# Patient Record
Sex: FEMALE | Race: WHITE | Employment: OTHER | URBAN - METROPOLITAN AREA
[De-identification: names, ages, dates, MRNs, and addresses within clinical notes are randomized per-mention and may not be internally consistent; named-entity substitution may affect disease eponyms.]

---

## 2017-04-13 ENCOUNTER — TELEPHONE (OUTPATIENT)
Dept: CARDIOLOGY CLINIC | Age: 67
End: 2017-04-13

## 2017-04-19 ENCOUNTER — OFFICE VISIT (OUTPATIENT)
Dept: CARDIOLOGY CLINIC | Age: 67
End: 2017-04-19

## 2017-04-19 VITALS
SYSTOLIC BLOOD PRESSURE: 116 MMHG | HEART RATE: 88 BPM | DIASTOLIC BLOOD PRESSURE: 60 MMHG | BODY MASS INDEX: 24.74 KG/M2 | HEIGHT: 60 IN | WEIGHT: 126 LBS

## 2017-04-19 DIAGNOSIS — R06.02 SOB (SHORTNESS OF BREATH): ICD-10-CM

## 2017-04-19 DIAGNOSIS — I10 ESSENTIAL HYPERTENSION: ICD-10-CM

## 2017-04-19 DIAGNOSIS — E78.2 MIXED HYPERLIPIDEMIA: Primary | ICD-10-CM

## 2017-04-19 PROCEDURE — 99212 OFFICE O/P EST SF 10 MIN: CPT | Performed by: INTERNAL MEDICINE

## 2017-04-25 ENCOUNTER — HOSPITAL ENCOUNTER (OUTPATIENT)
Dept: GENERAL RADIOLOGY | Age: 67
Discharge: HOME | End: 2017-04-25
Attending: FAMILY MEDICINE | Admitting: FAMILY MEDICINE

## 2017-04-25 DIAGNOSIS — R60.9 ACUTE EDEMA: ICD-10-CM

## 2017-05-02 LAB
BUN BLDV-MCNC: 15 MG/DL
CALCIUM SERPL-MCNC: 9.3 MG/DL
CHLORIDE BLD-SCNC: 92 MMOL/L
CO2: 43 MMOL/L
CREAT SERPL-MCNC: 0.7 MG/DL
GFR CALCULATED: NORMAL
GLUCOSE BLD-MCNC: 86 MG/DL
POTASSIUM SERPL-SCNC: 3.8 MMOL/L
SODIUM BLD-SCNC: 145 MMOL/L

## 2017-09-15 LAB
ALBUMIN SERPL-MCNC: 4.5 G/DL
ALP BLD-CCNC: 88 U/L
ALT SERPL-CCNC: 16 U/L
ANION GAP SERPL CALCULATED.3IONS-SCNC: NORMAL MMOL/L
AST SERPL-CCNC: 19 U/L
BILIRUB SERPL-MCNC: 0.4 MG/DL (ref 0.1–1.4)
BUN BLDV-MCNC: 33 MG/DL
CALCIUM SERPL-MCNC: 9.9 MG/DL
CHLORIDE BLD-SCNC: 93 MMOL/L
CHOLESTEROL, TOTAL: 201 MG/DL
CHOLESTEROL/HDL RATIO: ABNORMAL
CO2: 41 MMOL/L
CREAT SERPL-MCNC: 1.1 MG/DL
GFR CALCULATED: NORMAL
GLUCOSE BLD-MCNC: 106 MG/DL
HDLC SERPL-MCNC: 114 MG/DL (ref 35–70)
LDL CHOLESTEROL CALCULATED: 73 MG/DL (ref 0–160)
POTASSIUM SERPL-SCNC: 4.3 MMOL/L
SODIUM BLD-SCNC: 145 MMOL/L
TOTAL PROTEIN: 7.4
TRIGL SERPL-MCNC: 70 MG/DL
VLDLC SERPL CALC-MCNC: 14 MG/DL

## 2018-04-18 ENCOUNTER — OFFICE VISIT (OUTPATIENT)
Dept: CARDIOLOGY CLINIC | Age: 68
End: 2018-04-18

## 2018-04-18 VITALS
DIASTOLIC BLOOD PRESSURE: 70 MMHG | WEIGHT: 117.8 LBS | BODY MASS INDEX: 23.13 KG/M2 | HEIGHT: 60 IN | HEART RATE: 90 BPM | SYSTOLIC BLOOD PRESSURE: 116 MMHG

## 2018-04-18 DIAGNOSIS — R07.9 CHEST PAIN, UNSPECIFIED TYPE: ICD-10-CM

## 2018-04-18 DIAGNOSIS — R06.02 SOB (SHORTNESS OF BREATH): ICD-10-CM

## 2018-04-18 DIAGNOSIS — E78.2 MIXED HYPERLIPIDEMIA: ICD-10-CM

## 2018-04-18 DIAGNOSIS — I10 ESSENTIAL HYPERTENSION: Primary | ICD-10-CM

## 2018-04-18 PROCEDURE — 93000 ELECTROCARDIOGRAM COMPLETE: CPT | Performed by: INTERNAL MEDICINE

## 2018-04-18 PROCEDURE — 99213 OFFICE O/P EST LOW 20 MIN: CPT | Performed by: INTERNAL MEDICINE

## 2018-07-03 ENCOUNTER — HOSPITAL ENCOUNTER (OUTPATIENT)
Dept: WOMENS IMAGING | Age: 68
Discharge: HOME | End: 2018-07-03
Attending: FAMILY MEDICINE | Admitting: FAMILY MEDICINE

## 2018-07-03 ENCOUNTER — HOSPITAL ENCOUNTER (OUTPATIENT)
Dept: GENERAL RADIOLOGY | Age: 68
Discharge: HOME | End: 2018-07-03
Attending: INTERNAL MEDICINE | Admitting: INTERNAL MEDICINE

## 2018-07-03 DIAGNOSIS — J43.2 CENTRILOBULAR EMPHYSEMA (HCC): ICD-10-CM

## 2018-07-03 DIAGNOSIS — Z12.31 VISIT FOR SCREENING MAMMOGRAM: ICD-10-CM

## 2019-03-19 LAB
ALBUMIN SERPL-MCNC: 4.6 G/DL
ALP BLD-CCNC: 102 U/L
ALT SERPL-CCNC: 9 U/L
ANION GAP SERPL CALCULATED.3IONS-SCNC: ABNORMAL MMOL/L
AST SERPL-CCNC: 19 U/L
BILIRUB SERPL-MCNC: 0.4 MG/DL (ref 0.1–1.4)
BUN BLDV-MCNC: 21 MG/DL
CALCIUM SERPL-MCNC: 9.9 MG/DL
CHLORIDE BLD-SCNC: 93 MMOL/L
CHOLESTEROL, TOTAL: 174 MG/DL
CHOLESTEROL/HDL RATIO: ABNORMAL
CO2: 35 MMOL/L
CREAT SERPL-MCNC: 0.8 MG/DL
GFR CALCULATED: 75
GLUCOSE BLD-MCNC: 97 MG/DL
HDLC SERPL-MCNC: 79 MG/DL (ref 35–70)
LDL CHOLESTEROL CALCULATED: 73 MG/DL (ref 0–160)
POTASSIUM SERPL-SCNC: 4.7 MMOL/L
SODIUM BLD-SCNC: 142 MMOL/L
TOTAL PROTEIN: 7.2
TRIGL SERPL-MCNC: 109 MG/DL
VLDLC SERPL CALC-MCNC: 22 MG/DL

## 2019-04-16 ENCOUNTER — OFFICE VISIT (OUTPATIENT)
Dept: CARDIOLOGY CLINIC | Age: 69
End: 2019-04-16
Payer: COMMERCIAL

## 2019-04-16 VITALS
HEIGHT: 60 IN | DIASTOLIC BLOOD PRESSURE: 80 MMHG | SYSTOLIC BLOOD PRESSURE: 124 MMHG | HEART RATE: 86 BPM | BODY MASS INDEX: 25.05 KG/M2 | WEIGHT: 127.6 LBS

## 2019-04-16 DIAGNOSIS — I10 ESSENTIAL HYPERTENSION: Primary | ICD-10-CM

## 2019-04-16 DIAGNOSIS — E78.2 MIXED HYPERLIPIDEMIA: ICD-10-CM

## 2019-04-16 PROCEDURE — 99213 OFFICE O/P EST LOW 20 MIN: CPT | Performed by: NURSE PRACTITIONER

## 2019-04-16 NOTE — PROGRESS NOTES
[facility] [place]  [place]  Phone: [phone]    Fax [phone]                  Rafa Mcnulty MD, Nam Juarez MD, [address] Lee MD, MD Selin Trivedi MD Elida Christ, MD Jori Push, APRYUN Christensen, HELIO      4/16/2019    RE: Sanjana Foster  (1950)                               TO:  Dr. Junie Guadarrama MD  The primary cardiologist is Dr. Yessica Salvador    CC: HTN- HLD-  SOB/COPD  VHD    HPI:    Thank you for involving me in taking care of your patient Sanjana Foster. She is a 77y.o. year old female with a history of HTN- HLD - SOB/COPD- VHD and is being seen in the office today    She reports that she is doing fair. She notes on going SOB- using oxygen around the clock. She reports intermittent fleeting chest pain that is lasting for a second. Not related to activity. There is no palpitations or dizziness.         Vitals:    04/16/19 1055   BP: 124/80   Pulse: 86       Current Outpatient Medications   Medication Sig Dispense Refill    Umeclidinium Bromide (INCRUSE ELLIPTA) 62.5 MCG/INH AEPB Inhale 1 puff into the lungs daily 1 each 5    albuterol (PROVENTIL) (2.5 MG/3ML) 0.083% nebulizer solution Take 3 mLs by nebulization 4 times daily 120 each 5    LORazepam (ATIVAN) 0.5 MG tablet take 1 tablet by mouth three times a day if needed  0    furosemide (LASIX) 40 MG tablet Take 40 mg by mouth 2 times daily      fluticasone (FLONASE) 50 MCG/ACT nasal spray 1 spray by Nasal route daily 1 Bottle 3    GuaiFENesin (MUCINEX PO) Take by mouth      albuterol sulfate HFA (PROAIR HFA) 108 (90 BASE) MCG/ACT inhaler Inhale 2 puffs into the lungs every 6 hours as needed for Wheezing 1 Inhaler 6    Cholecalciferol (VITAMIN D3) 2000 UNITS CAPS Take 1 capsule by mouth daily      HYDROcodone-acetaminophen (NORCO)  MG per tablet   0    simvastatin (ZOCOR) 20 MG tablet Take 20 mg by mouth nightly.  aspirin 81 MG tablet Take 81 mg by mouth daily. No current facility-administered medications for this visit. Allergies: Formaldehyde; Gabapentin; Sosa Ephraim [aclidinium bromide]; and Cranberry  Past Medical History:   Diagnosis Date    COPD (chronic obstructive pulmonary disease) ([facility] [address]     H/O cardiovascular stress test 11/18/2009    thallium, normal no ischemia EF70%     H/O cardiovascular stress test 10/11/2013    thallium,EF70%, normal, no ischemia    H/O Doppler ultrasound 10/13/10    carotid, right normal, left normal    H/O Doppler ultrasound 10/07/14    Carotid mild bilateral internal carotid artery disease less than 50% in severity. Normal vertebral artery flows with good velocities. Incidental finding of possible thyroid nodule in the left lobe.  History of 24 hour EKG monitoring 3/11/2011    NSR no ectopy    History of nuclear stress test 10/21/2016    lexiscan-normal,no ischemia,EF70%,enlarged right ventricle    Hx of chest x-ray 01/02/2015    WNL    Hx of echocardiogram 10/11/2013    10/13 Abn lt ventricular diastolic function, mile MR, EF 57%,10/10 EF55%, mild mitral annular calcification    Hx of echocardiogram 10/21/2016    EF >55%. Normal chamber sizes. Normal LVSF. Mild aortic stenosis. Mild TR. Normal sized abdominal aorta.      Hx of pelvic x-ray 01/02/2015    Severe RT his osterarothrosis    Hx of x-ray of hip 01/02/2015    Severe Rt hip osteosrthrosis    Hyperlipidemia     Hypertension     Leg pain      Past Surgical History:   Procedure Laterality Date    BACK SURGERY      CYST REMOVAL      left arm    HYSTERECTOMY      1999    TUBAL LIGATION       Family History   Problem Relation Age of Onset    Stroke Mother     Dementia Mother     Heart Attack Mother 61    Cancer Father     Stroke Father     Heart Attack Father 61    Diabetes Sister     Heart Attack Sister     Hypertension Sister    [facility] Cancer Sister     Stroke Sister     Hypertension Sister     Hypertension Sister     Irritable Bowel Syndrome Sister      Social History     Tobacco Use    Smoking status: Current Some Day Smoker     Packs/day: 1.00     Years: 47.00     Pack years: 47.00    Smokeless tobacco: Never Used   Substance Use Topics    Alcohol use: Yes     Alcohol/week: 0.0 oz     Comment: rare        Review of Systems:   · Constitutional: No Fever,no unintentional weight Loss   · Eyes: No change in Vision:     · ENT: No Headaches, Hearing Loss or Vertigo. No tinnitus   · Cardiovascular: as per note above   · Respiratory: No cough or wheezing and as per note above. · Gastrointestinal: no abdominal pain, no appetite loss, no blood in stools, constipation, or diarrhea, No heartburn  · Genitourinary: No dysuria, trouble voiding, or hematuria  · Musculoskeletal: normal strength to legs and arms Yes,  back pain- Yes: myalgia No,  Arthralgia- No  · Integumentary: No rash or pruritis  · Neurological: No TIA or stroke symptoms  · Psychiatric: Anxiety- No: depression-  No   · Endocrine: No malaise-No, fatigue No,- no temperature intolerance  · Hematologic/Lymphatic: No bleeding problems, blood clots or swollen lymph nodes  · Allergic/Immunologic: No nasal congestion or hives    Objective:      Physical Exam:  /80   Pulse 86   Ht 5' (1.524 m)   Wt 127 lb 9.6 oz (57.9 kg)   BMI 24.92 kg/m²   Wt Readings from Last 3 Encounters:   04/16/19 127 lb 9.6 oz (57.9 kg)   01/14/19 121 lb 6.4 oz (55.1 kg)   09/18/18 118 lb (53.5 kg)     Body mass index is 24.92 kg/m². GENERAL - Alert, oriented, pleasant, in no apparent distress. Head unremarkable  Eyes - pupils equal and reactive to light - bilateral conjunctiva are pink: sclera are white   ENT - external ears intact, nose is intact:  tongue is midline pink and moist  Neck - Supple. No jugular venous distention noted. No carotid bruits appreciated.    Cardiovascular - Normal S1 and S2: murmur appreciated No, No gallop. Regular rate- Yes,  rhythm regular-Yes. Extremities - No cyanosis, clubbing, no edema to lower legs. Pulmonary - No respiratory distress. No wheezes or rales. Chest is clear  Pulses: Bilateral radial and pedal pulses normal  Abdomen -  Soft no tenderness, non distended   Musculoskeletal - Normal movement of all extremities   Neurologic - alert and oriented: There are no gross focal neurologic abnormalities. Skin-  No rash: No echymosis   Affect- normal mood and pleasant     DATA:  No results found for: CKTOTAL, CKMB, CKMBINDEX, TROPONINI  BNP:  No results found for: BNP  PT/INR:  No results found for: PTINR  No results found for: LABA1C  Lab Results   Component Value Date    CHOL 201 09/15/2017    TRIG 70 09/15/2017     (A) 09/15/2017    LDLCALC 73 09/15/2017     Lab Results   Component Value Date    ALT 16 09/15/2017    AST 19 09/15/2017     TSH:  No results found for: TSH      Assessment/ Plan:    Patient seen, interviewed and examined. Testing was reviewed. HTN    Controlled  advised low salt diet   Testing ordered:  no    Hyperlipidemia  At or near goal no recent labs will request copy   Current medications include: simvastatin   She is to continue current medications     SOB /COPD  On chronic oxygen    Stable     VHD  Mild AS   Will check echo     Lifestyle and risk factor modificatons discussed. Various goals are discussed and questions answered. Continue current medications. Appropriate prescriptions are addressed. Questions answered and patient verbalizes understanding. Call for any problems, questions, or concerns.

## 2019-04-16 NOTE — PATIENT INSTRUCTIONS
Please hold on to these instructions the  will call you within 1-9 business days when we receive authorization from your insurance. Echocardiogram    WHAT TO EXPECT:   ? This test will take approximately 45 minutes. ? It is an ultrasound of the heart. ? It can look at the valves and chambers inside the heart   ? There is no special instructions for this test.     If you are unable to keep this appointment, please notify us 24 hours prior to test at [phone].

## 2019-05-02 ENCOUNTER — PROCEDURE VISIT (OUTPATIENT)
Dept: CARDIOLOGY CLINIC | Age: 69
End: 2019-05-02
Payer: COMMERCIAL

## 2019-05-02 DIAGNOSIS — I10 ESSENTIAL HYPERTENSION: ICD-10-CM

## 2019-05-02 DIAGNOSIS — E78.2 MIXED HYPERLIPIDEMIA: ICD-10-CM

## 2019-05-02 DIAGNOSIS — I35.0 AORTIC VALVE STENOSIS, ETIOLOGY OF CARDIAC VALVE DISEASE UNSPECIFIED: Primary | ICD-10-CM

## 2019-05-02 LAB
LV EF: 58 %
LVEF MODALITY: NORMAL

## 2019-05-02 PROCEDURE — 93306 TTE W/DOPPLER COMPLETE: CPT | Performed by: INTERNAL MEDICINE

## 2019-05-08 ENCOUNTER — TELEPHONE (OUTPATIENT)
Dept: CARDIOLOGY CLINIC | Age: 69
End: 2019-05-08

## 2019-05-08 NOTE — TELEPHONE ENCOUNTER
LM on VM of echo results. Left ventricular systolic function is normal with an ejection fraction of   55-60%.  Grade I diastolic dysfunction.   Mild aortic stenosis with mean gradient of 11 mmHg.   Calcific thickening of posterior leaflet of Mitral valve.   Right ventricular systolic pressure of 38 mmHg consistent with mild   pulmonary hypertension.   No evidence of pericardial effusion.

## 2019-05-18 ENCOUNTER — ANESTHESIA EVENT (OUTPATIENT)
Dept: OPERATING ROOM | Age: 69
DRG: 343 | End: 2019-05-18
Payer: COMMERCIAL

## 2019-05-18 ENCOUNTER — HOSPITAL ENCOUNTER (INPATIENT)
Age: 69
LOS: 1 days | Discharge: HOME | DRG: 343 | End: 2019-05-19
Attending: EMERGENCY MEDICINE | Admitting: FAMILY MEDICINE
Payer: COMMERCIAL

## 2019-05-18 ENCOUNTER — APPOINTMENT (OUTPATIENT)
Dept: CT IMAGING | Age: 69
DRG: 343 | End: 2019-05-18
Payer: COMMERCIAL

## 2019-05-18 ENCOUNTER — ANESTHESIA (OUTPATIENT)
Dept: OPERATING ROOM | Age: 69
DRG: 343 | End: 2019-05-18
Payer: COMMERCIAL

## 2019-05-18 VITALS
SYSTOLIC BLOOD PRESSURE: 100 MMHG | OXYGEN SATURATION: 98 % | TEMPERATURE: 98.6 F | RESPIRATION RATE: 6 BRPM | DIASTOLIC BLOOD PRESSURE: 64 MMHG

## 2019-05-18 DIAGNOSIS — K35.80 ACUTE APPENDICITIS, UNSPECIFIED ACUTE APPENDICITIS TYPE: Primary | ICD-10-CM

## 2019-05-18 DIAGNOSIS — D72.829 LEUKOCYTOSIS, UNSPECIFIED TYPE: ICD-10-CM

## 2019-05-18 DIAGNOSIS — I71.40 ABDOMINAL AORTIC ANEURYSM (AAA) WITHOUT RUPTURE: ICD-10-CM

## 2019-05-18 DIAGNOSIS — R11.2 NON-INTRACTABLE VOMITING WITH NAUSEA, UNSPECIFIED VOMITING TYPE: ICD-10-CM

## 2019-05-18 DIAGNOSIS — K35.20 ACUTE APPENDICITIS WITH PERFORATION AND GENERALIZED PERITONITIS, WITHOUT ABSCESS OR GANGRENE: ICD-10-CM

## 2019-05-18 PROBLEM — Z72.0 TOBACCO ABUSE: Status: ACTIVE | Noted: 2019-05-18

## 2019-05-18 LAB
ALBUMIN SERPL-MCNC: 4.5 GM/DL (ref 3.4–5)
ALP BLD-CCNC: 90 IU/L (ref 40–129)
ALT SERPL-CCNC: 8 U/L (ref 10–40)
ANION GAP SERPL CALCULATED.3IONS-SCNC: 13 MMOL/L (ref 4–16)
AST SERPL-CCNC: 14 IU/L (ref 15–37)
BASOPHILS ABSOLUTE: 0 K/CU MM
BASOPHILS RELATIVE PERCENT: 0.1 % (ref 0–1)
BILIRUB SERPL-MCNC: 0.4 MG/DL (ref 0–1)
BUN BLDV-MCNC: 23 MG/DL (ref 6–23)
CALCIUM SERPL-MCNC: 10.1 MG/DL (ref 8.3–10.6)
CHLORIDE BLD-SCNC: 95 MMOL/L (ref 99–110)
CO2: 32 MMOL/L (ref 21–32)
CREAT SERPL-MCNC: 0.7 MG/DL (ref 0.6–1.1)
DIFFERENTIAL TYPE: ABNORMAL
EOSINOPHILS ABSOLUTE: 0 K/CU MM
EOSINOPHILS RELATIVE PERCENT: 0 % (ref 0–3)
GFR AFRICAN AMERICAN: >60 ML/MIN/1.73M2
GFR NON-AFRICAN AMERICAN: >60 ML/MIN/1.73M2
GLUCOSE BLD-MCNC: 166 MG/DL (ref 70–99)
HCT VFR BLD CALC: 44.4 % (ref 37–47)
HEMOGLOBIN: 13.4 GM/DL (ref 12.5–16)
IMMATURE NEUTROPHIL %: 0.4 % (ref 0–0.43)
LACTATE: 1.3 MMOL/L (ref 0.4–2)
LIPASE: 8 IU/L (ref 13–60)
LYMPHOCYTES ABSOLUTE: 0.6 K/CU MM
LYMPHOCYTES RELATIVE PERCENT: 3.9 % (ref 24–44)
MCH RBC QN AUTO: 27.6 PG (ref 27–31)
MCHC RBC AUTO-ENTMCNC: 30.2 % (ref 32–36)
MCV RBC AUTO: 91.4 FL (ref 78–100)
MONOCYTES ABSOLUTE: 0.3 K/CU MM
MONOCYTES RELATIVE PERCENT: 2.1 % (ref 0–4)
NUCLEATED RBC %: 0 %
PDW BLD-RTO: 13.4 % (ref 11.7–14.9)
PLATELET # BLD: 257 K/CU MM (ref 140–440)
PMV BLD AUTO: 10.7 FL (ref 7.5–11.1)
POTASSIUM SERPL-SCNC: 4.1 MMOL/L (ref 3.5–5.1)
RBC # BLD: 4.86 M/CU MM (ref 4.2–5.4)
SEGMENTED NEUTROPHILS ABSOLUTE COUNT: 15.3 K/CU MM
SEGMENTED NEUTROPHILS RELATIVE PERCENT: 93.5 % (ref 36–66)
SODIUM BLD-SCNC: 140 MMOL/L (ref 135–145)
TOTAL IMMATURE NEUTOROPHIL: 0.07 K/CU MM
TOTAL NUCLEATED RBC: 0 K/CU MM
TOTAL PROTEIN: 7.9 GM/DL (ref 6.4–8.2)
WBC # BLD: 16.3 K/CU MM (ref 4–10.5)

## 2019-05-18 PROCEDURE — 6360000002 HC RX W HCPCS: Performed by: NURSE ANESTHETIST, CERTIFIED REGISTERED

## 2019-05-18 PROCEDURE — 0DTJ4ZZ RESECTION OF APPENDIX, PERCUTANEOUS ENDOSCOPIC APPROACH: ICD-10-PCS | Performed by: SURGERY

## 2019-05-18 PROCEDURE — 2580000003 HC RX 258: Performed by: NURSE PRACTITIONER

## 2019-05-18 PROCEDURE — 3700000001 HC ADD 15 MINUTES (ANESTHESIA): Performed by: SURGERY

## 2019-05-18 PROCEDURE — G0378 HOSPITAL OBSERVATION PER HR: HCPCS

## 2019-05-18 PROCEDURE — 6360000002 HC RX W HCPCS: Performed by: ANESTHESIOLOGY

## 2019-05-18 PROCEDURE — 96372 THER/PROPH/DIAG INJ SC/IM: CPT

## 2019-05-18 PROCEDURE — 6370000000 HC RX 637 (ALT 250 FOR IP): Performed by: NURSE PRACTITIONER

## 2019-05-18 PROCEDURE — 74177 CT ABD & PELVIS W/CONTRAST: CPT

## 2019-05-18 PROCEDURE — 3700000000 HC ANESTHESIA ATTENDED CARE: Performed by: SURGERY

## 2019-05-18 PROCEDURE — 99223 1ST HOSP IP/OBS HIGH 75: CPT | Performed by: SURGERY

## 2019-05-18 PROCEDURE — 36415 COLL VENOUS BLD VENIPUNCTURE: CPT

## 2019-05-18 PROCEDURE — 1200000000 HC SEMI PRIVATE

## 2019-05-18 PROCEDURE — 83690 ASSAY OF LIPASE: CPT

## 2019-05-18 PROCEDURE — 6360000002 HC RX W HCPCS: Performed by: NURSE PRACTITIONER

## 2019-05-18 PROCEDURE — 7100000000 HC PACU RECOVERY - FIRST 15 MIN: Performed by: SURGERY

## 2019-05-18 PROCEDURE — 6360000004 HC RX CONTRAST MEDICATION: Performed by: PHYSICIAN ASSISTANT

## 2019-05-18 PROCEDURE — 99285 EMERGENCY DEPT VISIT HI MDM: CPT

## 2019-05-18 PROCEDURE — 2580000003 HC RX 258: Performed by: PHYSICIAN ASSISTANT

## 2019-05-18 PROCEDURE — 88304 TISSUE EXAM BY PATHOLOGIST: CPT

## 2019-05-18 PROCEDURE — 85025 COMPLETE CBC W/AUTO DIFF WBC: CPT

## 2019-05-18 PROCEDURE — 83605 ASSAY OF LACTIC ACID: CPT

## 2019-05-18 PROCEDURE — 3600000014 HC SURGERY LEVEL 4 ADDTL 15MIN: Performed by: SURGERY

## 2019-05-18 PROCEDURE — 2500000003 HC RX 250 WO HCPCS: Performed by: NURSE ANESTHETIST, CERTIFIED REGISTERED

## 2019-05-18 PROCEDURE — 96365 THER/PROPH/DIAG IV INF INIT: CPT

## 2019-05-18 PROCEDURE — 94640 AIRWAY INHALATION TREATMENT: CPT

## 2019-05-18 PROCEDURE — 96374 THER/PROPH/DIAG INJ IV PUSH: CPT

## 2019-05-18 PROCEDURE — 96376 TX/PRO/DX INJ SAME DRUG ADON: CPT

## 2019-05-18 PROCEDURE — 7100000001 HC PACU RECOVERY - ADDTL 15 MIN: Performed by: SURGERY

## 2019-05-18 PROCEDURE — 2709999900 HC NON-CHARGEABLE SUPPLY: Performed by: SURGERY

## 2019-05-18 PROCEDURE — 44970 LAPAROSCOPY APPENDECTOMY: CPT | Performed by: SURGERY

## 2019-05-18 PROCEDURE — 2580000003 HC RX 258: Performed by: NURSE ANESTHETIST, CERTIFIED REGISTERED

## 2019-05-18 PROCEDURE — 96375 TX/PRO/DX INJ NEW DRUG ADDON: CPT

## 2019-05-18 PROCEDURE — 80053 COMPREHEN METABOLIC PANEL: CPT

## 2019-05-18 PROCEDURE — 6360000002 HC RX W HCPCS: Performed by: PHYSICIAN ASSISTANT

## 2019-05-18 PROCEDURE — 2500000003 HC RX 250 WO HCPCS: Performed by: SURGERY

## 2019-05-18 PROCEDURE — 2720000010 HC SURG SUPPLY STERILE: Performed by: SURGERY

## 2019-05-18 PROCEDURE — 3600000004 HC SURGERY LEVEL 4 BASE: Performed by: SURGERY

## 2019-05-18 RX ORDER — ASPIRIN 81 MG/1
81 TABLET, CHEWABLE ORAL DAILY
Status: DISCONTINUED | OUTPATIENT
Start: 2019-05-18 | End: 2019-05-19 | Stop reason: HOSPADM

## 2019-05-18 RX ORDER — FAMOTIDINE 20 MG/1
20 TABLET, FILM COATED ORAL 2 TIMES DAILY
Status: DISCONTINUED | OUTPATIENT
Start: 2019-05-18 | End: 2019-05-19 | Stop reason: HOSPADM

## 2019-05-18 RX ORDER — MORPHINE SULFATE 4 MG/ML
2 INJECTION, SOLUTION INTRAMUSCULAR; INTRAVENOUS
Status: DISCONTINUED | OUTPATIENT
Start: 2019-05-18 | End: 2019-05-18 | Stop reason: SDUPTHER

## 2019-05-18 RX ORDER — MORPHINE SULFATE 4 MG/ML
1 INJECTION, SOLUTION INTRAMUSCULAR; INTRAVENOUS
Status: DISCONTINUED | OUTPATIENT
Start: 2019-05-18 | End: 2019-05-19 | Stop reason: HOSPADM

## 2019-05-18 RX ORDER — ONDANSETRON 2 MG/ML
4 INJECTION INTRAMUSCULAR; INTRAVENOUS ONCE
Status: COMPLETED | OUTPATIENT
Start: 2019-05-18 | End: 2019-05-18

## 2019-05-18 RX ORDER — HYDROMORPHONE HCL 110MG/55ML
0.5 PATIENT CONTROLLED ANALGESIA SYRINGE INTRAVENOUS EVERY 5 MIN PRN
Status: DISCONTINUED | OUTPATIENT
Start: 2019-05-18 | End: 2019-05-18 | Stop reason: HOSPADM

## 2019-05-18 RX ORDER — OXYCODONE HYDROCHLORIDE AND ACETAMINOPHEN 5; 325 MG/1; MG/1
1 TABLET ORAL EVERY 6 HOURS PRN
Status: DISCONTINUED | OUTPATIENT
Start: 2019-05-18 | End: 2019-05-19 | Stop reason: HOSPADM

## 2019-05-18 RX ORDER — MORPHINE SULFATE 4 MG/ML
2 INJECTION, SOLUTION INTRAMUSCULAR; INTRAVENOUS
Status: DISCONTINUED | OUTPATIENT
Start: 2019-05-18 | End: 2019-05-19 | Stop reason: HOSPADM

## 2019-05-18 RX ORDER — SUCCINYLCHOLINE/SOD CL,ISO/PF 100 MG/5ML
SYRINGE (ML) INTRAVENOUS PRN
Status: DISCONTINUED | OUTPATIENT
Start: 2019-05-18 | End: 2019-05-18 | Stop reason: SDUPTHER

## 2019-05-18 RX ORDER — SODIUM CHLORIDE 9 MG/ML
INJECTION, SOLUTION INTRAVENOUS CONTINUOUS
Status: DISCONTINUED | OUTPATIENT
Start: 2019-05-18 | End: 2019-05-19 | Stop reason: HOSPADM

## 2019-05-18 RX ORDER — ACETAMINOPHEN 10 MG/ML
1000 INJECTION, SOLUTION INTRAVENOUS ONCE
Status: COMPLETED | OUTPATIENT
Start: 2019-05-18 | End: 2019-05-18

## 2019-05-18 RX ORDER — SODIUM CHLORIDE 0.9 % (FLUSH) 0.9 %
10 SYRINGE (ML) INJECTION PRN
Status: DISCONTINUED | OUTPATIENT
Start: 2019-05-18 | End: 2019-05-18 | Stop reason: SDUPTHER

## 2019-05-18 RX ORDER — PROPOFOL 10 MG/ML
INJECTION, EMULSION INTRAVENOUS PRN
Status: DISCONTINUED | OUTPATIENT
Start: 2019-05-18 | End: 2019-05-18 | Stop reason: SDUPTHER

## 2019-05-18 RX ORDER — FUROSEMIDE 40 MG/1
40 TABLET ORAL 2 TIMES DAILY
Status: DISCONTINUED | OUTPATIENT
Start: 2019-05-18 | End: 2019-05-19 | Stop reason: HOSPADM

## 2019-05-18 RX ORDER — ONDANSETRON 2 MG/ML
INJECTION INTRAMUSCULAR; INTRAVENOUS PRN
Status: DISCONTINUED | OUTPATIENT
Start: 2019-05-18 | End: 2019-05-18 | Stop reason: SDUPTHER

## 2019-05-18 RX ORDER — ALBUTEROL SULFATE 2.5 MG/3ML
2.5 SOLUTION RESPIRATORY (INHALATION) 4 TIMES DAILY
Status: DISCONTINUED | OUTPATIENT
Start: 2019-05-18 | End: 2019-05-19 | Stop reason: HOSPADM

## 2019-05-18 RX ORDER — ONDANSETRON 2 MG/ML
4 INJECTION INTRAMUSCULAR; INTRAVENOUS EVERY 6 HOURS PRN
Status: DISCONTINUED | OUTPATIENT
Start: 2019-05-18 | End: 2019-05-18 | Stop reason: SDUPTHER

## 2019-05-18 RX ORDER — DICYCLOMINE HYDROCHLORIDE 10 MG/ML
20 INJECTION INTRAMUSCULAR ONCE
Status: COMPLETED | OUTPATIENT
Start: 2019-05-18 | End: 2019-05-18

## 2019-05-18 RX ORDER — 0.9 % SODIUM CHLORIDE 0.9 %
1000 INTRAVENOUS SOLUTION INTRAVENOUS ONCE
Status: DISCONTINUED | OUTPATIENT
Start: 2019-05-18 | End: 2019-05-18

## 2019-05-18 RX ORDER — FLUTICASONE PROPIONATE 50 MCG
1 SPRAY, SUSPENSION (ML) NASAL DAILY
Status: DISCONTINUED | OUTPATIENT
Start: 2019-05-19 | End: 2019-05-19 | Stop reason: HOSPADM

## 2019-05-18 RX ORDER — MEPERIDINE HYDROCHLORIDE 25 MG/ML
12.5 INJECTION INTRAMUSCULAR; INTRAVENOUS; SUBCUTANEOUS EVERY 5 MIN PRN
Status: DISCONTINUED | OUTPATIENT
Start: 2019-05-18 | End: 2019-05-18 | Stop reason: HOSPADM

## 2019-05-18 RX ORDER — SODIUM CHLORIDE 0.9 % (FLUSH) 0.9 %
10 SYRINGE (ML) INJECTION EVERY 12 HOURS SCHEDULED
Status: DISCONTINUED | OUTPATIENT
Start: 2019-05-18 | End: 2019-05-18 | Stop reason: SDUPTHER

## 2019-05-18 RX ORDER — PIPERACILLIN SODIUM, TAZOBACTAM SODIUM 3; .375 G/15ML; G/15ML
INJECTION, POWDER, LYOPHILIZED, FOR SOLUTION INTRAVENOUS PRN
Status: DISCONTINUED | OUTPATIENT
Start: 2019-05-18 | End: 2019-05-18 | Stop reason: SDUPTHER

## 2019-05-18 RX ORDER — ACETAMINOPHEN 325 MG/1
650 TABLET ORAL EVERY 4 HOURS PRN
Status: DISCONTINUED | OUTPATIENT
Start: 2019-05-18 | End: 2019-05-19 | Stop reason: HOSPADM

## 2019-05-18 RX ORDER — GUAIFENESIN 600 MG/1
600 TABLET, EXTENDED RELEASE ORAL 2 TIMES DAILY PRN
Status: DISCONTINUED | OUTPATIENT
Start: 2019-05-18 | End: 2019-05-19 | Stop reason: HOSPADM

## 2019-05-18 RX ORDER — SODIUM CHLORIDE 9 MG/ML
INJECTION, SOLUTION INTRAVENOUS CONTINUOUS PRN
Status: DISCONTINUED | OUTPATIENT
Start: 2019-05-18 | End: 2019-05-18 | Stop reason: SDUPTHER

## 2019-05-18 RX ORDER — 0.9 % SODIUM CHLORIDE 0.9 %
10 VIAL (ML) INJECTION
Status: COMPLETED | OUTPATIENT
Start: 2019-05-18 | End: 2019-05-18

## 2019-05-18 RX ORDER — LABETALOL HYDROCHLORIDE 5 MG/ML
5 INJECTION, SOLUTION INTRAVENOUS EVERY 10 MIN PRN
Status: DISCONTINUED | OUTPATIENT
Start: 2019-05-18 | End: 2019-05-18 | Stop reason: HOSPADM

## 2019-05-18 RX ORDER — FENTANYL CITRATE 50 UG/ML
50 INJECTION, SOLUTION INTRAMUSCULAR; INTRAVENOUS EVERY 5 MIN PRN
Status: DISCONTINUED | OUTPATIENT
Start: 2019-05-18 | End: 2019-05-18 | Stop reason: HOSPADM

## 2019-05-18 RX ORDER — DEXAMETHASONE SODIUM PHOSPHATE 4 MG/ML
INJECTION, SOLUTION INTRA-ARTICULAR; INTRALESIONAL; INTRAMUSCULAR; INTRAVENOUS; SOFT TISSUE PRN
Status: DISCONTINUED | OUTPATIENT
Start: 2019-05-18 | End: 2019-05-18 | Stop reason: SDUPTHER

## 2019-05-18 RX ORDER — FENTANYL CITRATE 50 UG/ML
INJECTION, SOLUTION INTRAMUSCULAR; INTRAVENOUS PRN
Status: DISCONTINUED | OUTPATIENT
Start: 2019-05-18 | End: 2019-05-18 | Stop reason: SDUPTHER

## 2019-05-18 RX ORDER — MORPHINE SULFATE 4 MG/ML
4 INJECTION, SOLUTION INTRAMUSCULAR; INTRAVENOUS ONCE
Status: COMPLETED | OUTPATIENT
Start: 2019-05-18 | End: 2019-05-18

## 2019-05-18 RX ORDER — HYDRALAZINE HYDROCHLORIDE 20 MG/ML
5 INJECTION INTRAMUSCULAR; INTRAVENOUS EVERY 10 MIN PRN
Status: DISCONTINUED | OUTPATIENT
Start: 2019-05-18 | End: 2019-05-18 | Stop reason: HOSPADM

## 2019-05-18 RX ORDER — SODIUM CHLORIDE 0.9 % (FLUSH) 0.9 %
10 SYRINGE (ML) INJECTION PRN
Status: DISCONTINUED | OUTPATIENT
Start: 2019-05-18 | End: 2019-05-19 | Stop reason: HOSPADM

## 2019-05-18 RX ORDER — ALBUTEROL SULFATE 90 UG/1
2 AEROSOL, METERED RESPIRATORY (INHALATION) EVERY 6 HOURS PRN
Status: DISCONTINUED | OUTPATIENT
Start: 2019-05-18 | End: 2019-05-19 | Stop reason: HOSPADM

## 2019-05-18 RX ORDER — SODIUM CHLORIDE 9 MG/ML
INJECTION, SOLUTION INTRAVENOUS
Status: DISPENSED
Start: 2019-05-18 | End: 2019-05-19

## 2019-05-18 RX ORDER — LIDOCAINE HYDROCHLORIDE 20 MG/ML
INJECTION, SOLUTION INTRAVENOUS PRN
Status: DISCONTINUED | OUTPATIENT
Start: 2019-05-18 | End: 2019-05-18 | Stop reason: SDUPTHER

## 2019-05-18 RX ORDER — SIMVASTATIN 20 MG
20 TABLET ORAL NIGHTLY
Status: DISCONTINUED | OUTPATIENT
Start: 2019-05-18 | End: 2019-05-19 | Stop reason: HOSPADM

## 2019-05-18 RX ORDER — NICOTINE 21 MG/24HR
1 PATCH, TRANSDERMAL 24 HOURS TRANSDERMAL DAILY
Status: DISCONTINUED | OUTPATIENT
Start: 2019-05-18 | End: 2019-05-19 | Stop reason: HOSPADM

## 2019-05-18 RX ORDER — SODIUM CHLORIDE 0.9 % (FLUSH) 0.9 %
10 SYRINGE (ML) INJECTION EVERY 12 HOURS SCHEDULED
Status: DISCONTINUED | OUTPATIENT
Start: 2019-05-18 | End: 2019-05-19 | Stop reason: HOSPADM

## 2019-05-18 RX ORDER — PROMETHAZINE HYDROCHLORIDE 25 MG/ML
6.25 INJECTION, SOLUTION INTRAMUSCULAR; INTRAVENOUS
Status: DISCONTINUED | OUTPATIENT
Start: 2019-05-18 | End: 2019-05-18 | Stop reason: HOSPADM

## 2019-05-18 RX ORDER — ROCURONIUM BROMIDE 10 MG/ML
INJECTION, SOLUTION INTRAVENOUS PRN
Status: DISCONTINUED | OUTPATIENT
Start: 2019-05-18 | End: 2019-05-18 | Stop reason: SDUPTHER

## 2019-05-18 RX ORDER — ONDANSETRON 2 MG/ML
4 INJECTION INTRAMUSCULAR; INTRAVENOUS EVERY 6 HOURS PRN
Status: DISCONTINUED | OUTPATIENT
Start: 2019-05-18 | End: 2019-05-19 | Stop reason: HOSPADM

## 2019-05-18 RX ORDER — BUPIVACAINE HYDROCHLORIDE 5 MG/ML
INJECTION, SOLUTION EPIDURAL; INTRACAUDAL
Status: COMPLETED | OUTPATIENT
Start: 2019-05-18 | End: 2019-05-18

## 2019-05-18 RX ORDER — DIPHENHYDRAMINE HYDROCHLORIDE 50 MG/ML
12.5 INJECTION INTRAMUSCULAR; INTRAVENOUS
Status: DISCONTINUED | OUTPATIENT
Start: 2019-05-18 | End: 2019-05-18 | Stop reason: HOSPADM

## 2019-05-18 RX ORDER — 0.9 % SODIUM CHLORIDE 0.9 %
1000 INTRAVENOUS SOLUTION INTRAVENOUS ONCE
Status: COMPLETED | OUTPATIENT
Start: 2019-05-18 | End: 2019-05-18

## 2019-05-18 RX ORDER — LORAZEPAM 0.5 MG/1
0.5 TABLET ORAL
Status: DISCONTINUED | OUTPATIENT
Start: 2019-05-18 | End: 2019-05-19 | Stop reason: HOSPADM

## 2019-05-18 RX ADMIN — ALBUTEROL SULFATE 2.5 MG: 2.5 SOLUTION RESPIRATORY (INHALATION) at 20:18

## 2019-05-18 RX ADMIN — ONDANSETRON 4 MG: 2 INJECTION INTRAMUSCULAR; INTRAVENOUS at 13:49

## 2019-05-18 RX ADMIN — SODIUM CHLORIDE 1000 ML: 9 INJECTION, SOLUTION INTRAVENOUS at 11:51

## 2019-05-18 RX ADMIN — ACETAMINOPHEN 1000 MG: 10 INJECTION, SOLUTION INTRAVENOUS at 16:47

## 2019-05-18 RX ADMIN — SODIUM CHLORIDE, PRESERVATIVE FREE 10 ML: 5 INJECTION INTRAVENOUS at 12:12

## 2019-05-18 RX ADMIN — SODIUM CHLORIDE: 9 INJECTION, SOLUTION INTRAVENOUS at 16:52

## 2019-05-18 RX ADMIN — SIMVASTATIN 20 MG: 20 TABLET, FILM COATED ORAL at 21:37

## 2019-05-18 RX ADMIN — ROCURONIUM BROMIDE 30 MG: 50 INJECTION, SOLUTION INTRAVENOUS at 15:40

## 2019-05-18 RX ADMIN — LIDOCAINE HYDROCHLORIDE 100 MG: 20 INJECTION, SOLUTION INTRAVENOUS at 15:36

## 2019-05-18 RX ADMIN — DEXAMETHASONE SODIUM PHOSPHATE 8 MG: 4 INJECTION, SOLUTION INTRAMUSCULAR; INTRAVENOUS at 15:41

## 2019-05-18 RX ADMIN — ROCURONIUM BROMIDE 10 MG: 50 INJECTION, SOLUTION INTRAVENOUS at 15:55

## 2019-05-18 RX ADMIN — PHENYLEPHRINE HYDROCHLORIDE 200 MCG: 10 INJECTION INTRAVENOUS at 15:40

## 2019-05-18 RX ADMIN — IOPAMIDOL 75 ML: 755 INJECTION, SOLUTION INTRAVENOUS at 12:11

## 2019-05-18 RX ADMIN — PIPERACILLIN SODIUM AND TAZOBACTAM SODIUM 3.38 G: .375; 3 INJECTION, POWDER, LYOPHILIZED, FOR SOLUTION INTRAVENOUS at 15:46

## 2019-05-18 RX ADMIN — ASPIRIN 81 MG 81 MG: 81 TABLET ORAL at 18:30

## 2019-05-18 RX ADMIN — FENTANYL CITRATE 100 MCG: 50 INJECTION INTRAMUSCULAR; INTRAVENOUS at 15:36

## 2019-05-18 RX ADMIN — Medication 100 MG: at 15:36

## 2019-05-18 RX ADMIN — PHENYLEPHRINE HYDROCHLORIDE 150 MCG: 10 INJECTION INTRAVENOUS at 15:45

## 2019-05-18 RX ADMIN — PHENYLEPHRINE HYDROCHLORIDE 150 MCG: 10 INJECTION INTRAVENOUS at 15:50

## 2019-05-18 RX ADMIN — DICYCLOMINE HYDROCHLORIDE 20 MG: 20 INJECTION, SOLUTION INTRAMUSCULAR at 11:51

## 2019-05-18 RX ADMIN — MORPHINE SULFATE 4 MG: 4 INJECTION INTRAVENOUS at 13:48

## 2019-05-18 RX ADMIN — PIPERACILLIN SODIUM,TAZOBACTAM SODIUM 3.38 G: 3; .375 INJECTION, POWDER, FOR SOLUTION INTRAVENOUS at 13:48

## 2019-05-18 RX ADMIN — ONDANSETRON 4 MG: 2 INJECTION INTRAMUSCULAR; INTRAVENOUS at 11:51

## 2019-05-18 RX ADMIN — SUGAMMADEX 200 MG: 100 INJECTION, SOLUTION INTRAVENOUS at 16:15

## 2019-05-18 RX ADMIN — ROCURONIUM BROMIDE 10 MG: 50 INJECTION, SOLUTION INTRAVENOUS at 16:00

## 2019-05-18 RX ADMIN — FAMOTIDINE 20 MG: 20 TABLET ORAL at 21:37

## 2019-05-18 RX ADMIN — SODIUM CHLORIDE: 9 INJECTION, SOLUTION INTRAVENOUS at 14:59

## 2019-05-18 RX ADMIN — ONDANSETRON 4 MG: 2 INJECTION INTRAMUSCULAR; INTRAVENOUS at 15:48

## 2019-05-18 RX ADMIN — PROPOFOL 120 MG: 10 INJECTION, EMULSION INTRAVENOUS at 15:36

## 2019-05-18 ASSESSMENT — PULMONARY FUNCTION TESTS
PIF_VALUE: 18
PIF_VALUE: 40
PIF_VALUE: 19
PIF_VALUE: 9
PIF_VALUE: 32
PIF_VALUE: 18
PIF_VALUE: 19
PIF_VALUE: 18
PIF_VALUE: 21
PIF_VALUE: 5
PIF_VALUE: 18
PIF_VALUE: 31
PIF_VALUE: 22
PIF_VALUE: 32
PIF_VALUE: 32
PIF_VALUE: 1
PIF_VALUE: 25
PIF_VALUE: 32
PIF_VALUE: 21
PIF_VALUE: 19
PIF_VALUE: 32
PIF_VALUE: 18
PIF_VALUE: 43
PIF_VALUE: 33
PIF_VALUE: 18
PIF_VALUE: 21
PIF_VALUE: 18
PIF_VALUE: 33
PIF_VALUE: 20
PIF_VALUE: 24
PIF_VALUE: 17
PIF_VALUE: 18
PIF_VALUE: 18
PIF_VALUE: 26
PIF_VALUE: 20
PIF_VALUE: 23
PIF_VALUE: 18
PIF_VALUE: 20
PIF_VALUE: 19
PIF_VALUE: 30
PIF_VALUE: 18

## 2019-05-18 ASSESSMENT — PAIN SCALES - GENERAL
PAINLEVEL_OUTOF10: 10
PAINLEVEL_OUTOF10: 0
PAINLEVEL_OUTOF10: 0
PAINLEVEL_OUTOF10: 8
PAINLEVEL_OUTOF10: 0

## 2019-05-18 ASSESSMENT — ENCOUNTER SYMPTOMS
STRIDOR: 0
APNEA: 0
PHOTOPHOBIA: 0
VOMITING: 1
SORE THROAT: 0
CONSTIPATION: 0
EYE ITCHING: 0
ANAL BLEEDING: 0
CHOKING: 0
BACK PAIN: 0
EYE REDNESS: 0
RECTAL PAIN: 0
ABDOMINAL PAIN: 1
SHORTNESS OF BREATH: 1
COLOR CHANGE: 0

## 2019-05-18 ASSESSMENT — PAIN DESCRIPTION - LOCATION: LOCATION: ABDOMEN

## 2019-05-18 ASSESSMENT — COPD QUESTIONNAIRES: CAT_SEVERITY: SEVERE

## 2019-05-18 ASSESSMENT — LIFESTYLE VARIABLES: SMOKING_STATUS: 1

## 2019-05-18 ASSESSMENT — PAIN DESCRIPTION - PAIN TYPE: TYPE: ACUTE PAIN

## 2019-05-18 NOTE — H&P
History and Physical  HELIO Sahu-BC   Internal Medicine Hospitalist        Name:  Susana King /Age/Sex: 1950  (77 y.o. female)   MRN & CSN:  [id number] & [id number] Admission Date/Time: 2019 11:01 AM   Location:  [facility] PCP: Farideh Polanco, 29 erica Singh Day: 1      Supervising Physician: Dr. Shell Vargas    Chief Complaint: Abdominal Pain (vomiting since early this morning)     Assessment and Plan:   Susana King is a 77 y.o.  female who presents with Acute appendicitis       Abdominal pain, 2/2 Acute appendicitis - WBC 16.3, CT Abdomen shows right hemipelvic findings suggesting acute appendicitis. - admit inpatient, telemetry         - General Surgery, Dr. Kasie Anderson consulted, for possible appendectomy         - NPO effective now         - cont Zosyn, Frist dose in ED         - cont IVF         - control pain: PRN Morphine         - check lab works in AM     Ruelas AAA without rupture - CT Abd also shows Abdominal aortic atherosclerosis with 2.6 cm distal infrarenal AAA, recommendations for abdominal aortas with maximum diameter of 2.6-2.9 cm meeting criteria  for AAA (>50% of proximal normal segment). - recommended annual f/u; outpatient f/u w/ PCP         Tobacco abuse - reports smoking cigarettes 1 PPD; Tobacco cessation education, Nicotine patch.  COPD - not in exacerbation, stable at room air w/ O2 sat >95%, continue home breathing treatment.  Chronic Illnesses: will continue current home medications unless contraindicated by above plan and assessment. - hyperlipidemia - cont daily Zocor         - hypertension - stable     Current diagnosis and plan of management discussed with the patient at the time of admission in lay language who agree(s) to the above plan and disposition of admission for further care. All concerns and questions addressed.       Patient assessment and plan in conjunction with supervising physician - Dr. Angel Cooper

## 2019-05-18 NOTE — ANESTHESIA POSTPROCEDURE EVALUATION
Department of Anesthesiology  Postprocedure Note    Patient: Dutch Orellana  MRN: [id number]  YOB: 1950  Date of evaluation: 5/18/2019  Time:  5:22 PM     Procedure Summary     Date:  05/18/19 Room / Location:  [place] [address] / [facility] OR    Anesthesia Start:  0937 Anesthesia Stop:  9811    Procedure:  APPENDECTOMY LAPAROSCOPIC (N/A Abdomen) Diagnosis:  (ac appendicitis)    Surgeon:  David Buitrago MD Responsible Provider:  Alicia Beauchamp DO    Anesthesia Type:  general ASA Status:  3 - Emergent          Anesthesia Type: general    Sam Phase I: Sam Score: 8    Sam Phase II:      Last vitals: Reviewed and per EMR flowsheets.        Anesthesia Post Evaluation    Patient location during evaluation: PACU  Patient participation: complete - patient participated  Level of consciousness: sleepy but conscious  Airway patency: patent  Nausea & Vomiting: no nausea  Complications: no  Cardiovascular status: hemodynamically stable  Respiratory status: acceptable  Hydration status: euvolemic

## 2019-05-18 NOTE — ED PROVIDER NOTES
Cholecalciferol (VITAMIN D3) 2000 UNITS CAPS Take 1 capsule by mouth daily      HYDROcodone-acetaminophen (NORCO)  MG per tablet   0    simvastatin (ZOCOR) 20 MG tablet Take 20 mg by mouth nightly.  aspirin 81 MG tablet Take 81 mg by mouth daily. ALLERGIES    Allergies   Allergen Reactions    Formaldehyde     Gabapentin Other (See Comments)    Tudorza Pressair [Aclidinium Bromide]      Pt states it gave her a rash on her ankles and knees.  Cranberry Rash       SOCIAL AND FAMILY HISTORY    Social History     Socioeconomic History    Marital status:      Spouse name: None    Number of children: None    Years of education: None    Highest education level: None   Occupational History    None   Social Needs    Financial resource strain: None    Food insecurity:     Worry: None     Inability: None    Transportation needs:     Medical: None     Non-medical: None   Tobacco Use    Smoking status: Current Some Day Smoker     Packs/day: 1.00     Years: 47.00     Pack years: 47.00    Smokeless tobacco: Never Used   Substance and Sexual Activity    Alcohol use:  Yes     Alcohol/week: 0.0 oz     Comment: rare    Drug use: No    Sexual activity: None     Comment:    Lifestyle    Physical activity:     Days per week: None     Minutes per session: None    Stress: None   Relationships    Social connections:     Talks on phone: None     Gets together: None     Attends [employer] service: None     Active member of club or organization: None     Attends meetings of clubs or organizations: None     Relationship status: None    Intimate partner violence:     Fear of current or ex partner: None     Emotionally abused: None     Physically abused: None     Forced sexual activity: None   Other Topics Concern    None   Social History Narrative    None     Family History   Problem Relation Age of Onset    Stroke Mother     Dementia Mother     Heart Attack Mother 61    Cancer Lymphocytes % 3.9 (L) 24 - 44 %    Monocytes % 2.1 0 - 4 %    Eosinophils % 0.0 0 - 3 %    Basophils % 0.1 0 - 1 %    Segs Absolute 15.3 K/CU MM    Lymphocytes # 0.6 K/CU MM    Monocytes # 0.3 K/CU MM    Eosinophils # 0.0 K/CU MM    Basophils # 0.0 K/CU MM    Nucleated RBC % 0.0 %    Total Nucleated RBC 0.0 K/CU MM    Total Immature Neutrophil 0.07 K/CU MM    Immature Neutrophil % 0.4 0 - 0.43 %   CMP   Result Value Ref Range    Sodium 140 135 - 145 MMOL/L    Potassium 4.1 3.5 - 5.1 MMOL/L    Chloride 95 (L) 99 - 110 mMol/L    CO2 32 21 - 32 MMOL/L    BUN 23 6 - 23 MG/DL    CREATININE 0.7 0.6 - 1.1 MG/DL    Glucose 166 (H) 70 - 99 MG/DL    Calcium 10.1 8.3 - 10.6 MG/DL    Alb 4.5 3.4 - 5.0 GM/DL    Total Protein 7.9 6.4 - 8.2 GM/DL    Total Bilirubin 0.4 0.0 - 1.0 MG/DL    ALT 8 (L) 10 - 40 U/L    AST 14 (L) 15 - 37 IU/L    Alkaline Phosphatase 90 40 - 129 IU/L    GFR Non-African American >60 >60 mL/min/1.73m2    GFR African American >60 >60 mL/min/1.73m2    Anion Gap 13 4 - 16   Lipase   Result Value Ref Range    Lipase 8 (L) 13 - 60 IU/L   Lactic Acid, Plasma   Result Value Ref Range    Lactate 1.3 0.4 - 2.0 mMOL/L           RADIOLOGY/PROCEDURES        CT ABDOMEN PELVIS W IV CONTRAST (Preliminary result)   Result time 05/18/19 12:51:18   Preliminary result by Dawson Bearden MD (05/18/19 12:51:18)                Impression:    1. Prior right hip arthroplasty results in streak artifact through the right  aspect of the pelvis which severely limits the exam.  There are right  hemipelvic findings suggesting acute appendicitis though the appendiceal  origin is not identified due to the artifact.   No evidence of bowel  obstruction, perforation, or abscess. 2. Nonspecific jejunal wall thickening which may relate to nondistention  versus enteritis. 3. Bilateral nonobstructing nephrolithiasis.   4. Abdominal aortic atherosclerosis with 2.6 cm distal infrarenal abdominal  aortic aneurysm, see vasculature  appear unremarkable.  The gallbladder, common bile duct, spleen, and adrenals  are normal.  The kidneys are symmetric in size with normal parenchymal  enhancement.  Bilateral non-obstructing nephrolithiasis ranging from 0.2-0.5  cm.  The pancreas demonstrates no acute abnormality. The stomach and duodenum are normal.  Proximal to mid jejunal loops  demonstrate circumferential wall and mucosal fold thickening with no  significant distention.  The distal jejunum and ileum appear unremarkable. The cecum extends into the right hemipelvis where there is extensive streak  artifact related to the right hip arthroplasty which limits the exam.  The  colon is decompressed throughout with left hemicolonic diverticulosis.  No  ascites or free air.  No abscess.  No focal mesenteric inflammatory changes. There is a small umbilical fat containing hernia. Pelvis:  The bladder is decompressed and unremarkable.  The rectum is normal.  Prior hysterectomy.  No ascites.  Along the right lateral aspect of the  pelvis there is subtle fat induration.  In the mid to posterior aspect there  is a fluid distended blind-ending tube which demonstrates central fluid  opacification measuring 1 cm in diameter.  I believe this represents an  acutely inflamed appendix though the origin cannot be identified due to the  hip arthroplasty artifact.  No evidence of an abscess or free air.     Musculoskeletal structures:  No diffuse body wall edema.  No significant  inguinal lymphadenopathy.  The lumbar spine demonstrates normal curvature  with multilevel degenerative changes and grade 1 anterolisthesis of L4 with  respect to 5.  Normal bilateral sacroiliac alignments.  The native left hip  demonstrates normal alignment with moderate-severe osteoarthritis.  Pubic  symphysis degeneration.  Prior right hip arthroplasty which demonstrates  normal alignment.                Preliminary result by Braydon Holm MD (05/18/19 12:46:24) leukocytosis. CT of the abdomen and pelvis shows evidence of acute appendicitis with some streaking, limitation from previous right hip arthroplasty. Incidental findings of jejunal wall thickening, nonobstructing nephrolithiasis and 2.6 cm infrarenal abdominal aortic aneurysm. I touched base with this patient she is not seen Gen. surgery in the past. She is not septic currently afebrile. Discussed remaining nothing by mouth. I will touch base with general surgery and the hospice regarding admission. Patient to remain nothing by mouth, given Zosyn.          ----------------------------------------------------------                   Clinical  IMPRESSION    1. Acute appendicitis, unspecified acute appendicitis type    2. Non-intractable vomiting with nausea, unspecified vomiting type    3. Leukocytosis, unspecified type    4. Abdominal aortic aneurysm (AAA) without rupture [facility])        Admission    Comment: Please note this report has been produced using speech recognition software and may contain errors related to that system including errors in grammar, punctuation, and spelling, as well as words and phrases that may be inappropriate. If there are any questions or concerns please feel free to contact the dictating provider for clarification. Celestine Wyatt PA-C  05/18/19 2959    I spoke with Dr. Janay Bullock who is comfortable with this work up and plan. Pt to remain NPO.         Celestine Wyatt PA-C  05/18/19 3016

## 2019-05-18 NOTE — ANESTHESIA PRE PROCEDURE
Department of Anesthesiology  Preprocedure Note       Name:  Leonel Brandon   Age:  77 y.o.  :  1950                                          MRN:  [id number]         Date:  2019      Surgeon: Cici Jaquez):  Quita Gutierrez MD    Procedure: APPENDECTOMY LAPAROSCOPIC (N/A Abdomen)    Medications prior to admission:   Prior to Admission medications    Medication Sig Start Date End Date Taking? Authorizing Provider   calcium-vitamin D (OSCAL) 250-125 MG-UNIT per tablet Take 1 tablet by mouth daily   Yes Historical Provider, MD   ipratropium-albuterol (DUONEB) 0.5-2.5 (3) MG/3ML SOLN nebulizer solution Inhale 1 vial into the lungs every 4 hours as needed  19  Yes Historical Provider, MD   CHANTIX CONTINUING MONTH CELSA 1 MG tablet 1 mg 2 times daily  19  Yes Historical Provider, MD   Umeclidinium Bromide (INCRUSE ELLIPTA) 62.5 MCG/INH AEPB Inhale 1 puff into the lungs daily 19  Yes Marleni Fisher MD   albuterol (PROVENTIL) (2.5 MG/3ML) 0.083% nebulizer solution Take 3 mLs by nebulization 4 times daily 18  Yes Marleni Fisher MD   furosemide (LASIX) 40 MG tablet Take 40 mg by mouth daily    Yes Historical Provider, MD   albuterol sulfate HFA (PROAIR HFA) 108 (90 BASE) MCG/ACT inhaler Inhale 2 puffs into the lungs every 6 hours as needed for Wheezing 17  Yes Marleni Fisher MD   Cholecalciferol (VITAMIN D3) 2000 UNITS CAPS Take 1 capsule by mouth daily   Yes Historical Provider, MD   HYDROcodone-acetaminophen (NORCO)  MG per tablet Take 1 tablet by mouth every 4 hours as needed. 4/21/15  Yes Historical Provider, MD   simvastatin (ZOCOR) 20 MG tablet Take 20 mg by mouth nightly. Yes Historical Provider, MD   aspirin 81 MG tablet Take 81 mg by mouth daily. Yes Historical Provider, MD       Current medications:    No current facility-administered medications for this encounter.       Current Outpatient Medications   Medication Sig Dispense Refill    [facility]  1st Floor  [address] [address]  [phone]  Dept: [phone]    PRE-OP EVALUATION:  Today's date: 2018    Patty Beckett (: 1977) presents for pre-operative evaluation assessment as requested by Dr. Platt.  She requires evaluation and anesthesia risk assessment prior to undergoing surgery/procedure for treatment of cervical cancer .    Surgery -Cr Sleeve insertion.    Proposed Surgery/ Procedure: Cr sleeve insertion  Date of Surgery/ Procedure: 18  Time of Surgery/ Procedure: 10:55  Hospital/Surgical Facility: [facility]    Primary Physician: No Ref-Primary, Physician  Type of Anesthesia Anticipated: General    Patient has a Health Care Directive or Living Will:  No    1. NO - Do you have a history of heart attack, stroke, stent, bypass or surgery on an artery in the head, neck, heart or legs?  2. YES - DO YOU EVER HAVE ANY PAIN OR DISCOMFORT IN YOUR CHEST? Patient had chest pain 8 years ago.  Negative EKG and Negative ECHO.  Diagnosed with anxiety.  3. NO - Do you have a history of  Heart Failure?  4. NO - Are you troubled by shortness of breath when: walking on the level, up a slight hill or at night?  5. NO - Do you currently have a cold, bronchitis or other respiratory infection?  6. NO - Do you have a cough, shortness of breath or wheezing?  7. NO - Do you sometimes get pains in the calves of your legs when you walk?  8. NO - Do you or anyone in your family have previous history of blood clots?  9. NO - Do you or does anyone in your family have a serious bleeding problem such as prolonged bleeding following surgeries or cuts?  10. YES - HAVE YOU EVER HAD PROBLEMS WITH ANEMIA OR BEEN TOLD TO TAKE IRON PILLS? While pregnant.  11. NO - Have you had any abnormal blood loss such as black, tarry or bloody stools, or abnormal vaginal bleeding?  12. NO - Have you ever had a blood transfusion?  13. NO -  Have you or any of your relatives ever had problems with anesthesia?  14. NO - Do you have sleep apnea, excessive snoring or daytime drowsiness?  15. NO - Do you have any prosthetic heart valves?  16. NO - Do you have prosthetic joints?  17. NO - Is there any chance that you may be pregnant?      HPI:     HPI related to upcoming procedure: Patient presented to ER on 17 with heavy vaginal bleeding.  Hemoglobin was 8.5.  Two months later she presented again with vaginal bleeding.  On 17 she had a D and C and endometrial ablation.  Pathology showed invasive SCC, moderately differentiated +p16.  PET showed uterine cervical mass and hypermetabolic 2.3 cm aortocaval node and 2cm right distal common iliac lymph node and 2 cm left external iliac chain node.    She is now being treated with weekly cisplatin and pelvic radiation. Last chemotherapy was today.      ANEMIA - Patient has a recent history of moderate severity anemia, which has not been symptomatic. Work up to date has revealed abnormal vag bleeding..                                                                                                                   .    MEDICAL HISTORY:     Patient Active Problem List    Diagnosis Date Noted     Encounter for antineoplastic chemotherapy 01/15/2018     Priority: Medium     Malignant neoplasm of endocervix (H) 2018     Priority: Medium      Past Medical History:   Diagnosis Date     Angina at rest (H)      Anxiety      Coronary atherosclerosis      Past Surgical History:   Procedure Laterality Date     AS ABLATION, ENDOMETRIAL, THERMAL, W/O HYSTEROSCOPIC GUIDANCE       CERVICAL CANCER SCREENING RESULTS DOCUMENTED AND REVIEWED        SECTION       TUBAL LIGATION       US BREAST BIOPSY RT N/A     Lanced for breast abscess     Current Outpatient Prescriptions   Medication Sig Dispense Refill     loperamide (IMODIUM A-D) 2 MG tablet Take 2 tabs (4 mg) after first loose stool, and then take one tab  calcium-vitamin D (OSCAL) 250-125 MG-UNIT per tablet Take 1 tablet by mouth daily      ipratropium-albuterol (DUONEB) 0.5-2.5 (3) MG/3ML SOLN nebulizer solution Inhale 1 vial into the lungs every 4 hours as needed   0    CHANTIX CONTINUING MONTH CELSA 1 MG tablet 1 mg 2 times daily   0    Umeclidinium Bromide (INCRUSE ELLIPTA) 62.5 MCG/INH AEPB Inhale 1 puff into the lungs daily 1 each 5    albuterol (PROVENTIL) (2.5 MG/3ML) 0.083% nebulizer solution Take 3 mLs by nebulization 4 times daily 120 each 5    furosemide (LASIX) 40 MG tablet Take 40 mg by mouth daily       albuterol sulfate HFA (PROAIR HFA) 108 (90 BASE) MCG/ACT inhaler Inhale 2 puffs into the lungs every 6 hours as needed for Wheezing 1 Inhaler 6    Cholecalciferol (VITAMIN D3) 2000 UNITS CAPS Take 1 capsule by mouth daily      HYDROcodone-acetaminophen (NORCO)  MG per tablet Take 1 tablet by mouth every 4 hours as needed. 0    simvastatin (ZOCOR) 20 MG tablet Take 20 mg by mouth nightly.  aspirin 81 MG tablet Take 81 mg by mouth daily. Allergies: Allergies   Allergen Reactions    Formaldehyde     Gabapentin Other (See Comments)    Tudorza Pressair [Aclidinium Bromide]      Pt states it gave her a rash on her ankles and knees.     Cranberry Rash       Problem List:    Patient Active Problem List   Diagnosis Code    Hypertension I10    Hyperlipidemia E78.5    COPD (chronic obstructive pulmonary disease) ([facility] [address] J44.9    Leg pain M79.606    Hypertension I10    SOB (shortness of breath) R06.02    Acute appendicitis K35.80    Tobacco abuse Z72.0       Past Medical History:        Diagnosis Date    COPD (chronic obstructive pulmonary disease) ([facility] [address]     H/O cardiovascular stress test 11/18/2009    thallium, normal no ischemia EF70%     H/O cardiovascular stress test 10/11/2013    thallium,EF70%, normal, no ischemia    H/O Doppler ultrasound 10/13/10    carotid, right normal, left normal    H/O Doppler ultrasound (2 mg) after each diarrheal stool.  Max of 8 tabs (16 mg) per day. 60 tablet 1     traMADol (ULTRAM) 50 MG tablet Take 1 tablet (50 mg) by mouth 2 times daily 20 tablet 0     ibuprofen (ADVIL/MOTRIN) 200 MG tablet Take 200-600 mg by mouth       GABAPENTIN PO Take 400 mg by mouth 3 times daily       prochlorperazine (COMPAZINE) 10 MG tablet Take 1 tablet (10 mg) by mouth every 6 hours as needed (nausea/vomiting) 30 tablet 2     BusPIRone HCl (BUSPAR PO) Take 10 mg by mouth 3 times daily       CLONIDINE HCL PO Take 0.1 mg by mouth 2 times daily       ferrous sulfate (IRON) 325 (65 FE) MG tablet Take by mouth 2 times daily       LAMOTRIGINE PO Take 100 mg by mouth daily        ACETAMINOPHEN PO Take 325 mg by mouth every 8 hours as needed for pain       MIRTAZAPINE PO Take 15 mg by mouth At Bedtime        OTC products: Tylenol PRN    Allergies   Allergen Reactions     Kiwi Swelling     Tongue swelling        Latex Allergy: NO    Social History   Substance Use Topics     Smoking status: Former Smoker     Smokeless tobacco: Never Used     Alcohol use No     History   Drug Use No       REVIEW OF SYSTEMS:   CONSTITUTIONAL: NEGATIVE for fever, chills, change in weight  INTEGUMENTARY/SKIN: NEGATIVE for worrisome rashes, moles or lesions  EYES: NEGATIVE for vision changes or irritation  ENT/MOUTH: NEGATIVE for ear, mouth and throat problems  RESP: NEGATIVE for significant cough or SOB  BREAST: NEGATIVE for masses, tenderness or discharge  CV: NEGATIVE for chest pain, palpitations or peripheral edema  GI: NEGATIVE for nausea, abdominal pain, heartburn, or change in bowel habits  GI: nausea  : NEGATIVE for frequency, dysuria, or hematuria  MUSCULOSKELETAL: NEGATIVE for significant arthralgias or myalgia  NEURO: NEGATIVE for weakness, dizziness or paresthesias  ENDOCRINE: NEGATIVE for temperature intolerance, skin/hair changes  HEME: NEGATIVE for bleeding problems  PSYCHIATRIC: NEGATIVE for changes in mood or affect    EXAM:  10/07/14    Carotid mild bilateral internal carotid artery disease less than 50% in severity. Normal vertebral artery flows with good velocities. Incidental finding of possible thyroid nodule in the left lobe.  History of 24 hour EKG monitoring 3/11/2011    NSR no ectopy    History of nuclear stress test 10/21/2016    lexiscan-normal,no ischemia,EF70%,enlarged right ventricle    Hx of chest x-ray 01/02/2015    WNL    Hx of echocardiogram 10/11/2013    10/13 Abn lt ventricular diastolic function, mile MR, EF 57%,10/10 EF55%, mild mitral annular calcification    Hx of echocardiogram 05/02/2019    EF 18-25%, Grade I diastolic dysfunction, Mild aortic stenosis, Calcific thickening of posterior leaflet of mitral valve, Mild Pulm HTN    Hx of pelvic x-ray 01/02/2015    Severe RT his osterarothrosis    Hx of x-ray of hip 01/02/2015    Severe Rt hip osteosrthrosis    Hyperlipidemia     Hypertension     Leg pain        Past Surgical History:        Procedure Laterality Date    BACK SURGERY      CYST REMOVAL      left arm    HYSTERECTOMY      1999    JOINT REPLACEMENT Right     hip    TUBAL LIGATION         Social History:    Social History     Tobacco Use    Smoking status: Current Some Day Smoker     Packs/day: 1.00     Years: 47.00     Pack years: 47.00    Smokeless tobacco: Never Used   Substance Use Topics    Alcohol use:  Yes     Alcohol/week: 0.0 oz     Comment: rare                                Ready to quit: Not Answered  Counseling given: Not Answered      Vital Signs (Current):   Vitals:    05/18/19 1027   BP: (!) 157/87   Pulse: 91   Resp: 18   Temp: 97.7 °F (36.5 °C)   TempSrc: Oral   SpO2: 92%   Weight: 120 lb (54.4 kg)   Height: 5' (1.524 m)                                              BP Readings from Last 3 Encounters:   05/18/19 (!) 157/87   05/13/19 136/77   04/16/19 124/80       NPO Status:                                                                                 BMI:   Wt   /89  Pulse 93  SpO2 96%      GENERAL APPEARANCE: healthy, alert and no distress     EYES: EOMI, PERRL     HENT: ear canals and TM's normal and nose and mouth without ulcers or lesions     NECK: no adenopathy, no asymmetry, masses, or scars and thyroid normal to palpation     RESP: lungs clear to auscultation - no rales, rhonchi or wheezes     CV: regular rates and rhythm, normal S1 S2, no S3 or S4 and no murmur, click or rub     ABDOMEN:  soft, nontender, no HSM or masses and bowel sounds normal     MS: extremities normal- no gross deformities noted, no evidence of inflammation in joints, FROM in all extremities.     SKIN: no suspicious lesions or rashes     NEURO: Normal strength and tone, sensory exam grossly normal, mentation intact and speech normal     PSYCH: mentation appears normal. and affect normal/bright     LYMPHATICS: No cervical adenopathy    DIAGNOSTICS:   No labs or EKG required for low risk surgery (cataract, skin procedure, breast biopsy, etc)    Recent Labs   Lab Test  02/20/18   0725  02/12/18   0853   HGB  11.6*  12.0   PLT  110*  116*   NA  138  138   POTASSIUM  3.6  3.5   CR  0.58  0.62        IMPRESSION:   Diagnosis/reason for consult: Cervical cancer.    The proposed surgical procedure is considered INTERMEDIATE risk.    REVISED CARDIAC RISK INDEX  The patient has the following serious cardiovascular risks for perioperative complications such as (MI, PE, VFib and 3  AV Block):  No serious cardiac risks  INTERPRETATION: 0 risks: Class I (very low risk - 0.4% complication rate)    The patient has the following additional risks for perioperative complications:  No identified additional risks        RECOMMENDATIONS:       --Patient is to take all scheduled medications on the day of surgery EXCEPT for modifications listed below.    APPROVAL GIVEN to proceed with proposed procedure, without further diagnostic evaluation       Signed Electronically by: NELLY Ching CNP    Copy  Readings from Last 3 Encounters:   05/18/19 120 lb (54.4 kg)   05/13/19 118 lb (53.5 kg)   04/16/19 127 lb 9.6 oz (57.9 kg)     Body mass index is 23.44 kg/m². CBC:   Lab Results   Component Value Date    WBC 16.3 05/18/2019    RBC 4.86 05/18/2019    HGB 13.4 05/18/2019    HCT 44.4 05/18/2019    MCV 91.4 05/18/2019    RDW 13.4 05/18/2019     05/18/2019       CMP:   Lab Results   Component Value Date     05/18/2019    K 4.1 05/18/2019    CL 95 05/18/2019    CO2 32 05/18/2019    BUN 23 05/18/2019    CREATININE 0.7 05/18/2019    GFRAA >60 05/18/2019    AGRATIO 2.0 03/06/2015    LABGLOM >60 05/18/2019    GLUCOSE 166 05/18/2019    PROT 7.9 05/18/2019    PROT 6.5 03/06/2015    CALCIUM 10.1 05/18/2019    BILITOT 0.4 05/18/2019    ALKPHOS 90 05/18/2019    AST 14 05/18/2019    ALT 8 05/18/2019       POC Tests: No results for input(s): POCGLU, POCNA, POCK, POCCL, POCBUN, POCHEMO, POCHCT in the last 72 hours.     Coags: No results found for: PROTIME, INR, APTT    HCG (If Applicable): No results found for: PREGTESTUR, PREGSERUM, HCG, HCGQUANT     ABGs: No results found for: PHART, PO2ART, TFP8VIZ, QJU8FQJ, BEART, [id number]     Type & Screen (If Applicable):  No results found for: [facility]    Anesthesia Evaluation  Patient summary reviewed and Nursing notes reviewed no history of anesthetic complications:   Airway: Mallampati: II  TM distance: >3 FB   Neck ROM: full  Mouth opening: > = 3 FB Dental:    (+) upper dentures and lower dentures      Pulmonary:   (+) COPD: severe,  shortness of breath: chronic,  current smoker                           Cardiovascular:  Exercise tolerance: poor (<4 METS),   (+) hypertension:, hyperlipidemia              Stress test reviewed       Beta Blocker:  Not on Beta Blocker         Neuro/Psych:   Negative Neuro/Psych ROS              GI/Hepatic/Renal: Neg GI/Hepatic/Renal ROS            Endo/Other:    (+) : arthritis: OA., .                 Abdominal:           Vascular: negative vascular ROS. Anesthesia Plan      general     ASA 3 - emergent       Induction: intravenous. MIPS: Postoperative opioids intended and Prophylactic antiemetics administered. Anesthetic plan and risks discussed with patient. Plan discussed with CRNA.                   Flora Danielson DO   5/18/2019 of this evaluation report is provided to requesting physician.    [place] Preop Guidelines

## 2019-05-18 NOTE — CONSULTS
Consults     Department of GeneralSurgery   Surgical Service Dr Immanuel Cole   Consult Note    Date of Consult: 5/18/19      Reason for Consult:  RLQ abdominal pain  Requesting Physician:  Rogers Christian PA-c    CHIEF COMPLAINT:  Abdominal pain    History Obtained From:  patient    HISTORY OF PRESENT ILLNESS:                The patient is a 77 y.o. female who presents with RLQ abdominal pain. This started this morning at 3 AM.. Pain is described as burning, colicky and cramping. Pain level is 10/10. Pain does not radiate. Patient is associated with nausea and vomiting. Patient denies fever chills. Patient was seen and evaluated in the emergency room Department. CT and labs were reviewed. CT scan consistent with acute appendicitis and patient has white count of 16,000. Past Medical History:    Past Medical History:   Diagnosis Date    COPD (chronic obstructive pulmonary disease) ([facility] [address]     H/O cardiovascular stress test 11/18/2009    thallium, normal no ischemia EF70%     H/O cardiovascular stress test 10/11/2013    thallium,EF70%, normal, no ischemia    H/O Doppler ultrasound 10/13/10    carotid, right normal, left normal    H/O Doppler ultrasound 10/07/14    Carotid mild bilateral internal carotid artery disease less than 50% in severity. Normal vertebral artery flows with good velocities. Incidental finding of possible thyroid nodule in the left lobe.      History of 24 hour EKG monitoring 3/11/2011    NSR no ectopy    History of nuclear stress test 10/21/2016    lexiscan-normal,no ischemia,EF70%,enlarged right ventricle    Hx of chest x-ray 01/02/2015    WNL    Hx of echocardiogram 10/11/2013    10/13 Abn lt ventricular diastolic function, mile MR, EF 57%,10/10 EF55%, mild mitral annular calcification    Hx of echocardiogram 05/02/2019    EF 70-77%, Grade I diastolic dysfunction, Mild aortic stenosis, Calcific thickening of posterior leaflet of mitral valve, Mild Pulm HTN    Hx of pelvic x-ray 01/02/2015 Severe RT his osterarothrosis    Hx of x-ray of hip 01/02/2015    Severe Rt hip osteosrthrosis    Hyperlipidemia     Hypertension     Leg pain        Past Surgical History:    Past Surgical History:   Procedure Laterality Date    BACK SURGERY      CYST REMOVAL      left arm    HYSTERECTOMY      1999    JOINT REPLACEMENT Right     hip    TUBAL LIGATION         Current Medications:   No current facility-administered medications for this encounter. Current Outpatient Medications   Medication Sig Dispense Refill    calcium-vitamin D (OSCAL) 250-125 MG-UNIT per tablet Take 1 tablet by mouth daily      ipratropium-albuterol (DUONEB) 0.5-2.5 (3) MG/3ML SOLN nebulizer solution Inhale 1 vial into the lungs every 4 hours as needed   0    CHANTIX CONTINUING MONTH CELSA 1 MG tablet 1 mg 2 times daily   0    Umeclidinium Bromide (INCRUSE ELLIPTA) 62.5 MCG/INH AEPB Inhale 1 puff into the lungs daily 1 each 5    albuterol (PROVENTIL) (2.5 MG/3ML) 0.083% nebulizer solution Take 3 mLs by nebulization 4 times daily 120 each 5    furosemide (LASIX) 40 MG tablet Take 40 mg by mouth daily       albuterol sulfate HFA (PROAIR HFA) 108 (90 BASE) MCG/ACT inhaler Inhale 2 puffs into the lungs every 6 hours as needed for Wheezing 1 Inhaler 6    Cholecalciferol (VITAMIN D3) 2000 UNITS CAPS Take 1 capsule by mouth daily      HYDROcodone-acetaminophen (NORCO)  MG per tablet Take 1 tablet by mouth every 4 hours as needed. 0    simvastatin (ZOCOR) 20 MG tablet Take 20 mg by mouth nightly.  aspirin 81 MG tablet Take 81 mg by mouth daily. Allergies:  Formaldehyde; Gabapentin; Cloyde Bhavik pressair [aclidinium bromide]; and Cranberry    Social History:   Social History     Socioeconomic History    Marital status:       Spouse name: None    Number of children: None    Years of education: None    Highest education level: None   Occupational History    None   Social Needs    Financial resource

## 2019-05-19 VITALS
RESPIRATION RATE: 18 BRPM | BODY MASS INDEX: 23.56 KG/M2 | DIASTOLIC BLOOD PRESSURE: 79 MMHG | OXYGEN SATURATION: 95 % | HEART RATE: 89 BPM | TEMPERATURE: 98.2 F | HEIGHT: 60 IN | SYSTOLIC BLOOD PRESSURE: 122 MMHG | WEIGHT: 120 LBS

## 2019-05-19 PROBLEM — K37 APPENDICITIS: Status: ACTIVE | Noted: 2019-05-19

## 2019-05-19 LAB
ANION GAP SERPL CALCULATED.3IONS-SCNC: 8 MMOL/L (ref 4–16)
BASOPHILS ABSOLUTE: 0 K/CU MM
BASOPHILS RELATIVE PERCENT: 0.1 % (ref 0–1)
BUN BLDV-MCNC: 19 MG/DL (ref 6–23)
CALCIUM SERPL-MCNC: 8.4 MG/DL (ref 8.3–10.6)
CHLORIDE BLD-SCNC: 104 MMOL/L (ref 99–110)
CO2: 32 MMOL/L (ref 21–32)
CREAT SERPL-MCNC: 0.7 MG/DL (ref 0.6–1.1)
DIFFERENTIAL TYPE: ABNORMAL
EOSINOPHILS ABSOLUTE: 0 K/CU MM
EOSINOPHILS RELATIVE PERCENT: 0 % (ref 0–3)
GFR AFRICAN AMERICAN: >60 ML/MIN/1.73M2
GFR NON-AFRICAN AMERICAN: >60 ML/MIN/1.73M2
GLUCOSE BLD-MCNC: 129 MG/DL (ref 70–99)
HCT VFR BLD CALC: 40.6 % (ref 37–47)
HEMOGLOBIN: 12 GM/DL (ref 12.5–16)
IMMATURE NEUTROPHIL %: 0.6 % (ref 0–0.43)
LYMPHOCYTES ABSOLUTE: 0.7 K/CU MM
LYMPHOCYTES RELATIVE PERCENT: 5.2 % (ref 24–44)
MCH RBC QN AUTO: 27.5 PG (ref 27–31)
MCHC RBC AUTO-ENTMCNC: 29.6 % (ref 32–36)
MCV RBC AUTO: 92.9 FL (ref 78–100)
MONOCYTES ABSOLUTE: 0.5 K/CU MM
MONOCYTES RELATIVE PERCENT: 3.3 % (ref 0–4)
NUCLEATED RBC %: 0 %
PDW BLD-RTO: 13.7 % (ref 11.7–14.9)
PLATELET # BLD: 224 K/CU MM (ref 140–440)
PMV BLD AUTO: 10.8 FL (ref 7.5–11.1)
POTASSIUM SERPL-SCNC: 3.7 MMOL/L (ref 3.5–5.1)
RBC # BLD: 4.37 M/CU MM (ref 4.2–5.4)
SEGMENTED NEUTROPHILS ABSOLUTE COUNT: 12.3 K/CU MM
SEGMENTED NEUTROPHILS RELATIVE PERCENT: 90.8 % (ref 36–66)
SODIUM BLD-SCNC: 144 MMOL/L (ref 135–145)
TOTAL IMMATURE NEUTOROPHIL: 0.08 K/CU MM
TOTAL NUCLEATED RBC: 0 K/CU MM
WBC # BLD: 13.5 K/CU MM (ref 4–10.5)

## 2019-05-19 PROCEDURE — 94150 VITAL CAPACITY TEST: CPT

## 2019-05-19 PROCEDURE — 80048 BASIC METABOLIC PNL TOTAL CA: CPT

## 2019-05-19 PROCEDURE — 94640 AIRWAY INHALATION TREATMENT: CPT

## 2019-05-19 PROCEDURE — 99024 POSTOP FOLLOW-UP VISIT: CPT | Performed by: PHYSICIAN ASSISTANT

## 2019-05-19 PROCEDURE — 94762 N-INVAS EAR/PLS OXIMTRY CONT: CPT

## 2019-05-19 PROCEDURE — 6360000002 HC RX W HCPCS: Performed by: NURSE PRACTITIONER

## 2019-05-19 PROCEDURE — G0378 HOSPITAL OBSERVATION PER HR: HCPCS

## 2019-05-19 PROCEDURE — 6370000000 HC RX 637 (ALT 250 FOR IP): Performed by: NURSE PRACTITIONER

## 2019-05-19 PROCEDURE — 85025 COMPLETE CBC W/AUTO DIFF WBC: CPT

## 2019-05-19 PROCEDURE — 6370000000 HC RX 637 (ALT 250 FOR IP): Performed by: SURGERY

## 2019-05-19 PROCEDURE — 36415 COLL VENOUS BLD VENIPUNCTURE: CPT

## 2019-05-19 PROCEDURE — 2580000003 HC RX 258: Performed by: NURSE PRACTITIONER

## 2019-05-19 RX ORDER — HYDROCODONE BITARTRATE AND ACETAMINOPHEN 10; 325 MG/1; MG/1
1 TABLET ORAL EVERY 6 HOURS PRN
Qty: 10 TABLET | Refills: 0 | Status: SHIPPED | OUTPATIENT
Start: 2019-05-19 | End: 2019-05-22

## 2019-05-19 RX ADMIN — FUROSEMIDE 40 MG: 40 TABLET ORAL at 08:37

## 2019-05-19 RX ADMIN — VITAMIN D, TAB 1000IU (100/BT) 1000 UNITS: 25 TAB at 08:37

## 2019-05-19 RX ADMIN — OXYCODONE AND ACETAMINOPHEN 1 TABLET: 5; 325 TABLET ORAL at 09:13

## 2019-05-19 RX ADMIN — SODIUM CHLORIDE: 9 INJECTION, SOLUTION INTRAVENOUS at 03:13

## 2019-05-19 RX ADMIN — ASPIRIN 81 MG 81 MG: 81 TABLET ORAL at 08:37

## 2019-05-19 RX ADMIN — ALBUTEROL SULFATE 2.5 MG: 2.5 SOLUTION RESPIRATORY (INHALATION) at 08:26

## 2019-05-19 RX ADMIN — ALBUTEROL SULFATE 2.5 MG: 2.5 SOLUTION RESPIRATORY (INHALATION) at 11:37

## 2019-05-19 RX ADMIN — FAMOTIDINE 20 MG: 20 TABLET ORAL at 08:37

## 2019-05-19 ASSESSMENT — PAIN SCALES - GENERAL
PAINLEVEL_OUTOF10: 7
PAINLEVEL_OUTOF10: 10

## 2019-05-19 ASSESSMENT — ENCOUNTER SYMPTOMS
ABDOMINAL PAIN: 1
APNEA: 0
RECTAL PAIN: 0
EYE REDNESS: 0
EYE ITCHING: 0
VOMITING: 0
ANAL BLEEDING: 0
PHOTOPHOBIA: 0
CHOKING: 0
STRIDOR: 0
SORE THROAT: 0
NAUSEA: 0
COLOR CHANGE: 0
CONSTIPATION: 0
BACK PAIN: 0

## 2019-05-19 NOTE — PROGRESS NOTES
ischemia,EF70%,enlarged right ventricle    Hx of chest x-ray 2015    WNL    Hx of echocardiogram 10/11/2013    10/13 Abn lt ventricular diastolic function, mile MR, EF 57%,10/10 EF55%, mild mitral annular calcification    Hx of echocardiogram 2019    EF 19-04%, Grade I diastolic dysfunction, Mild aortic stenosis, Calcific thickening of posterior leaflet of mitral valve, Mild Pulm HTN    Hx of pelvic x-ray 2015    Severe RT his osterarothrosis    Hx of x-ray of hip 2015    Severe Rt hip osteosrthrosis    Hyperlipidemia     Hypertension     Leg pain        Objective:     Vitals:    19 0419   BP: 122/79   Pulse: 89   Resp: 18   Temp: 98.2 °F (36.8 °C)   SpO2:        TEMPERATURE:  Current - Temp: 98.2 °F (36.8 °C); Max - Temp  Av.7 °F (37.1 °C)  Min: 98.1 °F (36.7 °C)  Max: 100.2 °F (37.9 °C)    I/O this shift:  In: 240 [P.O.:240]  Out: - I/O last 3 completed shifts: In: 1050 [I.V.:1050]  Out: 80 [Urine:700; Blood:10]      Physical Exam:    Physical Exam   Constitutional: She is oriented to person, place, and time. She appears well-developed and well-nourished. HENT:   Head: Normocephalic. Eyes: Pupils are equal, round, and reactive to light. Neck: Normal range of motion. Neck supple. Cardiovascular: Normal rate. Pulmonary/Chest: Effort normal.   Abdominal: Soft. She exhibits no distension and no mass. There is tenderness. There is no rebound and no guarding. Musculoskeletal: Normal range of motion. Neurological: She is alert and oriented to person, place, and time. Skin: Skin is warm. Laparoscopic incisions well approximated with glue in place. No evidence of infection   Psychiatric: She has a normal mood and affect.            Scheduled Meds:   enoxaparin  40 mg Subcutaneous Daily    famotidine  20 mg Oral BID    nicotine  1 patch Transdermal Daily    albuterol  2.5 mg Nebulization 4x Daily    aspirin  81 mg Oral Daily    vitamin D  1,000 Units Oral

## 2019-05-19 NOTE — DISCHARGE SUMMARY
Jalil Lip 1950 [id number]  PCP:  Jolanta Reyes MD    Admit date: 5/18/2019  Admitting Physician: Billie Perry MD    Discharge date: 5/19/2019 Discharge Physician: Billie Perry MD         Hospital Course and Discharge Diagnoses Include:    Acute Appendicitis  - stable  - s/p lap lesa POD#1  - IVF  - diet advanced  - empiric Zosyn  - surgery following, ok for d/c    2.6cm Infrarenal abd aortic aneurysm  - f/u scan in 5 years per recommendations          Physical Exam on Discharge date: 05/19/19  Gen:  awake, alert, cooperative, no apparent distress  Head/Eyes:  Normocephalic atraumatic, EOMI   NECK:   symmetrical, trachea midline  LUNGS: Normal Effort   CARDIOVASCULAR:  Normal rate  ABDOMEN: Non tender, non distended, no HSM noted. MUSCULOSKELETAL:  ROM WNL  NEUROLOGIC: Alert and Oriented,  Cranial nerves II-XII are grossly intact. SKIN:  no bruising or bleeding, normal skin color,  no redness    Procedures:  See above  Ct Abdomen Pelvis W Iv Contrast    Result Date: 5/18/2019  EXAMINATION: CT OF THE ABDOMEN AND PELVIS WITH CONTRAST 5/18/2019 12:04 pm TECHNIQUE: CT of the abdomen and pelvis was performed with the administration of intravenous contrast. Multiplanar reformatted images are provided for review. Dose modulation, iterative reconstruction, and/or weight based adjustment of the mA/kV was utilized to reduce the radiation dose to as low as reasonably achievable. COMPARISON: CT abdomen 09/19/2008 HISTORY: ORDERING SYSTEM PROVIDED HISTORY: abd nv TECHNOLOGIST PROVIDED HISTORY: Ordering Physician Provided Reason for Exam: generalized abd pain/ N/V Acuity: Acute Type of Exam: Initial Additional signs and symptoms: 75ml isovue 370 Relevant Medical/Surgical History: hx COPD, back surg, hyster FINDINGS: Thorax base:  Normal heart size with no pericardial effusion. The lung bases demonstrate no acute consolidation or pleural effusion.   Incompletely visualized right lower lobe days.  What changed:    · when to take this  · reasons to take this        CONTINUE taking these medications    * albuterol sulfate  (90 Base) MCG/ACT inhaler  Commonly known as:  PROAIR HFA  Inhale 2 puffs into the lungs every 6 hours as needed for Wheezing     * albuterol (2.5 MG/3ML) 0.083% nebulizer solution  Commonly known as:  PROVENTIL  Take 3 mLs by nebulization 4 times daily     aspirin 81 MG tablet     calcium-vitamin D 250-125 MG-UNIT per tablet  Commonly known as:  OSCAL     CHANTIX CONTINUING MONTH CELSA 1 MG tablet  Generic drug:  varenicline     furosemide 40 MG tablet  Commonly known as:  LASIX     ipratropium-albuterol 0.5-2.5 (3) MG/3ML Soln nebulizer solution  Commonly known as:  DUONEB     simvastatin 20 MG tablet  Commonly known as:  ZOCOR     Umeclidinium Bromide 62.5 MCG/INH Aepb  Commonly known as:  INCRUSE ELLIPTA  Inhale 1 puff into the lungs daily     Vitamin D3 2000 units Caps         * This list has 2 medication(s) that are the same as other medications prescribed for you. Read the directions carefully, and ask your doctor or other care provider to review them with you. Where to Get Your Medications      You can get these medications from any pharmacy    Bring a paper prescription for each of these medications  · HYDROcodone-acetaminophen  MG per tablet            Code Status: Full Code     Consults:   IP CONSULT TO GENERAL SURGERY  IP CONSULT TO HOSPITALIST  IP CONSULT TO GENERAL SURGERY    Diet: regular diet    Activity: activity as tolerated   Work:    Discharged Condition: good    Prognosis: Fair - Good    Disposition: home      Follow-up with   1. PCP within   5-7    Days    Follow up labs: none       Discharge Physician Signed: Link Diamond M.D. The patient was seen and examined on day of discharge and this discharge summary is in conjunction with any daily progress note from day of discharge.   Time spent on discharge in the examination, evaluation, counseling and review of medications and discharge plan: 34 minutes

## 2019-05-29 DIAGNOSIS — B96.89 ACUTE BACTERIAL SINUSITIS: ICD-10-CM

## 2019-05-29 DIAGNOSIS — M79.606 PAIN OF LOWER EXTREMITY, UNSPECIFIED LATERALITY: Primary | ICD-10-CM

## 2019-05-29 DIAGNOSIS — F41.9 ANXIETY: ICD-10-CM

## 2019-05-29 DIAGNOSIS — J01.90 ACUTE BACTERIAL SINUSITIS: ICD-10-CM

## 2019-05-29 RX ORDER — HYDROCODONE BITARTRATE AND ACETAMINOPHEN 10; 325 MG/1; MG/1
TABLET ORAL
Qty: 180 TABLET | Refills: 0 | Status: SHIPPED | OUTPATIENT
Start: 2019-05-29 | End: 2019-06-27

## 2019-05-29 RX ORDER — LORAZEPAM 0.5 MG/1
TABLET ORAL
Qty: 30 TABLET | Refills: 0 | Status: SHIPPED | OUTPATIENT
Start: 2019-05-29 | End: 2019-06-27

## 2019-05-29 NOTE — TELEPHONE ENCOUNTER
----- Message from Ernestina Ireland sent at 5/29/2019  9:27 AM EDT -----  Contact: PATIENT  MED REFILL:    NORCO 10 -325 MG  Q 4-6 HRS PRN    LORAZEPAM  0.5 MG  PRN      PHARMACY:  [id number]

## 2019-05-31 ENCOUNTER — OFFICE VISIT (OUTPATIENT)
Dept: SURGERY | Age: 69
End: 2019-05-31

## 2019-05-31 VITALS
WEIGHT: 125 LBS | SYSTOLIC BLOOD PRESSURE: 126 MMHG | HEIGHT: 60 IN | HEART RATE: 99 BPM | DIASTOLIC BLOOD PRESSURE: 60 MMHG | BODY MASS INDEX: 24.54 KG/M2

## 2019-05-31 DIAGNOSIS — Z09 POSTOP CHECK: ICD-10-CM

## 2019-05-31 DIAGNOSIS — K35.20 ACUTE APPENDICITIS WITH PERFORATION AND GENERALIZED PERITONITIS, WITHOUT ABSCESS OR GANGRENE: Primary | ICD-10-CM

## 2019-05-31 PROCEDURE — 99024 POSTOP FOLLOW-UP VISIT: CPT | Performed by: SURGERY

## 2019-05-31 ASSESSMENT — ENCOUNTER SYMPTOMS
SHORTNESS OF BREATH: 0
VOMITING: 0
ABDOMINAL PAIN: 0
NAUSEA: 0

## 2019-05-31 NOTE — PROGRESS NOTES
[id number] [employer] [facility] [employer] [facility]    PATIENT: January Marcano, 1950, 77 y.o., female    MRN: [id number]    Physician: Piyush Hein MD    Date: 5/31/19    CHIEF COMPLAINT:     Chief Complaint   Patient presents with    Post-Op Check     P/O Lap Rogelio @ [facility] 5/19/19 (Andom PT)       History Obtained From:  patient, electronic medical record    HISTORY OF PRESENT ILLNESS:      Roc Ponce is a 77 y.o. female presenting postoperatively after laparoscopic appendectomy with Dr. Harrison Han. Since the procedure, she has been doing well. Eating a regular diet without difficulty. Bowel movement are Normal.    Pain is controlled without any medications. .     Wounds/Incisions: are healing well. No drainage or erythema. Past Medical History:    Past Medical History:   Diagnosis Date    COPD (chronic obstructive pulmonary disease) ([facility] [address]     H/O cardiovascular stress test 11/18/2009    thallium, normal no ischemia EF70%     H/O cardiovascular stress test 10/11/2013    thallium,EF70%, normal, no ischemia    H/O Doppler ultrasound 10/13/10    carotid, right normal, left normal    H/O Doppler ultrasound 10/07/14    Carotid mild bilateral internal carotid artery disease less than 50% in severity. Normal vertebral artery flows with good velocities. Incidental finding of possible thyroid nodule in the left lobe.      History of 24 hour EKG monitoring 3/11/2011    NSR no ectopy    History of nuclear stress test 10/21/2016    lexiscan-normal,no ischemia,EF70%,enlarged right ventricle    Hx of chest x-ray 01/02/2015    WNL    Hx of echocardiogram 10/11/2013    10/13 Abn lt ventricular diastolic function, mile MR, EF 57%,10/10 EF55%, mild mitral annular calcification    Hx of echocardiogram 05/02/2019    EF 40-82%, Grade I diastolic dysfunction, Mild aortic stenosis, Calcific thickening of posterior leaflet of mitral valve, Mild Pulm HTN    Hx of pelvic x-ray 01/02/2015 Severe RT his osterarothrosis    Hx of x-ray of hip 01/02/2015    Severe Rt hip osteosrthrosis    Hyperlipidemia     Hypertension     Leg pain        Past Surgical History:    Past Surgical History:   Procedure Laterality Date    BACK SURGERY      CYST REMOVAL      left arm    HYSTERECTOMY      1999    JOINT REPLACEMENT Right     hip    LAPAROSCOPIC APPENDECTOMY N/A 5/18/2019    APPENDECTOMY LAPAROSCOPIC performed by Jassi Macedo MD at [address] [place] [address]         Current Medications:   Current Outpatient Medications   Medication Sig Dispense Refill    HYDROcodone-acetaminophen (NORCO)  MG per tablet TAKE 1 Q 4 HOURS. MUST LAST 30 DAYS. 180 tablet 0    LORazepam (ATIVAN) 0.5 MG tablet TAKE 1 TID PRN. MUST LAST 30 DAYS. 30 tablet 0    calcium-vitamin D (OSCAL) 250-125 MG-UNIT per tablet Take 1 tablet by mouth daily      ipratropium-albuterol (DUONEB) 0.5-2.5 (3) MG/3ML SOLN nebulizer solution Inhale 1 vial into the lungs every 4 hours as needed   0    CHANTIX CONTINUING MONTH CELSA 1 MG tablet 1 mg 2 times daily   0    Umeclidinium Bromide (INCRUSE ELLIPTA) 62.5 MCG/INH AEPB Inhale 1 puff into the lungs daily 1 each 5    albuterol (PROVENTIL) (2.5 MG/3ML) 0.083% nebulizer solution Take 3 mLs by nebulization 4 times daily 120 each 5    furosemide (LASIX) 40 MG tablet Take 40 mg by mouth daily       albuterol sulfate HFA (PROAIR HFA) 108 (90 BASE) MCG/ACT inhaler Inhale 2 puffs into the lungs every 6 hours as needed for Wheezing 1 Inhaler 6    Cholecalciferol (VITAMIN D3) 2000 UNITS CAPS Take 1 capsule by mouth daily      simvastatin (ZOCOR) 20 MG tablet Take 20 mg by mouth nightly.  aspirin 81 MG tablet Take 81 mg by mouth daily. No current facility-administered medications for this visit. Allergies:  Formaldehyde; Gabapentin;  Nicol Reva pressair [aclidinium bromide]; and Cranberry    Social History:   Social History     Socioeconomic History    Marital status: breath. Cardiovascular: Negative for chest pain. Gastrointestinal: Negative for abdominal pain, nausea and vomiting. I have reviewed the patient's information pertinent to this visit, including medical history, family history, social history and review of systems. PHYSICAL EXAM:    Vitals:    05/31/19 1019   BP: 126/60   Site: Right Upper Arm   Position: Sitting   Cuff Size: Medium Adult   Pulse: 99   Weight: 125 lb (56.7 kg)   Height: 5' (1.524 m)       Physical Exam   Constitutional: She is oriented to person, place, and time. She appears well-developed and well-nourished. No distress. HENT:   Head: Normocephalic and atraumatic. Eyes: Pupils are equal, round, and reactive to light. Cardiovascular: Normal rate and regular rhythm. Pulmonary/Chest: Effort normal. No respiratory distress. Abdominal: Soft. There is no tenderness. Incision(s) well healed, no erythema or drainage    Neurological: She is alert and oriented to person, place, and time. Skin: She is not diaphoretic. Psychiatric: She has a normal mood and affect.  Her behavior is normal.       DATA:    Pathology Results:   Specimen #[id number]      Final Pathologic Diagnosis:  Appendix, appendectomy:       Acute appendicitis with perforation and periappendiceal  abscess.      Labs:  Lab Results   Component Value Date    WBC 13.5 (H) 05/19/2019    HGB 12.0 (L) 05/19/2019    HCT 40.6 05/19/2019     05/19/2019     05/19/2019    K 3.7 05/19/2019     05/19/2019    CO2 32 05/19/2019    BUN 19 05/19/2019    CREATININE 0.7 05/19/2019    GLUCOSE 129 (H) 05/19/2019    CALCIUM 8.4 05/19/2019    PROT 7.9 05/18/2019    BILITOT 0.4 05/18/2019    AST 14 (L) 05/18/2019    ALT 8 (L) 05/18/2019    ALKPHOS 90 05/18/2019    LIPASE 8 (L) 05/18/2019       Imaging:   Ct Abdomen Pelvis W Iv Contrast    Result Date: 5/18/2019  EXAMINATION: CT OF THE ABDOMEN AND PELVIS WITH CONTRAST 5/18/2019 12:04 pm TECHNIQUE: CT of the abdomen and pelvis was performed with the administration of intravenous contrast. Multiplanar reformatted images are provided for review. Dose modulation, iterative reconstruction, and/or weight based adjustment of the mA/kV was utilized to reduce the radiation dose to as low as reasonably achievable. COMPARISON: CT abdomen 09/19/2008 HISTORY: ORDERING SYSTEM PROVIDED HISTORY: abd nv TECHNOLOGIST PROVIDED HISTORY: Ordering Physician Provided Reason for Exam: generalized abd pain/ N/V Acuity: Acute Type of Exam: Initial Additional signs and symptoms: 75ml isovue 370 Relevant Medical/Surgical History: hx COPD, back surg, hyster FINDINGS: Thorax base:  Normal heart size with no pericardial effusion. The lung bases demonstrate no acute consolidation or pleural effusion. Incompletely visualized right lower lobe macrolobular solid 0.8 x 0.9 cm nodule which is essentially stable compared to the prior 2008 exam.  No follow-up imaging is recommended. Abdomen:  Extensive abdominal aortic atherosclerotic calcifications. There is a distal abdominal aortic saccular 2.6 cm aneurysm with some mural hypodense thrombus. Extensive bilateral iliac atherosclerotic calcifications with no aneurysm formation. The liver, hepatic veins, and portal vasculature appear unremarkable. The gallbladder, common bile duct, spleen, and adrenals are normal.  The kidneys are symmetric in size with normal parenchymal enhancement. Bilateral non-obstructing nephrolithiasis ranging from 0.2-0.5 cm. The pancreas demonstrates no acute abnormality. The stomach and duodenum are normal.  Proximal to mid jejunal loops demonstrate circumferential wall and mucosal fold thickening with no significant distention. The distal jejunum and ileum appear unremarkable.  The cecum extends into the right hemipelvis where there is extensive streak artifact related to the right hip arthroplasty which limits the exam.  The colon is decompressed throughout with left hemicolonic

## 2019-06-09 DIAGNOSIS — F41.9 ANXIETY: ICD-10-CM

## 2019-06-09 PROBLEM — M85.80 OSTEOPENIA: Status: ACTIVE | Noted: 2019-06-09

## 2019-06-09 RX ORDER — NYSTATIN 100000 [USP'U]/G
POWDER TOPICAL 2 TIMES DAILY
COMMUNITY
End: 2019-06-24

## 2019-06-24 ENCOUNTER — OFFICE VISIT (OUTPATIENT)
Dept: FAMILY MEDICINE CLINIC | Age: 69
End: 2019-06-24
Payer: COMMERCIAL

## 2019-06-24 VITALS
SYSTOLIC BLOOD PRESSURE: 100 MMHG | HEIGHT: 59 IN | HEART RATE: 82 BPM | DIASTOLIC BLOOD PRESSURE: 70 MMHG | WEIGHT: 126.1 LBS | OXYGEN SATURATION: 94 % | BODY MASS INDEX: 25.42 KG/M2

## 2019-06-24 DIAGNOSIS — E78.2 MIXED HYPERLIPIDEMIA: ICD-10-CM

## 2019-06-24 DIAGNOSIS — G89.29 CHRONIC LOW BACK PAIN WITHOUT SCIATICA, UNSPECIFIED BACK PAIN LATERALITY: ICD-10-CM

## 2019-06-24 DIAGNOSIS — Z72.0 TOBACCO ABUSE: ICD-10-CM

## 2019-06-24 DIAGNOSIS — I10 ESSENTIAL HYPERTENSION: ICD-10-CM

## 2019-06-24 DIAGNOSIS — B37.89 CANDIDA RASH OF GROIN: ICD-10-CM

## 2019-06-24 DIAGNOSIS — M54.50 CHRONIC LOW BACK PAIN WITHOUT SCIATICA, UNSPECIFIED BACK PAIN LATERALITY: ICD-10-CM

## 2019-06-24 DIAGNOSIS — J44.9 CHRONIC OBSTRUCTIVE PULMONARY DISEASE, UNSPECIFIED COPD TYPE (HCC): Primary | ICD-10-CM

## 2019-06-24 PROCEDURE — 99214 OFFICE O/P EST MOD 30 MIN: CPT | Performed by: PHYSICIAN ASSISTANT

## 2019-06-24 RX ORDER — NYSTATIN 100000 U/G
OINTMENT TOPICAL 2 TIMES DAILY
Qty: 30 G | Refills: 1 | Status: SHIPPED | OUTPATIENT
Start: 2019-06-24 | End: 2019-09-30

## 2019-06-24 RX ORDER — NYSTATIN 100000 U/G
OINTMENT TOPICAL 2 TIMES DAILY
COMMUNITY
End: 2019-06-24 | Stop reason: SDUPTHER

## 2019-06-24 ASSESSMENT — PATIENT HEALTH QUESTIONNAIRE - PHQ9
SUM OF ALL RESPONSES TO PHQ9 QUESTIONS 1 & 2: 0
1. LITTLE INTEREST OR PLEASURE IN DOING THINGS: 0
2. FEELING DOWN, DEPRESSED OR HOPELESS: 0
SUM OF ALL RESPONSES TO PHQ QUESTIONS 1-9: 0
SUM OF ALL RESPONSES TO PHQ QUESTIONS 1-9: 0

## 2019-06-24 NOTE — PROGRESS NOTES
6/24/2019    [place]    Chief Complaint   Patient presents with    3 Month Follow-Up     medication refill       HPI  History was obtained from the patient. Briana Bae is a 77 y.o. female who presents today for routine follow up. Patient was just discharged from the hospital 3 weeks ago status post an acute appendicitis. Patient states she did well. Patient states it was very short hospital stay. Patient states she is healed well appetite is great she has no problems with bowel or bladder. Patient states she continues to smoke she was unable to take the Chantix due to nausea. Patient states she is thinking of quitting on her own and knows she must do so. States she continues to take her Ativan as needed for anxiety. It does work without any side effects. Patient also continues to take her Norco as needed for low back pain. It does work when she takes it. It does not make her groggy or sleepy or give her any side effects. she has had no falls. Patient states her COPD has been stable. She continues to use her oxygen. REVIEW OF SYMPTOMS    Constitutional:  Denies fever, chills, weight loss or weakness  Eyes:  Denies photophobia or discharge  ENT:  Denies sore throat or ear pain  Cardiovascular:  Denies chest pain, palpitations or swelling  Respiratory:  Denies cough or shortness of breath  GI:  Denies abdominal pain, nausea, vomiting, or diarrhea  Musculoskeletal: Chronic low back pain  Skin:  No rashes  Neurologic:  Denies headache, focal weakness, or sensory changes  Endocrine:  Denies polyuria or polydipsia  Psychiatric:  Denies depression, suicidal ideation or homicidal ideation  All symptoms negative except as marked.      PAST MEDICAL HISTORY  Past Medical History:   Diagnosis Date    [facility] obstructive pulmonary disease) ([facility] [address]     H/O cardiovascular stress test 11/18/2009    thallium, normal no ischemia EF70%     H/O cardiovascular stress test 10/11/2013    thallium,EF70%, sulfate HFA (PROAIR HFA) 108 (90 BASE) MCG/ACT inhaler Inhale 2 puffs into the lungs every 6 hours as needed for Wheezing 1 Inhaler 6    simvastatin (ZOCOR) 20 MG tablet Take 20 mg by mouth nightly.  aspirin 81 MG tablet Take 81 mg by mouth daily. No current facility-administered medications for this visit. ALLERGIES  Allergies   Allergen Reactions    Formaldehyde     Gabapentin Other (See Comments)    Norflex Tablets [Orphenadrine]     Tudorza Pressair [Aclidinium Bromide]      Pt states it gave her a rash on her ankles and knees.  Cranberry Rash       PHYSICAL EXAM    /70   Pulse 82   Ht 4' 11\" (1.499 m)   Wt 126 lb 1.6 oz (57.2 kg)   SpO2 94% Comment: 02 on 3L  BMI 25.47 kg/m²     Constitutional:  Well developed, well nourished  HENT:  Normocephalic, atraumatic, bilateral external ears normal, oropharynx moist, nose normal  Eyes:  PERRLA, EOMI, conjunctiva normal, no discharge, no scleral icterus  Neck:  Normal range of motion, no tenderness, supple, no thyromegaly no carotid bruits noted  Lymphatic:  No lymphadenopathy noted  Cardiovascular:  Normal heart rate, normal rhythm, no murmurs, gallops or rubs  Thorax & Lungs: Faint expiratory wheezes posteriorly bilaterally, no respiratory distress  Abdomen:  Soft, no tenderness, no masses, no pulsatile masses, not distended, bowel sounds normal  Incision healed  Skin:  Warm, dry, Candida breast and groin  Back:  No tenderness, No CVA tenderness  Extremities:  No edema, no tenderness, no cyanosis, no clubbing  Musculoskeletal:  Good range of motion  all major joints, no tenderness to palpation or major deformities noted  Neurologic:  Alert & oriented X 3, normal motor function, normal sensory function, no focal deficits noted  Psychiatric:  Affect normal, mood normal    ASSESSMENT & [address] was seen today for 3 month follow-up.     Diagnoses and all orders for this visit:    Chronic obstructive pulmonary disease,

## 2019-06-28 DIAGNOSIS — M79.606 PAIN OF LOWER EXTREMITY, UNSPECIFIED LATERALITY: Primary | ICD-10-CM

## 2019-06-28 RX ORDER — HYDROCODONE BITARTRATE AND ACETAMINOPHEN 10; 325 MG/1; MG/1
1 TABLET ORAL EVERY 4 HOURS PRN
Qty: 180 TABLET | Refills: 0 | Status: SHIPPED | OUTPATIENT
Start: 2019-06-28 | End: 2019-07-28

## 2019-06-28 NOTE — TELEPHONE ENCOUNTER
PER PT RQST FORWARDED TO DR Gloria Aguilar FOR [facility]  [facility]   [facility]  Electronically signed by Yuli Buenrostro MA on 6/28/2019 at 9:38 AM

## 2019-07-29 DIAGNOSIS — F41.9 ANXIETY: ICD-10-CM

## 2019-07-29 DIAGNOSIS — M51.37 DEGENERATION OF LUMBAR OR LUMBOSACRAL INTERVERTEBRAL DISC: Primary | ICD-10-CM

## 2019-07-29 RX ORDER — HYDROCODONE BITARTRATE AND ACETAMINOPHEN 10; 325 MG/1; MG/1
1 TABLET ORAL EVERY 4 HOURS PRN
Qty: 180 TABLET | Refills: 0 | Status: SHIPPED | OUTPATIENT
Start: 2019-07-29 | End: 2019-08-28 | Stop reason: SDUPTHER

## 2019-07-29 RX ORDER — LORAZEPAM 0.5 MG/1
0.5 TABLET ORAL 3 TIMES DAILY PRN
Qty: 30 TABLET | Refills: 0 | Status: SHIPPED | OUTPATIENT
Start: 2019-07-29 | End: 2019-08-28

## 2019-07-29 NOTE — TELEPHONE ENCOUNTER
PER PT RQST SENT NORCO/LORAZEPAM DIONISIO BRITO TO DR Day Andrade TO SIGN  Electronically signed by Leonel King MA on 7/29/2019 at 1:14 PM

## 2019-08-06 ENCOUNTER — HOSPITAL ENCOUNTER (OUTPATIENT)
Age: 69
Discharge: HOME | End: 2019-08-06
Payer: COMMERCIAL

## 2019-08-06 ENCOUNTER — HOSPITAL ENCOUNTER (OUTPATIENT)
Dept: GENERAL RADIOLOGY | Age: 69
Discharge: HOME | End: 2019-08-06
Payer: COMMERCIAL

## 2019-08-06 DIAGNOSIS — J43.2 CENTRILOBULAR EMPHYSEMA (HCC): ICD-10-CM

## 2019-08-06 PROCEDURE — 71046 X-RAY EXAM CHEST 2 VIEWS: CPT

## 2019-08-28 DIAGNOSIS — M51.37 DEGENERATION OF LUMBAR OR LUMBOSACRAL INTERVERTEBRAL DISC: ICD-10-CM

## 2019-08-28 NOTE — TELEPHONE ENCOUNTER
PER PT RQST FOR NORCO SENT RX TO DIONISIO LUND TO DR ZARAGOZA INBOX TO SIGN  Electronically signed by Aleta Sommer MA on 8/28/2019 at 10:07 AM

## 2019-08-29 RX ORDER — HYDROCODONE BITARTRATE AND ACETAMINOPHEN 10; 325 MG/1; MG/1
1 TABLET ORAL EVERY 4 HOURS PRN
Qty: 180 TABLET | Refills: 0 | Status: SHIPPED | OUTPATIENT
Start: 2019-08-29 | End: 2019-09-28

## 2019-09-23 DIAGNOSIS — I10 ESSENTIAL HYPERTENSION: ICD-10-CM

## 2019-09-23 DIAGNOSIS — E78.2 MIXED HYPERLIPIDEMIA: ICD-10-CM

## 2019-09-23 LAB
A/G RATIO: 1.8 (ref 1.1–2.2)
ALBUMIN SERPL-MCNC: 4.4 G/DL (ref 3.4–5)
ALP BLD-CCNC: 95 U/L (ref 40–129)
ALT SERPL-CCNC: 7 U/L (ref 10–40)
ANION GAP SERPL CALCULATED.3IONS-SCNC: 17 MMOL/L (ref 3–16)
AST SERPL-CCNC: 13 U/L (ref 15–37)
BASOPHILS ABSOLUTE: 0 K/UL (ref 0–0.2)
BASOPHILS RELATIVE PERCENT: 0.4 %
BILIRUB SERPL-MCNC: 0.3 MG/DL (ref 0–1)
BUN BLDV-MCNC: 21 MG/DL (ref 7–20)
CALCIUM SERPL-MCNC: 9.7 MG/DL (ref 8.3–10.6)
CHLORIDE BLD-SCNC: 94 MMOL/L (ref 99–110)
CHOLESTEROL, TOTAL: 171 MG/DL (ref 0–199)
CO2: 35 MMOL/L (ref 21–32)
CREAT SERPL-MCNC: 0.6 MG/DL (ref 0.6–1.2)
EOSINOPHILS ABSOLUTE: 0.1 K/UL (ref 0–0.6)
EOSINOPHILS RELATIVE PERCENT: 1 %
GFR AFRICAN AMERICAN: >60
GFR NON-AFRICAN AMERICAN: >60
GLOBULIN: 2.5 G/DL
GLUCOSE BLD-MCNC: 94 MG/DL (ref 70–99)
HCT VFR BLD CALC: 38.3 % (ref 36–48)
HDLC SERPL-MCNC: 97 MG/DL (ref 40–60)
HEMOGLOBIN: 12.5 G/DL (ref 12–16)
LDL CHOLESTEROL CALCULATED: 56 MG/DL
LYMPHOCYTES ABSOLUTE: 0.9 K/UL (ref 1–5.1)
LYMPHOCYTES RELATIVE PERCENT: 13 %
MCH RBC QN AUTO: 29.1 PG (ref 26–34)
MCHC RBC AUTO-ENTMCNC: 32.6 G/DL (ref 31–36)
MCV RBC AUTO: 89.1 FL (ref 80–100)
MONOCYTES ABSOLUTE: 0.6 K/UL (ref 0–1.3)
MONOCYTES RELATIVE PERCENT: 8.9 %
NEUTROPHILS ABSOLUTE: 5.3 K/UL (ref 1.7–7.7)
NEUTROPHILS RELATIVE PERCENT: 76.7 %
PDW BLD-RTO: 14.3 % (ref 12.4–15.4)
PLATELET # BLD: 240 K/UL (ref 135–450)
PMV BLD AUTO: 9.4 FL (ref 5–10.5)
POTASSIUM SERPL-SCNC: 4.5 MMOL/L (ref 3.5–5.1)
RBC # BLD: 4.3 M/UL (ref 4–5.2)
SODIUM BLD-SCNC: 146 MMOL/L (ref 136–145)
TOTAL PROTEIN: 6.9 G/DL (ref 6.4–8.2)
TRIGL SERPL-MCNC: 89 MG/DL (ref 0–150)
VLDLC SERPL CALC-MCNC: 18 MG/DL
WBC # BLD: 6.9 K/UL (ref 4–11)

## 2019-09-24 ENCOUNTER — TELEPHONE (OUTPATIENT)
Dept: FAMILY MEDICINE CLINIC | Age: 69
End: 2019-09-24

## 2019-09-30 ENCOUNTER — OFFICE VISIT (OUTPATIENT)
Dept: FAMILY MEDICINE CLINIC | Age: 69
End: 2019-09-30
Payer: COMMERCIAL

## 2019-09-30 VITALS
SYSTOLIC BLOOD PRESSURE: 138 MMHG | HEIGHT: 59 IN | BODY MASS INDEX: 24.52 KG/M2 | WEIGHT: 121.6 LBS | HEART RATE: 92 BPM | DIASTOLIC BLOOD PRESSURE: 78 MMHG

## 2019-09-30 DIAGNOSIS — Z13.220 SCREENING FOR HYPERLIPIDEMIA: ICD-10-CM

## 2019-09-30 DIAGNOSIS — Z23 NEED FOR PROPHYLACTIC VACCINATION AND INOCULATION AGAINST VARICELLA: ICD-10-CM

## 2019-09-30 DIAGNOSIS — Z87.891 PERSONAL HISTORY OF TOBACCO USE, PRESENTING HAZARDS TO HEALTH: ICD-10-CM

## 2019-09-30 DIAGNOSIS — Z13.1 SCREENING FOR DIABETES MELLITUS (DM): ICD-10-CM

## 2019-09-30 DIAGNOSIS — Z00.00 ROUTINE GENERAL MEDICAL EXAMINATION AT A HEALTH CARE FACILITY: ICD-10-CM

## 2019-09-30 DIAGNOSIS — Z23 NEED FOR PROPHYLACTIC VACCINATION AGAINST DIPHTHERIA-TETANUS-PERTUSSIS (DTP): ICD-10-CM

## 2019-09-30 DIAGNOSIS — Z23 NEEDS FLU SHOT: ICD-10-CM

## 2019-09-30 DIAGNOSIS — M51.37 DEGENERATION OF LUMBAR OR LUMBOSACRAL INTERVERTEBRAL DISC: ICD-10-CM

## 2019-09-30 DIAGNOSIS — F41.9 ANXIETY: Primary | ICD-10-CM

## 2019-09-30 PROCEDURE — G0438 PPPS, INITIAL VISIT: HCPCS | Performed by: FAMILY MEDICINE

## 2019-09-30 PROCEDURE — 99406 BEHAV CHNG SMOKING 3-10 MIN: CPT | Performed by: FAMILY MEDICINE

## 2019-09-30 PROCEDURE — G0008 ADMIN INFLUENZA VIRUS VAC: HCPCS | Performed by: FAMILY MEDICINE

## 2019-09-30 PROCEDURE — 90653 IIV ADJUVANT VACCINE IM: CPT | Performed by: FAMILY MEDICINE

## 2019-09-30 RX ORDER — SIMVASTATIN 20 MG
20 TABLET ORAL NIGHTLY
Qty: 90 TABLET | Refills: 1 | Status: SHIPPED | OUTPATIENT
Start: 2019-09-30 | End: 2020-03-06 | Stop reason: SDUPTHER

## 2019-09-30 RX ORDER — LORAZEPAM 0.5 MG/1
0.5 TABLET ORAL 3 TIMES DAILY PRN
Qty: 30 TABLET | Refills: 0 | Status: SHIPPED | OUTPATIENT
Start: 2019-09-30 | End: 2019-11-27 | Stop reason: SDUPTHER

## 2019-09-30 RX ORDER — FUROSEMIDE 40 MG/1
40 TABLET ORAL DAILY
Qty: 90 TABLET | Refills: 1 | Status: SHIPPED | OUTPATIENT
Start: 2019-09-30 | End: 2020-01-01 | Stop reason: ALTCHOICE

## 2019-09-30 RX ORDER — HYDROCODONE BITARTRATE AND ACETAMINOPHEN 10; 325 MG/1; MG/1
1 TABLET ORAL EVERY 4 HOURS PRN
Qty: 180 TABLET | Refills: 0 | Status: SHIPPED | OUTPATIENT
Start: 2019-09-30 | End: 2019-10-14 | Stop reason: SDUPTHER

## 2019-09-30 RX ORDER — HYDROCODONE BITARTRATE AND ACETAMINOPHEN 10; 325 MG/1; MG/1
1 TABLET ORAL EVERY 4 HOURS PRN
COMMUNITY
End: 2019-09-30 | Stop reason: SDUPTHER

## 2019-09-30 RX ORDER — LORAZEPAM 0.5 MG/1
0.5 TABLET ORAL 3 TIMES DAILY PRN
COMMUNITY
End: 2019-09-30 | Stop reason: SDUPTHER

## 2019-09-30 RX ORDER — HYDROCODONE BITARTRATE AND ACETAMINOPHEN 10; 325 MG/1; MG/1
1 TABLET ORAL EVERY 4 HOURS PRN
Qty: 180 TABLET | Refills: 0 | Status: SHIPPED | OUTPATIENT
Start: 2019-09-30 | End: 2019-09-30 | Stop reason: SDUPTHER

## 2019-09-30 RX ORDER — LORAZEPAM 0.5 MG/1
0.5 TABLET ORAL 3 TIMES DAILY PRN
Qty: 30 TABLET | Refills: 0 | Status: SHIPPED | OUTPATIENT
Start: 2019-09-30 | End: 2019-09-30 | Stop reason: SDUPTHER

## 2019-09-30 ASSESSMENT — LIFESTYLE VARIABLES
HAS A RELATIVE, FRIEND, DOCTOR, OR ANOTHER HEALTH PROFESSIONAL EXPRESSED CONCERN ABOUT YOUR DRINKING OR SUGGESTED YOU CUT DOWN: 0
HOW OFTEN DURING THE LAST YEAR HAVE YOU HAD A FEELING OF GUILT OR REMORSE AFTER DRINKING: 0
HOW OFTEN DURING THE LAST YEAR HAVE YOU FAILED TO DO WHAT WAS NORMALLY EXPECTED FROM YOU BECAUSE OF DRINKING: 0
HOW OFTEN DO YOU HAVE SIX OR MORE DRINKS ON ONE OCCASION: 0
HOW OFTEN DURING THE LAST YEAR HAVE YOU NEEDED AN ALCOHOLIC DRINK FIRST THING IN THE MORNING TO GET YOURSELF GOING AFTER A NIGHT OF HEAVY DRINKING: 0
HAVE YOU OR SOMEONE ELSE BEEN INJURED AS A RESULT OF YOUR DRINKING: 0
HOW OFTEN DO YOU HAVE A DRINK CONTAINING ALCOHOL: 1
HOW OFTEN DURING THE LAST YEAR HAVE YOU FOUND THAT YOU WERE NOT ABLE TO STOP DRINKING ONCE YOU HAD STARTED: 0
HOW OFTEN DURING THE LAST YEAR HAVE YOU BEEN UNABLE TO REMEMBER WHAT HAPPENED THE NIGHT BEFORE BECAUSE YOU HAD BEEN DRINKING: 0
AUDIT-C TOTAL SCORE: 1
AUDIT TOTAL SCORE: 1
HOW MANY STANDARD DRINKS CONTAINING ALCOHOL DO YOU HAVE ON A TYPICAL DAY: 0

## 2019-09-30 ASSESSMENT — PATIENT HEALTH QUESTIONNAIRE - PHQ9
SUM OF ALL RESPONSES TO PHQ QUESTIONS 1-9: 0
SUM OF ALL RESPONSES TO PHQ QUESTIONS 1-9: 0

## 2019-10-01 ENCOUNTER — TELEPHONE (OUTPATIENT)
Dept: FAMILY MEDICINE CLINIC | Age: 69
End: 2019-10-01

## 2019-10-01 RX ORDER — IPRATROPIUM BROMIDE AND ALBUTEROL SULFATE 2.5; .5 MG/3ML; MG/3ML
1 SOLUTION RESPIRATORY (INHALATION) EVERY 4 HOURS PRN
Qty: 360 ML | Refills: 0 | Status: SHIPPED | OUTPATIENT
Start: 2019-10-01 | End: 2019-12-06 | Stop reason: SDUPTHER

## 2019-10-07 DIAGNOSIS — M51.37 DEGENERATION OF LUMBAR OR LUMBOSACRAL INTERVERTEBRAL DISC: ICD-10-CM

## 2019-10-14 RX ORDER — HYDROCODONE BITARTRATE AND ACETAMINOPHEN 10; 325 MG/1; MG/1
1 TABLET ORAL EVERY 4 HOURS PRN
Qty: 180 TABLET | Refills: 0 | Status: SHIPPED | OUTPATIENT
Start: 2019-10-14 | End: 2019-11-27 | Stop reason: SDUPTHER

## 2019-11-27 RX ORDER — LORAZEPAM 0.5 MG/1
0.5 TABLET ORAL 3 TIMES DAILY PRN
Qty: 30 TABLET | Refills: 0 | Status: SHIPPED | OUTPATIENT
Start: 2019-11-27 | End: 2019-12-30 | Stop reason: SDUPTHER

## 2019-11-27 RX ORDER — HYDROCODONE BITARTRATE AND ACETAMINOPHEN 10; 325 MG/1; MG/1
1 TABLET ORAL EVERY 4 HOURS PRN
Qty: 180 TABLET | Refills: 0 | Status: SHIPPED | OUTPATIENT
Start: 2019-11-27 | End: 2019-12-30 | Stop reason: SDUPTHER

## 2019-12-06 ENCOUNTER — OFFICE VISIT (OUTPATIENT)
Dept: FAMILY MEDICINE CLINIC | Age: 69
End: 2019-12-06
Payer: COMMERCIAL

## 2019-12-06 VITALS
HEIGHT: 59 IN | WEIGHT: 119.6 LBS | BODY MASS INDEX: 24.11 KG/M2 | OXYGEN SATURATION: 91 % | DIASTOLIC BLOOD PRESSURE: 84 MMHG | HEART RATE: 96 BPM | SYSTOLIC BLOOD PRESSURE: 124 MMHG

## 2019-12-06 DIAGNOSIS — I10 ESSENTIAL HYPERTENSION: ICD-10-CM

## 2019-12-06 DIAGNOSIS — J44.9 CHRONIC OBSTRUCTIVE PULMONARY DISEASE, UNSPECIFIED COPD TYPE (HCC): Primary | ICD-10-CM

## 2019-12-06 DIAGNOSIS — F41.9 ANXIETY: ICD-10-CM

## 2019-12-06 DIAGNOSIS — M51.37 DEGENERATION OF LUMBAR OR LUMBOSACRAL INTERVERTEBRAL DISC: ICD-10-CM

## 2019-12-06 PROBLEM — K35.80 ACUTE APPENDICITIS: Status: RESOLVED | Noted: 2019-05-18 | Resolved: 2019-12-06

## 2019-12-06 PROCEDURE — 99213 OFFICE O/P EST LOW 20 MIN: CPT | Performed by: FAMILY MEDICINE

## 2019-12-06 RX ORDER — IPRATROPIUM BROMIDE AND ALBUTEROL SULFATE 2.5; .5 MG/3ML; MG/3ML
1 SOLUTION RESPIRATORY (INHALATION) EVERY 4 HOURS PRN
Qty: 360 ML | Refills: 0 | Status: SHIPPED | OUTPATIENT
Start: 2019-12-06 | End: 2020-01-01 | Stop reason: SDUPTHER

## 2019-12-27 DIAGNOSIS — F41.9 ANXIETY: ICD-10-CM

## 2019-12-27 DIAGNOSIS — M51.37 DEGENERATION OF LUMBAR OR LUMBOSACRAL INTERVERTEBRAL DISC: ICD-10-CM

## 2019-12-30 RX ORDER — LORAZEPAM 0.5 MG/1
0.5 TABLET ORAL 3 TIMES DAILY PRN
Qty: 30 TABLET | Refills: 0 | Status: SHIPPED | OUTPATIENT
Start: 2019-12-30 | End: 2020-01-29

## 2019-12-30 RX ORDER — HYDROCODONE BITARTRATE AND ACETAMINOPHEN 10; 325 MG/1; MG/1
1 TABLET ORAL EVERY 4 HOURS PRN
Qty: 180 TABLET | Refills: 0 | Status: SHIPPED | OUTPATIENT
Start: 2019-12-30 | End: 2020-01-29 | Stop reason: SDUPTHER

## 2020-01-01 ENCOUNTER — ANESTHESIA EVENT (OUTPATIENT)
Dept: ENDOSCOPY | Age: 70
End: 2020-01-01
Payer: COMMERCIAL

## 2020-01-01 ENCOUNTER — CARE COORDINATION (OUTPATIENT)
Dept: CASE MANAGEMENT | Age: 70
End: 2020-01-01

## 2020-01-01 ENCOUNTER — OFFICE VISIT (OUTPATIENT)
Dept: FAMILY MEDICINE CLINIC | Age: 70
End: 2020-01-01
Payer: COMMERCIAL

## 2020-01-01 ENCOUNTER — ANESTHESIA (OUTPATIENT)
Dept: OPERATING ROOM | Age: 70
DRG: 167 | End: 2020-01-01
Payer: COMMERCIAL

## 2020-01-01 ENCOUNTER — ANESTHESIA EVENT (OUTPATIENT)
Dept: OPERATING ROOM | Age: 70
DRG: 167 | End: 2020-01-01
Payer: COMMERCIAL

## 2020-01-01 ENCOUNTER — HOSPITAL ENCOUNTER (INPATIENT)
Age: 70
LOS: 4 days | Discharge: HOME | DRG: 189 | End: 2020-09-22
Attending: EMERGENCY MEDICINE | Admitting: INTERNAL MEDICINE
Payer: COMMERCIAL

## 2020-01-01 ENCOUNTER — APPOINTMENT (OUTPATIENT)
Dept: CT IMAGING | Age: 70
DRG: 167 | End: 2020-01-01
Payer: COMMERCIAL

## 2020-01-01 ENCOUNTER — VIRTUAL VISIT (OUTPATIENT)
Dept: FAMILY MEDICINE CLINIC | Age: 70
End: 2020-01-01
Payer: COMMERCIAL

## 2020-01-01 ENCOUNTER — HOSPITAL ENCOUNTER (OUTPATIENT)
Dept: GENERAL RADIOLOGY | Age: 70
Discharge: HOME | End: 2020-08-28
Payer: COMMERCIAL

## 2020-01-01 ENCOUNTER — APPOINTMENT (OUTPATIENT)
Dept: GENERAL RADIOLOGY | Age: 70
DRG: 191 | End: 2020-01-01
Payer: COMMERCIAL

## 2020-01-01 ENCOUNTER — HOSPITAL ENCOUNTER (OUTPATIENT)
Dept: LAB | Age: 70
Discharge: HOME | End: 2020-11-30
Payer: COMMERCIAL

## 2020-01-01 ENCOUNTER — HOSPITAL ENCOUNTER (INPATIENT)
Age: 70
LOS: 7 days | Discharge: HOME | DRG: 167 | End: 2020-12-11
Attending: EMERGENCY MEDICINE | Admitting: STUDENT IN AN ORGANIZED HEALTH CARE EDUCATION/TRAINING PROGRAM
Payer: COMMERCIAL

## 2020-01-01 ENCOUNTER — HOSPITAL ENCOUNTER (OUTPATIENT)
Dept: LAB | Age: 70
Discharge: HOME | End: 2020-11-02
Payer: COMMERCIAL

## 2020-01-01 ENCOUNTER — HOSPITAL ENCOUNTER (OUTPATIENT)
Dept: PREADMISSION TESTING | Age: 70
Discharge: HOME | End: 2020-12-23

## 2020-01-01 ENCOUNTER — HOSPITAL ENCOUNTER (OUTPATIENT)
Age: 70
Discharge: HOME | End: 2020-08-28
Payer: COMMERCIAL

## 2020-01-01 ENCOUNTER — APPOINTMENT (OUTPATIENT)
Dept: GENERAL RADIOLOGY | Age: 70
DRG: 167 | End: 2020-01-01
Payer: COMMERCIAL

## 2020-01-01 ENCOUNTER — OFFICE VISIT (OUTPATIENT)
Dept: CARDIOLOGY CLINIC | Age: 70
End: 2020-01-01
Payer: COMMERCIAL

## 2020-01-01 ENCOUNTER — HOSPITAL ENCOUNTER (OUTPATIENT)
Dept: PREADMISSION TESTING | Age: 70
Discharge: HOME | End: 2020-12-29

## 2020-01-01 ENCOUNTER — HOSPITAL ENCOUNTER (INPATIENT)
Age: 70
LOS: 3 days | Discharge: HOME HEALTH | DRG: 191 | End: 2020-12-31
Attending: EMERGENCY MEDICINE | Admitting: INTERNAL MEDICINE
Payer: COMMERCIAL

## 2020-01-01 ENCOUNTER — HOSPITAL ENCOUNTER (OUTPATIENT)
Dept: PET IMAGING | Age: 70
Discharge: HOME | End: 2020-10-22
Payer: COMMERCIAL

## 2020-01-01 ENCOUNTER — APPOINTMENT (OUTPATIENT)
Dept: CT IMAGING | Age: 70
DRG: 189 | End: 2020-01-01
Payer: COMMERCIAL

## 2020-01-01 ENCOUNTER — HOSPITAL ENCOUNTER (OUTPATIENT)
Dept: CT IMAGING | Age: 70
Discharge: HOME | End: 2020-11-06
Payer: COMMERCIAL

## 2020-01-01 VITALS
OXYGEN SATURATION: 93 % | TEMPERATURE: 98.9 F | BODY MASS INDEX: 24.56 KG/M2 | WEIGHT: 125.1 LBS | SYSTOLIC BLOOD PRESSURE: 140 MMHG | HEIGHT: 60 IN | RESPIRATION RATE: 24 BRPM | HEART RATE: 111 BPM | DIASTOLIC BLOOD PRESSURE: 74 MMHG

## 2020-01-01 VITALS
TEMPERATURE: 97.1 F | BODY MASS INDEX: 23.16 KG/M2 | DIASTOLIC BLOOD PRESSURE: 72 MMHG | SYSTOLIC BLOOD PRESSURE: 126 MMHG | HEIGHT: 60 IN | OXYGEN SATURATION: 91 % | WEIGHT: 118 LBS | HEART RATE: 103 BPM

## 2020-01-01 VITALS
WEIGHT: 118 LBS | SYSTOLIC BLOOD PRESSURE: 116 MMHG | HEIGHT: 60 IN | DIASTOLIC BLOOD PRESSURE: 68 MMHG | BODY MASS INDEX: 23.16 KG/M2 | OXYGEN SATURATION: 87 % | HEART RATE: 103 BPM | TEMPERATURE: 98.7 F

## 2020-01-01 VITALS
DIASTOLIC BLOOD PRESSURE: 80 MMHG | BODY MASS INDEX: 24.5 KG/M2 | HEART RATE: 92 BPM | SYSTOLIC BLOOD PRESSURE: 160 MMHG | WEIGHT: 124.8 LBS | HEIGHT: 60 IN

## 2020-01-01 VITALS
OXYGEN SATURATION: 91 % | DIASTOLIC BLOOD PRESSURE: 72 MMHG | HEIGHT: 60 IN | HEART RATE: 97 BPM | TEMPERATURE: 98.1 F | BODY MASS INDEX: 23.16 KG/M2 | SYSTOLIC BLOOD PRESSURE: 126 MMHG | WEIGHT: 118 LBS

## 2020-01-01 VITALS
DIASTOLIC BLOOD PRESSURE: 61 MMHG | OXYGEN SATURATION: 96 % | WEIGHT: 122.2 LBS | HEIGHT: 60 IN | RESPIRATION RATE: 16 BRPM | BODY MASS INDEX: 23.99 KG/M2 | TEMPERATURE: 97.6 F | HEART RATE: 68 BPM | SYSTOLIC BLOOD PRESSURE: 115 MMHG

## 2020-01-01 VITALS
OXYGEN SATURATION: 99 % | DIASTOLIC BLOOD PRESSURE: 114 MMHG | RESPIRATION RATE: 3 BRPM | TEMPERATURE: 97.7 F | SYSTOLIC BLOOD PRESSURE: 164 MMHG

## 2020-01-01 VITALS
SYSTOLIC BLOOD PRESSURE: 155 MMHG | HEART RATE: 104 BPM | RESPIRATION RATE: 20 BRPM | OXYGEN SATURATION: 96 % | TEMPERATURE: 97.2 F | HEIGHT: 60 IN | DIASTOLIC BLOOD PRESSURE: 71 MMHG | BODY MASS INDEX: 23.56 KG/M2 | WEIGHT: 120 LBS

## 2020-01-01 VITALS
DIASTOLIC BLOOD PRESSURE: 65 MMHG | RESPIRATION RATE: 26 BRPM | WEIGHT: 115 LBS | HEIGHT: 60 IN | SYSTOLIC BLOOD PRESSURE: 123 MMHG | TEMPERATURE: 97.5 F | OXYGEN SATURATION: 91 % | BODY MASS INDEX: 22.58 KG/M2 | HEART RATE: 91 BPM

## 2020-01-01 DIAGNOSIS — R06.00 DYSPNEA, UNSPECIFIED TYPE: ICD-10-CM

## 2020-01-01 DIAGNOSIS — R05.9 COUGH: ICD-10-CM

## 2020-01-01 DIAGNOSIS — J44.1 COPD EXACERBATION (HCC): Primary | ICD-10-CM

## 2020-01-01 DIAGNOSIS — E83.42 HYPOMAGNESEMIA: ICD-10-CM

## 2020-01-01 DIAGNOSIS — J40 BRONCHITIS: ICD-10-CM

## 2020-01-01 DIAGNOSIS — I10 ESSENTIAL HYPERTENSION: ICD-10-CM

## 2020-01-01 DIAGNOSIS — E87.6 HYPOKALEMIA: ICD-10-CM

## 2020-01-01 LAB
A/G RATIO: 1.6 (ref 1.1–2.2)
ADENOVIRUS DETECTION BY PCR: NOT DETECTED
ALBUMIN SERPL-MCNC: 3.2 GM/DL (ref 3.4–5)
ALBUMIN SERPL-MCNC: 3.3 GM/DL (ref 3.4–5)
ALBUMIN SERPL-MCNC: 3.3 GM/DL (ref 3.4–5)
ALBUMIN SERPL-MCNC: 3.5 GM/DL (ref 3.4–5)
ALBUMIN SERPL-MCNC: 3.6 GM/DL (ref 3.4–5)
ALBUMIN SERPL-MCNC: 4.2 G/DL (ref 3.4–5)
ALBUMIN SERPL-MCNC: 4.3 GM/DL (ref 3.4–5)
ALBUMIN SERPL-MCNC: 4.5 GM/DL (ref 3.4–5)
ALP BLD-CCNC: 71 IU/L (ref 40–129)
ALP BLD-CCNC: 74 IU/L (ref 40–129)
ALP BLD-CCNC: 75 IU/L (ref 40–129)
ALP BLD-CCNC: 76 IU/L (ref 40–129)
ALP BLD-CCNC: 77 IU/L (ref 40–129)
ALP BLD-CCNC: 78 U/L (ref 40–129)
ALP BLD-CCNC: 81 IU/L (ref 40–129)
ALP BLD-CCNC: 83 IU/L (ref 40–129)
ALT SERPL-CCNC: 5 U/L (ref 10–40)
ALT SERPL-CCNC: 6 U/L (ref 10–40)
ALT SERPL-CCNC: 6 U/L (ref 10–40)
ALT SERPL-CCNC: 7 U/L (ref 10–40)
ALT SERPL-CCNC: <5 U/L (ref 10–40)
ANION GAP SERPL CALCULATED.3IONS-SCNC: 10 MMOL/L (ref 3–16)
ANION GAP SERPL CALCULATED.3IONS-SCNC: 10 MMOL/L (ref 4–16)
ANION GAP SERPL CALCULATED.3IONS-SCNC: 11 MMOL/L (ref 4–16)
ANION GAP SERPL CALCULATED.3IONS-SCNC: 12 MMOL/L (ref 4–16)
ANION GAP SERPL CALCULATED.3IONS-SCNC: 13 MMOL/L (ref 4–16)
ANION GAP SERPL CALCULATED.3IONS-SCNC: 5 MMOL/L (ref 4–16)
ANION GAP SERPL CALCULATED.3IONS-SCNC: 5 MMOL/L (ref 4–16)
ANION GAP SERPL CALCULATED.3IONS-SCNC: 7 MMOL/L (ref 4–16)
ANION GAP SERPL CALCULATED.3IONS-SCNC: 8 MMOL/L (ref 4–16)
ANION GAP SERPL CALCULATED.3IONS-SCNC: 8 MMOL/L (ref 4–16)
ANION GAP SERPL CALCULATED.3IONS-SCNC: 9 MMOL/L (ref 4–16)
APTT: 29.9 SECONDS (ref 25.1–37.1)
ASPERGILLUS ANTIBODY ID: NORMAL
AST SERPL-CCNC: 10 IU/L (ref 15–37)
AST SERPL-CCNC: 10 IU/L (ref 15–37)
AST SERPL-CCNC: 12 IU/L (ref 15–37)
AST SERPL-CCNC: 15 U/L (ref 15–37)
AST SERPL-CCNC: 16 IU/L (ref 15–37)
AST SERPL-CCNC: 8 IU/L (ref 15–37)
AST SERPL-CCNC: 9 IU/L (ref 15–37)
AST SERPL-CCNC: 9 IU/L (ref 15–37)
BACTERIA: ABNORMAL /HPF
BANDED NEUTROPHILS ABSOLUTE COUNT: 0.12 K/CU MM
BANDED NEUTROPHILS RELATIVE PERCENT: 1 % (ref 5–11)
BASE EXCESS MIXED: 13 (ref 0–2.3)
BASE EXCESS MIXED: 14.8 (ref 0–2.3)
BASE EXCESS MIXED: 15.2 (ref 0–2.3)
BASE EXCESS MIXED: 18.1 (ref 0–2.3)
BASE EXCESS MIXED: 9.2 (ref 0–2.3)
BASOPHILS ABSOLUTE: 0 K/CU MM
BASOPHILS RELATIVE PERCENT: 0.1 % (ref 0–1)
BASOPHILS RELATIVE PERCENT: 0.2 % (ref 0–1)
BASOPHILS RELATIVE PERCENT: 0.3 % (ref 0–1)
BASOPHILS RELATIVE PERCENT: 0.3 % (ref 0–1)
BILIRUB SERPL-MCNC: 0.1 MG/DL (ref 0–1)
BILIRUB SERPL-MCNC: 0.2 MG/DL (ref 0–1)
BILIRUB SERPL-MCNC: 0.2 MG/DL (ref 0–1)
BILIRUB SERPL-MCNC: 0.3 MG/DL (ref 0–1)
BILIRUB SERPL-MCNC: 0.3 MG/DL (ref 0–1)
BILIRUBIN URINE: NEGATIVE MG/DL
BLASTOMYCES ANTIBODY ID: NORMAL
BLOOD, URINE: ABNORMAL
BORDETELLA PARAPERTUSSIS BY PCR: NOT DETECTED
BORDETELLA PERTUSSIS PCR: NOT DETECTED
BUN BLDV-MCNC: 12 MG/DL (ref 6–23)
BUN BLDV-MCNC: 12 MG/DL (ref 6–23)
BUN BLDV-MCNC: 19 MG/DL (ref 6–23)
BUN BLDV-MCNC: 21 MG/DL (ref 6–23)
BUN BLDV-MCNC: 21 MG/DL (ref 6–23)
BUN BLDV-MCNC: 22 MG/DL (ref 6–23)
BUN BLDV-MCNC: 23 MG/DL (ref 6–23)
BUN BLDV-MCNC: 23 MG/DL (ref 7–20)
BUN BLDV-MCNC: 24 MG/DL (ref 6–23)
BUN BLDV-MCNC: 28 MG/DL (ref 6–23)
BUN BLDV-MCNC: 31 MG/DL (ref 6–23)
BUN BLDV-MCNC: 32 MG/DL (ref 6–23)
BUN BLDV-MCNC: 32 MG/DL (ref 6–23)
BUN BLDV-MCNC: 34 MG/DL (ref 6–23)
CALCIUM SERPL-MCNC: 8.3 MG/DL (ref 8.3–10.6)
CALCIUM SERPL-MCNC: 8.6 MG/DL (ref 8.3–10.6)
CALCIUM SERPL-MCNC: 8.6 MG/DL (ref 8.3–10.6)
CALCIUM SERPL-MCNC: 8.8 MG/DL (ref 8.3–10.6)
CALCIUM SERPL-MCNC: 8.9 MG/DL (ref 8.3–10.6)
CALCIUM SERPL-MCNC: 9.1 MG/DL (ref 8.3–10.6)
CALCIUM SERPL-MCNC: 9.2 MG/DL (ref 8.3–10.6)
CALCIUM SERPL-MCNC: 9.2 MG/DL (ref 8.3–10.6)
CALCIUM SERPL-MCNC: 9.3 MG/DL (ref 8.3–10.6)
CALCIUM SERPL-MCNC: 9.8 MG/DL (ref 8.3–10.6)
CARBON MONOXIDE, BLOOD: 1.5 % (ref 0–5)
CARBON MONOXIDE, BLOOD: 1.5 % (ref 0–5)
CEA: 3.8 NG/ML
CEA: 3.8 NG/ML
CHLAMYDOPHILA PNEUMONIA PCR: NOT DETECTED
CHLORIDE BLD-SCNC: 100 MMOL/L (ref 99–110)
CHLORIDE BLD-SCNC: 89 MMOL/L (ref 99–110)
CHLORIDE BLD-SCNC: 90 MMOL/L (ref 99–110)
CHLORIDE BLD-SCNC: 91 MMOL/L (ref 99–110)
CHLORIDE BLD-SCNC: 93 MMOL/L (ref 99–110)
CHLORIDE BLD-SCNC: 93 MMOL/L (ref 99–110)
CHLORIDE BLD-SCNC: 96 MMOL/L (ref 99–110)
CHLORIDE BLD-SCNC: 97 MMOL/L (ref 99–110)
CHLORIDE BLD-SCNC: 97 MMOL/L (ref 99–110)
CHLORIDE BLD-SCNC: 98 MMOL/L (ref 99–110)
CHOLESTEROL, TOTAL: 169 MG/DL (ref 0–199)
CLARITY: CLEAR
CO2 CONTENT: 42.9 MMOL/L (ref 19–24)
CO2 CONTENT: 47 MMOL/L (ref 19–24)
CO2: 30 MMOL/L (ref 21–32)
CO2: 33 MMOL/L (ref 21–32)
CO2: 34 MMOL/L (ref 21–32)
CO2: 36 MMOL/L (ref 21–32)
CO2: 37 MMOL/L (ref 21–32)
CO2: 38 MMOL/L (ref 21–32)
CO2: 38 MMOL/L (ref 21–32)
CO2: 39 MMOL/L (ref 21–32)
CO2: 39 MMOL/L (ref 21–32)
CO2: 40 MMOL/L (ref 21–32)
CO2: 40 MMOL/L (ref 21–32)
CO2: 41 MMOL/L (ref 21–32)
COCCIDIOIDES ANTIBODY ID: NORMAL
COLOR: YELLOW
COMMENT: ABNORMAL
CORONAVIRUS 229E PCR: NOT DETECTED
CORONAVIRUS HKU1 PCR: NOT DETECTED
CORONAVIRUS NL63 PCR: NOT DETECTED
CORONAVIRUS OC43 PCR: NOT DETECTED
CREAT SERPL-MCNC: 0.5 MG/DL (ref 0.6–1.1)
CREAT SERPL-MCNC: 0.5 MG/DL (ref 0.6–1.1)
CREAT SERPL-MCNC: 0.6 MG/DL (ref 0.6–1.1)
CREAT SERPL-MCNC: 0.7 MG/DL (ref 0.6–1.1)
CREAT SERPL-MCNC: 0.7 MG/DL (ref 0.6–1.2)
CREAT SERPL-MCNC: 0.8 MG/DL (ref 0.6–1.1)
CREAT SERPL-MCNC: 0.8 MG/DL (ref 0.6–1.1)
CREAT SERPL-MCNC: 0.9 MG/DL (ref 0.6–1.1)
CREAT SERPL-MCNC: 1 MG/DL (ref 0.6–1.1)
CREAT SERPL-MCNC: 1 MG/DL (ref 0.6–1.1)
CULTURE: NORMAL
D DIMER: 243 NG/ML(DDU)
DIFFERENTIAL TYPE: ABNORMAL
DOSE AMOUNT: ABNORMAL
DOSE TIME: ABNORMAL
EKG ATRIAL RATE: 105 BPM
EKG ATRIAL RATE: 107 BPM
EKG ATRIAL RATE: 109 BPM
EKG ATRIAL RATE: 93 BPM
EKG ATRIAL RATE: 99 BPM
EKG ATRIAL RATE: 99 BPM
EKG DIAGNOSIS: NORMAL
EKG P AXIS: 25 DEGREES
EKG P AXIS: 58 DEGREES
EKG P AXIS: 61 DEGREES
EKG P AXIS: 63 DEGREES
EKG P AXIS: 67 DEGREES
EKG P AXIS: 78 DEGREES
EKG P-R INTERVAL: 136 MS
EKG P-R INTERVAL: 142 MS
EKG P-R INTERVAL: 144 MS
EKG P-R INTERVAL: 152 MS
EKG P-R INTERVAL: 156 MS
EKG P-R INTERVAL: 162 MS
EKG Q-T INTERVAL: 310 MS
EKG Q-T INTERVAL: 312 MS
EKG Q-T INTERVAL: 314 MS
EKG Q-T INTERVAL: 326 MS
EKG Q-T INTERVAL: 326 MS
EKG Q-T INTERVAL: 334 MS
EKG QRS DURATION: 60 MS
EKG QRS DURATION: 66 MS
EKG QRS DURATION: 66 MS
EKG QRS DURATION: 72 MS
EKG QRS DURATION: 74 MS
EKG QRS DURATION: 80 MS
EKG QTC CALCULATION (BAZETT): 400 MS
EKG QTC CALCULATION (BAZETT): 405 MS
EKG QTC CALCULATION (BAZETT): 413 MS
EKG QTC CALCULATION (BAZETT): 422 MS
EKG QTC CALCULATION (BAZETT): 428 MS
EKG QTC CALCULATION (BAZETT): 430 MS
EKG R AXIS: -11 DEGREES
EKG R AXIS: -12 DEGREES
EKG R AXIS: -20 DEGREES
EKG R AXIS: -27 DEGREES
EKG R AXIS: -7 DEGREES
EKG R AXIS: 9 DEGREES
EKG T AXIS: 43 DEGREES
EKG T AXIS: 48 DEGREES
EKG T AXIS: 49 DEGREES
EKG T AXIS: 49 DEGREES
EKG T AXIS: 50 DEGREES
EKG T AXIS: 51 DEGREES
EKG VENTRICULAR RATE: 105 BPM
EKG VENTRICULAR RATE: 107 BPM
EKG VENTRICULAR RATE: 109 BPM
EKG VENTRICULAR RATE: 93 BPM
EKG VENTRICULAR RATE: 99 BPM
EKG VENTRICULAR RATE: 99 BPM
EOSINOPHILS ABSOLUTE: 0 K/CU MM
EOSINOPHILS ABSOLUTE: 0.1 K/CU MM
EOSINOPHILS ABSOLUTE: 0.1 K/CU MM
EOSINOPHILS RELATIVE PERCENT: 0 % (ref 0–3)
EOSINOPHILS RELATIVE PERCENT: 0.1 % (ref 0–3)
EOSINOPHILS RELATIVE PERCENT: 0.2 % (ref 0–3)
EOSINOPHILS RELATIVE PERCENT: 1 % (ref 0–3)
EOSINOPHILS RELATIVE PERCENT: 1.3 % (ref 0–3)
FERRITIN: 234 NG/ML (ref 15–150)
GFR AFRICAN AMERICAN: >60
GFR AFRICAN AMERICAN: >60 ML/MIN/1.73M2
GFR NON-AFRICAN AMERICAN: 55 ML/MIN/1.73M2
GFR NON-AFRICAN AMERICAN: 55 ML/MIN/1.73M2
GFR NON-AFRICAN AMERICAN: >60
GFR NON-AFRICAN AMERICAN: >60 ML/MIN/1.73M2
GLOBULIN: 2.7 G/DL
GLUCOSE BLD-MCNC: 109 MG/DL (ref 70–99)
GLUCOSE BLD-MCNC: 115 MG/DL (ref 70–99)
GLUCOSE BLD-MCNC: 117 MG/DL (ref 70–99)
GLUCOSE BLD-MCNC: 118 MG/DL (ref 70–99)
GLUCOSE BLD-MCNC: 119 MG/DL (ref 70–99)
GLUCOSE BLD-MCNC: 122 MG/DL (ref 70–99)
GLUCOSE BLD-MCNC: 140 MG/DL (ref 70–99)
GLUCOSE BLD-MCNC: 142 MG/DL (ref 70–99)
GLUCOSE BLD-MCNC: 160 MG/DL (ref 70–99)
GLUCOSE BLD-MCNC: 164 MG/DL (ref 70–99)
GLUCOSE BLD-MCNC: 170 MG/DL (ref 70–99)
GLUCOSE BLD-MCNC: 67 MG/DL (ref 70–99)
GLUCOSE BLD-MCNC: 77 MG/DL (ref 70–99)
GLUCOSE BLD-MCNC: 97 MG/DL (ref 70–99)
GLUCOSE, URINE: NEGATIVE MG/DL
GRAM SMEAR: NORMAL
HCO3 ARTERIAL: 40.9 MMOL/L (ref 18–23)
HCO3 ARTERIAL: 44.6 MMOL/L (ref 18–23)
HCO3 VENOUS: 38.9 MMOL/L (ref 19–25)
HCO3 VENOUS: 45.3 MMOL/L (ref 19–25)
HCO3 VENOUS: 47.1 MMOL/L (ref 19–25)
HCT VFR BLD CALC: 36.6 % (ref 37–47)
HCT VFR BLD CALC: 38.2 % (ref 37–47)
HCT VFR BLD CALC: 38.8 % (ref 37–47)
HCT VFR BLD CALC: 39.4 % (ref 37–47)
HCT VFR BLD CALC: 39.6 % (ref 37–47)
HCT VFR BLD CALC: 41.8 % (ref 37–47)
HCT VFR BLD CALC: 42.8 % (ref 37–47)
HCT VFR BLD CALC: 44.3 % (ref 37–47)
HCT VFR BLD CALC: 44.8 % (ref 37–47)
HDLC SERPL-MCNC: 82 MG/DL (ref 40–60)
HEMOGLOBIN: 10.9 GM/DL (ref 12.5–16)
HEMOGLOBIN: 11.2 GM/DL (ref 12.5–16)
HEMOGLOBIN: 11.2 GM/DL (ref 12.5–16)
HEMOGLOBIN: 11.4 GM/DL (ref 12.5–16)
HEMOGLOBIN: 11.8 GM/DL (ref 12.5–16)
HEMOGLOBIN: 12.5 GM/DL (ref 12.5–16)
HEMOGLOBIN: 12.6 GM/DL (ref 12.5–16)
HEMOGLOBIN: 13.2 GM/DL (ref 12.5–16)
HEMOGLOBIN: 13.6 GM/DL (ref 12.5–16)
HIGH SENSITIVE C-REACTIVE PROTEIN: 4.6 MG/L
HISTOPLASMA AB, ID: NORMAL
HUMAN METAPNEUMOVIRUS PCR: NOT DETECTED
IMMATURE NEUTROPHIL %: 0.2 % (ref 0–0.43)
IMMATURE NEUTROPHIL %: 0.3 % (ref 0–0.43)
IMMATURE NEUTROPHIL %: 0.4 % (ref 0–0.43)
IMMATURE NEUTROPHIL %: 0.4 % (ref 0–0.43)
IMMATURE NEUTROPHIL %: 0.5 % (ref 0–0.43)
IMMATURE NEUTROPHIL %: 0.6 % (ref 0–0.43)
IMMATURE NEUTROPHIL %: 0.6 % (ref 0–0.43)
INFLUENZA A BY PCR: NOT DETECTED
INFLUENZA A H1 (2009) PCR: NOT DETECTED
INFLUENZA A H1 PANDEMIC PCR: NOT DETECTED
INFLUENZA A H3 PCR: NOT DETECTED
INFLUENZA B BY PCR: NOT DETECTED
INR BLD: 0.84 INDEX
KETONES, URINE: ABNORMAL MG/DL
LACTATE DEHYDROGENASE: 172 IU/L (ref 120–246)
LACTATE: 0.7 MMOL/L (ref 0.4–2)
LDL CHOLESTEROL CALCULATED: 71 MG/DL
LEUKOCYTE ESTERASE, URINE: ABNORMAL
LV EF: 53 %
LVEF MODALITY: NORMAL
LYMPHOCYTES ABSOLUTE: 0.3 K/CU MM
LYMPHOCYTES ABSOLUTE: 0.4 K/CU MM
LYMPHOCYTES ABSOLUTE: 0.4 K/CU MM
LYMPHOCYTES ABSOLUTE: 0.5 K/CU MM
LYMPHOCYTES ABSOLUTE: 0.5 K/CU MM
LYMPHOCYTES ABSOLUTE: 0.6 K/CU MM
LYMPHOCYTES ABSOLUTE: 0.9 K/CU MM
LYMPHOCYTES ABSOLUTE: 3.3 K/CU MM
LYMPHOCYTES RELATIVE PERCENT: 10.2 % (ref 24–44)
LYMPHOCYTES RELATIVE PERCENT: 28 % (ref 24–44)
LYMPHOCYTES RELATIVE PERCENT: 3.4 % (ref 24–44)
LYMPHOCYTES RELATIVE PERCENT: 3.5 % (ref 24–44)
LYMPHOCYTES RELATIVE PERCENT: 3.7 % (ref 24–44)
LYMPHOCYTES RELATIVE PERCENT: 5.1 % (ref 24–44)
LYMPHOCYTES RELATIVE PERCENT: 5.5 % (ref 24–44)
LYMPHOCYTES RELATIVE PERCENT: 8.1 % (ref 24–44)
Lab: NORMAL
MAGNESIUM: 1.2 MG/DL (ref 1.8–2.4)
MAGNESIUM: 1.6 MG/DL (ref 1.8–2.4)
MAGNESIUM: 1.6 MG/DL (ref 1.8–2.4)
MAGNESIUM: 1.7 MG/DL (ref 1.8–2.4)
MAGNESIUM: 1.8 MG/DL (ref 1.8–2.4)
MCH RBC QN AUTO: 26.9 PG (ref 27–31)
MCH RBC QN AUTO: 27.7 PG (ref 27–31)
MCH RBC QN AUTO: 27.8 PG (ref 27–31)
MCH RBC QN AUTO: 28 PG (ref 27–31)
MCH RBC QN AUTO: 28.1 PG (ref 27–31)
MCH RBC QN AUTO: 28.1 PG (ref 27–31)
MCH RBC QN AUTO: 28.3 PG (ref 27–31)
MCH RBC QN AUTO: 28.6 PG (ref 27–31)
MCH RBC QN AUTO: 28.6 PG (ref 27–31)
MCHC RBC AUTO-ENTMCNC: 28.4 % (ref 32–36)
MCHC RBC AUTO-ENTMCNC: 29.2 % (ref 32–36)
MCHC RBC AUTO-ENTMCNC: 29.3 % (ref 32–36)
MCHC RBC AUTO-ENTMCNC: 29.4 % (ref 32–36)
MCHC RBC AUTO-ENTMCNC: 29.8 % (ref 32–36)
MCHC RBC AUTO-ENTMCNC: 30.1 % (ref 32–36)
MCHC RBC AUTO-ENTMCNC: 30.4 % (ref 32–36)
MCV RBC AUTO: 93.4 FL (ref 78–100)
MCV RBC AUTO: 93.9 FL (ref 78–100)
MCV RBC AUTO: 94.1 FL (ref 78–100)
MCV RBC AUTO: 94.3 FL (ref 78–100)
MCV RBC AUTO: 94.5 FL (ref 78–100)
MCV RBC AUTO: 94.7 FL (ref 78–100)
MCV RBC AUTO: 95.5 FL (ref 78–100)
MCV RBC AUTO: 95.6 FL (ref 78–100)
MCV RBC AUTO: 95.9 FL (ref 78–100)
METAMYELOCYTES ABSOLUTE COUNT: 0.24 K/CU MM
METAMYELOCYTES PERCENT: 2 %
METHEMOGLOBIN ARTERIAL: 1 %
METHEMOGLOBIN ARTERIAL: 1 %
MONOCYTES ABSOLUTE: 0.1 K/CU MM
MONOCYTES ABSOLUTE: 0.1 K/CU MM
MONOCYTES ABSOLUTE: 0.2 K/CU MM
MONOCYTES ABSOLUTE: 0.3 K/CU MM
MONOCYTES ABSOLUTE: 0.3 K/CU MM
MONOCYTES ABSOLUTE: 0.5 K/CU MM
MONOCYTES ABSOLUTE: 0.7 K/CU MM
MONOCYTES ABSOLUTE: 0.8 K/CU MM
MONOCYTES RELATIVE PERCENT: 0.9 % (ref 0–4)
MONOCYTES RELATIVE PERCENT: 1.1 % (ref 0–4)
MONOCYTES RELATIVE PERCENT: 1.7 % (ref 0–4)
MONOCYTES RELATIVE PERCENT: 2.2 % (ref 0–4)
MONOCYTES RELATIVE PERCENT: 3.3 % (ref 0–4)
MONOCYTES RELATIVE PERCENT: 3.4 % (ref 0–4)
MONOCYTES RELATIVE PERCENT: 7 % (ref 0–4)
MONOCYTES RELATIVE PERCENT: 9 % (ref 0–4)
MUCUS: ABNORMAL HPF
MYCOPLASMA PNEUMONIAE PCR: NOT DETECTED
NITRITE URINE, QUANTITATIVE: NEGATIVE
NUCLEATED RBC %: 0 %
NUCLEATED RED BLOOD CELLS: 1
O2 SAT, VEN: 66.2 % (ref 50–70)
O2 SAT, VEN: 91.3 % (ref 50–70)
O2 SAT, VEN: 93.7 % (ref 50–70)
O2 SATURATION: 89.7 % (ref 96–97)
O2 SATURATION: 94.8 % (ref 96–97)
PARAINFLUENZA 1 PCR: NOT DETECTED
PARAINFLUENZA 2 PCR: NOT DETECTED
PARAINFLUENZA 3 PCR: NOT DETECTED
PARAINFLUENZA 4 PCR: NOT DETECTED
PCO2 ARTERIAL: 66 MMHG (ref 32–45)
PCO2 ARTERIAL: 79 MMHG (ref 32–45)
PCO2, VEN: 76 MMHG (ref 38–52)
PCO2, VEN: 79 MMHG (ref 38–52)
PCO2, VEN: 88 MMHG (ref 38–52)
PDW BLD-RTO: 13.4 % (ref 11.7–14.9)
PDW BLD-RTO: 13.4 % (ref 11.7–14.9)
PDW BLD-RTO: 13.6 % (ref 11.7–14.9)
PDW BLD-RTO: 13.7 % (ref 11.7–14.9)
PDW BLD-RTO: 13.7 % (ref 11.7–14.9)
PDW BLD-RTO: 14.5 % (ref 11.7–14.9)
PDW BLD-RTO: 14.6 % (ref 11.7–14.9)
PDW BLD-RTO: 14.6 % (ref 11.7–14.9)
PDW BLD-RTO: 14.9 % (ref 11.7–14.9)
PH BLOOD: 7.36 (ref 7.34–7.45)
PH BLOOD: 7.4 (ref 7.34–7.45)
PH VENOUS: 7.3 (ref 7.32–7.42)
PH VENOUS: 7.32 (ref 7.32–7.42)
PH VENOUS: 7.4 (ref 7.32–7.42)
PH, URINE: 5 (ref 5–8)
PHOSPHORUS: 2.4 MG/DL (ref 2.5–4.9)
PHOSPHORUS: 3 MG/DL (ref 2.5–4.9)
PHOSPHORUS: 3 MG/DL (ref 2.5–4.9)
PLATELET # BLD: 218 K/CU MM (ref 140–440)
PLATELET # BLD: 282 K/CU MM (ref 140–440)
PLATELET # BLD: 303 K/CU MM (ref 140–440)
PLATELET # BLD: 314 K/CU MM (ref 140–440)
PLATELET # BLD: 324 K/CU MM (ref 140–440)
PLATELET # BLD: 324 K/CU MM (ref 140–440)
PLATELET # BLD: 389 K/CU MM (ref 140–440)
PLATELET # BLD: 393 K/CU MM (ref 140–440)
PLATELET # BLD: 442 K/CU MM (ref 140–440)
PLT MORPHOLOGY: ABNORMAL
PLT MORPHOLOGY: ABNORMAL
PMV BLD AUTO: 10.1 FL (ref 7.5–11.1)
PMV BLD AUTO: 10.3 FL (ref 7.5–11.1)
PMV BLD AUTO: 9.6 FL (ref 7.5–11.1)
PMV BLD AUTO: 9.9 FL (ref 7.5–11.1)
PMV BLD AUTO: 9.9 FL (ref 7.5–11.1)
PO2 ARTERIAL: 58 MMHG (ref 75–100)
PO2 ARTERIAL: 73 MMHG (ref 75–100)
PO2, VEN: 180 MMHG (ref 28–48)
PO2, VEN: 190 MMHG (ref 28–48)
PO2, VEN: 33 MMHG (ref 28–48)
POLYCHROMASIA: ABNORMAL
POLYCHROMASIA: ABNORMAL
POTASSIUM SERPL-SCNC: 3.2 MMOL/L (ref 3.5–5.1)
POTASSIUM SERPL-SCNC: 3.9 MMOL/L (ref 3.5–5.1)
POTASSIUM SERPL-SCNC: 3.9 MMOL/L (ref 3.5–5.1)
POTASSIUM SERPL-SCNC: 4.1 MMOL/L (ref 3.5–5.1)
POTASSIUM SERPL-SCNC: 4.2 MMOL/L (ref 3.5–5.1)
POTASSIUM SERPL-SCNC: 4.3 MMOL/L (ref 3.5–5.1)
POTASSIUM SERPL-SCNC: 4.3 MMOL/L (ref 3.5–5.1)
POTASSIUM SERPL-SCNC: 4.4 MMOL/L (ref 3.5–5.1)
POTASSIUM SERPL-SCNC: 4.6 MMOL/L (ref 3.5–5.1)
POTASSIUM SERPL-SCNC: 4.6 MMOL/L (ref 3.5–5.1)
POTASSIUM SERPL-SCNC: 5 MMOL/L (ref 3.5–5.1)
POTASSIUM SERPL-SCNC: 5 MMOL/L (ref 3.5–5.1)
POTASSIUM SERPL-SCNC: 5.4 MMOL/L (ref 3.5–5.1)
POTASSIUM SERPL-SCNC: 5.4 MMOL/L (ref 3.5–5.1)
POTASSIUM SERPL-SCNC: 5.5 MMOL/L (ref 3.5–5.1)
POTASSIUM SERPL-SCNC: 5.5 MMOL/L (ref 3.5–5.1)
PRO-BNP: 112.3 PG/ML
PRO-BNP: 120.6 PG/ML
PRO-BNP: 191.4 PG/ML
PRO-BNP: 381.8 PG/ML
PRO-BNP: 440 PG/ML
PRO-BNP: 459.6 PG/ML
PRO-BNP: 655.5 PG/ML
PRO-BNP: 803.3 PG/ML
PROCALCITONIN: 0.16
PROCALCITONIN: 0.21
PROCALCITONIN: 0.3
PROCALCITONIN: 0.37
PROTEIN UA: 100 MG/DL
PROTHROMBIN TIME: 10.2 SECONDS (ref 11.7–14.5)
RAPID INFLUENZA  B AGN: NEGATIVE
RAPID INFLUENZA A AGN: NEGATIVE
RBC # BLD: 3.92 M/CU MM (ref 4.2–5.4)
RBC # BLD: 4 M/CU MM (ref 4.2–5.4)
RBC # BLD: 4.06 M/CU MM (ref 4.2–5.4)
RBC # BLD: 4.17 M/CU MM (ref 4.2–5.4)
RBC # BLD: 4.2 M/CU MM (ref 4.2–5.4)
RBC # BLD: 4.45 M/CU MM (ref 4.2–5.4)
RBC # BLD: 4.52 M/CU MM (ref 4.2–5.4)
RBC # BLD: 4.62 M/CU MM (ref 4.2–5.4)
RBC # BLD: 4.76 M/CU MM (ref 4.2–5.4)
RBC URINE: 11 /HPF (ref 0–6)
RHINOVIRUS ENTEROVIRUS PCR: NOT DETECTED
RSV PCR: NOT DETECTED
SARS-COV-2, NAAT: NOT DETECTED
SARS-COV-2: NOT DETECTED
SEGMENTED NEUTROPHILS ABSOLUTE COUNT: 10.5 K/CU MM
SEGMENTED NEUTROPHILS ABSOLUTE COUNT: 10.8 K/CU MM
SEGMENTED NEUTROPHILS ABSOLUTE COUNT: 13 K/CU MM
SEGMENTED NEUTROPHILS ABSOLUTE COUNT: 6 K/CU MM
SEGMENTED NEUTROPHILS ABSOLUTE COUNT: 6.4 K/CU MM
SEGMENTED NEUTROPHILS ABSOLUTE COUNT: 7.3 K/CU MM
SEGMENTED NEUTROPHILS ABSOLUTE COUNT: 7.8 K/CU MM
SEGMENTED NEUTROPHILS ABSOLUTE COUNT: 8 K/CU MM
SEGMENTED NEUTROPHILS RELATIVE PERCENT: 61 % (ref 36–66)
SEGMENTED NEUTROPHILS RELATIVE PERCENT: 80.9 % (ref 36–66)
SEGMENTED NEUTROPHILS RELATIVE PERCENT: 85.9 % (ref 36–66)
SEGMENTED NEUTROPHILS RELATIVE PERCENT: 92.5 % (ref 36–66)
SEGMENTED NEUTROPHILS RELATIVE PERCENT: 92.9 % (ref 36–66)
SEGMENTED NEUTROPHILS RELATIVE PERCENT: 93.1 % (ref 36–66)
SEGMENTED NEUTROPHILS RELATIVE PERCENT: 93.6 % (ref 36–66)
SEGMENTED NEUTROPHILS RELATIVE PERCENT: 94.2 % (ref 36–66)
SODIUM BLD-SCNC: 132 MMOL/L (ref 135–145)
SODIUM BLD-SCNC: 134 MMOL/L (ref 135–145)
SODIUM BLD-SCNC: 137 MMOL/L (ref 135–145)
SODIUM BLD-SCNC: 139 MMOL/L (ref 135–145)
SODIUM BLD-SCNC: 139 MMOL/L (ref 135–145)
SODIUM BLD-SCNC: 141 MMOL/L (ref 135–145)
SODIUM BLD-SCNC: 142 MMOL/L (ref 135–145)
SODIUM BLD-SCNC: 143 MMOL/L (ref 135–145)
SODIUM BLD-SCNC: 144 MMOL/L (ref 135–145)
SODIUM BLD-SCNC: 145 MMOL/L (ref 136–145)
SODIUM BLD-SCNC: 146 MMOL/L (ref 135–145)
SODIUM BLD-SCNC: 146 MMOL/L (ref 135–145)
SOURCE: NORMAL
SPECIFIC GRAVITY UA: 1.02 (ref 1–1.03)
SPECIMEN: NORMAL
SQUAMOUS EPITHELIAL: 1 /HPF
THEOPHYLLINE LEVEL: 3.2 UG/ML (ref 10–20)
THEOPHYLLINE LEVEL: 4.1 UG/ML (ref 10–20)
THEOPHYLLINE LEVEL: 6 UG/ML (ref 10–20)
THEOPHYLLINE LEVEL: 9.4 UG/ML (ref 10–20)
TOTAL CK: 33 IU/L (ref 26–140)
TOTAL IMMATURE NEUTOROPHIL: 0.01 K/CU MM
TOTAL IMMATURE NEUTOROPHIL: 0.03 K/CU MM
TOTAL IMMATURE NEUTOROPHIL: 0.03 K/CU MM
TOTAL IMMATURE NEUTOROPHIL: 0.05 K/CU MM
TOTAL IMMATURE NEUTOROPHIL: 0.05 K/CU MM
TOTAL IMMATURE NEUTOROPHIL: 0.06 K/CU MM
TOTAL IMMATURE NEUTOROPHIL: 0.08 K/CU MM
TOTAL NUCLEATED RBC: 0 K/CU MM
TOTAL PROTEIN: 5.8 GM/DL (ref 6.4–8.2)
TOTAL PROTEIN: 6.1 GM/DL (ref 6.4–8.2)
TOTAL PROTEIN: 6.1 GM/DL (ref 6.4–8.2)
TOTAL PROTEIN: 6.6 GM/DL (ref 6.4–8.2)
TOTAL PROTEIN: 6.8 GM/DL (ref 6.4–8.2)
TOTAL PROTEIN: 6.9 G/DL (ref 6.4–8.2)
TOTAL PROTEIN: 6.9 GM/DL (ref 6.4–8.2)
TOTAL PROTEIN: 7.6 GM/DL (ref 6.4–8.2)
TRICHOMONAS: ABNORMAL /HPF
TRIGL SERPL-MCNC: 82 MG/DL (ref 0–150)
TROPONIN T: <0.01 NG/ML
TSH HIGH SENSITIVITY: 0.91 UIU/ML (ref 0.27–4.2)
TSH HIGH SENSITIVITY: 1.02 UIU/ML (ref 0.27–4.2)
UROBILINOGEN, URINE: NORMAL MG/DL (ref 0.2–1)
VLDLC SERPL CALC-MCNC: 16 MG/DL
WBC # BLD: 11.2 K/CU MM (ref 4–10.5)
WBC # BLD: 11.5 K/CU MM (ref 4–10.5)
WBC # BLD: 11.9 K/CU MM (ref 4–10.5)
WBC # BLD: 14.1 K/CU MM (ref 4–10.5)
WBC # BLD: 6.5 K/CU MM (ref 4–10.5)
WBC # BLD: 7.9 K/CU MM (ref 4–10.5)
WBC # BLD: 8.6 K/CU MM (ref 4–10.5)
WBC # BLD: 9.1 K/CU MM (ref 4–10.5)
WBC # BLD: 9.5 K/CU MM (ref 4–10.5)
WBC # BLD: ABNORMAL 10*3/UL
WBC UA: 1 /HPF (ref 0–5)

## 2020-01-01 PROCEDURE — 96375 TX/PRO/DX INJ NEW DRUG ADDON: CPT

## 2020-01-01 PROCEDURE — 6360000002 HC RX W HCPCS: Performed by: NURSE PRACTITIONER

## 2020-01-01 PROCEDURE — 2580000003 HC RX 258: Performed by: INTERNAL MEDICINE

## 2020-01-01 PROCEDURE — 6370000000 HC RX 637 (ALT 250 FOR IP): Performed by: INTERNAL MEDICINE

## 2020-01-01 PROCEDURE — 83735 ASSAY OF MAGNESIUM: CPT

## 2020-01-01 PROCEDURE — 94761 N-INVAS EAR/PLS OXIMETRY MLT: CPT

## 2020-01-01 PROCEDURE — 80053 COMPREHEN METABOLIC PANEL: CPT

## 2020-01-01 PROCEDURE — 6370000000 HC RX 637 (ALT 250 FOR IP): Performed by: STUDENT IN AN ORGANIZED HEALTH CARE EDUCATION/TRAINING PROGRAM

## 2020-01-01 PROCEDURE — 36600 WITHDRAWAL OF ARTERIAL BLOOD: CPT

## 2020-01-01 PROCEDURE — G0378 HOSPITAL OBSERVATION PER HR: HCPCS

## 2020-01-01 PROCEDURE — 6360000002 HC RX W HCPCS: Performed by: STUDENT IN AN ORGANIZED HEALTH CARE EDUCATION/TRAINING PROGRAM

## 2020-01-01 PROCEDURE — 93005 ELECTROCARDIOGRAM TRACING: CPT | Performed by: EMERGENCY MEDICINE

## 2020-01-01 PROCEDURE — 2580000003 HC RX 258: Performed by: STUDENT IN AN ORGANIZED HEALTH CARE EDUCATION/TRAINING PROGRAM

## 2020-01-01 PROCEDURE — 84145 PROCALCITONIN (PCT): CPT

## 2020-01-01 PROCEDURE — 3600000003 HC SURGERY LEVEL 3 BASE: Performed by: THORACIC SURGERY (CARDIOTHORACIC VASCULAR SURGERY)

## 2020-01-01 PROCEDURE — 1200000000 HC SEMI PRIVATE

## 2020-01-01 PROCEDURE — 82805 BLOOD GASES W/O2 SATURATION: CPT

## 2020-01-01 PROCEDURE — 2709999900 HC NON-CHARGEABLE SUPPLY

## 2020-01-01 PROCEDURE — 36415 COLL VENOUS BLD VENIPUNCTURE: CPT

## 2020-01-01 PROCEDURE — 6370000000 HC RX 637 (ALT 250 FOR IP): Performed by: PHYSICIAN ASSISTANT

## 2020-01-01 PROCEDURE — 96374 THER/PROPH/DIAG INJ IV PUSH: CPT

## 2020-01-01 PROCEDURE — 80198 ASSAY OF THEOPHYLLINE: CPT

## 2020-01-01 PROCEDURE — 84100 ASSAY OF PHOSPHORUS: CPT

## 2020-01-01 PROCEDURE — 94640 AIRWAY INHALATION TREATMENT: CPT

## 2020-01-01 PROCEDURE — 96366 THER/PROPH/DIAG IV INF ADDON: CPT

## 2020-01-01 PROCEDURE — 71045 X-RAY EXAM CHEST 1 VIEW: CPT

## 2020-01-01 PROCEDURE — 2700000000 HC OXYGEN THERAPY PER DAY

## 2020-01-01 PROCEDURE — 81001 URINALYSIS AUTO W/SCOPE: CPT

## 2020-01-01 PROCEDURE — 2709999900 HC NON-CHARGEABLE SUPPLY: Performed by: THORACIC SURGERY (CARDIOTHORACIC VASCULAR SURGERY)

## 2020-01-01 PROCEDURE — 6360000002 HC RX W HCPCS: Performed by: THORACIC SURGERY (CARDIOTHORACIC VASCULAR SURGERY)

## 2020-01-01 PROCEDURE — U0002 COVID-19 LAB TEST NON-CDC: HCPCS

## 2020-01-01 PROCEDURE — 83880 ASSAY OF NATRIURETIC PEPTIDE: CPT

## 2020-01-01 PROCEDURE — 2140000000 HC CCU INTERMEDIATE R&B

## 2020-01-01 PROCEDURE — 83605 ASSAY OF LACTIC ACID: CPT

## 2020-01-01 PROCEDURE — 2500000003 HC RX 250 WO HCPCS: Performed by: NURSE PRACTITIONER

## 2020-01-01 PROCEDURE — 6370000000 HC RX 637 (ALT 250 FOR IP): Performed by: NURSE PRACTITIONER

## 2020-01-01 PROCEDURE — 84132 ASSAY OF SERUM POTASSIUM: CPT

## 2020-01-01 PROCEDURE — 6370000000 HC RX 637 (ALT 250 FOR IP): Performed by: THORACIC SURGERY (CARDIOTHORACIC VASCULAR SURGERY)

## 2020-01-01 PROCEDURE — 82550 ASSAY OF CK (CPK): CPT

## 2020-01-01 PROCEDURE — 87205 SMEAR GRAM STAIN: CPT

## 2020-01-01 PROCEDURE — 6360000002 HC RX W HCPCS: Performed by: INTERNAL MEDICINE

## 2020-01-01 PROCEDURE — 6360000004 HC RX CONTRAST MEDICATION: Performed by: EMERGENCY MEDICINE

## 2020-01-01 PROCEDURE — 80048 BASIC METABOLIC PNL TOTAL CA: CPT

## 2020-01-01 PROCEDURE — 6360000002 HC RX W HCPCS: Performed by: EMERGENCY MEDICINE

## 2020-01-01 PROCEDURE — 93010 ELECTROCARDIOGRAM REPORT: CPT | Performed by: INTERNAL MEDICINE

## 2020-01-01 PROCEDURE — 2580000003 HC RX 258: Performed by: NURSE PRACTITIONER

## 2020-01-01 PROCEDURE — G2012 BRIEF CHECK IN BY MD/QHP: HCPCS | Performed by: PHYSICIAN ASSISTANT

## 2020-01-01 PROCEDURE — 96376 TX/PRO/DX INJ SAME DRUG ADON: CPT

## 2020-01-01 PROCEDURE — 7100000000 HC PACU RECOVERY - FIRST 15 MIN: Performed by: THORACIC SURGERY (CARDIOTHORACIC VASCULAR SURGERY)

## 2020-01-01 PROCEDURE — 2500000003 HC RX 250 WO HCPCS: Performed by: THORACIC SURGERY (CARDIOTHORACIC VASCULAR SURGERY)

## 2020-01-01 PROCEDURE — 3600000013 HC SURGERY LEVEL 3 ADDTL 15MIN: Performed by: THORACIC SURGERY (CARDIOTHORACIC VASCULAR SURGERY)

## 2020-01-01 PROCEDURE — 2500000003 HC RX 250 WO HCPCS: Performed by: FAMILY MEDICINE

## 2020-01-01 PROCEDURE — 84484 ASSAY OF TROPONIN QUANT: CPT

## 2020-01-01 PROCEDURE — 1111F DSCHRG MED/CURRENT MED MERGE: CPT | Performed by: FAMILY MEDICINE

## 2020-01-01 PROCEDURE — 82378 CARCINOEMBRYONIC ANTIGEN: CPT

## 2020-01-01 PROCEDURE — 99232 SBSQ HOSP IP/OBS MODERATE 35: CPT | Performed by: INTERNAL MEDICINE

## 2020-01-01 PROCEDURE — 87804 INFLUENZA ASSAY W/OPTIC: CPT

## 2020-01-01 PROCEDURE — 86141 C-REACTIVE PROTEIN HS: CPT

## 2020-01-01 PROCEDURE — 2580000003 HC RX 258: Performed by: THORACIC SURGERY (CARDIOTHORACIC VASCULAR SURGERY)

## 2020-01-01 PROCEDURE — 99441 PR PHYS/QHP TELEPHONE EVALUATION 5-10 MIN: CPT | Performed by: FAMILY MEDICINE

## 2020-01-01 PROCEDURE — 85610 PROTHROMBIN TIME: CPT

## 2020-01-01 PROCEDURE — 87581 M.PNEUMON DNA AMP PROBE: CPT

## 2020-01-01 PROCEDURE — 86606 ASPERGILLUS ANTIBODY: CPT

## 2020-01-01 PROCEDURE — 94660 CPAP INITIATION&MGMT: CPT

## 2020-01-01 PROCEDURE — 6370000000 HC RX 637 (ALT 250 FOR IP): Performed by: EMERGENCY MEDICINE

## 2020-01-01 PROCEDURE — 85007 BL SMEAR W/DIFF WBC COUNT: CPT

## 2020-01-01 PROCEDURE — 99285 EMERGENCY DEPT VISIT HI MDM: CPT

## 2020-01-01 PROCEDURE — 87070 CULTURE OTHR SPECIMN AEROBIC: CPT

## 2020-01-01 PROCEDURE — 2500000003 HC RX 250 WO HCPCS

## 2020-01-01 PROCEDURE — 85379 FIBRIN DEGRADATION QUANT: CPT

## 2020-01-01 PROCEDURE — 83615 LACTATE (LD) (LDH) ENZYME: CPT

## 2020-01-01 PROCEDURE — 84443 ASSAY THYROID STIM HORMONE: CPT

## 2020-01-01 PROCEDURE — 85025 COMPLETE CBC W/AUTO DIFF WBC: CPT

## 2020-01-01 PROCEDURE — 96367 TX/PROPH/DG ADDL SEQ IV INF: CPT

## 2020-01-01 PROCEDURE — 99214 OFFICE O/P EST MOD 30 MIN: CPT | Performed by: INTERNAL MEDICINE

## 2020-01-01 PROCEDURE — 71046 X-RAY EXAM CHEST 2 VIEWS: CPT

## 2020-01-01 PROCEDURE — 99214 OFFICE O/P EST MOD 30 MIN: CPT | Performed by: FAMILY MEDICINE

## 2020-01-01 PROCEDURE — 96365 THER/PROPH/DIAG IV INF INIT: CPT

## 2020-01-01 PROCEDURE — 86698 HISTOPLASMA ANTIBODY: CPT

## 2020-01-01 PROCEDURE — G0008 ADMIN INFLUENZA VIRUS VAC: HCPCS | Performed by: FAMILY MEDICINE

## 2020-01-01 PROCEDURE — 86635 COCCIDIOIDES ANTIBODY: CPT

## 2020-01-01 PROCEDURE — 6360000002 HC RX W HCPCS

## 2020-01-01 PROCEDURE — 88307 TISSUE EXAM BY PATHOLOGIST: CPT | Performed by: PATHOLOGY

## 2020-01-01 PROCEDURE — A9552 F18 FDG: HCPCS | Performed by: INTERNAL MEDICINE

## 2020-01-01 PROCEDURE — 82803 BLOOD GASES ANY COMBINATION: CPT

## 2020-01-01 PROCEDURE — 96372 THER/PROPH/DIAG INJ SC/IM: CPT

## 2020-01-01 PROCEDURE — 85027 COMPLETE CBC AUTOMATED: CPT

## 2020-01-01 PROCEDURE — 2580000003 HC RX 258: Performed by: FAMILY MEDICINE

## 2020-01-01 PROCEDURE — 07B70ZX EXCISION OF THORAX LYMPHATIC, OPEN APPROACH, DIAGNOSTIC: ICD-10-PCS | Performed by: THORACIC SURGERY (CARDIOTHORACIC VASCULAR SURGERY)

## 2020-01-01 PROCEDURE — 71250 CT THORAX DX C-: CPT

## 2020-01-01 PROCEDURE — 88331 PATH CONSLTJ SURG 1 BLK 1SPC: CPT | Performed by: PATHOLOGY

## 2020-01-01 PROCEDURE — 94150 VITAL CAPACITY TEST: CPT

## 2020-01-01 PROCEDURE — 93005 ELECTROCARDIOGRAM TRACING: CPT | Performed by: NURSE PRACTITIONER

## 2020-01-01 PROCEDURE — 93005 ELECTROCARDIOGRAM TRACING: CPT | Performed by: PHYSICIAN ASSISTANT

## 2020-01-01 PROCEDURE — C9803 HOPD COVID-19 SPEC COLLECT: HCPCS

## 2020-01-01 PROCEDURE — 2060000000 HC ICU INTERMEDIATE R&B

## 2020-01-01 PROCEDURE — 99223 1ST HOSP IP/OBS HIGH 75: CPT | Performed by: INTERNAL MEDICINE

## 2020-01-01 PROCEDURE — 7100000001 HC PACU RECOVERY - ADDTL 15 MIN: Performed by: THORACIC SURGERY (CARDIOTHORACIC VASCULAR SURGERY)

## 2020-01-01 PROCEDURE — 86612 BLASTOMYCES ANTIBODY: CPT

## 2020-01-01 PROCEDURE — 87486 CHLMYD PNEUM DNA AMP PROBE: CPT

## 2020-01-01 PROCEDURE — 99284 EMERGENCY DEPT VISIT MOD MDM: CPT

## 2020-01-01 PROCEDURE — 78815 PET IMAGE W/CT SKULL-THIGH: CPT

## 2020-01-01 PROCEDURE — 88342 IMHCHEM/IMCYTCHM 1ST ANTB: CPT | Performed by: PATHOLOGY

## 2020-01-01 PROCEDURE — 2580000003 HC RX 258: Performed by: RADIOLOGY

## 2020-01-01 PROCEDURE — 6360000002 HC RX W HCPCS: Performed by: PHYSICIAN ASSISTANT

## 2020-01-01 PROCEDURE — 71275 CT ANGIOGRAPHY CHEST: CPT

## 2020-01-01 PROCEDURE — 3700000001 HC ADD 15 MINUTES (ANESTHESIA): Performed by: THORACIC SURGERY (CARDIOTHORACIC VASCULAR SURGERY)

## 2020-01-01 PROCEDURE — 94762 N-INVAS EAR/PLS OXIMTRY CONT: CPT

## 2020-01-01 PROCEDURE — 88184 FLOWCYTOMETRY/ TC 1 MARKER: CPT

## 2020-01-01 PROCEDURE — 82728 ASSAY OF FERRITIN: CPT

## 2020-01-01 PROCEDURE — 87633 RESP VIRUS 12-25 TARGETS: CPT

## 2020-01-01 PROCEDURE — 85730 THROMBOPLASTIN TIME PARTIAL: CPT

## 2020-01-01 PROCEDURE — 88185 FLOWCYTOMETRY/TC ADD-ON: CPT

## 2020-01-01 PROCEDURE — 90653 IIV ADJUVANT VACCINE IM: CPT | Performed by: FAMILY MEDICINE

## 2020-01-01 PROCEDURE — 3700000000 HC ANESTHESIA ATTENDED CARE: Performed by: THORACIC SURGERY (CARDIOTHORACIC VASCULAR SURGERY)

## 2020-01-01 PROCEDURE — G0439 PPPS, SUBSEQ VISIT: HCPCS | Performed by: FAMILY MEDICINE

## 2020-01-01 PROCEDURE — 3430000000 HC RX DIAGNOSTIC RADIOPHARMACEUTICAL: Performed by: INTERNAL MEDICINE

## 2020-01-01 PROCEDURE — 93306 TTE W/DOPPLER COMPLETE: CPT

## 2020-01-01 PROCEDURE — 94664 DEMO&/EVAL PT USE INHALER: CPT

## 2020-01-01 PROCEDURE — 87798 DETECT AGENT NOS DNA AMP: CPT

## 2020-01-01 RX ORDER — PANTOPRAZOLE SODIUM 40 MG/1
40 TABLET, DELAYED RELEASE ORAL
Status: DISCONTINUED | OUTPATIENT
Start: 2020-01-01 | End: 2020-01-01 | Stop reason: HOSPADM

## 2020-01-01 RX ORDER — FENTANYL CITRATE 50 UG/ML
INJECTION, SOLUTION INTRAMUSCULAR; INTRAVENOUS PRN
Status: DISCONTINUED | OUTPATIENT
Start: 2020-01-01 | End: 2020-01-01 | Stop reason: SDUPTHER

## 2020-01-01 RX ORDER — FUROSEMIDE 10 MG/ML
20 INJECTION INTRAMUSCULAR; INTRAVENOUS ONCE
Status: COMPLETED | OUTPATIENT
Start: 2020-01-01 | End: 2020-01-01

## 2020-01-01 RX ORDER — GUAIFENESIN 600 MG/1
600 TABLET, EXTENDED RELEASE ORAL 2 TIMES DAILY
Status: DISCONTINUED | OUTPATIENT
Start: 2020-01-01 | End: 2020-01-01

## 2020-01-01 RX ORDER — SODIUM CHLORIDE 0.9 % (FLUSH) 0.9 %
10 SYRINGE (ML) INJECTION PRN
Status: DISCONTINUED | OUTPATIENT
Start: 2020-01-01 | End: 2020-01-01 | Stop reason: HOSPADM

## 2020-01-01 RX ORDER — ALBUTEROL SULFATE 90 UG/1
2 AEROSOL, METERED RESPIRATORY (INHALATION) EVERY 6 HOURS PRN
Status: DISCONTINUED | OUTPATIENT
Start: 2020-01-01 | End: 2020-01-01

## 2020-01-01 RX ORDER — HYDROCODONE BITARTRATE AND ACETAMINOPHEN 10; 325 MG/1; MG/1
1 TABLET ORAL EVERY 4 HOURS PRN
Qty: 180 TABLET | Refills: 0 | Status: SHIPPED | OUTPATIENT
Start: 2020-01-01 | End: 2020-01-01 | Stop reason: SDUPTHER

## 2020-01-01 RX ORDER — IPRATROPIUM BROMIDE AND ALBUTEROL SULFATE 2.5; .5 MG/3ML; MG/3ML
1 SOLUTION RESPIRATORY (INHALATION)
Status: DISCONTINUED | OUTPATIENT
Start: 2020-01-01 | End: 2020-01-01

## 2020-01-01 RX ORDER — POTASSIUM CHLORIDE 20 MEQ/1
40 TABLET, EXTENDED RELEASE ORAL ONCE
Status: COMPLETED | OUTPATIENT
Start: 2020-01-01 | End: 2020-01-01

## 2020-01-01 RX ORDER — LEVOFLOXACIN 5 MG/ML
500 INJECTION, SOLUTION INTRAVENOUS ONCE
Status: COMPLETED | OUTPATIENT
Start: 2020-01-01 | End: 2020-01-01

## 2020-01-01 RX ORDER — FLUDEOXYGLUCOSE F 18 200 MCI/ML
14.65 INJECTION, SOLUTION INTRAVENOUS
Status: COMPLETED | OUTPATIENT
Start: 2020-01-01 | End: 2020-01-01

## 2020-01-01 RX ORDER — HYDROCODONE BITARTRATE AND ACETAMINOPHEN 5; 325 MG/1; MG/1
1 TABLET ORAL PRN
Status: DISCONTINUED | OUTPATIENT
Start: 2020-01-01 | End: 2020-01-01 | Stop reason: HOSPADM

## 2020-01-01 RX ORDER — CALCIUM CARBONATE 500(1250)
500 TABLET ORAL DAILY
Status: DISCONTINUED | OUTPATIENT
Start: 2020-01-01 | End: 2020-01-01 | Stop reason: HOSPADM

## 2020-01-01 RX ORDER — FLUTICASONE PROPIONATE 50 MCG
1 SPRAY, SUSPENSION (ML) NASAL DAILY
Status: DISCONTINUED | OUTPATIENT
Start: 2020-01-01 | End: 2020-01-01 | Stop reason: HOSPADM

## 2020-01-01 RX ORDER — ACETAMINOPHEN 650 MG/1
650 SUPPOSITORY RECTAL EVERY 6 HOURS PRN
Status: DISCONTINUED | OUTPATIENT
Start: 2020-01-01 | End: 2020-01-01 | Stop reason: HOSPADM

## 2020-01-01 RX ORDER — ALBUTEROL SULFATE 90 UG/1
2 AEROSOL, METERED RESPIRATORY (INHALATION) ONCE
Status: COMPLETED | OUTPATIENT
Start: 2020-01-01 | End: 2020-01-01

## 2020-01-01 RX ORDER — HYDROCODONE BITARTRATE AND ACETAMINOPHEN 10; 325 MG/1; MG/1
1 TABLET ORAL EVERY 4 HOURS PRN
Qty: 180 TABLET | Refills: 0 | Status: SHIPPED | OUTPATIENT
Start: 2020-01-01 | End: 2021-01-01 | Stop reason: SDUPTHER

## 2020-01-01 RX ORDER — AZITHROMYCIN 500 MG/1
500 TABLET, FILM COATED ORAL DAILY
Qty: 3 TABLET | Refills: 0 | Status: SHIPPED | OUTPATIENT
Start: 2020-01-01 | End: 2020-01-01

## 2020-01-01 RX ORDER — METHYLPREDNISOLONE SODIUM SUCCINATE 40 MG/ML
40 INJECTION, POWDER, LYOPHILIZED, FOR SOLUTION INTRAMUSCULAR; INTRAVENOUS EVERY 6 HOURS
Status: COMPLETED | OUTPATIENT
Start: 2020-01-01 | End: 2020-01-01

## 2020-01-01 RX ORDER — NICOTINE 21 MG/24HR
1 PATCH, TRANSDERMAL 24 HOURS TRANSDERMAL DAILY
Status: DISCONTINUED | OUTPATIENT
Start: 2020-01-01 | End: 2020-01-01 | Stop reason: HOSPADM

## 2020-01-01 RX ORDER — ACETAMINOPHEN 325 MG/1
650 TABLET ORAL EVERY 6 HOURS PRN
Status: DISCONTINUED | OUTPATIENT
Start: 2020-01-01 | End: 2020-01-01 | Stop reason: HOSPADM

## 2020-01-01 RX ORDER — PROPOFOL 10 MG/ML
INJECTION, EMULSION INTRAVENOUS PRN
Status: DISCONTINUED | OUTPATIENT
Start: 2020-01-01 | End: 2020-01-01 | Stop reason: SDUPTHER

## 2020-01-01 RX ORDER — CALCIUM CARBONATE 200(500)MG
500 TABLET,CHEWABLE ORAL DAILY
Status: DISCONTINUED | OUTPATIENT
Start: 2020-01-01 | End: 2020-01-01 | Stop reason: HOSPADM

## 2020-01-01 RX ORDER — ALBUTEROL SULFATE 2.5 MG/3ML
2.5 SOLUTION RESPIRATORY (INHALATION)
Status: DISCONTINUED | OUTPATIENT
Start: 2020-01-01 | End: 2020-01-01

## 2020-01-01 RX ORDER — DILTIAZEM HYDROCHLORIDE 120 MG/1
120 CAPSULE, COATED, EXTENDED RELEASE ORAL DAILY
Status: DISCONTINUED | OUTPATIENT
Start: 2020-01-01 | End: 2020-01-01 | Stop reason: HOSPADM

## 2020-01-01 RX ORDER — MEPERIDINE HYDROCHLORIDE 25 MG/ML
12.5 INJECTION INTRAMUSCULAR; INTRAVENOUS; SUBCUTANEOUS EVERY 5 MIN PRN
Status: DISCONTINUED | OUTPATIENT
Start: 2020-01-01 | End: 2020-01-01 | Stop reason: HOSPADM

## 2020-01-01 RX ORDER — AZITHROMYCIN 500 MG/1
500 TABLET, FILM COATED ORAL DAILY
Qty: 3 TABLET | Refills: 0 | Status: SHIPPED | OUTPATIENT
Start: 2020-01-01 | End: 2021-01-01

## 2020-01-01 RX ORDER — FLUTICASONE PROPIONATE 50 MCG
1 SPRAY, SUSPENSION (ML) NASAL DAILY
COMMUNITY
End: 2021-01-01

## 2020-01-01 RX ORDER — HYDROCODONE BITARTRATE AND ACETAMINOPHEN 5; 325 MG/1; MG/1
2 TABLET ORAL PRN
Status: DISCONTINUED | OUTPATIENT
Start: 2020-01-01 | End: 2020-01-01 | Stop reason: HOSPADM

## 2020-01-01 RX ORDER — DILTIAZEM HYDROCHLORIDE 60 MG/1
60 TABLET, FILM COATED ORAL ONCE
Status: COMPLETED | OUTPATIENT
Start: 2020-01-01 | End: 2020-01-01

## 2020-01-01 RX ORDER — MAGNESIUM SULFATE 1 G/100ML
1 INJECTION INTRAVENOUS PRN
Status: DISCONTINUED | OUTPATIENT
Start: 2020-01-01 | End: 2020-01-01 | Stop reason: HOSPADM

## 2020-01-01 RX ORDER — ALBUTEROL SULFATE 90 UG/1
2 AEROSOL, METERED RESPIRATORY (INHALATION) EVERY 6 HOURS PRN
Qty: 1 INHALER | Refills: 6 | Status: SHIPPED | OUTPATIENT
Start: 2020-01-01

## 2020-01-01 RX ORDER — SODIUM POLYSTYRENE SULFONATE 15 G/60ML
15 SUSPENSION ORAL; RECTAL ONCE
Status: COMPLETED | OUTPATIENT
Start: 2020-01-01 | End: 2020-01-01

## 2020-01-01 RX ORDER — LABETALOL HYDROCHLORIDE 5 MG/ML
5 INJECTION, SOLUTION INTRAVENOUS EVERY 10 MIN PRN
Status: DISCONTINUED | OUTPATIENT
Start: 2020-01-01 | End: 2020-01-01 | Stop reason: HOSPADM

## 2020-01-01 RX ORDER — HYDROCODONE BITARTRATE AND ACETAMINOPHEN 10; 325 MG/1; MG/1
1 TABLET ORAL EVERY 4 HOURS PRN
Status: DISCONTINUED | OUTPATIENT
Start: 2020-01-01 | End: 2020-01-01 | Stop reason: HOSPADM

## 2020-01-01 RX ORDER — ALBUTEROL SULFATE 90 UG/1
2 AEROSOL, METERED RESPIRATORY (INHALATION) 4 TIMES DAILY
Status: DISCONTINUED | OUTPATIENT
Start: 2020-01-01 | End: 2020-01-01 | Stop reason: HOSPADM

## 2020-01-01 RX ORDER — ONDANSETRON 2 MG/ML
4 INJECTION INTRAMUSCULAR; INTRAVENOUS EVERY 6 HOURS PRN
Status: DISCONTINUED | OUTPATIENT
Start: 2020-01-01 | End: 2020-01-01 | Stop reason: HOSPADM

## 2020-01-01 RX ORDER — DEXAMETHASONE SODIUM PHOSPHATE 10 MG/ML
10 INJECTION, SOLUTION INTRAMUSCULAR; INTRAVENOUS ONCE
Status: COMPLETED | OUTPATIENT
Start: 2020-01-01 | End: 2020-01-01

## 2020-01-01 RX ORDER — POLYETHYLENE GLYCOL 3350 17 G/17G
17 POWDER, FOR SOLUTION ORAL DAILY PRN
Status: DISCONTINUED | OUTPATIENT
Start: 2020-01-01 | End: 2020-01-01 | Stop reason: HOSPADM

## 2020-01-01 RX ORDER — POTASSIUM CHLORIDE 7.45 MG/ML
10 INJECTION INTRAVENOUS PRN
Status: DISCONTINUED | OUTPATIENT
Start: 2020-01-01 | End: 2020-01-01

## 2020-01-01 RX ORDER — AZITHROMYCIN 250 MG/1
250 TABLET, FILM COATED ORAL DAILY
Status: COMPLETED | OUTPATIENT
Start: 2020-01-01 | End: 2020-01-01

## 2020-01-01 RX ORDER — SODIUM CHLORIDE, SODIUM LACTATE, POTASSIUM CHLORIDE, CALCIUM CHLORIDE 600; 310; 30; 20 MG/100ML; MG/100ML; MG/100ML; MG/100ML
INJECTION, SOLUTION INTRAVENOUS CONTINUOUS
Status: DISCONTINUED | OUTPATIENT
Start: 2020-01-01 | End: 2020-01-01 | Stop reason: HOSPADM

## 2020-01-01 RX ORDER — SODIUM CHLORIDE 0.9 % (FLUSH) 0.9 %
10 SYRINGE (ML) INJECTION EVERY 12 HOURS SCHEDULED
Status: DISCONTINUED | OUTPATIENT
Start: 2020-01-01 | End: 2020-01-01 | Stop reason: HOSPADM

## 2020-01-01 RX ORDER — IPRATROPIUM BROMIDE AND ALBUTEROL SULFATE 2.5; .5 MG/3ML; MG/3ML
1 SOLUTION RESPIRATORY (INHALATION) ONCE
Status: DISCONTINUED | OUTPATIENT
Start: 2020-01-01 | End: 2020-01-01

## 2020-01-01 RX ORDER — ASPIRIN 81 MG/1
81 TABLET ORAL DAILY
Status: DISCONTINUED | OUTPATIENT
Start: 2020-01-01 | End: 2020-01-01 | Stop reason: HOSPADM

## 2020-01-01 RX ORDER — GUAIFENESIN 600 MG/1
600 TABLET, EXTENDED RELEASE ORAL 3 TIMES DAILY
Status: DISCONTINUED | OUTPATIENT
Start: 2020-01-01 | End: 2020-01-01 | Stop reason: HOSPADM

## 2020-01-01 RX ORDER — ATORVASTATIN CALCIUM 10 MG/1
10 TABLET, FILM COATED ORAL DAILY
Status: DISCONTINUED | OUTPATIENT
Start: 2020-01-01 | End: 2020-01-01 | Stop reason: HOSPADM

## 2020-01-01 RX ORDER — METOPROLOL TARTRATE 5 MG/5ML
5 INJECTION INTRAVENOUS ONCE
Status: COMPLETED | OUTPATIENT
Start: 2020-01-01 | End: 2020-01-01

## 2020-01-01 RX ORDER — FEXOFENADINE HCL 180 MG/1
180 TABLET ORAL 2 TIMES DAILY
Status: DISCONTINUED | OUTPATIENT
Start: 2020-01-01 | End: 2020-01-01 | Stop reason: CLARIF

## 2020-01-01 RX ORDER — ONDANSETRON 2 MG/ML
4 INJECTION INTRAMUSCULAR; INTRAVENOUS
Status: DISCONTINUED | OUTPATIENT
Start: 2020-01-01 | End: 2020-01-01 | Stop reason: HOSPADM

## 2020-01-01 RX ORDER — PROMETHAZINE HYDROCHLORIDE 25 MG/1
12.5 TABLET ORAL EVERY 6 HOURS PRN
Status: DISCONTINUED | OUTPATIENT
Start: 2020-01-01 | End: 2020-01-01 | Stop reason: HOSPADM

## 2020-01-01 RX ORDER — ALBUTEROL SULFATE 2.5 MG/3ML
2.5 SOLUTION RESPIRATORY (INHALATION) EVERY 6 HOURS PRN
Status: DISCONTINUED | OUTPATIENT
Start: 2020-01-01 | End: 2020-01-01 | Stop reason: HOSPADM

## 2020-01-01 RX ORDER — THEOPHYLLINE 400 MG/1
200 TABLET, EXTENDED RELEASE ORAL 2 TIMES DAILY
Status: DISCONTINUED | OUTPATIENT
Start: 2020-01-01 | End: 2020-01-01 | Stop reason: HOSPADM

## 2020-01-01 RX ORDER — PREDNISONE 10 MG/1
10 TABLET ORAL DAILY
Status: DISCONTINUED | OUTPATIENT
Start: 2020-01-01 | End: 2020-01-01 | Stop reason: HOSPADM

## 2020-01-01 RX ORDER — CETIRIZINE HYDROCHLORIDE 10 MG/1
10 TABLET ORAL DAILY
Status: DISCONTINUED | OUTPATIENT
Start: 2020-01-01 | End: 2020-01-01 | Stop reason: HOSPADM

## 2020-01-01 RX ORDER — MORPHINE SULFATE 4 MG/ML
4 INJECTION, SOLUTION INTRAMUSCULAR; INTRAVENOUS ONCE
Status: COMPLETED | OUTPATIENT
Start: 2020-01-01 | End: 2020-01-01

## 2020-01-01 RX ORDER — PREDNISONE 20 MG/1
40 TABLET ORAL DAILY
Qty: 20 TABLET | Refills: 0 | Status: SHIPPED | OUTPATIENT
Start: 2021-01-01 | End: 2021-01-01

## 2020-01-01 RX ORDER — DEXAMETHASONE SODIUM PHOSPHATE 4 MG/ML
INJECTION, SOLUTION INTRA-ARTICULAR; INTRALESIONAL; INTRAMUSCULAR; INTRAVENOUS; SOFT TISSUE PRN
Status: DISCONTINUED | OUTPATIENT
Start: 2020-01-01 | End: 2020-01-01 | Stop reason: SDUPTHER

## 2020-01-01 RX ORDER — PREDNISONE 20 MG/1
20 TABLET ORAL DAILY
Status: DISCONTINUED | OUTPATIENT
Start: 2020-01-01 | End: 2020-01-01 | Stop reason: HOSPADM

## 2020-01-01 RX ORDER — PROMETHAZINE HYDROCHLORIDE 12.5 MG/1
12.5 TABLET ORAL EVERY 6 HOURS PRN
Status: DISCONTINUED | OUTPATIENT
Start: 2020-01-01 | End: 2020-01-01 | Stop reason: HOSPADM

## 2020-01-01 RX ORDER — PREDNISONE 1 MG/1
5 TABLET ORAL DAILY
Qty: 10 TABLET | Refills: 0 | Status: SHIPPED | OUTPATIENT
Start: 2020-01-01 | End: 2020-01-01

## 2020-01-01 RX ORDER — DOXYCYCLINE HYCLATE 100 MG
100 TABLET ORAL EVERY 12 HOURS
Status: DISCONTINUED | OUTPATIENT
Start: 2020-01-01 | End: 2020-01-01 | Stop reason: HOSPADM

## 2020-01-01 RX ORDER — FUROSEMIDE 40 MG/1
40 TABLET ORAL DAILY
Status: DISCONTINUED | OUTPATIENT
Start: 2020-01-01 | End: 2020-01-01 | Stop reason: HOSPADM

## 2020-01-01 RX ORDER — PREDNISONE 10 MG/1
40 TABLET ORAL DAILY
Status: DISCONTINUED | OUTPATIENT
Start: 2020-01-01 | End: 2020-01-01

## 2020-01-01 RX ORDER — DILTIAZEM HCL 90 MG
180 TABLET ORAL ONCE
Status: COMPLETED | OUTPATIENT
Start: 2020-01-01 | End: 2020-01-01

## 2020-01-01 RX ORDER — CEFAZOLIN SODIUM 1 G/50ML
INJECTION, SOLUTION INTRAVENOUS PRN
Status: DISCONTINUED | OUTPATIENT
Start: 2020-01-01 | End: 2020-01-01 | Stop reason: SDUPTHER

## 2020-01-01 RX ORDER — GUAIFENESIN 600 MG/1
600 TABLET, EXTENDED RELEASE ORAL 3 TIMES DAILY
Qty: 15 TABLET | Refills: 0 | Status: SHIPPED | OUTPATIENT
Start: 2020-01-01 | End: 2020-01-01

## 2020-01-01 RX ORDER — SODIUM CHLORIDE 0.9 % (FLUSH) 0.9 %
10 SYRINGE (ML) INJECTION PRN
Status: CANCELLED | OUTPATIENT
Start: 2020-01-01

## 2020-01-01 RX ORDER — THEOPHYLLINE 400 MG/1
200 TABLET, EXTENDED RELEASE ORAL DAILY
Status: DISCONTINUED | OUTPATIENT
Start: 2020-01-01 | End: 2020-01-01

## 2020-01-01 RX ORDER — DIPHENHYDRAMINE HYDROCHLORIDE 50 MG/ML
12.5 INJECTION INTRAMUSCULAR; INTRAVENOUS
Status: DISCONTINUED | OUTPATIENT
Start: 2020-01-01 | End: 2020-01-01 | Stop reason: HOSPADM

## 2020-01-01 RX ORDER — GUAIFENESIN 600 MG/1
600 TABLET, EXTENDED RELEASE ORAL 2 TIMES DAILY
Status: DISCONTINUED | OUTPATIENT
Start: 2020-01-01 | End: 2020-01-01 | Stop reason: HOSPADM

## 2020-01-01 RX ORDER — DILTIAZEM HYDROCHLORIDE 120 MG/1
120 CAPSULE, COATED, EXTENDED RELEASE ORAL DAILY
Qty: 30 CAPSULE | Refills: 3 | Status: ON HOLD | OUTPATIENT
Start: 2020-01-01 | End: 2021-01-01 | Stop reason: HOSPADM

## 2020-01-01 RX ORDER — PREDNISONE 10 MG/1
TABLET ORAL
Qty: 20 TABLET | Refills: 0 | Status: ON HOLD | OUTPATIENT
Start: 2020-01-01 | End: 2020-01-01 | Stop reason: HOSPADM

## 2020-01-01 RX ORDER — NICOTINE 21 MG/24HR
1 PATCH, TRANSDERMAL 24 HOURS TRANSDERMAL DAILY
Qty: 30 PATCH | Refills: 3 | Status: SHIPPED | OUTPATIENT
Start: 2020-01-01 | End: 2020-01-01 | Stop reason: DRUGHIGH

## 2020-01-01 RX ORDER — ONDANSETRON 2 MG/ML
INJECTION INTRAMUSCULAR; INTRAVENOUS PRN
Status: DISCONTINUED | OUTPATIENT
Start: 2020-01-01 | End: 2020-01-01 | Stop reason: SDUPTHER

## 2020-01-01 RX ORDER — HYDRALAZINE HYDROCHLORIDE 20 MG/ML
5 INJECTION INTRAMUSCULAR; INTRAVENOUS EVERY 10 MIN PRN
Status: DISCONTINUED | OUTPATIENT
Start: 2020-01-01 | End: 2020-01-01 | Stop reason: HOSPADM

## 2020-01-01 RX ORDER — 0.9 % SODIUM CHLORIDE 0.9 %
500 INTRAVENOUS SOLUTION INTRAVENOUS
Status: DISCONTINUED | OUTPATIENT
Start: 2020-01-01 | End: 2020-01-01 | Stop reason: HOSPADM

## 2020-01-01 RX ORDER — HYDROMORPHONE HCL 110MG/55ML
0.5 PATIENT CONTROLLED ANALGESIA SYRINGE INTRAVENOUS EVERY 5 MIN PRN
Status: DISCONTINUED | OUTPATIENT
Start: 2020-01-01 | End: 2020-01-01 | Stop reason: HOSPADM

## 2020-01-01 RX ORDER — BENZONATATE 100 MG/1
100 CAPSULE ORAL ONCE
Status: COMPLETED | OUTPATIENT
Start: 2020-01-01 | End: 2020-01-01

## 2020-01-01 RX ORDER — PREDNISONE 10 MG/1
10 TABLET ORAL DAILY
Qty: 10 TABLET | Refills: 0 | Status: SHIPPED | OUTPATIENT
Start: 2020-01-01 | End: 2020-01-01

## 2020-01-01 RX ORDER — ALBUTEROL SULFATE 2.5 MG/3ML
15 SOLUTION RESPIRATORY (INHALATION)
Status: DISCONTINUED | OUTPATIENT
Start: 2020-01-01 | End: 2020-01-01

## 2020-01-01 RX ORDER — ROCURONIUM BROMIDE 10 MG/ML
INJECTION, SOLUTION INTRAVENOUS PRN
Status: DISCONTINUED | OUTPATIENT
Start: 2020-01-01 | End: 2020-01-01 | Stop reason: SDUPTHER

## 2020-01-01 RX ORDER — CETIRIZINE HYDROCHLORIDE 10 MG/1
10 TABLET ORAL 2 TIMES DAILY
Status: DISCONTINUED | OUTPATIENT
Start: 2020-01-01 | End: 2020-01-01 | Stop reason: HOSPADM

## 2020-01-01 RX ORDER — IPRATROPIUM BROMIDE AND ALBUTEROL SULFATE 2.5; .5 MG/3ML; MG/3ML
1 SOLUTION RESPIRATORY (INHALATION) EVERY 4 HOURS PRN
Qty: 360 ML | Refills: 0 | Status: SHIPPED | OUTPATIENT
Start: 2020-01-01 | End: 2020-01-01 | Stop reason: SDUPTHER

## 2020-01-01 RX ORDER — PREDNISONE 1 MG/1
15 TABLET ORAL DAILY
Qty: 30 TABLET | Refills: 0 | Status: SHIPPED | OUTPATIENT
Start: 2020-01-01 | End: 2020-01-01

## 2020-01-01 RX ORDER — ALBUTEROL SULFATE 90 UG/1
2 AEROSOL, METERED RESPIRATORY (INHALATION)
Status: DISCONTINUED | OUTPATIENT
Start: 2020-01-01 | End: 2020-01-01 | Stop reason: HOSPADM

## 2020-01-01 RX ORDER — PREDNISONE 20 MG/1
40 TABLET ORAL DAILY
Qty: 20 TABLET | Refills: 0 | Status: SHIPPED | OUTPATIENT
Start: 2020-01-01 | End: 2020-01-01 | Stop reason: HOSPADM

## 2020-01-01 RX ORDER — BUPIVACAINE HYDROCHLORIDE AND EPINEPHRINE 2.5; 5 MG/ML; UG/ML
INJECTION, SOLUTION EPIDURAL; INFILTRATION; INTRACAUDAL; PERINEURAL
Status: COMPLETED | OUTPATIENT
Start: 2020-01-01 | End: 2020-01-01

## 2020-01-01 RX ORDER — PROMETHAZINE HYDROCHLORIDE 25 MG/ML
6.25 INJECTION, SOLUTION INTRAMUSCULAR; INTRAVENOUS
Status: DISCONTINUED | OUTPATIENT
Start: 2020-01-01 | End: 2020-01-01 | Stop reason: HOSPADM

## 2020-01-01 RX ORDER — PREDNISONE 20 MG/1
20 TABLET ORAL DAILY
Qty: 10 TABLET | Refills: 0 | Status: SHIPPED | OUTPATIENT
Start: 2020-01-01 | End: 2020-01-01

## 2020-01-01 RX ORDER — PREDNISONE 20 MG/1
40 TABLET ORAL DAILY
Status: DISCONTINUED | OUTPATIENT
Start: 2020-01-01 | End: 2020-01-01 | Stop reason: HOSPADM

## 2020-01-01 RX ORDER — POTASSIUM CHLORIDE 20 MEQ/1
40 TABLET, EXTENDED RELEASE ORAL PRN
Status: DISCONTINUED | OUTPATIENT
Start: 2020-01-01 | End: 2020-01-01

## 2020-01-01 RX ORDER — MAGNESIUM SULFATE IN WATER 40 MG/ML
2 INJECTION, SOLUTION INTRAVENOUS ONCE
Status: COMPLETED | OUTPATIENT
Start: 2020-01-01 | End: 2020-01-01

## 2020-01-01 RX ORDER — SODIUM CHLORIDE 0.9 % (FLUSH) 0.9 %
10 SYRINGE (ML) INJECTION PRN
Status: COMPLETED | OUTPATIENT
Start: 2020-01-01 | End: 2020-01-01

## 2020-01-01 RX ORDER — GUAIFENESIN 100 MG/5ML
200 SOLUTION ORAL ONCE
Status: COMPLETED | OUTPATIENT
Start: 2020-01-01 | End: 2020-01-01

## 2020-01-01 RX ORDER — ALBUTEROL SULFATE 90 UG/1
2 AEROSOL, METERED RESPIRATORY (INHALATION) 4 TIMES DAILY
Status: DISCONTINUED | OUTPATIENT
Start: 2020-01-01 | End: 2020-01-01

## 2020-01-01 RX ORDER — SIMVASTATIN 20 MG
20 TABLET ORAL NIGHTLY
Qty: 90 TABLET | Refills: 1 | Status: SHIPPED | OUTPATIENT
Start: 2020-01-01 | End: 2021-01-01 | Stop reason: SDUPTHER

## 2020-01-01 RX ORDER — IPRATROPIUM BROMIDE AND ALBUTEROL SULFATE 2.5; .5 MG/3ML; MG/3ML
1 SOLUTION RESPIRATORY (INHALATION) 4 TIMES DAILY
Status: DISCONTINUED | OUTPATIENT
Start: 2020-01-01 | End: 2020-01-01

## 2020-01-01 RX ORDER — FENTANYL CITRATE 50 UG/ML
50 INJECTION, SOLUTION INTRAMUSCULAR; INTRAVENOUS EVERY 5 MIN PRN
Status: DISCONTINUED | OUTPATIENT
Start: 2020-01-01 | End: 2020-01-01 | Stop reason: HOSPADM

## 2020-01-01 RX ORDER — PREDNISONE 10 MG/1
10 TABLET ORAL DAILY
Qty: 44 TABLET | Refills: 0 | Status: SHIPPED | OUTPATIENT
Start: 2020-01-01 | End: 2020-01-01

## 2020-01-01 RX ORDER — LIDOCAINE HYDROCHLORIDE 20 MG/ML
INJECTION, SOLUTION INTRAVENOUS PRN
Status: DISCONTINUED | OUTPATIENT
Start: 2020-01-01 | End: 2020-01-01 | Stop reason: SDUPTHER

## 2020-01-01 RX ORDER — HYDROXYZINE PAMOATE 25 MG/1
50 CAPSULE ORAL ONCE
Status: COMPLETED | OUTPATIENT
Start: 2020-01-01 | End: 2020-01-01

## 2020-01-01 RX ORDER — DIPHENHYDRAMINE HCL 25 MG
50 TABLET ORAL ONCE
Status: COMPLETED | OUTPATIENT
Start: 2020-01-01 | End: 2020-01-01

## 2020-01-01 RX ORDER — OXYCODONE HYDROCHLORIDE AND ACETAMINOPHEN 5; 325 MG/1; MG/1
2 TABLET ORAL ONCE
Status: COMPLETED | OUTPATIENT
Start: 2020-01-01 | End: 2020-01-01

## 2020-01-01 RX ORDER — PREDNISONE 1 MG/1
5 TABLET ORAL DAILY
Status: DISCONTINUED | OUTPATIENT
Start: 2020-01-01 | End: 2020-01-01 | Stop reason: HOSPADM

## 2020-01-01 RX ADMIN — SODIUM CHLORIDE, PRESERVATIVE FREE 10 ML: 5 INJECTION INTRAVENOUS at 21:20

## 2020-01-01 RX ADMIN — GUAIFENESIN 600 MG: 600 TABLET, EXTENDED RELEASE ORAL at 20:24

## 2020-01-01 RX ADMIN — ATORVASTATIN CALCIUM 10 MG: 10 TABLET, FILM COATED ORAL at 08:03

## 2020-01-01 RX ADMIN — DILTIAZEM HYDROCHLORIDE 120 MG: 120 CAPSULE, COATED, EXTENDED RELEASE ORAL at 13:16

## 2020-01-01 RX ADMIN — ALBUTEROL SULFATE 2 PUFF: 90 AEROSOL, METERED RESPIRATORY (INHALATION) at 08:41

## 2020-01-01 RX ADMIN — GUAIFENESIN 600 MG: 600 TABLET, EXTENDED RELEASE ORAL at 08:35

## 2020-01-01 RX ADMIN — METHYLPREDNISOLONE SODIUM SUCCINATE 40 MG: 40 INJECTION, POWDER, LYOPHILIZED, FOR SOLUTION INTRAMUSCULAR; INTRAVENOUS at 17:35

## 2020-01-01 RX ADMIN — Medication 1000 UNITS: at 08:43

## 2020-01-01 RX ADMIN — CETIRIZINE HYDROCHLORIDE 10 MG: 10 TABLET, FILM COATED ORAL at 09:53

## 2020-01-01 RX ADMIN — SODIUM CHLORIDE, PRESERVATIVE FREE 10 ML: 5 INJECTION INTRAVENOUS at 08:28

## 2020-01-01 RX ADMIN — CEFTRIAXONE 1 G: 1 INJECTION, POWDER, FOR SOLUTION INTRAMUSCULAR; INTRAVENOUS at 20:56

## 2020-01-01 RX ADMIN — Medication 2 PUFF: at 12:18

## 2020-01-01 RX ADMIN — Medication 2 PUFF: at 08:52

## 2020-01-01 RX ADMIN — CEFTRIAXONE 1 G: 1 INJECTION, POWDER, FOR SOLUTION INTRAMUSCULAR; INTRAVENOUS at 20:34

## 2020-01-01 RX ADMIN — DOXYCYCLINE HYCLATE 100 MG: 100 TABLET, COATED ORAL at 17:47

## 2020-01-01 RX ADMIN — Medication 2 PUFF: at 20:08

## 2020-01-01 RX ADMIN — GUAIFENESIN 600 MG: 600 TABLET, EXTENDED RELEASE ORAL at 08:24

## 2020-01-01 RX ADMIN — HYDROCODONE BITARTRATE AND ACETAMINOPHEN 1 TABLET: 10; 325 TABLET ORAL at 12:14

## 2020-01-01 RX ADMIN — HYDROCODONE BITARTRATE AND ACETAMINOPHEN 1 TABLET: 10; 325 TABLET ORAL at 10:29

## 2020-01-01 RX ADMIN — DOXYCYCLINE HYCLATE 100 MG: 100 TABLET, COATED ORAL at 15:06

## 2020-01-01 RX ADMIN — FLUTICASONE PROPIONATE 1 SPRAY: 50 SPRAY, METERED NASAL at 08:58

## 2020-01-01 RX ADMIN — DOXYCYCLINE HYCLATE 100 MG: 100 TABLET, COATED ORAL at 02:36

## 2020-01-01 RX ADMIN — PREDNISONE 50 MG: 20 TABLET ORAL at 17:30

## 2020-01-01 RX ADMIN — CETIRIZINE HYDROCHLORIDE 10 MG: 10 TABLET, FILM COATED ORAL at 08:58

## 2020-01-01 RX ADMIN — CETIRIZINE HYDROCHLORIDE 10 MG: 10 TABLET, FILM COATED ORAL at 08:39

## 2020-01-01 RX ADMIN — METHYLPREDNISOLONE SODIUM SUCCINATE 40 MG: 40 INJECTION, POWDER, LYOPHILIZED, FOR SOLUTION INTRAMUSCULAR; INTRAVENOUS at 22:49

## 2020-01-01 RX ADMIN — METHYLPREDNISOLONE SODIUM SUCCINATE 40 MG: 40 INJECTION, POWDER, FOR SOLUTION INTRAMUSCULAR; INTRAVENOUS at 04:29

## 2020-01-01 RX ADMIN — ALBUTEROL SULFATE 2 PUFF: 90 AEROSOL, METERED RESPIRATORY (INHALATION) at 23:02

## 2020-01-01 RX ADMIN — ATORVASTATIN CALCIUM 10 MG: 10 TABLET, FILM COATED ORAL at 09:53

## 2020-01-01 RX ADMIN — ATORVASTATIN CALCIUM 10 MG: 10 TABLET, FILM COATED ORAL at 09:50

## 2020-01-01 RX ADMIN — MAGNESIUM SULFATE HEPTAHYDRATE 1 G: 1 INJECTION, SOLUTION INTRAVENOUS at 04:19

## 2020-01-01 RX ADMIN — AZITHROMYCIN MONOHYDRATE 250 MG: 250 TABLET ORAL at 14:08

## 2020-01-01 RX ADMIN — PREDNISONE 40 MG: 20 TABLET ORAL at 09:33

## 2020-01-01 RX ADMIN — ALBUTEROL SULFATE 2 PUFF: 90 AEROSOL, METERED RESPIRATORY (INHALATION) at 07:28

## 2020-01-01 RX ADMIN — ALBUTEROL SULFATE 2 PUFF: 90 AEROSOL, METERED RESPIRATORY (INHALATION) at 00:20

## 2020-01-01 RX ADMIN — CETIRIZINE HYDROCHLORIDE 10 MG: 10 TABLET, FILM COATED ORAL at 21:20

## 2020-01-01 RX ADMIN — SODIUM CHLORIDE, PRESERVATIVE FREE 10 ML: 5 INJECTION INTRAVENOUS at 09:49

## 2020-01-01 RX ADMIN — FLUTICASONE PROPIONATE 1 SPRAY: 50 SPRAY, METERED NASAL at 08:03

## 2020-01-01 RX ADMIN — HYDROCODONE BITARTRATE AND ACETAMINOPHEN 1 TABLET: 10; 325 TABLET ORAL at 10:56

## 2020-01-01 RX ADMIN — THEOPHYLLINE 200 MG: 400 TABLET, EXTENDED RELEASE ORAL at 09:53

## 2020-01-01 RX ADMIN — SODIUM CHLORIDE, PRESERVATIVE FREE 10 ML: 5 INJECTION INTRAVENOUS at 21:13

## 2020-01-01 RX ADMIN — HYDROCODONE BITARTRATE AND ACETAMINOPHEN 1 TABLET: 10; 325 TABLET ORAL at 19:11

## 2020-01-01 RX ADMIN — ALBUTEROL SULFATE 2.5 MG: 2.5 SOLUTION RESPIRATORY (INHALATION) at 00:59

## 2020-01-01 RX ADMIN — ATORVASTATIN CALCIUM 10 MG: 10 TABLET, FILM COATED ORAL at 09:27

## 2020-01-01 RX ADMIN — ALBUTEROL SULFATE 2 PUFF: 90 AEROSOL, METERED RESPIRATORY (INHALATION) at 20:45

## 2020-01-01 RX ADMIN — IPRATROPIUM BROMIDE 2 PUFF: 17 AEROSOL, METERED RESPIRATORY (INHALATION) at 16:15

## 2020-01-01 RX ADMIN — ATORVASTATIN CALCIUM 10 MG: 10 TABLET, FILM COATED ORAL at 09:06

## 2020-01-01 RX ADMIN — GUAIFENESIN 200 MG: 200 SOLUTION ORAL at 12:29

## 2020-01-01 RX ADMIN — HYDROCODONE BITARTRATE AND ACETAMINOPHEN 1 TABLET: 10; 325 TABLET ORAL at 17:54

## 2020-01-01 RX ADMIN — DOXYCYCLINE HYCLATE 100 MG: 100 TABLET, COATED ORAL at 04:07

## 2020-01-01 RX ADMIN — PREDNISONE 40 MG: 20 TABLET ORAL at 08:03

## 2020-01-01 RX ADMIN — ALBUTEROL SULFATE 2.5 MG: 2.5 SOLUTION RESPIRATORY (INHALATION) at 15:24

## 2020-01-01 RX ADMIN — SODIUM CHLORIDE, PRESERVATIVE FREE 10 ML: 5 INJECTION INTRAVENOUS at 17:47

## 2020-01-01 RX ADMIN — ASPIRIN 81 MG: 81 TABLET, COATED ORAL at 09:50

## 2020-01-01 RX ADMIN — CETIRIZINE HYDROCHLORIDE 10 MG: 10 TABLET, FILM COATED ORAL at 09:33

## 2020-01-01 RX ADMIN — HYDROCODONE BITARTRATE AND ACETAMINOPHEN 1 TABLET: 10; 325 TABLET ORAL at 06:41

## 2020-01-01 RX ADMIN — HYDROCODONE BITARTRATE AND ACETAMINOPHEN 1 TABLET: 10; 325 TABLET ORAL at 20:46

## 2020-01-01 RX ADMIN — AZITHROMYCIN MONOHYDRATE 250 MG: 250 TABLET ORAL at 08:26

## 2020-01-01 RX ADMIN — CALCIUM CARBONATE 500 MG: 500 TABLET, CHEWABLE ORAL at 08:28

## 2020-01-01 RX ADMIN — ALBUTEROL SULFATE 2 PUFF: 90 AEROSOL, METERED RESPIRATORY (INHALATION) at 11:50

## 2020-01-01 RX ADMIN — ALBUTEROL SULFATE 2 PUFF: 90 AEROSOL, METERED RESPIRATORY (INHALATION) at 11:41

## 2020-01-01 RX ADMIN — GUAIFENESIN 600 MG: 600 TABLET, EXTENDED RELEASE ORAL at 21:10

## 2020-01-01 RX ADMIN — HYDROCODONE BITARTRATE AND ACETAMINOPHEN 1 TABLET: 10; 325 TABLET ORAL at 05:23

## 2020-01-01 RX ADMIN — CALCIUM 500 MG: 500 TABLET ORAL at 09:53

## 2020-01-01 RX ADMIN — PREDNISONE 40 MG: 10 TABLET ORAL at 08:25

## 2020-01-01 RX ADMIN — ALBUTEROL SULFATE 2 PUFF: 90 AEROSOL, METERED RESPIRATORY (INHALATION) at 08:21

## 2020-01-01 RX ADMIN — THEOPHYLLINE ANHYDROUS 100 MG: 100 CAPSULE, EXTENDED RELEASE ORAL at 10:56

## 2020-01-01 RX ADMIN — CETIRIZINE HYDROCHLORIDE 10 MG: 10 TABLET, FILM COATED ORAL at 21:13

## 2020-01-01 RX ADMIN — DOXYCYCLINE HYCLATE 100 MG: 100 TABLET, COATED ORAL at 15:11

## 2020-01-01 RX ADMIN — IPRATROPIUM BROMIDE 2 PUFF: 17 AEROSOL, METERED RESPIRATORY (INHALATION) at 20:30

## 2020-01-01 RX ADMIN — SODIUM CHLORIDE, POTASSIUM CHLORIDE, SODIUM LACTATE AND CALCIUM CHLORIDE: 600; 310; 30; 20 INJECTION, SOLUTION INTRAVENOUS at 20:24

## 2020-01-01 RX ADMIN — FUROSEMIDE 40 MG: 40 TABLET ORAL at 08:03

## 2020-01-01 RX ADMIN — ALBUTEROL SULFATE 2 PUFF: 90 AEROSOL, METERED RESPIRATORY (INHALATION) at 12:26

## 2020-01-01 RX ADMIN — FLUTICASONE PROPIONATE 1 SPRAY: 50 SPRAY, METERED NASAL at 08:27

## 2020-01-01 RX ADMIN — SODIUM CHLORIDE, PRESERVATIVE FREE 10 ML: 5 INJECTION INTRAVENOUS at 21:48

## 2020-01-01 RX ADMIN — ALBUTEROL SULFATE 2 PUFF: 90 AEROSOL, METERED RESPIRATORY (INHALATION) at 04:55

## 2020-01-01 RX ADMIN — CEFTRIAXONE 1 G: 1 INJECTION, POWDER, FOR SOLUTION INTRAMUSCULAR; INTRAVENOUS at 18:18

## 2020-01-01 RX ADMIN — ALBUTEROL SULFATE 2 PUFF: 90 AEROSOL, METERED RESPIRATORY (INHALATION) at 20:29

## 2020-01-01 RX ADMIN — CALCIUM CARBONATE 500 MG: 500 TABLET, CHEWABLE ORAL at 09:27

## 2020-01-01 RX ADMIN — DOXYCYCLINE HYCLATE 100 MG: 100 TABLET, COATED ORAL at 15:38

## 2020-01-01 RX ADMIN — ASPIRIN 81 MG: 81 TABLET, COATED ORAL at 09:44

## 2020-01-01 RX ADMIN — ALBUTEROL SULFATE 2 PUFF: 90 AEROSOL, METERED RESPIRATORY (INHALATION) at 11:25

## 2020-01-01 RX ADMIN — ALBUTEROL SULFATE 2 PUFF: 90 AEROSOL, METERED RESPIRATORY (INHALATION) at 10:52

## 2020-01-01 RX ADMIN — CEFTRIAXONE 1 G: 1 INJECTION, POWDER, FOR SOLUTION INTRAMUSCULAR; INTRAVENOUS at 00:00

## 2020-01-01 RX ADMIN — PANTOPRAZOLE SODIUM 40 MG: 40 TABLET, DELAYED RELEASE ORAL at 09:26

## 2020-01-01 RX ADMIN — SODIUM POLYSTYRENE SULFONATE 15 G: 15 SUSPENSION ORAL; RECTAL at 14:08

## 2020-01-01 RX ADMIN — SODIUM CHLORIDE, PRESERVATIVE FREE 10 ML: 5 INJECTION INTRAVENOUS at 08:04

## 2020-01-01 RX ADMIN — METHYLPREDNISOLONE SODIUM SUCCINATE 40 MG: 40 INJECTION, POWDER, LYOPHILIZED, FOR SOLUTION INTRAMUSCULAR; INTRAVENOUS at 14:08

## 2020-01-01 RX ADMIN — SODIUM CHLORIDE, POTASSIUM CHLORIDE, SODIUM LACTATE AND CALCIUM CHLORIDE: 600; 310; 30; 20 INJECTION, SOLUTION INTRAVENOUS at 07:28

## 2020-01-01 RX ADMIN — DILTIAZEM HYDROCHLORIDE 60 MG: 60 TABLET, FILM COATED ORAL at 00:00

## 2020-01-01 RX ADMIN — ROCURONIUM BROMIDE 50 MG: 10 INJECTION INTRAVENOUS at 07:37

## 2020-01-01 RX ADMIN — CEFTRIAXONE 1 G: 1 INJECTION, POWDER, FOR SOLUTION INTRAMUSCULAR; INTRAVENOUS at 17:59

## 2020-01-01 RX ADMIN — OXYCODONE HYDROCHLORIDE AND ACETAMINOPHEN 2 TABLET: 5; 325 TABLET ORAL at 13:58

## 2020-01-01 RX ADMIN — GUAIFENESIN 600 MG: 600 TABLET, EXTENDED RELEASE ORAL at 20:46

## 2020-01-01 RX ADMIN — GUAIFENESIN 600 MG: 600 TABLET, EXTENDED RELEASE ORAL at 20:51

## 2020-01-01 RX ADMIN — Medication 1000 UNITS: at 08:27

## 2020-01-01 RX ADMIN — ALBUTEROL SULFATE 2 PUFF: 90 AEROSOL, METERED RESPIRATORY (INHALATION) at 08:51

## 2020-01-01 RX ADMIN — ASPIRIN 81 MG: 81 TABLET, COATED ORAL at 12:13

## 2020-01-01 RX ADMIN — ALBUTEROL SULFATE 2 PUFF: 90 AEROSOL, METERED RESPIRATORY (INHALATION) at 15:03

## 2020-01-01 RX ADMIN — ASPIRIN 81 MG: 81 TABLET, COATED ORAL at 08:58

## 2020-01-01 RX ADMIN — DIPHENHYDRAMINE HYDROCHLORIDE 50 MG: 25 TABLET ORAL at 22:07

## 2020-01-01 RX ADMIN — Medication 2 PUFF: at 10:53

## 2020-01-01 RX ADMIN — PREDNISONE 40 MG: 20 TABLET ORAL at 08:39

## 2020-01-01 RX ADMIN — PREDNISONE 40 MG: 20 TABLET ORAL at 09:07

## 2020-01-01 RX ADMIN — FLUTICASONE PROPIONATE 1 SPRAY: 50 SPRAY, METERED NASAL at 09:33

## 2020-01-01 RX ADMIN — THEOPHYLLINE ANHYDROUS 100 MG: 100 CAPSULE, EXTENDED RELEASE ORAL at 20:46

## 2020-01-01 RX ADMIN — ONDANSETRON 4 MG: 2 INJECTION INTRAMUSCULAR; INTRAVENOUS at 09:14

## 2020-01-01 RX ADMIN — IPRATROPIUM BROMIDE 2 PUFF: 17 AEROSOL, METERED RESPIRATORY (INHALATION) at 09:09

## 2020-01-01 RX ADMIN — THEOPHYLLINE 200 MG: 400 TABLET, EXTENDED RELEASE ORAL at 09:02

## 2020-01-01 RX ADMIN — ALBUTEROL SULFATE 2.5 MG: 2.5 SOLUTION RESPIRATORY (INHALATION) at 15:51

## 2020-01-01 RX ADMIN — CETIRIZINE HYDROCHLORIDE 10 MG: 10 TABLET, FILM COATED ORAL at 08:50

## 2020-01-01 RX ADMIN — ENOXAPARIN SODIUM 40 MG: 40 INJECTION SUBCUTANEOUS at 08:25

## 2020-01-01 RX ADMIN — HYDROCODONE BITARTRATE AND ACETAMINOPHEN 1 TABLET: 10; 325 TABLET ORAL at 17:59

## 2020-01-01 RX ADMIN — SODIUM CHLORIDE, PRESERVATIVE FREE 10 ML: 5 INJECTION INTRAVENOUS at 08:59

## 2020-01-01 RX ADMIN — HYDROCODONE BITARTRATE AND ACETAMINOPHEN 1 TABLET: 10; 325 TABLET ORAL at 17:52

## 2020-01-01 RX ADMIN — DILTIAZEM HYDROCHLORIDE 120 MG: 120 CAPSULE, COATED, EXTENDED RELEASE ORAL at 08:58

## 2020-01-01 RX ADMIN — ALBUTEROL SULFATE 2 PUFF: 90 AEROSOL, METERED RESPIRATORY (INHALATION) at 07:17

## 2020-01-01 RX ADMIN — HYDROCODONE BITARTRATE AND ACETAMINOPHEN 1 TABLET: 10; 325 TABLET ORAL at 12:54

## 2020-01-01 RX ADMIN — HYDROCODONE BITARTRATE AND ACETAMINOPHEN 1 TABLET: 10; 325 TABLET ORAL at 19:56

## 2020-01-01 RX ADMIN — GUAIFENESIN 600 MG: 600 TABLET, EXTENDED RELEASE ORAL at 22:08

## 2020-01-01 RX ADMIN — ALBUTEROL SULFATE 2 PUFF: 90 AEROSOL, METERED RESPIRATORY (INHALATION) at 15:26

## 2020-01-01 RX ADMIN — DOXYCYCLINE HYCLATE 100 MG: 100 TABLET, COATED ORAL at 02:12

## 2020-01-01 RX ADMIN — METHYLPREDNISOLONE SODIUM SUCCINATE 40 MG: 40 INJECTION, POWDER, FOR SOLUTION INTRAMUSCULAR; INTRAVENOUS at 22:03

## 2020-01-01 RX ADMIN — ALBUTEROL SULFATE 2 PUFF: 90 AEROSOL, METERED RESPIRATORY (INHALATION) at 07:52

## 2020-01-01 RX ADMIN — SODIUM CHLORIDE, PRESERVATIVE FREE 10 ML: 5 INJECTION INTRAVENOUS at 20:13

## 2020-01-01 RX ADMIN — SODIUM CHLORIDE, POTASSIUM CHLORIDE, SODIUM LACTATE AND CALCIUM CHLORIDE: 600; 310; 30; 20 INJECTION, SOLUTION INTRAVENOUS at 09:59

## 2020-01-01 RX ADMIN — THEOPHYLLINE ANHYDROUS 100 MG: 100 CAPSULE, EXTENDED RELEASE ORAL at 12:51

## 2020-01-01 RX ADMIN — ATORVASTATIN CALCIUM 10 MG: 10 TABLET, FILM COATED ORAL at 09:44

## 2020-01-01 RX ADMIN — IPRATROPIUM BROMIDE 2 PUFF: 17 AEROSOL, METERED RESPIRATORY (INHALATION) at 11:51

## 2020-01-01 RX ADMIN — ALBUTEROL SULFATE 2 PUFF: 90 AEROSOL, METERED RESPIRATORY (INHALATION) at 11:12

## 2020-01-01 RX ADMIN — THEOPHYLLINE ANHYDROUS 100 MG: 100 CAPSULE, EXTENDED RELEASE ORAL at 08:25

## 2020-01-01 RX ADMIN — Medication 1000 UNITS: at 09:02

## 2020-01-01 RX ADMIN — THEOPHYLLINE ANHYDROUS 100 MG: 100 CAPSULE, EXTENDED RELEASE ORAL at 19:56

## 2020-01-01 RX ADMIN — HYDROCODONE BITARTRATE AND ACETAMINOPHEN 1 TABLET: 10; 325 TABLET ORAL at 07:05

## 2020-01-01 RX ADMIN — ALBUTEROL SULFATE 2.5 MG: 2.5 SOLUTION RESPIRATORY (INHALATION) at 00:16

## 2020-01-01 RX ADMIN — GUAIFENESIN 600 MG: 600 TABLET, EXTENDED RELEASE ORAL at 08:02

## 2020-01-01 RX ADMIN — ATORVASTATIN CALCIUM 10 MG: 10 TABLET, FILM COATED ORAL at 09:33

## 2020-01-01 RX ADMIN — DILTIAZEM HYDROCHLORIDE 180 MG: 90 TABLET, FILM COATED ORAL at 12:13

## 2020-01-01 RX ADMIN — ALBUTEROL SULFATE 2 PUFF: 90 AEROSOL, METERED RESPIRATORY (INHALATION) at 04:15

## 2020-01-01 RX ADMIN — CALCIUM CARBONATE 500 MG: 500 TABLET, CHEWABLE ORAL at 08:25

## 2020-01-01 RX ADMIN — ALBUTEROL SULFATE 2 PUFF: 90 AEROSOL, METERED RESPIRATORY (INHALATION) at 03:59

## 2020-01-01 RX ADMIN — ASPIRIN 81 MG: 81 TABLET, COATED ORAL at 09:34

## 2020-01-01 RX ADMIN — ALBUTEROL SULFATE 2 PUFF: 90 AEROSOL, METERED RESPIRATORY (INHALATION) at 09:08

## 2020-01-01 RX ADMIN — THEOPHYLLINE 200 MG: 400 TABLET, EXTENDED RELEASE ORAL at 21:13

## 2020-01-01 RX ADMIN — ALBUTEROL SULFATE 2 PUFF: 90 AEROSOL, METERED RESPIRATORY (INHALATION) at 11:20

## 2020-01-01 RX ADMIN — METHYLPREDNISOLONE SODIUM SUCCINATE 40 MG: 40 INJECTION, POWDER, LYOPHILIZED, FOR SOLUTION INTRAMUSCULAR; INTRAVENOUS at 23:56

## 2020-01-01 RX ADMIN — MAGNESIUM SULFATE HEPTAHYDRATE 2 G: 40 INJECTION, SOLUTION INTRAVENOUS at 14:32

## 2020-01-01 RX ADMIN — GUAIFENESIN 600 MG: 600 TABLET, EXTENDED RELEASE ORAL at 14:15

## 2020-01-01 RX ADMIN — CEFTRIAXONE 1 G: 1 INJECTION, POWDER, FOR SOLUTION INTRAMUSCULAR; INTRAVENOUS at 17:28

## 2020-01-01 RX ADMIN — ALBUTEROL SULFATE 2 PUFF: 90 AEROSOL, METERED RESPIRATORY (INHALATION) at 11:01

## 2020-01-01 RX ADMIN — CALCIUM 500 MG: 500 TABLET ORAL at 09:07

## 2020-01-01 RX ADMIN — PREDNISONE 40 MG: 20 TABLET ORAL at 12:12

## 2020-01-01 RX ADMIN — ENOXAPARIN SODIUM 40 MG: 40 INJECTION SUBCUTANEOUS at 09:27

## 2020-01-01 RX ADMIN — GUAIFENESIN 600 MG: 600 TABLET, EXTENDED RELEASE ORAL at 09:44

## 2020-01-01 RX ADMIN — HYDROCODONE BITARTRATE AND ACETAMINOPHEN 1 TABLET: 10; 325 TABLET ORAL at 13:56

## 2020-01-01 RX ADMIN — METHYLPREDNISOLONE SODIUM SUCCINATE 40 MG: 40 INJECTION, POWDER, FOR SOLUTION INTRAMUSCULAR; INTRAVENOUS at 04:02

## 2020-01-01 RX ADMIN — THEOPHYLLINE ANHYDROUS 100 MG: 100 CAPSULE, EXTENDED RELEASE ORAL at 20:34

## 2020-01-01 RX ADMIN — Medication 2 PUFF: at 08:42

## 2020-01-01 RX ADMIN — CALCIUM 500 MG: 500 TABLET ORAL at 09:33

## 2020-01-01 RX ADMIN — HYDROCODONE BITARTRATE AND ACETAMINOPHEN 1 TABLET: 10; 325 TABLET ORAL at 02:51

## 2020-01-01 RX ADMIN — THEOPHYLLINE ANHYDROUS 100 MG: 100 CAPSULE, EXTENDED RELEASE ORAL at 20:24

## 2020-01-01 RX ADMIN — ALBUTEROL SULFATE 2 PUFF: 90 AEROSOL, METERED RESPIRATORY (INHALATION) at 22:33

## 2020-01-01 RX ADMIN — ALBUTEROL SULFATE 2 PUFF: 90 AEROSOL, METERED RESPIRATORY (INHALATION) at 13:05

## 2020-01-01 RX ADMIN — ATORVASTATIN CALCIUM 10 MG: 10 TABLET, FILM COATED ORAL at 12:14

## 2020-01-01 RX ADMIN — ALBUTEROL SULFATE 2 PUFF: 90 AEROSOL, METERED RESPIRATORY (INHALATION) at 16:39

## 2020-01-01 RX ADMIN — Medication 1000 UNITS: at 09:49

## 2020-01-01 RX ADMIN — HYDROCODONE BITARTRATE AND ACETAMINOPHEN 1 TABLET: 10; 325 TABLET ORAL at 00:07

## 2020-01-01 RX ADMIN — MAGNESIUM SULFATE HEPTAHYDRATE 1 G: 1 INJECTION, SOLUTION INTRAVENOUS at 05:04

## 2020-01-01 RX ADMIN — DILTIAZEM HYDROCHLORIDE 120 MG: 120 CAPSULE, COATED, EXTENDED RELEASE ORAL at 08:40

## 2020-01-01 RX ADMIN — DOXYCYCLINE HYCLATE 100 MG: 100 TABLET, COATED ORAL at 15:19

## 2020-01-01 RX ADMIN — CALCIUM 500 MG: 500 TABLET ORAL at 09:50

## 2020-01-01 RX ADMIN — SODIUM CHLORIDE, PRESERVATIVE FREE 10 ML: 5 INJECTION INTRAVENOUS at 07:55

## 2020-01-01 RX ADMIN — METHYLPREDNISOLONE SODIUM SUCCINATE 40 MG: 40 INJECTION, POWDER, FOR SOLUTION INTRAMUSCULAR; INTRAVENOUS at 09:44

## 2020-01-01 RX ADMIN — DOXYCYCLINE HYCLATE 100 MG: 100 TABLET, COATED ORAL at 17:10

## 2020-01-01 RX ADMIN — FLUTICASONE PROPIONATE 1 SPRAY: 50 SPRAY, METERED NASAL at 12:15

## 2020-01-01 RX ADMIN — DEXAMETHASONE SODIUM PHOSPHATE 10 MG: 10 INJECTION, SOLUTION INTRAMUSCULAR; INTRAVENOUS at 23:31

## 2020-01-01 RX ADMIN — HYDROCODONE BITARTRATE AND ACETAMINOPHEN 1 TABLET: 10; 325 TABLET ORAL at 04:55

## 2020-01-01 RX ADMIN — FENTANYL CITRATE 50 MCG: 50 INJECTION INTRAMUSCULAR; INTRAVENOUS at 07:36

## 2020-01-01 RX ADMIN — HYDROCODONE BITARTRATE AND ACETAMINOPHEN 1 TABLET: 10; 325 TABLET ORAL at 22:23

## 2020-01-01 RX ADMIN — METOPROLOL TARTRATE 5 MG: 5 INJECTION INTRAVENOUS at 01:55

## 2020-01-01 RX ADMIN — ALBUTEROL SULFATE 2 PUFF: 90 AEROSOL, METERED RESPIRATORY (INHALATION) at 20:42

## 2020-01-01 RX ADMIN — SODIUM CHLORIDE, PRESERVATIVE FREE 10 ML: 5 INJECTION INTRAVENOUS at 09:33

## 2020-01-01 RX ADMIN — METHYLPREDNISOLONE SODIUM SUCCINATE 40 MG: 40 INJECTION, POWDER, LYOPHILIZED, FOR SOLUTION INTRAMUSCULAR; INTRAVENOUS at 08:23

## 2020-01-01 RX ADMIN — IPRATROPIUM BROMIDE 2 PUFF: 17 AEROSOL, METERED RESPIRATORY (INHALATION) at 07:28

## 2020-01-01 RX ADMIN — HYDROCODONE BITARTRATE AND ACETAMINOPHEN 1 TABLET: 10; 325 TABLET ORAL at 02:37

## 2020-01-01 RX ADMIN — LEVOFLOXACIN 500 MG: 5 INJECTION, SOLUTION INTRAVENOUS at 16:02

## 2020-01-01 RX ADMIN — DILTIAZEM HYDROCHLORIDE 120 MG: 120 CAPSULE, COATED, EXTENDED RELEASE ORAL at 09:53

## 2020-01-01 RX ADMIN — ASPIRIN 81 MG: 81 TABLET, COATED ORAL at 09:01

## 2020-01-01 RX ADMIN — METHYLPREDNISOLONE SODIUM SUCCINATE 40 MG: 40 INJECTION, POWDER, FOR SOLUTION INTRAMUSCULAR; INTRAVENOUS at 10:52

## 2020-01-01 RX ADMIN — ASPIRIN 81 MG: 81 TABLET, COATED ORAL at 08:25

## 2020-01-01 RX ADMIN — THEOPHYLLINE ANHYDROUS 100 MG: 100 CAPSULE, EXTENDED RELEASE ORAL at 08:40

## 2020-01-01 RX ADMIN — SODIUM CHLORIDE, PRESERVATIVE FREE 10 ML: 5 INJECTION INTRAVENOUS at 10:09

## 2020-01-01 RX ADMIN — SODIUM CHLORIDE, PRESERVATIVE FREE 10 ML: 5 INJECTION INTRAVENOUS at 21:10

## 2020-01-01 RX ADMIN — HYDROCODONE BITARTRATE AND ACETAMINOPHEN 1 TABLET: 10; 325 TABLET ORAL at 22:08

## 2020-01-01 RX ADMIN — THEOPHYLLINE ANHYDROUS 100 MG: 100 CAPSULE, EXTENDED RELEASE ORAL at 09:06

## 2020-01-01 RX ADMIN — Medication 2 PUFF: at 04:20

## 2020-01-01 RX ADMIN — Medication 1000 UNITS: at 09:53

## 2020-01-01 RX ADMIN — HYDROCODONE BITARTRATE AND ACETAMINOPHEN 1 TABLET: 10; 325 TABLET ORAL at 04:07

## 2020-01-01 RX ADMIN — THEOPHYLLINE 200 MG: 400 TABLET, EXTENDED RELEASE ORAL at 08:58

## 2020-01-01 RX ADMIN — SODIUM POLYSTYRENE SULFONATE 15 G: 15 SUSPENSION ORAL; RECTAL at 12:30

## 2020-01-01 RX ADMIN — AZITHROMYCIN MONOHYDRATE 500 MG: 500 INJECTION, POWDER, LYOPHILIZED, FOR SOLUTION INTRAVENOUS at 20:47

## 2020-01-01 RX ADMIN — ALBUTEROL SULFATE 2 PUFF: 90 AEROSOL, METERED RESPIRATORY (INHALATION) at 16:20

## 2020-01-01 RX ADMIN — ALBUTEROL SULFATE 2 PUFF: 90 AEROSOL, METERED RESPIRATORY (INHALATION) at 11:40

## 2020-01-01 RX ADMIN — GUAIFENESIN 600 MG: 600 TABLET, EXTENDED RELEASE ORAL at 20:56

## 2020-01-01 RX ADMIN — FLUTICASONE PROPIONATE 1 SPRAY: 50 SPRAY, METERED NASAL at 12:52

## 2020-01-01 RX ADMIN — THEOPHYLLINE ANHYDROUS 100 MG: 100 CAPSULE, EXTENDED RELEASE ORAL at 08:24

## 2020-01-01 RX ADMIN — ONDANSETRON 4 MG: 2 INJECTION INTRAMUSCULAR; INTRAVENOUS at 19:36

## 2020-01-01 RX ADMIN — LIDOCAINE HYDROCHLORIDE 80 MG: 20 INJECTION, SOLUTION INTRAVENOUS at 07:36

## 2020-01-01 RX ADMIN — METHYLPREDNISOLONE SODIUM SUCCINATE 40 MG: 40 INJECTION, POWDER, FOR SOLUTION INTRAMUSCULAR; INTRAVENOUS at 21:47

## 2020-01-01 RX ADMIN — ALBUTEROL SULFATE 2 PUFF: 90 AEROSOL, METERED RESPIRATORY (INHALATION) at 19:50

## 2020-01-01 RX ADMIN — HYDROCODONE BITARTRATE AND ACETAMINOPHEN 1 TABLET: 10; 325 TABLET ORAL at 04:02

## 2020-01-01 RX ADMIN — HYDROCODONE BITARTRATE AND ACETAMINOPHEN 1 TABLET: 10; 325 TABLET ORAL at 02:22

## 2020-01-01 RX ADMIN — THEOPHYLLINE ANHYDROUS 100 MG: 100 CAPSULE, EXTENDED RELEASE ORAL at 20:13

## 2020-01-01 RX ADMIN — THEOPHYLLINE 200 MG: 400 TABLET, EXTENDED RELEASE ORAL at 08:26

## 2020-01-01 RX ADMIN — Medication 2 PUFF: at 16:54

## 2020-01-01 RX ADMIN — SODIUM CHLORIDE, PRESERVATIVE FREE 10 ML: 5 INJECTION INTRAVENOUS at 20:24

## 2020-01-01 RX ADMIN — HYDROCODONE BITARTRATE AND ACETAMINOPHEN 1 TABLET: 10; 325 TABLET ORAL at 16:16

## 2020-01-01 RX ADMIN — Medication 1000 UNITS: at 09:33

## 2020-01-01 RX ADMIN — ALBUTEROL SULFATE 2 PUFF: 90 AEROSOL, METERED RESPIRATORY (INHALATION) at 07:51

## 2020-01-01 RX ADMIN — HYDROCODONE BITARTRATE AND ACETAMINOPHEN 1 TABLET: 10; 325 TABLET ORAL at 00:00

## 2020-01-01 RX ADMIN — HYDROCODONE BITARTRATE AND ACETAMINOPHEN 1 TABLET: 10; 325 TABLET ORAL at 22:51

## 2020-01-01 RX ADMIN — ALBUTEROL SULFATE 2 PUFF: 90 AEROSOL, METERED RESPIRATORY (INHALATION) at 12:17

## 2020-01-01 RX ADMIN — CETIRIZINE HYDROCHLORIDE 10 MG: 10 TABLET, FILM COATED ORAL at 08:02

## 2020-01-01 RX ADMIN — AZITHROMYCIN MONOHYDRATE 500 MG: 500 INJECTION, POWDER, LYOPHILIZED, FOR SOLUTION INTRAVENOUS at 22:21

## 2020-01-01 RX ADMIN — PANTOPRAZOLE SODIUM 40 MG: 40 TABLET, DELAYED RELEASE ORAL at 07:01

## 2020-01-01 RX ADMIN — POTASSIUM CHLORIDE 40 MEQ: 1500 TABLET, EXTENDED RELEASE ORAL at 23:53

## 2020-01-01 RX ADMIN — HYDROCODONE BITARTRATE AND ACETAMINOPHEN 1 TABLET: 10; 325 TABLET ORAL at 11:34

## 2020-01-01 RX ADMIN — METHYLPREDNISOLONE SODIUM SUCCINATE 40 MG: 40 INJECTION, POWDER, FOR SOLUTION INTRAMUSCULAR; INTRAVENOUS at 03:18

## 2020-01-01 RX ADMIN — IPRATROPIUM BROMIDE 2 PUFF: 17 AEROSOL, METERED RESPIRATORY (INHALATION) at 19:11

## 2020-01-01 RX ADMIN — HYDROCODONE BITARTRATE AND ACETAMINOPHEN 1 TABLET: 10; 325 TABLET ORAL at 04:19

## 2020-01-01 RX ADMIN — GUAIFENESIN 600 MG: 600 TABLET, EXTENDED RELEASE ORAL at 20:34

## 2020-01-01 RX ADMIN — ALBUTEROL SULFATE 2 PUFF: 90 AEROSOL, METERED RESPIRATORY (INHALATION) at 15:08

## 2020-01-01 RX ADMIN — ENOXAPARIN SODIUM 40 MG: 40 INJECTION SUBCUTANEOUS at 09:32

## 2020-01-01 RX ADMIN — CEFTRIAXONE 1 G: 1 INJECTION, POWDER, FOR SOLUTION INTRAMUSCULAR; INTRAVENOUS at 21:47

## 2020-01-01 RX ADMIN — SODIUM CHLORIDE, PRESERVATIVE FREE 10 ML: 5 INJECTION INTRAVENOUS at 09:06

## 2020-01-01 RX ADMIN — ALBUTEROL SULFATE 2 PUFF: 90 AEROSOL, METERED RESPIRATORY (INHALATION) at 16:11

## 2020-01-01 RX ADMIN — PANTOPRAZOLE SODIUM 40 MG: 40 TABLET, DELAYED RELEASE ORAL at 06:41

## 2020-01-01 RX ADMIN — ENOXAPARIN SODIUM 40 MG: 40 INJECTION SUBCUTANEOUS at 09:49

## 2020-01-01 RX ADMIN — ALBUTEROL SULFATE 2 PUFF: 90 AEROSOL, METERED RESPIRATORY (INHALATION) at 08:43

## 2020-01-01 RX ADMIN — CEFTRIAXONE 1 G: 1 INJECTION, POWDER, FOR SOLUTION INTRAMUSCULAR; INTRAVENOUS at 22:50

## 2020-01-01 RX ADMIN — HYDROCODONE BITARTRATE AND ACETAMINOPHEN 1 TABLET: 10; 325 TABLET ORAL at 02:10

## 2020-01-01 RX ADMIN — HYDROCODONE BITARTRATE AND ACETAMINOPHEN 1 TABLET: 10; 325 TABLET ORAL at 12:13

## 2020-01-01 RX ADMIN — METHYLPREDNISOLONE SODIUM SUCCINATE 40 MG: 40 INJECTION, POWDER, FOR SOLUTION INTRAMUSCULAR; INTRAVENOUS at 20:34

## 2020-01-01 RX ADMIN — CETIRIZINE HYDROCHLORIDE 10 MG: 10 TABLET, FILM COATED ORAL at 00:07

## 2020-01-01 RX ADMIN — FUROSEMIDE 20 MG: 10 INJECTION, SOLUTION INTRAVENOUS at 12:51

## 2020-01-01 RX ADMIN — HYDROCODONE BITARTRATE AND ACETAMINOPHEN 1 TABLET: 10; 325 TABLET ORAL at 00:20

## 2020-01-01 RX ADMIN — SODIUM CHLORIDE, PRESERVATIVE FREE 10 ML: 5 INJECTION INTRAVENOUS at 08:58

## 2020-01-01 RX ADMIN — GUAIFENESIN 600 MG: 600 TABLET, EXTENDED RELEASE ORAL at 09:33

## 2020-01-01 RX ADMIN — Medication 1000 UNITS: at 09:06

## 2020-01-01 RX ADMIN — HYDROCODONE BITARTRATE AND ACETAMINOPHEN 1 TABLET: 10; 325 TABLET ORAL at 21:03

## 2020-01-01 RX ADMIN — ENOXAPARIN SODIUM 40 MG: 40 INJECTION SUBCUTANEOUS at 09:02

## 2020-01-01 RX ADMIN — METHYLPREDNISOLONE SODIUM SUCCINATE 40 MG: 40 INJECTION, POWDER, LYOPHILIZED, FOR SOLUTION INTRAMUSCULAR; INTRAVENOUS at 10:53

## 2020-01-01 RX ADMIN — THEOPHYLLINE ANHYDROUS 100 MG: 100 CAPSULE, EXTENDED RELEASE ORAL at 15:19

## 2020-01-01 RX ADMIN — THEOPHYLLINE ANHYDROUS 100 MG: 100 CAPSULE, EXTENDED RELEASE ORAL at 09:33

## 2020-01-01 RX ADMIN — ATORVASTATIN CALCIUM 10 MG: 10 TABLET, FILM COATED ORAL at 08:44

## 2020-01-01 RX ADMIN — CEFTRIAXONE 1 G: 1 INJECTION, POWDER, FOR SOLUTION INTRAMUSCULAR; INTRAVENOUS at 17:47

## 2020-01-01 RX ADMIN — HYDROCODONE BITARTRATE AND ACETAMINOPHEN 1 TABLET: 10; 325 TABLET ORAL at 17:36

## 2020-01-01 RX ADMIN — DOXYCYCLINE HYCLATE 100 MG: 100 TABLET, COATED ORAL at 03:38

## 2020-01-01 RX ADMIN — METHYLPREDNISOLONE SODIUM SUCCINATE 40 MG: 40 INJECTION, POWDER, FOR SOLUTION INTRAMUSCULAR; INTRAVENOUS at 05:15

## 2020-01-01 RX ADMIN — ALBUTEROL SULFATE 2 PUFF: 90 AEROSOL, METERED RESPIRATORY (INHALATION) at 11:21

## 2020-01-01 RX ADMIN — POTASSIUM PHOSPHATE, MONOBASIC AND POTASSIUM PHOSPHATE, DIBASIC 15 MMOL: 224; 236 INJECTION, SOLUTION, CONCENTRATE INTRAVENOUS at 11:05

## 2020-01-01 RX ADMIN — THEOPHYLLINE ANHYDROUS 100 MG: 100 CAPSULE, EXTENDED RELEASE ORAL at 20:51

## 2020-01-01 RX ADMIN — ATORVASTATIN CALCIUM 10 MG: 10 TABLET, FILM COATED ORAL at 09:01

## 2020-01-01 RX ADMIN — HYDROCODONE BITARTRATE AND ACETAMINOPHEN 1 TABLET: 10; 325 TABLET ORAL at 16:45

## 2020-01-01 RX ADMIN — ASPIRIN 81 MG: 81 TABLET, COATED ORAL at 08:43

## 2020-01-01 RX ADMIN — CETIRIZINE HYDROCHLORIDE 10 MG: 10 TABLET, FILM COATED ORAL at 09:49

## 2020-01-01 RX ADMIN — SODIUM CHLORIDE, PRESERVATIVE FREE 10 ML: 5 INJECTION INTRAVENOUS at 09:27

## 2020-01-01 RX ADMIN — POTASSIUM CHLORIDE 40 MEQ: 1500 TABLET, EXTENDED RELEASE ORAL at 13:58

## 2020-01-01 RX ADMIN — FLUTICASONE PROPIONATE 1 SPRAY: 50 SPRAY, METERED NASAL at 10:17

## 2020-01-01 RX ADMIN — Medication 1000 UNITS: at 08:40

## 2020-01-01 RX ADMIN — HYDROCODONE BITARTRATE AND ACETAMINOPHEN 1 TABLET: 10; 325 TABLET ORAL at 03:11

## 2020-01-01 RX ADMIN — HYDROCODONE BITARTRATE AND ACETAMINOPHEN 1 TABLET: 10; 325 TABLET ORAL at 10:34

## 2020-01-01 RX ADMIN — ALBUTEROL SULFATE 2 PUFF: 90 AEROSOL, METERED RESPIRATORY (INHALATION) at 19:11

## 2020-01-01 RX ADMIN — SODIUM CHLORIDE, PRESERVATIVE FREE 10 ML: 5 INJECTION INTRAVENOUS at 07:13

## 2020-01-01 RX ADMIN — THEOPHYLLINE ANHYDROUS 100 MG: 100 CAPSULE, EXTENDED RELEASE ORAL at 13:50

## 2020-01-01 RX ADMIN — ALBUTEROL SULFATE 2 PUFF: 90 AEROSOL, METERED RESPIRATORY (INHALATION) at 15:55

## 2020-01-01 RX ADMIN — HYDROCODONE BITARTRATE AND ACETAMINOPHEN 1 TABLET: 10; 325 TABLET ORAL at 14:49

## 2020-01-01 RX ADMIN — PREDNISONE 40 MG: 20 TABLET ORAL at 09:50

## 2020-01-01 RX ADMIN — SODIUM CHLORIDE, PRESERVATIVE FREE 10 ML: 5 INJECTION INTRAVENOUS at 08:42

## 2020-01-01 RX ADMIN — Medication 2 PUFF: at 00:53

## 2020-01-01 RX ADMIN — HYDROCODONE BITARTRATE AND ACETAMINOPHEN 1 TABLET: 10; 325 TABLET ORAL at 22:54

## 2020-01-01 RX ADMIN — AZITHROMYCIN MONOHYDRATE 500 MG: 500 INJECTION, POWDER, LYOPHILIZED, FOR SOLUTION INTRAVENOUS at 21:48

## 2020-01-01 RX ADMIN — CALCIUM 500 MG: 500 TABLET ORAL at 08:03

## 2020-01-01 RX ADMIN — THEOPHYLLINE ANHYDROUS 100 MG: 100 CAPSULE, EXTENDED RELEASE ORAL at 15:22

## 2020-01-01 RX ADMIN — ALBUTEROL SULFATE 2 PUFF: 90 AEROSOL, METERED RESPIRATORY (INHALATION) at 15:29

## 2020-01-01 RX ADMIN — ALBUTEROL SULFATE 2 PUFF: 90 AEROSOL, METERED RESPIRATORY (INHALATION) at 11:49

## 2020-01-01 RX ADMIN — ENOXAPARIN SODIUM 40 MG: 40 INJECTION SUBCUTANEOUS at 09:55

## 2020-01-01 RX ADMIN — SODIUM CHLORIDE, PRESERVATIVE FREE 10 ML: 5 INJECTION INTRAVENOUS at 23:58

## 2020-01-01 RX ADMIN — Medication 2 PUFF: at 08:18

## 2020-01-01 RX ADMIN — SODIUM CHLORIDE, POTASSIUM CHLORIDE, SODIUM LACTATE AND CALCIUM CHLORIDE: 600; 310; 30; 20 INJECTION, SOLUTION INTRAVENOUS at 07:21

## 2020-01-01 RX ADMIN — ASPIRIN 81 MG: 81 TABLET, COATED ORAL at 09:07

## 2020-01-01 RX ADMIN — ALBUTEROL SULFATE 2.5 MG: 2.5 SOLUTION RESPIRATORY (INHALATION) at 10:12

## 2020-01-01 RX ADMIN — METHYLPREDNISOLONE SODIUM SUCCINATE 40 MG: 40 INJECTION, POWDER, FOR SOLUTION INTRAMUSCULAR; INTRAVENOUS at 16:09

## 2020-01-01 RX ADMIN — THEOPHYLLINE ANHYDROUS 100 MG: 100 CAPSULE, EXTENDED RELEASE ORAL at 08:03

## 2020-01-01 RX ADMIN — ATORVASTATIN CALCIUM 10 MG: 10 TABLET, FILM COATED ORAL at 08:29

## 2020-01-01 RX ADMIN — GUAIFENESIN 600 MG: 600 TABLET, EXTENDED RELEASE ORAL at 19:56

## 2020-01-01 RX ADMIN — CALCIUM 500 MG: 500 TABLET ORAL at 08:26

## 2020-01-01 RX ADMIN — HYDROCODONE BITARTRATE AND ACETAMINOPHEN 1 TABLET: 10; 325 TABLET ORAL at 20:51

## 2020-01-01 RX ADMIN — IPRATROPIUM BROMIDE 2 PUFF: 17 AEROSOL, METERED RESPIRATORY (INHALATION) at 12:27

## 2020-01-01 RX ADMIN — THEOPHYLLINE ANHYDROUS 100 MG: 100 CAPSULE, EXTENDED RELEASE ORAL at 20:00

## 2020-01-01 RX ADMIN — CETIRIZINE HYDROCHLORIDE 10 MG: 10 TABLET, FILM COATED ORAL at 12:12

## 2020-01-01 RX ADMIN — THEOPHYLLINE ANHYDROUS 100 MG: 100 CAPSULE, EXTENDED RELEASE ORAL at 09:49

## 2020-01-01 RX ADMIN — HYDROCODONE BITARTRATE AND ACETAMINOPHEN 1 TABLET: 10; 325 TABLET ORAL at 15:06

## 2020-01-01 RX ADMIN — HYDROCODONE BITARTRATE AND ACETAMINOPHEN 1 TABLET: 10; 325 TABLET ORAL at 13:35

## 2020-01-01 RX ADMIN — THEOPHYLLINE 200 MG: 400 TABLET, EXTENDED RELEASE ORAL at 08:43

## 2020-01-01 RX ADMIN — HYDROCODONE BITARTRATE AND ACETAMINOPHEN 1 TABLET: 10; 325 TABLET ORAL at 04:42

## 2020-01-01 RX ADMIN — SODIUM CHLORIDE, PRESERVATIVE FREE 10 ML: 5 INJECTION INTRAVENOUS at 20:34

## 2020-01-01 RX ADMIN — CEFTRIAXONE 1 G: 1 INJECTION, POWDER, FOR SOLUTION INTRAMUSCULAR; INTRAVENOUS at 18:03

## 2020-01-01 RX ADMIN — CETIRIZINE HYDROCHLORIDE 10 MG: 10 TABLET, FILM COATED ORAL at 08:27

## 2020-01-01 RX ADMIN — Medication 2 PUFF: at 22:09

## 2020-01-01 RX ADMIN — DILTIAZEM HYDROCHLORIDE 120 MG: 120 CAPSULE, COATED, EXTENDED RELEASE ORAL at 12:13

## 2020-01-01 RX ADMIN — Medication 2 PUFF: at 14:31

## 2020-01-01 RX ADMIN — DOXYCYCLINE HYCLATE 100 MG: 100 TABLET, COATED ORAL at 15:22

## 2020-01-01 RX ADMIN — METHYLPREDNISOLONE SODIUM SUCCINATE 40 MG: 40 INJECTION, POWDER, FOR SOLUTION INTRAMUSCULAR; INTRAVENOUS at 17:02

## 2020-01-01 RX ADMIN — GUAIFENESIN 600 MG: 600 TABLET, EXTENDED RELEASE ORAL at 20:13

## 2020-01-01 RX ADMIN — DOXYCYCLINE HYCLATE 100 MG: 100 TABLET, COATED ORAL at 04:17

## 2020-01-01 RX ADMIN — FUROSEMIDE 20 MG: 10 INJECTION, SOLUTION INTRAVENOUS at 10:52

## 2020-01-01 RX ADMIN — GUAIFENESIN 600 MG: 600 TABLET, EXTENDED RELEASE ORAL at 08:40

## 2020-01-01 RX ADMIN — PREDNISONE 40 MG: 20 TABLET ORAL at 08:26

## 2020-01-01 RX ADMIN — METHYLPREDNISOLONE SODIUM SUCCINATE 40 MG: 40 INJECTION, POWDER, FOR SOLUTION INTRAMUSCULAR; INTRAVENOUS at 17:53

## 2020-01-01 RX ADMIN — HYDROCODONE BITARTRATE AND ACETAMINOPHEN 1 TABLET: 10; 325 TABLET ORAL at 20:33

## 2020-01-01 RX ADMIN — CALCIUM CARBONATE 500 MG: 500 TABLET, CHEWABLE ORAL at 09:44

## 2020-01-01 RX ADMIN — IPRATROPIUM BROMIDE 2 PUFF: 17 AEROSOL, METERED RESPIRATORY (INHALATION) at 21:04

## 2020-01-01 RX ADMIN — CALCIUM 500 MG: 500 TABLET ORAL at 08:43

## 2020-01-01 RX ADMIN — CETIRIZINE HYDROCHLORIDE 10 MG: 10 TABLET, FILM COATED ORAL at 09:07

## 2020-01-01 RX ADMIN — SODIUM CHLORIDE, PRESERVATIVE FREE 10 ML: 5 INJECTION INTRAVENOUS at 08:45

## 2020-01-01 RX ADMIN — AZITHROMYCIN DIHYDRATE 500 MG: 500 INJECTION, POWDER, LYOPHILIZED, FOR SOLUTION INTRAVENOUS at 01:07

## 2020-01-01 RX ADMIN — ASPIRIN 81 MG: 81 TABLET, COATED ORAL at 08:03

## 2020-01-01 RX ADMIN — ASPIRIN 81 MG: 81 TABLET, COATED ORAL at 08:40

## 2020-01-01 RX ADMIN — ENOXAPARIN SODIUM 40 MG: 40 INJECTION SUBCUTANEOUS at 08:36

## 2020-01-01 RX ADMIN — IPRATROPIUM BROMIDE 2 PUFF: 17 AEROSOL, METERED RESPIRATORY (INHALATION) at 08:42

## 2020-01-01 RX ADMIN — AZITHROMYCIN DIHYDRATE 500 MG: 500 INJECTION, POWDER, LYOPHILIZED, FOR SOLUTION INTRAVENOUS at 23:45

## 2020-01-01 RX ADMIN — ASPIRIN 81 MG: 81 TABLET, COATED ORAL at 09:26

## 2020-01-01 RX ADMIN — GUAIFENESIN 600 MG: 600 TABLET, EXTENDED RELEASE ORAL at 12:52

## 2020-01-01 RX ADMIN — FLUTICASONE PROPIONATE 1 SPRAY: 50 SPRAY, METERED NASAL at 09:06

## 2020-01-01 RX ADMIN — ENOXAPARIN SODIUM 40 MG: 40 INJECTION SUBCUTANEOUS at 08:58

## 2020-01-01 RX ADMIN — METHYLPREDNISOLONE SODIUM SUCCINATE 40 MG: 40 INJECTION, POWDER, FOR SOLUTION INTRAMUSCULAR; INTRAVENOUS at 00:32

## 2020-01-01 RX ADMIN — HYDROXYZINE PAMOATE 50 MG: 25 CAPSULE ORAL at 23:53

## 2020-01-01 RX ADMIN — DOXYCYCLINE HYCLATE 100 MG: 100 TABLET, COATED ORAL at 03:09

## 2020-01-01 RX ADMIN — SODIUM CHLORIDE, PRESERVATIVE FREE 10 ML: 5 INJECTION INTRAVENOUS at 09:35

## 2020-01-01 RX ADMIN — ALBUTEROL SULFATE 2 PUFF: 90 AEROSOL, METERED RESPIRATORY (INHALATION) at 16:16

## 2020-01-01 RX ADMIN — SODIUM CHLORIDE, PRESERVATIVE FREE 10 ML: 5 INJECTION INTRAVENOUS at 20:32

## 2020-01-01 RX ADMIN — SUGAMMADEX 111 MG: 100 INJECTION, SOLUTION INTRAVENOUS at 09:27

## 2020-01-01 RX ADMIN — METHYLPREDNISOLONE SODIUM SUCCINATE 40 MG: 40 INJECTION, POWDER, FOR SOLUTION INTRAMUSCULAR; INTRAVENOUS at 09:27

## 2020-01-01 RX ADMIN — ALBUTEROL SULFATE 2 PUFF: 90 AEROSOL, METERED RESPIRATORY (INHALATION) at 20:43

## 2020-01-01 RX ADMIN — CETIRIZINE HYDROCHLORIDE 10 MG: 10 TABLET, FILM COATED ORAL at 09:01

## 2020-01-01 RX ADMIN — ATORVASTATIN CALCIUM 10 MG: 10 TABLET, FILM COATED ORAL at 08:40

## 2020-01-01 RX ADMIN — METHYLPREDNISOLONE SODIUM SUCCINATE 40 MG: 40 INJECTION, POWDER, LYOPHILIZED, FOR SOLUTION INTRAMUSCULAR; INTRAVENOUS at 19:14

## 2020-01-01 RX ADMIN — METHYLPREDNISOLONE SODIUM SUCCINATE 40 MG: 40 INJECTION, POWDER, FOR SOLUTION INTRAMUSCULAR; INTRAVENOUS at 09:02

## 2020-01-01 RX ADMIN — PROPOFOL 90 MG: 10 INJECTION, EMULSION INTRAVENOUS at 07:36

## 2020-01-01 RX ADMIN — CALCIUM 500 MG: 500 TABLET ORAL at 09:01

## 2020-01-01 RX ADMIN — GUAIFENESIN 600 MG: 600 TABLET, EXTENDED RELEASE ORAL at 13:35

## 2020-01-01 RX ADMIN — ALBUTEROL SULFATE 2 PUFF: 90 AEROSOL, METERED RESPIRATORY (INHALATION) at 12:04

## 2020-01-01 RX ADMIN — METHYLPREDNISOLONE SODIUM SUCCINATE 40 MG: 40 INJECTION, POWDER, FOR SOLUTION INTRAMUSCULAR; INTRAVENOUS at 17:47

## 2020-01-01 RX ADMIN — ALBUTEROL SULFATE 2 PUFF: 90 AEROSOL, METERED RESPIRATORY (INHALATION) at 15:28

## 2020-01-01 RX ADMIN — THEOPHYLLINE ANHYDROUS 100 MG: 100 CAPSULE, EXTENDED RELEASE ORAL at 20:56

## 2020-01-01 RX ADMIN — SODIUM CHLORIDE, PRESERVATIVE FREE 10 ML: 5 INJECTION INTRAVENOUS at 09:02

## 2020-01-01 RX ADMIN — PREDNISONE 40 MG: 10 TABLET ORAL at 08:29

## 2020-01-01 RX ADMIN — THEOPHYLLINE ANHYDROUS 100 MG: 100 CAPSULE, EXTENDED RELEASE ORAL at 09:27

## 2020-01-01 RX ADMIN — HYDROCODONE BITARTRATE AND ACETAMINOPHEN 1 TABLET: 10; 325 TABLET ORAL at 03:09

## 2020-01-01 RX ADMIN — CEFAZOLIN SODIUM 1 G: 1 INJECTION, SOLUTION INTRAVENOUS at 07:43

## 2020-01-01 RX ADMIN — CALCIUM 500 MG: 500 TABLET ORAL at 08:40

## 2020-01-01 RX ADMIN — CEFTRIAXONE 1 G: 1 INJECTION, POWDER, FOR SOLUTION INTRAMUSCULAR; INTRAVENOUS at 16:45

## 2020-01-01 RX ADMIN — DOXYCYCLINE HYCLATE 100 MG: 100 TABLET, COATED ORAL at 04:42

## 2020-01-01 RX ADMIN — ATORVASTATIN CALCIUM 10 MG: 10 TABLET, FILM COATED ORAL at 08:58

## 2020-01-01 RX ADMIN — THEOPHYLLINE ANHYDROUS 100 MG: 100 CAPSULE, EXTENDED RELEASE ORAL at 13:16

## 2020-01-01 RX ADMIN — SODIUM CHLORIDE, PRESERVATIVE FREE 10 ML: 5 INJECTION INTRAVENOUS at 09:54

## 2020-01-01 RX ADMIN — Medication 2 PUFF: at 16:14

## 2020-01-01 RX ADMIN — GUAIFENESIN 600 MG: 600 TABLET, EXTENDED RELEASE ORAL at 09:07

## 2020-01-01 RX ADMIN — ATORVASTATIN CALCIUM 10 MG: 10 TABLET, FILM COATED ORAL at 08:25

## 2020-01-01 RX ADMIN — FLUDEOXYGLUCOSE F 18 14.65 MILLICURIE: 200 INJECTION, SOLUTION INTRAVENOUS at 07:55

## 2020-01-01 RX ADMIN — ROCURONIUM BROMIDE 10 MG: 10 INJECTION INTRAVENOUS at 08:46

## 2020-01-01 RX ADMIN — ENOXAPARIN SODIUM 40 MG: 40 INJECTION SUBCUTANEOUS at 09:44

## 2020-01-01 RX ADMIN — HYDROCODONE BITARTRATE AND ACETAMINOPHEN 1 TABLET: 10; 325 TABLET ORAL at 18:07

## 2020-01-01 RX ADMIN — ALBUTEROL SULFATE 2 PUFF: 90 AEROSOL, METERED RESPIRATORY (INHALATION) at 11:11

## 2020-01-01 RX ADMIN — CEFTRIAXONE 1 G: 1 INJECTION, POWDER, FOR SOLUTION INTRAMUSCULAR; INTRAVENOUS at 20:47

## 2020-01-01 RX ADMIN — BENZONATATE 100 MG: 100 CAPSULE ORAL at 12:29

## 2020-01-01 RX ADMIN — ALBUTEROL SULFATE 2 PUFF: 90 AEROSOL, METERED RESPIRATORY (INHALATION) at 22:32

## 2020-01-01 RX ADMIN — AZITHROMYCIN MONOHYDRATE 500 MG: 500 INJECTION, POWDER, LYOPHILIZED, FOR SOLUTION INTRAVENOUS at 21:03

## 2020-01-01 RX ADMIN — CEFTRIAXONE 1 G: 1 INJECTION, POWDER, FOR SOLUTION INTRAMUSCULAR; INTRAVENOUS at 18:45

## 2020-01-01 RX ADMIN — ALBUTEROL SULFATE 2 PUFF: 90 AEROSOL, METERED RESPIRATORY (INHALATION) at 22:07

## 2020-01-01 RX ADMIN — SODIUM CHLORIDE, PRESERVATIVE FREE 10 ML: 5 INJECTION INTRAVENOUS at 08:25

## 2020-01-01 RX ADMIN — DOXYCYCLINE HYCLATE 100 MG: 100 TABLET, COATED ORAL at 02:51

## 2020-01-01 RX ADMIN — THEOPHYLLINE ANHYDROUS 100 MG: 100 CAPSULE, EXTENDED RELEASE ORAL at 14:46

## 2020-01-01 RX ADMIN — CALCIUM 500 MG: 500 TABLET ORAL at 08:58

## 2020-01-01 RX ADMIN — ENOXAPARIN SODIUM 40 MG: 40 INJECTION SUBCUTANEOUS at 08:26

## 2020-01-01 RX ADMIN — HYDROCODONE BITARTRATE AND ACETAMINOPHEN 1 TABLET: 10; 325 TABLET ORAL at 12:02

## 2020-01-01 RX ADMIN — SODIUM POLYSTYRENE SULFONATE 15 G: 15 SUSPENSION ORAL; RECTAL at 13:16

## 2020-01-01 RX ADMIN — ALBUTEROL SULFATE 2 PUFF: 90 AEROSOL, METERED RESPIRATORY (INHALATION) at 22:19

## 2020-01-01 RX ADMIN — DILTIAZEM HYDROCHLORIDE 60 MG: 60 TABLET, FILM COATED ORAL at 21:09

## 2020-01-01 RX ADMIN — DEXAMETHASONE SODIUM PHOSPHATE 8 MG: 4 INJECTION, SOLUTION INTRAMUSCULAR; INTRAVENOUS at 08:04

## 2020-01-01 RX ADMIN — Medication 1000 UNITS: at 12:13

## 2020-01-01 RX ADMIN — FENTANYL CITRATE 25 MCG: 50 INJECTION INTRAMUSCULAR; INTRAVENOUS at 08:15

## 2020-01-01 RX ADMIN — IPRATROPIUM BROMIDE AND ALBUTEROL SULFATE 1 AMPULE: .5; 2.5 SOLUTION RESPIRATORY (INHALATION) at 23:32

## 2020-01-01 RX ADMIN — ALBUTEROL SULFATE 2.5 MG: 2.5 SOLUTION RESPIRATORY (INHALATION) at 08:02

## 2020-01-01 RX ADMIN — IPRATROPIUM BROMIDE 2 PUFF: 17 AEROSOL, METERED RESPIRATORY (INHALATION) at 15:29

## 2020-01-01 RX ADMIN — ALBUTEROL SULFATE 2 PUFF: 90 AEROSOL, METERED RESPIRATORY (INHALATION) at 20:07

## 2020-01-01 RX ADMIN — ASPIRIN 81 MG: 81 TABLET, COATED ORAL at 08:26

## 2020-01-01 RX ADMIN — HYDROCODONE BITARTRATE AND ACETAMINOPHEN 1 TABLET: 10; 325 TABLET ORAL at 09:49

## 2020-01-01 RX ADMIN — CALCIUM 500 MG: 500 TABLET ORAL at 12:13

## 2020-01-01 RX ADMIN — SODIUM CHLORIDE, PRESERVATIVE FREE 10 ML: 5 INJECTION INTRAVENOUS at 20:21

## 2020-01-01 RX ADMIN — Medication 2 PUFF: at 11:49

## 2020-01-01 RX ADMIN — GUAIFENESIN 600 MG: 600 TABLET, EXTENDED RELEASE ORAL at 09:27

## 2020-01-01 RX ADMIN — ATORVASTATIN CALCIUM 10 MG: 10 TABLET, FILM COATED ORAL at 08:27

## 2020-01-01 RX ADMIN — GUAIFENESIN 600 MG: 600 TABLET, EXTENDED RELEASE ORAL at 16:09

## 2020-01-01 RX ADMIN — HYDROCODONE BITARTRATE AND ACETAMINOPHEN 1 TABLET: 10; 325 TABLET ORAL at 17:07

## 2020-01-01 RX ADMIN — MORPHINE SULFATE 4 MG: 4 INJECTION, SOLUTION INTRAMUSCULAR; INTRAVENOUS at 05:55

## 2020-01-01 RX ADMIN — IOPAMIDOL 75 ML: 755 INJECTION, SOLUTION INTRAVENOUS at 17:56

## 2020-01-01 RX ADMIN — ALBUTEROL SULFATE 2.5 MG: 2.5 SOLUTION RESPIRATORY (INHALATION) at 16:37

## 2020-01-01 RX ADMIN — THEOPHYLLINE ANHYDROUS 100 MG: 100 CAPSULE, EXTENDED RELEASE ORAL at 14:48

## 2020-01-01 RX ADMIN — Medication 1000 UNITS: at 08:58

## 2020-01-01 RX ADMIN — HYDROCODONE BITARTRATE AND ACETAMINOPHEN 1 TABLET: 10; 325 TABLET ORAL at 02:12

## 2020-01-01 RX ADMIN — ALBUTEROL SULFATE 2 PUFF: 90 AEROSOL, METERED RESPIRATORY (INHALATION) at 21:04

## 2020-01-01 RX ADMIN — ALBUTEROL SULFATE 2.5 MG: 2.5 SOLUTION RESPIRATORY (INHALATION) at 07:55

## 2020-01-01 RX ADMIN — METHYLPREDNISOLONE SODIUM SUCCINATE 40 MG: 40 INJECTION, POWDER, LYOPHILIZED, FOR SOLUTION INTRAMUSCULAR; INTRAVENOUS at 05:35

## 2020-01-01 RX ADMIN — HYDROCODONE BITARTRATE AND ACETAMINOPHEN 1 TABLET: 10; 325 TABLET ORAL at 14:48

## 2020-01-01 RX ADMIN — ALBUTEROL SULFATE 2 PUFF: 90 AEROSOL, METERED RESPIRATORY (INHALATION) at 07:50

## 2020-01-01 RX ADMIN — FLUTICASONE PROPIONATE 1 SPRAY: 50 SPRAY, METERED NASAL at 08:41

## 2020-01-01 RX ADMIN — THEOPHYLLINE ANHYDROUS 100 MG: 100 CAPSULE, EXTENDED RELEASE ORAL at 21:09

## 2020-01-01 RX ADMIN — IPRATROPIUM BROMIDE 2 PUFF: 17 AEROSOL, METERED RESPIRATORY (INHALATION) at 08:15

## 2020-01-01 RX ADMIN — HYDROCODONE BITARTRATE AND ACETAMINOPHEN 1 TABLET: 10; 325 TABLET ORAL at 06:06

## 2020-01-01 RX ADMIN — DILTIAZEM HYDROCHLORIDE 120 MG: 120 CAPSULE, COATED, EXTENDED RELEASE ORAL at 08:26

## 2020-01-01 RX ADMIN — DOXYCYCLINE HYCLATE 100 MG: 100 TABLET, COATED ORAL at 14:46

## 2020-01-01 RX ADMIN — ALBUTEROL SULFATE 2.5 MG: 2.5 SOLUTION RESPIRATORY (INHALATION) at 15:27

## 2020-01-01 RX ADMIN — ALBUTEROL SULFATE 2 PUFF: 90 AEROSOL, METERED RESPIRATORY (INHALATION) at 19:38

## 2020-01-01 RX ADMIN — IPRATROPIUM BROMIDE 2 PUFF: 17 AEROSOL, METERED RESPIRATORY (INHALATION) at 12:05

## 2020-01-01 RX ADMIN — ENOXAPARIN SODIUM 40 MG: 40 INJECTION SUBCUTANEOUS at 08:43

## 2020-01-01 RX ADMIN — SODIUM CHLORIDE, PRESERVATIVE FREE 10 ML: 5 INJECTION INTRAVENOUS at 12:15

## 2020-01-01 RX ADMIN — Medication 1000 UNITS: at 08:02

## 2020-01-01 RX ADMIN — FENTANYL CITRATE 25 MCG: 50 INJECTION INTRAMUSCULAR; INTRAVENOUS at 08:27

## 2020-01-01 ASSESSMENT — PULMONARY FUNCTION TESTS
PIF_VALUE: 1
PIF_VALUE: 21
PIF_VALUE: 1
PIF_VALUE: 21
PIF_VALUE: 28
PIF_VALUE: 28
PIF_VALUE: 15
PIF_VALUE: 23
PIF_VALUE: 24
PIF_VALUE: 22
PIF_VALUE: 21
PIF_VALUE: 21
PIF_VALUE: 22
PIF_VALUE: 25
PIF_VALUE: 13
PIF_VALUE: 22
PIF_VALUE: 26
PIF_VALUE: 18
PIF_VALUE: 25
PIF_VALUE: 23
PIF_VALUE: 31
PIF_VALUE: 21
PIF_VALUE: 21
PIF_VALUE: 22
PIF_VALUE: 23
PIF_VALUE: 22
PIF_VALUE: 21
PIF_VALUE: 22
PIF_VALUE: 21
PIF_VALUE: 24
PIF_VALUE: 21
PIF_VALUE: 22
PIF_VALUE: 0
PIF_VALUE: 31
PIF_VALUE: 23
PIF_VALUE: 22
PIF_VALUE: 1
PIF_VALUE: 26
PIF_VALUE: 21
PIF_VALUE: 24
PIF_VALUE: 22
PIF_VALUE: 22
PIF_VALUE: 6
PIF_VALUE: 21
PIF_VALUE: 26
PIF_VALUE: 14
PIF_VALUE: 21
PIF_VALUE: 22
PIF_VALUE: 0
PIF_VALUE: 29
PIF_VALUE: 21
PIF_VALUE: 22
PIF_VALUE: 23
PIF_VALUE: 22
PIF_VALUE: 23
PIF_VALUE: 22
PIF_VALUE: 27
PIF_VALUE: 21
PIF_VALUE: 21
PIF_VALUE: 28
PIF_VALUE: 22
PIF_VALUE: 24
PIF_VALUE: 21
PIF_VALUE: 1
PIF_VALUE: 22
PIF_VALUE: 21
PIF_VALUE: 6
PIF_VALUE: 25
PIF_VALUE: 27
PIF_VALUE: 21
PIF_VALUE: 21
PIF_VALUE: 22
PIF_VALUE: 24
PIF_VALUE: 27
PIF_VALUE: 22
PIF_VALUE: 24
PIF_VALUE: 21
PIF_VALUE: 24
PIF_VALUE: 21
PIF_VALUE: 22
PIF_VALUE: 21
PIF_VALUE: 0
PIF_VALUE: 2
PIF_VALUE: 26
PIF_VALUE: 24
PIF_VALUE: 28
PIF_VALUE: 25
PIF_VALUE: 7
PIF_VALUE: 26
PIF_VALUE: 21
PIF_VALUE: 21
PIF_VALUE: 29
PIF_VALUE: 22
PIF_VALUE: 21
PIF_VALUE: 31
PIF_VALUE: 21
PIF_VALUE: 21
PIF_VALUE: 2
PIF_VALUE: 22
PIF_VALUE: 31
PIF_VALUE: 21
PIF_VALUE: 14
PIF_VALUE: 21
PIF_VALUE: 21
PIF_VALUE: 23
PIF_VALUE: 22
PIF_VALUE: 21
PIF_VALUE: 24
PIF_VALUE: 21
PIF_VALUE: 21
PIF_VALUE: 1
PIF_VALUE: 21
PIF_VALUE: 22
PIF_VALUE: 23
PIF_VALUE: 23
PIF_VALUE: 21
PIF_VALUE: 22
PIF_VALUE: 23

## 2020-01-01 ASSESSMENT — PAIN DESCRIPTION - PROGRESSION
CLINICAL_PROGRESSION: NOT CHANGED
CLINICAL_PROGRESSION: GRADUALLY IMPROVING
CLINICAL_PROGRESSION: NOT CHANGED
CLINICAL_PROGRESSION: GRADUALLY WORSENING
CLINICAL_PROGRESSION: NOT CHANGED
CLINICAL_PROGRESSION: GRADUALLY IMPROVING
CLINICAL_PROGRESSION: NOT CHANGED
CLINICAL_PROGRESSION: NOT CHANGED

## 2020-01-01 ASSESSMENT — PAIN DESCRIPTION - DESCRIPTORS
DESCRIPTORS: ACHING
DESCRIPTORS: CONSTANT;ACHING
DESCRIPTORS: ACHING
DESCRIPTORS: ACHING
DESCRIPTORS: ACHING;DISCOMFORT;NAGGING
DESCRIPTORS: ACHING
DESCRIPTORS: ACHING;DISCOMFORT
DESCRIPTORS: ACHING

## 2020-01-01 ASSESSMENT — PAIN SCALES - GENERAL
PAINLEVEL_OUTOF10: 9
PAINLEVEL_OUTOF10: 8
PAINLEVEL_OUTOF10: 7
PAINLEVEL_OUTOF10: 8
PAINLEVEL_OUTOF10: 0
PAINLEVEL_OUTOF10: 10
PAINLEVEL_OUTOF10: 0
PAINLEVEL_OUTOF10: 10
PAINLEVEL_OUTOF10: 10
PAINLEVEL_OUTOF10: 7
PAINLEVEL_OUTOF10: 9
PAINLEVEL_OUTOF10: 8
PAINLEVEL_OUTOF10: 7
PAINLEVEL_OUTOF10: 8
PAINLEVEL_OUTOF10: 10
PAINLEVEL_OUTOF10: 3
PAINLEVEL_OUTOF10: 8
PAINLEVEL_OUTOF10: 2
PAINLEVEL_OUTOF10: 1
PAINLEVEL_OUTOF10: 8
PAINLEVEL_OUTOF10: 6
PAINLEVEL_OUTOF10: 8
PAINLEVEL_OUTOF10: 0
PAINLEVEL_OUTOF10: 0
PAINLEVEL_OUTOF10: 9
PAINLEVEL_OUTOF10: 0
PAINLEVEL_OUTOF10: 8
PAINLEVEL_OUTOF10: 0
PAINLEVEL_OUTOF10: 0
PAINLEVEL_OUTOF10: 7
PAINLEVEL_OUTOF10: 8
PAINLEVEL_OUTOF10: 6
PAINLEVEL_OUTOF10: 8
PAINLEVEL_OUTOF10: 2
PAINLEVEL_OUTOF10: 0
PAINLEVEL_OUTOF10: 7
PAINLEVEL_OUTOF10: 0
PAINLEVEL_OUTOF10: 8
PAINLEVEL_OUTOF10: 9
PAINLEVEL_OUTOF10: 6
PAINLEVEL_OUTOF10: 7
PAINLEVEL_OUTOF10: 8
PAINLEVEL_OUTOF10: 8
PAINLEVEL_OUTOF10: 0
PAINLEVEL_OUTOF10: 8
PAINLEVEL_OUTOF10: 0
PAINLEVEL_OUTOF10: 3
PAINLEVEL_OUTOF10: 0
PAINLEVEL_OUTOF10: 0
PAINLEVEL_OUTOF10: 10
PAINLEVEL_OUTOF10: 3
PAINLEVEL_OUTOF10: 5
PAINLEVEL_OUTOF10: 0
PAINLEVEL_OUTOF10: 4
PAINLEVEL_OUTOF10: 10
PAINLEVEL_OUTOF10: 8
PAINLEVEL_OUTOF10: 9
PAINLEVEL_OUTOF10: 5
PAINLEVEL_OUTOF10: 8
PAINLEVEL_OUTOF10: 10
PAINLEVEL_OUTOF10: 4
PAINLEVEL_OUTOF10: 7
PAINLEVEL_OUTOF10: 8
PAINLEVEL_OUTOF10: 8
PAINLEVEL_OUTOF10: 0
PAINLEVEL_OUTOF10: 8
PAINLEVEL_OUTOF10: 6
PAINLEVEL_OUTOF10: 8
PAINLEVEL_OUTOF10: 7
PAINLEVEL_OUTOF10: 7
PAINLEVEL_OUTOF10: 4
PAINLEVEL_OUTOF10: 0
PAINLEVEL_OUTOF10: 0
PAINLEVEL_OUTOF10: 9
PAINLEVEL_OUTOF10: 2
PAINLEVEL_OUTOF10: 8
PAINLEVEL_OUTOF10: 8
PAINLEVEL_OUTOF10: 0
PAINLEVEL_OUTOF10: 8

## 2020-01-01 ASSESSMENT — PAIN DESCRIPTION - PAIN TYPE
TYPE: ACUTE PAIN
TYPE: CHRONIC PAIN
TYPE: CHRONIC PAIN
TYPE: ACUTE PAIN
TYPE: ACUTE PAIN
TYPE: CHRONIC PAIN
TYPE: CHRONIC PAIN
TYPE: ACUTE PAIN
TYPE: CHRONIC PAIN
TYPE: CHRONIC PAIN
TYPE: ACUTE PAIN
TYPE: ACUTE PAIN
TYPE: CHRONIC PAIN

## 2020-01-01 ASSESSMENT — PAIN DESCRIPTION - FREQUENCY
FREQUENCY: CONTINUOUS
FREQUENCY: INTERMITTENT
FREQUENCY: INTERMITTENT
FREQUENCY: CONTINUOUS
FREQUENCY: INTERMITTENT

## 2020-01-01 ASSESSMENT — LIFESTYLE VARIABLES
HOW OFTEN DO YOU HAVE A DRINK CONTAINING ALCOHOL: 1
HOW MANY STANDARD DRINKS CONTAINING ALCOHOL DO YOU HAVE ON A TYPICAL DAY: 0
AUDIT-C TOTAL SCORE: 1
HOW OFTEN DURING THE LAST YEAR HAVE YOU FOUND THAT YOU WERE NOT ABLE TO STOP DRINKING ONCE YOU HAD STARTED: 0
HOW OFTEN DURING THE LAST YEAR HAVE YOU BEEN UNABLE TO REMEMBER WHAT HAPPENED THE NIGHT BEFORE BECAUSE YOU HAD BEEN DRINKING: 0
HAVE YOU OR SOMEONE ELSE BEEN INJURED AS A RESULT OF YOUR DRINKING: 0
AUDIT TOTAL SCORE: 1
HOW OFTEN DURING THE LAST YEAR HAVE YOU FAILED TO DO WHAT WAS NORMALLY EXPECTED FROM YOU BECAUSE OF DRINKING: 0
HAS A RELATIVE, FRIEND, DOCTOR, OR ANOTHER HEALTH PROFESSIONAL EXPRESSED CONCERN ABOUT YOUR DRINKING OR SUGGESTED YOU CUT DOWN: 0
HOW OFTEN DO YOU HAVE SIX OR MORE DRINKS ON ONE OCCASION: 0
HOW OFTEN DURING THE LAST YEAR HAVE YOU HAD A FEELING OF GUILT OR REMORSE AFTER DRINKING: 0
HOW OFTEN DURING THE LAST YEAR HAVE YOU NEEDED AN ALCOHOLIC DRINK FIRST THING IN THE MORNING TO GET YOURSELF GOING AFTER A NIGHT OF HEAVY DRINKING: 0

## 2020-01-01 ASSESSMENT — PAIN DESCRIPTION - ORIENTATION
ORIENTATION: MID;LOWER
ORIENTATION: LOWER

## 2020-01-01 ASSESSMENT — PAIN - FUNCTIONAL ASSESSMENT
PAIN_FUNCTIONAL_ASSESSMENT: ACTIVITIES ARE NOT PREVENTED
PAIN_FUNCTIONAL_ASSESSMENT: PREVENTS OR INTERFERES SOME ACTIVE ACTIVITIES AND ADLS
PAIN_FUNCTIONAL_ASSESSMENT: PREVENTS OR INTERFERES SOME ACTIVE ACTIVITIES AND ADLS
PAIN_FUNCTIONAL_ASSESSMENT: ACTIVITIES ARE NOT PREVENTED
PAIN_FUNCTIONAL_ASSESSMENT: ACTIVITIES ARE NOT PREVENTED
PAIN_FUNCTIONAL_ASSESSMENT: 0-10
PAIN_FUNCTIONAL_ASSESSMENT: ACTIVITIES ARE NOT PREVENTED
PAIN_FUNCTIONAL_ASSESSMENT: ACTIVITIES ARE NOT PREVENTED

## 2020-01-01 ASSESSMENT — PAIN DESCRIPTION - LOCATION
LOCATION: BACK
LOCATION: ABDOMEN;BACK
LOCATION: BACK
LOCATION: GENERALIZED
LOCATION: ABDOMEN;BACK
LOCATION: BACK
LOCATION: ABDOMEN;BACK
LOCATION: BACK
LOCATION: GENERALIZED
LOCATION: BACK
LOCATION: ABDOMEN;BACK

## 2020-01-01 ASSESSMENT — PATIENT HEALTH QUESTIONNAIRE - PHQ9
SUM OF ALL RESPONSES TO PHQ9 QUESTIONS 1 & 2: 0
SUM OF ALL RESPONSES TO PHQ QUESTIONS 1-9: 0
2. FEELING DOWN, DEPRESSED OR HOPELESS: 0
SUM OF ALL RESPONSES TO PHQ QUESTIONS 1-9: 0
1. LITTLE INTEREST OR PLEASURE IN DOING THINGS: 0
2. FEELING DOWN, DEPRESSED OR HOPELESS: 0
1. LITTLE INTEREST OR PLEASURE IN DOING THINGS: 0
SUM OF ALL RESPONSES TO PHQ QUESTIONS 1-9: 0
SUM OF ALL RESPONSES TO PHQ QUESTIONS 1-9: 0
SUM OF ALL RESPONSES TO PHQ9 QUESTIONS 1 & 2: 0

## 2020-01-01 ASSESSMENT — ENCOUNTER SYMPTOMS
SHORTNESS OF BREATH: 1
NAUSEA: 0
GASTROINTESTINAL NEGATIVE: 1
BACK PAIN: 0
VOMITING: 0
SORE THROAT: 0
WHEEZING: 1
SHORTNESS OF BREATH: 1
SHORTNESS OF BREATH: 1
COLOR CHANGE: 0
COUGH: 1
ALLERGIC/IMMUNOLOGIC NEGATIVE: 1
CHEST TIGHTNESS: 0
RHINORRHEA: 0
ABDOMINAL PAIN: 0
SORE THROAT: 0
COUGH: 1
COUGH: 0
EYES NEGATIVE: 1
BACK PAIN: 1
WHEEZING: 1
BACK PAIN: 1
WHEEZING: 1
DIARRHEA: 0
RHINORRHEA: 1
SORE THROAT: 0

## 2020-01-01 ASSESSMENT — PAIN DESCRIPTION - DIRECTION
RADIATING_TOWARDS: LEG
RADIATING_TOWARDS: LEG

## 2020-01-01 ASSESSMENT — PAIN DESCRIPTION - ONSET
ONSET: ON-GOING
ONSET: GRADUAL
ONSET: ON-GOING
ONSET: ON-GOING
ONSET: PROGRESSIVE
ONSET: PROGRESSIVE
ONSET: ON-GOING
ONSET: ON-GOING

## 2020-01-29 RX ORDER — HYDROCODONE BITARTRATE AND ACETAMINOPHEN 10; 325 MG/1; MG/1
1 TABLET ORAL EVERY 4 HOURS PRN
Qty: 180 TABLET | Refills: 0 | Status: SHIPPED | OUTPATIENT
Start: 2020-01-29 | End: 2020-02-28 | Stop reason: SDUPTHER

## 2020-02-28 RX ORDER — HYDROCODONE BITARTRATE AND ACETAMINOPHEN 10; 325 MG/1; MG/1
1 TABLET ORAL EVERY 4 HOURS PRN
Qty: 180 TABLET | Refills: 0 | Status: SHIPPED | OUTPATIENT
Start: 2020-02-28 | End: 2020-03-30 | Stop reason: SDUPTHER

## 2020-03-06 ENCOUNTER — OFFICE VISIT (OUTPATIENT)
Dept: FAMILY MEDICINE CLINIC | Age: 70
End: 2020-03-06
Payer: COMMERCIAL

## 2020-03-06 VITALS
HEIGHT: 60 IN | WEIGHT: 120.2 LBS | BODY MASS INDEX: 23.6 KG/M2 | DIASTOLIC BLOOD PRESSURE: 62 MMHG | SYSTOLIC BLOOD PRESSURE: 126 MMHG | OXYGEN SATURATION: 86 % | HEART RATE: 84 BPM

## 2020-03-06 DIAGNOSIS — I10 ESSENTIAL HYPERTENSION: ICD-10-CM

## 2020-03-06 LAB
A/G RATIO: 1.7 (ref 1.1–2.2)
ALBUMIN SERPL-MCNC: 4.2 G/DL (ref 3.4–5)
ALP BLD-CCNC: 89 U/L (ref 40–129)
ALT SERPL-CCNC: 9 U/L (ref 10–40)
ANION GAP SERPL CALCULATED.3IONS-SCNC: 11 MMOL/L (ref 3–16)
AST SERPL-CCNC: 17 U/L (ref 15–37)
BILIRUB SERPL-MCNC: 0.3 MG/DL (ref 0–1)
BUN BLDV-MCNC: 19 MG/DL (ref 7–20)
CALCIUM SERPL-MCNC: 9.6 MG/DL (ref 8.3–10.6)
CHLORIDE BLD-SCNC: 91 MMOL/L (ref 99–110)
CHOLESTEROL, TOTAL: 167 MG/DL (ref 0–199)
CO2: 38 MMOL/L (ref 21–32)
CREAT SERPL-MCNC: 0.7 MG/DL (ref 0.6–1.2)
GFR AFRICAN AMERICAN: >60
GFR NON-AFRICAN AMERICAN: >60
GLOBULIN: 2.5 G/DL
GLUCOSE BLD-MCNC: 99 MG/DL (ref 70–99)
HDLC SERPL-MCNC: 79 MG/DL (ref 40–60)
LDL CHOLESTEROL CALCULATED: 73 MG/DL
POTASSIUM SERPL-SCNC: 3.7 MMOL/L (ref 3.5–5.1)
SODIUM BLD-SCNC: 140 MMOL/L (ref 136–145)
TOTAL PROTEIN: 6.7 G/DL (ref 6.4–8.2)
TRIGL SERPL-MCNC: 75 MG/DL (ref 0–150)
VLDLC SERPL CALC-MCNC: 15 MG/DL

## 2020-03-06 PROCEDURE — G0009 ADMIN PNEUMOCOCCAL VACCINE: HCPCS | Performed by: FAMILY MEDICINE

## 2020-03-06 PROCEDURE — 99214 OFFICE O/P EST MOD 30 MIN: CPT | Performed by: FAMILY MEDICINE

## 2020-03-06 PROCEDURE — 90732 PPSV23 VACC 2 YRS+ SUBQ/IM: CPT | Performed by: FAMILY MEDICINE

## 2020-03-06 RX ORDER — SIMVASTATIN 20 MG
20 TABLET ORAL NIGHTLY
Qty: 90 TABLET | Refills: 1 | Status: SHIPPED | OUTPATIENT
Start: 2020-03-06 | End: 2020-01-01 | Stop reason: SDUPTHER

## 2020-03-06 ASSESSMENT — ENCOUNTER SYMPTOMS
ABDOMINAL PAIN: 0
WHEEZING: 0
BACK PAIN: 1
COUGH: 0
SHORTNESS OF BREATH: 1
CHEST TIGHTNESS: 0

## 2020-03-06 NOTE — PROGRESS NOTES
3/6/2020     Nino Gravel      Chief Complaint   Patient presents with    3 Month Follow-Up    COPD       HPI      Rupert is a 68 y.o. female who presents today with the followin. Essential hypertension    2. Chronic obstructive pulmonary disease, unspecified COPD type ([address]    3. Mixed hyperlipidemia    4. Degeneration of lumbar or lumbosacral intervertebral disc    5. Anxiety    6. Pain of lower extremity, unspecified laterality      For follow-up of several things  The patient has oxygen dependent COPD  She checks her oximetry at home is usually around 90  Her weight is stable  Anxiety and chronic pain are unchanged  She is due for labs to check lipids and chemistry profile  She is on statin she is also on diuretic  REVIEW OF SYMPTOMS    Review of Systems   Constitutional: Negative. Negative for activity change, appetite change and unexpected weight change. HENT: Negative. Respiratory: Positive for shortness of breath. Negative for cough, chest tightness and wheezing. Oxygen dependent COPD   Cardiovascular: Negative. Negative for chest pain and leg swelling. Gastrointestinal: Negative for abdominal pain. Genitourinary: Negative. Musculoskeletal: Positive for back pain. Neck lumbar radiculopathy not responding to injections and other conservative modalities  She is a poor candidate for surgery  She is in compliance with controlled substance monitoring and contract   Neurological: Negative. Psychiatric/Behavioral: The patient is nervous/anxious.         PAST MEDICAL HISTORY  Past Medical History:   Diagnosis Date    COPD (chronic obstructive pulmonary disease) ([address]     H/O cardiovascular stress test 2009    thallium, normal no ischemia EF70%     H/O cardiovascular stress test 10/11/2013    thallium,EF70%, normal, no ischemia    H/O Doppler ultrasound 10/13/10    carotid, right normal, left normal    H/O Doppler ultrasound 10/07/14    Carotid mild bilateral internal carotid artery disease less than 50% in severity. Normal vertebral artery flows with good velocities. Incidental finding of possible thyroid nodule in the left lobe.  History of 24 hour EKG monitoring 3/11/2011    NSR no ectopy    History of nuclear stress test 10/21/2016    lexiscan-normal,no ischemia,EF70%,enlarged right ventricle    Hx of chest x-ray 01/02/2015    WNL    Hx of echocardiogram 10/11/2013    10/13 Abn lt ventricular diastolic function, mile MR, EF 57%,10/10 EF55%, mild mitral annular calcification    Hx of echocardiogram 05/02/2019    EF 31-79%, Grade I diastolic dysfunction, Mild aortic stenosis, Calcific thickening of posterior leaflet of mitral valve, Mild Pulm HTN    Hx of pelvic x-ray 01/02/2015    Severe RT his osterarothrosis    Hx of x-ray of hip 01/02/2015    Severe Rt hip osteosrthrosis    Hyperlipidemia     Hypertension     Leg pain        FAMILY HISTORY  Family History   Problem Relation Age of Onset    Stroke Mother     Dementia Mother     Heart Attack Mother 61    Cancer Father     Stroke Father     Heart Attack Father 61    Diabetes Sister     Heart Attack Sister     Hypertension Sister     Cancer Sister     Stroke Sister     Hypertension Sister     Hypertension Sister     Irritable Bowel Syndrome Sister        SOCIAL HISTORY  Social History     Socioeconomic History    Marital status:       Spouse name: Not on file    Number of children: Not on file    Years of education: Not on file    Highest education level: Not on file   Occupational History    Not on file   Social Needs    Financial resource strain: Not on file    Food insecurity:     Worry: Not on file     Inability: Not on file    Transportation needs:     Medical: Not on file     Non-medical: Not on file   Tobacco Use    Smoking status: Current Every Day Smoker     Packs/day: 1.00     Years: 47.00     Pack years: 47.00    Smokeless tobacco: Never Used   Substance and Sexual Activity    Alcohol use: Yes     Alcohol/week: 0.0 standard drinks     Comment: rare    Drug use: No    Sexual activity: Not on file     Comment:    Lifestyle    Physical activity:     Days per week: Not on file     Minutes per session: Not on file    Stress: Not on file   Relationships    Social connections:     Talks on phone: Not on file     Gets together: Not on file     Attends Gnosticist service: Not on file     Active member of club or organization: Not on file     Attends meetings of clubs or organizations: Not on file     Relationship status: Not on file    Intimate partner violence:     Fear of current or ex partner: Not on file     Emotionally abused: Not on file     Physically abused: Not on file     Forced sexual activity: Not on file   Other Topics Concern    Not on file   Social History Narrative    Not on file        SURGICAL HISTORY  Past Surgical History:   Procedure Laterality Date    BACK SURGERY      CYST REMOVAL      left arm    HYSTERECTOMY      1999    JOINT REPLACEMENT Right     hip    LAPAROSCOPIC APPENDECTOMY N/A 5/18/2019    APPENDECTOMY LAPAROSCOPIC performed by Mikel Lopez MD at [address] [place] [address]  Current Outpatient Medications   Medication Sig Dispense Refill    simvastatin (ZOCOR) 20 MG tablet Take 1 tablet by mouth nightly 90 tablet 1    HYDROcodone-acetaminophen (NORCO)  MG per tablet Take 1 tablet by mouth every 4 hours as needed for Pain for up to 30 days.  Indications: 180 180 tablet 0    ipratropium-albuterol (DUONEB) 0.5-2.5 (3) MG/3ML SOLN nebulizer solution Inhale 3 mLs into the lungs every 4 hours as needed for Shortness of Breath 360 mL 0    furosemide (LASIX) 40 MG tablet Take 1 tablet by mouth daily 90 tablet 1    calcium-vitamin D (OSCAL) 250-125 MG-UNIT per tablet Take 1 tablet by mouth daily      albuterol sulfate HFA (PROAIR HFA) 108 (90 BASE) MCG/ACT inhaler Inhale 2 puffs into the lungs every 6 hours as needed for Wheezing 1 Inhaler 6    Umeclidinium Bromide (INCRUSE ELLIPTA) 62.5 MCG/INH AEPB Inhale 1 puff into the lungs daily (Patient not taking: Reported on 3/6/2020) 1 each 5     No current facility-administered medications for this visit. ALLERGIES  Allergies   Allergen Reactions    Formaldehyde     Gabapentin Other (See Comments)    Norflex Tablets [Orphenadrine]     Tudorza Pressair [Aclidinium Bromide]      Pt states it gave her a rash on her ankles and knees.  Cranberry Rash       PHYSICAL EXAM    /62   Pulse 84   Ht 5' (1.524 m)   Wt 120 lb 3.2 oz (54.5 kg)   SpO2 (!) 86%   BMI 23.47 kg/m²     Physical Exam  Vitals signs and nursing note reviewed. Constitutional:       Appearance: Normal appearance. She is well-developed. HENT:      Right Ear: External ear normal.      Left Ear: External ear normal.      Nose: Nose normal.      Mouth/Throat:      Mouth: Mucous membranes are moist.      Pharynx: Oropharynx is clear. Eyes:      Conjunctiva/sclera: Conjunctivae normal.   Cardiovascular:      Rate and Rhythm: Normal rate and regular rhythm. Heart sounds: Normal heart sounds. Pulmonary:      Effort: Pulmonary effort is normal. No respiratory distress. Breath sounds: No wheezing or rales. Comments: No wheezes but she has long expiration bilaterally  Oxygen saturation on 2 L oxygen was 86%  Musculoskeletal:      Right lower leg: No edema. Left lower leg: No edema. Skin:     General: Skin is warm and dry. Neurological:      Mental Status: She is alert and oriented to person, place, and time. Mental status is at baseline. Psychiatric:         Mood and Affect: Mood normal.         Behavior: Behavior normal.         ASSESSMENT and [address] was seen today for 3 month follow-up and copd. Diagnoses and all orders for this visit:    Essential hypertension  -     Comprehensive Metabolic Panel; Future  -     Lipid Panel;  Future    Chronic

## 2020-03-09 ENCOUNTER — TELEPHONE (OUTPATIENT)
Dept: FAMILY MEDICINE CLINIC | Age: 70
End: 2020-03-09

## 2020-03-09 NOTE — TELEPHONE ENCOUNTER
TO DR. Tian Sessions-    PATIENT NEEDS A PRESCRIPTION FOR TWO HANDICAP PLACARDS. SHE WILL COME AND  SCRIPTS---PLEASE CALL HER WHEN THIS IS READY.     PHONE:  [phone]

## 2020-03-25 PROBLEM — I10 HYPERTENSION: Status: RESOLVED | Noted: 2020-03-25 | Resolved: 2020-03-24

## 2020-03-30 RX ORDER — HYDROCODONE BITARTRATE AND ACETAMINOPHEN 10; 325 MG/1; MG/1
1 TABLET ORAL EVERY 4 HOURS PRN
Qty: 180 TABLET | Refills: 0 | Status: SHIPPED | OUTPATIENT
Start: 2020-03-30 | End: 2020-04-30 | Stop reason: SDUPTHER

## 2020-04-16 ENCOUNTER — OFFICE VISIT (OUTPATIENT)
Dept: CARDIOLOGY CLINIC | Age: 70
End: 2020-04-16
Payer: COMMERCIAL

## 2020-04-16 VITALS
SYSTOLIC BLOOD PRESSURE: 136 MMHG | WEIGHT: 119.8 LBS | HEART RATE: 80 BPM | HEIGHT: 60 IN | BODY MASS INDEX: 23.52 KG/M2 | DIASTOLIC BLOOD PRESSURE: 78 MMHG

## 2020-04-16 PROCEDURE — 99213 OFFICE O/P EST LOW 20 MIN: CPT | Performed by: INTERNAL MEDICINE

## 2020-04-16 RX ORDER — LORAZEPAM 0.5 MG/1
0.5 TABLET ORAL EVERY 6 HOURS PRN
COMMUNITY
End: 2020-01-01 | Stop reason: ALTCHOICE

## 2020-04-16 RX ORDER — MELATONIN
DAILY
COMMUNITY

## 2020-04-16 RX ORDER — PHENOL 1.4 %
1 AEROSOL, SPRAY (ML) MUCOUS MEMBRANE DAILY
COMMUNITY

## 2020-04-16 RX ORDER — ASPIRIN 81 MG/1
81 TABLET ORAL DAILY
Status: ON HOLD | COMMUNITY
End: 2021-01-01 | Stop reason: HOSPADM

## 2020-04-16 NOTE — PROGRESS NOTES
mL 0    furosemide (LASIX) 40 MG tablet Take 1 tablet by mouth daily 90 tablet 1    albuterol sulfate HFA (PROAIR HFA) 108 (90 BASE) MCG/ACT inhaler Inhale 2 puffs into the lungs every 6 hours as needed for Wheezing 1 Inhaler 6     No current facility-administered medications for this visit. Allergies: Formaldehyde; Gabapentin; Norflex tablets [orphenadrine]; Lowell Rodriguez [aclidinium bromide]; and Cranberry  Past Medical History:   Diagnosis Date    COPD [facility]chronic obstructive pulmonary disease) [facility]     H/O cardiovascular stress test 11/18/2009    thallium, normal no ischemia EF70%     H/O cardiovascular stress test 10/11/2013    thallium,EF70%, normal, no ischemia    H/O Doppler ultrasound 10/13/10    carotid, right normal, left normal    H/O Doppler ultrasound 10/07/14    Carotid mild bilateral internal carotid artery disease less than 50% in severity. Normal vertebral artery flows with good velocities. Incidental finding of possible thyroid nodule in the left lobe.      History of 24 hour EKG monitoring 3/11/2011    NSR no ectopy    History of nuclear stress test 10/21/2016    lexiscan-normal,no ischemia,EF70%,enlarged right ventricle    Hx of chest x-ray 01/02/2015    WNL    Hx of echocardiogram 10/11/2013    10/13 Abn lt ventricular diastolic function, mile MR, EF 57%,10/10 EF55%, mild mitral annular calcification    Hx of echocardiogram 05/02/2019    EF 69-04%, Grade I diastolic dysfunction, Mild aortic stenosis, Calcific thickening of posterior leaflet of mitral valve, Mild Pulm HTN    Hx of pelvic x-ray 01/02/2015    Severe RT his osterarothrosis    Hx of x-ray of hip 01/02/2015    Severe Rt hip osteosrthrosis    Hyperlipidemia     Hypertension     Leg pain      Past Surgical History:   Procedure Laterality Date    BACK SURGERY      CYST REMOVAL      left arm    HYSTERECTOMY      1999    JOINT REPLACEMENT Right     hip    LAPAROSCOPIC APPENDECTOMY N/A 5/18/2019    APPENDECTOMY distress. EYES: No jaundice, no conjunctival pallor. SKIN: It is warm & dry. No rashes. No Echhymosis    HEENT - No clinically significant abnormalities seen. Neck - Supple. No jugular venous distention noted. No carotid bruits. Cardiovascular - Normal S1 and S2 without obvious murmur or gallop. Extremities - No cyanosis, clubbing, or significant edema. Pulmonary - No respiratory distress. No wheezes or rales. Abdomen - No masses, tenderness, or organomegaly. Musculoskeletal - No significant edema. No joint deformities. No muscle wasting. Neurologic - Cranial nerves II through XII are grossly intact. There were no gross focal neurologic abnormalities.     Lab Review   No results found for: CKTOTAL, CKMB, CKMBINDEX, TROPONINT  BNP:  No results found for: BNP  PT/INR:  No results found for: INR  No results found for: LABA1C  Lab Results   Component Value Date    WBC 6.9 09/23/2019    WBC 13.5 (H) 05/19/2019    HCT 38.3 09/23/2019    HCT 40.6 05/19/2019    MCV 89.1 09/23/2019    MCV 92.9 05/19/2019     09/23/2019     05/19/2019     Lab Results   Component Value Date    CHOL 167 03/06/2020    CHOL 171 09/23/2019    TRIG 75 03/06/2020    TRIG 89 09/23/2019    HDL 79 (H) 03/06/2020    HDL 97 (H) 09/23/2019    LDLCALC 73 03/06/2020    LDLCALC 56 09/23/2019     Lab Results   Component Value Date    ALT 9 (L) 03/06/2020    ALT 7 (L) 09/23/2019    AST 17 03/06/2020    AST 13 (L) 09/23/2019     BMP:    Lab Results   Component Value Date     03/06/2020     09/23/2019    K 3.7 03/06/2020    K 4.5 09/23/2019    CL 91 03/06/2020    CL 94 09/23/2019    CO2 38 03/06/2020    CO2 35 09/23/2019    BUN 19 03/06/2020    BUN 21 09/23/2019    CREATININE 0.7 03/06/2020    CREATININE 0.6 09/23/2019     CMP:   Lab Results   Component Value Date     03/06/2020     09/23/2019    K 3.7 03/06/2020    K 4.5 09/23/2019    CL 91 03/06/2020    CL 94 09/23/2019    CO2 38 03/06/2020    CO2 35 cardiovascular risk reduction. Various goals are discussed and multiple questions answered.

## 2020-04-30 RX ORDER — HYDROCODONE BITARTRATE AND ACETAMINOPHEN 10; 325 MG/1; MG/1
1 TABLET ORAL EVERY 4 HOURS PRN
Qty: 180 TABLET | Refills: 0 | Status: SHIPPED | OUTPATIENT
Start: 2020-04-30 | End: 2020-01-01 | Stop reason: SDUPTHER

## 2020-06-03 NOTE — TELEPHONE ENCOUNTER
Patient still has not received her medicine ----could this be sent in today. She is really struggling. Please call her when this is sent in.

## 2020-06-09 PROBLEM — K37 APPENDICITIS: Status: RESOLVED | Noted: 2019-05-19 | Resolved: 2020-01-01

## 2020-06-09 NOTE — PROGRESS NOTES
Janeann Collet is a 68 y.o. female evaluated via telephone on 6/9/2020.       Consent:  She and/or health care decision maker is aware that that she may receive a bill for this telephone service, depending on her insurance coverage, and has provided verbal consent to proceed: Yes      Documentation:  I communicated with the patient and/or health care decision maker about sev follow-ups  Details of this discussion including any medical advice provided: Patient any ongoing problems  She considers her situation to be stable  Her chief complaint is nocturnal leg cramps that she relates to taking Lasix  The patient needs to stay on diuretic therapy  She has oxygen dependent COPD and she continues to smoke AGAINST MEDICAL ADVICE I advised her about the warning about fire with this she says she always turns the oxygen off strongly recommend she stop this entirely  She has chronic pain issues related to chronic lumbar spine disease she has had surgery she has had physical therapy she has had shots nothing really has helped  She is on controlled substance pain medication  She is in compliance with dosing and vandana  There is no suspicion of diversion or abuse  PDMP has been checked every 3 months  Her weight is stable  History of small abdominal aortic aneurysm measured at 2.6 cm  5-year follow-up was recommended I think this was done last year  Immunizations been reviewed  She is due for Tdap and Shingrix at a later date  Emotionally she is stable chronic anxiety unchanged  She is on a benzodiazepine  She is taking appropriate precautions of Covid 23 but she has son who moved in with 2 of his children  No one's been ill with any suspicious symptoms  A-s table  p-stop smoking  Continue same medications and diet  Follow-up in 3 months  She will be due for labs then  I affirm this is a Patient Initiated Episode with a Patient who has not had a related appointment within my department in the past 7 days or scheduled within

## 2020-09-01 NOTE — PROGRESS NOTES
observation    Is on medication for hypertension hyperlipidemia with good control  No known history of coronary artery disease  Small abdominal aortic aneurysm less than 3 cm does not need follow-up at this time   Genitourinary: Negative for difficulty urinating. Musculoskeletal: Positive for arthralgias and back pain. Patient suffers with a significant lower back pain  She has had all conservative treatments  Nothing really has helped her very much  She is on narcotic pain medication  She has a pain contract which she is following  Appropriate PDMP is been monitored including today  She is in compliance  There is no suggestion of diversion or abuse  She patient tolerates the medicine well   Neurological: Negative. Psychiatric/Behavioral: Positive for dysphoric mood. PAST MEDICAL HISTORY  Past Medical History:   Diagnosis Date    [facility] obstructive [facility] disease) [facility]     H/O cardiovascular stress test 11/18/2009    thallium, normal no ischemia EF70%     H/O cardiovascular stress test 10/11/2013    thallium,EF70%, normal, no ischemia    H/O Doppler ultrasound 10/13/10    carotid, right normal, left normal    H/O Doppler ultrasound 10/07/14    Carotid mild bilateral internal carotid artery disease less than 50% in severity. Normal vertebral artery flows with good velocities. Incidental finding of possible thyroid nodule in the left lobe.      History of 24 hour EKG monitoring 3/11/2011    NSR no ectopy    History of nuclear stress test 10/21/2016    lexiscan-normal,no ischemia,EF70%,enlarged right ventricle    Hx of chest x-ray 01/02/2015    WNL    Hx of echocardiogram 10/11/2013    10/13 Abn lt ventricular diastolic function, mile MR, EF 57%,10/10 EF55%, mild mitral annular calcification    Hx of echocardiogram 05/02/2019    EF 93-79%, Grade I diastolic dysfunction, Mild aortic stenosis, Calcific thickening of posterior leaflet of mitral valve, Mild Pulm HTN    Hx of pelvic Laterality Date    BACK SURGERY      CYST REMOVAL      left arm    HYSTERECTOMY      1999    JOINT REPLACEMENT Right     hip    LAPAROSCOPIC APPENDECTOMY N/A 5/18/2019    APPENDECTOMY LAPAROSCOPIC performed by Ro Coronado MD at [address] [place] [address]  Current Outpatient Medications   Medication Sig Dispense Refill    Tdap (ADACEL) 5-2-15.5 LF-MCG/0.5 injection Inject 0.5 mLs into the muscle once for 1 dose 0.5 mL 0    zoster recombinant adjuvanted vaccine (SHINGRIX) 50 MCG/0.5ML SUSR injection 50 MCG IM then repeat 2-6 months. 0.5 mL 1    [START ON 9/5/2020] HYDROcodone-acetaminophen (NORCO)  MG per tablet Take 1 tablet by mouth every 4 hours as needed for Pain for up to 30 days. Indications: 180 180 tablet 0    ipratropium-albuterol (DUONEB) 0.5-2.5 (3) MG/3ML SOLN nebulizer solution Inhale 3 mLs into the lungs 4 times daily 360 mL 5    albuterol sulfate HFA (PROAIR HFA) 108 (90 Base) MCG/ACT inhaler Inhale 2 puffs into the lungs every 6 hours as needed for Wheezing 1 Inhaler 6    Cholecalciferol (VITAMIN D3) 25 MCG (1000 UT) TABS Take by mouth      LORazepam (ATIVAN) 0.5 MG tablet Take 0.5 mg by mouth every 6 hours as needed for Anxiety.  calcium carbonate 600 MG TABS tablet Take 1 tablet by mouth daily      aspirin 81 MG EC tablet Take 81 mg by mouth daily      Fexofenadine HCl (MUCINEX ALLERGY PO) Take by mouth      simvastatin (ZOCOR) 20 MG tablet Take 1 tablet by mouth nightly 90 tablet 1     No current facility-administered medications for this visit. ALLERGIES  Allergies   Allergen Reactions    Formaldehyde     Gabapentin Other (See Comments)    Norflex Tablets [Orphenadrine]     Tudorza Pressair [Aclidinium Bromide]      Pt states it gave her a rash on her ankles and knees.     Cranberry Rash       PHYSICAL EXAM    /68 (Site: Right Upper Arm, Position: Sitting, Cuff Size: Medium Adult)   Pulse 103   Temp 98.7 °F (37.1 °C) (Temporal)   Ht 5' (1.524 m)   Wt 118 lb (53.5 kg)   SpO2 (!) 87% Comment: on Oxygen  BMI 23.05 kg/m²     Physical Exam  Vitals signs and nursing note reviewed. Constitutional:       General: She is not in acute distress. Appearance: Normal appearance. HENT:      Head: Normocephalic. Right Ear: External ear normal.      Left Ear: External ear normal.   Eyes:      Conjunctiva/sclera: Conjunctivae normal.   Cardiovascular:      Rate and Rhythm: Normal rate. Pulmonary:      Effort: Pulmonary effort is normal. No respiratory distress. Skin:     General: Skin is warm and dry. Neurological:      General: No focal deficit present. Mental Status: She is alert. Mental status is at baseline. Psychiatric:         Mood and Affect: Mood normal.         Thought Content: Thought content normal.       I reviewed her immunizations  Reviewed her most recent labs  PDMP checked    ASSESSMENT and [address] was seen today for 3 month follow-up, sore and discuss medications. Diagnoses and all orders for this visit:    Mixed hyperlipidemia    Encounter for screening mammogram for breast cancer  -     OLIVIA Digital Screen Bilateral [[id number]]; Future    Need for prophylactic vaccination against diphtheria-tetanus-pertussis (DTP)  -     Tdap (ADACEL) 5-2-15.5 LF-MCG/0.5 injection; Inject 0.5 mLs into the muscle once for 1 dose    Need for prophylactic vaccination and inoculation against varicella  -     zoster recombinant adjuvanted vaccine (SHINGRIX) 50 MCG/0.5ML SUSR injection; 50 MCG IM then repeat 2-6 months. Degeneration of lumbar or lumbosacral intervertebral disc  -     HYDROcodone-acetaminophen (NORCO)  MG per tablet; Take 1 tablet by mouth every 4 hours as needed for Pain for up to 30 days.  Indications: 180    Essential hypertension    Anxiety    Chronic obstructive pulmonary disease, unspecified COPD type ([address]    Abdominal aortic aneurysm (AAA) without rupture ([address]      She is to stop

## 2020-09-18 PROBLEM — J96.21 ACUTE ON CHRONIC RESPIRATORY FAILURE WITH HYPOXIA (HCC): Status: ACTIVE | Noted: 2020-01-01

## 2020-09-18 NOTE — ED PROVIDER NOTES
EMERGENCY DEPARTMENT ENCOUNTER    Patient: Navya Chu  MRN: [id number]  : 1950  Date of Evaluation: 2020  ED Provider:  Kyle Beckwith    CHIEF COMPLAINT  Chief Complaint   Patient presents with    Shortness of Breath       HPI  Navya Chu is a 68 y.o. female who presents moderate to severe, constant shortness of breath with productive cough for the last 2 days. Reports some associated chest tightness but denies any actual chest pain associated with this. Denies leg pain or swelling. Denies any wheezing. Denies any other associated symptoms or complaints or concerns. REVIEW OF SYSTEMS    Constitutional: negative for fever, chills  Neurological: negative for HA, focal weakness, loss of sensation  Ophthalmic: negative for vision change  ENT: negative for congestion, rhinorrhea  Cardiovascular: negative for chest pain, palpitations, edema  Respiratory: negative for wheezing  GI: negative for abdominal pain, nausea, vomiting, diarrhea, constipation  : negative for dysuria, hematuria  Musculoskeletal: negative for myalgias, decreased ROM, joint swelling  Dermatological: negative for rash, wounds  Heme: Negative for bleeding, bruising      PAST MEDICAL HISTORY  Past Medical History:   Diagnosis Date    COPD (chronic obstructive pulmonary disease) ([facility] [address]     H/O cardiovascular stress test 2009    thallium, normal no ischemia EF70%     H/O cardiovascular stress test 10/11/2013    thallium,EF70%, normal, no ischemia    H/O Doppler ultrasound 10/13/10    carotid, right normal, left normal    H/O Doppler ultrasound 10/07/14    Carotid mild bilateral internal carotid artery disease less than 50% in severity. Normal vertebral artery flows with good velocities. Incidental finding of possible thyroid nodule in the left lobe.      History of 24 hour EKG monitoring 3/11/2011    NSR no ectopy    History of nuclear stress test 10/21/2016    lexiscan-normal,no ischemia,EF70%,enlarged right ventricle    Hx of chest x-ray 01/02/2015    WNL    Hx of echocardiogram 10/11/2013    10/13 Abn lt ventricular diastolic function, mile MR, EF 57%,10/10 EF55%, mild mitral annular calcification    Hx of echocardiogram 05/02/2019    EF 12-33%, Grade I diastolic dysfunction, Mild aortic stenosis, Calcific thickening of posterior leaflet of mitral valve, Mild Pulm HTN    Hx of pelvic x-ray 01/02/2015    Severe RT his osterarothrosis    Hx of x-ray of hip 01/02/2015    Severe Rt hip osteosrthrosis    Hyperlipidemia     Hypertension     Leg pain        CURRENT MEDICATIONS  [unfilled]    ALLERGIES  Allergies   Allergen Reactions    Formaldehyde     Gabapentin Other (See Comments)    Norflex Tablets [Orphenadrine]     Tudorza Pressair [Aclidinium Bromide]      Pt states it gave her a rash on her ankles and knees.  Cranberry Rash       SURGICAL HISTORY  Past Surgical History:   Procedure Laterality Date    BACK SURGERY      CYST REMOVAL      left arm    HYSTERECTOMY      1999    JOINT REPLACEMENT Right     hip    LAPAROSCOPIC APPENDECTOMY N/A 5/18/2019    APPENDECTOMY LAPAROSCOPIC performed by Peggy Phoenix MD at [address] [place] [address]  Family History   Problem Relation Age of Onset    Stroke Mother     Dementia Mother     Heart Attack Mother 61    Cancer Father     Stroke Father     Heart Attack Father 61    Diabetes Sister     Heart Attack Sister     Hypertension Sister     Cancer Sister     Stroke Sister     Hypertension Sister     Hypertension Sister     Irritable Bowel Syndrome Sister        SOCIAL HISTORY  Social History     Socioeconomic History    Marital status:       Spouse name: None    Number of children: None    Years of education: None    Highest education level: None   Occupational History    None   Social Needs    Financial resource strain: None    Food insecurity     Worry: None     Inability: None    Transportation needs Medical: None     Non-medical: None   Tobacco Use    Smoking status: Current Every Day Smoker     Packs/day: 1.00     Years: 47.00     Pack years: 47.00    Smokeless tobacco: Never Used   Substance and Sexual Activity    Alcohol use: Yes     Alcohol/week: 0.0 standard drinks     Comment: rare    Drug use: No    Sexual activity: None     Comment:    Lifestyle    Physical activity     Days per week: None     Minutes per session: None    Stress: None   Relationships    Social connections     Talks on phone: None     Gets together: None     Attends [employer] service: None     Active member of club or organization: None     Attends meetings of clubs or organizations: None     Relationship status: None    Intimate partner violence     Fear of current or ex partner: None     Emotionally abused: None     Physically abused: None     Forced sexual activity: None   Other Topics Concern    None   Social History Narrative    None         **Past medical, family and social histories, and nursing notes reviewed and verified by me**      PHYSICAL EXAM  VITAL SIGNS:   ED Triage Vitals [09/18/20 1627]   Enc Vitals Group      BP       Pulse 121      Resp 24      Temp       Temp src       SpO2 (!) 71 %      Weight       Height       Head Circumference       Peak Flow       Pain Score       Pain Loc       Pain Edu? Excl. in [address]? Vitals during ED course were reviewed and are as charted.     Constitutional: Minimal distress, Non-toxic appearance  Eyes: Conjunctiva normal, No discharge  HENT: Normocephalic, Atraumatic, posterior oropharynx is nonerythematous and without exudate, uvula is midline, oropharynx moist  Neck: Supple, no stridor, no grossly visible or palpable masses  Cardiovascular: Regular rate and rhythm, No murmurs, No rubs, No gallops, no JVD, 2+ symmetrical distal pulses  Pulmonary/Chest: Tachypneic with very diminished breath sounds diffusely and bilaterally, however no accessory muscle use, No crackles or rhonchi. Abdomen: Soft, nondistended and nonrigid, No tenderness or peritoneal signs, No masses, normal bowel sounds  Back: No midline point tenderness, No paraspinous muscle tenderness.  No CVA tenderness  Extremities: No gross deformities, no edema, no tenderness  Neurologic: Normal motor function, Normal sensory function, No focal deficits  Skin: Warm, Dry, No erythema, No rash, No cyanosis, No mottling  Lymphatic: No lymphadenopathy in the following location(s): cervical  Psychiatric: Alert and oriented x3, Affect normal          EKG Interpretation    Interpreted by emergency department physician    Rhythm: sinus tachycardia  Rate: 100-110  Axis: normal  Ectopy: none  Conduction: normal  ST Segments: normal  T Waves: normal  Q Waves: none    Clinical Impression: Sinus tachycardia, otherwise normal EKG        RADIOLOGY/PROCEDURES/LABS/MEDICATIONS ADMINISTERED:    I have reviewed and interpreted all of the currently available lab results from this visit (if applicable):  Results for orders placed or performed during the hospital encounter of 09/18/20   CBC Auto Differential   Result Value Ref Range    WBC 9.1 4.0 - 10.5 K/CU MM    RBC 4.76 4.2 - 5.4 M/CU MM    Hemoglobin 13.6 12.5 - 16.0 GM/DL    Hematocrit 44.8 37 - 47 %    MCV 94.1 78 - 100 FL    MCH 28.6 27 - 31 PG    MCHC 30.4 (L) 32.0 - 36.0 %    RDW 13.4 11.7 - 14.9 %    Platelets 553 592 - 936 K/CU MM    MPV 9.9 7.5 - 11.1 FL    Differential Type AUTOMATED DIFFERENTIAL     Segs Relative 85.9 (H) 36 - 66 %    Lymphocytes % 10.2 (L) 24 - 44 %    Monocytes % 3.3 0 - 4 %    Eosinophils % 0.1 0 - 3 %    Basophils % 0.2 0 - 1 %    Segs Absolute 7.8 K/CU MM    Lymphocytes Absolute 0.9 K/CU MM    Monocytes Absolute 0.3 K/CU MM    Eosinophils Absolute 0.0 K/CU MM    Basophils Absolute 0.0 K/CU MM    Nucleated RBC % 0.0 %    Total Nucleated RBC 0.0 K/CU MM    Total Immature Neutrophil 0.03 K/CU MM    Immature Neutrophil % 0.3 0 - 0.43 %   Blood Gas, MG FOR LOW K - Abnormal; Notable for the following components:    Chloride 91 (*)     CO2 40 (*)     Glucose 109 (*)     ALT 7 (*)     AST 12 (*)     All other components within normal limits   D-DIMER, QUANTITATIVE - Abnormal; Notable for the following components:    D-Dimer, Quant 243 (*)     All other components within normal limits   TROPONIN   BRAIN NATRIURETIC PEPTIDE   COVID-19 AMBULATORY         IMAGING STUDIES ORDERED:  CTA PULMONARY W CONTRAST  IP CONSULT TO HOSPITALIST    I have personally viewed the imaging studies. The radiologist interpretation is:   CTA PULMONARY W CONTRAST   Final Result   No PE visualized. Mild bibasilar bronchiolitis. Masses and nodule within the right lung, in a background of pulmonary   emphysema, worrisome for malignancy. Recommend pulmonary consultation. MEDICATIONS ADMINISTERED:  Medications   albuterol sulfate  (90 Base) MCG/ACT inhaler 2 puff (2 puffs Inhalation Given 9/18/20 1639)   predniSONE (DELTASONE) tablet 50 mg (50 mg Oral Given 9/18/20 1730)   iopamidol (ISOVUE-370) 76 % injection 75 mL (75 mLs Intravenous Given 9/18/20 1756)         COURSE & MEDICAL DECISION MAKING  Last vitals: BP (!) 114/55   Pulse 94   Temp 98.8 °F (37.1 °C) (Oral)   Resp 18   [id number]     59-year-old female with shortness of breath and cough likely secondary to bronchitis. COVID-19 test has been ordered and is pending as well. No radiographic evidence for pneumonia. No evidence for PE. She is normally on 3 L of O2 via nasal cannula around-the-clock and was noted to have an oxygen saturation of 71% on this upon arrival to the emergency department. She was increased to 6 L with improvement into the 90s.     Differential diagnoses considered included, but were not limited to, acute bronchospasm, allergic reaction/anaphylaxis, acute respiratory infection, pneumonia, acute coronary syndrome, dysrhythmia, congestive heart failure, non-CHF-related pulmonary

## 2020-09-19 NOTE — CONSULTS
[address], [address] [facility]                                  CONSULTATION    PATIENT NAME: Dajuan Alexander                     :        1950  MED REC NO:   [id number]                          ROOM:       2015  ACCOUNT NO:   [de-identified]                           ADMIT DATE: 2020  PROVIDER:     Emily Gillis MD    CONSULT DATE:  2020    HISTORY OF PRESENT ILLNESS:  The patient is a 66-year-old lady with  multiple medical problems including COPD, hypertension, hyperlipidemia,  chronic respiratory failure, on home oxygen, who was admitted through  the emergency room with complaints of increasing shortness of breath,  cough productive of yellowish phlegm. She denied any hemoptysis. She  denied any fever or chills. She denied any nausea or vomiting. She  denied any chest pains. The patient was using her nebulizer at home  without much improvement. In the emergency room, she was given  bronchodilator with some relief, but she continued to have shortness of  breath, so it was decided to admit her for aggressive therapy. She also  had a CAT scan of the chest in the emergency room, which showed no  evidence of PE, mild basilar bronchiolitis, and she had mass noted in  the right upper lobe and right lower lobe. She had a CAT scan done in   when the right lower lobe nodule was 8 x 8 mm and had remained  stable for two years. She was admitted to the COVID unit to rule out  COVID infection. PAST MEDICAL HISTORY:  Significant for COPD, chronic respiratory  failure, on home oxygen, hypertension, and hyperlipidemia. PAST SURGICAL HISTORY:  Remarkable for tubal ligation, laparoscopic  appendectomy, hip replacement surgery, hysterectomy, and back surgery. FAMILY HISTORY:  Reveals that her mother had CVA, dementia, coronary  artery disease. Father had cancer, coronary artery disease, and stroke.     SOCIAL HISTORY:  Reveals that she smokes about one pack per day and has  been smoking for 47 years. She drinks alcohol off and on. No history  of drug abuse. MEDICATIONS:  Reviewed. ALLERGIES:  She is allergic to FORMALDEHYDE, GABAPENTIN, NORFLEX,  TUDORZA, and CRANBERRY. REVIEW OF SYSTEMS:  A 10- to 14-point review of systems was reviewed and  is negative except for what is mentioned in the history of present  illness. PHYSICAL EXAMINATION:  GENERAL:  The patient is alert and oriented x3, in no acute respiratory  distress. VITAL SIGNS:  Her blood pressure was 115/68 mmHg, pulse of 89 per  minute, and respiratory rate of 17 per minute. She is afebrile, her  T-max was 99.4 degrees Fahrenheit. Her saturation is 92% on 3 liters  nasal cannula. HEENT:  Essentially unremarkable. NECK:  There is no JVD. No lymphadenopathy. The neck is supple. LUNGS:  Revealed diminished breath sounds and occasional bilateral  rhonchi. HEART:  Showed normal S1 and S2. There was no S3 or S4 noted. ABDOMEN:  Benign. There is no evidence of any organomegaly. The bowel  sounds are present. NEUROLOGIC:  Reveals that she is awake and responsive, answering  questions appropriately, and moving her extremities. LABORATORY DATA:  Her CAT scan of the chest showed no evidence of PE. Mild basilar bronchiolitis, lung nodule in the right upper lobe and  lower lobe. The lower lobe is 16 x 16 mm and has doubled in size  compared to the CAT scan, which was done in 2010 when it had remained  stable for more than two years. Her respiratory disease panel PCR was  negative. Her ABGs showed a pH of 7.36, pCO2 of 79, pO2 of 58 with  bicarbonate of 44 with 90% saturation on BiPAP 12/6, 35%. Her  procalcitonin level was 0.210. Her electrolytes showed a sodium of 139,  potassium 4.2, chloride 91, carbon dioxide 40, BUN 22, creatinine 0.6.    Her CBC showed a white count of 9.1, hemoglobin of 13.6, and hematocrit  of 44.8.    IMPRESSION:  1. Acute-on-chronic respiratory failure secondary to acute exacerbation  of COPD. 2.  Acute exacerbation of COPD. 3.  Right upper lobe and lower lobe lung nodule. The right lower lobe  nodule has increased in size compared to 2010. Right upper lobe nodule  is new. 4.  History of tobacco abuse. PLAN:  1. Continue Rocephin and Zithromax. 2.  Combivent two puffs four times a day. 3.  Solu-Medrol 40 mg q.6 h.  4.  We will add Steven-Dur 100 mg twice a day. 5.  Check CEA level. 6.  Check fungal serology. 7.  The patient will need PET scan as an outpatient. 8.  Discussed with the patient in detail. 9.  Sputum for culture and sensitivity. 10.  As per orders.         Eusebio Ashley MD    D: 09/19/2020 10:51:49       T: 09/19/2020 12:10:40     /[email or web address]  Job#: [id number]     Doc#: [id number]    CC:

## 2020-09-19 NOTE — PROGRESS NOTES
Hospitalist Progress Note      Name:  Maeola Hodgkins /Age/Sex: 1950  (68 y.o. female)   MRN & CSN:  [id number] & [id number] Admission Date/Time: 2020  4:28 PM   Location:  -A PCP: Ángel Vail, GeoTrac Day: 2    History of Present Illness:     Chief Complaint: Maeola Hodgkins is a 68 y.o.  female  who presents with SOB    The patient is seen and examined at bed side. Says has some difficulty with breathing but better than when she came in. Denies having chest pain.     Ten point ROS reviewed negative, unless as noted above    Objective:   No intake or output data in the 24 hours ending 20 1115   Vitals:   Vitals:    20 0942   BP: 115/68   Pulse: 89   Resp: 17   Temp: 98.6 °F (37 °C)   SpO2:      Physical Exam:   General Appearance: alert and oriented to person, place and time, in no acute distress  Cardiovascular: normal rate, regular rhythm, normal S1 and S2  Pulmonary/Chest: decreased breath sounds  Abdomen: soft, non-tender, non-distended, normal bowel sounds, no masses   Extremities: no cyanosis, clubbing or edema, pulse   Skin: warm and dry, no rash or erythema  Head: normocephalic and atraumatic  Eyes: pupils equal, round, and reactive to light  Neck: supple and non-tender without mass, no thyromegaly   Musculoskeletal: normal range of motion, no joint swelling, deformity or tenderness  Neurological: alert, oriented, normal speech, no focal findings or movement disorder noted    Medications:   Medications:    albuterol sulfate HFA  2 puff Inhalation 4x daily    guaiFENesin  600 mg Oral TID    theophylline  100 mg Oral BID    aspirin  81 mg Oral Daily    calcium carbonate  500 mg Oral Daily    atorvastatin  10 mg Oral Daily    ipratropium  2 puff Inhalation 4x daily    sodium chloride flush  10 mL Intravenous 2 times per day    enoxaparin  40 mg Subcutaneous Daily    methylPREDNISolone  40 mg Intravenous Q6H    Followed by   Martha Armstrong ON 2020] predniSONE  40 mg Oral Daily    cefTRIAXone (ROCEPHIN) IV  1 g Intravenous Q24H    azithromycin  500 mg Intravenous Q24H    nicotine  1 patch Transdermal Daily      Infusions:   PRN Meds: HYDROcodone-acetaminophen, 1 tablet, Q4H PRN  sodium chloride flush, 10 mL, PRN  acetaminophen, 650 mg, Q6H PRN    Or  acetaminophen, 650 mg, Q6H PRN  polyethylene glycol, 17 g, Daily PRN  promethazine, 12.5 mg, Q6H PRN    Or  ondansetron, 4 mg, Q6H PRN            Pertinent New Labs & Imaging Studies     CBC with Differential:    Lab Results   Component Value Date    WBC 9.1 09/18/2020    RBC 4.76 09/18/2020    HGB 13.6 09/18/2020    HCT 44.8 09/18/2020     09/18/2020    MCV 94.1 09/18/2020    MCH 28.6 09/18/2020    MCHC 30.4 09/18/2020    RDW 13.4 09/18/2020    SEGSPCT 85.9 09/18/2020    LYMPHOPCT 10.2 09/18/2020    MONOPCT 3.3 09/18/2020    BASOPCT 0.2 09/18/2020    MONOSABS 0.3 09/18/2020    LYMPHSABS 0.9 09/18/2020    EOSABS 0.0 09/18/2020    BASOSABS 0.0 09/18/2020    DIFFTYPE AUTOMATED DIFFERENTIAL 09/18/2020     CMP:    Lab Results   Component Value Date     09/18/2020    K 4.2 09/18/2020    CL 91 09/18/2020    CO2 40 09/18/2020    BUN 22 09/18/2020    CREATININE 0.6 09/18/2020    GFRAA >60 09/18/2020    AGRATIO 1.6 08/28/2020    LABGLOM >60 09/18/2020    GLUCOSE 109 09/18/2020    PROT 7.6 09/18/2020    PROT 6.5 03/06/2015    LABALBU 4.5 09/18/2020    CALCIUM 9.8 09/18/2020    BILITOT 0.2 09/18/2020    ALKPHOS 83 09/18/2020    AST 12 09/18/2020    ALT 7 09/18/2020     Cta Pulmonary W Contrast    Result Date: 9/18/2020  EXAMINATION: CTA OF THE CHEST 9/18/2020 5:55 pm TECHNIQUE: CTA of the chest was performed after the administration of intravenous contrast.  Multiplanar reformatted images are provided for review. MIP images are provided for review.  Dose modulation, iterative reconstruction, and/or weight based adjustment of the mA/kV was utilized to reduce the radiation dose to as low as reasonably achievable. COMPARISON: None. HISTORY: ORDERING SYSTEM PROVIDED HISTORY: Shortness of breath, tachycardia, hypoxia, elevated d-dimer TECHNOLOGIST PROVIDED HISTORY: Reason for exam:->Shortness of breath, tachycardia, hypoxia, elevated d-dimer Reason for Exam: Shortness of breath, tachycardia, hypoxia, elevated d-dimer Acuity: Acute Type of Exam: Initial FINDINGS: Pulmonary Arteries: Pulmonary arteries are adequately opacified for evaluation. No evidence of intraluminal filling defect to suggest pulmonary embolism. Main pulmonary artery is normal in caliber. Mediastinum: No evidence of mediastinal lymphadenopathy. The heart and pericardium demonstrate no acute abnormality. There is no acute abnormality of the thoracic aorta. Lungs/pleura: There is pulmonary emphysema. There is a mass within the right lung apex measuring 16 mm in diameter as well as a 16 x 16 mm spiculated mass in the right lower lobe. A smaller 9.6 mm nodule is present within the right lower lobe. No effusion or infiltrate is identified. There are mildly thickened, partially opacified bronchioles in both lower lobes, likely reflecting bronchiolitis. Upper Abdomen: Limited images of the upper abdomen are unremarkable. Soft Tissues/Bones: No acute bone or soft tissue abnormality. No PE visualized. Mild bibasilar bronchiolitis. Masses and nodule within the right lung, in a background of pulmonary emphysema, worrisome for malignancy. Recommend pulmonary consultation. Assessment and Plan:   Amber Osler is a 68 y.o.  female  who presents with SOB    Acute on chronic respiratory failure with hypoxia due to COPD exacerbation/possible covid 19/malignancy  Chronic hypercapnic respiratory failure  CTA pulmonary: No PE. Mild bibasilar bronchiolitis. Masses and nodule within the right lung in background of pulmonary emphysema, worrisome for malignancy.  Low suspicious for covid  Azithromycin and Rocephin IV  Solumedrol IV  MDI

## 2020-09-19 NOTE — PROGRESS NOTES
Patient arrived to unit with transport. Patient alert and oriented, on 5 L NC. Patient assisted walking to bathroom before getting in bed. Patient working hard to breathe, O2 sat 82%. Patient assisted back to bed, increased O2 to 6L NC, no improvement. Juanito Pedraza, RT and this nurse asked him to come give patient inhaler. Patient sat for a few minutes to catch breath and check for improvement. No improvement. Patient states she is still working hard to breathe. Pt states she is very congested in her nose and feels anxious. Obtained orders for mucinex and benedryl (pt takes at home). Patient switched to high flow cannula, 15L. O2 sat increased to 100%. VBG CO2 88, notified Rhona Martinez and asked for orders for bipap. Bipap order received with ABG ordered for 0600 09/19/2020. Called RT, he came and placed patient on bipap and this nurse notified of Susan's request to adjust FiO2 maintain O2 sat betwwn 92-96%. Patient reassessed, resting comfortably.

## 2020-09-19 NOTE — FLOWSHEET NOTE
Patient transferred to 3N in bed. Patient placed back on bipap. Call light in reach, report previously given to Lynne Orlando.
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
## 2020-09-20 PROBLEM — J96.01 ACUTE RESPIRATORY FAILURE WITH HYPOXIA (HCC): Status: ACTIVE | Noted: 2020-01-01

## 2020-09-20 NOTE — PROGRESS NOTES
Hospitalist Progress Note      Name:  Rober Do /Age/Sex: 1950  (68 y.o. female)   MRN & CSN:  [id number] & [id number] Admission Date/Time: 2020  4:28 PM   Location:  [address] [place] [address] PCP: Otilio Chávez MD       Rober Do is a 68 y.o.  female  who presents with Shortness of Breath      Assessment and Plan:   Acute on chronic respiratory failure with hypoxia 2/2 COPD exacerbation, Lung Mass possible malignancy? - COVID 19 neg  - CTA pulmonary: No PE. Mild bibasilar bronchiolitis.  Masses and nodule within the right lung in background of pulmonary emphysema, worrisome for malignancy  -Azithromycin and Rocephin IV  -Solumedrol IV  -MDI treatment  -Symptomatic management  -Resp panel; neg  -Pulmonary consulted, recommended PET scan outpt     Tobacco abuse  HTN  HLD        DVT-PPX: Lovenox  Diet: General               Diet DIET GENERAL;   Code Status Full Code     Medications:   Medications:    albuterol sulfate HFA  2 puff Inhalation 4x daily    guaiFENesin  600 mg Oral TID    theophylline  100 mg Oral BID    pantoprazole  40 mg Oral QAM AC    aspirin  81 mg Oral Daily    calcium carbonate  500 mg Oral Daily    atorvastatin  10 mg Oral Daily    ipratropium  2 puff Inhalation 4x daily    sodium chloride flush  10 mL Intravenous 2 times per day    enoxaparin  40 mg Subcutaneous Daily    methylPREDNISolone  40 mg Intravenous Q6H    Followed by   [facility] 2020] predniSONE  40 mg Oral Daily    cefTRIAXone (ROCEPHIN) IV  1 g Intravenous Q24H    azithromycin  500 mg Intravenous Q24H    nicotine  1 patch Transdermal Daily      Infusions:   PRN Meds: HYDROcodone-acetaminophen, 1 tablet, Q4H PRN  sodium chloride flush, 10 mL, PRN  acetaminophen, 650 mg, Q6H PRN    Or  acetaminophen, 650 mg, Q6H PRN  polyethylene glycol, 17 g, Daily PRN  promethazine, 12.5 mg, Q6H PRN    Or  ondansetron, 4 mg, Q6H PRN      Subjective:   Doing ok, no distress    Objective:   No intake or output data in the 24 hours ending 09/20/20 0935   Vitals:   Vitals:    09/20/20 0900   BP: (!) 131/91   Pulse: 100   Resp: 20   Temp: 98.1 °F (36.7 °C)   SpO2:      Physical Exam:   Gen:  awake, alert, no apparent distress  Head/Eyes:  Normocephalic atraumatic, EOMI   NECK:   symmetrical, trachea midline  LUNGS: Normal Effort   CARDIOVASCULAR:  Normal rate  ABDOMEN:  non distended  MUSCULOSKELETAL:  ROM WNL  NEUROLOGIC: Alert and Oriented,  Cranial nerves II-XII are grossly intact.    SKIN:  no bruising or bleeding, normal skin color,  no redness      Data:       CBC   Recent Labs     09/18/20  1640   WBC 9.1   HGB 13.6   HCT 44.8         BMP   Recent Labs     09/18/20  1640      K 4.2   CL 91*   CO2 40*   BUN 22   CREATININE 0.6         Electronically signed by Janusz Martin MD on 9/20/2020 at 9:35 AM

## 2020-09-20 NOTE — PROGRESS NOTES
Pulmonary and Critical Care  Progress Note      VITALS:  /64   Pulse 103   Temp 98.1 °F (36.7 °C) (Oral)   Resp 18   Ht 5' (1.524 m)   Wt 115 lb 4.8 oz (52.3 kg)   SpO2 93%   BMI 22.52 kg/m²     Subjective:   CHIEF COMPLAINT :SOB     HPI:                The patient is a 68 y.o. female with significant past medical history of COPD, hypertension, hyperlipidemia,  chronic respiratory failure, on home oxygen, who was admitted through  the emergency room with complaints of increasing shortness of breath,  cough productive of yellowish phlegm. She denied any hemoptysis. She  denied any fever or chills. She denied any nausea or vomiting. She  denied any chest pains. The patient was using her nebulizer at home  without much improvement. In the emergency room, she was given  bronchodilator with some relief, but she continued to have shortness of  breath, so it was decided to admit her for aggressive therapy. She also  had a CAT scan of the chest in the emergency room, which showed no  evidence of PE, mild basilar bronchiolitis, and she had mass noted in  the right upper lobe and right lower lobe. She had a CAT scan done in  2010 when the right lower lobe nodule was 8 x 8 mm and had remained  stable for two years.      Objective:   PHYSICAL EXAM:    LUNGS:Occasional basal crackles  Abd-soft, BS+,NT  Ext - no pedal edema  CVs-s1s2, no murmurs      DATA:    CBC:  Recent Labs     09/18/20  1640 09/20/20  1047   WBC 9.1 14.1*   RBC 4.76 4.45   HGB 13.6 12.6   HCT 44.8 41.8    324   MCV 94.1 93.9   MCH 28.6 28.3   MCHC 30.4* 30.1*   RDW 13.4 13.6   SEGSPCT 85.9* 92.5*      BMP:  Recent Labs     09/18/20  1640 09/20/20  1047    144   K 4.2 3.9   CL 91* 97*   CO2 40* 34*   BUN 22 32*   CREATININE 0.6 0.9   CALCIUM 9.8 9.3   GLUCOSE 109* 122*      ABG:  Recent Labs     09/19/20  0600   PH 7.36   PO2ART 58*   [id number] 79.0*   O2SAT 89.7*     BNP  No results found for: BNP   D-Dimer:  Lab Results Component Value Date    DDIMER 243 (H) 09/18/2020      1. Radiology: None      Assessment/Plan     Patient Active Problem List    Diagnosis Date Noted    Acute on chronic respiratory failure with hypoxia ([facility] [address] 09/18/2020    Osteopenia 06/09/2019    Anxiety 06/09/2019    Tobacco abuse 05/18/2019    Abdominal aortic aneurysm (AAA) without rupture ([facility] [address] 05/18/2019    Hypertension     Degeneration of lumbar or lumbosacral intervertebral disc 07/08/2014    Hyperlipidemia     COPD (chronic obstructive pulmonary disease) ([facility] [address]     Leg pain    Patient is lying in the bed. She is using BIPAP, not in acute resp distress    1. Acute-on-chronic respiratory failure secondary to acute exacerbation  of COPD. 2.  Acute exacerbation of COPD. 3.  Right upper lobe and lower lobe lung nodule. The right lower lobe  nodule has increased in size compared to 2010. Right upper lobe nodule  is new. 4.  History of tobacco abuse       1. Abx  2. F/u C&S  3. Nebs  4. BIPAP  5. ICS  6. OOB  7. C/w present management  No follow-ups on file.     Electronically signed by Nataliia Florez MD on 9/20/2020 at 1:00 PM

## 2020-09-21 NOTE — CARE COORDINATION
.CM met with pt for d/c planning. Introduced self and updated white board. Pt lives with her family and is independent with ADL's. Pt drives, has a PCP, has insurance, and is able to afford  medication. Pt is on O2 24/7, has a walker and and sleeps in a recliner. [facility] offered and pt refused. Pt denies any needs at this time. D/c plan is home with her family, no needs. Notify CM if any d/c needs arise.   TE

## 2020-09-21 NOTE — PROGRESS NOTES
Hospitalist Progress Note      Name:  Isaiah Valdivia /Age/Sex: 1950  (68 y.o. female)   MRN & CSN:  [id number] & [id number] Admission Date/Time: 2020  4:28 PM   Location:  [address] [place] [address] PCP: Lakisha Newell MD       Isaiah Valdivia is a 68 y.o.  female  who presents with Shortness of Breath      Assessment and Plan:   Acute on chronic respiratory failure with hypoxia 2/2 COPD exacerbation, Lung Mass possible malignancy? - COVID 19 neg  - CTA pulmonary: No PE. Mild bibasilar bronchiolitis. Masses and nodule within the right lung in background of pulmonary emphysema, worrisome for malignancy  -Azithromycin and Rocephin IV  -Solumedrol IV changed to oral now  -MDI treatment  -Symptomatic management  -Resp panel; neg  -Pulmonary consulted, recommended PET scan outpt  - wean down NC O2 as tolerated, she wears 3L at home on 6 this am, ambulate and try to wean to baseline.     HypoPhos  - replace     Tobacco abuse  HTN  HLD        DVT-PPX: Lovenox  Diet: General               Diet DIET GENERAL;   Code Status Full Code     Medications:   Medications:    albuterol sulfate HFA  2 puff Inhalation 4x daily    guaiFENesin  600 mg Oral TID    theophylline  100 mg Oral BID    pantoprazole  40 mg Oral QAM AC    aspirin  81 mg Oral Daily    calcium carbonate  500 mg Oral Daily    atorvastatin  10 mg Oral Daily    ipratropium  2 puff Inhalation 4x daily    sodium chloride flush  10 mL Intravenous 2 times per day    enoxaparin  40 mg Subcutaneous Daily    predniSONE  40 mg Oral Daily    cefTRIAXone (ROCEPHIN) IV  1 g Intravenous Q24H    azithromycin  500 mg Intravenous Q24H    nicotine  1 patch Transdermal Daily      Infusions:   PRN Meds: HYDROcodone-acetaminophen, 1 tablet, Q4H PRN  sodium chloride flush, 10 mL, PRN  acetaminophen, 650 mg, Q6H PRN    Or  acetaminophen, 650 mg, Q6H PRN  polyethylene glycol, 17 g, Daily PRN  promethazine, 12.5 mg, Q6H PRN    Or  ondansetron, 4 mg, Q6H PRN      Subjective: On bipap. Feels a bit better she states  Objective:   No intake or output data in the 24 hours ending 09/21/20 0912   Vitals:   Vitals:    09/21/20 0822   BP: (!) 143/77   Pulse: 87   Resp: 20   Temp: 98.6 °F (37 °C)   SpO2: 92%     Physical Exam:   Gen:  awake, alert, no apparent distress  Head/Eyes:  Normocephalic atraumatic, EOMI   NECK:   symmetrical, trachea midline  LUNGS: Normal Effort   CARDIOVASCULAR:  Normal rate  ABDOMEN:  non distended  MUSCULOSKELETAL:  ROM WNL  NEUROLOGIC: Alert and Oriented,  Cranial nerves II-XII are grossly intact.    SKIN:  no bruising or bleeding, normal skin color,  no redness      Data:       CBC   Recent Labs     09/18/20  1640 09/20/20  1047   WBC 9.1 14.1*   HGB 13.6 12.6   HCT 44.8 41.8    324      BMP   Recent Labs     09/18/20  1640 09/20/20  1047    144   K 4.2 3.9   CL 91* 97*   CO2 40* 34*   PHOS  --  2.4*   BUN 22 32*   CREATININE 0.6 0.9         Electronically signed by Gerald Bejarano MD on 9/21/2020 at 9:12 AM

## 2020-09-21 NOTE — PROGRESS NOTES
Pulmonary and Critical Care  Progress Note    Subjective: The patient has improved  Shortness of breath has improved  Chest pain none  Addressing respiratory complaints Patient is negative for  hemoptysis and cyanosis  CONSTITUTIONAL:  negative for fevers and chills      Past Medical History:     has a past medical history of COPD (chronic obstructive pulmonary disease) ([address] H/O cardiovascular stress test, H/O cardiovascular stress test, H/O Doppler ultrasound, H/O Doppler ultrasound, History of 24 hour EKG monitoring, History of nuclear stress test, Hx of chest x-ray, Hx of echocardiogram, Hx of echocardiogram, Hx of pelvic x-ray, Hx of x-ray of hip, Hyperlipidemia, Hypertension, and Leg pain. has a past surgical history that includes Hysterectomy; cyst removal; Tubal ligation; back surgery; joint replacement (Right); and laparoscopic appendectomy (N/A, 5/18/2019). reports that she has been smoking. She has a 47.00 pack-year smoking history. She has never used smokeless tobacco. She reports current alcohol use. She reports that she does not use drugs. Family history:  family history includes Cancer in her father and sister; Dementia in her mother; Diabetes in her sister; Heart Attack in her sister; Heart Attack (age of onset: 61) in her father and mother; Hypertension in her sister, sister, and sister; Irritable Bowel Syndrome in her sister; Stroke in her father, mother, and sister. Allergies   Allergen Reactions    Formaldehyde     Gabapentin Other (See Comments)    Norflex Tablets [Orphenadrine]     Tudorza Pressair [Aclidinium Bromide]      Pt states it gave her a rash on her ankles and knees.     Cranberry Rash     Social History:    Reviewed; no changes    Objective:   PHYSICAL EXAM:        VITALS:  BP (!) 143/77   Pulse 87   Temp 98.6 °F (37 °C) (Oral)   Resp 20   Ht 5' (1.524 m)   Wt 115 lb 4.8 oz (52.3 kg)   SpO2 92%   BMI 22.52 kg/m²     24HR INTAKE/OUTPUT:  No intake or output data in the 24 hours ending 09/21/20 1042    CONSTITUTIONAL:  awake, alert, cooperative, no apparent distress, and appears stated age  LUNGS:  decreased breath sounds  CARDIOVASCULAR:  normal S1 and S2 and negative JVD  ABD:Abdomen soft, non-tender. BS normal. No masses,  No organomegaly  NEURO:Alert and oriented x3. Gait normal. Reflexes and motor strength normal and symmetric. Cranial nerves 2-12 and sensation grossly intact. DATA:    CBC:  Recent Labs     09/18/20  1640 09/20/20  1047   WBC 9.1 14.1*   RBC 4.76 4.45   HGB 13.6 12.6   HCT 44.8 41.8    324   MCV 94.1 93.9   MCH 28.6 28.3   MCHC 30.4* 30.1*   RDW 13.4 13.6   SEGSPCT 85.9* 92.5*      BMP:  Recent Labs     09/18/20  1640 09/20/20  1047    144   K 4.2 3.9   CL 91* 97*   CO2 40* 34*   BUN 22 32*   CREATININE 0.6 0.9   CALCIUM 9.8 9.3   GLUCOSE 109* 122*      ABG:  Recent Labs     09/19/20  0600   PH 7.36   PO2ART 58*   [id number] 79.0*   O2SAT 89.7*     Lab Results   Component Value Date    PROBNP 191.4 09/20/2020    PROBNP 120.6 09/18/2020     No results found for: [facility]    Radiology Review:  Pertinent images / reports were reviewed as a part of this visit. Assessment:     Patient Active Problem List   Diagnosis    Hyperlipidemia    COPD (chronic obstructive pulmonary disease) ([address]    Leg pain    Hypertension    Tobacco abuse    Abdominal aortic aneurysm (AAA) without rupture (HCC)    Degeneration of lumbar or lumbosacral intervertebral disc    Osteopenia    Anxiety    Acute respiratory failure with hypoxia ([address]       Plan:   1. Overall the patient has improved  2. PET scan as outpt.   3. Home Bipap eval.    Kendra Doherty MD  9/21/2020  10:42 AM

## 2020-09-21 NOTE — PLAN OF CARE
Problem: Falls - Risk of:  Goal: Will remain free from falls  Description: Will remain free from falls  Outcome: Ongoing  Goal: Absence of physical injury  Description: Absence of physical injury  Outcome: Ongoing     Problem: Pain:  Description: Pain management should include both nonpharmacologic and pharmacologic interventions.   Goal: Pain level will decrease  Description: Pain level will decrease  Outcome: Ongoing  Goal: Control of acute pain  Description: Control of acute pain  Outcome: Ongoing  Goal: Control of chronic pain  Description: Control of chronic pain  Outcome: Ongoing     Problem: Breathing Pattern - Ineffective:  Goal: Ability to achieve and maintain a regular respiratory rate will improve  Description: Ability to achieve and maintain a regular respiratory rate will improve  Outcome: Ongoing

## 2020-09-21 NOTE — PROGRESS NOTES
Patient Room #: 6680/9408-F  Patient Name: [address] NIV Evaluation / Orders  1. Notify Pulmonary Diagnostics of order to evaluate for home NIV. 2. Perform the following tests at any point before discharge. [x] Perform an Overnight Oximetry while the patient is on at least                  2 lpm of oxygen. The saturation level must be <  88% for > 5                   cumulative minutes to qualify. [x] Arterial puncture (ABG) for blood gas analysis done while awake. ·    Qualifying result is a PaCO2 >   52 mmHg    3. [x]  I have documented in a progress note that ANDRES is not the primary cause of hypercapnia in this patient. CPAP will not effectively treat this patient hypercapnia. BIPA Therapy will benefit and more effectively treat the hypercapnia. Results Documentation    (Attach a copy of Reports)  1. ABG Results       Date _________  PaCO2 __________ mmHg on ___________ lpm oxygen    2. Oximetry Level _________% for ___________ minutes on ________ lpm oxygen    3. [] Patient Qualifies      [] Patient Does NOT qualify      Complete order below:  Diagnosis _COPD, Chronic Hypercapnic Respiratory Failure____           Length of Need: [x] Lifetime  Home NIV () at:      Inspiratory Setting _15_ cm H2O   Expiratory Setting _6_ cm H2O      Therapist Signature: ___Date: _____________    Physician Signature:   Electronically Signed in EMR_______  Date: _____________    Physician Printed Name: Ordering User: Rancho Parrish MD  NPI:  [id number]

## 2020-09-21 NOTE — PROGRESS NOTES
Please run overnight oximetry on 3 LPM overnight per pts home oxygen orders. ABG to be completed on 3 LPM if pt has qualifying desaturations.  Thanks

## 2020-09-22 NOTE — PROGRESS NOTES
Bipap Eval performed on patient. Pt study performed on pt home O2 setting of 3L. Hard copy of study can be found in pt soft chart.

## 2020-09-22 NOTE — PROGRESS NOTES
Attempted to make follow up with Dr. Ania Velarde, called during lunch hour. Will advise pt to call office when she gets home to make her follow up.   Elan Aguirre RN   11:20 AM  9/22/2020

## 2020-09-22 NOTE — PROGRESS NOTES
Pulmonary and Critical Care  Progress Note    Subjective: The patient is better. Didnt qualify for home Bipap. Shortness of breath has improved. Chest pain none  Addressing respiratory complaints Patient is negative for  hemoptysis and cyanosis  CONSTITUTIONAL:  negative for fevers and chills      Past Medical History:     has a past medical history of COPD (chronic obstructive pulmonary disease) ([facility] [address] H/O cardiovascular stress test, H/O cardiovascular stress test, H/O Doppler ultrasound, H/O Doppler ultrasound, History of 24 hour EKG monitoring, History of nuclear stress test, Hx of chest x-ray, Hx of echocardiogram, Hx of echocardiogram, Hx of pelvic x-ray, Hx of x-ray of hip, Hyperlipidemia, Hypertension, and Leg pain. has a past surgical history that includes Hysterectomy; cyst removal; Tubal ligation; back surgery; joint replacement (Right); and laparoscopic appendectomy (N/A, 5/18/2019). reports that she has been smoking. She has a 47.00 pack-year smoking history. She has never used smokeless tobacco. She reports current alcohol use. She reports that she does not use drugs. Family history:  family history includes Cancer in her father and sister; Dementia in her mother; Diabetes in her sister; Heart Attack in her sister; Heart Attack (age of onset: 61) in her father and mother; Hypertension in her sister, sister, and sister; Irritable Bowel Syndrome in her sister; Stroke in her father, mother, and sister. Allergies   Allergen Reactions    Formaldehyde     Gabapentin Other (See Comments)    Norflex Tablets [Orphenadrine]     Tudorza Pressair [Aclidinium Bromide]      Pt states it gave her a rash on her ankles and knees.     Cranberry Rash     Social History:    Reviewed; no changes    Objective:   PHYSICAL EXAM:        VITALS:  /65   Pulse 91   Temp 97.5 °F (36.4 °C) (Oral)   Resp 26   Ht 5' (1.524 m)   Wt 115 lb (52.2 kg)   SpO2 91%   BMI 22.46 kg/m²     24HR INTAKE/OUTPUT: Intake/Output Summary (Last 24 hours) at 9/22/2020 1038  Last data filed at 9/21/2020 1855  Gross per 24 hour   Intake 480 ml   Output --   Net 480 ml       CONSTITUTIONAL:  awake, alert, cooperative, no apparent distress, and appears stated age  LUNGS:  decreased breath sounds. CARDIOVASCULAR:  normal S1 and S2 and negative JVD  ABD:Abdomen soft, non-tender. BS normal. No masses,  No organomegaly  NEURO:Alert and oriented x3. Gait normal. Reflexes and motor strength normal and symmetric. Cranial nerves 2-12 and sensation grossly intact. DATA:    CBC:  Recent Labs     09/20/20  1047   WBC 14.1*   RBC 4.45   HGB 12.6   HCT 41.8      MCV 93.9   MCH 28.3   MCHC 30.1*   RDW 13.6   SEGSPCT 92.5*      BMP:  Recent Labs     09/20/20  1047      K 3.9   CL 97*   CO2 34*   BUN 32*   CREATININE 0.9   CALCIUM 9.3   GLUCOSE 122*      ABG:  No results for input(s): PH, PO2ART, NYP6EJJ, HCO3, BEART, O2SAT in the last 72 hours. Lab Results   Component Value Date    PROBNP 191.4 09/20/2020    PROBNP 120.6 09/18/2020     No results found for: [facility]    Radiology Review:  Pertinent images / reports were reviewed as a part of this visit. Assessment:     Patient Active Problem List   Diagnosis    Hyperlipidemia    COPD (chronic obstructive pulmonary disease) ([facility] [address]    Leg pain    Hypertension    Tobacco abuse    Abdominal aortic aneurysm (AAA) without rupture (HCC)    Degeneration of lumbar or lumbosacral intervertebral disc    Osteopenia    Anxiety    Acute respiratory failure with hypoxia ([address]       Plan:   1. Overall the patient has improved  2. PET scan as outpt. 3. Inc. Activity.     Nessa Denny MD  9/22/2020  10:38 AM

## 2020-09-22 NOTE — DISCHARGE SUMMARY
Discharge Summary    Name:  Vincent Rudolph /Age/Sex: 1950  (68 y.o. female)   MRN & CSN:  [id number] & [id number] Admission Date/Time: 2020  4:28 PM   Attending:  Gaye Brown MD Discharging Physician: Sonia Mccullough MD     Hospital Course:   #Acute on chronic respiratory failure with hypoxia 2/2 COPD exacerbation,  #Lung Mass possible malignancy  - COVID 19 neg  - CTA pulmonary: No PE. Mild bibasilar bronchiolitis. Masses and nodule within the right lung in background of pulmonary emphysema, worrisome for malignancy  -will discontinue IV rocephin and azithromycin  -start on PO azithromycin at home  -Continue p.o. steroid taper at home  -Continue breathing treatments at home  -Resp panel; neg  -Pulmonary consulted, recommended PET scan outpatient  -Patient is currently on home 3 L of oxygen  -Patient failed overnight study does not qualify for BiPAP  -Follow-up with pulmonology as outpatient    #HypoPhos  - replaced     Other chronic medical conditions: We will continue medications except were contraindicated  Tobacco abuse  HTN  HLD      The patient expressed appropriate understanding of and agreement with the discharge recommendations, medications, and plan.      Consults this admission:  IP CONSULT TO HOSPITALIST  IP CONSULT TO PULMONOLOGY    Discharge Instruction:   Follow up appointments: Pulmonology  Primary care physician:  within 2 weeks    Diet:  cardiac diet   Activity: activity as tolerated  Disposition: Discharged to:   [x]Home, [][facility], []SNF, []Acute Rehab, []Hospice  Condition on discharge: Stable    Discharge Medications:      Aye Fernando   Home Medication Instructions YYN:[id number]    Printed on:20 1051   Medication Information                      albuterol sulfate HFA (PROAIR HFA) 108 (90 Base) MCG/ACT inhaler  Inhale 2 puffs into the lungs every 6 hours as needed for Wheezing             aspirin 81 MG EC tablet  Take 81 mg by mouth daily             azithromycin (ZITHROMAX) 500 MG tablet  Take 1 tablet by mouth daily for 3 days             calcium carbonate 600 MG TABS tablet  Take 1 tablet by mouth daily             Cholecalciferol (VITAMIN D3) 25 MCG (1000 UT) TABS  Take by mouth             Fexofenadine HCl (MUCINEX ALLERGY PO)  Take by mouth             guaiFENesin (MUCINEX) 600 MG extended release tablet  Take 1 tablet by mouth 3 times daily             HYDROcodone-acetaminophen (NORCO)  MG per tablet  Take 1 tablet by mouth every 4 hours as needed for Pain for up to 30 days. Indications: 180             ipratropium-albuterol (DUONEB) 0.5-2.5 (3) MG/3ML SOLN nebulizer solution  Inhale 3 mLs into the lungs 4 times daily             LORazepam (ATIVAN) 0.5 MG tablet  Take 0.5 mg by mouth every 6 hours as needed for Anxiety. nicotine (NICODERM CQ) 21 MG/24HR  Place 1 patch onto the skin daily             predniSONE (DELTASONE) 10 MG tablet  Take 1 tablet by mouth daily for 10 days             predniSONE (DELTASONE) 20 MG tablet  Take 1 tablet by mouth daily for 10 days             predniSONE (DELTASONE) 5 MG tablet  Take 3 tablets by mouth daily for 10 days             predniSONE (DELTASONE) 5 MG tablet  Take 1 tablet by mouth daily for 10 days             simvastatin (ZOCOR) 20 MG tablet  Take 1 tablet by mouth nightly             theophylline (BECKY-24) 100 MG extended release capsule  Take 1 capsule by mouth 2 times daily             zoster recombinant adjuvanted vaccine (SHINGRIX) 50 MCG/0.5ML SUSR injection  50 MCG IM then repeat 2-6 months. Objective Findings at Discharge:   /65   Pulse 91   Temp 97.5 °F (36.4 °C) (Oral)   Resp 26   Ht 5' (1.524 m)   Wt 115 lb (52.2 kg)   SpO2 91%   BMI 22.46 kg/m²            PHYSICAL EXAM   GEN Awake female, sitting upright in bed in no apparent distress. Appears given age. EYES Pupils are equally round. No scleral erythema, discharge, or conjunctivitis.   HENT Mucous membranes are

## 2020-09-23 NOTE — CARE COORDINATION
Rickey 45 Transitions Initial Follow Up Call    Call within 2 business days of discharge: Yes    Patient: Trish Garcia Patient : 1950   MRN: [id number]  Reason for Admission: Ac Copd, A/C Resp Failure, Lung Mass--possible malignancy  Discharge Date: 20 RARS: Readmission Risk Score: 16  Facility: [address] [place] [address]       Complaint Diagnosis Description Type Department Provider    20 Shortness of Breath Chronic obstructive pulmonary disease with acute exacerbation ([facility] [address] . .. ED to Hosp-Admission (Discharged) (ADMITTED) Vaughn Alanis MD; Ruben Mares ...         Non-face-to-face services provided:  Obtained and reviewed discharge summary and/or continuity of care documents    Care Transitions 24 Hour Call    Schedule Follow Up Appointment with PCP:  Jaimie Jones you have any ongoing symptoms?:  Yes  Patient-reported symptoms:  Shortness of Breath  Interventions for patient-reported symptoms:  Other (Comment: Patient denied need as sx at baseline)  Do you have a copy of your discharge instructions?:  Yes  Do you have all of your prescriptions and are they filled?:  No  Have you been contacted by a [id number] [address] Pharmacist?:  No  Have you scheduled your follow up appointment?:  Yes  How are you going to get to your appointment?:  Car - family or friend to transport  Were you discharged with any Home Care or Post Acute Services:  No  Do you feel like you have everything you need to keep you well at home?:  Yes  Care Transitions Interventions  No Identified Needs   Home Care Waiver:  Declined        DME Assistance:  Declined     Senior Services:  Declined         Follow Up  Future Appointments   Date Time Provider Jourdan Funez   10/8/2020 12:30 PM Heidi Shields MD [facility]   11/3/2020  1:45 PM Heidi Shields MD [facility]   2020  1:15 PM Jamar Jessica MD [facility]   4/15/2021 10:30 AM Taryn Cervantes MD [facility] quarantine From CDC: Are you at higher risk for severe illness? For more information on steps you can take to protect yourself, see CDC's How to Protect Yourself . Patient denies questions and agrees to contact PCP with healthcare questions, concerns. Advanced Care Planning: Reports she does have ACP \"in the trunk\". Advised to give copy to PCP to have on file. Verified [facility]. Decision Maker/number on file. No email for Get Well Loop. Agreeable to ongoing CT follow up. CTN provided contact information for future needs.     Upon next outreach: [facility] education, verify appts, verify ACP documents on file w/ MD.  Scot Roger RN

## 2020-10-01 NOTE — CARE COORDINATION
Rickey 45 Transitions Follow Up Call    10/1/2020    Patient: Skylar Alexander  Patient : 1950   MRN: [id number]  Reason for Admission: Ac Copd, A/C Resp Failure, Lung Mass--possible malignancy  Discharge Date: 20 RARS: Readmission Risk Score: 16    Care Transitions Subsequent and Final Call    Subsequent and Final Calls  Care Transitions Interventions   Home Care Waiver:  Declined        DME Assistance:  Declined     Senior Services:  Declined    Other Interventions: Follow Up  Future Appointments   Date Time Provider Jourdan Funez   10/8/2020 12:30 PM Fercho Russell MD [facility] Fercho Ran   11/3/2020  1:45 PM Fercho Russell MD [facility]   2020  1:15 PM Marilin Valdez MD [facility]   4/15/2021 10:30 AM Felipe Oconnell MD [facility]   . CARE TRANSITIONS / COVID19 RISK MONITORING  COVID19 SCREEN: 2020 Not detected  PATIENT RISK FACTORS:  Copd, Ch Resp Failure on O2, Lung Mass    Phone Assessment: CTN spoke w/ Patient for follow up call. Reports ongoing sob on exertions and fatigue; states \"I tire easy\". Denies acute distress or need for MD notification. Reports using O2/Med Neb and taking all rx as directed. Continues on Prednisone. Discharge Need Reviewed: Continues to deny resource needs including hhc, dme, community assistance. Reports [facility] assists as needed. Reports O2 and Med Nebs good working order, no supply needs    Follow Up Appointment Completed?: No, Has appt w/ dr Landon Urena 10/8/2020 and PET Scan. Declined CTN offer to schedule 7 day hosp f/u appt    Atb Completed?: Yes, Zithromax completed. Risk Factors for Readmission: Copd, Ch Resp Failure on O2, Lung Mass; Patient does not seem to understand severity of illness and need for follow up. [id number] Education Provided on Sx, Preventative Measures, Flu Clinic?:  Yes    Interventions to Address Risk Factors:  1.  Discussed changes in size of lung mass and need for follow up. Discussed risks of not following thru w/ appts/tx. Support given. 2. Confirmed transportation for Pulm appt. 3. Confirmed taking Prednisone as directed. 4. Advised to contact PCP 24/7 re: any health concerns. 5. Copd Zone Mgt education    Upon Next Outreach:  1.  F/U on Dr Laya Cisneros appt, PET Scan; Claire Sharma RN

## 2020-10-08 PROBLEM — R91.8 LUNG MASS: Chronic | Status: ACTIVE | Noted: 2020-01-01

## 2020-10-08 NOTE — PROGRESS NOTES
Vaccine Information Sheet, \"Influenza - Inactivated\"  given to Rachel Wilson, or parent/legal guardian of  Rachel Wilson and verbalized understanding. Patient responses:    Have you ever had a reaction to a flu vaccine? No  Do you have any current illness? No  Have you ever had Guillian [place] Syndrome? No  Do you have a serious allergy to any of the follow: Neomycin, Polymyxin, Thimerosal, eggs or egg products? No    Flu vaccine given per order. Please see immunization tab. Risks and benefits explained. Current VIS given.       Immunizations Administered     Name Date Dose Route    Influenza, Triv, inactivated, subunit, adjuvanted, IM (Fluad 65 yrs and older) 10/8/2020 0.5 mL Intramuscular    Site: Deltoid- Left    Lot: [id number]    [address]: 18093-569-33

## 2020-10-08 NOTE — PATIENT INSTRUCTIONS
Personalized Preventive Plan for Sawant Copa - 10/8/2020  Medicare offers a range of preventive health benefits. Some of the tests and screenings are paid in full while other may be subject to a deductible, co-insurance, and/or copay. Some of these benefits include a comprehensive review of your medical history including lifestyle, illnesses that may run in your family, and various assessments and screenings as appropriate. After reviewing your medical record and screening and assessments performed today your provider may have ordered immunizations, labs, imaging, and/or referrals for you. A list of these orders (if applicable) as well as your Preventive Care list are included within your After Visit Summary for your review. Other Preventive Recommendations:    · A preventive eye exam performed by an eye specialist is recommended every 1-2 years to screen for glaucoma; cataracts, macular degeneration, and other eye disorders. · A preventive dental visit is recommended every 6 months. · Try to get at least 150 minutes of exercise per week or 10,000 steps per day on a pedometer . · Order or download the FREE \"Exercise & Physical Activity: Your Everyday Guide\" from [employer]. Call 2-931.577.2380 or search [employer] online. · You need 7181-1479 mg of calcium and 9682-7648 IU of vitamin D per day. It is possible to meet your calcium requirement with diet alone, but a vitamin D supplement is usually necessary to meet this goal.  · When exposed to the sun, use a sunscreen that protects against both UVA and UVB radiation with an SPF of 30 or greater. Reapply every 2 to 3 hours or after sweating, drying off with a towel, or swimming. · Always wear a seat belt when traveling in a car. Always wear a helmet when riding a bicycle or motorcycle.

## 2020-10-08 NOTE — PROGRESS NOTES
10/8/2020     Zulay Esquivel      Chief Complaint   Patient presents with    Follow-Up from Je[facility] Gaspar, sleep apnea. couldn't breath when went in. seeing sree to set up PET scan       HPI      Rupert is a 65 y.o. female who presents today with the followin. Encounter for screening mammogram for breast cancer    2. Degeneration of lumbar or lumbosacral intervertebral disc    3. Lung mass    4. Chronic obstructive pulmonary disease with acute exacerbation ([address]    5. Acute respiratory failure with hypoxia (HCC)    6. Anxiety    7. Essential hypertension    8. Tobacco abuse    She is here for follow-up  She was hospitalized about 3 weeks ago for hypoxic respiratory failure  Her oxygen saturation went down about 70% on 3 to 4 L  She had required 11 L in the hospital  She was treated with antibiotics and steroids and she is still on a taper of that  She was able to go home  Mention was made of a possible malignant lung mass while she was in the hospital  She says that her pulmonary doctor then following something like this for years it was not all that concerned about it but I think she is being lined up for a PET scan    REVIEW OF SYMPTOMS    Review of Systems   Constitutional: Positive for activity change. Negative for fever and unexpected weight change. Patient rarely goes out  I think a daughter granddaughter lives with her  She is taking precaution for COVID-19 prevention  She is had no symptoms or known exposure  She was recently tested for COVID-19 during her hospital stay and was negative   HENT: Negative for congestion and sore throat.     Respiratory:        Patient has advanced COPD she is oxygen dependent   She admitted to me she still smoke smoking 5 cigarettes/day  I cautioned her against this with the oxygen  She absolutely needs to quit and she seems determined to taper off  She has had a recent exacerbation of COPD which is well documented in her recent hospital discharge summary   Genitourinary: Negative for difficulty urinating. Musculoskeletal: Positive for arthralgias and back pain. Long history of chronic right lumbar radiculopathy  She has exhausted all nonsurgical treatments  She is in compliance with controlled substance contract and monitoring  There are no signs or suspicion of abuse or diversion   Neurological: Negative for dizziness and light-headedness. Psychiatric/Behavioral: Positive for dysphoric mood. The patient is nervous/anxious. Chronic depression and anxiety       PAST MEDICAL HISTORY  Past Medical History:   Diagnosis Date    COPD [facility]chronic obstructive pulmonary disease) ([facility] [address]     H/O cardiovascular stress test 11/18/2009    thallium, normal no ischemia EF70%     H/O cardiovascular stress test 10/11/2013    thallium,EF70%, normal, no ischemia    H/O Doppler ultrasound 10/13/10    carotid, right normal, left normal    H/O Doppler ultrasound 10/07/14    Carotid mild bilateral internal carotid artery disease less than 50% in severity. Normal vertebral artery flows with good velocities. Incidental finding of possible thyroid nodule in the left lobe.      History of 24 hour EKG monitoring 3/11/2011    NSR no ectopy    History of nuclear stress test 10/21/2016    lexiscan-normal,no ischemia,EF70%,enlarged right ventricle    Hx of chest x-ray 01/02/2015    WNL    Hx of echocardiogram 10/11/2013    10/13 Abn lt ventricular diastolic function, mile MR, EF 57%,10/10 EF55%, mild mitral annular calcification    Hx of echocardiogram 05/02/2019    EF 65-79%, Grade I diastolic dysfunction, Mild aortic stenosis, Calcific thickening of posterior leaflet of mitral valve, Mild Pulm HTN    Hx of pelvic x-ray 01/02/2015    Severe RT his osterarothrosis    Hx of x-ray of hip 01/02/2015    Severe Rt hip osteosrthrosis    Hyperlipidemia     Hypertension     Leg pain        FAMILY HISTORY  Family History   Problem Relation Age of Onset    Stroke Mother  Dementia Mother     Heart Attack Mother 61    Cancer Father     Stroke Father     Heart Attack Father 61    Diabetes Sister     Heart Attack Sister     Hypertension Sister     Cancer Sister     Stroke Sister     Hypertension Sister     Hypertension Sister     Irritable Bowel Syndrome Sister        SOCIAL HISTORY  Social History     Socioeconomic History    Marital status:      Spouse name: None    Number of children: None    Years of education: None    Highest education level: None   Occupational History    None   Social Needs    Financial resource strain: None    Food insecurity     Worry: None     Inability: None    Transportation needs     Medical: None     Non-medical: None   Tobacco Use    Smoking status: Current Every Day Smoker     Packs/day: 1.00     Years: 47.00     Pack years: 47.00    Smokeless tobacco: Never Used   Substance and Sexual Activity    Alcohol use:  Yes     Alcohol/week: 0.0 standard drinks     Comment: rare    Drug use: No    Sexual activity: None     Comment:    Lifestyle    Physical activity     Days per week: None     Minutes per session: None    Stress: None   Relationships    Social connections     Talks on phone: None     Gets together: None     Attends Cheondo[employer] service: None     Active member of club or organization: None     Attends meetings of clubs or organizations: None     Relationship status: None    Intimate partner violence     Fear of current or ex partner: None     Emotionally abused: None     Physically abused: None     Forced sexual activity: None   Other Topics Concern    None   Social History Narrative    None        SURGICAL HISTORY  Past Surgical History:   Procedure Laterality Date    BACK SURGERY      CYST REMOVAL      left arm    HYSTERECTOMY      1999    JOINT REPLACEMENT Right     hip    LAPAROSCOPIC APPENDECTOMY N/A 5/18/2019    APPENDECTOMY LAPAROSCOPIC performed by Francisco Javier Duenas MD at [address] Kristyn Cruz MD on 10/8/2020    Please note that this chart was generated using dragon dictation software. Although every effort was made to ensure the accuracy of this automated transcription, some errors in transcription may have occurred.

## 2020-10-08 NOTE — PROGRESS NOTES
Medicare Annual Wellness Visit  Name: Olaf Cordova Date: 10/8/2020   MRN: [id number] Sex: Female   Age: 65 y.o. Ethnicity: Non-/Non    : 1950 Race: Sonia Max is here for Medicare AWV    Screenings for behavioral, psychosocial and functional/safety risks, and cognitive dysfunction are all negative except as indicated below. These results, as well as other patient data from the [address] form, are documented in Flowsheets linked to this Encounter. Allergies   Allergen Reactions    Formaldehyde     Gabapentin Other (See Comments)    Norflex Tablets [Orphenadrine]     Tudorza Pressair [Aclidinium Bromide]      Pt states it gave her a rash on her ankles and knees.  Cranberry Rash       Prior to Visit Medications    Medication Sig Taking? Authorizing Provider   HYDROcodone-acetaminophen (NORCO)  MG per tablet Take 1 tablet by mouth every 4 hours as needed for Pain for up to 30 days. Indications: 180 Yes Estrada Damon MD   azithromycin (ZITHROMAX Z-CELSA) 250 MG tablet 2 tabs day 1, 1 tab day 2 to 5 Yes Debbie Mendoza MD   methylPREDNISolone (MEDROL, CELSA,) 4 MG tablet Use as directed Yes Debbie Mendoza MD   theophylline (UNIPHYL) 400 MG extended release tablet Take 0.5 tablets by mouth daily Yes Debbie Mendoza MD   nicotine (NICODERM CQ) 21 MG/24HR Place 1 patch onto the skin daily Yes Horacio Christian MD   predniSONE (DELTASONE) 10 MG tablet Take 1 tablet by mouth daily for 10 days Yes Horacio Christian MD   predniSONE (DELTASONE) 5 MG tablet Take 1 tablet by mouth daily for 10 days Yes Horacio Christian MD   zoster recombinant adjuvanted vaccine (SHINGRIX) 50 MCG/0.5ML SUSR injection 50 MCG IM then repeat 2-6 months.  Yes Estrada Damon MD   ipratropium-albuterol (DUONEB) 0.5-2.5 (3) MG/3ML SOLN nebulizer solution Inhale 3 mLs into the lungs 4 times daily Yes Debbie Mendoza MD   albuterol sulfate HFA (PROAIR HFA) 108 (90 Base) MCG/ACT inhaler Inhale 2 puffs into the lungs every 6 hours as needed for Wheezing Yes Nivia Hastings MD   Cholecalciferol (VITAMIN D3) 25 MCG (1000 UT) TABS Take by mouth Yes Historical Provider, MD   calcium carbonate 600 MG TABS tablet Take 1 tablet by mouth daily Yes Historical Provider, MD   aspirin 81 MG EC tablet Take 81 mg by mouth daily Yes Historical Provider, MD   Fexofenadine HCl (MUCINEX ALLERGY PO) Take by mouth Yes Historical Provider, MD   simvastatin (ZOCOR) 20 MG tablet Take 1 tablet by mouth nightly Yes Nivia Hastings MD   LORazepam (ATIVAN) 0.5 MG tablet Take 0.5 mg by mouth every 6 hours as needed for Anxiety. Historical Provider, MD       Past Medical History:   Diagnosis Date    COPD (chronic obstructive pulmonary disease) ([facility] [address]     H/O cardiovascular stress test 11/18/2009    thallium, normal no ischemia EF70%     H/O cardiovascular stress test 10/11/2013    thallium,EF70%, normal, no ischemia    H/O Doppler ultrasound 10/13/10    carotid, right normal, left normal    H/O Doppler ultrasound 10/07/14    Carotid mild bilateral internal carotid artery disease less than 50% in severity. Normal vertebral artery flows with good velocities. Incidental finding of possible thyroid nodule in the left lobe.      History of 24 hour EKG monitoring 3/11/2011    NSR no ectopy    History of nuclear stress test 10/21/2016    lexiscan-normal,no ischemia,EF70%,enlarged right ventricle    Hx of chest x-ray 01/02/2015    WNL    Hx of echocardiogram 10/11/2013    10/13 Abn lt ventricular diastolic function, mile MR, EF 57%,10/10 EF55%, mild mitral annular calcification    Hx of echocardiogram 05/02/2019    EF 46-00%, Grade I diastolic dysfunction, Mild aortic stenosis, Calcific thickening of posterior leaflet of mitral valve, Mild Pulm HTN    Hx of pelvic x-ray 01/02/2015    Severe RT his osterarothrosis    Hx of x-ray of hip 01/02/2015    Severe Rt hip osteosrthrosis    Hyperlipidemia     Hypertension     Leg pain        Past Surgical History:   Procedure Laterality Date    BACK SURGERY      CYST REMOVAL      left arm    HYSTERECTOMY      1999    JOINT REPLACEMENT Right     hip    LAPAROSCOPIC APPENDECTOMY N/A 5/18/2019    APPENDECTOMY LAPAROSCOPIC performed by Radha Shell MD at [address] [place] [address]         Family History   Problem Relation Age of Onset    Stroke Mother     Dementia Mother     Heart Attack Mother 61    Cancer Father     Stroke Father     Heart Attack Father 61    Diabetes Sister     Heart Attack Sister     Hypertension Sister     Cancer Sister     Stroke Sister     Hypertension Sister     Hypertension Sister     Irritable Bowel Syndrome Sister        CareTeam (Including outside providers/suppliers regularly involved in providing care):   Patient Care Team:  Janet Bermudez MD as PCP - Andre Espinoza MD as PCP - [facility] Empaneled Provider  Chaz Edge MD as Consulting Physician (Cardiology)  Yeny Samayoa RN as Care Transitions Nurse    Wt Readings from Last 3 Encounters:   10/08/20 118 lb (53.5 kg)   10/08/20 118 lb (53.5 kg)   10/08/20 118 lb (53.5 kg)     Vitals:    10/08/20 1442   BP: 126/72   Pulse: 103   Temp: 97.1 °F (36.2 °C)   SpO2: 91%   Weight: 118 lb (53.5 kg)   Height: 5' (1.524 m)     Body mass index is 23.05 kg/m². Based upon direct observation of the patient, evaluation of cognition reveals recent and remote memory intact. Patient's complete Health Risk Assessment and screening values have been reviewed and are found in Flowsheets. The following problems were reviewed today and where indicated follow up appointments were made and/or referrals ordered.     Positive Risk Factor Screenings with Interventions:     Substance History:  Social History     Tobacco History     Smoking Status  Current Every Day Smoker Smoking Frequency  1 pack/day for 47 years (52 pk yrs)    Smokeless Tobacco Use  Never Used          Alcohol History     Alcohol Use Status  Yes Drinks/Week  0 Standard drinks or equivalent per week Amount  0.0 standard drinks of alcohol/wk Comment  rare          Drug Use     Drug Use Status  No          Sexual Activity     Sexually Active  Not Asked Comment                 Alcohol Screening: Audit-C Score: 1  Total Score: 1    A score of 8 or more is associated with harmful or hazardous drinking. A score of 13 or more in women, and 15 or more in men, is likely to indicate alcohol dependence. Substance Abuse Interventions:  · Tobacco abuse:  Patient is using nicoderm for smoking cessation    General Health and ACP:  General  In general, how would you say your health is?: Fair  In the past 7 days, have you experienced any of the following?  New or Increased Pain, New or Increased Fatigue, Loneliness, Social Isolation, Stress or Anger?: None of These  Do you get the social and emotional support that you need?: Yes  Do you have a Living Will?: Yes  Advance Directives     Power of  Living Will ACP-Advance Directive ACP-Power of     Not on File Not on File [address] Interventions:  · No Living Will: ACP documents already completed- patient asked to provide copy to the office    Health Habits/Nutrition:  Health Habits/Nutrition  Do you exercise for at least 20 minutes 2-3 times per week?: (!) No  Have you lost any weight without trying in the past 3 months?: No  Do you eat fewer than 2 meals per day?: No  Have you seen a dentist within the past year?: (!) No  Body mass index: 23.04  Health Habits/Nutrition Interventions:  · Inadequate physical activity:  patient is not ready to increase his/her physical activity level at this time  · Dental exam overdue:  patient encouraged to make appointment with his/her dentist    Hearing/Vision:  No exam data present  Hearing/Vision  Do you or your family notice any trouble with your hearing?: No  Do you have difficulty driving, watching TV, or doing any of your daily activities because of your eyesight?: No  Have you had an eye exam within the past year?: (!) No  Hearing/Vision Interventions:  · Vision concerns:  patient encouraged to make appointment with his/her eye specialist    Safety:  Safety  Do you have working smoke detectors?: Yes  Have all throw rugs been removed or fastened?: Yes  Do you have non-slip mats or surfaces in all bathtubs/showers?: (!) No  Do all of your stairways have a railing or banister?: Yes  Are your doorways, halls and stairs free of clutter?: Yes  Do you always fasten your seatbelt when you are in a car?: (!) No  Safety Interventions:  · Home safety tips provided verbally    Personalized Preventive Plan   Current Health Maintenance Status  Immunization History   Administered Date(s) Administered    Influenza A (Y8S8-07) Vaccine PF IM 12/10/2009    Influenza, High Dose (Fluzone 65 yrs and older) 09/19/2014, 10/23/2015, 09/18/2017, 09/21/2018    Influenza, Triv, inactivated, subunit, adjuvanted, IM (Fluad 65 yrs and older) 09/30/2019, 10/08/2020    Pneumococcal Conjugate 13-valent ([id number]) 03/24/2015    Pneumococcal Polysaccharide ([id number]) 09/17/2007, 03/06/2020    Tdap (Boostrix, Adacel) 06/22/2009        Health Maintenance   Topic Date Due    Shingles Vaccine (1 of 2) 09/27/1998    Low dose CT lung screening  10/13/2011    DTaP/Tdap/Td vaccine (2 - Td) 06/22/2019    Annual Wellness Visit (AWV)  06/23/2019    Breast cancer screen  07/03/2020    Lipid screen  08/28/2021    Colon cancer screen fecal DNA test (Cologuard)  01/23/2022    DEXA (modify frequency per FRAX score)  Completed    Flu vaccine  Completed    Pneumococcal 65+ years Vaccine  Completed    Hepatitis A vaccine  Aged Out    Hepatitis B vaccine  Aged Out    Hib vaccine  Aged Out    Meningococcal (ACWY) vaccine  Aged Out    Hepatitis C screen  Discontinued     Recommendations for Preventive Services Due: see orders and patient instructions/AVS.  . Recommended screening schedule for the next 5-10 years is provided to the patient in written form: see Patient Hattie Oglesby is a 65 y.o. female being evaluated by a Virtual Visit (audio visit) encounter to address concerns as mentioned above. A caregiver was present when appropriate. Due to this being a TeleHealth encounter (During AFLCT-12 public health emergency), evaluation of the following organ systems was limited: Vitals/Constitutional/EENT/Resp/CV/GI//MS/Neuro/Skin/Heme-Lymph-Imm. Pursuant to the emergency declaration under the [address] [place] [address], [address] [place] [address] authority and the [employer] and [employer] Act, this Virtual Visit was conducted with patient's (and/or legal guardian's) consent, to reduce the patient's risk of exposure to COVID-19 and provide necessary medical care. The patient (and/or legal guardian) has also been advised to contact this office for worsening conditions or problems, and seek emergency medical treatment and/or call 911 if deemed necessary. Patient identification was verified at the start of the visit: Yes    Total time spent for this encounter: Not billed by time    Services were provided through a audio synchronous discussion virtually to substitute for in-person clinic visit. Patient and provider were located at their individual homes. This encounter was performed under my, Marlin Habermann, MDs, direct supervision, 10/8/2020.      --Annalise Fields on 10/8/2020 at 2:48 PM    An electronic signature was used to authenticate this note. Rissa Balbuena, 10/8/2020, performed the documented evaluation under the direct supervision of the attending physician.

## 2020-10-09 NOTE — CARE COORDINATION
Rickey 45 Transitions Follow Up Call    10/9/2020    Patient: Mallika Muñoz  Patient : 1950   MRN: [id number]  Reason for Admission:   Discharge Date: 20 RARS: Readmission Risk Score: 16    Care Transitions Subsequent and Final Call    Subsequent and Final Calls  Care Transitions Interventions  Other Interventions:             Follow Up  Future Appointments   Date Time Provider Jourdan Funez   10/26/2020 12:30 PM Fer[facility] Bl[facility]   11/3/2020  1:45 PM Fer[facility] Bl[facility]   2020  1:15 PM Rogers Valencia MD [facility]   4/15/2021 10:30 AM Maryuri Trujillo MD [facility]   10/12/2021  4:00 PM [address] [place] [address], [facility] Meaghan Mejia RN

## 2020-10-11 NOTE — CARE COORDINATION
[facility] Transitions Follow Up Call    10/10/2020    Patient: Ceasar Weiss  Patient : 1950   MRN: [id number]  Reason for Admission: Ac Copd, A/C Resp Failure, Lung Mass--possible malignancy  Discharge Date: 20 RARS: Readmission Risk Score: 16    Care Transitions Subsequent and Final Call    Subsequent and Final Calls  Do you have any ongoing symptoms?:  Yes  Onset of Patient-reported symptoms:  Other  Patient-reported symptoms:  Shortness of Breath, Cough  Interventions for patient-reported symptoms:  Other  Have your medications changed?:  Yes  Patient Reports:  10/8/2020 + Zpack, Medrol Dose Pack  Do you have any questions related to your medications?:  No  Do you currently have any active services?:  No  Do you have any needs or concerns that I can assist you with?:  No  Identified Barriers: Other  Care Transitions Interventions   Home Care Waiver:  Declined        DME Assistance:  Declined     Senior Services:  Declined    Other Interventions: Follow Up  Future Appointments   Date Time Provider Jourdan Funez   10/26/2020 12:30 PM Ginny Gomez MD [facility]   11/3/2020  1:45 PM Ginny Gomez MD [facility]   2020  1:15 PM Steven Tripathi MD [facility]   4/15/2021 10:30 AM Ara Li MD [facility]   10/12/2021  4:00 PM Gina Lynne LPN [facility]   . CARE TRANSITIONS / COVID19 RISK MONITORING  COVID19 SCREEN: 2020 Not detected  PATIENT RISK FACTORS:  Copd, Ch Resp Failure on O2, Lung Mass    CTN spoke w/ Patient for follow up call. Reports ongoing sob on exertion, cough w/ thick, yellow/greean secretions. Denies acute resp distress, fever, malaise. Reports seen by Dr Cindy Zepeda on 10/8/2020 and started on second round of Zpack/Medorol Dose Pk. Confirmed Patient obtained new rx.  Stressed importance of taking as directed, not skipping doses and completing entire course unless otherwise directed by MD. Jos Zamora to deny resource needs including hhc, dme, community assistance. Reports [employer] assists as needed. Reports O2 and Med Nebs good working order, no supply needs. Reports she is using Nicotine Patch to stop smoking. Has went from 25-30 cigarettes daily to 6. Confirmed transportation for 10/26/2020 appt w/ Dr Kellen Bernard. Advised to contact PCP 24/7 re: any health concerns. Reviewed KOXIT18 education including sx, preventative measures and Flu Clinic. Reviewed Copd Zone Mgt education. Verbalizes understanding.    Shi Myrick RN

## 2020-10-23 NOTE — CARE COORDINATION
Rickey 45 Transitions Follow Up Call    10/23/2020    Patient: Jaimie Smith  Patient : 1950   MRN: [id number]  Reason for Admission: Ac Copd, A/C Resp Failure, Lung Mass--possible malignancy  Discharge Date: 20 RARS: Readmission Risk Score: 16    Care Transitions Subsequent and Final Call    Schedule Follow Up Appointment with PCP:  Declined  Subsequent and Final Calls  Do you have any ongoing symptoms?:  Yes  Onset of Patient-reported symptoms:  Other  Patient-reported symptoms:  Shortness of Breath, Cough  Interventions for patient-reported symptoms:  Other  Have your medications changed?:  No  Do you have any questions related to your medications?:  No  Do you currently have any active services?:  No  Do you have any needs or concerns that I can assist you with?:  No  Identified Barriers:  Stress, Other  Care Transitions Interventions   Home Care Waiver:  Declined        DME Assistance:  Declined     Senior Services:  Declined    Other Interventions: Follow Up  Future Appointments   Date Time Provider Jourdan Funez   10/26/2020 12:30 PM Daphne Rendon MD [facility]   11/3/2020  1:45 PM Daphne Rendon MD [facility]   2020  1:15 PM Vaishnavi Verdin MD [facility]   4/15/2021 10:30 AM Sydni Garrett MD [facility]   10/12/2021  4:00 PM Maxwell Cordoba LPN [facility]   . CARE TRANSITIONS / COVID19 RISK MONITORING  COVID19 SCREEN: 2020 Not detected  PATIENT RISK FACTORS:  Copd, Ch Resp Failure on O2, Lung Mass    Phone Assessment: Patient reports ongoing productive cough w/ thick, yellow/green secretions and mild sob on exertion. States \"It hasnt changed much\". Denies acute resp distress, fever, malaise, n/v, wt loss, sick contacts. Denies need for squad or MD notification as actively been treated by Dr Lee Florence. Reports she had PET scan yesterday and f/u qppt w/ MD to go over results on Monday.  Reports taking all meds as directed. Reports still only smoking approximately 6 cig/day. Continues to decline smoking cessation class referral.  Denies any other sx, concerns, needs. Agreeable to ongoing follow up. Discharge Need Reviewed: Yes. Continues to deny resource needs including hhc, dme, community assistance. Reports O2 and Med Nebs good working order, no supply needs. Follow Up Appointments?: Completed PET scan yesterday (see results below). Has 10/26/2020 1230pm appt w/ Dr Ezekiel Dakin to disuss PET scan results. CTN did NOT discuss below results as MD plans to do so on Monday. 10/22/2020 PET SCAN- Skull base to mid thigh:   1. Dominant 1.6 cm nodules in the medial right upper lobe and central right    lower lobe are hypermetabolic and suspicious for malignancy, possibly    synchronous lung cancers. 2. A 3 x 2 cm precarinal merrick mass is intensely hypermetabolic and    consistent with metastatic disease. 3. A smaller 1 cm nodule in the lateral right lower lobe has not    significantly changed since 2009 and demonstrates only minimal uptake,    consistent with benign etiology. 4. Patchy ground-glass opacity throughout the left lung demonstrates mild    uptake and is favored to be infectious or inflammatory. Atb Completed?: No, Patient continues to take atb/steroids as directed by Ezekiel Dakin which were ordered on 10/8/2020    Covid19 Education Provided on Sx, Preventative Measures, Flu Clinic?: Yes, verbalizes understanding    Risk Factors for Readmission:  Copd, Ch Resp Failure on O2, Lung Mass; Patient does not appear to be comprehending severity of current illness and concern for malignancy despite speaking w/ MD, CTN, tests etc.    Interventions to Address Risk Factors:  1. Confirmed transportation for PET scan f/u appt; stressed importance of keeping this appt. 2. Confirmed Patient still taking atb/steroids as directed.  Stressed importance of not skipping doses and completing unless otherwise directed by MD.  3. Advised to contact PCP 24/7 re: any health concerns. 4. Discussed benefits of Telehealth, Urgent Care, [facility] for non-emergent needs during office after hours. 5. Reviewed Copd Zone Mgt    Upon Next Outreach:  1. Follow up 10/26/2020 appt w/ Dr Isabella Hernandez re: PET results. 2. Reassess resource needs given PET scan results-- palliative care or assist w/ navigating next steps.     Demetria Jo RN

## 2020-10-29 NOTE — CARE COORDINATION
including hhc, dme, community assistance. Lives w/ Won Bunch who assists as needed. Reports O2 and Med Nebs good working order, no supply needs. Appts Since Last Call: 10/26/2020 Dr Mary Rojas-- Positive PET scan. Plan for biopsy 11/6/2020. RTO 11/12/2020 to discuss biopsy results. Rx Changes: Continues atb/steroid as directed. Patient reports holding blood thinners as directed by Dr Mary Rojas in prep for bx. [id number] Education Provided on Sx, Preventative Measures, Flu Clinic?: Yes, verbalizes understanding. Risk Factors for Readmission: Copd, Ch Resp Failure on O2, Lung Mass; Patient does not appear to be comprehending severity of current illness and concern for malignancy despite speaking w/ MD, positive PET scan    Interventions to Address Risk Factors:  1. Confirmed Patient aware of upcoming biopsy and appt w/ Dr Mary Rojas; confirmed transportation. 2. Discussed PET scan results and poc    3. Advised to contact PCP 24/7 re: any health concerns.    4. Discussed benefits of Telehealth, Urgent Care, [facility] for non-emergent needs during office after hours. 5. Reviewed Copd Zone Mgt  6. Discussed benefits of Palliative Care; not interested at this time. Upon Next Outreach:   1.  Follow up on biopsy results and f/u appt w/ Dr Mary Rojas  2. Reassess resource needs including Vaelncia Fry, RENETTA

## 2020-11-06 NOTE — PROGRESS NOTES
The patient arrived to CT for a lung biopsy. The patient is A&OX3, verbalizes understanding of the procedure, consent confirmed and placed in the patients soft chart. The patient is prepped and draped on the table. Dr Kevin Huang performing the procedure. Patient could not lay prone, Dr Kevin Huang came to speak to patient about the risks of the procedure, both agreed that the procedure would be better to be performed under anesthesia.       Report given to [facility]

## 2020-11-16 NOTE — CARE COORDINATION
Rickey 45 Transitions Follow Up Call    2020    Patient: Francesco Roberto  Patient : 1950   MRN: [id number]  Reason for Admission: Ac Copd, A/C Resp Failure, Lung Mass--possible malignancy  Discharge Date: 20 RARS: Readmission Risk Score: 16  . CARE TRANSITIONS / COVID19 RISK MONITORING  COVID19 SCREEN: 2020 Not detected  PATIENT RISK FACTORS:  Copd, Ch Resp Failure on O2, Lung Mass    Attempt to reach for final CT follow up call unsuccessful; message left requesting call back.    Luciano Espinoza RN

## 2020-12-01 PROBLEM — J44.1 COPD EXACERBATION (HCC): Status: ACTIVE | Noted: 2020-01-01

## 2020-12-02 PROBLEM — J44.1 ACUTE EXACERBATION OF CHRONIC OBSTRUCTIVE PULMONARY DISEASE (COPD) (HCC): Status: ACTIVE | Noted: 2020-01-01

## 2020-12-02 NOTE — CARE COORDINATION
Met c pt to initiate discharge planning. Pt states she lives at home, has family support on 02 2l at home through [employer]. Pt has pcp/ active insurance and can afford meds. Pt denied needs, advised CM available if needs arise.

## 2020-12-02 NOTE — ED NOTES
Report called to [facility] for room 7974.      Dylon Gains  12/02/20 9550

## 2020-12-02 NOTE — ED NOTES
Ambulated pt. In room to door and back. Pts. O2 saturation dropped to 85% on 4L. Pts. 02 saturation came back up to 94% upon getting back into bed.      Mateusz Davenport  12/02/20 3202

## 2020-12-02 NOTE — ED PROVIDER NOTES
Triage Chief Complaint:   Shortness of Breath    [place]:  Dwight Avalos is a 65 y.o. female that presents with main complaint of shortness of breath, productive cough and fever. States that she has been ill for about 1 day. States that she is chronically on steroids and thinks that she may be on an antibiotic at this time but does not know the name. States that she feels very congested and has extreme dyspnea especially with exertion. She uses 4 to 5 L nasal cannula at home and noticed her oxygen saturation has been dropping to about 80% with exertion. Denies any chest pain, abdominal pain, nausea, vomiting, diarrhea. She has pulmonary nodules and is scheduled for biopsy this coming week. Denies lower extremity pain or swelling. ROS:  At least 14 systems reviewed and otherwise acutely negative except as in the [address] Past Medical History:   Diagnosis Date    Arthritis     COPD (chronic obstructive pulmonary disease) ([address]     H/O cardiovascular stress test 11/18/2009    thallium, normal no ischemia EF70%     H/O cardiovascular stress test 10/11/2013    thallium,EF70%, normal, no ischemia    H/O Doppler ultrasound 10/13/10    carotid, right normal, left normal    H/O Doppler ultrasound 10/07/14    Carotid mild bilateral internal carotid artery disease less than 50% in severity. Normal vertebral artery flows with good velocities. Incidental finding of possible thyroid nodule in the left lobe.      History of 24 hour EKG monitoring 3/11/2011    NSR no ectopy    History of nuclear stress test 10/21/2016    lexiscan-normal,no ischemia,EF70%,enlarged right ventricle    Hx of chest x-ray 01/02/2015    WNL    Hx of echocardiogram 10/11/2013    10/13 Abn lt ventricular diastolic function, mile MR, EF 57%,10/10 EF55%, mild mitral annular calcification    Hx of echocardiogram 05/02/2019    EF 13-22%, Grade I diastolic dysfunction, Mild aortic stenosis, Calcific thickening of posterior leaflet of mitral valve, Mild Pulm HTN    Hx of pelvic x-ray 2015    Severe RT his osterarothrosis    Hx of x-ray of hip 2015    Severe Rt hip osteosrthrosis    Hyperlipidemia     Hypertension     Leg pain      Past Surgical History:   Procedure Laterality Date    BACK SURGERY      CYST REMOVAL      left arm    HYSTERECTOMY          JOINT REPLACEMENT Right     hip    LAPAROSCOPIC APPENDECTOMY N/A 2019    APPENDECTOMY LAPAROSCOPIC performed by Nelda Koenig MD at [address] [place] [address]       Family History   Problem Relation Age of Onset    Stroke Mother     Dementia Mother     Heart Attack Mother 61    Cancer Father     Stroke Father     Heart Attack Father 61    Diabetes Sister     Heart Attack Sister     Hypertension Sister     Cancer Sister     Stroke Sister     Hypertension Sister     Hypertension Sister     Irritable Bowel Syndrome Sister      Social History     Socioeconomic History    Marital status:      Spouse name: Not on file    Number of children: Not on file    Years of education: Not on file    Highest education level: Not on file   Occupational History    Not on file   Social Needs    Financial resource strain: Not on file    Food insecurity     Worry: Not on file     Inability: Not on file    Transportation needs     Medical: Not on file     Non-medical: Not on file   Tobacco Use    Smoking status: Former Smoker     Packs/day: 1.00     Years: 47.00     Pack years: 47.00     Start date: 1973     Last attempt to quit: 2020     Years since quittin.0    Smokeless tobacco: Never Used   Substance and Sexual Activity    Alcohol use:  Yes     Alcohol/week: 0.0 standard drinks     Comment: rare    Drug use: No    Sexual activity: Not on file     Comment:    Lifestyle    Physical activity     Days per week: Not on file     Minutes per session: Not on file    Stress: Not on file   Relationships    Social connections     Talks on phone: Not on file     Gets together: Not on file     Attends Hoahaoism service: Not on file     Active member of club or organization: Not on file     Attends meetings of clubs or organizations: Not on file     Relationship status: Not on file    Intimate partner violence     Fear of current or ex partner: Not on file     Emotionally abused: Not on file     Physically abused: Not on file     Forced sexual activity: Not on file   Other Topics Concern    Not on file   Social History Narrative    Not on file     Current Facility-Administered Medications   Medication Dose Route Frequency Provider Last Rate Last Dose    aspirin EC tablet 81 mg  81 mg Oral Daily [place],         calcium elemental (OSCAL) tablet 500 mg  500 mg Oral Daily [place],         vitamin D CAPS 1,000 Units  1,000 Units Oral Daily [place],         HYDROcodone-acetaminophen (NORCO)  MG per tablet 1 tablet  1 tablet Oral Q4H PRN [place], DO   1 tablet at 12/02/20 0407    nicotine (NICODERM CQ) 21 MG/24HR 1 patch  1 patch Transdermal Daily Tex Solis,         atorvastatin (LIPITOR) tablet 10 mg  10 mg Oral Daily [place],         theophylline (UNIPHYL) extended release tablet 200 mg  200 mg Oral Daily [place],         sodium chloride flush 0.9 % injection 10 mL  10 mL Intravenous 2 times per day [place],         sodium chloride flush 0.9 % injection 10 mL  10 mL Intravenous PRN [place],         acetaminophen (TYLENOL) tablet 650 mg  650 mg Oral Q6H PRN [place],         Or    acetaminophen (TYLENOL) suppository 650 mg  650 mg Rectal Q6H PRN [place], DO        polyethylene glycol (GLYCOLAX) packet 17 g  17 g Oral Daily PRN [place], DO        promethazine (PHENERGAN) tablet 12.5 mg  12.5 mg Oral Q6H PRN [place],         Or    ondansetron (ZOFRAN) injection 4 mg  4 mg Intravenous Q6H PRN [place], DO        enoxaparin (LOVENOX) injection 40 mg  40 mg Subcutaneous Daily [facility], DO        ipratropium-albuterol (DUONEB) nebulizer solution 1 ampule  1 ampule Inhalation Q4H LINCOLN MEDEROS DO        methylPREDNISolone sodium (SOLU-MEDROL) injection 40 mg  40 mg Intravenous Q6H Tex Solis DO        Followed by   Em Dye ON 12/5/2020] predniSONE (DELTASONE) tablet 40 mg  40 mg Oral Daily [facility], DO        doxycycline hyclate (VIBRA-TABS) tablet 100 mg  100 mg Oral Q12H Tex Solis DO   100 mg at 12/02/20 0407    cetirizine (ZYRTEC) tablet 10 mg  10 mg Oral Daily [facility], DO         Allergies   Allergen Reactions    Formaldehyde     Gabapentin Other (See Comments)    Norflex Tablets [Orphenadrine]     Tudorza Pressair [Aclidinium Bromide]      Pt states it gave her a rash on her ankles and knees.  Cranberry Rash       Nursing Notes Reviewed    Physical Exam:  ED Triage Vitals [12/01/20 2241]   Enc Vitals Group      /79      Pulse 111      Resp 22      Temp 98.9 °F (37.2 °C)      Temp src       SpO2 99 %      Weight 120 lb (54.4 kg)      Height 5' (1.524 m)      Head Circumference       Peak Flow       Pain Score       Pain Loc       Pain Edu? Excl. in [address]? GENERAL APPEARANCE: Awake and alert. Cooperative. No acute distress. HEAD: Normocephalic. Atraumatic. EYES: EOM's grossly intact. Sclera anicteric. ENT: Mucous membranes are moist. Tolerates saliva. No trismus. NECK: Supple. Trachea midline. HEART: RRR. Radial pulses 2+. LUNGS: Respirations mildly tachypneic with expiratory wheezing in all lung fields  ABDOMEN: Soft. Non-tender. No guarding or rebound. EXTREMITIES: No acute deformities. No edema  SKIN: Warm and dry. NEUROLOGICAL: No gross facial drooping. Moves all 4 extremities spontaneously. PSYCHIATRIC: Normal mood.     I have reviewed and interpreted all of the currently available lab results from this visit (if applicable):  Results for orders placed or performed during the hospital encounter of 12/01/20   CBC Auto Differential   Result Value Ref Range    WBC 6.5 4.0 - 10.5 K/CU MM    RBC 4.17 (L) 4.2 - 5.4 M/CU MM    Hemoglobin 11.2 (L) 12.5 - 16.0 GM/DL    Hematocrit 39.4 37 - 47 %    MCV 94.5 78 - 100 FL    MCH 26.9 (L) 27 - 31 PG    MCHC 28.4 (L) 32.0 - 36.0 %    RDW 13.4 11.7 - 14.9 %    Platelets 813 171 - 736 K/CU MM    MPV 9.9 7.5 - 11.1 FL    Differential Type AUTOMATED DIFFERENTIAL     Segs Relative 93.1 (H) 36 - 66 %    Lymphocytes % 5.1 (L) 24 - 44 %    Monocytes % 1.1 0 - 4 %    Eosinophils % 0.2 0 - 3 %    Basophils % 0.3 0 - 1 %    Segs Absolute 6.0 K/CU MM    Lymphocytes Absolute 0.3 K/CU MM    Monocytes Absolute 0.1 K/CU MM    Eosinophils Absolute 0.0 K/CU MM    Basophils Absolute 0.0 K/CU MM    Nucleated RBC % 0.0 %    Total Nucleated RBC 0.0 K/CU MM    Total Immature Neutrophil 0.01 K/CU MM    Immature Neutrophil % 0.2 0 - 0.43 %   Comprehensive Metabolic Panel w/ Reflex to MG   Result Value Ref Range    Sodium 137 135 - 145 MMOL/L    Potassium 3.9 3.5 - 5.1 MMOL/L    Chloride 90 (L) 99 - 110 mMol/L    CO2 37 (H) 21 - 32 MMOL/L    BUN 12 6 - 23 MG/DL    CREATININE 0.5 (L) 0.6 - 1.1 MG/DL    Glucose 140 (H) 70 - 99 MG/DL    Calcium 9.2 8.3 - 10.6 MG/DL    Alb 3.2 (L) 3.4 - 5.0 GM/DL    Total Protein 6.8 6.4 - 8.2 GM/DL    Total Bilirubin 0.2 0.0 - 1.0 MG/DL    ALT <5 (L) 10 - 40 U/L    AST 9 (L) 15 - 37 IU/L    Alkaline Phosphatase 81 40 - 129 IU/L    GFR Non-African American >60 >60 mL/min/1.73m2    GFR African American >60 >60 mL/min/1.73m2    Anion Gap 10 4 - 16   Troponin   Result Value Ref Range    Troponin T <0.010 <0.01 NG/ML   Brain Natriuretic Peptide   Result Value Ref Range    Pro-.8 (H) <300 PG/ML   Lactic Acid, Plasma   Result Value Ref Range    Lactate 0.7 0.4 - 2.0 mMOL/L   Blood Gas, Venous   Result Value Ref Range    pH, Tad 7.40 7.32 - 7.42    pCO2, Tad 76 (H) 38 - 52 mmHG    pO2, Tad 190 (H) 28 - 48 mmHG    Base Exc, Mixed 18.1 (H) 0 - 2. 3    HCO3, Venous 47.1 (H) 19 - 25 MMOL/L    O2 Sat, Tad 93.7 (H) 50 - 70 %    Comment VBG    COVID-19    Specimen: Nasopharynx/Oropharynx   Result Value Ref Range    SARS-CoV-2, NAAT NOT DETECTED    EKG 12 Lead   Result Value Ref Range    Ventricular Rate 109 BPM    Atrial Rate 109 BPM    P-R Interval 142 ms    QRS Duration 72 ms    Q-T Interval 314 ms    QTc Calculation (Bazett) 422 ms    P Axis 58 degrees    R Axis -7 degrees    T Axis 43 degrees    Diagnosis       Sinus tachycardia  Otherwise normal ECG  When compared with ECG of 18-SEP-2020 16:54,  No significant change was found        Radiographs (if obtained):  [] The following radiograph was interpreted by myself in the absence of a radiologist:  [x] Radiologist's Report Reviewed:    EKG (if obtained): (All EKG's are interpreted by myself in the absence of a cardiologist)  12 lead EKG as interpreted by me reveals sinus tachycardia. Axis is normal. There are no ischemic ST elevations or other suspicious ST changes;  QRS interval is narrow, QT interval is not prolonged. Final interpretation: Sinus tachycardia      MDM:  Plan of care is discussed thoroughly with the patient and family if present. If performed, all imaging and lab work also discussed with patient. All relevant prior results and chart reviewed if available. Patient presents as above. She is mild distress but can have a conversation without significant dyspnea. Overall presentation most consistent with likely COPD exacerbation. Patient is afebrile here. Patient given dose of Decadron and albuterol/Atrovent for presumed COPD exacerbation. Cannot rule out Covid and rapid swab is sent. Low suspicion at this time for pulmonary embolism given patient's overall presentation, similar episodes in the past and lack of any chest pain. Patient's chest x-ray is clear. Metabolic work-up largely unremarkable with normal troponin. EKG does not show any ischemic changes.   Patient remains on 4 to 5 L nasal cannula here but when ambulating desaturates well into the 80s. She is extremely dyspneic with exertion. I do not feel that she would do well at home and she requires admission for further management. Rapid Covid was negative. Clinical Impression:  1.  COPD exacerbation ([address]      (Please note that portions of this note may have been completed with a voice recognition program. Efforts were made to edit the dictations but occasionally words are mis-transcribed.)    MD Joann Pablo MD  12/02/20 [id number]

## 2020-12-02 NOTE — ED NOTES
Bed: 03TR-03  Expected date:   Expected time:   Means of arrival:   Comments:  Medic: short of breath     Walt Bonilla RN  12/01/20 3796

## 2020-12-02 NOTE — ED NOTES
Pt to ED via [facility] for c.o SOB. Pt lives at home with [place] daughter. Pt has HX of COPD, became acutely SOB yesterday, worse with exertion. Pt  Also c/o cough. Per EMS, pt was on 4L at home with a sat of 78%. Pt was brought in on 15 L via non rebreather at 100%. Pt had recent negative COVID test because she is suppose to have a lung biopsy on Friday.      Priyank Medley RN  12/01/20 9409

## 2020-12-02 NOTE — H&P
History and Physical      Name:  Dwight Avalos /Age/Sex: 1950  (65 y.o. female)   MRN & CSN:  [id number] & [id number] Admission Date/Time: 2020 10:45 PM   Location:  [address] [place] [address] PCP: Edward Chiu MD       Hospital Day: 2    Assessment and Plan:   Dwight Avalos is a 65 y.o.  female  who presents with Acute exacerbation of chronic obstructive pulmonary disease (COPD) ([facility]    1. Acute exacerbation of COPD  2. Acute on chronic hypoxic respiratory failure  3. History of tobacco abuse  4. History of lung mass   Admit to observation services   Switch antibiotics to doxycycline as patient has recently been on azithromycin multiple times   IV methylprednisolone followed by prednisone taper starting tomorrow. Patient did receive 10 mg Decadron in ED.  Continue home COPD medications   Patient is supposed to have biopsy of lung mass as an outpatient, if prolonged stay consider consulting pulmonology   Continue oxygen therapy   Patient states she quit smoking recently    Other chronic medical conditions:   History of osteopenia: Continue home medication   Hyperlipidemia: Continue home medication      Diet DIET GENERAL;   DVT Prophylaxis [x] Lovenox, []  Heparin, [] SCDs, [] Ambulation   GI Prophylaxis [] PPI,  [] H2 Blocker,  [] Carafate,  [] Diet/Tube Feeds   Code Status Full Code   Disposition Patient requires continued admission due to Acute exacerbation of chronic obstructive pulmonary disease (COPD) ([facility]   MDM [] Low, [x] Moderate,[]  High  Patient's risk as above due to acuity of condition with potential for decompensation. History of Present Illness:     Chief Complaint: Acute exacerbation of chronic obstructive pulmonary disease (COPD) ([facility]    Dwight Avalos is a 65 y.o.  female with family and PMH as stated below, who presents with worsening shortness of breath since Monday. Patient states she has had additional symptoms of productive fever, cough, and ANGELA.   Patient states she is on 4 L home O2 and has had to increase that to 5 L over the last day when she has not help. Patient states she was prescribed azithromycin today and did take her first dose but states it has not helped. Patient does endorse steroid use chronically. When patient arrived to ED her oxygen saturation dropped to 80% while ambulating to the hospital bed. Patient responded well to oxygen therapy. Patient does endorse negative Covid test and no recent exposure that she knows of. She states she has had about 5 test as she is scheduled for a biopsy for pulmonary nodules this coming week. Patient denies chest pain, abdominal pain, nausea, vomiting, or diarrhea at this time. Discussed case with ED provider. Review of Systems   Constitutional: Positive for fatigue. Negative for appetite change, chills, diaphoresis and fever. HENT: Positive for congestion and rhinorrhea. Negative for sore throat. Eyes: Negative for visual disturbance. Respiratory: Positive for cough, shortness of breath and wheezing. Negative for chest tightness. ANGELA   Cardiovascular: Negative for chest pain, palpitations and leg swelling. Gastrointestinal: Negative for abdominal pain, diarrhea, nausea and vomiting. Genitourinary: Negative for dysuria, frequency and urgency. Musculoskeletal: Negative for arthralgias and back pain. Skin: Negative for color change, pallor and rash. Neurological: Negative for dizziness, seizures, syncope, speech difficulty, weakness, light-headedness, numbness and headaches. Psychiatric/Behavioral: Negative for confusion. Objective:   No intake or output data in the 24 hours ending 12/02/20 0454   Vitals:   Vitals:    12/02/20 0315   BP: (!) 147/72   Pulse: 102   Resp: 18   Temp: 98 °F (36.7 °C)   SpO2: 95%     Physical Exam:   Physical Exam  Vitals signs and nursing note reviewed. Constitutional:       General: She is awake. She is not in acute distress.      Appearance: Normal appearance. She is underweight. She is ill-appearing. She is not toxic-appearing or diaphoretic. Interventions: She is not intubated. HENT:      Head: Atraumatic. Right Ear: External ear normal.      Left Ear: External ear normal.      Nose: Nose normal. No rhinorrhea. Mouth/Throat:      Mouth: Mucous membranes are moist.   Eyes:      General: No scleral icterus. Conjunctiva/sclera: Conjunctivae normal.      Pupils: Pupils are equal, round, and reactive to light. Neck:      Musculoskeletal: Neck supple. Cardiovascular:      Rate and Rhythm: Regular rhythm. Tachycardia present. Pulses: Normal pulses. Heart sounds: No murmur. No gallop. Pulmonary:      Effort: Pulmonary effort is normal. No tachypnea or accessory muscle usage. She is not intubated. Breath sounds: Decreased air movement present. Examination of the right-upper field reveals wheezing. Examination of the left-upper field reveals wheezing. Examination of the right-middle field reveals wheezing. Examination of the left-middle field reveals wheezing. Examination of the right-lower field reveals wheezing. Examination of the left-lower field reveals wheezing. Wheezing present. No rhonchi or rales. Abdominal:      General: Abdomen is flat. Bowel sounds are normal. There is no distension. Palpations: Abdomen is soft. Tenderness: There is no abdominal tenderness. There is no guarding or rebound. Negative signs include Lopes's sign. Musculoskeletal: Normal range of motion. Right lower leg: No edema. Left lower leg: No edema. Skin:     General: Skin is warm and dry. Capillary Refill: Capillary refill takes less than 2 seconds. Neurological:      Mental Status: She is alert and oriented to person, place, and time. Mental status is at baseline. Cranial Nerves: No dysarthria or facial asymmetry. Motor: No tremor or seizure activity.    Psychiatric:         Attention and Perception: Attention normal.         Mood and Affect: Mood normal. Mood is not anxious. Speech: Speech normal. Speech is not slurred. Behavior: Behavior normal. Behavior is cooperative. Past Medical History:      Past Medical History:   Diagnosis Date    Arthritis     COPD (chronic obstructive pulmonary disease) ([facility]     H/O cardiovascular stress test 11/18/2009    thallium, normal no ischemia EF70%     H/O cardiovascular stress test 10/11/2013    thallium,EF70%, normal, no ischemia    H/O Doppler ultrasound 10/13/10    carotid, right normal, left normal    H/O Doppler ultrasound 10/07/14    Carotid mild bilateral internal carotid artery disease less than 50% in severity. Normal vertebral artery flows with good velocities. Incidental finding of possible thyroid nodule in the left lobe.  History of 24 hour EKG monitoring 3/11/2011    NSR no ectopy    History of nuclear stress test 10/21/2016    lexiscan-normal,no ischemia,EF70%,enlarged right ventricle    Hx of chest x-ray 01/02/2015    WNL    Hx of echocardiogram 10/11/2013    10/13 Abn lt ventricular diastolic function, mile MR, EF 57%,10/10 EF55%, mild mitral annular calcification    Hx of echocardiogram 05/02/2019    EF 64-88%, Grade I diastolic dysfunction, Mild aortic stenosis, Calcific thickening of posterior leaflet of mitral valve, Mild Pulm HTN    Hx of pelvic x-ray 01/02/2015    Severe RT his osterarothrosis    Hx of x-ray of hip 01/02/2015    Severe Rt hip osteosrthrosis    Hyperlipidemia     Hypertension     Leg pain      PSHX:  has a past surgical history that includes Hysterectomy; cyst removal; Tubal ligation; back surgery; joint replacement (Right); and laparoscopic appendectomy (N/A, 5/18/2019). Allergies:    Allergies   Allergen Reactions    Formaldehyde     Gabapentin Other (See Comments)    Norflex Tablets [Orphenadrine]     Tudorza Pressair [Aclidinium Bromide]      Pt states it gave her a rash on her ankles and knees.  Cranberry Rash       FAM HX: family history includes Cancer in her father and sister; Dementia in her mother; Diabetes in her sister; Heart Attack in her sister; Heart Attack (age of onset: 61) in her father and mother; Hypertension in her sister, sister, and sister; Irritable Bowel Syndrome in her sister; Stroke in her father, mother, and sister. Soc HX:   Social History     Socioeconomic History    Marital status:      Spouse name: None    Number of children: None    Years of education: None    Highest education level: None   Occupational History    None   Social Needs    Financial resource strain: None    Food insecurity     Worry: None     Inability: None    Transportation needs     Medical: None     Non-medical: None   Tobacco Use    Smoking status: Former Smoker     Packs/day: 1.00     Years: 47.00     Pack years: 47.00     Start date: 1973     Last attempt to quit: 2020     Years since quittin.0    Smokeless tobacco: Never Used   Substance and Sexual Activity    Alcohol use:  Yes     Alcohol/week: 0.0 standard drinks     Comment: rare    Drug use: No    Sexual activity: None     Comment:    Lifestyle    Physical activity     Days per week: None     Minutes per session: None    Stress: None   Relationships    Social connections     Talks on phone: None     Gets together: None     Attends Tenriism service: None     Active member of club or organization: None     Attends meetings of clubs or organizations: None     Relationship status: None    Intimate partner violence     Fear of current or ex partner: None     Emotionally abused: None     Physically abused: None     Forced sexual activity: None   Other Topics Concern    None   Social History Narrative    None       Medications:   Medications:    aspirin  81 mg Oral Daily    calcium elemental  500 mg Oral Daily    vitamin D  1,000 Units Oral Daily    nicotine  1 patch Transdermal Daily    atorvastatin  10 mg Oral Daily    theophylline  200 mg Oral Daily    sodium chloride flush  10 mL Intravenous 2 times per day    enoxaparin  40 mg Subcutaneous Daily    ipratropium-albuterol  1 ampule Inhalation Q4H [place]    methylPREDNISolone  40 mg Intravenous Q6H    Followed by   Luis Hastings ON 12/5/2020] predniSONE  40 mg Oral Daily    doxycycline hyclate  100 mg Oral Q12H    cetirizine  10 mg Oral Daily      Infusions:   PRN Meds: HYDROcodone-acetaminophen, 1 tablet, Q4H PRN  sodium chloride flush, 10 mL, PRN  acetaminophen, 650 mg, Q6H PRN    Or  acetaminophen, 650 mg, Q6H PRN  polyethylene glycol, 17 g, Daily PRN  promethazine, 12.5 mg, Q6H PRN    Or  ondansetron, 4 mg, Q6H PRN        Electronically signed by Ian Jenkins DO on 12/2/2020 at 4:54 AM      This dictation was created with voice recognition software. While attempts have been made to review the dictation as it is transcribed, on occasion the spoken word can be misinterpreted by the technology leading to omissions or inappropriate words, phrases or sentences.

## 2020-12-02 NOTE — PROGRESS NOTES
Patient was seen and examined at the bedside early this morning    Admission diagnosis COPD exacerbation    Chart notes, lab results, imaging reports all reviewed  Patient continues to have shortness of breath which gets worse with minimal exertion  Denies any chest pain or fever    Continue with breathing treatments  Patient will require nebulizers as she uses them at home and she is in exacerbation  Continue with steroids, theophylline  We will continue to monitor

## 2020-12-03 NOTE — PROGRESS NOTES
[address]    Hospitalist Progress Note      Name:  Bijan Roper /Age/Sex: 1950  (65 y.o. female)   MRN & CSN:  [id number] & [id number] Admission Date/Time: 2020 10:45 PM   Location:  [address] [place] [address] PCP: Sophy Ghotra MD         Hospital Day: 3    Assessment and Plan:   Bijan Roper is a 65 y.o.  female  who presents with Acute exacerbation of chronic obstructive pulmonary disease (COPD) ([facility] [address]    Acute COPD exacerbation     On doxycycline  Continue with DuoNeb treatment, Solu-Medrol  Oxygen via nasal cannula  Pulmonary consulted    Lung Nodule/lung mass- IR consulted and plan for biopsy in a.m., n.p.o. after midnight    Chronic hypoxic respiratory failure -continue oxygen via nasal cannula    Hyperlipidemia -on Lipitor      Diet DIET GENERAL;   DVT Prophylaxis [] Lovenox, []  Heparin, [] SCDs, []No VTE prophylaxis, patient ambulating   GI Prophylaxis [] PPI, [] H2 Blocker, [] No GI prophylaxis, patient is receiving diet/Tube Feeds   Code Status Full Code   Disposition Patient requires continued admission due to COPD exacerbation   MDM [] Low, [] Moderate,[x]  High     History of Present Illness: Subjective     Patient Seen & Examined at the bedside      Patient continues to have shortness of breath-chest tightness  Remains afebrile    Ten point ROS reviewed negative, unless as noted above    Objective: Intake/Output Summary (Last 24 hours) at 12/3/2020 1143  Last data filed at 12/3/2020 0859  Gross per 24 hour   Intake 250 ml   Output --   Net 250 ml      Vitals:   Vitals:    20 1122   BP:    Pulse:    Resp: 18   Temp:    SpO2:      Physical Exam:    GEN Awake female, resting in bed in no apparent distress. Appears given age. HENT Mucous membranes are moist.   RESP diminished air entry and scattered wheezes, no rales or rhonchi. CARDIO/VASC -S1/S2 auscultated. Regular rate without appreciable murmurs, rubs, or gallops. Peripheral pulses equal bilaterally and palpable.  No peripheral edema.  GI Abdomen is soft without significant tenderness, masses, or guarding. Bowel sounds are normoactive. Rectal exam deferred.  Nicole catheter is not present.       Medications:   Medications:    albuterol sulfate HFA  2 puff Inhalation 4x daily    sodium polystyrene  15 g Oral Once    aspirin  81 mg Oral Daily    calcium elemental  500 mg Oral Daily    vitamin D  1,000 Units Oral Daily    nicotine  1 patch Transdermal Daily    atorvastatin  10 mg Oral Daily    theophylline  200 mg Oral Daily    sodium chloride flush  10 mL Intravenous 2 times per day    enoxaparin  40 mg Subcutaneous Daily    methylPREDNISolone  40 mg Intravenous Q6H    Followed by   Rodrick Cooley ON 12/5/2020] predniSONE  40 mg Oral Daily    doxycycline hyclate  100 mg Oral Q12H    cetirizine  10 mg Oral Daily      Infusions:   PRN Meds: HYDROcodone-acetaminophen, 1 tablet, Q4H PRN  sodium chloride flush, 10 mL, PRN  acetaminophen, 650 mg, Q6H PRN    Or  acetaminophen, 650 mg, Q6H PRN  polyethylene glycol, 17 g, Daily PRN  promethazine, 12.5 mg, Q6H PRN    Or  ondansetron, 4 mg, Q6H PRN          Electronically signed by Jose Simon MD on 12/3/2020 at 11:43 AM

## 2020-12-04 NOTE — PROGRESS NOTES
Anes progress note. Pt scheduled for non emergent lung biopsy    Medicine MD had ordered BNP and CBC  BNP now increased to 803 from 300s,  WBC increased to 11.5 from 6.5 initially. Will defer anesthesia until sepsis labs trend down, or case deemed emergent. Otherwise IR may choose to proceed as conscious sedation without anesthesia.     Spoke with pt who states she subjectively feels like \"she cannot get enough air\"  Spoke with Dr Laya Cisneros, will reschedule for Monday if improved

## 2020-12-04 NOTE — PROGRESS NOTES
Duo Neb changed to Albuterol MDI per COVID 19 protocol. She uses both inhalers and MDIs at home. Feels inhalers are enough at this time. She is allergic to Acilidinium Bromide.

## 2020-12-04 NOTE — PROGRESS NOTES
1003: this nurse spoke to Dr. Cher Rivera, Dr. Donato Resendiz, Dr. Adán Rasheed and Dr. Melchor Leslie. Lab values are trending upwards. All parties at this time are agreeable to re-evaluate the patients lab on Monday. Make pt NPO on Sunday night for a possible lung biopsy on the right side.  Pt is aware, Saeed Monge the nurse is aware

## 2020-12-04 NOTE — PROGRESS NOTES
Pulmonary and Critical Care  Progress Note    Subjective: The patient cancelled by anesthesia. Shortness of breath has slightly improved  Chest pain none  Addressing respiratory complaints Patient is negative for  hemoptysis and cyanosis  CONSTITUTIONAL:  negative for fevers and chills      Past Medical History:     has a past medical history of Arthritis, COPD (chronic obstructive pulmonary disease) ([facility] [address] H/O cardiovascular stress test, H/O cardiovascular stress test, H/O Doppler ultrasound, H/O Doppler ultrasound, History of 24 hour EKG monitoring, History of nuclear stress test, Hx of chest x-ray, Hx of echocardiogram, Hx of echocardiogram, Hx of pelvic x-ray, Hx of x-ray of hip, Hyperlipidemia, Hypertension, and Leg pain. has a past surgical history that includes Hysterectomy; cyst removal; Tubal ligation; back surgery; joint replacement (Right); and laparoscopic appendectomy (N/A, 5/18/2019). reports that she quit smoking about 2 weeks ago. She started smoking about 47 years ago. She has a 47.00 pack-year smoking history. She has never used smokeless tobacco. She reports current alcohol use. She reports that she does not use drugs. Family history:  family history includes Cancer in her father and sister; Dementia in her mother; Diabetes in her sister; Heart Attack in her sister; Heart Attack (age of onset: 61) in her father and mother; Hypertension in her sister, sister, and sister; Irritable Bowel Syndrome in her sister; Stroke in her father, mother, and sister. Allergies   Allergen Reactions    Formaldehyde     Gabapentin Other (See Comments)    Norflex Tablets [Orphenadrine]     Tudorza Pressair [Aclidinium Bromide]      Pt states it gave her a rash on her ankles and knees.     Cranberry Rash     Social History:    Reviewed; no changes    Objective:   PHYSICAL EXAM:        VITALS:  /75   Pulse 92   Temp 98.3 °F (36.8 °C) (Oral)   Resp 18   Ht 5' (1.524 m)   Wt 121 lb 1 oz (54.9 kg) SpO2 93%   BMI 23.64 kg/m²     24HR INTAKE/OUTPUT:      Intake/Output Summary (Last 24 hours) at 12/4/2020 1225  Last data filed at 12/3/2020 2022  Gross per 24 hour   Intake 180.6 ml   Output --   Net 180.6 ml       CONSTITUTIONAL:  awake, alert, cooperative, no apparent distress, and appears stated age  LUNGS:  decreased breath sounds, occ rhonchii. CARDIOVASCULAR:  normal S1 and S2 and negative JVD  ABD:Abdomen soft, non-tender. BS normal. No masses,  No organomegaly  NEURO:Alert and oriented x3. Gait normal. Reflexes and motor strength normal and symmetric. Cranial nerves 2-12 and sensation grossly intact. DATA:    CBC:  Recent Labs     12/01/20 2245 12/03/20 0732 12/04/20  0657   WBC 6.5 8.6 11.5*   RBC 4.17* 4.06* 4.00*   HGB 11.2* 11.4* 11.2*   HCT 39.4 38.8 38.2    393 389   MCV 94.5 95.6 95.5   MCH 26.9* 28.1 28.0   MCHC 28.4* 29.4* 29.3*   RDW 13.4 13.7 13.7   SEGSPCT 93.1* 92.9* 94.2*      BMP:  Recent Labs     12/01/20 2245 12/03/20 0732 12/04/20  0657    144 146*   K 3.9 5.5* 4.6   CL 90* 98* 97*   CO2 37* 41* 38*   BUN 12 24* 23   CREATININE 0.5* 0.7 0.6   CALCIUM 9.2 8.8 8.8   GLUCOSE 140* 142* 119*      ABG:  Recent Labs     12/03/20  1815   PH 7.40   PO2ART 73*   RDG0XTJ 66.0*   O2SAT 94.8*     Lab Results   Component Value Date    PROBNP 803.3 (H) 12/04/2020    PROBNP 381.8 (H) 12/01/2020    PROBNP 191.4 09/20/2020    THEOPH 3.2 (L) 12/04/2020     No results found for: [facility]    Radiology Review:  Pertinent images / reports were reviewed as a part of this visit.     Assessment:     Patient Active Problem List   Diagnosis    Hyperlipidemia    COPD (chronic obstructive pulmonary disease) ([facility] [address]    Leg pain    Hypertension    Tobacco abuse    Abdominal aortic aneurysm (AAA) without rupture (HCC)    Degeneration of lumbar or lumbosacral intervertebral disc    Osteopenia    Anxiety    Acute on chronic respiratory failure with hypoxia (HCC)    Lung mass    COPD exacerbation ([facility]    Acute exacerbation of chronic obstructive pulmonary disease (COPD) ([facility]       Plan:   1. Overall the patient has slightly improved. 2. Gentle diuresis. 3. Inc. theodmert.       Acacia Ewing MD  12/4/2020  12:25 PM

## 2020-12-04 NOTE — CONSULTS
[address], [address] [facility]                                  CONSULTATION    PATIENT NAME: Jackie Stanford                   :        1950  MED REC NO:   [id number]                          ROOM:       4106  ACCOUNT NO:   [de-identified]                           ADMIT DATE: 2020  PROVIDER:     Montana Pollack MD    CONSULT DATE:  2020    HISTORY OF PRESENT ILLNESS:  The patient is a 39-year-old lady with  multiple medical problems including COPD; chronic respiratory failure,  on home oxygen; lung mass for which she is scheduled for biopsy, which  was PET positive, who came to the emergency room with complaints of  increasing shortness of breath and cough productive of whitish-to-yellow  phlegm. She was also complaining of fever and chills. She denied any  hemoptysis. She denied any nausea or vomiting. She denied any chest  pains. She had a chest x-ray, which showed right greater than left  bibasilar infiltrate and atelectasis. She has had a PET scan in  10/2020, which showed a 1.6 cm nodule in the right upper lobe and right  lower lobe nodule which were suspicious for malignancy for which she is  scheduled for biopsy. PAST MEDICAL HISTORY:  Significant for COPD; chronic respiratory  failure, on home oxygen; arthritis; hypertension; hyperlipidemia; and  recently diagnosed lung mass. PAST SURGICAL HISTORY:  Remarkable for tubal ligation, appendectomy,  joint replacement surgery, hysterectomy, and back surgery. FAMILY HISTORY:  Reveals that her mother had CVA, dementia, and coronary  artery disease. Father had cancer, CVA, and coronary artery disease. SOCIAL HISTORY:  Reveals that she quit smoking recently, but used to  smoke a pack per day for 47 years prior to that. She drinks alcohol off  and on. No history of drug abuse. MEDICATIONS:  Reviewed.

## 2020-12-04 NOTE — PROGRESS NOTES
age.  HENT Mucous membranes are moist.   RESP diminished air entry and scattered wheezes, no rales or rhonchi. CARDIO/VASC -S1/S2 auscultated. Regular rate without appreciable murmurs, rubs, or gallops. Peripheral pulses equal bilaterally and palpable. No peripheral edema. GI Abdomen is soft without significant tenderness, masses, or guarding. Bowel sounds are normoactive. Rectal exam deferred.  Nicole catheter is not present.       Medications:   Medications:    albuterol sulfate HFA  2 puff Inhalation Q4H WA    theophylline  100 mg Oral TID    cefTRIAXone (ROCEPHIN) IV  1 g Intravenous Q24H    aspirin  81 mg Oral Daily    calcium elemental  500 mg Oral Daily    vitamin D  1,000 Units Oral Daily    nicotine  1 patch Transdermal Daily    atorvastatin  10 mg Oral Daily    sodium chloride flush  10 mL Intravenous 2 times per day    [Held by provider] enoxaparin  40 mg Subcutaneous Daily    methylPREDNISolone  40 mg Intravenous Q6H    Followed by   Breana Rai ON 12/5/2020] predniSONE  40 mg Oral Daily    doxycycline hyclate  100 mg Oral Q12H    cetirizine  10 mg Oral Daily      Infusions:   PRN Meds: HYDROcodone-acetaminophen, 1 tablet, Q4H PRN  sodium chloride flush, 10 mL, PRN  acetaminophen, 650 mg, Q6H PRN    Or  acetaminophen, 650 mg, Q6H PRN  polyethylene glycol, 17 g, Daily PRN  promethazine, 12.5 mg, Q6H PRN    Or  ondansetron, 4 mg, Q6H PRN          Electronically signed by Radha Oreilly MD on 12/4/2020 at 4:22 PM

## 2020-12-05 NOTE — PROGRESS NOTES
[address]    Hospitalist Progress Note      Name:  Holly Gonsales /Age/Sex: 1950  (65 y.o. female)   MRN & CSN:  [id number] & [id number] Admission Date/Time: 2020 10:45 PM   Location:  [address] [place] [address]- PCP: Yady Lino MD         Hospital Day: 5    Assessment and Plan:   Holly Gonsales is a 65 y.o.  female  who presents with Acute exacerbation of chronic obstructive pulmonary disease (COPD) ([facility] [address]    Acute COPD exacerbation     On doxycycline, IV Rocephin was added by pulmonary  Continue with DuoNeb treatment, Solu-Medrol  Procalcitonin 0.3 -low suspicion for sepsis or infection  Pulmonary following    Lung Nodule/lung mass    IR consulted and plan for biopsy was postponed for elevated BNP  Patient is stable for the procedure - possible plan for biopsy     Chronic hypoxic respiratory failure -continue oxygen via nasal cannula    Hyperlipidemia -on Lipitor    Elevated BNP -no signs or symptoms of CHF at this point    Diet DIET GENERAL;   DVT Prophylaxis [] Lovenox, []  Heparin, [] SCDs, []No VTE prophylaxis, patient ambulating   GI Prophylaxis [] PPI, [] H2 Blocker, [] No GI prophylaxis, patient is receiving diet/Tube Feeds   Code Status Full Code   Disposition Patient requires continued admission due to COPD exacerbation   MDM [] Low, [] Moderate,[x]  High     History of Present Illness: Subjective     Patient Seen & Examined at the bedside      Patient continues to have shortness of breath-chest tightness  Remains afebrile  Cough productive of thick yellowish sputum     Ten point ROS reviewed negative, unless as noted above    Objective: Intake/Output Summary (Last 24 hours) at 2020 1136  Last data filed at 2020 0949  Gross per 24 hour   Intake 10 ml   Output --   Net 10 ml      Vitals:   Vitals:    20 0746   BP: (!) 184/88   Pulse: 94   Resp: 18   Temp: 97.5 °F (36.4 °C)   SpO2: 92%     Physical Exam:    GEN Awake female, resting in bed in no apparent distress.  Appears given age. RESP diminished air entry and scattered wheezes, no rales or rhonchi. CARDIO/VASC -S1/S2 auscultated. Regular rate without appreciable murmurs, rubs, or gallops. Peripheral pulses equal bilaterally and palpable. No peripheral edema. GI Abdomen is soft without significant tenderness, masses, or guarding. Bowel sounds are normoactive. Rectal exam deferred.  Nicole catheter is not present.     Medications:   Medications:    fluticasone  1 spray Each Nostril Daily    guaiFENesin  600 mg Oral BID    albuterol sulfate HFA  2 puff Inhalation Q4H WA    theophylline  100 mg Oral TID    cefTRIAXone (ROCEPHIN) IV  1 g Intravenous Q24H    aspirin  81 mg Oral Daily    calcium elemental  500 mg Oral Daily    vitamin D  1,000 Units Oral Daily    nicotine  1 patch Transdermal Daily    atorvastatin  10 mg Oral Daily    sodium chloride flush  10 mL Intravenous 2 times per day    enoxaparin  40 mg Subcutaneous Daily    predniSONE  40 mg Oral Daily    doxycycline hyclate  100 mg Oral Q12H    cetirizine  10 mg Oral Daily      Infusions:   PRN Meds: albuterol, 2.5 mg, Q6H PRN  HYDROcodone-acetaminophen, 1 tablet, Q4H PRN  sodium chloride flush, 10 mL, PRN  acetaminophen, 650 mg, Q6H PRN    Or  acetaminophen, 650 mg, Q6H PRN  polyethylene glycol, 17 g, Daily PRN  promethazine, 12.5 mg, Q6H PRN    Or  ondansetron, 4 mg, Q6H PRN          Electronically signed by Josh Scherer MD on 12/5/2020 at 11:36 AM

## 2020-12-05 NOTE — PROGRESS NOTES
Pulmonary and Critical Care  Progress Note    Subjective: The patient is feeling better. Shortness of breath has improved  Chest pain none  Addressing respiratory complaints Patient is negative for  hemoptysis and cyanosis  CONSTITUTIONAL:  negative for fevers and chills      Past Medical History:     has a past medical history of Arthritis, COPD (chronic obstructive pulmonary disease) ([facility] [address] H/O cardiovascular stress test, H/O cardiovascular stress test, H/O Doppler ultrasound, H/O Doppler ultrasound, History of 24 hour EKG monitoring, History of nuclear stress test, Hx of chest x-ray, Hx of echocardiogram, Hx of echocardiogram, Hx of pelvic x-ray, Hx of x-ray of hip, Hyperlipidemia, Hypertension, and Leg pain. has a past surgical history that includes Hysterectomy; cyst removal; Tubal ligation; back surgery; joint replacement (Right); and laparoscopic appendectomy (N/A, 5/18/2019). reports that she quit smoking about 2 weeks ago. She started smoking about 47 years ago. She has a 47.00 pack-year smoking history. She has never used smokeless tobacco. She reports current alcohol use. She reports that she does not use drugs. Family history:  family history includes Cancer in her father and sister; Dementia in her mother; Diabetes in her sister; Heart Attack in her sister; Heart Attack (age of onset: 61) in her father and mother; Hypertension in her sister, sister, and sister; Irritable Bowel Syndrome in her sister; Stroke in her father, mother, and sister.     Allergies   Allergen Reactions    Formaldehyde     Gabapentin Other (See Comments)    Norflex Tablets [Orphenadrine]     Cranberry Rash     Social History:    Reviewed; no changes    Objective:   PHYSICAL EXAM:        VITALS:  BP (!) 184/88   Pulse 94   Temp 97.5 °F (36.4 °C) (Oral)   Resp 18   Ht 5' (1.524 m)   Wt 121 lb 1 oz (54.9 kg)   SpO2 92%   BMI 23.64 kg/m²     24HR INTAKE/OUTPUT:      Intake/Output Summary (Last 24 hours) at 12/5/2020 Extension Farrah Greene filed at 12/5/2020 0949  Gross per 24 hour   Intake 10 ml   Output --   Net 10 ml       CONSTITUTIONAL:  awake, alert, cooperative, no apparent distress, and appears stated age  LUNGS:  decreased breath sounds. CARDIOVASCULAR:  normal S1 and S2 and negative JVD  ABD:Abdomen soft, non-tender. BS normal. No masses,  No organomegaly  NEURO:Alert and oriented x3. Gait normal. Reflexes and motor strength normal and symmetric. Cranial nerves 2-12 and sensation grossly intact. DATA:    CBC:  Recent Labs     12/03/20  0732 12/04/20  0657   WBC 8.6 11.5*   RBC 4.06* 4.00*   HGB 11.4* 11.2*   HCT 38.8 38.2    389   MCV 95.6 95.5   MCH 28.1 28.0   MCHC 29.4* 29.3*   RDW 13.7 13.7   SEGSPCT 92.9* 94.2*      BMP:  Recent Labs     12/03/20  0732 12/04/20  0657 12/05/20  0421    146* 146*   K 5.5* 4.6 4.3   CL 98* 97* 100   CO2 41* 38* 39*   BUN 24* 23 31*   CREATININE 0.7 0.6 0.8   CALCIUM 8.8 8.8 8.6   GLUCOSE 142* 119* 115*      ABG:  Recent Labs     12/03/20  1815   PH 7.40   PO2ART 73*   DHA2RVN 66.0*   O2SAT 94.8*     Lab Results   Component Value Date    PROBNP 655.5 (H) 12/05/2020    PROBNP 803.3 (H) 12/04/2020    PROBNP 381.8 (H) 12/01/2020    THEOPH 3.2 (L) 12/04/2020     No results found for: [address] [facility]    Radiology Review:  Pertinent images / reports were reviewed as a part of this visit. Assessment:     Patient Active Problem List   Diagnosis    Hyperlipidemia    COPD (chronic obstructive pulmonary disease) ([facility] [address]    Leg pain    Hypertension    Tobacco abuse    Abdominal aortic aneurysm (AAA) without rupture (HCC)    Degeneration of lumbar or lumbosacral intervertebral disc    Osteopenia    Anxiety    Acute on chronic respiratory failure with hypoxia (HCC)    Lung mass    COPD exacerbation (HCC)    Acute exacerbation of chronic obstructive pulmonary disease (COPD) ([facility] [address]       Plan:   1. Overall the patient has improved. 2. Gentle diuresis. 3. Check parisa level.       Fercho

## 2020-12-06 NOTE — PROGRESS NOTES
[address]    Hospitalist Progress Note      Name:  Isma Smalls /Age/Sex: 1950  (65 y.o. female)   MRN & CSN:  [id number] & [id number] Admission Date/Time: 2020 10:45 PM   Location:  [address] [place] [address] PCP: Carolina Dai MD         Hospital Day: 6    Assessment and Plan:   Isma Smalls is a 65 y.o.  female  who presents with Acute exacerbation of chronic obstructive pulmonary disease (COPD) ([facility] [address]    Acute COPD exacerbation     On doxycycline, IV Rocephin was added by pulmonary  Continue with DuoNeb treatment, Solu-Medrol  Procalcitonin 0.3 -low suspicion for sepsis or infection  Pulmonary following    Lung Nodule/lung mass    IR consulted and plan for biopsy was postponed for elevated BNP  Patient is stable for the procedure - possible plan for biopsy     Chronic hypoxic respiratory failure -continue oxygen via nasal cannula    Hyperlipidemia -on Lipitor    Elevated BNP -no signs or symptoms of CHF at this point    Diet DIET GENERAL;  Diet NPO, After Midnight   DVT Prophylaxis [] Lovenox, []  Heparin, [] SCDs, []No VTE prophylaxis, patient ambulating   GI Prophylaxis [] PPI, [] H2 Blocker, [] No GI prophylaxis, patient is receiving diet/Tube Feeds   Code Status Full Code   Disposition Patient requires continued admission due to COPD exacerbation   MDM [] Low, [] Moderate,[x]  High     History of Present Illness: Subjective     Patient Seen & Examined at the bedside      Patient states that her shortness of breath has improved   Cough productive of thick yellowish sputum   No fever    Ten point ROS reviewed negative, unless as noted above    Objective:     No intake or output data in the 24 hours ending 20 1244   Vitals:   Vitals:    20 0758   BP:    Pulse:    Resp:    Temp:    SpO2: 94%     Physical Exam:    GEN Awake female, resting in bed in no apparent distress. Appears given age. RESP diminished air entry and scattered wheezes, no rales or rhonchi.     CARDIO/VASC -S1/S2 auscultated. Regular rate without appreciable murmurs, rubs, or gallops. Peripheral pulses equal bilaterally and palpable. No peripheral edema. GI Abdomen is soft without significant tenderness, masses, or guarding. Bowel sounds are normoactive. Rectal exam deferred.  Nicole catheter is not present.     Medications:   Medications:    fluticasone  1 spray Each Nostril Daily    guaiFENesin  600 mg Oral BID    furosemide  40 mg Oral Daily    albuterol sulfate HFA  2 puff Inhalation Q4H WA    theophylline  100 mg Oral TID    cefTRIAXone (ROCEPHIN) IV  1 g Intravenous Q24H    aspirin  81 mg Oral Daily    calcium elemental  500 mg Oral Daily    vitamin D  1,000 Units Oral Daily    nicotine  1 patch Transdermal Daily    atorvastatin  10 mg Oral Daily    sodium chloride flush  10 mL Intravenous 2 times per day    [Held by provider] enoxaparin  40 mg Subcutaneous Daily    predniSONE  40 mg Oral Daily    doxycycline hyclate  100 mg Oral Q12H    cetirizine  10 mg Oral Daily      Infusions:   PRN Meds: albuterol, 2.5 mg, Q6H PRN  HYDROcodone-acetaminophen, 1 tablet, Q4H PRN  sodium chloride flush, 10 mL, PRN  acetaminophen, 650 mg, Q6H PRN    Or  acetaminophen, 650 mg, Q6H PRN  polyethylene glycol, 17 g, Daily PRN  promethazine, 12.5 mg, Q6H PRN    Or  ondansetron, 4 mg, Q6H PRN          Electronically signed by Sarah Patel MD on 12/6/2020 at 12:44 PM

## 2020-12-07 NOTE — PROGRESS NOTES
at 12/7/2020 1443  Last data filed at 12/7/2020 0353  Gross per 24 hour   Intake 0 ml   Output 0 ml   Net 0 ml       CONSTITUTIONAL:  awake, alert, cooperative, no apparent distress, and appears stated age  LUNGS:  decreased breath sounds. CARDIOVASCULAR:  normal S1 and S2 and negative JVD  ABD:Abdomen soft, non-tender. BS normal. No masses,  No organomegaly  NEURO:Alert and oriented x3. Gait normal. Reflexes and motor strength normal and symmetric. Cranial nerves 2-12 and sensation grossly intact. DATA:    CBC:  Recent Labs     12/07/20  0732   WBC 11.9*   RBC 4.52   HGB 12.5   HCT 42.8   *   MCV 94.7   MCH 27.7   MCHC 29.2*   RDW 14.5   NRBC 1   SEGSPCT 61.0   BANDSPCT 1*      BMP:  Recent Labs     12/05/20  0421 12/06/20  0523 12/07/20  0732   * 144 139   K 4.3 4.3 4.4    98* 93*   CO2 39* 37* 38*   BUN 31* 32* 28*   CREATININE 0.8 0.7 0.6   CALCIUM 8.6 8.9 9.2   GLUCOSE 115* 77 67*      ABG:  No results for input(s): PH, PO2ART, SCL9QBE, HCO3, BEART, O2SAT in the last 72 hours. Lab Results   Component Value Date    PROBNP 459.6 (H) 12/06/2020    PROBNP 655.5 (H) 12/05/2020    PROBNP 803.3 (H) 12/04/2020    THEOPH 6.0 (L) 12/06/2020     No results found for: [facility]    Radiology Review:  Pertinent images / reports were reviewed as a part of this visit. Assessment:     Patient Active Problem List   Diagnosis    Hyperlipidemia    COPD [facility]chronic obstructive [facility] disease) ([facility] [address]    Leg pain    Hypertension    Tobacco abuse    Abdominal aortic aneurysm (AAA) without rupture (HCC)    Degeneration of lumbar or lumbosacral intervertebral disc    Osteopenia    Anxiety    Acute on chronic respiratory failure with hypoxia (HCC)    Lung mass    COPD exacerbation (HCC)    Acute exacerbation of chronic obstructive pulmonary disease (COPD) ([address]       Plan:   1. Overall the patient has improved. 2. As per IR. 3. CT surgery consulted for possible mediastinoscopy vs EBUS.       Fercho Liisa Snellen MD  12/7/2020  2:43 PM

## 2020-12-07 NOTE — PROGRESS NOTES
Chart reviewed. Full note and consult to follow. I have reviewed the CT scan/PET scan. Lung nodules would be difficult to access by navigational bronchoscopy. Mediastinal LN can be accessed by EBUS or mediastinoscopy.     I will discuss with oncology

## 2020-12-07 NOTE — PROGRESS NOTES
[address]    Hospitalist Progress Note      Name:  Dariana Guadarrama /Age/Sex: 1950  (65 y.o. female)   MRN & CSN:  [id number] & [id number] Admission Date/Time: 2020 10:45 PM   Location:  [address] [place] [address] PCP: Estrada Damon MD         Hospital Day: 7    Assessment and Plan:   Dariana Guadarrama is a 65 y.o.  female  who presents with Acute exacerbation of chronic obstructive pulmonary disease (COPD) ([facility] [address]    Acute COPD exacerbation     On doxycycline, IV Rocephin was added by pulmonary  Continue with DuoNeb treatment, Solu-Medrol  Procalcitonin 0.3 -low suspicion for sepsis or infection  Pulmonary following    Lung Nodules/lung mass    IR consulted and plan for biopsy was postponed for elevated BNP  Plan for biopsy by IR on  was canceled again (was ) due to - not being able to be prone and the risk of perc CT biopsy - instead recommending CT surgery consult for bronchoscopy and biopsy    Chronic hypoxic respiratory failure -continue oxygen via nasal cannula    Hyperlipidemia -on Lipitor    Pending CT surgery evaluation     Diet Diet NPO, After Midnight   DVT Prophylaxis [] Lovenox, []  Heparin, [] SCDs, []No VTE prophylaxis, patient ambulating   GI Prophylaxis [] PPI, [] H2 Blocker, [] No GI prophylaxis, patient is receiving diet/Tube Feeds   Code Status Full Code   Disposition Patient requires continued admission due to COPD exacerbation   MDM [] Low, [] Moderate,[x]  High     History of Present Illness: Subjective     Patient Seen & Examined at the bedside      Patient states that her shortness of breath has improved   Cough productive of thick yellowish sputum   Patient disappointed with the consult biopsy -awaiting explanation by IR physician    Ten point ROS reviewed negative, unless as noted above    Objective:        Intake/Output Summary (Last 24 hours) at 2020 1157  Last data filed at 2020 0352  Gross per 24 hour   Intake 0 ml   Output 0 ml   Net 0 ml      Vitals:   Vitals: 12/07/20 0832   BP: (!) 144/65   Pulse: 83   Resp: 20   Temp: 97.1 °F (36.2 °C)   SpO2: 97%     Physical Exam:    GEN Awake female, resting in bed in no apparent distress. Appears given age. RESP diminished air entry and scattered wheezes, no rales or rhonchi. CARDIO/VASC -S1/S2 auscultated. Regular rate without appreciable murmurs, rubs, or gallops. Peripheral pulses equal bilaterally and palpable. No peripheral edema. GI Abdomen is soft without significant tenderness, masses, or guarding. Bowel sounds are normoactive. Rectal exam deferred.  Nicole catheter is not present.     Medications:   Medications:    fluticasone  1 spray Each Nostril Daily    guaiFENesin  600 mg Oral BID    furosemide  40 mg Oral Daily    albuterol sulfate HFA  2 puff Inhalation Q4H WA    theophylline  100 mg Oral TID    cefTRIAXone (ROCEPHIN) IV  1 g Intravenous Q24H    aspirin  81 mg Oral Daily    calcium elemental  500 mg Oral Daily    vitamin D  1,000 Units Oral Daily    nicotine  1 patch Transdermal Daily    atorvastatin  10 mg Oral Daily    sodium chloride flush  10 mL Intravenous 2 times per day    [Held by provider] enoxaparin  40 mg Subcutaneous Daily    predniSONE  40 mg Oral Daily    doxycycline hyclate  100 mg Oral Q12H    cetirizine  10 mg Oral Daily      Infusions:   PRN Meds: albuterol, 2.5 mg, Q6H PRN  HYDROcodone-acetaminophen, 1 tablet, Q4H PRN  sodium chloride flush, 10 mL, PRN  acetaminophen, 650 mg, Q6H PRN    Or  acetaminophen, 650 mg, Q6H PRN  polyethylene glycol, 17 g, Daily PRN  promethazine, 12.5 mg, Q6H PRN    Or  ondansetron, 4 mg, Q6H PRN          Electronically signed by Mary Ellen Baker MD on 12/7/2020 at 11:57 AM

## 2020-12-07 NOTE — PROGRESS NOTES
Date:12/7/2020  Name:Wendy Magana   SBF:[birth year]   #:[id number]    SEX:female     Chief Complaint:  Multiple right lung nodules with mediastinal lymphoadenopathy  History of Present Illness:   History of the right lung nodules and mediastinal lymphoadenopathy, concerning for malignancy and lung nodule biopsy was requested. HISTORY AND PHYSICAL  Acute exacerbation of chronic obstructive pulmonary disease (COPD) (HCC) [J44.1]  COPD exacerbation (HCC) [J44.1]  Acute exacerbation of chronic obstructive pulmonary disease (COPD) ([facility] [address] [J44.1]    Past Medical History:  Past Medical History:   Diagnosis Date    Arthritis     COPD (chronic obstructive pulmonary disease) ([facility] [address]     H/O cardiovascular stress test 11/18/2009    thallium, normal no ischemia EF70%     H/O cardiovascular stress test 10/11/2013    thallium,EF70%, normal, no ischemia    H/O Doppler ultrasound 10/13/10    carotid, right normal, left normal    H/O Doppler ultrasound 10/07/14    Carotid mild bilateral internal carotid artery disease less than 50% in severity. Normal vertebral artery flows with good velocities. Incidental finding of possible thyroid nodule in the left lobe.      History of 24 hour EKG monitoring 3/11/2011    NSR no ectopy    History of nuclear stress test 10/21/2016    lexiscan-normal,no ischemia,EF70%,enlarged right ventricle    Hx of chest x-ray 01/02/2015    WNL    Hx of echocardiogram 10/11/2013    10/13 Abn lt ventricular diastolic function, mile MR, EF 57%,10/10 EF55%, mild mitral annular calcification    Hx of echocardiogram 05/02/2019    EF 55-52%, Grade I diastolic dysfunction, Mild aortic stenosis, Calcific thickening of posterior leaflet of mitral valve, Mild Pulm HTN    Hx of pelvic x-ray 01/02/2015    Severe RT his osterarothrosis    Hx of x-ray of hip 01/02/2015    Severe Rt hip osteosrthrosis    Hyperlipidemia     Hypertension     Leg pain        Past Surgical History:  Past Surgical History: Procedure Laterality Date    BACK SURGERY      CYST REMOVAL      left arm    HYSTERECTOMY          JOINT REPLACEMENT Right     hip    LAPAROSCOPIC APPENDECTOMY N/A 2019    APPENDECTOMY LAPAROSCOPIC performed by Daryl Gay MD at [address] [place] [address]         Social History:  Social History     Socioeconomic History    Marital status:      Spouse name: Not on file    Number of children: Not on file    Years of education: Not on file    Highest education level: Not on file   Occupational History    Not on file   Social Needs    Financial resource strain: Not on file    Food insecurity     Worry: Not on file     Inability: Not on file    Transportation needs     Medical: Not on file     Non-medical: Not on file   Tobacco Use    Smoking status: Former Smoker     Packs/day: 1.00     Years: 47.00     Pack years: 47.00     Start date: 1973     Last attempt to quit: 2020     Years since quittin.0    Smokeless tobacco: Never Used   Substance and Sexual Activity    Alcohol use:  Yes     Alcohol/week: 0.0 standard drinks     Comment: rare    Drug use: No    Sexual activity: Not on file     Comment:    Lifestyle    Physical activity     Days per week: Not on file     Minutes per session: Not on file    Stress: Not on file   Relationships    Social connections     Talks on phone: Not on file     Gets together: Not on file     Attends Taoism service: Not on file     Active member of club or organization: Not on file     Attends meetings of clubs or organizations: Not on file     Relationship status: Not on file    Intimate partner violence     Fear of current or ex partner: Not on file     Emotionally abused: Not on file     Physically abused: Not on file     Forced sexual activity: Not on file   Other Topics Concern    Not on file   Social History Narrative    Not on file       Family History:  Family History   Problem Relation Age of Onset    Stroke Mother     Dementia Mother     Heart Attack Mother 61    Cancer Father     Stroke Father     Heart Attack Father 61    Diabetes Sister     Heart Attack Sister     Hypertension Sister     Cancer Sister     Stroke Sister     Hypertension Sister     Hypertension Sister     Irritable Bowel Syndrome Sister        Allergies: Allergies   Allergen Reactions    Formaldehyde     Gabapentin Other (See Comments)    Norflex Tablets [Orphenadrine]     Cranberry Rash       Medications:  No current facility-administered medications on file prior to encounter. Current Outpatient Medications on File Prior to Encounter   Medication Sig Dispense Refill    simvastatin (ZOCOR) 20 MG tablet Take 1 tablet by mouth nightly 90 tablet 1    predniSONE (DELTASONE) 10 MG tablet 4 tabs for 2 days, 3 tabs for 2 days, 2 tabs for 2 days, 1 tab for 2 days 20 tablet 0    HYDROcodone-acetaminophen (NORCO)  MG per tablet Take 1 tablet by mouth every 4 hours as needed for Pain for up to 30 days. Indications: 180 180 tablet 0    theophylline (UNIPHYL) 400 MG extended release tablet Take 0.5 tablets by mouth daily 15 tablet 3    nicotine (NICODERM CQ) 21 MG/24HR Place 1 patch onto the skin daily 30 patch 3    ipratropium-albuterol (DUONEB) 0.5-2.5 (3) MG/3ML SOLN nebulizer solution Inhale 3 mLs into the lungs 4 times daily 360 mL 5    albuterol sulfate HFA (PROAIR HFA) 108 (90 Base) MCG/ACT inhaler Inhale 2 puffs into the lungs every 6 hours as needed for Wheezing 1 Inhaler 6    Cholecalciferol (VITAMIN D3) 25 MCG (1000 UT) TABS Take by mouth daily       calcium carbonate 600 MG TABS tablet Take 1 tablet by mouth daily      Fexofenadine HCl (MUCINEX ALLERGY PO) Take by mouth 2 times daily       zoster recombinant adjuvanted vaccine (SHINGRIX) 50 MCG/0.5ML SUSR injection 50 MCG IM then repeat 2-6 months.  (Patient not taking: Reported on 12/1/2020) 0.5 mL 1    aspirin 81 MG EC tablet Take 81 mg by mouth daily Vital Signs:  BP (!) 144/65   Pulse 83   Temp 97.1 °F (36.2 °C) (Oral)   Resp 20   Ht 5' (1.524 m)   Wt 122 lb 3.2 oz (55.4 kg)   SpO2 97%   BMI 23.87 kg/m²   Body mass index is 23.87 kg/m². Laboratory:  Recent Labs     12/05/20  0421 12/06/20  0523 12/07/20  0732   WBC  --   --  11.9*   * 144 139    98* 93*   CO2 39* 37* 38*   BUN 31* 32* 28*   CREATININE 0.8 0.7 0.6   GLUCOSE 115* 77 67*     INR @LABR24(INR)@    Physical Exam:  GENERAL:Well developed, well nourished in NAD  NECK: Neck exam - No JVD,HJR or carotid bruit, no thyromegaly   RESPIRATORY:Clear to auscultation  HEART:RRR,no murmer, gallop or friction rub  ABDOMEN: Bowel sounds present, no tenderness to palpation. No organomegaly  EXTREMITIES: no edema or cyanosis  VASCULAR:  no ischemic changes  SKIN: no erythema, rubor or lesions noted  NEURO/PSYCH:Alert and oriented    EKG:    Imaging:    Chest: CTA    Heart: S1, S1    Impression:  Principal Problem:    Acute exacerbation of chronic obstructive pulmonary disease (COPD) (HCC)  Active Problems:    Hyperlipidemia    Hypertension    Tobacco abuse    Acute on chronic respiratory failure with hypoxia (HCC)    Lung mass  Resolved Problems:    * No resolved hospital problems. *      Right lung multiple nodules with mediastinal lymphoadenopathy, concerning for malignancy. Mallampati Score 2  ASA class 2    PLAN OF CARE/PLANNED PROCEDURE    Patient was admitted to hospital due to exacerbation of COPD. She was scheduled biopsy multiple times but was unable to proceed due to limited tolerance to lie flat prone position on CT table due to her poor respiratory status. Anesthesiology was involved for assistance to the procedure, but still waiting for lab results to improve. Discussed with patient and pulmonologist for possible alternative approach to biopsy the anterior bronchial lymphoadenopathy to avoid the pneumothorax risk and preserve her respiratory function.  Will recommend possible bronchoscopic biopsy of the lymphoadenopathy and consult thoracic surgery team for further evaluation. Please call IR for any questions regarding the options of biopsy procedure.

## 2020-12-08 NOTE — CONSULTS
Department of Cardiovascular & Thoracic Surgery   Consult Note    Reason for Consult:  Lung nodules and mediastinal LAD    Requesting Physician: Dr. Jose Carolina     Date of Consult: 12/8/20      History Obtained From:  patient     HISTORY OF PRESENT ILLNESS:    The patient is a 65 y.o. female who presented for percutaneous lung biopsy. She was found to have COPD exacerbation. She was having productive cough and worse than usual SOB. She had hypoxia on admission. She does wear 4L NC at home. She is currently on the same. The biopsy was cancelled due to size and location of the nodules. Now she is feeling well and she wants to go home. She has had lung nodules for several years and was followed by Dr. Gissell Joseph. He ordered a PET scan which showed metabolically active RUL and RLL nodules and mediastinal LAD. She is a former smoker 50 pack years. Past Medical History:        Diagnosis Date    Arthritis     COPD (chronic obstructive pulmonary disease) ([facility] [address]     H/O cardiovascular stress test 11/18/2009    thallium, normal no ischemia EF70%     H/O cardiovascular stress test 10/11/2013    thallium,EF70%, normal, no ischemia    H/O Doppler ultrasound 10/13/10    carotid, right normal, left normal    H/O Doppler ultrasound 10/07/14    Carotid mild bilateral internal carotid artery disease less than 50% in severity. Normal vertebral artery flows with good velocities. Incidental finding of possible thyroid nodule in the left lobe.      History of 24 hour EKG monitoring 3/11/2011    NSR no ectopy    History of nuclear stress test 10/21/2016    lexiscan-normal,no ischemia,EF70%,enlarged right ventricle    Hx of chest x-ray 01/02/2015    WNL    Hx of echocardiogram 10/11/2013    10/13 Abn lt ventricular diastolic function, mile MR, EF 57%,10/10 EF55%, mild mitral annular calcification    Hx of echocardiogram 05/02/2019    EF 88-68%, Grade I diastolic dysfunction, Mild aortic stenosis, Calcific thickening of posterior leaflet of mitral valve, Mild Pulm HTN    Hx of pelvic x-ray 01/02/2015    Severe RT his osterarothrosis    Hx of x-ray of hip 01/02/2015    Severe Rt hip osteosrthrosis    Hyperlipidemia     Hypertension     Leg pain      Past Surgical History:        Procedure Laterality Date    BACK SURGERY      CYST REMOVAL      left arm    HYSTERECTOMY      1999    JOINT REPLACEMENT Right     hip    LAPAROSCOPIC APPENDECTOMY N/A 5/18/2019    APPENDECTOMY LAPAROSCOPIC performed by Quynh Fay MD at [address]       Current Medications:   Current Facility-Administered Medications: fluticasone (FLONASE) 50 MCG/ACT nasal spray 1 spray, 1 spray, Each Nostril, Daily  guaiFENesin (MUCINEX) extended release tablet 600 mg, 600 mg, Oral, BID  furosemide (LASIX) tablet 40 mg, 40 mg, Oral, Daily  albuterol sulfate  (90 Base) MCG/ACT inhaler 2 puff, 2 puff, Inhalation, Q4H WA  theophylline (BECKY-24) extended release capsule 100 mg, 100 mg, Oral, TID  albuterol (PROVENTIL) nebulizer solution 2.5 mg, 2.5 mg, Nebulization, Q6H PRN  cefTRIAXone (ROCEPHIN) 1 g IVPB in 50 mL D5W minibag, 1 g, Intravenous, Q24H  aspirin EC tablet 81 mg, 81 mg, Oral, Daily  calcium elemental (OSCAL) tablet 500 mg, 500 mg, Oral, Daily  vitamin D CAPS 1,000 Units, 1,000 Units, Oral, Daily  HYDROcodone-acetaminophen (NORCO)  MG per tablet 1 tablet, 1 tablet, Oral, Q4H PRN  nicotine (NICODERM CQ) 21 MG/24HR 1 patch, 1 patch, Transdermal, Daily  atorvastatin (LIPITOR) tablet 10 mg, 10 mg, Oral, Daily  sodium chloride flush 0.9 % injection 10 mL, 10 mL, Intravenous, 2 times per day  sodium chloride flush 0.9 % injection 10 mL, 10 mL, Intravenous, PRN  acetaminophen (TYLENOL) tablet 650 mg, 650 mg, Oral, Q6H PRN **OR** acetaminophen (TYLENOL) suppository 650 mg, 650 mg, Rectal, Q6H PRN  polyethylene glycol (GLYCOLAX) packet 17 g, 17 g, Oral, Daily PRN  promethazine (PHENERGAN) tablet 12.5 mg, 12.5 mg, Oral, Q6H PRN **OR** ondansetron (ZOFRAN) injection 4 mg, 4 mg, Intravenous, Q6H PRN  enoxaparin (LOVENOX) injection 40 mg, 40 mg, Subcutaneous, Daily  [COMPLETED] methylPREDNISolone sodium (SOLU-MEDROL) injection 40 mg, 40 mg, Intravenous, Q6H **FOLLOWED BY** predniSONE (DELTASONE) tablet 40 mg, 40 mg, Oral, Daily  doxycycline hyclate (VIBRA-TABS) tablet 100 mg, 100 mg, Oral, Q12H  cetirizine (ZYRTEC) tablet 10 mg, 10 mg, Oral, Daily  Allergies: Allergies   Allergen Reactions    Formaldehyde     Gabapentin Other (See Comments)    Norflex Tablets [Orphenadrine]     Cranberry Rash       Social History:   Social History     Socioeconomic History    Marital status:      Spouse name: Not on file    Number of children: Not on file    Years of education: Not on file    Highest education level: Not on file   Occupational History    Not on file   Social Needs    Financial resource strain: Not on file    Food insecurity     Worry: Not on file     Inability: Not on file    Transportation needs     Medical: Not on file     Non-medical: Not on file   Tobacco Use    Smoking status: Former Smoker     Packs/day: 1.00     Years: 47.00     Pack years: 47.00     Start date: 1973     Last attempt to quit: 2020     Years since quittin.0    Smokeless tobacco: Never Used   Substance and Sexual Activity    Alcohol use:  Yes     Alcohol/week: 0.0 standard drinks     Comment: rare    Drug use: No    Sexual activity: Not on file     Comment:    Lifestyle    Physical activity     Days per week: Not on file     Minutes per session: Not on file    Stress: Not on file   Relationships    Social connections     Talks on phone: Not on file     Gets together: Not on file     Attends Nondenominational service: Not on file     Active member of club or organization: Not on file     Attends meetings of clubs or organizations: Not on file     Relationship status: Not on file    Intimate partner violence     Fear of current or ex partner: Not on file     Emotionally abused: Not on file     Physically abused: Not on file     Forced sexual activity: Not on file   Other Topics Concern    Not on file   Social History Narrative    Not on file       Family History:        Problem Relation Age of Onset    Stroke Mother     Dementia Mother     Heart Attack Mother 61    Cancer Father     Stroke Father     Heart Attack Father 61    Diabetes Sister     Heart Attack Sister     Hypertension Sister     Cancer Sister     Stroke Sister     Hypertension Sister     Hypertension Sister     Irritable Bowel Syndrome Sister        REVIEW OF SYSTEMS:  Constitutional: - fatigue, - fever, - chills, - night sweats  Eyes: - vision loss  Cardiovascular: -  chest pain, - palpitations, - leg swelling, - leg pain   Respiratory: + cough, +shortness of breath, - wheezing   GI: - nausea, - vomiting, - abdominal pain, - constipation, - diarrhea   : - dysuria   MSK: - joint pain, - muscle pain  Integument: - rash, - skin color change   Heme: - easy bruising or bleeding  Neurologic: - headache, - weakness, - dizziness, - paresthesias       EXAM:  Constitutional: Blood pressure 127/67, pulse 80, temperature 97.1 °F (36.2 °C), temperature source Oral, resp. rate 18, height 5' (1.524 m), weight 122 lb 3.2 oz (55.4 kg), SpO2 94 %, not currently breastfeeding. No apparent distress, appears stated age and cooperative. Neurologic: follows commands, no focal weakness noted   Lungs: Good respiratory effort. Clear to auscultation,   CV: Regular rate/ rhythm , no peripheral edema, feet warm and well perfused  GI: Soft, non-tender in all four quadrants, non-distended, + bowel sounds, liver and spleen no palpable masses  : bladder nondistended   MSK: no obvious deformity   Skin: warm, pink and dry       DATA:  CT    Impression    1.  Persistent mediastinal adenopathy and right upper and lower lobe pulmonary    nodule is suspicious for malignancy.  Recommend tissue sampling. 2. Ground-glass opacity seen within the left lung on the prior PET-CT are no    longer visualized.               IMPRESSION  Patient Active Problem List   Diagnosis    Hyperlipidemia    COPD (chronic obstructive pulmonary disease) (HCC)    Leg pain    Hypertension    Tobacco abuse    Abdominal aortic aneurysm (AAA) without rupture (HCC)    Degeneration of lumbar or lumbosacral intervertebral disc    Osteopenia    Anxiety    Acute on chronic respiratory failure with hypoxia (HCC)    Lung mass    COPD exacerbation (HCC)    Acute exacerbation of chronic obstructive pulmonary disease (COPD) (HCC)       Lung nodules, PET avid  Mediastinal LAD      RECOMMENDATIONS:  Recommend mediastinoscopy for bx of the large anterior mediastinal lymph node. There are no airways to the lung nodules to due bx by bronchoscopy. Pt would like to have the procedure soon, preferably before leaving the hospital. Dr. Montse Gu to discuss with hospitalist regarding timing of the procedure. She will likely need to be [facility] and return for the procedure. Pt seen with Dr. Montse Gu.      James Colon PA-C

## 2020-12-08 NOTE — PROGRESS NOTES
Hospitalist Progress Note      Name:  Padmini Perdomo /Age/Sex: 1950  (65 y.o. female)   MRN & CSN:  [id number] & [id number] Admission Date/Time: 2020 10:45 PM   Location:  [address] [place] [address] PCP: Loyda Jain MD         Hospital Day: 8    History of Present Illness:     Chief Complaint: Acute exacerbation of chronic obstructive pulmonary disease (COPD) ([address]  Padmini Perdomo is a 65 y.o.  female  who presents with SOB     The patient seen and examined at bedside. She says feeling better today. Awaiting for Dr. Skyla Hallman, to decide on biopsy. Ten point ROS reviewed negative, unless as noted above    Objective:        Intake/Output Summary (Last 24 hours) at 2020 1335  Last data filed at 2020 0935  Gross per 24 hour   Intake 130 ml   Output --   Net 130 ml      Vitals:   Vitals:    20 1110   BP:    Pulse:    Resp:    Temp:    SpO2: 98%     Physical Exam:   General Appearance: alert and oriented to person, place and time, in no acute distress  Cardiovascular: normal rat x-rays breath sounds bilaterally  Abdomen: soft, non-tender, non-distended, normal bowel sounds, no masses   Extremities: no cyanosis, clubbing or edema, pulse   Skin: warm and dry, no rash or erythema  Head: normocephalic and atraumatic  Eyes: pupils equal, round, and reactive to light  Neck: supple and non-tender without mass, no thyromegaly   Musculoskeletal: normal range of motion, no joint swelling, deformity or tenderness  Neurological: alert, oriented, normal speech, no focal findings or movement disorder noted    Medications:   Medications:    fluticasone  1 spray Each Nostril Daily    guaiFENesin  600 mg Oral BID    furosemide  40 mg Oral Daily    albuterol sulfate HFA  2 puff Inhalation Q4H WA    theophylline  100 mg Oral TID    cefTRIAXone (ROCEPHIN) IV  1 g Intravenous Q24H    aspirin  81 mg Oral Daily    calcium elemental  500 mg Oral Daily    vitamin D  1,000 Units Oral Daily    nicotine  1 patch Transdermal Daily    atorvastatin  10 mg Oral Daily    sodium chloride flush  10 mL Intravenous 2 times per day    enoxaparin  40 mg Subcutaneous Daily    predniSONE  40 mg Oral Daily    doxycycline hyclate  100 mg Oral Q12H    cetirizine  10 mg Oral Daily      Infusions:   PRN Meds: albuterol, 2.5 mg, Q6H PRN  HYDROcodone-acetaminophen, 1 tablet, Q4H PRN  sodium chloride flush, 10 mL, PRN  acetaminophen, 650 mg, Q6H PRN    Or  acetaminophen, 650 mg, Q6H PRN  polyethylene glycol, 17 g, Daily PRN  promethazine, 12.5 mg, Q6H PRN    Or  ondansetron, 4 mg, Q6H PRN            Pertinent New Labs & Imaging Studies     CBC with Differential:    Lab Results   Component Value Date    WBC 11.9 12/07/2020    RBC 4.52 12/07/2020    HGB 12.5 12/07/2020    HCT 42.8 12/07/2020     12/07/2020    MCV 94.7 12/07/2020    MCH 27.7 12/07/2020    MCHC 29.2 12/07/2020    RDW 14.5 12/07/2020    NRBC 1 12/07/2020    SEGSPCT 61.0 12/07/2020    BANDSPCT 1 12/07/2020    LYMPHOPCT 28.0 12/07/2020    MONOPCT 7.0 12/07/2020    BASOPCT 0.1 12/04/2020    MONOSABS 0.8 12/07/2020    LYMPHSABS 3.3 12/07/2020    EOSABS 0.1 12/07/2020    BASOSABS 0.0 12/04/2020    DIFFTYPE MANUAL DIFFERENTIAL 12/07/2020     CMP:    Lab Results   Component Value Date     12/07/2020    K 4.4 12/07/2020    CL 93 12/07/2020    CO2 38 12/07/2020    BUN 28 12/07/2020    CREATININE 0.6 12/07/2020    GFRAA >60 12/07/2020    AGRATIO 1.6 08/28/2020    LABGLOM >60 12/07/2020    GLUCOSE 67 12/07/2020    PROT 6.1 12/04/2020    PROT 6.5 03/06/2015    LABALBU 3.3 12/04/2020    CALCIUM 9.2 12/07/2020    BILITOT 0.1 12/04/2020    ALKPHOS 71 12/04/2020    AST 9 12/04/2020    ALT 5 12/04/2020     Xr Chest Portable    Result Date: 12/1/2020  EXAMINATION: ONE XRAY VIEW OF THE CHEST 12/1/2020 11:12 pm COMPARISON: 08/06/2019 and 08/28/2020, PET CT 10/22/2020 HISTORY: ORDERING SYSTEM PROVIDED HISTORY: dyspnea TECHNOLOGIST PROVIDED HISTORY: Reason for exam:->dyspnea Reason for Exam: dyspnea Acuity: Acute Type of Exam: Initial FINDINGS: Emphysema with chronically increased interstitial lung markings. There is acute right greater than left bibasilar infiltrate and/or atelectasis. These findings partially obscure the pulmonary nodules noted on the recent stone PET CT. 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 Metastatic disease noted on the recent PET CT is not well evaluated on this portable chest x-ray. Right greater than left bibasilar infiltrate and/or atelectasis. There is underlying emphysema.        Assessment and Plan:   Jayleen Quinteros is a 65 y.o.  female  who presents with Acute exacerbation of chronic obstructive pulmonary disease (COPD) ([facility] [address]    Acute COPD exacerbation   On doxycycline and IV Rocephin  Continue with DuoNeb treatment, Tapering steroids  Procalcitonin 0.3 -low suspicion for sepsis or infection  Pulmonary recommendations appreciated     Lung Nodules/lung mass  IR consulted and plan for biopsy was postponed for elevated BNP  Plan for biopsy by IR on 12/07 was canceled again (was 12/4) due to - not being able to be prone and the risk of perc CT biopsy - instead recommending CT surgery consult for bronchoscopy and biopsy     Chronic hypoxic respiratory failure -continue oxygen via nasal cannula     Hyperlipidemia -on Lipitor     Awaiting CT surgery recommendations    Diet DIET GENERAL;   DVT Prophylaxis [x] Lovenox, []  Heparin, [] SCDs, [] Ambulation   GI Prophylaxis [] PPI,  [] H2 Blocker,  [] Carafate,  [x] Diet/Tube Feeds   Code Status Full Code   Disposition Pending CTS evaluation   MDM [] Low, [x] Moderate,[]  High     Electronically signed by Brent Jacques MD on 12/8/2020 at 1:35 PM

## 2020-12-08 NOTE — PROGRESS NOTES
Comprehensive Nutrition Assessment    Type and Reason for Visit:  Initial(length of stay)    Nutrition Recommendations/Plan:   Continue general diet with snacks as needed    Nutrition Assessment:  Admit with shortness of breath, exacerbation of COPD with eval in progress for lung nodules. Currently on general diet with meal intake %. Patient reports good appetite eating well during stay and gaining weight. Low nutrition risk at this time with good intake but will continue to follow up to monitor intake, change in appetite. Malnutrition Assessment:  Malnutrition Status:     Risk for malnutrition    Estimated Daily Nutrient Needs:  Energy (kcal):  1292-8686 (30-32 eboni/kg); Weight Used for Energy Requirements:  Current     Protein (g):  55-66 (1-1.2 g/kg); Weight Used for Protein Requirements:  Current        Fluid (ml/day):  1700; Method Used for Fluid Requirements:  1 ml/kcal      Nutrition Related Findings:  sitting up at edge of bed finishing breakfast, frustrated with LOS and unsure of medical plan      Wounds:  None       Current Nutrition Therapies:    DIET GENERAL;     Anthropometric Measures:  · Height: 5' (152.4 cm)  · Current Body Weight: 122 lb 2.2 oz (55.4 kg)   · Admission Body Weight: 119 lb 14.9 oz (54.4 kg)    · Usual Body Weight: 120 lb (54.4 kg)(per hx)     · Ideal Body Weight: 100 lbs; % Ideal Body Weight 122.1 %   · BMI: 23.9  · Adjusted Body Weight:  ; No Adjustment    · BMI Categories: Normal Weight (BMI 22.0 to 24.9) age over 72       Nutrition Diagnosis:   · Increased nutrient needs related to increase demand for energy/nutrients as evidenced by other (comment)(lung disease)      Nutrition Interventions:   Food and/or Nutrient Delivery:  Continue Current Diet, Snacks (Comment)  Nutrition Education/Counseling:  Education initiated   Coordination of Nutrition Care:  Continue to monitor while inpatient    Goals:  Patient will continue to consume at least 75% at meals during stay Nutrition Monitoring and Evaluation:   Behavioral-Environmental Outcomes:  None Identified   Food/Nutrient Intake Outcomes:  Diet Advancement/Tolerance, Food and Nutrient Intake  Physical Signs/Symptoms Outcomes:  Biochemical Data, Skin, Weight     Discharge Planning:    Continue current diet     Electronically signed by Liana Hurley RD, LD on 12/8/20 at 10:20 AM EST    Contact: [phone]

## 2020-12-08 NOTE — PROGRESS NOTES
Pulmonary and Critical Care  Progress Note    Subjective: The patient is feeling better. Shortness of breath better. Chest pain none  Addressing respiratory complaints Patient is negative for  hemoptysis and cyanosis  CONSTITUTIONAL:  negative for fevers and chills      Past Medical History:     has a past medical history of Arthritis, COPD (chronic obstructive pulmonary disease) ([facility] [address] H/O cardiovascular stress test, H/O cardiovascular stress test, H/O Doppler ultrasound, H/O Doppler ultrasound, History of 24 hour EKG monitoring, History of nuclear stress test, Hx of chest x-ray, Hx of echocardiogram, Hx of echocardiogram, Hx of pelvic x-ray, Hx of x-ray of hip, Hyperlipidemia, Hypertension, and Leg pain. has a past surgical history that includes Hysterectomy; cyst removal; Tubal ligation; back surgery; joint replacement (Right); and laparoscopic appendectomy (N/A, 5/18/2019). reports that she quit smoking about 3 weeks ago. She started smoking about 47 years ago. She has a 47.00 pack-year smoking history. She has never used smokeless tobacco. She reports current alcohol use. She reports that she does not use drugs. Family history:  family history includes Cancer in her father and sister; Dementia in her mother; Diabetes in her sister; Heart Attack in her sister; Heart Attack (age of onset: 61) in her father and mother; Hypertension in her sister, sister, and sister; Irritable Bowel Syndrome in her sister; Stroke in her father, mother, and sister.     Allergies   Allergen Reactions    Formaldehyde     Gabapentin Other (See Comments)    Norflex Tablets [Orphenadrine]     Cranberry Rash     Social History:    Reviewed; no changes    Objective:   PHYSICAL EXAM:        VITALS:  /67   Pulse 80   Temp 97.1 °F (36.2 °C) (Oral)   Resp 18   Ht 5' (1.524 m)   Wt 122 lb 3.2 oz (55.4 kg)   SpO2 94%   BMI 23.87 kg/m²     24HR INTAKE/OUTPUT:      Intake/Output Summary (Last 24 hours) at 12/8/2020 [address] Me[address] W[address]sworth filed at 12/8/2020 0935  Gross per 24 hour   Intake 130 ml   Output --   Net 130 ml       CONSTITUTIONAL:  awake, alert, cooperative, no apparent distress, and appears stated age  LUNGS:  decreased breath sounds. CARDIOVASCULAR:  normal S1 and S2 and negative JVD  ABD:Abdomen soft, non-tender. BS normal. No masses,  No organomegaly  NEURO:Alert and oriented x3. Gait normal. Reflexes and motor strength normal and symmetric. Cranial nerves 2-12 and sensation grossly intact. DATA:    CBC:  Recent Labs     12/07/20  0732   WBC 11.9*   RBC 4.52   HGB 12.5   HCT 42.8   *   MCV 94.7   MCH 27.7   MCHC 29.2*   RDW 14.5   NRBC 1   SEGSPCT 61.0   BANDSPCT 1*      BMP:  Recent Labs     12/06/20  0523 12/07/20  0732    139   K 4.3 4.4   CL 98* 93*   CO2 37* 38*   BUN 32* 28*   CREATININE 0.7 0.6   CALCIUM 8.9 9.2   GLUCOSE 77 67*      ABG:  No results for input(s): PH, PO2ART, YUM7MCS, HCO3, BEART, O2SAT in the last 72 hours. Lab Results   Component Value Date    PROBNP 459.6 (H) 12/06/2020    PROBNP 655.5 (H) 12/05/2020    PROBNP 803.3 (H) 12/04/2020    THEOPH 6.0 (L) 12/06/2020     No results found for: [facility]    Radiology Review:  Pertinent images / reports were reviewed as a part of this visit. Assessment:     Patient Active Problem List   Diagnosis    Hyperlipidemia    COPD (chronic obstructive pulmonary disease) ([address]    Leg pain    Hypertension    Tobacco abuse    Abdominal aortic aneurysm (AAA) without rupture (HCC)    Degeneration of lumbar or lumbosacral intervertebral disc    Osteopenia    Anxiety    Acute on chronic respiratory failure with hypoxia (HCC)    Lung mass    COPD exacerbation (HCC)    Acute exacerbation of chronic obstructive pulmonary disease (COPD) ([address]       Plan:   1. Overall the patient has improved. 2. CT surg F/U.       Shana Dang MD  12/8/2020  3:30 PM

## 2020-12-09 NOTE — ANESTHESIA PRE PROCEDURE
Department of Anesthesiology  Preprocedure Note       Name:  Rupert Reyna   Age:  65 y.o.  :  1950                                          MRN:  [id number]         Date:  2020      Surgeon: Pushpa Yen):  Cynthia Herrera MD    Procedure: Procedure(s):  MEDIASTINOSCOPY    Medications prior to admission:   Prior to Admission medications    Medication Sig Start Date End Date Taking? Authorizing Provider   simvastatin (ZOCOR) 20 MG tablet Take 1 tablet by mouth nightly 20  Yes Nivia Hastings MD   predniSONE (DELTASONE) 10 MG tablet 4 tabs for 2 days, 3 tabs for 2 days, 2 tabs for 2 days, 1 tab for 2 days 20  Yes Nivia Hastings MD   HYDROcodone-acetaminophen [facility])  MG per tablet Take 1 tablet by mouth every 4 hours as needed for Pain for up to 30 days. Indications: 180 20 Yes Nivia Hastings MD   theophylline (UNIPHYL) 400 MG extended release tablet Take 0.5 tablets by mouth daily 10/8/20  Yes Heather Bradley MD   nicotine (NICODERM CQ) 21 MG/24HR Place 1 patch onto the skin daily 20  Yes Augustus Murrell MD   ipratropium-albuterol (DUONEB) 0.5-2.5 (3) MG/3ML SOLN nebulizer solution Inhale 3 mLs into the lungs 4 times daily 20  Yes Heather Bradley MD   albuterol sulfate HFA (PROAIR HFA) 108 (90 Base) MCG/ACT inhaler Inhale 2 puffs into the lungs every 6 hours as needed for Wheezing 20  Yes Nivia Hastings MD   Cholecalciferol (VITAMIN D3) 25 MCG (1000 UT) TABS Take by mouth daily    Yes Historical Provider, MD   calcium carbonate 600 MG TABS tablet Take 1 tablet by mouth daily   Yes Historical Provider, MD   Fexofenadine HCl (MUCINEX ALLERGY PO) Take by mouth 2 times daily    Yes Historical Provider, MD   zoster recombinant adjuvanted vaccine (SHINGRIX) 50 MCG/0.5ML SUSR injection 50 MCG IM then repeat 2-6 months.   Patient not taking: Reported on 2020  Nivia Hastings MD   aspirin 81 MG EC tablet Take 81 mg by mouth daily    Historical Provider, MD       Current medications:    Current Facility-Administered Medications   Medication Dose Route Frequency Provider Last Rate Last Dose    magnesium sulfate 1 g in dextrose 5% 100 mL IVPB  1 g Intravenous PRN Paty Yates PA-C   Stopped at 12/09/20 0505    dilTIAZem (CARDIZEM CD) extended release capsule 120 mg  120 mg Oral Daily Laurie Chung MD   120 mg at 12/09/20 1316    fluticasone (FLONASE) 50 MCG/ACT nasal spray 1 spray  1 spray Each Nostril Daily Luis Aldana MD   1 spray at 12/09/20 0906    guaiFENesin (MUCINEX) extended release tablet 600 mg  600 mg Oral BID Jeffrey Wang MD   600 mg at 12/09/20 0907    [Held by provider] furosemide (LASIX) tablet 40 mg  40 mg Oral Daily Luis Aldana MD   40 mg at 12/06/20 0803    albuterol sulfate  (90 Base) MCG/ACT inhaler 2 puff  2 puff Inhalation Q4H WA Luis Aldana MD   2 puff at 12/09/20 1555    theophylline (BECKY-24) extended release capsule 100 mg  100 mg Oral TID Fercho Homero Jackson MD   100 mg at 12/09/20 1316    albuterol (PROVENTIL) nebulizer solution 2.5 mg  2.5 mg Nebulization Q6H PRN Fercho Jackson MD   2.5 mg at 12/08/20 0016    cefTRIAXone (ROCEPHIN) 1 g IVPB in 50 mL D5W minibag  1 g Intravenous Q24H Fercho Willis MD   Stopped at 12/08/20 1758    aspirin EC tablet 81 mg  81 mg Oral Daily [facility], DO   81 mg at 12/09/20 1578    calcium elemental (OSCAL) tablet 500 mg  500 mg Oral Daily [facility], DO   500 mg at 12/09/20 6849    vitamin D CAPS 1,000 Units  1,000 Units Oral Daily [facility], DO   1,000 Units at 12/09/20 0906    HYDROcodone-acetaminophen (NORCO)  MG per tablet 1 tablet  1 tablet Oral Q4H PRN [facility], DO   1 tablet at 12/09/20 0309    nicotine (NICODERM CQ) 21 MG/24HR 1 patch  1 patch Transdermal Daily [facility], DO   1 patch at 12/03/20 0902    atorvastatin (LIPITOR) tablet 10 mg  10 mg Oral Daily [facility], DO 10 mg at 12/09/20 0906    sodium chloride flush 0.9 % injection 10 mL  10 mL Intravenous 2 times per day [facility], DO   10 mL at 12/09/20 0906    sodium chloride flush 0.9 % injection 10 mL  10 mL Intravenous PRN [facility], DO   10 mL at 12/05/20 1747    acetaminophen (TYLENOL) tablet 650 mg  650 mg Oral Q6H PRN [facility], DO        Or    acetaminophen (TYLENOL) suppository 650 mg  650 mg Rectal Q6H PRN [facility], DO        polyethylene glycol (GLYCOLAX) packet 17 g  17 g Oral Daily PRN [facility], DO        promethazine (PHENERGAN) tablet 12.5 mg  12.5 mg Oral Q6H PRN [facility], DO        Or    ondansetron (ZOFRAN) injection 4 mg  4 mg Intravenous Q6H PRN [facility], DO        enoxaparin (LOVENOX) injection 40 mg  40 mg Subcutaneous Daily [facility], DO   40 mg at 12/08/20 0932    predniSONE (DELTASONE) tablet 40 mg  40 mg Oral Daily [facility], DO   40 mg at 12/09/20 1041    doxycycline hyclate (VIBRA-TABS) tablet 100 mg  100 mg Oral Q12H [facility], DO   100 mg at 12/09/20 0309    cetirizine (ZYRTEC) tablet 10 mg  10 mg Oral Daily [facility], DO   10 mg at 12/09/20 4901       Allergies:     Allergies   Allergen Reactions    Formaldehyde     Gabapentin Other (See Comments)    Norflex Tablets [Orphenadrine]     Cranberry Rash       Problem List:    Patient Active Problem List   Diagnosis Code    Hyperlipidemia E78.5    COPD (chronic obstructive pulmonary disease) ([facility] [address] J44.9    Leg pain M79.606    Hypertension I10    Tobacco abuse Z72.0    Abdominal aortic aneurysm (AAA) without rupture (HCC) I71.4    Degeneration of lumbar or lumbosacral intervertebral disc M51.37    Osteopenia M85.80    Anxiety F41.9    Acute on chronic respiratory failure with hypoxia ([facility] [address] J96.21    Lung mass R91.8    COPD exacerbation (HCC) J44.1    Acute exacerbation of chronic obstructive pulmonary disease (COPD) ([facility] [address] J44.1       Past Medical History: Diagnosis Date    Arthritis     COPD (chronic obstructive pulmonary disease) ([facility] [address]     H/O cardiovascular stress test 2009    thallium, normal no ischemia EF70%     H/O cardiovascular stress test 10/11/2013    thallium,EF70%, normal, no ischemia    H/O Doppler ultrasound 10/13/10    carotid, right normal, left normal    H/O Doppler ultrasound 10/07/14    Carotid mild bilateral internal carotid artery disease less than 50% in severity. Normal vertebral artery flows with good velocities. Incidental finding of possible thyroid nodule in the left lobe.  History of 24 hour EKG monitoring 3/11/2011    NSR no ectopy    History of nuclear stress test 10/21/2016    lexiscan-normal,no ischemia,EF70%,enlarged right ventricle    Hx of chest x-ray 2015    WNL    Hx of echocardiogram 10/11/2013    10/13 Abn lt ventricular diastolic function, mile MR, EF 57%,10/10 EF55%, mild mitral annular calcification    Hx of echocardiogram 2019    EF 29-66%, Grade I diastolic dysfunction, Mild aortic stenosis, Calcific thickening of posterior leaflet of mitral valve, Mild Pulm HTN    Hx of pelvic x-ray 2015    Severe RT his osterarothrosis    Hx of x-ray of hip 2015    Severe Rt hip osteosrthrosis    Hyperlipidemia     Hypertension     Leg pain        Past Surgical History:        Procedure Laterality Date    BACK SURGERY      CYST REMOVAL      left arm    HYSTERECTOMY          JOINT REPLACEMENT Right     hip    LAPAROSCOPIC APPENDECTOMY N/A 2019    APPENDECTOMY LAPAROSCOPIC performed by Danis Tesfaye MD at [address] [place] [address]         Social History:    Social History     Tobacco Use    Smoking status: Former Smoker     Packs/day: 1.00     Years: 47.00     Pack years: 47.00     Start date: 1973     Last attempt to quit: 2020     Years since quittin.0    Smokeless tobacco: Never Used   Substance Use Topics    Alcohol use:  Yes     Alcohol/week: 0.0 standard drinks     Comment: rare                                Counseling given: Not Answered      Vital Signs (Current):   Vitals:    12/09/20 0751 12/09/20 0759 12/09/20 1112 12/09/20 1556   BP:  126/73     Pulse:  79     Resp: 18 16 16 19   Temp:  36.6 °C (97.9 °F)     TempSrc:  Oral     SpO2: 98% 96% 94% 92%   Weight:       Height:                                                  BP Readings from Last 3 Encounters:   12/09/20 126/73   11/06/20 (!) 155/71   10/08/20 126/72       NPO Status: Time of last liquid consumption: 2000                        Time of last solid consumption: 1800                        Date of last liquid consumption: 12/06/20                        Date of last solid food consumption: 12/06/20    BMI:   Wt Readings from Last 3 Encounters:   12/07/20 122 lb 3.2 oz (55.4 kg)   11/18/20 120 lb (54.4 kg)   11/12/20 120 lb (54.4 kg)     Body mass index is 23.87 kg/m². CBC:   Lab Results   Component Value Date    WBC 11.9 12/07/2020    RBC 4.52 12/07/2020    HGB 12.5 12/07/2020    HCT 42.8 12/07/2020    MCV 94.7 12/07/2020    RDW 14.5 12/07/2020     12/07/2020       CMP:   Lab Results   Component Value Date     12/09/2020    K 5.5 12/09/2020    CL 96 12/09/2020    CO2 40 12/09/2020    BUN 34 12/09/2020    CREATININE 1.0 12/09/2020    GFRAA >60 12/09/2020    AGRATIO 1.6 08/28/2020    LABGLOM 55 12/09/2020    GLUCOSE 118 12/09/2020    PROT 6.1 12/04/2020    PROT 6.5 03/06/2015    CALCIUM 9.8 12/09/2020    BILITOT 0.1 12/04/2020    ALKPHOS 71 12/04/2020    AST 9 12/04/2020    ALT 5 12/04/2020       POC Tests: No results for input(s): POCGLU, POCNA, POCK, POCCL, POCBUN, POCHEMO, POCHCT in the last 72 hours.     Coags:   Lab Results   Component Value Date    PROTIME 10.2 11/06/2020    INR 0.84 11/06/2020    APTT 29.9 11/06/2020       HCG (If Applicable): No results found for: PREGTESTUR, PREGSERUM, HCG, HCGQUANT     ABGs:   Lab Results   Component Value Date    PO2ART 73 12/03/2020 HGV4QTT 66.0 2020    TZG1YTI 40.9 2020        Type & Screen (If Applicable):  No results found for: LABABO, LABRH    Drug/Infectious Status (If Applicable):  No results found for: HIV, HEPCAB    COVID-19 Screening (If Applicable):   Lab Results   Component Value Date    COVID19 NOT DETECTED 2020    COVID19 NOT DETECTED 2020         Anesthesia Evaluation  Patient summary reviewed  Airway: Mallampati: II  TM distance: >3 FB   Neck ROM: full  Mouth opening: > = 3 FB Dental:    (+) lower dentures and upper dentures      Pulmonary:   (+) COPD:  decreased breath sounds,                            ROS comment: Chest CT 2020: Impression   1. Persistent mediastinal adenopathy and right upper and lower lobe pulmonary   nodule is suspicious for malignancy.  Recommend tissue sampling. 2. Ground-glass opacity seen within the left lung on the prior PET-CT are no   longer visualized. Acute exacerbation of chronic obstructive pulmonary disease    Former Smoker - 52 pack years   Quit Smokin20        Cardiovascular:    (+) hypertension:, valvular problems/murmurs: AS, dysrhythmias: SVT, hyperlipidemia        Rhythm: regular  Rate: normal           Beta Blocker:  Not on Beta Blocker      ROS comment: Echo 2020:  Summary   Technically difficult study patient sitting up during exam.   Left ventricular systolic function is normal.   Ejection fraction is visually estimated at 50-55%. Mild left ventricular hypertrophy. Grade II diastolic dysfunction. Calcified aortic valve with restricted motion of the non coronary cusp; mild   aortic stenosis. No evidence of any pericardial effusion. Neuro/Psych:   Negative Neuro/Psych ROS              GI/Hepatic/Renal: Neg GI/Hepatic/Renal ROS            Endo/Other: Negative Endo/Other ROS   (+) electrolyte abnormalities, . Abdominal:       Abdomen: soft.     Vascular:           ROS comment: Abdominal aortic aneurysm (AAA)

## 2020-12-09 NOTE — PROGRESS NOTES
Called lab around [phone] to see if the STAT labs were drawn and they were not so the  gave me the number to the [facility] and she did not answer. Will try to contact her again. Will keep monitoring.

## 2020-12-09 NOTE — PROGRESS NOTES
Pulmonary and Critical Care  Progress Note    Subjective: The patient is better. Shortness of breath better. Chest pain none  Addressing respiratory complaints Patient is negative for  hemoptysis and cyanosis  CONSTITUTIONAL:  negative for fevers and chills      Past Medical History:     has a past medical history of Arthritis, COPD (chronic obstructive pulmonary disease) ([address] H/O cardiovascular stress test, H/O cardiovascular stress test, H/O Doppler ultrasound, H/O Doppler ultrasound, History of 24 hour EKG monitoring, History of nuclear stress test, Hx of chest x-ray, Hx of echocardiogram, Hx of echocardiogram, Hx of pelvic x-ray, Hx of x-ray of hip, Hyperlipidemia, Hypertension, and Leg pain. has a past surgical history that includes Hysterectomy; cyst removal; Tubal ligation; back surgery; joint replacement (Right); and laparoscopic appendectomy (N/A, 5/18/2019). reports that she quit smoking about 3 weeks ago. She started smoking about 47 years ago. She has a 47.00 pack-year smoking history. She has never used smokeless tobacco. She reports current alcohol use. She reports that she does not use drugs. Family history:  family history includes Cancer in her father and sister; Dementia in her mother; Diabetes in her sister; Heart Attack in her sister; Heart Attack (age of onset: 61) in her father and mother; Hypertension in her sister, sister, and sister; Irritable Bowel Syndrome in her sister; Stroke in her father, mother, and sister.     Allergies   Allergen Reactions    Formaldehyde     Gabapentin Other (See Comments)    Norflex Tablets [Orphenadrine]     Cranberry Rash     Social History:    Reviewed; no changes    Objective:   PHYSICAL EXAM:        VITALS:  /73   Pulse 79   Temp 97.9 °F (36.6 °C) (Oral)   Resp 16   Ht 5' (1.524 m)   Wt 122 lb 3.2 oz (55.4 kg)   SpO2 96%   BMI 23.87 kg/m²     24HR INTAKE/OUTPUT:    No intake or output data in the 24 hours ending 12/09/20 1106    CONSTITUTIONAL:  awake, alert, cooperative, no apparent distress, and appears stated age  LUNGS:  decreased breath sounds. CARDIOVASCULAR:  normal S1 and S2 and negative JVD  ABD:Abdomen soft, non-tender. BS normal. No masses,  No organomegaly  NEURO:Alert and oriented x3. Gait normal. Reflexes and motor strength normal and symmetric. Cranial nerves 2-12 and sensation grossly intact. DATA:    CBC:  Recent Labs     12/07/20  0732   WBC 11.9*   RBC 4.52   HGB 12.5   HCT 42.8   *   MCV 94.7   MCH 27.7   MCHC 29.2*   RDW 14.5   NRBC 1   SEGSPCT 61.0   BANDSPCT 1*      BMP:  Recent Labs     12/07/20  0732 12/09/20  0306    141   K 4.4 5.5*   CL 93* 96*   CO2 38* 40*   BUN 28* 34*   CREATININE 0.6 1.0   CALCIUM 9.2 9.8   GLUCOSE 67* 118*      ABG:  No results for input(s): PH, PO2ART, PEE3TGI, HCO3, BEART, O2SAT in the last 72 hours. Lab Results   Component Value Date    PROBNP 459.6 (H) 12/06/2020    PROBNP 655.5 (H) 12/05/2020    PROBNP 803.3 (H) 12/04/2020    THEOPH 6.0 (L) 12/06/2020     No results found for: [facility]    Radiology Review:  Pertinent images / reports were reviewed as a part of this visit. Assessment:     Patient Active Problem List   Diagnosis    Hyperlipidemia    COPD (chronic obstructive pulmonary disease) ([address]    Leg pain    Hypertension    Tobacco abuse    Abdominal aortic aneurysm (AAA) without rupture (HCC)    Degeneration of lumbar or lumbosacral intervertebral disc    Osteopenia    Anxiety    Acute on chronic respiratory failure with hypoxia (HCC)    Lung mass    COPD exacerbation (HCC)    Acute exacerbation of chronic obstructive pulmonary disease (COPD) ([address]       Plan:   1. Overall the patient has improved. 2. Mediastinoscopy tomorrow. 3. Kayexalate.     Mary Pinzon MD  12/9/2020  11:06 AM

## 2020-12-09 NOTE — PROGRESS NOTES
PS Dr. Daiana Doe (covering for Dr. Milagros Levy) notified that HR is still running 160's and pt asymptomatic and nonexertional.

## 2020-12-09 NOTE — CONSULTS
CARDIOLOGY CONSULT NOTE   Reason for consultation:  tachycardia    Referring physician:  Neto Harper DO     Primary care physician: Vaishnavi Verdin MD      Dear  Dr. Neto Harper DO   Thanks for the consult. Chief Complaints :  Chief Complaint   Patient presents with    Shortness of Breath        History of present illness:Georgia is a 65 y. o.year old who presents with concerns of primarily respiratory symptoms she has a lung mass scheduled for biopsy overnight had runs of SVT hence cardiology was consulted she denies any palpitations. Echo last year showed mild aortic stenosis no significant valvular disease  Potassium was elevated at 5.5 this morning denies any chest pain uses oxygen around-the-clock        Past medical history:    has a past medical history of Arthritis, COPD (chronic obstructive pulmonary disease) ([facility] H/O cardiovascular stress test, H/O cardiovascular stress test, H/O Doppler ultrasound, H/O Doppler ultrasound, History of 24 hour EKG monitoring, History of nuclear stress test, Hx of chest x-ray, Hx of echocardiogram, Hx of echocardiogram, Hx of pelvic x-ray, Hx of x-ray of hip, Hyperlipidemia, Hypertension, and Leg pain. Past surgical history:   has a past surgical history that includes Hysterectomy; cyst removal; Tubal ligation; back surgery; joint replacement (Right); and laparoscopic appendectomy (N/A, 5/18/2019). Social History:   reports that she quit smoking about 3 weeks ago. She started smoking about 47 years ago. She has a 47.00 pack-year smoking history. She has never used smokeless tobacco. She reports current alcohol use. She reports that she does not use drugs.   Family history:   no family history of CAD, STROKE of DM at early age    Allergies   Allergen Reactions    Formaldehyde     Gabapentin Other (See Comments)    Norflex Tablets [Orphenadrine]     Cranberry Rash       magnesium sulfate 1 g in dextrose 5% 100 mL IVPB, PRN  sodium polystyrene (KAYEXALATE) 15 GM/60ML suspension 15 g, Once  fluticasone (FLONASE) 50 MCG/ACT nasal spray 1 spray, Daily  guaiFENesin (MUCINEX) extended release tablet 600 mg, BID  [Held by provider] furosemide (LASIX) tablet 40 mg, Daily  albuterol sulfate  (90 Base) MCG/ACT inhaler 2 puff, Q4H WA  theophylline (BECKY-24) extended release capsule 100 mg, TID  albuterol (PROVENTIL) nebulizer solution 2.5 mg, Q6H PRN  cefTRIAXone (ROCEPHIN) 1 g IVPB in 50 mL D5W minibag, Q24H  aspirin EC tablet 81 mg, Daily  calcium elemental (OSCAL) tablet 500 mg, Daily  vitamin D CAPS 1,000 Units, Daily  HYDROcodone-acetaminophen (NORCO)  MG per tablet 1 tablet, Q4H PRN  nicotine (NICODERM CQ) 21 MG/24HR 1 patch, Daily  atorvastatin (LIPITOR) tablet 10 mg, Daily  sodium chloride flush 0.9 % injection 10 mL, 2 times per day  sodium chloride flush 0.9 % injection 10 mL, PRN  acetaminophen (TYLENOL) tablet 650 mg, Q6H PRN    Or  acetaminophen (TYLENOL) suppository 650 mg, Q6H PRN  polyethylene glycol (GLYCOLAX) packet 17 g, Daily PRN  promethazine (PHENERGAN) tablet 12.5 mg, Q6H PRN    Or  ondansetron (ZOFRAN) injection 4 mg, Q6H PRN  enoxaparin (LOVENOX) injection 40 mg, Daily  predniSONE (DELTASONE) tablet 40 mg, Daily  doxycycline hyclate (VIBRA-TABS) tablet 100 mg, Q12H  cetirizine (ZYRTEC) tablet 10 mg, Daily      Current Facility-Administered Medications   Medication Dose Route Frequency Provider Last Rate Last Dose    magnesium sulfate 1 g in dextrose 5% 100 mL IVPB  1 g Intravenous PRN Suma Alexander PA-C   Stopped at 12/09/20 0505    sodium polystyrene (KAYEXALATE) 15 GM/60ML suspension 15 g  15 g Oral Once Fercho Jackson MD        fluticasone (FLONASE) 50 MCG/ACT nasal spray 1 spray  1 spray Each Nostril Daily Luis Aldana MD   1 spray at 12/09/20 0906    guaiFENesin (MUCINEX) extended release tablet 600 mg  600 mg Oral BID Triston Zuniga MD   600 mg at 12/09/20 0907    [Held by provider] furosemide (LASIX) tablet 40 mg  40 mg Oral Daily Kimberly Nelson MD   40 mg at 12/06/20 0803    albuterol sulfate  (90 Base) MCG/ACT inhaler 2 puff  2 puff Inhalation Q4H WA Luis Aldana MD   2 puff at 12/09/20 1112    theophylline (BECKY-24) extended release capsule 100 mg  100 mg Oral TID Fercho Urena MD   100 mg at 12/09/20 0906    albuterol (PROVENTIL) nebulizer solution 2.5 mg  2.5 mg Nebulization Q6H PRN Fercho Jackson MD   2.5 mg at 12/08/20 0016    cefTRIAXone (ROCEPHIN) 1 g IVPB in 50 mL D5W minibag  1 g Intravenous Q24H Fercho Russell MD   Stopped at 12/08/20 1758    aspirin EC tablet 81 mg  81 mg Oral Daily Abiodun Goode, DO   81 mg at 12/09/20 5770    calcium elemental (OSCAL) tablet 500 mg  500 mg Oral Daily Abiodun Goode, DO   500 mg at 12/09/20 8544    vitamin D CAPS 1,000 Units  1,000 Units Oral Daily Abiodun Russel, DO   1,000 Units at 12/09/20 0906    HYDROcodone-acetaminophen (NORCO)  MG per tablet 1 tablet  1 tablet Oral Q4H PRN Abiodun Goode, DO   1 tablet at 12/09/20 0309    nicotine (NICODERM CQ) 21 MG/24HR 1 patch  1 patch Transdermal Daily Abiodun Russel, DO   1 patch at 12/03/20 0902    atorvastatin (LIPITOR) tablet 10 mg  10 mg Oral Daily Abiodun Russel, DO   10 mg at 12/09/20 0930    sodium chloride flush 0.9 % injection 10 mL  10 mL Intravenous 2 times per day Abiodun Russel, DO   10 mL at 12/09/20 0906    sodium chloride flush 0.9 % injection 10 mL  10 mL Intravenous PRN Abiodun Goode, DO   10 mL at 12/05/20 1747    acetaminophen (TYLENOL) tablet 650 mg  650 mg Oral Q6H PRN Abiodun Goode, DO        Or    acetaminophen (TYLENOL) suppository 650 mg  650 mg Rectal Q6H PRN Abiodun Russel, DO        polyethylene glycol (GLYCOLAX) packet 17 g  17 g Oral Daily PRN Abiodun Goode,         promethazine (PHENERGAN) tablet 12.5 mg  12.5 mg Oral Q6H PRN Abiodun Goode DO        Or    ondansetron (ZOFRAN) injection 4 mg  4 mg Intravenous Q6H PRN Abiodun Goode, DO  enoxaparin (LOVENOX) injection 40 mg  40 mg Subcutaneous Daily [facility], DO   40 mg at 12/08/20 0932    predniSONE (DELTASONE) tablet 40 mg  40 mg Oral Daily [facility], DO   40 mg at 12/09/20 4031    doxycycline hyclate (VIBRA-TABS) tablet 100 mg  100 mg Oral Q12H Tex Solis, DO   100 mg at 12/09/20 0309    cetirizine (ZYRTEC) tablet 10 mg  10 mg Oral Daily [facility], DO   10 mg at 12/09/20 1830     Review of Systems:   · Constitutional: No Fever or Weight Loss   · Eyes: No Decreased Vision  · ENT: No Headaches, Hearing Loss or Vertigo  · Cardiovascular: As per HPI  · Respiratory: As per HPI  · Gastrointestinal: No abdominal pain, appetite loss, blood in stools, constipation, diarrhea or heartburn  · Genitourinary: No dysuria, trouble voiding, or hematuria  · Musculoskeletal:  No gait disturbance, weakness or joint complaints  · Integumentary: No rash or pruritis  · Neurological: No TIA or stroke symptoms  · Psychiatric: No anxiety or depression  · Endocrine: No malaise, fatigue or temperature intolerance  · Hematologic/Lymphatic:  worked up for possible lung mass  · Allergic/Immunologic: No nasal congestion or hives  All systems negative except as marked. Physical Examination:    Vitals:    12/09/20 0409 12/09/20 0751 12/09/20 0759 12/09/20 1112   BP: 129/61  126/73    Pulse: 88  79    Resp: 18 18 16 16   Temp: 97.9 °F (36.6 °C)  97.9 °F (36.6 °C)    TempSrc: Oral  Oral    SpO2: 98% 98% 96% 94%   Weight:       Height:           General Appearance:  No distress, conversant    Constitutional:  Well developed, Well nourished, No acute distress, Non-toxic appearance. HENT:  Normocephalic, Atraumatic, Bilateral external ears normal, Oropharynx moist, No oral exudates, Nose normal. Neck- Normal range of motion, No tenderness, Supple, No stridor,no apical-carotid delay  Lymphatics : no palpable lymph nodes  Eyes:  PERRL, EOMI, Conjunctiva normal, No discharge.    Respiratory:  Normal breath sounds, No respiratory distress, No wheezing, No chest tenderness. ,no use of accessory muscles, crackles Absent   Cardiovascular: (PMI) apex non displaced,no lifts no thrills, ankle swelling Absent  , 1+, s1 and s2 audible,Murmur. Absent , JVD not noted    Abdomen /GI:  Bowel sounds normal, Soft, No tenderness, No masses, No gross visceromegaly   :  No costovertebral angle tenderness   Musculoskeletal:  No edema, no tenderness, no deformities. Back- no tenderness  Integument:  Well hydrated, no rash   Lymphatic:  No lymphadenopathy noted   Neurologic:  Alert & oriented x 3, CN 2-12 normal, normal motor function, normal sensory function, no focal deficits noted           Medical decision making and Data review:    Lab Review   Recent Labs     12/07/20  0732   WBC 11.9*   HGB 12.5   HCT 42.8   *      Recent Labs     12/09/20  0306      K 5.5*   CL 96*   CO2 40*   BUN 34*   CREATININE 1.0     No results for input(s): AST, ALT, ALB, BILIDIR, BILITOT, ALKPHOS in the last 72 hours. No results for input(s): TROPONINT in the last 72 hours. No results for input(s): PROBNP in the last 72 hours. Lab Results   Component Value Date    INR 0.84 11/06/2020    PROTIME 10.2 (L) 11/06/2020       EKG: (reviewed by myself)    ECHO:(reviewed by myself)    Chest Xray:(reviewed by myself)  Xr Chest (2 Vw)    Result Date: 12/4/2020  EXAMINATION: TWO XRAY VIEWS OF THE CHEST 12/4/2020 8:22 am COMPARISON: 12/01/2020 HISTORY: ORDERING SYSTEM PROVIDED HISTORY: acute copd TECHNOLOGIST PROVIDED HISTORY: Reason for exam:->acute copd FINDINGS: Stable cardiomediastinal silhouette. Aortic calcifications. Trace bilateral pleural effusions. No focal consolidation or pneumothorax. Trace bilateral pleural effusions.      Ct Chest Wo Contrast    Result Date: 12/8/2020  EXAMINATION: CT OF THE CHEST WITHOUT CONTRAST 12/7/2020 9:33 am TECHNIQUE: CT of the chest was performed without the administration of intravenous contrast. Multiplanar reformatted images are provided for review. Dose modulation, iterative reconstruction, and/or weight based adjustment of the mA/kV was utilized to reduce the radiation dose to as low as reasonably achievable. COMPARISON: CT PE 09/18/2020, PET-CT 10/22/2020 HISTORY: ORDERING SYSTEM PROVIDED HISTORY: eval for nodule, acute copd exac. TECHNOLOGIST PROVIDED HISTORY: Reason for exam:->eval for nodule, acute copd exac. Reason for Exam: eval for nodule, acute copd exac. Acuity: Acute Type of Exam: Initial FINDINGS: Mediastinum: There is mild mediastinal adenopathy. For example a 2.6 x 1.9 cm precarinal lymph node. The heart size is within normal limits. No pericardial effusion. The thoracic aorta is normal in caliber with mild atherosclerosis. The main pulmonary artery is normal caliber. Coronary arterial calcifications. Lungs/pleura: Mild to moderate emphysematous changes. No pleural effusion or pneumothorax. Bibasilar atelectasis. There are at least three discrete solid right pulmonary nodules. For example, -15 x 13 mm nodule in the right upper lobe (image 28) -18 x 16 mm nodule in the right lower lobe (image 75) -and a 9 x 8 mm nodule in the lateral right lower lobe (image 83). This nodule has been present dating back to 10/13/2010 and not significantly changed. There is also a sub solid nodular opacity measuring 8 x 6 mm in the left lower lobe (image 68). Central airways are otherwise patent. Upper Abdomen: No acute findings in the upper abdomen. Soft Tissues/Bones: No acute findings in the bones or soft tissues. Osseous structures are otherwise stable. 1. Persistent mediastinal adenopathy and right upper and lower lobe pulmonary nodule is suspicious for malignancy. Recommend tissue sampling. 2. Ground-glass opacity seen within the left lung on the prior PET-CT are no longer visualized.      Xr Chest Portable    Result Date: 12/1/2020  EXAMINATION: ONE XRAY VIEW OF THE CHEST 12/1/2020 11:12 pm COMPARISON: 08/06/2019 and 08/28/2020, PET CT 10/22/2020 HISTORY: ORDERING SYSTEM PROVIDED HISTORY: dyspnea TECHNOLOGIST PROVIDED HISTORY: Reason for exam:->dyspnea Reason for Exam: dyspnea Acuity: Acute Type of Exam: Initial FINDINGS: Emphysema with chronically increased interstitial lung markings. There is acute right greater than left bibasilar infiltrate and/or atelectasis. These findings partially obscure the pulmonary nodules noted on the recent stone PET CT. 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 Metastatic disease noted on the recent PET CT is not well evaluated on this portable chest x-ray. Right greater than left bibasilar infiltrate and/or atelectasis. There is underlying emphysema. All labs, medications and tests reviewed by myself including data  from outside source , patient and available family . Continue all other medications of all above medical condition listed as is. Impression:  Principal Problem:    Acute exacerbation of chronic obstructive pulmonary disease (COPD) ([facility] [address]  Active Problems:    Hyperlipidemia    Hypertension    Tobacco abuse    Acute on chronic respiratory failure with hypoxia (HCC)    Lung mass  Resolved Problems:    * No resolved hospital problems. *      Assessment: 65 y. o.year old with PMH of  has a past medical history of Arthritis, COPD (chronic obstructive pulmonary disease) ([facility] [address] H/O cardiovascular stress test, H/O cardiovascular stress test, H/O Doppler ultrasound, H/O Doppler ultrasound, History of 24 hour EKG monitoring, History of nuclear stress test, Hx of chest x-ray, Hx of echocardiogram, Hx of echocardiogram, Hx of pelvic x-ray, Hx of x-ray of hip, Hyperlipidemia, Hypertension, and Leg pain.       Plan and Recommendations:    SVT: In setting of hyperkalemia  Get echo to make sure right side is normal and not too dilated  Can add Cardizem or beta-blocker but she is on theophylline which is probably contributing to it  Check TSH  DVT prophylaxis if no contraindication            Thank you  much for consult and giving us the opportunity in contributing in the care of this patient. Please feel free to call me for any questions.        Ted Kohli MD, 12/9/2020 11:39 AM

## 2020-12-09 NOTE — PROGRESS NOTES
Hospitalist Progress Note      Name:  Bijan Roper /Age/Sex: 1950  (65 y.o. female)   MRN & CSN:  [id number] & [id number] Admission Date/Time: 2020 10:45 PM   Location:  [address] [place] [address] PCP: Sophy Ghotra MD         Hospital Day: 9    History of Present Illness:     Chief Complaint: Acute exacerbation of chronic obstructive pulmonary disease (COPD) ([facility] [address]  Bijan Roper is a 65 y.o.  female  who presents with SOB     The patient seen and examined at bedside. She says feeling better today. Awaiting for Dr. Jose Alvarez, to decide on biopsy.     Ten point ROS reviewed negative, unless as noted above    Objective:     No intake or output data in the 24 hours ending 20 1236   Vitals:   Vitals:    20 1112   BP:    Pulse:    Resp: 16   Temp:    SpO2: 94%     Physical Exam:   General Appearance: alert and oriented to person, place and time, in no acute distress  Cardiovascular: normal rat x-rays breath sounds bilaterally  Abdomen: soft, non-tender, non-distended, normal bowel sounds, no masses   Extremities: no cyanosis, clubbing or edema, pulse   Skin: warm and dry, no rash or erythema  Head: normocephalic and atraumatic  Eyes: pupils equal, round, and reactive to light  Neck: supple and non-tender without mass, no thyromegaly   Musculoskeletal: normal range of motion, no joint swelling, deformity or tenderness  Neurological: alert, oriented, normal speech, no focal findings or movement disorder noted    Medications:   Medications:    sodium polystyrene  15 g Oral Once    dilTIAZem  120 mg Oral Daily    fluticasone  1 spray Each Nostril Daily    guaiFENesin  600 mg Oral BID    [Held by provider] furosemide  40 mg Oral Daily    albuterol sulfate HFA  2 puff Inhalation Q4H WA    theophylline  100 mg Oral TID    cefTRIAXone (ROCEPHIN) IV  1 g Intravenous Q24H    aspirin  81 mg Oral Daily    calcium elemental  500 mg Oral Daily    vitamin D  1,000 Units Oral Daily    nicotine  1 patch Transdermal Daily    atorvastatin  10 mg Oral Daily    sodium chloride flush  10 mL Intravenous 2 times per day    enoxaparin  40 mg Subcutaneous Daily    predniSONE  40 mg Oral Daily    doxycycline hyclate  100 mg Oral Q12H    cetirizine  10 mg Oral Daily      Infusions:   PRN Meds: magnesium sulfate, 1 g, PRN  albuterol, 2.5 mg, Q6H PRN  HYDROcodone-acetaminophen, 1 tablet, Q4H PRN  sodium chloride flush, 10 mL, PRN  acetaminophen, 650 mg, Q6H PRN    Or  acetaminophen, 650 mg, Q6H PRN  polyethylene glycol, 17 g, Daily PRN  promethazine, 12.5 mg, Q6H PRN    Or  ondansetron, 4 mg, Q6H PRN            Pertinent New Labs & Imaging Studies     CBC with Differential:    Lab Results   Component Value Date    WBC 11.9 12/07/2020    RBC 4.52 12/07/2020    HGB 12.5 12/07/2020    HCT 42.8 12/07/2020     12/07/2020    MCV 94.7 12/07/2020    MCH 27.7 12/07/2020    MCHC 29.2 12/07/2020    RDW 14.5 12/07/2020    NRBC 1 12/07/2020    SEGSPCT 61.0 12/07/2020    BANDSPCT 1 12/07/2020    LYMPHOPCT 28.0 12/07/2020    MONOPCT 7.0 12/07/2020    BASOPCT 0.1 12/04/2020    MONOSABS 0.8 12/07/2020    LYMPHSABS 3.3 12/07/2020    EOSABS 0.1 12/07/2020    BASOSABS 0.0 12/04/2020    DIFFTYPE MANUAL DIFFERENTIAL 12/07/2020     CMP:    Lab Results   Component Value Date     12/09/2020    K 5.5 12/09/2020    CL 96 12/09/2020    CO2 40 12/09/2020    BUN 34 12/09/2020    CREATININE 1.0 12/09/2020    GFRAA >60 12/09/2020    AGRATIO 1.6 08/28/2020    LABGLOM 55 12/09/2020    GLUCOSE 118 12/09/2020    PROT 6.1 12/04/2020    PROT 6.5 03/06/2015    LABALBU 3.3 12/04/2020    CALCIUM 9.8 12/09/2020    BILITOT 0.1 12/04/2020    ALKPHOS 71 12/04/2020    AST 9 12/04/2020    ALT 5 12/04/2020     Xr Chest Portable    Result Date: 12/1/2020  EXAMINATION: ONE XRAY VIEW OF THE CHEST 12/1/2020 11:12 pm COMPARISON: 08/06/2019 and 08/28/2020, PET CT 10/22/2020 HISTORY: ORDERING SYSTEM PROVIDED HISTORY: dyspnea TECHNOLOGIST PROVIDED HISTORY: Reason for exam:->dyspnea Reason for Exam: dyspnea Acuity: Acute Type of Exam: Initial FINDINGS: Emphysema with chronically increased interstitial lung markings. There is acute right greater than left bibasilar infiltrate and/or atelectasis. These findings partially obscure the pulmonary nodules noted on the recent stone PET CT. 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 Metastatic disease noted on the recent PET CT is not well evaluated on this portable chest x-ray. Right greater than left bibasilar infiltrate and/or atelectasis. There is underlying emphysema.        Assessment and Plan:   Isma Smalls is a 65 y.o.  female  who presents with Acute exacerbation of chronic obstructive pulmonary disease (COPD) ([facility] [address]    Acute COPD exacerbation   On doxycycline and IV Rocephin  Continue with DuoNeb treatment, Tapering steroids  Procalcitonin 0.3 -low suspicion for sepsis or infection  Pulmonary recommendations appreciated     Lung Nodules/lung mass  IR consulted and plan for biopsy was postponed for elevated BNP  Plan for biopsy by IR on 12/07 was canceled again (was 12/4) due to - not being able to be prone and the risk of perc CT biopsy - instead recommending CT surgery consult for bronchoscopy and biopsy--Plan for tomorrow     Chronic hypoxic respiratory failure -continue oxygen via nasal cannula     Hyperkalemia  Hypomagnesemia  K 5.5 in the morning  Holding Lasix for now  Replaced Mag  Monitor Electrolytes  Noted Kayexalate, ordered by Pulmonology    Hyperlipidemia -on Lipitor     CT surgery plan for Mediastinoscopy tomorrow    Diet DIET GENERAL;   DVT Prophylaxis [x] Lovenox, []  Heparin, [] SCDs, [] Ambulation   GI Prophylaxis [] PPI,  [] H2 Blocker,  [] Carafate,  [x] Diet/Tube Feeds   Code Status Full Code   Disposition Pending CTS evaluation   MDM [] Low, [x] Moderate,[]  High     Electronically signed by Dajuan Valera MD on 12/9/2020 at 12:36 PM

## 2020-12-10 NOTE — PROGRESS NOTES
Hospitalist Progress Note      Name:  Kai Last /Age/Sex: 1950  (65 y.o. female)   MRN & CSN:  [id number] & [id number] Admission Date/Time: 2020 10:45 PM   Location:  [address] [place] [address] PCP: Juan Carlos Cerda MD         Hospital Day: 10    History of Present Illness:     Chief Complaint: Acute exacerbation of chronic obstructive pulmonary disease (COPD) ([facility] [address]  Kai Last is a 65 y.o.  female  who presents with SOB     The patient seen and examined at bedside after procedure. She does not have any chest pain or worsening SOB. Ten point ROS reviewed negative, unless as noted above    Objective: Intake/Output Summary (Last 24 hours) at 12/10/2020 1201  Last data filed at 12/10/2020 1024  Gross per 24 hour   Intake 550 ml   Output 20 ml   Net 530 ml      Vitals:   Vitals:    12/10/20 1030   BP: (!) 150/59   Pulse: 84   Resp: 14   Temp:    SpO2: 92%     Physical Exam:   General Appearance: alert and oriented to person, place and time, in no acute distress  Cardiovascular: normal rat x-rays breath sounds bilaterally  Abdomen: soft, non-tender, non-distended, normal bowel sounds, no masses   Extremities: no cyanosis, clubbing or edema, pulse   Skin: warm and dry, no rash or erythema  Head: normocephalic and atraumatic  Eyes: pupils equal, round, and reactive to light  Neck: supple and non-tender without mass, no thyromegaly.  S/p procedure with dressing   Musculoskeletal: normal range of motion, no joint swelling, deformity or tenderness  Neurological: alert, oriented, normal speech, no focal findings or movement disorder noted    Medications:   Medications:    ipratropium-albuterol  1 ampule Inhalation Once    dilTIAZem  120 mg Oral Daily    dilTIAZem  180 mg Oral Once    fluticasone  1 spray Each Nostril Daily    guaiFENesin  600 mg Oral BID    [Held by provider] furosemide  40 mg Oral Daily    albuterol sulfate HFA  2 puff Inhalation Q4H WA    theophylline  100 mg Oral TID    cefTRIAXone (ROCEPHIN) IV  1 g Intravenous Q24H    aspirin  81 mg Oral Daily    calcium elemental  500 mg Oral Daily    vitamin D  1,000 Units Oral Daily    nicotine  1 patch Transdermal Daily    atorvastatin  10 mg Oral Daily    sodium chloride flush  10 mL Intravenous 2 times per day    [Held by provider] enoxaparin  40 mg Subcutaneous Daily    predniSONE  40 mg Oral Daily    doxycycline hyclate  100 mg Oral Q12H    cetirizine  10 mg Oral Daily      Infusions:    lactated ringers 100 mL/hr at 12/10/20 0959     PRN Meds: magnesium sulfate, 1 g, PRN  albuterol, 2.5 mg, Q6H PRN  HYDROcodone-acetaminophen, 1 tablet, Q4H PRN  sodium chloride flush, 10 mL, PRN  acetaminophen, 650 mg, Q6H PRN    Or  acetaminophen, 650 mg, Q6H PRN  polyethylene glycol, 17 g, Daily PRN  promethazine, 12.5 mg, Q6H PRN    Or  ondansetron, 4 mg, Q6H PRN            Pertinent New Labs & Imaging Studies     CBC with Differential:    Lab Results   Component Value Date    WBC 11.9 12/07/2020    RBC 4.52 12/07/2020    HGB 12.5 12/07/2020    HCT 42.8 12/07/2020     12/07/2020    MCV 94.7 12/07/2020    MCH 27.7 12/07/2020    MCHC 29.2 12/07/2020    RDW 14.5 12/07/2020    NRBC 1 12/07/2020    SEGSPCT 61.0 12/07/2020    BANDSPCT 1 12/07/2020    LYMPHOPCT 28.0 12/07/2020    MONOPCT 7.0 12/07/2020    BASOPCT 0.1 12/04/2020    MONOSABS 0.8 12/07/2020    LYMPHSABS 3.3 12/07/2020    EOSABS 0.1 12/07/2020    BASOSABS 0.0 12/04/2020    DIFFTYPE MANUAL DIFFERENTIAL 12/07/2020     CMP:    Lab Results   Component Value Date     12/09/2020    K 5.5 12/09/2020    CL 96 12/09/2020    CO2 40 12/09/2020    BUN 34 12/09/2020    CREATININE 1.0 12/09/2020    GFRAA >60 12/09/2020    AGRATIO 1.6 08/28/2020    LABGLOM 55 12/09/2020    GLUCOSE 118 12/09/2020    PROT 6.1 12/04/2020    PROT 6.5 03/06/2015    LABALBU 3.3 12/04/2020    CALCIUM 9.8 12/09/2020    BILITOT 0.1 12/04/2020    ALKPHOS 71 12/04/2020    AST 9 12/04/2020    ALT 5 12/04/2020     Xr Chest Portable    Result Date: 12/1/2020  EXAMINATION: ONE XRAY VIEW OF THE CHEST 12/1/2020 11:12 pm COMPARISON: 08/06/2019 and 08/28/2020, PET CT 10/22/2020 HISTORY: ORDERING SYSTEM PROVIDED HISTORY: dyspnea TECHNOLOGIST PROVIDED HISTORY: Reason for exam:->dyspnea Reason for Exam: dyspnea Acuity: Acute Type of Exam: Initial FINDINGS: Emphysema with chronically increased interstitial lung markings. There is acute right greater than left bibasilar infiltrate and/or atelectasis. These findings partially obscure the pulmonary nodules noted on the recent stone PET CT. 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 Metastatic disease noted on the recent PET CT is not well evaluated on this portable chest x-ray. Right greater than left bibasilar infiltrate and/or atelectasis. There is underlying emphysema. Assessment and Plan:   Bijan Roper is a 65 y.o.  female  who presents with Acute exacerbation of chronic obstructive pulmonary disease (COPD) ([facility] [address]    Acute COPD exacerbation   On doxycycline and IV Rocephin  Continue with DuoNeb treatment, Tapering steroids  Procalcitonin 0.3 -low suspicion for sepsis or infection  Pulmonary recommendations appreciated     Lung Nodules/lung mass  S/p Mediatinoscopy   IR consulted and plan for biopsy was postponed for elevated BNP  Plan for biopsy by IR on 12/07 was canceled again (was 12/4) due to - not being able to be prone and the risk of perc CT biopsy - instead recommending CT surgery consult for bronchoscopy and biopsy- s/p Mediastinoscopy, LN were resected along with a mass in the thyroid. Unable to flex neck.  Plan for EBUS as out patient.     Chronic hypoxic respiratory failure -continue oxygen via nasal cannula     SVT  Hyperkalemia  Hypomagnesemia  K 5.5 in the morning  Holding Lasix for now  Replaced Mag  Monitor Electrolytes  Noted Kayexalate, ordered by Pulmonology  Cardiology recommendations appreciated, giving doses of Cardizem to help with SVT, on currently oral Cardizem    Hyperlipidemia -on Lipitor       Diet Diet NPO, After Midnight   DVT Prophylaxis [] Lovenox, []  Heparin, [x] SCDs, [] Ambulation   GI Prophylaxis [] PPI,  [] H2 Blocker,  [] Carafate,  [x] Diet/Tube Feeds   Code Status Full Code   Disposition Likely to DC tomorrow   MDM [] Low, [x] Moderate,[]  High     Electronically signed by Jenna Mcintyre MD on 12/10/2020 at 12:01 PM

## 2020-12-10 NOTE — PROGRESS NOTES
Pulmonary and Critical Care  Progress Note    Subjective: The patient is having mediastinoscopy today. Shortness of breath better. Chest pain none  Addressing respiratory complaints Patient is negative for  hemoptysis and cyanosis  CONSTITUTIONAL:  negative for fevers and chills      Past Medical History:     has a past medical history of Arthritis, COPD (chronic obstructive pulmonary disease) ([facility] [address] H/O cardiovascular stress test, H/O cardiovascular stress test, H/O Doppler ultrasound, H/O Doppler ultrasound, History of 24 hour EKG monitoring, History of nuclear stress test, Hx of chest x-ray, Hx of echocardiogram, Hx of echocardiogram, Hx of pelvic x-ray, Hx of x-ray of hip, Hyperlipidemia, Hypertension, and Leg pain. has a past surgical history that includes Hysterectomy; cyst removal; Tubal ligation; back surgery; joint replacement (Right); and laparoscopic appendectomy (N/A, 5/18/2019). reports that she quit smoking about 3 weeks ago. She started smoking about 47 years ago. She has a 47.00 pack-year smoking history. She has never used smokeless tobacco. She reports current alcohol use. She reports that she does not use drugs. Family history:  family history includes Cancer in her father and sister; Dementia in her mother; Diabetes in her sister; Heart Attack in her sister; Heart Attack (age of onset: 61) in her father and mother; Hypertension in her sister, sister, and sister; Irritable Bowel Syndrome in her sister; Stroke in her father, mother, and sister.     Allergies   Allergen Reactions    Formaldehyde     Gabapentin Other (See Comments)    Norflex Tablets [Orphenadrine]     Cranberry Rash     Social History:    Reviewed; no changes    Objective:   PHYSICAL EXAM:        VITALS:  BP (!) 150/59   Pulse 84   Temp 98.1 °F (36.7 °C) (Temporal)   Resp 14   Ht 5' (1.524 m)   Wt 122 lb 3.2 oz (55.4 kg)   SpO2 92%   BMI 23.87 kg/m²     24HR INTAKE/OUTPUT:      Intake/Output Summary (Last 24 hours) at 12/10/2020 1035  Last data filed at 12/10/2020 1024  Gross per 24 hour   Intake 550 ml   Output 20 ml   Net 530 ml       CONSTITUTIONAL:  awake, alert, cooperative, no apparent distress, and appears stated age  LUNGS:  decreased breath sounds. CARDIOVASCULAR:  normal S1 and S2 and negative JVD  ABD:Abdomen soft, non-tender. BS normal. No masses,  No organomegaly  NEURO:Alert and oriented x3. Gait normal. Reflexes and motor strength normal and symmetric. Cranial nerves 2-12 and sensation grossly intact. DATA:    CBC:  No results for input(s): WBC, RBC, HGB, HCT, PLT, MCV, MCH, MCHC, RDW, NRBC, SEGSPCT, BANDSPCT in the last 72 hours. BMP:  Recent Labs     12/09/20  0306      K 5.5*   CL 96*   CO2 40*   BUN 34*   CREATININE 1.0   CALCIUM 9.8   GLUCOSE 118*      ABG:  No results for input(s): PH, PO2ART, TRA7UNY, HCO3, BEART, O2SAT in the last 72 hours. Lab Results   Component Value Date    PROBNP 459.6 (H) 12/06/2020    PROBNP 655.5 (H) 12/05/2020    PROBNP 803.3 (H) 12/04/2020    THEOPH 6.0 (L) 12/06/2020     No results found for: [facility]    Radiology Review:  Pertinent images / reports were reviewed as a part of this visit. Assessment:     Patient Active Problem List   Diagnosis    Hyperlipidemia    COPD (chronic obstructive pulmonary disease) ([facility] [address]    Leg pain    Hypertension    Tobacco abuse    Abdominal aortic aneurysm (AAA) without rupture (HCC)    Degeneration of lumbar or lumbosacral intervertebral disc    Osteopenia    Anxiety    Acute on chronic respiratory failure with hypoxia (HCC)    Lung mass    COPD exacerbation (HCC)    Acute exacerbation of chronic obstructive pulmonary disease (COPD) ([address]       Plan:   1. Overall the patient has improved.   2. Mediastinoscopy today      Debbie Mendoza MD  12/10/2020  10:35 AM

## 2020-12-10 NOTE — PROGRESS NOTES
PATIENT NAME: Abigail Francisco    TODAY'S DATE: 12/09/20    Reason for visit: Mediastinal lymphadenopathy and PET avid lung lesions    SUBJECTIVE:    Pt has been fairly stable overnight. She is on her baseline oxygen. She has had SVT and was seen by cardiology. No chest pains. Baseline shortness of breath. OBJECTIVE:   VITALS:    Vitals:    12/09/20 2038   BP: 127/65   Pulse: 95   Resp: 18   Temp: 98.9 °F (37.2 °C)   SpO2: 98%     INTAKE/OUTPUT:       EXAM:  Blood pressure 127/65, pulse 95, temperature 98.9 °F (37.2 °C), temperature source Oral, resp. rate 18, height 5' (1.524 m), weight 122 lb 3.2 oz (55.4 kg), SpO2 98 %, not currently breastfeeding. General appearance: No apparent distress, appears stated age and cooperative. Skin: unremarkable  HEENT Normocephalic, atraumatic without obvious deformity. Neck: Supple, Trachea midline   Lungs: Good respiratory effort. Clear to auscultation, bilaterally  Heart: Regular rate/ rhythm   Abdomen: Soft, non-tender or non-distended   Extremities: No lower extremity edema warm well perfused  Neurologic: Alert, grossly intact  Mental status: normal affect      Data:  CBC:   Recent Labs     12/07/20  0732   WBC 11.9*   HGB 12.5   HCT 42.8   *     BMP:    Recent Labs     12/07/20  0732 12/09/20  0306    141   K 4.4 5.5*   CL 93* 96*   CO2 38* 40*   BUN 28* 34*   CREATININE 0.6 1.0   GLUCOSE 67* 118*     Hepatic: No results for input(s): AST, ALT, ALB, BILITOT, ALKPHOS in the last 72 hours. Mag:      Recent Labs     12/09/20  0306   MG 1.7*      Phos:   No results for input(s): PHOS in the last 72 hours. INR: No results for input(s): INR in the last 72 hours.   Radiology Review: CT scan reviewed with the patient      ASSESSMENT AND PLAN:  Lung lesion and PET avid mediastinal adenopathy    * No surgery date entered *  Patient Active Problem List   Diagnosis    Hyperlipidemia    COPD (chronic obstructive pulmonary disease) (HCC)    Leg pain    Hypertension    Tobacco abuse    Abdominal aortic aneurysm (AAA) without rupture (HCC)    Degeneration of lumbar or lumbosacral intervertebral disc    Osteopenia    Anxiety    Acute on chronic respiratory failure with hypoxia (HCC)    Lung mass    COPD exacerbation (HCC)    Acute exacerbation of chronic obstructive pulmonary disease (COPD) ([facility] [address]       I discussed the case with cardiology and the hospitalist.  We will proceed with mediastinoscopy tomorrow. He has been cleared by cardiology despite the SVT. Risks and benefits of mediastinoscopy were discussed in detail. The risks include but are not limited to infection, bleeding, and rarely need for urgent sternotomy. The patient understands and agrees to proceed.     Electronically signed by Goyo Jesus MD on 12/9/2020 at 9:44 PM

## 2020-12-10 NOTE — PROGRESS NOTES
Cardiology Progress Note     Admit Date:  12/1/2020    Consult reason/ Seen today for :   SVT    Subjective and  Overnight Events : Underwent Endo scopic biopsy today heart rate has been running in 60s no SVT      Chief complain on admission : 65 y. o.year old who is admitted for  Chief Complaint   Patient presents with    Shortness of Breath      Assessment / Plan:  SVT : continue cardiaxem and metoprolol  Avoid theophyline if possible   Echo shows preserved EF  HTN: stable, continue To titrate up medication as needed  DVT Prophylaxis if no contraindication  Follow-up in office call with questions    Past medical history:    has a past medical history of Arthritis, COPD (chronic obstructive pulmonary disease) ([facility] [address] H/O cardiovascular stress test, H/O cardiovascular stress test, H/O Doppler ultrasound, H/O Doppler ultrasound, History of 24 hour EKG monitoring, History of nuclear stress test, Hx of chest x-ray, Hx of echocardiogram, Hx of echocardiogram, Hx of pelvic x-ray, Hx of x-ray of hip, Hyperlipidemia, Hypertension, and Leg pain. Past surgical history:   has a past surgical history that includes Hysterectomy; cyst removal; Tubal ligation; back surgery; joint replacement (Right); and laparoscopic appendectomy (N/A, 5/18/2019). Social History:   reports that she quit smoking about 3 weeks ago. She started smoking about 47 years ago. She has a 47.00 pack-year smoking history. She has never used smokeless tobacco. She reports current alcohol use. She reports that she does not use drugs. Family history:  family history includes Cancer in her father and sister; Dementia in her mother; Diabetes in her sister; Heart Attack in her sister; Heart Attack (age of onset: 61) in her father and mother; Hypertension in her sister, sister, and sister; Irritable Bowel Syndrome in her sister; Stroke in her father, mother, and sister.     Allergies Allergen Reactions    Formaldehyde     Gabapentin Other (See Comments)    Norflex Tablets [Orphenadrine]     Cranberry Rash       Review of Systems:    All 14 systems were reviewed and are negative  Except for the positive findings  which as documented     BP (!) 110/58   Pulse 67   Temp 98.2 °F (36.8 °C) (Oral)   Resp 16   Ht 5' (1.524 m)   Wt 122 lb 3.2 oz (55.4 kg)   SpO2 95%   BMI 23.87 kg/m²       Intake/Output Summary (Last 24 hours) at 12/10/2020 1815  Last data filed at 12/10/2020 1024  Gross per 24 hour   Intake 550 ml   Output 20 ml   Net 530 ml     Physical Exam:  Constitutional:  Well developed, Well nourished, No acute distress, Non-toxic appearance. HENT:  Normocephalic, Atraumatic, Bilateral external ears normal, Oropharynx moist, No oral exudates, Nose normal. Neck- Normal range of motion, No tenderness, Supple, No stridor. Eyes:  PERRL, EOMI, Conjunctiva normal, No discharge. Respiratory:  Normal breath sounds, No respiratory distress, No wheezing, No chest tenderness. Cardiovascular:  Normal heart rate, Normal rhythm, No murmurs, No rubs, No gallops, JVP not elevated  Abdomen/GI:  Bowel sounds normal, Soft, No tenderness, No masses, No pulsatile masses. Musculoskeletal:  Intact distal pulses, No edema, No tenderness, No cyanosis, No clubbing. Good range of motion in all major joints. No tenderness to palpation or major deformities noted. Back- No tenderness. Integument:  Warm, Dry, No erythema, No rash. Lymphatic:  No lymphadenopathy noted. Neurologic:  Alert & oriented x 3, Normal motor function, Normal sensory function, No focal deficits noted.    Psychiatric:  Affect  and  Mood :no change    Medications:    dilTIAZem  120 mg Oral Daily    fluticasone  1 spray Each Nostril Daily    guaiFENesin  600 mg Oral BID    [Held by provider] furosemide  40 mg Oral Daily    albuterol sulfate HFA  2 puff Inhalation Q4H WA    theophylline  100 mg Oral TID    cefTRIAXone

## 2020-12-10 NOTE — BRIEF OP NOTE
Brief Postoperative Note      Patient: Bijan Roper  YOB: 1950  MRN: [id number]    Date of Procedure: 12/10/2020    Pre-Op Diagnosis: MEDIASTINAL ADENOPATHY    Post-Op Diagnosis: Same       Procedure(s):  MEDIASTINOSCOPY    Surgeon(s):  Cheri Morris MD    Assistant:  * No surgical staff found *    Anesthesia: General    Estimated Blood Loss (mL): less than 50     Complications: None    Specimens:   ID Type Source Tests Collected by Time Destination   A : cervical lymph node Tissue Lymph Node SURGICAL PATHOLOGY Cheri Morris MD 12/10/2020 9775    B :  Tissue Lymph Node SURGICAL PATHOLOGY Cheri Morris MD 12/10/2020 0844    C : 4 R #2  Tissue Lymph Node SURGICAL PATHOLOGY Cheri Morris MD 12/10/2020 0291        Implants:  * No implants in log *      Drains: * No LDAs found *    Findings: unable to flex neck. LN were resected along with a mass in the thyroid that was firm.   She will need EBUS    [id number]    Electronically signed by [place] MD on 12/10/2020 at 9:31 AM

## 2020-12-10 NOTE — PROGRESS NOTES
PS Dr. Ryan Smith to clarify new orders for Cardizem. Pt already on Cardizem  mg po qd. Anup Anand RN received pt for night shift and is aware of situation.

## 2020-12-10 NOTE — CARE COORDINATION
Met c pt to continue discharge planning. Pt states he plan remains home, pt continues to endorse she will have family at home to help her if needed. Pt continues to deny needs, advised pt remains available to assist as pt progresses.

## 2020-12-10 NOTE — ANESTHESIA POSTPROCEDURE EVALUATION
Department of Anesthesiology  Postprocedure Note    Patient: Catalina Good  MRN: [id number]  YOB: 1950  Date of evaluation: 12/10/2020  Time:  9:36 AM     Procedure Summary     Date:  12/10/20 Room / Location:  [place] [address] / [facility]    Anesthesia Start:  5752 Anesthesia Stop:      Procedure:  MEDIASTINOSCOPY (N/A ) Diagnosis:  (MEDIASTINAL ADENOPATHY)    Surgeon:  Akira Hurd MD Responsible Provider:  HELIO Joseph CRNA    Anesthesia Type:  general ASA Status:  4          Anesthesia Type: general    Sam Phase I:      Sam Phase II:      Last vitals: Reviewed and per EMR flowsheets.        Anesthesia Post Evaluation    Patient location during evaluation: PACU  Patient participation: complete - patient participated  Level of consciousness: awake and alert  Airway patency: patent  Nausea & Vomiting: no nausea and no vomiting  Complications: no  Cardiovascular status: hemodynamically stable and blood pressure returned to baseline  Respiratory status: acceptable, spontaneous ventilation, nonlabored ventilation and face mask  Hydration status: stable

## 2020-12-10 NOTE — PROGRESS NOTES
2200 - transferred from OR on bed, monitor applied, alarms on and verified, bedside handoff provided by Jc JACKSON, Galina Shirley, and Kori Every  6755 - chest xray obtained bedside  1016 - patient repositioned up in bed, tolerated well  1025 - repositioned in bed, linens changed, patient tolerated well  1030 - phase one care complete; report called to Brett Neff  [phone] - transferred to room 4106 on telemetry and oxygen, accompanied by RN

## 2020-12-11 NOTE — PROGRESS NOTES
output data in the 24 hours ending 12/11/20 1118    CONSTITUTIONAL:  awake, alert, cooperative, no apparent distress, and appears stated age  LUNGS:  decreased breath sounds. CARDIOVASCULAR:  normal S1 and S2 and negative JVD  ABD:Abdomen soft, non-tender. BS normal. No masses,  No organomegaly  NEURO:Alert and oriented x3. Gait normal. Reflexes and motor strength normal and symmetric. Cranial nerves 2-12 and sensation grossly intact. DATA:    CBC:  No results for input(s): WBC, RBC, HGB, HCT, PLT, MCV, MCH, MCHC, RDW, NRBC, SEGSPCT, BANDSPCT in the last 72 hours. BMP:  Recent Labs     12/09/20  0306 12/10/20  2256    132*   K 5.5* 5.0   CL 96* 93*   CO2 40* 30   BUN 34* 21   CREATININE 1.0 0.7   CALCIUM 9.8 8.3   GLUCOSE 118* 170*      ABG:  No results for input(s): PH, PO2ART, [id number], HCO3, BEART, O2SAT in the last 72 hours. Lab Results   Component Value Date    PROBNP 459.6 (H) 12/06/2020    PROBNP 655.5 (H) 12/05/2020    PROBNP 803.3 (H) 12/04/2020    THEOPH 6.0 (L) 12/06/2020     No results found for: [facility]    Radiology Review:  Pertinent images / reports were reviewed as a part of this visit. Assessment:     Patient Active Problem List   Diagnosis    Hyperlipidemia    COPD (chronic obstructive pulmonary disease) ([facility] [address]    Leg pain    Hypertension    Tobacco abuse    Abdominal aortic aneurysm (AAA) without rupture (HCC)    Degeneration of lumbar or lumbosacral intervertebral disc    Osteopenia    Anxiety    Acute on chronic respiratory failure with hypoxia (HCC)    Lung mass    COPD exacerbation (HCC)    Acute exacerbation of chronic obstructive pulmonary disease (COPD) ([facility] [address]       Plan:   1. Overall the patient has improved. 2. EBUS as outpt by Dr Zaina Garcia.       Santiago Ariza MD  12/11/2020  11:18 AM

## 2020-12-11 NOTE — OP NOTE
[address] [place] [address], [address] [place] [address]                                OPERATIVE REPORT    PATIENT NAME: Tiana Sandoval                   :        1950  MED REC NO:   [id number]                          ROOM:       4106  ACCOUNT NO:   [de-identified]                           ADMIT DATE: 2020  PROVIDER:     Skyler Townsend MD    DATE OF PROCEDURE:  12/10/2020    PREOPERATIVE DIAGNOSIS:  Mediastinal lymphadenopathy PET-avid and  enlarged lung nodules. POSTOPERATIVE DIAGNOSIS:  Mediastinal lymphadenopathy PET-avid and  enlarged lung nodules. PROCEDURE PERFORMED:  Mediastinoscopy. SURGEON:  Skyler Townsend MD    Estimated blood loss 20 cc    PREOP:  This is a 58-year-old female who was found to have enlarged  mediastinal lymph nodes that are PET-avid along with pulmonary nodules  that are less avid. She underwent attempted percutaneous biopsy;  however, this failed secondary to the patient was unable to lie in the  prone position. Thoracic Surgery was consulted for attempted biopsy. The risks and benefits of mediastinoscopy were discussed in detail with  the patient. The patient understood and agreed to proceed. FINDINGS:  We were unable to extend the patient's neck and our angle was  not adequate to place the mediastinoscope far enough to biopsy the  specific lesion. We did biopsy two 4R lymph nodes and a nodule that was  in the thyroid. DESCRIPTION OF PROCEDURE:  The patient was identified, brought to the  operating room, and laid in supine position. After successful induction  of general anesthesia, the patient was intubated by the Anesthesia  staff, prepped and draped in a normal sterile fashion. Prior to  incision, a time-out was performed. Antibiotics were given. We first  began by making an incision in the suprasternal notch region. Dissection down through the subcutaneous tissue.   As we were feeling  into the incision, there was a very firm nodule that was noted. We  dissected down onto this firm nodule. This was in the isthmus of the  thyroid. This was easily able to be removed. It was very firm and was  sent for pathologic frozen section. We had already noted that we could  not extend the patient's neck and it would be very difficult to perform  mediastinoscopy on. The lesion was in the central region, which also  would make it difficult because of our angle. We attempted to turn the  patient's neck, but this did not improve dramatically. Once we found  that the frozen section showed this was most likely benign thyroid  tissue, we dissected down onto the pretracheal space and dissected into  the pretracheal space into the mediastinum using finger palpation. The  scope was then introduced in this location. Again, we could not advance  the scope very far due to the poor angle. I did note two lymph nodes  that were noted in the 4R region and these were both biopsied and  removed in their entirety. These were not the masses we were looking  for on PET scan. After multiple maneuvers, we felt that it was unsafe  to proceed with mediastinoscopy. Hemostasis was achieved and the  incision was closed. The plan will be to have the patient return for  endobronchial ultrasound. Sponge count, needle count, and instrument  count were correct. The patient tolerated the procedure well, was  transported to the PACU in stable condition.         Seb Bello MD    D: 12/10/2020 17:12:30       T: 12/10/2020 18:38:45     YOLANDA_ASHISH_AVERY  Job#: [id number]     Doc#: [id number]    CC:

## 2020-12-11 NOTE — DISCHARGE SUMMARY
Discharge Summary Note  Patient ID:  Charley Joyce  [id number]  58 y.o.  1950    Admit date: 12/1/2020    Discharge date and time: 12/11/2020 11:14 AM     Admitting Physician: Osman Enamorado DO     Discharge Physician: Rissa Garcia MD    Admission Diagnoses:   Acute exacerbation of chronic obstructive pulmonary disease (COPD) ([facility] [J44.1]  [facility] [J[facility].1]  Acute exacerbation of chronic obstructive pulmonary disease (COPD) ([facility] [J44.1]    Discharge Diagnoses and Hospital Course:   Charley Joyce is a 65 y.o.  female  who presents with Acute exacerbation of chronic obstructive pulmonary disease (COPD) ([facility]     Acute COPD exacerbation   On doxycycline and IV Rocephin>> Discontinued at discharge  Continue with DuoNeb treatment, Tapering steroids on discharge  Procalcitonin 0.3 -low suspicion for sepsis or infection  Pulmonary recommendations appreciated     Lung Nodules/lung mass  S/p Mediatinoscopy   IR consulted and plan for biopsy was postponed for elevated BNP  Plan for biopsy by IR on 12/07 was canceled again (was 12/4) due to - not being able to be prone and the risk of perc CT biopsy - instead recommending CT surgery consult for bronchoscopy and biopsy- s/p Mediastinoscopy, LN were resected along with a mass in the thyroid. Unable to flex neck.  Plan for EBUS as out patient.     Chronic hypoxic respiratory failure -continue oxygen via nasal cannula     SVT  Hyperkalemia  Hypomagnesemia  Replaced Mag  Kayexalate, ordered by Pulmonology  Cardiology recommendations appreciated- SVT, to continue with oral Cardizem on discharge     Hyperlipidemia -on Lipitor    Admission Condition: fair    Discharged Condition: stable    Consults: cardiology, pulmonary and CT surgery    Significant Diagnostic Studies:   CBC with Differential:    Lab Results   Component Value Date    WBC 11.9 12/07/2020    RBC 4.52 12/07/2020    HGB 12.5 12/07/2020    HCT 42.8 12/07/2020     12/07/2020 MCV 94.7 12/07/2020    MCH 27.7 12/07/2020    MCHC 29.2 12/07/2020    RDW 14.5 12/07/2020    NRBC 1 12/07/2020    SEGSPCT 61.0 12/07/2020    BANDSPCT 1 12/07/2020    LYMPHOPCT 28.0 12/07/2020    MONOPCT 7.0 12/07/2020    BASOPCT 0.1 12/04/2020    MONOSABS 0.8 12/07/2020    LYMPHSABS 3.3 12/07/2020    EOSABS 0.1 12/07/2020    BASOSABS 0.0 12/04/2020    DIFFTYPE MANUAL DIFFERENTIAL 12/07/2020     CMP:    Lab Results   Component Value Date     12/10/2020    K 5.0 12/10/2020    CL 93 12/10/2020    CO2 30 12/10/2020    BUN 21 12/10/2020    CREATININE 0.7 12/10/2020    GFRAA >60 12/10/2020    AGRATIO 1.6 08/28/2020    LABGLOM >60 12/10/2020    GLUCOSE 170 12/10/2020    PROT 6.1 12/04/2020    PROT 6.5 03/06/2015    LABALBU 3.3 12/04/2020    CALCIUM 8.3 12/10/2020    BILITOT 0.1 12/04/2020    ALKPHOS 71 12/04/2020    AST 9 12/04/2020    ALT 5 12/04/2020     Xr Chest Portable    Result Date: 12/1/2020  EXAMINATION: ONE XRAY VIEW OF THE CHEST 12/1/2020 11:12 pm COMPARISON: 08/06/2019 and 08/28/2020, PET CT 10/22/2020 HISTORY: ORDERING SYSTEM PROVIDED HISTORY: dyspnea TECHNOLOGIST PROVIDED HISTORY: Reason for exam:->dyspnea Reason for Exam: dyspnea Acuity: Acute Type of Exam: Initial FINDINGS: Emphysema with chronically increased interstitial lung markings. There is acute right greater than left bibasilar infiltrate and/or atelectasis. These findings partially obscure the pulmonary nodules noted on the recent stone PET CT. 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 Metastatic disease noted on the recent PET CT is not well evaluated on this portable chest x-ray. Right greater than left bibasilar infiltrate and/or atelectasis. There is underlying emphysema.      Discharge Exam:  General Appearance: alert and oriented to person, place and time, in no acute distress  Cardiovascular: normal rat x-rays breath sounds bilaterally  Abdomen: soft, non-tender, non-distended, normal bowel sounds, no masses   Extremities: no cyanosis, clubbing or edema, pulse   Skin: warm and dry, no rash or erythema  Head: normocephalic and atraumatic  Eyes: pupils equal, round, and reactive to light  Neck: supple and non-tender without mass, no thyromegaly. S/p procedure with dressing   Musculoskeletal: normal range of motion, no joint swelling, deformity or tenderness  Neurological: alert, oriented, normal speech, no focal findings or movement disorder noted    Disposition: home    Patient Instructions:     Discharge Medications:   Shilo Hernandez   Home Medication Instructions ZMF:[id number]    Printed on:12/11/20 8472   Medication Information                      albuterol sulfate HFA (PROAIR HFA) 108 (90 Base) MCG/ACT inhaler  Inhale 2 puffs into the lungs every 6 hours as needed for Wheezing             aspirin 81 MG EC tablet  Take 81 mg by mouth daily             calcium carbonate 600 MG TABS tablet  Take 1 tablet by mouth daily             Cholecalciferol (VITAMIN D3) 25 MCG (1000 UT) TABS  Take by mouth daily              dilTIAZem (CARDIZEM CD) 120 MG extended release capsule  Take 1 capsule by mouth daily             Fexofenadine HCl (MUCINEX ALLERGY PO)  Take by mouth 2 times daily              HYDROcodone-acetaminophen (NORCO)  MG per tablet  Take 1 tablet by mouth every 4 hours as needed for Pain for up to 30 days.  Indications: 180             ipratropium-albuterol (DUONEB) 0.5-2.5 (3) MG/3ML SOLN nebulizer solution  Inhale 3 mLs into the lungs 4 times daily             nicotine (NICODERM CQ) 21 MG/24HR  Place 1 patch onto the skin daily             predniSONE (DELTASONE) 10 MG tablet  Take 1 tablet by mouth daily for 14 days 5 tablets by mouth once a day x 3 days, then 4 tablets by mouth once a day x 3 days, then 3 tablets by mouth once a day x 3 days, then 2 tablets by mouth once a day x 3 days, then 1 tablet by mouth once a day x 2 days then stop.             simvastatin (ZOCOR) 20 MG tablet  Take 1 tablet by mouth nightly             theophylline (UNIPHYL) 400 MG extended release tablet  Take 0.5 tablets by mouth daily             zoster recombinant adjuvanted vaccine (SHINGRIX) 50 MCG/0.5ML SUSR injection  50 MCG IM then repeat 2-6 months.                  Activity: activity as tolerated    Diet: regular diet    Wound Care: none needed    Follow-up:  PCP 1-2 weeks  DR Montse Gu as scheduled    Time Spent Doing discharge 36 min  Electronically signed by Dann Young MD  on 12/11/20 at 2:23 PM EST

## 2020-12-12 NOTE — CARE COORDINATION
[facility] Transitions Initial Follow Up Call    Call within 2 business days of discharge: Yes    Patient: Gladys Streeter Patient : 1950   MRN: <[id number]>   Reason for Admission:  COPD exac  Discharge Date: 20 RARS: Readmission Risk Score: 15      Last Discharge [facility]       Complaint Diagnosis Description Type Department Provider    20 Shortness of Breath COPD exacerbation [facility]) ED to Hosp-Admission (Discharged) (ADMITTED) Mateo Sherman MD; Job Cords. .. Spoke with: [place]: Dara Perez provided:  Obtained and reviewed discharge summary and/or continuity of care documents  Education of patient/family/caregiver/guardian to support self-management-   Assessment and support for treatment adherence and medication management-        Care Transitions 24 Hour Call    Do you have any ongoing symptoms?: Yes  Patient-reported symptoms: Shortness of Breath, Cough, Congestion  Interventions for patient-reported symptoms: Other  Do you have a copy of your discharge instructions?: Yes  Do you have all of your prescriptions and are they filled?: Yes  Have you been contacted by a [address]?: No  Have you scheduled your follow up appointment?: No  Were you discharged with any Home Care or Post Acute Services: No  Do you feel like you have everything you need to keep you well at home?: Yes  Care Transitions Interventions   Home Care Waiver: Declined        DME Assistance: Declined     Senior Services: Declined         Challenges to be reviewed by the provider   Additional needs identified to be addressed with provider:  no               Method of communication with provider :   phone    Care Transition Nurse (CTN) contacted the patient by telephone to perform post hospital discharge assessment. Verified name with patient as identifier. Provided introduction to self, and explanation of the CTN role.      CTN reviewed [facility] [place]   12/16/2020  3:00 PM Maria T [facility]   4/15/2021 10:30 AM Sebastian Garcia MD [facility]   10/12/2021  4:00 PM [facility]       Gerardo Doe RN

## 2020-12-14 NOTE — CARE COORDINATION
[facility] Transitions Follow Up Call    2020    Patient: Christopher Fuentes  Patient : 1950   MRN: [id number]  Reason for Admission: Ac Copd; Lung Nodules/lung mass  S/p Mediatinoscopy   Discharge Date: 20 RARS: Readmission Risk Score: 15    Care Transitions Subsequent and Final Call    Schedule Follow Up Appointment with PCP: Declined  Subsequent and Final Calls  Do you have any ongoing symptoms?: Yes  Onset of Patient-reported symptoms: Other  Patient-reported symptoms: Weakness, Shortness of Breath, Cough  Interventions for patient-reported symptoms: Other  Have your medications changed?: No  Do you have any questions related to your medications?: No  Do you currently have any active services?: No  Do you have any needs or concerns that I can assist you with?: No  Identified Barriers: Other, Stress  Care Transitions Interventions   Home Care Waiver: Declined        DME Assistance: Declined     Senior Services: Declined    Other Interventions: Follow Up  Future Appointments   Date Time Provider Jourdan Funez   2020  3:00 PM Arthur Lazar [facility]   4/15/2021 10:30 AM Rosalie Robles MD [facility]   10/12/2021  4:00 PM Jhonny Ngo LPN [facility]   . Spoke w/ Patient for Care Transition follow up call. Reports ongoing fatigue, generalized weakness, mild sob on exertion. Attributes sx to Copd; sx nearing baseline. Denies acute distress or need for MD notification. Copd Zone Mgt reviewed, advised to contact PCP  if noting sx of exac or other health concerns. Reports she has stopped smoking. 1111F completed, taking meds as directed. Has appt w/ D Megan 2020 3pm, transportation confirmed. Awaiting call back from Dr Carmel Crocker office to schedule appt and bronch. Reports Dr Eri Azevedo wants call once she has f/u w/ Dr Parvez De Leon. Continues to deny resource needs including hhc, dme, community assistance. Reports she is well supported by [facility]. Advanced Care Planning: see ACP note. Agreeable to ongoing CT follow up, has CTN contact number.      Mandy Hernandez RN

## 2020-12-15 NOTE — CARE COORDINATION
Advance Care Planning     General Advance Care Planning (ACP) Conversation    Date of Conversation: 12/14/2020  Conducted with: Patient with Decision Making Capacity    Healthcare Decision Maker:      Primary Decision Maker: Mike Castellano - [phone]    Primary Decision Maker: Loc Quesada - [phone]    Content/Action Overview:  Has NO ACP documents/care preferences - information provided, considering goals and options  Reviewed DNR/DNI and patient elects Full Code (Attempt Resuscitation)     Ronnie Jimenez

## 2020-12-16 NOTE — PROGRESS NOTES
Jay Trujillo is a 65 y.o. female evaluated via telephone on 12/16/2020. Consent:  She and/or health care decision maker is aware that that she may receive a bill for this telephone service, depending on her insurance coverage, and has provided verbal consent to proceed: Yes      Documentation:  I communicated with the patient and/or health care decision maker about hospital follow-up. Details of this discussion including any medical advice provided:     Patient was admitted for COPD exacerbation. Her pulse ox had gone down to 71% and she could not get it even above 80% at home. she is currently, and has been, on 24-hour oxygen 4 L. While in the hospital they treated her with IV antibiotics and steroid taper along with duo neb treatments. She also has a pulmonary lung mass that was biopsied but they did not get a good enough tissue sample and she is scheduled for repeat biopsy later this month. Overall she feels better. Her oxygen level has been staying around 91 to 92% when resting. She feels like it probably drops a bit when she gets up and is walking around, but has not actually checked it while she has been ambulating. She had SVT while she was in the hospital and was started on diltiazem. Note from cardiology also mention metoprolol but that was never started. She did note that her pulse went up to 150 earlier today but now is at 100. Over the past couple days has been staying around 80 to 90 bpm.    Dr. Raciel Martinez started her on theophylline. She is not yet have scheduled follow-up with cardiology or pulmonology. 1. Chronic obstructive pulmonary disease with acute exacerbation (HCC)  Exacerbation is improved, oxygen level is now stable at 91 to 92%. Patient is to follow-up with her pulmonologist, Dr. Raciel Martinez. I do not see need for any further antibiotic treatment at this point in time.     2. Lung mass  Follow-up for lung mass biopsy 3. SVT (supraventricular tachycardia) (HCC)  Continue to monitor pulse, notify cardiology if it is staying above 100. Medication list updated         I affirm this is a Patient Initiated Episode with an Established Patient who has not had a related appointment within my department in the past 7 days or scheduled within the next 24 hours.     Total Time: minutes: 11-20 minutes    Note: not billable if this call serves to triage the patient into an appointment for the relevant concern      Hernandes Serum

## 2020-12-18 NOTE — PROGRESS NOTES
Physician Progress Note      Marybeth Barron  CSN #:                  [id number]  :                       1950  ADMIT DATE:       2020 10:45 PM  [address] DATE:        2020 11:14 AM  RESPONDING  PROVIDER #:        Nathan Andrews MD          QUERY TEXT:    Dear Attending Provider,    Patient admitted with COPD with exacerbation. Noted documentation of chronic   respiratory failure in H/P and progress notes by attending and acute on   chronic respiratory failure documented per pulmonary consult notes. .  If possible, please document in the  discharge summary if you are evaluating   and /or treating any of the following: The medical record reflects the following:  Risk Factors: COPD exacerbation on home O2 baseline use 4-5 L  Clinical Indicators: Per pulmonary consult note \"  Acute-on-chronic   respiratory failure secondary to acute exacerbation\" ED noted O2 saturation   dropped to 85% on 4L  Treatment:  ABGS pulmonary consult O2 NRB - 4L monitoring pulse ox    Thank you,  Zafar Padilla RN BSN CCDS  [phone]  Options provided:  -- Acute on chronic respiratory failure  confirmed  -- Chronic respiratory failure with hypoxia confirmed and acute on chronic   respiratory failure with hypoxia ruled out  -- Other - I will add my own diagnosis  -- Disagree - Not applicable / Not valid  -- Disagree - Clinically unable to determine / Unknown  -- Refer to Clinical Documentation Reviewer    PROVIDER RESPONSE TEXT:    After study, Chronic respiratory failure with hypoxia confirmed and acute on   chronic respiratory failure ruled out.     Query created by: Matty Hunter on 2020 10:09 AM      Electronically signed by:  Nathan Andrews MD 2020 8:31 PM

## 2020-12-18 NOTE — PATIENT INSTRUCTIONS
Please be informed that if you contact our office outside of normal business hours the physician on call cannot help with any scheduling or rescheduling issues, procedure instruction questions or any type of medication issue. We advise you for any urgent/emergency that you go to the nearest emergency room! PLEASE CALL OUR OFFICE DURING NORMAL BUSINESS HOURS    Monday - Friday   8 am to 5 pm    [facility] [address]: [phone]    [place]:  [phone]  CAD:None known. HTN:? Not well controlled on current medical regimen, see list above. - changes in  treatment: no, says she has not taken medication this am yet. CARDIOMYOPATHY: None known   CONGESTIVE HEART FAILURE: NO KNOWN HISTORY.     VHD: No significant VHD noted  DYSLIPIDEMIA: Patient's profile is at / near Goal.yes,                                 HDL is high                                Tolerating current medical regimen wellyes,                                 See most recent Lab values in Labs section above. OTHER RELEVANT DIAGNOSIS:as noted in patient's active problem list:  TESTS ORDERED: None this visit                                    All previously ordered tests reviewed. ARRHYTHMIAS: None known   MEDICATIONS: CPM. Cardiac wise, Patient is considered a medium risk candidate for lung biopsy procedure. Office f/u in one year. Primary/secondary prevention is the goal by aggressive risk modification, healthy and therapeutic life style changes for cardiovascular risk reduction. Various goals are discussed and multiple questions answered.

## 2020-12-18 NOTE — LETTER
[facility]  [address] [place] [address]  Phone: [phone]  Fax: [phone]    Jumana Marmolejo MD        December 18, 2020     Estrada Damon [facility]    Patient: Dariana Guadarrama  MR Number: [id number]  YOB: 1950  Date of Visit: 12/18/2020    Dear Dr. Estrada Damon:    Thank you for the request for consultation for Yvette Gaston to me for the evaluation of HTN. Below are the relevant portions of my assessment and plan of care. If you have questions, please do not hesitate to call me. I look forward to following [facility] along with you.     Sincerely,        Jumana Marmolejo MD

## 2020-12-18 NOTE — PROGRESS NOTES
ventricular diastolic function, mile MR, EF 57%,10/10 EF55%, mild mitral annular calcification    Hx of echocardiogram 2019    EF 65-19%, Grade I diastolic dysfunction, Mild aortic stenosis, Calcific thickening of posterior leaflet of mitral valve, Mild Pulm HTN    Hx of pelvic x-ray 2015    Severe RT his osterarothrosis    Hx of x-ray of hip 2015    Severe Rt hip osteosrthrosis    Hyperlipidemia     Hypertension     Leg pain      Past Surgical History:   Procedure Laterality Date    BACK SURGERY      CYST REMOVAL      left arm    HYSTERECTOMY          JOINT REPLACEMENT Right     hip    LAPAROSCOPIC APPENDECTOMY N/A 2019    APPENDECTOMY LAPAROSCOPIC performed by Dameon Hagan MD at [facility] 12/10/2020    MEDIASTINOSCOPY performed by Iza Guaman MD at [address] [place] [address]        As reviewed   Family History   Problem Relation Age of Onset    Stroke Mother     Dementia Mother     Heart Attack Mother 61    Coronary Art Dis Mother     Hypertension Mother     High Cholesterol Mother     Cancer Father     Stroke Father     Heart Attack Father 61    Diabetes Sister     Heart Attack Sister     Hypertension Sister    [place] Sister    [facility] COPD Sister     Stroke Sister     Hypertension Sister     Hypertension Sister     Irritable Bowel Syndrome Sister     Pacemaker Sister      Social History     Tobacco Use    Smoking status: Former Smoker     Packs/day: 1.00     Years: 47.00     Pack years: 47.00     Start date: 1973     Quit date: 2020     Years since quittin.0    Smokeless tobacco: Never Used   Substance Use Topics    Alcohol use:  Yes     Alcohol/week: 0.0 standard drinks     Comment: rare      Review of Systems:    Constitutional: Negative for diaphoresis and fatigue  Psychological:Negative for anxiety or depression  HENT: Negative for headaches, nasal congestion, sinus pain or vertigo  Eyes: Negative for visual organomegaly. Musculoskeletal - No significant edema. No joint deformities. No muscle wasting. Neurologic - Cranial nerves II through XII are grossly intact. There were no gross focal neurologic abnormalities.     Lab Review   Lab Results   Component Value Date    TROPONINT <0.010 12/01/2020    TROPONINT <0.010 09/18/2020     BNP:    Lab Results   Component Value Date    PROBNP 459.6 12/06/2020    PROBNP 655.5 12/05/2020     PT/INR:    Lab Results   Component Value Date    INR 0.84 11/06/2020     No results found for: LABA1C  Lab Results   Component Value Date    WBC 11.9 (H) 12/07/2020    WBC 11.5 (H) 12/04/2020    HCT 42.8 12/07/2020    HCT 38.2 12/04/2020    MCV 94.7 12/07/2020    MCV 95.5 12/04/2020     (H) 12/07/2020     12/04/2020     Lab Results   Component Value Date    CHOL 169 08/28/2020    CHOL 167 03/06/2020    TRIG 82 08/28/2020    TRIG 75 03/06/2020    HDL 82 (H) 08/28/2020    HDL 79 (H) 03/06/2020    LDLCALC 71 08/28/2020    LDLCALC 73 03/06/2020     Lab Results   Component Value Date    ALT 5 (L) 12/04/2020    ALT <5 (L) 12/01/2020    AST 9 (L) 12/04/2020    AST 9 (L) 12/01/2020     BMP:    Lab Results   Component Value Date     12/10/2020     12/09/2020    K 5.0 12/10/2020    K 5.5 12/09/2020    CL 93 12/10/2020    CL 96 12/09/2020    CO2 30 12/10/2020    CO2 40 12/09/2020    BUN 21 12/10/2020    BUN 34 12/09/2020    CREATININE 0.7 12/10/2020    CREATININE 1.0 12/09/2020     CMP:   Lab Results   Component Value Date     12/10/2020     12/09/2020    K 5.0 12/10/2020    K 5.5 12/09/2020    CL 93 12/10/2020    CL 96 12/09/2020    CO2 30 12/10/2020    CO2 40 12/09/2020    BUN 21 12/10/2020    BUN 34 12/09/2020    CREATININE 0.7 12/10/2020    CREATININE 1.0 12/09/2020    PROT 6.1 12/04/2020    PROT 6.8 12/01/2020    PROT 6.5 03/06/2015     TSH:    Lab Results   Component Value Date    [facility] 1.020 12/09/2020       QUALITY MEASURES REVIEWED:  1.CAD:Patient is taking anti platelet agent:Yes  2. DYSLIPIDEMIA: Patient is on cholesterol lowering medication:Yes  3. Beta-Blocker therapy for CAD, if prior Myocardial Infarction:No  4. Counselled regarding smoking cessation. 5. Atrial fibrillation & anticoagulation therapy No    Impression:    No diagnosis found. Patient Active Problem List   Diagnosis Code    Hyperlipidemia E78.5    COPD (chronic obstructive pulmonary disease) ([facility] J44.9    Leg pain M79.606    Hypertension I10    Tobacco abuse Z72.0    Abdominal aortic aneurysm (AAA) without rupture ([facility]) I71.4    Degeneration of lumbar or lumbosacral intervertebral disc M51.37    Osteopenia M85.80    Anxiety F41.9    Acute on chronic respiratory failure with hypoxia ([facility]) J96.21    Lung mass R91.8    COPD exacerbation ([facility]) J44.1    Acute exacerbation of chronic obstructive pulmonary disease (COPD) ([facility] J44.1       Assessment & Plan:    CAD:None known. HTN:? Not well controlled on current medical regimen, see list above. - changes in  treatment: no, says she has not taken medication this am yet. CARDIOMYOPATHY: None known   CONGESTIVE HEART FAILURE: NO KNOWN HISTORY.     VHD: No significant VHD noted  DYSLIPIDEMIA: Patient's profile is at / near Goal.yes,                                 HDL is high                                Tolerating current medical regimen wellyes,                                 See most recent Lab values in Labs section above. OTHER RELEVANT DIAGNOSIS:as noted in patient's active problem list:  TESTS ORDERED: None this visit                                    All previously ordered tests reviewed. ARRHYTHMIAS: None known   MEDICATIONS: CPM. Cardiac wise, Patient is considered a medium risk candidate for lung biopsy procedure. Office f/u in one year. Primary/secondary prevention is the goal by aggressive risk modification, healthy and therapeutic life style changes for cardiovascular risk reduction.  Various goals are discussed and multiple questions answered.

## 2020-12-28 NOTE — H&P
History and Physical      Name:  Tiffanie Baird /Age/Sex: 1950  (65 y.o. female)   MRN & CSN:  [id number] & [id number] Admission Date/Time: 2020 11:36 AM   Location:  ED15/ED-15 PCP: Emir Hill MD       Admitting Physicians : Dr. Jem Silvestre is a 65 y.o.  female  who presents with Shortness of Breath    Assessment and Plan:   COPD exacerbation  # Chronic hypoxemia on 4 liters  # Lung nodules/mass s/p mediatinoscopy  SARS CoV-2, NAAT pending  -Azithromycin and Rocephin IV  -IV Solumedrol  -Nasal canula oxygen prn to maintain SaO2 88-92%  -Respiratory viral panel  -Encourage deep breathing and coughing  -Incentive spirometry  -Consult pulmonology    Electrolytes imbalance  # Mild hypokalemia  # Acute moderate hypomagnesemia  - 2 g of magnesium given in ED  -40 mEq of potassium x1  -Monitor and replete  -Telemetry    Arrhythmia  -Continue Cardizem    HLD  -Continue Lipitor    Former smoker      DVT-PPX: Lovenox  Diet: General        Medications:   Medications:    magnesium sulfate  2 g Intravenous Once    levofloxacin  500 mg Intravenous Once      Infusions:   PRN Meds:      Current Facility-Administered Medications:     magnesium sulfate 2 g in 50 mL IVPB premix, 2 g, Intravenous, Once, Presstler, DO, Last Rate: 25 mL/hr at 20 1432, 2 g at 20 1432    levoFLOXacin (LEVAQUIN) 500 MG/100ML infusion 500 mg, 500 mg, Intravenous, Once, Presstler, DO    Current Outpatient Medications:     fluticasone (FLONASE) 50 MCG/ACT nasal spray, 1 spray by Each Nostril route daily, Disp: , Rfl:     HYDROcodone-acetaminophen (NORCO)  MG per tablet, Take 1 tablet by mouth every 4 hours as needed for Pain for up to 30 days.  Indications: 180, Disp: 180 tablet, Rfl: 0    dilTIAZem (CARDIZEM CD) 120 MG extended release capsule, Take 1 capsule by mouth daily, Disp: 30 capsule, Rfl: 3   simvastatin (ZOCOR) 20 MG tablet, Take 1 tablet by mouth nightly, Disp: 90 tablet, Rfl: 1    theophylline (UNIPHYL) 400 MG extended release tablet, Take 0.5 tablets by mouth daily, Disp: 15 tablet, Rfl: 3    ipratropium-albuterol (DUONEB) 0.5-2.5 (3) MG/3ML SOLN nebulizer solution, Inhale 3 mLs into the lungs 4 times daily, Disp: 360 mL, Rfl: 5    albuterol sulfate HFA (PROAIR HFA) 108 (90 Base) MCG/ACT inhaler, Inhale 2 puffs into the lungs every 6 hours as needed for Wheezing, Disp: 1 Inhaler, Rfl: 6    Cholecalciferol (VITAMIN D3) 25 MCG (1000 UT) TABS, Take by mouth daily , Disp: , Rfl:     calcium carbonate 600 MG TABS tablet, Take 1 tablet by mouth daily, Disp: , Rfl:     aspirin 81 MG EC tablet, Take 81 mg by mouth daily, Disp: , Rfl:     Fexofenadine HCl (MUCINEX ALLERGY PO), Take by mouth 2 times daily , Disp: , Rfl:     History of present illness     Chief Complaint: Shortness of Breath      Yvan Hernandez is a 65 y.o.  female  who presents with worsening shortness of breath associated with wheezing and productive cough that has been going on for the past couple days. Patient was admitted 3 weeks ago for similar symptoms. She was found to have lung mass and was scheduled to have a biopsy done by IR but was consult due to elevated BNP and patient not able to be prone. And states they recommended CT surgery consult for bronchoscopy and biopsy. Patient had a mediastinoscopy done and was resected along with mass in the thyroid. Patient is scheduled to have EBUS as outpatient. Denies sick contact with Covid, fever, chills, leg swelling, recent travel, history of DVT/PE, trauma/fall. Hx of SVT, HLD, and former smoker. Review of Systems   Ten point ROS reviewed and negative, unless as noted below. GENERAL:  Denies fever, chills, night sweats, or changes in weight. EYES:  Denies recent visual changes. ENT:  Denies ear pain, hearing loss or tinnitus  RESP:  + cough, dyspnea, and wheezing. thallium,EF70%, normal, no ischemia    H/O Doppler ultrasound 10/13/2010    carotid, right normal, left normal    H/O Doppler ultrasound 10/07/2014    Carotid mild bilateral internal carotid artery disease less than 50% in severity. Normal vertebral artery flows with good velocities. Incidental finding of possible thyroid nodule in the left lobe.  History of 24 hour EKG monitoring 03/11/2011    NSR no ectopy    History of nuclear stress test 10/21/2016    lexiscan-normal,no ischemia,EF70%,enlarged right ventricle    Hx of chest x-ray 01/02/2015    WNL    Hx of Doppler echocardiogram 12/01/2020    EF 50-55% mild LV hypertrophy. Grade 2 diastolic dysfunction. Mild AS.  Hx of echocardiogram 10/11/2013    10/13 Abn lt ventricular diastolic function, mile MR, EF 57%,10/10 EF55%, mild mitral annular calcification    Hx of echocardiogram 05/02/2019    EF 21-78%, Grade I diastolic dysfunction, Mild aortic stenosis, Calcific thickening of posterior leaflet of mitral valve, Mild Pulm HTN    Hx of pelvic x-ray 01/02/2015    Severe RT his osterarothrosis    Hx of x-ray of hip 01/02/2015    Severe Rt hip osteosrthrosis    Hyperlipidemia     Hypertension     Leg pain      PSHX:  has a past surgical history that includes Hysterectomy; cyst removal; Tubal ligation; back surgery; joint replacement (Right); laparoscopic appendectomy (N/A, 5/18/2019); and Mediastinoscopy (N/A, 12/10/2020). Allergies:    Allergies   Allergen Reactions    Formaldehyde     Gabapentin Other (See Comments)    Norflex Tablets [Orphenadrine]     Cranberry Rash

## 2020-12-28 NOTE — CARE COORDINATION
Pt identified as potential readmission. Last admission 12/1 - 12/11 for Acute exacerbation of chronic obstructive pulmonary disease. Pt here today for with increased productive cough shortness of breath wheezing. History of COPD she wears 4 L of oxygen all the time. Shortness of breath. COPD exacerbation. Pt is requiring readmission and there were no alternatives to admission to explore at this time.

## 2020-12-28 NOTE — ED PROVIDER NOTES
[facility]      TRIAGE CHIEF COMPLAINT:   Shortness of Breath      [place]:  Marj Woodall is a 65 y.o. female that presents with increased productive cough shortness of breath wheezing. History of COPD she wears 4 L of oxygen all the time. Came in by EMS denies any fevers nausea vomiting chest pain, abdominal pain and swelling. Denies history of DVT PE or AAA. Is coughing up colored sputum no other questions or concerns this does feel like her COPD. REVIEW OF SYSTEMS:  At least 10 systems reviewed and otherwise acutely negative except as in the [address] Review of Systems   Constitutional: Negative. HENT: Positive for congestion. Eyes: Negative. Respiratory: Positive for cough, shortness of breath and wheezing. Cardiovascular: Negative. Gastrointestinal: Negative. Endocrine: Negative. Genitourinary: Negative. Musculoskeletal: Negative. Skin: Negative. Allergic/Immunologic: Negative. Neurological: Negative. Hematological: Negative. Psychiatric/Behavioral: Negative. All other systems reviewed and are negative. Past Medical History:   Diagnosis Date    Arthritis     COPD (chronic obstructive pulmonary disease) ([facility] [address]     H/O cardiovascular stress test 11/18/2009    thallium, normal no ischemia EF70%     H/O cardiovascular stress test 10/11/2013    thallium,EF70%, normal, no ischemia    H/O Doppler ultrasound 10/13/2010    carotid, right normal, left normal    H/O Doppler ultrasound 10/07/2014    Carotid mild bilateral internal carotid artery disease less than 50% in severity. Normal vertebral artery flows with good velocities. Incidental finding of possible thyroid nodule in the left lobe.      History of 24 hour EKG monitoring 03/11/2011    NSR no ectopy    History of nuclear stress test 10/21/2016    lexiscan-normal,no ischemia,EF70%,enlarged right ventricle    Hx of chest x-ray 01/02/2015    WNL  Hx of Doppler echocardiogram 12/01/2020    EF 50-55% mild LV hypertrophy. Grade 2 diastolic dysfunction. Mild AS.  Hx of echocardiogram 10/11/2013    10/13 Abn lt ventricular diastolic function, mile MR, EF 57%,10/10 EF55%, mild mitral annular calcification    Hx of echocardiogram 05/02/2019    EF 65-48%, Grade I diastolic dysfunction, Mild aortic stenosis, Calcific thickening of posterior leaflet of mitral valve, Mild Pulm HTN    Hx of pelvic x-ray 01/02/2015    Severe RT his osterarothrosis    Hx of x-ray of hip 01/02/2015    Severe Rt hip osteosrthrosis    Hyperlipidemia     Hypertension     Leg pain      Past Surgical History:   Procedure Laterality Date    BACK SURGERY      CYST REMOVAL      left arm    HYSTERECTOMY      1999    JOINT REPLACEMENT Right     hip    LAPAROSCOPIC APPENDECTOMY N/A 5/18/2019    APPENDECTOMY LAPAROSCOPIC performed by Roseanne Macias MD at [address] [place] [address] 12/10/2020    MEDIASTINOSCOPY performed by Malaika Heaton MD at [address] [place] [address]       Family History   Problem Relation Age of Onset    Stroke Mother     Dementia Mother     Heart Attack Mother 61    Coronary Art Dis Mother     Hypertension Mother     High Cholesterol Mother     Cancer Father     Stroke Father     Heart Attack Father 61    Diabetes Sister     Heart Attack Sister     Hypertension Sister     Cancer Sister    Ina Parham COPD Sister     Stroke Sister     Hypertension Sister     Hypertension Sister     Irritable Bowel Syndrome Sister     Pacemaker Sister      Social History     Socioeconomic History    Marital status:       Spouse name: Not on file    Number of children: Not on file    Years of education: Not on file    Highest education level: Not on file   Occupational History    Not on file   Social Needs    Financial resource strain: Not on file    Food insecurity     Worry: Not on file     Inability: Not on file   [facility] needs Medical: Not on file     Non-medical: Not on file   Tobacco Use    Smoking status: Former Smoker     Packs/day: 1.00     Years: 47.00     Pack years: 47.00     Start date: 1973     Quit date: 2020     Years since quittin.1    Smokeless tobacco: Never Used   Substance and Sexual Activity    Alcohol use: Yes     Alcohol/week: 0.0 standard drinks     Comment: rare    Drug use: No    Sexual activity: Not on file     Comment:    Lifestyle    Physical activity     Days per week: Not on file     Minutes per session: Not on file    Stress: Not on file   Relationships    Social connections     Talks on phone: Not on file     Gets together: Not on file     Attends Hinduism service: Not on file     Active member of club or organization: Not on file     Attends meetings of clubs or organizations: Not on file     Relationship status: Not on file    Intimate partner violence     Fear of current or ex partner: Not on file     Emotionally abused: Not on file     Physically abused: Not on file     Forced sexual activity: Not on file   Other Topics Concern    Not on file   Social History Narrative    Not on file     Current Facility-Administered Medications   Medication Dose Route Frequency Provider Last Rate Last Admin    magnesium sulfate 2 g in 50 mL IVPB premix  2 g Intravenous Once [employer], DO 25 mL/hr at 20 1432 2 g at 20 1432    levoFLOXacin (LEVAQUIN) 500 MG/100ML infusion 500 mg  500 mg Intravenous Once [employer], DO         Current Outpatient Medications   Medication Sig Dispense Refill    fluticasone (FLONASE) 50 MCG/ACT nasal spray 1 spray by Each Nostril route daily      HYDROcodone-acetaminophen (NORCO)  MG per tablet Take 1 tablet by mouth every 4 hours as needed for Pain for up to 30 days.  Indications: 180 180 tablet 0    dilTIAZem (CARDIZEM CD) 120 MG extended release capsule Take 1 capsule by mouth daily 30 capsule 3  simvastatin (ZOCOR) 20 MG tablet Take 1 tablet by mouth nightly 90 tablet 1    theophylline (UNIPHYL) 400 MG extended release tablet Take 0.5 tablets by mouth daily 15 tablet 3    ipratropium-albuterol (DUONEB) 0.5-2.5 (3) MG/3ML SOLN nebulizer solution Inhale 3 mLs into the lungs 4 times daily 360 mL 5    albuterol sulfate HFA (PROAIR HFA) 108 (90 Base) MCG/ACT inhaler Inhale 2 puffs into the lungs every 6 hours as needed for Wheezing 1 Inhaler 6    Cholecalciferol (VITAMIN D3) 25 MCG (1000 UT) TABS Take by mouth daily       calcium carbonate 600 MG TABS tablet Take 1 tablet by mouth daily      aspirin 81 MG EC tablet Take 81 mg by mouth daily      Fexofenadine HCl (MUCINEX ALLERGY PO) Take by mouth 2 times daily         Allergies   Allergen Reactions    Formaldehyde     Gabapentin Other (See Comments)    Norflex Tablets [Orphenadrine]     Cranberry Rash     Current Facility-Administered Medications   Medication Dose Route Frequency Provider Last Rate Last Admin    magnesium sulfate 2 g in 50 mL IVPB premix  2 g Intravenous Once Natividad James DO 25 mL/hr at 12/28/20 1432 2 g at 12/28/20 1432    levoFLOXacin (LEVAQUIN) 500 MG/100ML infusion 500 mg  500 mg Intravenous Once Natividad James, DO         Current Outpatient Medications   Medication Sig Dispense Refill    fluticasone (FLONASE) 50 MCG/ACT nasal spray 1 spray by Each Nostril route daily      HYDROcodone-acetaminophen (NORCO)  MG per tablet Take 1 tablet by mouth every 4 hours as needed for Pain for up to 30 days.  Indications: 180 180 tablet 0    dilTIAZem (CARDIZEM CD) 120 MG extended release capsule Take 1 capsule by mouth daily 30 capsule 3    simvastatin (ZOCOR) 20 MG tablet Take 1 tablet by mouth nightly 90 tablet 1    theophylline (UNIPHYL) 400 MG extended release tablet Take 0.5 tablets by mouth daily 15 tablet 3  ipratropium-albuterol (DUONEB) 0.5-2.5 (3) MG/3ML SOLN nebulizer solution Inhale 3 mLs into the lungs 4 times daily 360 mL 5    albuterol sulfate HFA (PROAIR HFA) 108 (90 Base) MCG/ACT inhaler Inhale 2 puffs into the lungs every 6 hours as needed for Wheezing 1 Inhaler 6    Cholecalciferol (VITAMIN D3) 25 MCG (1000 UT) TABS Take by mouth daily       calcium carbonate 600 MG TABS tablet Take 1 tablet by mouth daily      aspirin 81 MG EC tablet Take 81 mg by mouth daily      Fexofenadine HCl (MUCINEX ALLERGY PO) Take by mouth 2 times daily          Nursing Notes Reviewed    VITAL SIGNS:  ED Triage Vitals   Enc Vitals Group      BP       Pulse       Resp       Temp       Temp src       SpO2       Weight       Height       Head Circumference       Peak Flow       Pain Score       Pain Loc       Pain Edu? Excl. in [address]? PHYSICAL EXAM:  Physical Exam  Vitals signs and nursing note reviewed. Constitutional:       General: She is not in acute distress. Appearance: Normal appearance. She is well-developed. She is not ill-appearing, toxic-appearing or diaphoretic. Interventions: Nasal cannula in place. HENT:      Head: Normocephalic and atraumatic. Right Ear: External ear normal.      Left Ear: External ear normal.      Nose: Congestion present. No rhinorrhea. Eyes:      General: No scleral icterus. Right eye: No discharge. Left eye: No discharge. Extraocular Movements: Extraocular movements intact. Conjunctiva/sclera: Conjunctivae normal.   Neck:      Musculoskeletal: Normal range of motion. No neck rigidity. Cardiovascular:      Rate and Rhythm: Regular rhythm. Tachycardia present. Pulses: Normal pulses. Heart sounds: Normal heart sounds. No murmur. No friction rub. No gallop. Pulmonary:      Effort: Pulmonary effort is normal. No respiratory distress. Breath sounds: No stridor. Wheezing and rhonchi present. No rales.    Abdominal: General: Bowel sounds are normal. There is no distension. Palpations: Abdomen is soft. There is no mass. Tenderness: There is no abdominal tenderness. There is no guarding or rebound. Hernia: No hernia is present. Musculoskeletal: Normal range of motion. General: No swelling, tenderness, deformity or signs of injury. Right lower leg: No edema. Left lower leg: No edema. Skin:     General: Skin is warm. Coloration: Skin is not jaundiced or pale. Findings: No bruising, erythema, lesion or rash. Neurological:      General: No focal deficit present. Mental Status: She is alert and oriented to person, place, and time. GCS: GCS eye subscore is 4. GCS verbal subscore is 5. GCS motor subscore is 6. Cranial Nerves: Cranial nerves are intact. No cranial nerve deficit, dysarthria or facial asymmetry. Sensory: Sensation is intact. No sensory deficit. Motor: Motor function is intact. No weakness, tremor, atrophy, abnormal muscle tone or seizure activity. Coordination: Coordination is intact. Coordination normal.   Psychiatric:         Mood and Affect: Mood normal.         Behavior: Behavior normal. Behavior is cooperative. Thought Content:  Thought content normal.         Judgment: Judgment normal.           I have reviewed andinterpreted all of the currently available lab results from this visit (if applicable):    Results for orders placed or performed during the hospital encounter of 12/28/20   Rapid Flu Swab    Specimen: Nasopharyngeal   Result Value Ref Range    Rapid Influenza A Ag NEGATIVE NEGATIVE    Rapid Influenza B Ag NEGATIVE NEGATIVE   CBC Auto Differential   Result Value Ref Range    WBC 7.9 4.0 - 10.5 K/CU MM    RBC 4.20 4.2 - 5.4 M/CU MM    Hemoglobin 11.8 (L) 12.5 - 16.0 GM/DL    Hematocrit 39.6 37 - 47 %    MCV 94.3 78 - 100 FL    MCH 28.1 27 - 31 PG    MCHC 29.8 (L) 32.0 - 36.0 %    RDW 14.9 11.7 - 14.9 % Platelets 706 112 - 392 K/CU MM    MPV 10.1 7.5 - 11.1 FL    Differential Type AUTOMATED DIFFERENTIAL     Segs Relative 80.9 (H) 36 - 66 %    Lymphocytes % 8.1 (L) 24 - 44 %    Monocytes % 9.0 (H) 0 - 4 %    Eosinophils % 1.3 0 - 3 %    Basophils % 0.3 0 - 1 %    Segs Absolute 6.4 K/CU MM    Lymphocytes Absolute 0.6 K/CU MM    Monocytes Absolute 0.7 K/CU MM    Eosinophils Absolute 0.1 K/CU MM    Basophils Absolute 0.0 K/CU MM    Nucleated RBC % 0.0 %    Total Nucleated RBC 0.0 K/CU MM    Total Immature Neutrophil 0.03 K/CU MM    Immature Neutrophil % 0.4 0 - 0.43 %   CMP   Result Value Ref Range    Sodium 134 (L) 135 - 145 MMOL/L    Potassium 3.2 (L) 3.5 - 5.1 MMOL/L    Chloride 89 (L) 99 - 110 mMol/L    CO2 33 (H) 21 - 32 MMOL/L    BUN 12 6 - 23 MG/DL    CREATININE 0.5 (L) 0.6 - 1.1 MG/DL    Glucose 117 (H) 70 - 99 MG/DL    Calcium 9.1 8.3 - 10.6 MG/DL    Alb 3.3 (L) 3.4 - 5.0 GM/DL    Total Protein 6.9 6.4 - 8.2 GM/DL    Total Bilirubin 0.3 0.0 - 1.0 MG/DL    ALT 6 (L) 10 - 40 U/L    AST 10 (L) 15 - 37 IU/L    Alkaline Phosphatase 74 40 - 129 IU/L    GFR Non-African American >60 >60 mL/min/1.73m2    GFR African American >60 >60 mL/min/1.73m2    Anion Gap 12 4 - 16   Troponin   Result Value Ref Range    Troponin T <0.010 <0.01 NG/ML   Brain Natriuretic Peptide   Result Value Ref Range    Pro-.3 <300 PG/ML   CK   Result Value Ref Range    Total CK 33 26 - 140 IU/L   Magnesium   Result Value Ref Range    Magnesium 1.2 (L) 1.8 - 2.4 mg/dl   TSH without Reflex   Result Value Ref Range    TSH, High Sensitivity 0.910 0.270 - 4.20 uIu/ml   Urinalysis   Result Value Ref Range    Color, UA YELLOW YELLOW    Clarity, UA CLEAR CLEAR    Glucose, Urine NEGATIVE NEGATIVE MG/DL    Bilirubin Urine NEGATIVE NEGATIVE MG/DL    Ketones, Urine LARGE (A) NEGATIVE MG/DL    Specific Gravity, UA 1.020 1.001 - 1.035    Blood, Urine SMALL (A) NEGATIVE    pH, Urine 5.0 5.0 - 8.0    Protein,  (A) NEGATIVE MG/DL Urobilinogen, Urine NORMAL 0.2 - 1.0 MG/DL    Nitrite Urine, Quantitative NEGATIVE NEGATIVE    Leukocyte Esterase, Urine TRACE (A) NEGATIVE    RBC, UA 11 (H) 0 - 6 /HPF    WBC, UA 1 0 - 5 /HPF    Bacteria, UA RARE (A) NEGATIVE /HPF    Squam Epithel, UA 1 /HPF    Mucus, UA RARE (A) NEGATIVE HPF    Trichomonas, UA NONE SEEN NONE SEEN /HPF   EKG 12 Lead   Result Value Ref Range    Ventricular Rate 105 BPM    Atrial Rate 105 BPM    P-R Interval 152 ms    QRS Duration 74 ms    Q-T Interval 326 ms    QTc Calculation (Bazett) 430 ms    P Axis 25 degrees    R Axis -20 degrees    T Axis 51 degrees    Diagnosis       Sinus tachycardia  Otherwise normal ECG  When compared with ECG of 08-DEC-2020 21:02,  No significant change was found          Radiographs (if obtained):  [] The following radiograph was interpreted by myself in the absence of a radiologist:  [x] Radiologist's Report Reviewed:    CXR    Echo Complete 2d W Doppler W Color    Result Date: 12/9/2020 Transthoracic Echocardiography Report (TTE)  Demographics   Patient Name       Nessa LUTHER   Date of Study       12/09/2020   Date of Birth      1950         Gender              Female   Age                65 year(s)         Race                   Patient Number     [id number]         Room Number         4106   Visit Number       [id number]   Corporate ID       [id number]   Accession Number   [id number]         Agnes Wilcox RVT   Ordering Physician Idania Lyon MD                 Physician           MD  Procedure Type of Study   TTE procedure:ECHOCARDIOGRAM COMPLETE 2D W DOPPLER W COLOR. Procedure Date Date: 12/09/2020 Start: 03:21 PM Study Location: Portable Technical Quality: Fair visualization Indications:SVT. Patient Status: Routine Height: 60 inches Weight: 122 pounds BSA: 1.51 m2 BMI: 23.83 kg/m2 HR: 100 bpm BP: 123/73 mmHg  Conclusions   Summary  Technically difficult study patient sitting up during exam.  Left ventricular systolic function is normal.  Ejection fraction is visually estimated at 50-55%. Mild left ventricular hypertrophy. Grade II diastolic dysfunction. Calcified aortic valve with restricted motion of the non coronary cusp; mild  aortic stenosis. No evidence of any pericardial effusion. Signature   ------------------------------------------------------------------  Electronically signed by Darian Wells MD (Interpreting  physician) on 12/09/2020 at 04:05 PM  ------------------------------------------------------------------   Findings   Left Ventricle  Left ventricular systolic function is normal.  Ejection fraction is visually estimated at 50-55%. Mild left ventricular hypertrophy. Grade II diastolic dysfunction. Left Atrium  Essentially normal left atrium. Right Atrium  Essentially normal right atrium. Right Ventricle  Essentially normal right ventricle. Aortic Valve  Calcified aortic valve with restricted motion of the non coronary cusp; mild  aortic stenosis. Mitral Valve  Structurally normal mitral valve. Tricuspid Valve  Trace tricuspid regurgitation; normal RVSP. Pulmonic Valve  The pulmonic valve was not well visualized. Pericardial Effusion  No evidence of any pericardial effusion. Pleural Effusion  No evidence of pleural effusion.   M-Mode/2D Measurements & Calculations   LV Diastolic Dimension:  LV Systolic Dimension:  LA Dimension: 1.8 cmAO Root  3.9 cm                   2.54 cm                 Dimension: 3 cmLA Area:  LV FS:34.9 %             LV Volume Diastolic: 33 43.8 cm2  LV PW Diastolic: 2.68 cm ml  LV PW Systolic: 9.53 cm  LV Volume Systolic: 16  Septum Diastolic: 8.63   ml  cm                       LV EDV/LV EDV Index: 33 RV Diastolic Dimension:  Septum Systolic: 3.52 cm WE/68 H3DV ESV/LV ESV   2.77 cm  CO: 6.88 l/min           Index: 16 ml/11 m2  CI: 4.56 l/m*m2          EF Calculated (A4C):    LA/Aorta: 0.6                           51.5 %  LV Area Diastolic: 03.2  EF Calculated (2D):     LA volume/Index: 38 ml  cm2                      64.8 %                  /96D4  LV Area Systolic: 76.6  cm2                      LV Length: 6.18 cm                            LVOT: 2.2 cm  Doppler Measurements & Calculations   MV Peak E-Wave: 91.3    AV Peak Velocity: 166 cm/s   LVOT Peak Velocity:  cm/s                    AV Peak Gradient: 11.02 mmHg 87.2 cm/s  MV Peak A-Wave: 109     AV Mean Velocity: 125 cm/s   LVOT Mean Velocity: 68  cm/s                    AV Mean Gradient: 10 mmHg    cm/s  MV E/A Ratio: 0.84      AV VTI: 31.8 cm              LVOT Peak Gradient: 3  MV Peak Gradient: 3.33  AV Area (Continuity):2.16    mmHgLVOT Mean Gradient:  mmHg                    cm2                          2 mmHg   MV P1/2t: 49 msec       LVOT VTI: 18.1 cm  MVA by PHT:4.49 cm2   MV E' Septal Velocity: 7.13 cm/s  MV E' Lateral Velocity:  6.47 cm/s  MV E/E' septal: 12.81  MV E/E' lateral: 14.11      Xr Chest (2 Vw)    Result Date: 12/4/2020  EXAMINATION: TWO XRAY VIEWS OF THE CHEST 12/4/2020 8:22 am COMPARISON: 12/01/2020 HISTORY: ORDERING SYSTEM PROVIDED HISTORY: acute copd TECHNOLOGIST PROVIDED HISTORY: Reason for exam:->acute copd FINDINGS: Stable cardiomediastinal silhouette. Aortic calcifications. Trace bilateral pleural effusions. No focal consolidation or pneumothorax. Trace bilateral pleural effusions.      Ct Chest Wo Contrast    Result Date: 12/8/2020 EXAMINATION: CT OF THE CHEST WITHOUT CONTRAST 12/7/2020 9:33 am TECHNIQUE: CT of the chest was performed without the administration of intravenous contrast. Multiplanar reformatted images are provided for review. Dose modulation, iterative reconstruction, and/or weight based adjustment of the mA/kV was utilized to reduce the radiation dose to as low as reasonably achievable. COMPARISON: CT PE 09/18/2020, PET-CT 10/22/2020 HISTORY: ORDERING SYSTEM PROVIDED HISTORY: eval for nodule, acute copd exac. TECHNOLOGIST PROVIDED HISTORY: Reason for exam:->eval for nodule, acute copd exac. Reason for Exam: eval for nodule, acute copd exac. Acuity: Acute Type of Exam: Initial FINDINGS: Mediastinum: There is mild mediastinal adenopathy. For example a 2.6 x 1.9 cm precarinal lymph node. The heart size is within normal limits. No pericardial effusion. The thoracic aorta is normal in caliber with mild atherosclerosis. The main pulmonary artery is normal caliber. Coronary arterial calcifications. Lungs/pleura: Mild to moderate emphysematous changes. No pleural effusion or pneumothorax. Bibasilar atelectasis. There are at least three discrete solid right pulmonary nodules. For example, -15 x 13 mm nodule in the right upper lobe (image 28) -18 x 16 mm nodule in the right lower lobe (image 75) -and a 9 x 8 mm nodule in the lateral right lower lobe (image 83). This nodule has been present dating back to 10/13/2010 and not significantly changed. There is also a sub solid nodular opacity measuring 8 x 6 mm in the left lower lobe (image 68). Central airways are otherwise patent. Upper Abdomen: No acute findings in the upper abdomen. Soft Tissues/Bones: No acute findings in the bones or soft tissues. Osseous structures are otherwise stable. 1. Persistent mediastinal adenopathy and right upper and lower lobe pulmonary nodule is suspicious for malignancy. Recommend tissue sampling. 2. Ground-glass opacity seen within the left lung on the prior PET-CT are no longer visualized. Xr Chest Portable    Result Date: 12/10/2020  EXAMINATION: ONE XRAY VIEW OF THE CHEST 12/10/2020 10:23 am COMPARISON: Chest radiograph performed December 4, 2020. HISTORY: ORDERING SYSTEM PROVIDED HISTORY: rule out pneumo post procedure TECHNOLOGIST PROVIDED HISTORY: Reason for exam:-> rule out pneumo post procedure Reason for Exam: rule out pneumo post procedure Acuity: Acute Type of Exam: Initial FINDINGS: Bibasilar hazy opacities which may represent atelectasis. No pleural effusion or pneumothorax. Stable cardiomediastinal silhouette. Atherosclerotic changes of the thoracic aorta. No acute osseous abnormality. 1. No pneumothorax. 2. Bibasilar hazy opacities which may represent atelectasis. Xr Chest Portable    Result Date: 12/1/2020  EXAMINATION: ONE XRAY VIEW OF THE CHEST 12/1/2020 11:12 pm COMPARISON: 08/06/2019 and 08/28/2020, PET CT 10/22/2020 HISTORY: ORDERING SYSTEM PROVIDED HISTORY: dyspnea TECHNOLOGIST PROVIDED HISTORY: Reason for exam:->dyspnea Reason for Exam: dyspnea Acuity: Acute Type of Exam: Initial FINDINGS: Emphysema with chronically increased interstitial lung markings. There is acute right greater than left bibasilar infiltrate and/or atelectasis. These findings partially obscure the pulmonary nodules noted on the recent stone PET CT. 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 Metastatic disease noted on the recent PET CT is not well evaluated on this portable chest x-ray. Right greater than left bibasilar infiltrate and/or atelectasis. There is underlying emphysema.        EKG (if obtained): (All EKG's are interpreted by myself in the absence of a cardiologist) 12 lead EKG per my interpretation:  Sinus Tachycardia 105  Axis is   Normal  QTc is  within an acceptable range  There is no specific T wave changes appreciated. There is no specific ST wave changes appreciated. Prior EKG to compare with was not available       Total critical care time today provided was at least 30 minutes. This excludes seperately billable procedure. Critical care time provided for reviewing labs, images, giving oxygen, breathing treatments, steroids, antibiotics, consulting medicine, that required close evaluation and/or intervention with concern for patient decompensation. MDM:    Patient here with cough shortness of breath. Again history of COPD started having increased shortness of breath cough wheezing since last night. Cough is productive with colored sputum. Denies any fevers nausea vomiting chest pain abdominal pain swelling no history of DVT PE or AAA. She wears 4 L of oxygen all the time. She appears well she no distress vital signs are stable set for mild tachycardia I put her on her home 4 L oxygen she is doing well we will give her albuterol cough medicine steroids will check labs imaging I did wear appropriate PPE including 95 mask gown gloves face shield eye protection. Will check for Covid, flu. Patient recheck she still feels short of breath wheezing she is 93% on her 4 L mildly tachycardic is coughing up productive sputum awaiting Covid test I did give her antibiotics magnesium potassium patient feels like this is her COPD will admit to hospital medicine for COPD exacerbation again pending Devon test.  Otherwise patient stable admitted.   Work-up otherwise negative    CLINICAL IMPRESSION:  Final diagnoses:   Dyspnea, unspecified type   COPD exacerbation (HCC)   Cough   Hypokalemia   Hypomagnesemia   Bronchitis (Please note that portions of this note may have been completed with a voice recognition program. Efforts were made to edit the dictations but occasionally words aremis-transcribed.)    DISPOSITION REFERRAL (if applicable):  No follow-up provider specified.     DISPOSITION MEDICATIONS (if applicable):  New Prescriptions    No medications on file          Francisco Javier Hernandez, [facility],   12/28/20 4932

## 2020-12-29 NOTE — PROGRESS NOTES
Physician Progress Note      Alli [facility]  CSN #:                  [id number]  :                       1950  ADMIT DATE:       2020 11:36 AM  DISCH DATE:  RESPONDING  PROVIDER #:        Hunter Cm MD          QUERY TEXT:    Dear Hospitalist    Pt admitted with  [address] . Pt noted to have Chronic home O2 use. If possible,   please document in the progress notes and discharge summary if you are   evaluating and/or treating any of the following: The medical record reflects the following:  Risk Factors: COPDE, Home dependence on O2  Clinical Indicators:\" Chronic hypoxemia on 4 liters& Lung nodules/mass s/p   mediastinoscopy  ,\" Documented in the H&P,  \"History of COPD she wears 4 L of   oxygen all the time. \" documented in the ED  Treatment: 4LNC, labs Pulmonary Cx, Azithromycin and Rocephin IV IV   Solumedrol, Nasal canula oxygen prn to maintain SaO2 88-92%, Respiratory viral   panel, Encourage deep breathing and coughing ,Incentive spirometry    Thank you Deedee Bain RN, CDS ([phone])  Options provided:  -- Acute respiratory failure with hypoxia  -- Acute respiratory failure with hypercapnia  -- Chronic respiratory failure with hypoxia  -- Chronic respiratory failure with hypercapnia  -- Acute on chronic respiratory failure with hypoxia  -- Acute on chronic respiratory failure with hypercapnia  -- Other - I will add my own diagnosis  -- Disagree - Not applicable / Not valid  -- Disagree - Clinically unable to determine / Unknown  -- Refer to Clinical Documentation Reviewer    PROVIDER RESPONSE TEXT:    This patient is in acute respiratory failure with hypoxia.     Query created by: Sanjeev Garcia on 2020 2:26 PM      Electronically signed by:  Hunter Cm MD 2020 4:11 PM

## 2020-12-29 NOTE — CARE COORDINATION
LSW spoke with pt about discharge plans. Pt lives with her grand daughter in a 2 story home however pt stated she stays on the main floor. Pt denies having HC. Pt has a walker, nebulizer and home O2 with [facility]. Pt stated she is independent of her ADLs. Pt stated it take her aware but she can do them on her own. Pt has a PCP and can afford her meds. LSW offered HC at discharge and pt refused. Pt plans home with grand daughter and denies any needs.

## 2020-12-29 NOTE — PROGRESS NOTES
Hospitalist Progress Note      Name:  Tiffanie Baird /Age/Sex: 1950  (65 y.o. female)   MRN & CSN:  [id number] & [id number] Admission Date/Time: 2020 11:36 AM   Location:  [address] [place], [place] [address]- PCP: Emir Hill MD         Hospital Day: 2    Assessment and Plan:   Tiffanie Baird is a 65 y.o.  female  who presents with  SOB    #COPD exacerbation  # Chronic hypoxemia on 4 liters  # Lung nodules/mass s/p mediatinoscopy  -SARS CoV-2 negative  -Azithromycin and Rocephin IV  -IV Solumedrol  -Breathing txt  -Nasal canula oxygen prn to maintain SaO2 88-92%  -Respiratory viral panel  -Incentive spirometry  -Consult pulmonology     #Electrolytes imbalance  # Mild hypokalemia  # Acute moderate hypomagnesemia  -k replacement protocol  -Monitor and replete prn  -Telemetry     #Arrhythmia  -Continue Cardizem     #HLD  -Continue Lipitor    Disposition: possible d/c in AM       Diet DIET GENERAL;   DVT Prophylaxis [] Lovenox, []  Heparin, [] SCDs, [] Ambulation   GI Prophylaxis [] PPI,  [] H2 Blocker,  [] Carafate,  [] Diet/Tube Feeds   Code Status Full Code   Disposition Patient requires continued admission due to chronic hypoxemia         History of Present Illness:     Chief Complaint: <principal problem not specified>  Tiffanie Baird is a 65 y.o.  female  who presents with SOB    Patient reports stable baseline shortness of breath. Denies any cough sputum production       Ten point ROS reviewed negative, unless as noted above    Objective:   No intake or output data in the 24 hours ending 20 1611   Vitals:   Vitals:    20 1545   BP: 129/74   Pulse: 96   Resp: 20   Temp: 98.7 °F (37.1 °C)   SpO2: 92%     Physical Exam:   GEN Awake female, no apparent distress. EYES Pupils are equally round. No scleral erythema, discharge, or conjunctivitis. HENT Mucous membranes are moist.   NECK Supple, no apparent thyromegaly or masses.

## 2020-12-29 NOTE — ED NOTES
covid-19 sample was collected on Epic by MS RN, but lab states that it was not received. I called micro and was given consent to collect and send at this time.        Vianney Damon RN  12/28/20 2000

## 2020-12-29 NOTE — CONSULTS
[address] [place] [address], [address] [place] [address]                                  CONSULTATION    PATIENT NAME: Edwin Parker                   :        1950  MED REC NO:   [id number]                          ROOM:       [id number]  ACCOUNT NO:   [de-identified]                           ADMIT DATE: 2020  PROVIDER:     Lanny Galicia MD    CONSULT DATE:  2020    HISTORY OF PRESENT ILLNESS:  The patient is a 64-year-old lady with  multiple medical problems including COPD, hypertension, hyperlipidemia,  lung mass, who was admitted through the emergency room with complaints  of increasing shortness of breath, cough productive of whitish to yellow  phlegm and wheezing. In the emergency room, she was hypoxic. She was  given bronchodilator with some relief, but she continued to have  shortness of breath, so it was decided to admit her for aggressive  therapy. She had a chest x-ray, which showed bibasilar infiltrate  versus atelectasis. She does have a lung mass and mediastinal  adenopathy for which she underwent mediastinoscopy, which was  nondiagnostic and she has been scheduled for EBUS bronchoscopy by Dr. Javier Barnard as outpatient. There was no history of hemoptysis, hematemesis,  nausea or vomiting. No history of chest pains. PAST MEDICAL HISTORY:  Significant for COPD, lung mass, arthritis,  hypertension, hyperlipidemia. PAST SURGICAL HISTORY:  Remarkable for hysterectomy, tubal ligation,  back surgery, joint replacement surgery of right hip, laparoscopic  appendectomy, and mediastinoscopy. FAMILY HISTORY:  Reveals that her mother had CVA, dementia, coronary  artery disease, hypertension, hypercholesterolemia. Her father had  cancer, CVA and coronary artery disease. SOCIAL HISTORY:  Reveals that she quit smoking recently, but used to  smoke a pack per day for 47 years prior to that.   She drinks alcohol off and on. No history of drug abuse. MEDICATIONS:  Reviewed. ALLERGIES:  She is allergic to FORMALDEHYDE, GABAPENTIN, NORFLEX, and  CRANBERRY. REVIEW OF SYSTEMS:  A 10- to 14-point review of systems was reviewed and  is negative except for what is mentioned in the history of present  illness. PHYSICAL EXAMINATION:  GENERAL:  The patient is alert and oriented x3, in no acute respiratory  distress. VITAL SIGNS:  Her blood pressure is 153/78 mmHg, pulse of 90 per minute  and respiratory rate of 20 per minute. She is afebrile. Her saturation  is 98% on 2 liters nasal cannula. HEENT:  Essentially unremarkable. NECK:  There is no JVD. No lymphadenopathy. The neck is supple. LUNG:  Revealed diminished breath sounds, occasional rhonchi and basilar  crackles. HEART:  Showed normal S1, S2. There was no S3 or S4 noted. ABDOMEN:  Benign. There is no evidence of any organomegaly. The bowel  sounds are present. NEUROLOGIC:  Reveals that she is awake and responsive, answering  questions appropriately and moving her extremities. LABORATORY AND IMAGING DATA:  Her CBC showed a white count of 7.9,  hemoglobin 11.8, hematocrit 39.6. Her electrolytes showed a sodium of  134, potassium 3.2, chloride 89, carbon dioxide 33, BUN 12, creatinine  0.5. Her chest x-ray showed bibasilar infiltrate versus atelectasis. Her COVID-19 test was negative. Her venous blood gases showed a pH of  7.30, pCO2 of 79, pO2 of 180 with 91% saturation. She had a CAT scan of  the chest early this month, which showed mediastinal adenopathy and  right upper lobe and lower lobe pulmonary nodules suspicious for  malignancy. IMPRESSION:  1. Acute exacerbation of COPD. 2.  Possible basilar atelectasis versus pneumonia. 3.  Lung mass with mediastinal adenopathy. The patient is scheduled for  EBUS bronchoscopy by Dr. Ilsa George. 4.  Hypokalemia, hypomagnesemia. 5.  History of heavy tobacco abuse.     PLAN: 1.  Continue present antibiotic therapy. 2.  Solu-Medrol 40 q.6 hours. 3.  We will add Proventil to Atrovent. 4.  Check theophylline level. 5.  Sputum for culture and sensitivity. 6.  Supplement magnesium and potassium. 7.  As per orders.         Andrei Webb MD    D: 12/29/2020 10:55:12       T: 12/29/2020 13:11:59     [email or web address]  Job#: [id number]     Doc#: [id number]    CC:

## 2020-12-30 NOTE — PLAN OF CARE
Problem: Falls - Risk of:  Goal: Will remain free from falls  Description: Will remain free from falls  Outcome: Ongoing  Goal: Absence of physical injury  Description: Absence of physical injury  Outcome: Ongoing     Problem: Pain:  Goal: Pain level will decrease  Description: Pain level will decrease  Outcome: Ongoing  Goal: Control of acute pain  Description: Control of acute pain  Outcome: Ongoing  Goal: Control of chronic pain  Description: Control of chronic pain  Outcome: Ongoing     Problem: Anxiety:  Goal: Level of anxiety will decrease  Description: Level of anxiety will decrease  Outcome: Ongoing     Problem: Discharge Planning:  Goal: Discharged to appropriate level of care  Description: Discharged to appropriate level of care  Outcome: Ongoing     Problem:  Activity Intolerance:  Goal: Ability to tolerate increased activity will improve  Description: Ability to tolerate increased activity will improve  Outcome: Ongoing     Problem: Airway Clearance - Ineffective:  Goal: Ability to maintain a clear airway will improve  Description: Ability to maintain a clear airway will improve  Outcome: Ongoing     Problem: Breathing Pattern - Ineffective:  Goal: Ability to achieve and maintain a regular respiratory rate will improve  Description: Ability to achieve and maintain a regular respiratory rate will improve  Outcome: Ongoing     Problem: Gas Exchange - Impaired:  Goal: Levels of oxygenation will improve  Description: Levels of oxygenation will improve  Outcome: Ongoing

## 2020-12-30 NOTE — PROGRESS NOTES
Hospitalist Progress Note      Name:  Jose Jones /Age/Sex: 1950  (65 y.o. female)   MRN & CSN:  [id number] & [id number] Admission Date/Time: 2020 11:36 AM   Location:  [address] [place] [address] PCP: Waleska Ames MD         Hospital Day: 3    Assessment and Plan:   Jose Jones is a 65 y.o.  female  who presents with  SOB.     #COPD exacerbation  # Chronic hypoxemia on 4 liters  # Lung nodules/mass s/p mediatinoscopy  -SARS CoV-2 negative  -Azithromycin will be PO  -d/c Rocephin IV  -Solumedrol IV changed to PO taper  -Breathing txt  -Nasal canula oxygen prn to maintain SaO2 88-92%  -Respiratory viral panel  -Incentive spirometry  -pulmonology f/u out patient     #Electrolytes imbalance  # Mild hypokalemia> hypperkalemia  # Acute moderate hypomagnesemia  -k replacement protocol discontinued  -Telemetry     #Arrhythmia  -Continue Cardizem     #HLD  -Continue Lipitor     Disposition: d/c home am,holding due to hyperkalemia. Patient was inquiring about performing her outpatient preop testing. Patient was informed case management. Case management informed, they report that it probably will be covered by insurance in-house. They will talk with the patient        Diet DIET GENERAL;   DVT Prophylaxis [] Lovenox, []  Heparin, [] SCDs, [] Ambulation   GI Prophylaxis [] PPI,  [] H2 Blocker,  [] Carafate,  [] Diet/Tube Feeds   Code Status Full Code   Disposition Patient requires Hyperkalemia         History of Present Illness:     Chief Complaint: <principal problem not specified>  Jose Jones is a 65 y.o.  female  who presents with SOB       Ten point ROS reviewed negative, unless as noted above    Objective:        Intake/Output Summary (Last 24 hours) at 2020 1109  Last data filed at 2020 1738  Gross per 24 hour   Intake 360 ml   Output    Net 360 ml      Vitals:   Vitals:    20 0844   BP:    Pulse:    Resp: 17   Temp:    SpO2: 91%     Physical Exam: GEN Awake female, sitting upright in bed in no apparent distress. Appears given age. EYES Pupils are equally round. No scleral erythema, discharge, or conjunctivitis. HENT Mucous membranes are moist. Oral pharynx without exudates, no evidence of thrush. NECK Supple, no apparent thyromegaly or masses. RESP Clear to auscultation, no wheezes, rales or rhonchi. Symmetric chest movement. CARDIO/VASC S1/S2 auscultated. Regular rate without appreciable murmurs, rubs, or gallops. No JVD or carotid bruits. Peripheral pulses equal bilaterally and palpable. No peripheral edema. GI Abdomen is soft without significant tenderness, masses, or guarding. Bowel sounds are normoactive. Rectal exam deferred.  No costovertebral angle tenderness. Normal appearing external genitalia. Nicole catheter is not present. HEME/LYMPH No palpable cervical lymphadenopathy and no hepatosplenomegaly. No petechiae or ecchymoses. MSK No gross joint deformities. SKIN Normal coloration, warm, dry. NEURO Cranial nerves appear grossly intact, normal speech, no lateralizing weakness. PSYCH Awake, alert, oriented x 4. Affect appropriate.     Medications:   Medications:    albuterol sulfate HFA  2 puff Inhalation 4x daily    aspirin  81 mg Oral Daily    calcium elemental  500 mg Oral Daily    vitamin D  1,000 Units Oral Daily    dilTIAZem  120 mg Oral Daily    theophylline  200 mg Oral Daily    atorvastatin  10 mg Oral Daily    ipratropium-albuterol  1 vial Inhalation 4x Daily    fluticasone  1 spray Each Nostril Daily    sodium chloride flush  10 mL Intravenous 2 times per day    enoxaparin  40 mg Subcutaneous Daily    methylPREDNISolone  40 mg Intravenous Q6H    Followed by   Juancarlos Randolph ON 12/31/2020] predniSONE  40 mg Oral Daily    azithromycin  500 mg Intravenous Q24H    cefTRIAXone (ROCEPHIN) IV  1 g Intravenous Q24H    ipratropium  2 puff Inhalation 4x daily    cetirizine  10 mg Oral BID      Infusions:   PRN Meds:

## 2020-12-30 NOTE — CARE COORDINATION
LSW was informed by the doctor that she plans to discharge plan. Doctor stated that pt does not want to go home. Pt stated to the doctor that she missed her preadmission testing yesterday and wants it to be done while she is here. LSW spoke with pt and informed her of the discharge. LSW informed her that we can not complete her preadmission testing while she is here. LSW informed pt that she will need to call [facility] scheduling to reschedule her preadmission. Pt stated she wasn't to be discharged and go to preadmission and then go home. LSW informed pt she will grecia to call the number that was provided to her. Pt then stated that Dr. Angelo Aragon informed her she will stay another night. LSW spoke with Dr. Scot Barbosa and and informed her of what Dr. Angelo Aragon told pt.   Spoke with Dr. Angelo Aragon and pt will stay for one more night however pt will be discharged in the morning.   Pt will call [facility] scheduling to reschedule her preadmission testing

## 2020-12-30 NOTE — PROGRESS NOTES
Physician Progress Note      Favio Card  [facility] #:                  [id number]  :                       1950  ADMIT DATE:       2020 11:36 AM  DISCH DATE:  RESPONDING  PROVIDER #:        Gregory Nunez MD          QUERY TEXT:    Dear Hospitalist    Pt admitted with  [address] . Pt noted to have Chronic home O2 use. If possible,   please document in the progress notes and discharge summary if you are   evaluating and/or treating any of the following: The medical record reflects the following:  Risk Factors: COPDE, Home dependence on O2  Clinical Indicators:\" Chronic hypoxemia on 4 liters& Lung nodules/mass s/p   mediastinoscopy  ,\" Documented in the H&P,  \"History of COPD she wears 4 L of   oxygen all the time. \" documented in the ED  Treatment: 4LNC, labs Pulmonary Cx, Azithromycin and Rocephin IV IV   Solumedrol, Nasal canula oxygen prn to maintain SaO2 88-92%, Respiratory viral   panel, Encourage deep breathing and coughing ,Incentive spirometry    Thank you Slick Ley RN, CDS ([phone])  Options provided:  -- Chronic respiratory failure with hypoxia  -- Chronic respiratory failure with hypercapnia  -- Acute on chronic respiratory failure with hypoxia  -- Acute on chronic respiratory failure with hypercapnia  -- Other - I will add my own diagnosis  -- Disagree - Not applicable / Not valid  -- Disagree - Clinically unable to determine / Unknown  -- Refer to Clinical Documentation Reviewer    PROVIDER RESPONSE TEXT:    This patient has chronic respiratory failure with hypoxia.     Query created by: Maria Elena Ashley on 2020 9:44 AM      Electronically signed by:  Gregory Nunez MD 2020 10:56 AM

## 2020-12-30 NOTE — CARE COORDINATION
Upon attempt at Care Transition follow up call Patient noted to be readmitted to [facility] on 12/28/2020 w/ Copd Exac. Per protocol current episode closed. Will follow if criteria met upon discharge.  Paris Jerome RN, CTN

## 2020-12-31 NOTE — DISCHARGE SUMMARY
Discharge Summary    Name:  Pablo Hussein /Age/Sex: 1950  (65 y.o. female)   MRN & CSN:  [id number] & [id number] Admission Date/Time: 2020 11:36 AM   Attending:  Loretta Rodriguez MD Discharging Physician: Deion Jacobs MD     Hospital Course:   Pablo Hussein is a 65 y.o.  female  who presents with SOB.     #COPD exacerbation  # Chronic hypoxemia on 4 liters  # Lung nodules/mass s/p mediatinoscopy  -SARS CoV-2 negative  -give Po azithromycin for 3 days  -steroid taper  -Incentive spirometry  -pulmonology f/u out patient     #Electrolytes imbalance  # Mild hypokalemia> hyperkalemia-resolved  # Acute moderate hypomagnesemia  -K 4.6 today     #Arrhythmia  -Continue Cardizem  -f/u with cardiology as out pt     #HLD  -Continue Lipitor     Disposition: d/c home am,holding due to hyperkalemia. Patient was inquiring about performing her outpatient preop testing. Patient was informed case management. Case management informed, they report that it probably will be covered by insurance in-house. They will talk with the patient    The patient expressed appropriate understanding of and agreement with the discharge recommendations, medications, and plan.      Consults this admission:  IP CONSULT TO HOSPITALIST  IP CONSULT TO PULMONOLOGY    Discharge Instruction:   Follow up appointments:pulmology  Primary care physician:  within 2 weeks    Diet:  cardiac diet and low fat, low cholesterol diet   Activity: activity as tolerated  Disposition: Discharged to:   [x]Home, [][facility], []SNF, []Acute Rehab, []Hospice   Condition on discharge: Stable    Discharge Medications:      Richard Flores   Home Medication Instructions ZIR:[id number]    Printed on:20 1054   Medication Information                      albuterol sulfate HFA (PROAIR HFA) 108 (90 Base) MCG/ACT inhaler  Inhale 2 puffs into the lungs every 6 hours as needed for Wheezing             aspirin 81 MG EC tablet  Take 81 mg by mouth daily azithromycin (ZITHROMAX) 500 MG tablet  Take 1 tablet by mouth daily for 3 days             calcium carbonate 600 MG TABS tablet  Take 1 tablet by mouth daily             Cholecalciferol (VITAMIN D3) 25 MCG (1000 UT) TABS  Take by mouth daily              dilTIAZem (CARDIZEM CD) 120 MG extended release capsule  Take 1 capsule by mouth daily             Fexofenadine HCl (MUCINEX ALLERGY PO)  Take by mouth 2 times daily              fluticasone (FLONASE) 50 MCG/ACT nasal spray  1 spray by Each Nostril route daily             HYDROcodone-acetaminophen (NORCO)  MG per tablet  Take 1 tablet by mouth every 4 hours as needed for Pain for up to 30 days. Indications: 180             ipratropium-albuterol (DUONEB) 0.5-2.5 (3) MG/3ML SOLN nebulizer solution  Inhale 3 mLs into the lungs 4 times daily             predniSONE (DELTASONE) 20 MG tablet  Take 2 tablets by mouth daily for 10 days             simvastatin (ZOCOR) 20 MG tablet  Take 1 tablet by mouth nightly             theophylline (UNIPHYL) 400 MG extended release tablet  Take 0.5 tablets by mouth daily                 Objective Findings at Discharge:   BP (!) 140/74   Pulse 111   Temp 98.9 °F (37.2 °C) (Oral)   Resp 24   Ht 5' (1.524 m)   Wt 125 lb 1.6 oz (56.7 kg)   SpO2 97%   BMI 24.43 kg/m²            PHYSICAL EXAM   GEN Awake female, sitting upright in bed in no apparent distress. Appears given age. EYES Pupils are equally round. No scleral erythema, discharge, or conjunctivitis. HENT Mucous membranes are moist.  NECK Supple, no apparent thyromegaly or masses. RESP Clear to auscultation, no wheezes, rales or rhonchi. Symmetric chest movement   CARDIO/VASC S1/S2 auscultated. Regular rate without appreciable murmurs, rubs, or gallops. No JVD or carotid bruits. Peripheral pulses equal bilaterally and palpable. No peripheral edema. GI Abdomen is soft without significant tenderness, masses, or guarding. Bowel sounds are normoactive. Rectal exam deferred.  No costovertebral angle tenderness. SKIN Normal coloration, warm, dry. NEURO Cranial nerves appear grossly intact, normal speech, no lateralizing weakness. PSYCH Awake, alert, oriented x 4. Affect appropriate. BMP/CBC  Recent Labs     12/28/20  1144 12/28/20  1144 12/29/20  2021 12/30/20  0501 12/31/20  0821   *  --   --  143 142   K 3.2*   < > 5.0 5.4* 4.6   CL 89*  --   --  98* 96*   CO2 33*  --   --  36* 37*   BUN 12  --   --  21 19   CREATININE 0.5*  --   --  1.0 0.8   WBC 7.9  --   --  11.2*  --    HCT 39.6  --   --  36.6*  --      --   --  282  --     < > = values in this interval not displayed.        IMAGING:    Discharge Time of 29 minutes    Electronically signed by Fabricio Moreno MD on 12/31/2020 at 10:57 AM

## 2020-12-31 NOTE — PROGRESS NOTES
Pulmonary and Critical Care  Progress Note    Subjective: The patient is feeling better. Shortness of breath has improved  Chest pain none  Addressing respiratory complaints Patient is negative for  hemoptysis and cyanosis  CONSTITUTIONAL:  negative for fevers and chills      Past Medical History:     has a past medical history of Arthritis, COPD (chronic obstructive pulmonary disease) ([facility] [address] H/O cardiovascular stress test, H/O cardiovascular stress test, H/O Doppler ultrasound, H/O Doppler ultrasound, History of 24 hour EKG monitoring, History of nuclear stress test, Hx of chest x-ray, Hx of Doppler echocardiogram, Hx of echocardiogram, Hx of echocardiogram, Hx of pelvic x-ray, Hx of x-ray of hip, Hyperlipidemia, Hypertension, and Leg pain. has a past surgical history that includes Hysterectomy; cyst removal; Tubal ligation; back surgery; joint replacement (Right); laparoscopic appendectomy (N/A, 5/18/2019); and Mediastinoscopy (N/A, 12/10/2020). reports that she quit smoking about 6 weeks ago. She started smoking about 47 years ago. She has a 47.00 pack-year smoking history. She has never used smokeless tobacco. She reports current alcohol use. She reports that she does not use drugs. Family history:  family history includes COPD in her sister; Cancer in her father and sister; Coronary Art Dis in her mother; Dementia in her mother; Diabetes in her sister; Heart Attack in her sister; Heart Attack (age of onset: 61) in her father and mother; High Cholesterol in her mother; Hypertension in her mother, sister, sister, and sister; Irritable Bowel Syndrome in her sister; Pacemaker in her sister; Stroke in her father, mother, and sister.     Allergies   Allergen Reactions    Formaldehyde     Gabapentin Other (See Comments)    Norflex Tablets [Orphenadrine]     Cranberry Rash     Social History:    Reviewed; no changes    Objective:   PHYSICAL EXAM: VITALS:  BP (!) 140/74   Pulse 111   Temp 98.9 °F (37.2 °C) (Oral)   Resp 24   Ht 5' (1.524 m)   Wt 125 lb 1.6 oz (56.7 kg)   SpO2 97%   BMI 24.43 kg/m²     24HR INTAKE/OUTPUT:      Intake/Output Summary (Last 24 hours) at 12/31/2020 1043  Last data filed at 12/30/2020 2109  Gross per 24 hour   Intake 60 ml   Output    Net 60 ml       CONSTITUTIONAL:  awake, alert, cooperative, no apparent distress, and appears stated age  LUNGS: Decreased BS. CARDIOVASCULAR:  normal S1 and S2 and negative JVD  ABD:Abdomen soft, non-tender. BS normal. No masses,  No organomegaly  NEURO:Alert and oriented x3. Gait normal. Reflexes and motor strength normal and symmetric. Cranial nerves 2-12 and sensation grossly intact. DATA:    CBC:  Recent Labs     12/28/20  1144 12/30/20  0501   WBC 7.9 11.2*   RBC 4.20 3.92*   HGB 11.8* 10.9*   HCT 39.6 36.6*    282   MCV 94.3 93.4   MCH 28.1 27.8   MCHC 29.8* 29.8*   RDW 14.9 14.6   SEGSPCT 80.9* 93.6*      BMP:  Recent Labs     12/28/20  1144 12/28/20  1144 12/29/20 2021 12/30/20  0501 12/31/20  0821   *  --   --  143 142   K 3.2*   < > 5.0 5.4* 4.6   CL 89*  --   --  98* 96*   CO2 33*  --   --  36* 37*   BUN 12  --   --  21 19   CREATININE 0.5*  --   --  1.0 0.8   CALCIUM 9.1  --   --  8.6 8.8   GLUCOSE 117*  --   --  164* 160*    < > = values in this interval not displayed. ABG:  No results for input(s): PH, PO2ART, TFL3XSH, HCO3, BEART, O2SAT in the last 72 hours. Lab Results   Component Value Date    PROBNP 440.0 (H) 12/30/2020    PROBNP 112.3 12/28/2020    PROBNP 459.6 (H) 12/06/2020    THEOPH 9.4 (L) 12/31/2020     No results found for: [facility]    Radiology Review:  Pertinent images / reports were reviewed as a part of this visit.     Assessment:     Patient Active Problem List   Diagnosis    Hyperlipidemia    COPD (chronic obstructive pulmonary disease) ([facility] [address]    Leg pain    Hypertension    Tobacco abuse  Abdominal aortic aneurysm (AAA) without rupture (HCC)    Degeneration of lumbar or lumbosacral intervertebral disc    Osteopenia    Anxiety    Acute on chronic respiratory failure with hypoxia (HCC)    Lung mass    COPD exacerbation (HCC)    [facility]       Plan:   1. Overall the patient has improved. 2. D/C today.   Beto Manzano MD  12/31/2020  10:43 AM

## 2021-01-01 ENCOUNTER — APPOINTMENT (OUTPATIENT)
Dept: GENERAL RADIOLOGY | Age: 71
DRG: 871 | End: 2021-01-01
Payer: COMMERCIAL

## 2021-01-01 ENCOUNTER — TELEPHONE (OUTPATIENT)
Dept: INFUSION THERAPY | Age: 71
End: 2021-01-01

## 2021-01-01 ENCOUNTER — TELEPHONE (OUTPATIENT)
Dept: ONCOLOGY | Age: 71
End: 2021-01-01

## 2021-01-01 ENCOUNTER — CARE COORDINATION (OUTPATIENT)
Dept: CASE MANAGEMENT | Age: 71
End: 2021-01-01

## 2021-01-01 ENCOUNTER — TELEPHONE (OUTPATIENT)
Dept: FAMILY MEDICINE CLINIC | Age: 71
End: 2021-01-01

## 2021-01-01 ENCOUNTER — OFFICE VISIT (OUTPATIENT)
Dept: ONCOLOGY | Age: 71
End: 2021-01-01
Payer: COMMERCIAL

## 2021-01-01 ENCOUNTER — CLINICAL DOCUMENTATION (OUTPATIENT)
Dept: ONCOLOGY | Age: 71
End: 2021-01-01

## 2021-01-01 ENCOUNTER — APPOINTMENT (OUTPATIENT)
Dept: INFUSION THERAPY | Age: 71
End: 2021-01-01
Payer: COMMERCIAL

## 2021-01-01 ENCOUNTER — INITIAL CONSULT (OUTPATIENT)
Dept: ONCOLOGY | Age: 71
End: 2021-01-01
Payer: COMMERCIAL

## 2021-01-01 ENCOUNTER — HOSPITAL ENCOUNTER (OUTPATIENT)
Dept: INFUSION THERAPY | Age: 71
Discharge: HOME | End: 2021-01-20
Payer: COMMERCIAL

## 2021-01-01 ENCOUNTER — APPOINTMENT (OUTPATIENT)
Dept: GENERAL RADIOLOGY | Age: 71
DRG: 870 | End: 2021-01-01
Payer: COMMERCIAL

## 2021-01-01 ENCOUNTER — ANESTHESIA EVENT (OUTPATIENT)
Dept: ICU | Age: 71
DRG: 870 | End: 2021-01-01
Payer: COMMERCIAL

## 2021-01-01 ENCOUNTER — HOSPITAL ENCOUNTER (INPATIENT)
Age: 71
LOS: 13 days | Discharge: HOME | DRG: 871 | End: 2021-03-13
Attending: STUDENT IN AN ORGANIZED HEALTH CARE EDUCATION/TRAINING PROGRAM | Admitting: STUDENT IN AN ORGANIZED HEALTH CARE EDUCATION/TRAINING PROGRAM
Payer: COMMERCIAL

## 2021-01-01 ENCOUNTER — ANESTHESIA EVENT (OUTPATIENT)
Dept: ENDOSCOPY | Age: 71
DRG: 871 | End: 2021-01-01
Payer: COMMERCIAL

## 2021-01-01 ENCOUNTER — HOSPITAL ENCOUNTER (OUTPATIENT)
Dept: INFUSION THERAPY | Age: 71
Discharge: HOME | DRG: 870 | End: 2021-04-09
Payer: COMMERCIAL

## 2021-01-01 ENCOUNTER — ANESTHESIA (OUTPATIENT)
Dept: ENDOSCOPY | Age: 71
End: 2021-01-01
Payer: COMMERCIAL

## 2021-01-01 ENCOUNTER — HOSPITAL ENCOUNTER (OUTPATIENT)
Dept: INFUSION THERAPY | Age: 71
Discharge: HOME | DRG: 870 | End: 2021-04-08
Payer: COMMERCIAL

## 2021-01-01 ENCOUNTER — OFFICE VISIT (OUTPATIENT)
Dept: FAMILY MEDICINE CLINIC | Age: 71
End: 2021-01-01
Payer: COMMERCIAL

## 2021-01-01 ENCOUNTER — HOSPITAL ENCOUNTER (OUTPATIENT)
Dept: INFUSION THERAPY | Age: 71
Discharge: HOME | End: 2021-02-11
Payer: COMMERCIAL

## 2021-01-01 ENCOUNTER — HOSPITAL ENCOUNTER (OUTPATIENT)
Dept: INFUSION THERAPY | Age: 71
Discharge: HOME | DRG: 870 | End: 2021-04-07
Payer: COMMERCIAL

## 2021-01-01 ENCOUNTER — APPOINTMENT (OUTPATIENT)
Dept: MRI IMAGING | Age: 71
DRG: 870 | End: 2021-01-01
Payer: COMMERCIAL

## 2021-01-01 ENCOUNTER — HOSPITAL ENCOUNTER (OUTPATIENT)
Dept: INFUSION THERAPY | Age: 71
Discharge: HOME | End: 2021-01-21
Payer: COMMERCIAL

## 2021-01-01 ENCOUNTER — HOSPITAL ENCOUNTER (OUTPATIENT)
Dept: RADIATION ONCOLOGY | Age: 71
Discharge: HOME | End: 2021-01-27
Payer: COMMERCIAL

## 2021-01-01 ENCOUNTER — ANESTHESIA (OUTPATIENT)
Dept: ICU | Age: 71
DRG: 870 | End: 2021-01-01
Payer: COMMERCIAL

## 2021-01-01 ENCOUNTER — TELEPHONE (OUTPATIENT)
Dept: GASTROENTEROLOGY | Age: 71
End: 2021-01-01

## 2021-01-01 ENCOUNTER — HOSPITAL ENCOUNTER (OUTPATIENT)
Dept: INFUSION THERAPY | Age: 71
Discharge: HOME | End: 2021-02-12
Payer: COMMERCIAL

## 2021-01-01 ENCOUNTER — APPOINTMENT (OUTPATIENT)
Dept: CT IMAGING | Age: 71
DRG: 870 | End: 2021-01-01
Payer: COMMERCIAL

## 2021-01-01 ENCOUNTER — HOSPITAL ENCOUNTER (INPATIENT)
Age: 71
LOS: 11 days | Discharge: NURSING, REHAB OR LONG-TERM CARE | DRG: 870 | End: 2021-04-21
Attending: EMERGENCY MEDICINE | Admitting: HOSPITALIST
Payer: COMMERCIAL

## 2021-01-01 ENCOUNTER — APPOINTMENT (OUTPATIENT)
Dept: GENERAL RADIOLOGY | Age: 71
DRG: 193 | End: 2021-01-01
Payer: COMMERCIAL

## 2021-01-01 ENCOUNTER — APPOINTMENT (OUTPATIENT)
Dept: CT IMAGING | Age: 71
DRG: 871 | End: 2021-01-01
Payer: COMMERCIAL

## 2021-01-01 ENCOUNTER — ANESTHESIA EVENT (OUTPATIENT)
Dept: OPERATING ROOM | Age: 71
End: 2021-01-01

## 2021-01-01 ENCOUNTER — HOSPITAL ENCOUNTER (OUTPATIENT)
Dept: INFUSION THERAPY | Age: 71
Discharge: HOME | End: 2021-01-22
Payer: COMMERCIAL

## 2021-01-01 ENCOUNTER — HOSPITAL ENCOUNTER (INPATIENT)
Age: 71
LOS: 8 days | DRG: 951 | End: 2021-05-12
Attending: FAMILY MEDICINE | Admitting: FAMILY MEDICINE
Payer: COMMERCIAL

## 2021-01-01 ENCOUNTER — HOSPITAL ENCOUNTER (OUTPATIENT)
Dept: INFUSION THERAPY | Age: 71
Discharge: HOME | End: 2021-01-13
Payer: COMMERCIAL

## 2021-01-01 ENCOUNTER — HOSPITAL ENCOUNTER (OUTPATIENT)
Dept: INFUSION THERAPY | Age: 71
Discharge: HOME | End: 2021-01-27
Payer: COMMERCIAL

## 2021-01-01 ENCOUNTER — APPOINTMENT (OUTPATIENT)
Dept: GENERAL RADIOLOGY | Age: 71
End: 2021-01-01
Attending: THORACIC SURGERY (CARDIOTHORACIC VASCULAR SURGERY)
Payer: COMMERCIAL

## 2021-01-01 ENCOUNTER — APPOINTMENT (OUTPATIENT)
Dept: GENERAL RADIOLOGY | Age: 71
End: 2021-01-01
Payer: COMMERCIAL

## 2021-01-01 ENCOUNTER — HOSPITAL ENCOUNTER (OUTPATIENT)
Age: 71
Setting detail: SPECIMEN
Discharge: HOME | End: 2021-04-05
Payer: COMMERCIAL

## 2021-01-01 ENCOUNTER — HOSPITAL ENCOUNTER (OUTPATIENT)
Dept: INFUSION THERAPY | Age: 71
Discharge: HOME | End: 2021-02-19
Payer: COMMERCIAL

## 2021-01-01 ENCOUNTER — ANESTHESIA (OUTPATIENT)
Dept: ENDOSCOPY | Age: 71
DRG: 871 | End: 2021-01-01
Payer: COMMERCIAL

## 2021-01-01 ENCOUNTER — HOSPITAL ENCOUNTER (OUTPATIENT)
Dept: INFUSION THERAPY | Age: 71
Discharge: HOME | End: 2021-02-10
Payer: COMMERCIAL

## 2021-01-01 ENCOUNTER — HOSPITAL ENCOUNTER (OUTPATIENT)
Dept: LAB | Age: 71
Discharge: HOME | End: 2021-03-19
Payer: COMMERCIAL

## 2021-01-01 ENCOUNTER — ANESTHESIA (OUTPATIENT)
Dept: OPERATING ROOM | Age: 71
End: 2021-01-01

## 2021-01-01 ENCOUNTER — HOSPITAL ENCOUNTER (OUTPATIENT)
Age: 71
Setting detail: OUTPATIENT SURGERY
Discharge: HOME | End: 2021-01-05
Attending: THORACIC SURGERY (CARDIOTHORACIC VASCULAR SURGERY) | Admitting: THORACIC SURGERY (CARDIOTHORACIC VASCULAR SURGERY)
Payer: COMMERCIAL

## 2021-01-01 ENCOUNTER — HOSPITAL ENCOUNTER (EMERGENCY)
Age: 71
Discharge: HOME | End: 2021-01-11
Attending: EMERGENCY MEDICINE
Payer: COMMERCIAL

## 2021-01-01 ENCOUNTER — HOSPITAL ENCOUNTER (OUTPATIENT)
Dept: INFUSION THERAPY | Age: 71
Discharge: HOME | End: 2021-01-19
Payer: COMMERCIAL

## 2021-01-01 ENCOUNTER — HOSPITAL ENCOUNTER (OUTPATIENT)
Dept: MRI IMAGING | Age: 71
Discharge: HOME | End: 2021-01-19
Payer: COMMERCIAL

## 2021-01-01 ENCOUNTER — HOSPITAL ENCOUNTER (INPATIENT)
Age: 71
LOS: 10 days | Discharge: HOME | DRG: 871 | End: 2021-04-02
Attending: EMERGENCY MEDICINE | Admitting: INTERNAL MEDICINE
Payer: COMMERCIAL

## 2021-01-01 ENCOUNTER — CLINICAL DOCUMENTATION (OUTPATIENT)
Dept: RADIATION ONCOLOGY | Age: 71
End: 2021-01-01

## 2021-01-01 ENCOUNTER — HOSPITAL ENCOUNTER (INPATIENT)
Age: 71
LOS: 8 days | Discharge: HOSPICE | DRG: 193 | End: 2021-05-04
Attending: EMERGENCY MEDICINE | Admitting: INTERNAL MEDICINE
Payer: COMMERCIAL

## 2021-01-01 VITALS
DIASTOLIC BLOOD PRESSURE: 67 MMHG | HEIGHT: 60 IN | WEIGHT: 116 LBS | SYSTOLIC BLOOD PRESSURE: 149 MMHG | OXYGEN SATURATION: 94 % | BODY MASS INDEX: 22.78 KG/M2 | TEMPERATURE: 97.6 F | RESPIRATION RATE: 16 BRPM | HEART RATE: 94 BPM

## 2021-01-01 VITALS
RESPIRATION RATE: 16 BRPM | WEIGHT: 125 LBS | SYSTOLIC BLOOD PRESSURE: 162 MMHG | OXYGEN SATURATION: 96 % | DIASTOLIC BLOOD PRESSURE: 71 MMHG | BODY MASS INDEX: 24.54 KG/M2 | TEMPERATURE: 96.9 F | HEIGHT: 60 IN | HEART RATE: 87 BPM

## 2021-01-01 VITALS
HEIGHT: 60 IN | BODY MASS INDEX: 25.66 KG/M2 | WEIGHT: 130.7 LBS | RESPIRATION RATE: 17 BRPM | OXYGEN SATURATION: 98 % | SYSTOLIC BLOOD PRESSURE: 128 MMHG | DIASTOLIC BLOOD PRESSURE: 57 MMHG | HEART RATE: 74 BPM | TEMPERATURE: 99.2 F

## 2021-01-01 VITALS
SYSTOLIC BLOOD PRESSURE: 134 MMHG | BODY MASS INDEX: 23.32 KG/M2 | DIASTOLIC BLOOD PRESSURE: 58 MMHG | RESPIRATION RATE: 18 BRPM | HEIGHT: 60 IN | HEART RATE: 99 BPM | WEIGHT: 118.8 LBS | OXYGEN SATURATION: 91 %

## 2021-01-01 VITALS
HEIGHT: 60 IN | OXYGEN SATURATION: 91 % | BODY MASS INDEX: 23.2 KG/M2 | TEMPERATURE: 97.3 F | WEIGHT: 118.2 LBS | DIASTOLIC BLOOD PRESSURE: 58 MMHG | HEART RATE: 99 BPM | SYSTOLIC BLOOD PRESSURE: 134 MMHG | RESPIRATION RATE: 18 BRPM

## 2021-01-01 VITALS
TEMPERATURE: 97.4 F | DIASTOLIC BLOOD PRESSURE: 63 MMHG | SYSTOLIC BLOOD PRESSURE: 141 MMHG | WEIGHT: 126.8 LBS | HEART RATE: 108 BPM | BODY MASS INDEX: 24.76 KG/M2 | OXYGEN SATURATION: 98 %

## 2021-01-01 VITALS
DIASTOLIC BLOOD PRESSURE: 63 MMHG | SYSTOLIC BLOOD PRESSURE: 109 MMHG | RESPIRATION RATE: 18 BRPM | HEART RATE: 103 BPM | OXYGEN SATURATION: 97 % | WEIGHT: 126 LBS | BODY MASS INDEX: 24.74 KG/M2 | HEIGHT: 60 IN

## 2021-01-01 VITALS
OXYGEN SATURATION: 96 % | SYSTOLIC BLOOD PRESSURE: 152 MMHG | HEART RATE: 107 BPM | TEMPERATURE: 98.7 F | WEIGHT: 125.88 LBS | RESPIRATION RATE: 16 BRPM | DIASTOLIC BLOOD PRESSURE: 66 MMHG | BODY MASS INDEX: 24.71 KG/M2 | HEIGHT: 60 IN

## 2021-01-01 VITALS
WEIGHT: 118 LBS | OXYGEN SATURATION: 99 % | HEIGHT: 60 IN | BODY MASS INDEX: 23.16 KG/M2 | SYSTOLIC BLOOD PRESSURE: 136 MMHG | TEMPERATURE: 97.8 F | DIASTOLIC BLOOD PRESSURE: 61 MMHG | HEART RATE: 109 BPM

## 2021-01-01 VITALS
HEIGHT: 60 IN | RESPIRATION RATE: 17 BRPM | OXYGEN SATURATION: 98 % | DIASTOLIC BLOOD PRESSURE: 78 MMHG | WEIGHT: 125 LBS | BODY MASS INDEX: 24.54 KG/M2 | SYSTOLIC BLOOD PRESSURE: 149 MMHG | TEMPERATURE: 98.6 F | HEART RATE: 96 BPM

## 2021-01-01 VITALS
SYSTOLIC BLOOD PRESSURE: 139 MMHG | DIASTOLIC BLOOD PRESSURE: 68 MMHG | BODY MASS INDEX: 23.81 KG/M2 | TEMPERATURE: 98.6 F | HEIGHT: 60 IN | WEIGHT: 121.25 LBS | OXYGEN SATURATION: 99 % | HEART RATE: 105 BPM | RESPIRATION RATE: 16 BRPM

## 2021-01-01 VITALS
DIASTOLIC BLOOD PRESSURE: 67 MMHG | HEART RATE: 99 BPM | SYSTOLIC BLOOD PRESSURE: 150 MMHG | OXYGEN SATURATION: 93 % | TEMPERATURE: 98.6 F | WEIGHT: 127.7 LBS | HEIGHT: 60 IN | RESPIRATION RATE: 20 BRPM | BODY MASS INDEX: 25.07 KG/M2

## 2021-01-01 VITALS
HEIGHT: 60 IN | TEMPERATURE: 97.6 F | OXYGEN SATURATION: 94 % | WEIGHT: 116 LBS | DIASTOLIC BLOOD PRESSURE: 67 MMHG | RESPIRATION RATE: 20 BRPM | SYSTOLIC BLOOD PRESSURE: 149 MMHG | HEART RATE: 94 BPM | BODY MASS INDEX: 22.78 KG/M2

## 2021-01-01 VITALS
OXYGEN SATURATION: 100 % | DIASTOLIC BLOOD PRESSURE: 63 MMHG | SYSTOLIC BLOOD PRESSURE: 141 MMHG | WEIGHT: 118.2 LBS | HEIGHT: 60 IN | RESPIRATION RATE: 16 BRPM | HEART RATE: 108 BPM | BODY MASS INDEX: 23.2 KG/M2 | TEMPERATURE: 97.1 F

## 2021-01-01 VITALS
BODY MASS INDEX: 23.81 KG/M2 | OXYGEN SATURATION: 88 % | SYSTOLIC BLOOD PRESSURE: 109 MMHG | DIASTOLIC BLOOD PRESSURE: 68 MMHG | HEIGHT: 60 IN | HEART RATE: 105 BPM | RESPIRATION RATE: 16 BRPM | TEMPERATURE: 97.8 F | WEIGHT: 121.25 LBS

## 2021-01-01 VITALS
TEMPERATURE: 97.9 F | BODY MASS INDEX: 23.4 KG/M2 | DIASTOLIC BLOOD PRESSURE: 73 MMHG | OXYGEN SATURATION: 91 % | HEART RATE: 103 BPM | SYSTOLIC BLOOD PRESSURE: 143 MMHG | HEIGHT: 60 IN | WEIGHT: 119.2 LBS

## 2021-01-01 VITALS
WEIGHT: 119 LBS | SYSTOLIC BLOOD PRESSURE: 144 MMHG | BODY MASS INDEX: 23.36 KG/M2 | TEMPERATURE: 97.6 F | HEIGHT: 60 IN | OXYGEN SATURATION: 90 % | DIASTOLIC BLOOD PRESSURE: 67 MMHG | HEART RATE: 92 BPM | RESPIRATION RATE: 18 BRPM

## 2021-01-01 VITALS
SYSTOLIC BLOOD PRESSURE: 110 MMHG | TEMPERATURE: 97 F | DIASTOLIC BLOOD PRESSURE: 55 MMHG | BODY MASS INDEX: 23.36 KG/M2 | HEIGHT: 60 IN | OXYGEN SATURATION: 91 % | HEART RATE: 90 BPM | WEIGHT: 119 LBS | RESPIRATION RATE: 18 BRPM

## 2021-01-01 VITALS — DIASTOLIC BLOOD PRESSURE: 76 MMHG | SYSTOLIC BLOOD PRESSURE: 132 MMHG | OXYGEN SATURATION: 87 %

## 2021-01-01 VITALS
RESPIRATION RATE: 20 BRPM | OXYGEN SATURATION: 98 % | SYSTOLIC BLOOD PRESSURE: 140 MMHG | HEIGHT: 60 IN | TEMPERATURE: 97.7 F | HEART RATE: 112 BPM | DIASTOLIC BLOOD PRESSURE: 64 MMHG | BODY MASS INDEX: 22.65 KG/M2

## 2021-01-01 VITALS
SYSTOLIC BLOOD PRESSURE: 170 MMHG | OXYGEN SATURATION: 99 % | TEMPERATURE: 98.6 F | DIASTOLIC BLOOD PRESSURE: 93 MMHG | RESPIRATION RATE: 10 BRPM

## 2021-01-01 VITALS
BODY MASS INDEX: 24.94 KG/M2 | HEART RATE: 112 BPM | RESPIRATION RATE: 16 BRPM | OXYGEN SATURATION: 98 % | HEIGHT: 60 IN | SYSTOLIC BLOOD PRESSURE: 121 MMHG | DIASTOLIC BLOOD PRESSURE: 66 MMHG | WEIGHT: 127 LBS | TEMPERATURE: 97.5 F

## 2021-01-01 VITALS
DIASTOLIC BLOOD PRESSURE: 56 MMHG | HEART RATE: 104 BPM | OXYGEN SATURATION: 96 % | RESPIRATION RATE: 18 BRPM | SYSTOLIC BLOOD PRESSURE: 111 MMHG

## 2021-01-01 VITALS
SYSTOLIC BLOOD PRESSURE: 187 MMHG | RESPIRATION RATE: 18 BRPM | TEMPERATURE: 97.8 F | OXYGEN SATURATION: 96 % | HEART RATE: 118 BPM | BODY MASS INDEX: 22.78 KG/M2 | HEIGHT: 60 IN | WEIGHT: 116 LBS | DIASTOLIC BLOOD PRESSURE: 77 MMHG

## 2021-01-01 DIAGNOSIS — E78.2 MIXED HYPERLIPIDEMIA: Primary | ICD-10-CM

## 2021-01-01 DIAGNOSIS — J96.22 ACUTE ON CHRONIC RESPIRATORY FAILURE WITH HYPOXIA AND HYPERCAPNIA (HCC): ICD-10-CM

## 2021-01-01 DIAGNOSIS — R09.02 HYPOXIA: ICD-10-CM

## 2021-01-01 DIAGNOSIS — M51.37 DEGENERATION OF LUMBAR OR LUMBOSACRAL INTERVERTEBRAL DISC: ICD-10-CM

## 2021-01-01 DIAGNOSIS — R91.1 RIGHT LOWER LOBE PULMONARY NODULE: ICD-10-CM

## 2021-01-01 DIAGNOSIS — C34.90 SMALL CELL LUNG CANCER (HCC): Primary | ICD-10-CM

## 2021-01-01 DIAGNOSIS — D64.9 ACUTE ANEMIA: Primary | ICD-10-CM

## 2021-01-01 DIAGNOSIS — I50.33 ACUTE ON CHRONIC DIASTOLIC CONGESTIVE HEART FAILURE (HCC): ICD-10-CM

## 2021-01-01 DIAGNOSIS — C34.11 MALIGNANT NEOPLASM OF UPPER LOBE OF RIGHT LUNG (HCC): Primary | ICD-10-CM

## 2021-01-01 DIAGNOSIS — R13.10 DYSPHAGIA, UNSPECIFIED TYPE: ICD-10-CM

## 2021-01-01 DIAGNOSIS — F41.9 ANXIETY: Primary | ICD-10-CM

## 2021-01-01 DIAGNOSIS — C34.91 SMALL CELL LUNG CANCER, RIGHT (HCC): ICD-10-CM

## 2021-01-01 DIAGNOSIS — J44.1 CHRONIC OBSTRUCTIVE PULMONARY DISEASE WITH ACUTE EXACERBATION (HCC): Primary | ICD-10-CM

## 2021-01-01 DIAGNOSIS — R06.89 CO2 NARCOSIS: ICD-10-CM

## 2021-01-01 DIAGNOSIS — C34.11 MALIGNANT NEOPLASM OF UPPER LOBE OF RIGHT LUNG (HCC): ICD-10-CM

## 2021-01-01 DIAGNOSIS — J96.21 ACUTE ON CHRONIC RESPIRATORY FAILURE WITH HYPOXIA AND HYPERCAPNIA (HCC): ICD-10-CM

## 2021-01-01 DIAGNOSIS — J96.02 ACUTE RESPIRATORY ACIDOSIS (HCC): ICD-10-CM

## 2021-01-01 DIAGNOSIS — J96.22 ACUTE ON CHRONIC RESPIRATORY FAILURE WITH HYPOXIA AND HYPERCAPNIA (HCC): Primary | ICD-10-CM

## 2021-01-01 DIAGNOSIS — Z99.11 VENTILATOR DEPENDENT (HCC): Primary | ICD-10-CM

## 2021-01-01 DIAGNOSIS — G47.9 SLEEP DISORDER: ICD-10-CM

## 2021-01-01 DIAGNOSIS — J44.1 COPD EXACERBATION (HCC): ICD-10-CM

## 2021-01-01 DIAGNOSIS — R91.8 LUNG MASS: ICD-10-CM

## 2021-01-01 DIAGNOSIS — I10 ESSENTIAL HYPERTENSION: ICD-10-CM

## 2021-01-01 DIAGNOSIS — D72.829 LEUKOCYTOSIS, UNSPECIFIED TYPE: ICD-10-CM

## 2021-01-01 DIAGNOSIS — I21.4 NSTEMI (NON-ST ELEVATED MYOCARDIAL INFARCTION) (HCC): ICD-10-CM

## 2021-01-01 DIAGNOSIS — F41.9 ANXIETY: ICD-10-CM

## 2021-01-01 DIAGNOSIS — C34.90 MALIGNANT NEOPLASM OF LUNG, UNSPECIFIED LATERALITY, UNSPECIFIED PART OF LUNG (HCC): Primary | ICD-10-CM

## 2021-01-01 DIAGNOSIS — E87.1 HYPONATREMIA: ICD-10-CM

## 2021-01-01 DIAGNOSIS — J96.21 ACUTE ON CHRONIC RESPIRATORY FAILURE WITH HYPOXIA AND HYPERCAPNIA (HCC): Primary | ICD-10-CM

## 2021-01-01 DIAGNOSIS — Z51.5 HOSPICE CARE PATIENT: ICD-10-CM

## 2021-01-01 DIAGNOSIS — E78.2 MIXED HYPERLIPIDEMIA: ICD-10-CM

## 2021-01-01 DIAGNOSIS — R60.0 LOWER EXTREMITY EDEMA: ICD-10-CM

## 2021-01-01 DIAGNOSIS — J42 CHRONIC BRONCHITIS, UNSPECIFIED CHRONIC BRONCHITIS TYPE (HCC): Primary | ICD-10-CM

## 2021-01-01 DIAGNOSIS — C34.90 SMALL CELL LUNG CANCER (HCC): ICD-10-CM

## 2021-01-01 DIAGNOSIS — R06.02 SHORTNESS OF BREATH: Primary | ICD-10-CM

## 2021-01-01 DIAGNOSIS — R06.02 SHORTNESS OF BREATH: ICD-10-CM

## 2021-01-01 LAB
1,3 BETA-D-GLUCAN INTERP: NEGATIVE
1,3 BETA-D-GLUCAN INTERP: NEGATIVE
1,3 BETA-D-GLUCAN: <31 PG/ML
1,3 BETA-D-GLUCAN: <31 PG/ML
ABO/RH: NORMAL
ADENOVIRUS DETECTION BY PCR: NOT DETECTED
AFB SMEAR: NORMAL
ALBUMIN SERPL-MCNC: 2.8 GM/DL (ref 3.4–5)
ALBUMIN SERPL-MCNC: 2.9 GM/DL (ref 3.4–5)
ALBUMIN SERPL-MCNC: 3 GM/DL (ref 3.4–5)
ALBUMIN SERPL-MCNC: 3.2 GM/DL (ref 3.4–5)
ALBUMIN SERPL-MCNC: 3.3 GM/DL (ref 3.4–5)
ALBUMIN SERPL-MCNC: 3.4 GM/DL (ref 3.4–5)
ALBUMIN SERPL-MCNC: 3.4 GM/DL (ref 3.4–5)
ALBUMIN SERPL-MCNC: 3.5 GM/DL (ref 3.4–5)
ALBUMIN SERPL-MCNC: 3.7 GM/DL (ref 3.4–5)
ALBUMIN SERPL-MCNC: 3.8 GM/DL (ref 3.4–5)
ALBUMIN SERPL-MCNC: 4 GM/DL (ref 3.4–5)
ALP BLD-CCNC: 108 IU/L (ref 40–129)
ALP BLD-CCNC: 123 IU/L (ref 40–128)
ALP BLD-CCNC: 48 IU/L (ref 40–128)
ALP BLD-CCNC: 51 IU/L (ref 40–129)
ALP BLD-CCNC: 56 IU/L (ref 40–128)
ALP BLD-CCNC: 66 IU/L (ref 40–128)
ALP BLD-CCNC: 66 IU/L (ref 40–129)
ALP BLD-CCNC: 73 IU/L (ref 40–128)
ALP BLD-CCNC: 75 IU/L (ref 40–129)
ALP BLD-CCNC: 78 IU/L (ref 40–128)
ALP BLD-CCNC: 84 IU/L (ref 40–129)
ALP BLD-CCNC: 85 IU/L (ref 40–128)
ALP BLD-CCNC: 86 IU/L (ref 40–129)
ALP BLD-CCNC: 90 IU/L (ref 40–128)
ALP BLD-CCNC: 93 IU/L (ref 40–129)
ALP BLD-CCNC: 99 IU/L (ref 40–129)
ALT SERPL-CCNC: 10 U/L (ref 10–40)
ALT SERPL-CCNC: 11 U/L (ref 10–40)
ALT SERPL-CCNC: 12 U/L (ref 10–40)
ALT SERPL-CCNC: 13 U/L (ref 10–40)
ALT SERPL-CCNC: 14 U/L (ref 10–40)
ALT SERPL-CCNC: 14 U/L (ref 10–40)
ALT SERPL-CCNC: 15 U/L (ref 10–40)
ALT SERPL-CCNC: 16 U/L (ref 10–40)
ALT SERPL-CCNC: 20 U/L (ref 10–40)
ALT SERPL-CCNC: 7 U/L (ref 10–40)
ALT SERPL-CCNC: 8 U/L (ref 10–40)
ALT SERPL-CCNC: 9 U/L (ref 10–40)
ANION GAP SERPL CALCULATED.3IONS-SCNC: 0 MMOL/L (ref 4–16)
ANION GAP SERPL CALCULATED.3IONS-SCNC: 0 MMOL/L (ref 4–16)
ANION GAP SERPL CALCULATED.3IONS-SCNC: 1 MMOL/L (ref 4–16)
ANION GAP SERPL CALCULATED.3IONS-SCNC: 10 MMOL/L (ref 4–16)
ANION GAP SERPL CALCULATED.3IONS-SCNC: 10 MMOL/L (ref 4–16)
ANION GAP SERPL CALCULATED.3IONS-SCNC: 11 MMOL/L (ref 4–16)
ANION GAP SERPL CALCULATED.3IONS-SCNC: 2 MMOL/L (ref 4–16)
ANION GAP SERPL CALCULATED.3IONS-SCNC: 3 MMOL/L (ref 4–16)
ANION GAP SERPL CALCULATED.3IONS-SCNC: 4 MMOL/L (ref 4–16)
ANION GAP SERPL CALCULATED.3IONS-SCNC: 4 MMOL/L (ref 4–16)
ANION GAP SERPL CALCULATED.3IONS-SCNC: 5 MMOL/L (ref 4–16)
ANION GAP SERPL CALCULATED.3IONS-SCNC: 6 MMOL/L (ref 4–16)
ANION GAP SERPL CALCULATED.3IONS-SCNC: 7 MMOL/L (ref 4–16)
ANION GAP SERPL CALCULATED.3IONS-SCNC: 7 MMOL/L (ref 4–16)
ANION GAP SERPL CALCULATED.3IONS-SCNC: 8 MMOL/L (ref 4–16)
ANISOCYTOSIS: ABNORMAL
ANTIBODY DATA: NORMAL
ANTIBODY SCREEN: NEGATIVE
ANTIGEN DATA: NORMAL
APTT: 23.3 SECONDS (ref 25.1–37.1)
APTT: 28 SECONDS (ref 25.1–37.1)
ASPERGILLUS GALACTO AG: NEGATIVE
ASPERGILLUS GALACTO AG: NEGATIVE
ASPERGILLUS GALACTO INDEX: 0.05
ASPERGILLUS GALACTO INDEX: 0.08
AST SERPL-CCNC: 10 IU/L (ref 15–37)
AST SERPL-CCNC: 10 IU/L (ref 15–37)
AST SERPL-CCNC: 11 IU/L (ref 15–37)
AST SERPL-CCNC: 13 IU/L (ref 15–37)
AST SERPL-CCNC: 15 IU/L (ref 15–37)
AST SERPL-CCNC: 16 IU/L (ref 15–37)
AST SERPL-CCNC: 18 IU/L (ref 15–37)
AST SERPL-CCNC: 19 IU/L (ref 15–37)
AST SERPL-CCNC: 19 IU/L (ref 15–37)
AST SERPL-CCNC: 20 IU/L (ref 15–37)
AST SERPL-CCNC: 23 IU/L (ref 15–37)
AST SERPL-CCNC: 26 IU/L (ref 15–37)
BACTERIA: ABNORMAL /HPF
BACTERIA: NEGATIVE /HPF
BACTERIA: NEGATIVE /HPF
BANDED NEUTROPHILS ABSOLUTE COUNT: 0.03 K/CU MM
BANDED NEUTROPHILS ABSOLUTE COUNT: 0.04 K/CU MM
BANDED NEUTROPHILS ABSOLUTE COUNT: 0.05 K/CU MM
BANDED NEUTROPHILS ABSOLUTE COUNT: 0.06 K/CU MM
BANDED NEUTROPHILS ABSOLUTE COUNT: 0.13 K/CU MM
BANDED NEUTROPHILS ABSOLUTE COUNT: 0.2 K/CU MM
BANDED NEUTROPHILS ABSOLUTE COUNT: 0.25 K/CU MM
BANDED NEUTROPHILS ABSOLUTE COUNT: 0.34 K/CU MM
BANDED NEUTROPHILS ABSOLUTE COUNT: 0.37 K/CU MM
BANDED NEUTROPHILS ABSOLUTE COUNT: 0.37 K/CU MM
BANDED NEUTROPHILS ABSOLUTE COUNT: 0.39 K/CU MM
BANDED NEUTROPHILS ABSOLUTE COUNT: 0.41 K/CU MM
BANDED NEUTROPHILS ABSOLUTE COUNT: 0.54 K/CU MM
BANDED NEUTROPHILS ABSOLUTE COUNT: 0.64 K/CU MM
BANDED NEUTROPHILS ABSOLUTE COUNT: 0.74 K/CU MM
BANDED NEUTROPHILS ABSOLUTE COUNT: 0.93 K/CU MM
BANDED NEUTROPHILS ABSOLUTE COUNT: 1.35 K/CU MM
BANDED NEUTROPHILS ABSOLUTE COUNT: 1.45 K/CU MM
BANDED NEUTROPHILS ABSOLUTE COUNT: 1.48 K/CU MM
BANDED NEUTROPHILS RELATIVE PERCENT: 1 % (ref 5–11)
BANDED NEUTROPHILS RELATIVE PERCENT: 11 % (ref 5–11)
BANDED NEUTROPHILS RELATIVE PERCENT: 13 % (ref 5–11)
BANDED NEUTROPHILS RELATIVE PERCENT: 14 % (ref 5–11)
BANDED NEUTROPHILS RELATIVE PERCENT: 2 % (ref 5–11)
BANDED NEUTROPHILS RELATIVE PERCENT: 3 % (ref 5–11)
BANDED NEUTROPHILS RELATIVE PERCENT: 5 % (ref 5–11)
BANDED NEUTROPHILS RELATIVE PERCENT: 5 % (ref 5–11)
BANDED NEUTROPHILS RELATIVE PERCENT: 6 % (ref 5–11)
BANDED NEUTROPHILS RELATIVE PERCENT: 7 % (ref 5–11)
BANDED NEUTROPHILS RELATIVE PERCENT: 9 % (ref 5–11)
BANDED NEUTROPHILS RELATIVE PERCENT: 9 % (ref 5–11)
BASE EXCESS MIXED: 1 (ref 0–2.3)
BASE EXCESS MIXED: 13.3 (ref 0–2.3)
BASE EXCESS MIXED: 14.9 (ref 0–2.3)
BASE EXCESS MIXED: 14.9 (ref 0–2.3)
BASE EXCESS MIXED: 15.3 (ref 0–2.3)
BASE EXCESS MIXED: 15.6 (ref 0–2.3)
BASE EXCESS MIXED: 17.5 (ref 0–2.3)
BASE EXCESS MIXED: 17.6 (ref 0–2.3)
BASE EXCESS MIXED: 18.1 (ref 0–2.3)
BASE EXCESS MIXED: 19.8 (ref 0–2.3)
BASE EXCESS MIXED: 20.7 (ref 0–2.3)
BASE EXCESS MIXED: 23.2 (ref 0–2.3)
BASE EXCESS MIXED: 3.8 (ref 0–2.3)
BASE EXCESS MIXED: 5.5 (ref 0–2.3)
BASE EXCESS MIXED: 6 (ref 0–2.3)
BASE EXCESS MIXED: 6.2 (ref 0–2.3)
BASE EXCESS MIXED: 8.2 (ref 0–2.3)
BASE EXCESS MIXED: 8.8 (ref 0–2.3)
BASOPHILIC STIPPLING: PRESENT
BASOPHILS ABSOLUTE: 0 K/CU MM
BASOPHILS ABSOLUTE: 0.1 K/CU MM
BASOPHILS RELATIVE PERCENT: 0 % (ref 0–1)
BASOPHILS RELATIVE PERCENT: 0.1 % (ref 0–1)
BASOPHILS RELATIVE PERCENT: 0.2 % (ref 0–1)
BASOPHILS RELATIVE PERCENT: 0.3 % (ref 0–1)
BILIRUB SERPL-MCNC: 0.1 MG/DL (ref 0–1)
BILIRUB SERPL-MCNC: 0.2 MG/DL (ref 0–1)
BILIRUB SERPL-MCNC: 0.3 MG/DL (ref 0–1)
BILIRUB SERPL-MCNC: 0.3 MG/DL (ref 0–1)
BILIRUBIN URINE: NEGATIVE MG/DL
BLOOD, URINE: ABNORMAL
BLOOD, URINE: NEGATIVE
BLOOD, URINE: NEGATIVE
BORDETELLA PARAPERTUSSIS BY PCR: NOT DETECTED
BORDETELLA PERTUSSIS PCR: NOT DETECTED
BUN BLDV-MCNC: 12 MG/DL (ref 6–23)
BUN BLDV-MCNC: 13 MG/DL (ref 6–23)
BUN BLDV-MCNC: 13 MG/DL (ref 6–23)
BUN BLDV-MCNC: 14 MG/DL (ref 6–23)
BUN BLDV-MCNC: 15 MG/DL (ref 6–23)
BUN BLDV-MCNC: 15 MG/DL (ref 6–23)
BUN BLDV-MCNC: 16 MG/DL (ref 6–23)
BUN BLDV-MCNC: 17 MG/DL (ref 6–23)
BUN BLDV-MCNC: 17 MG/DL (ref 6–23)
BUN BLDV-MCNC: 18 MG/DL (ref 6–23)
BUN BLDV-MCNC: 19 MG/DL (ref 6–23)
BUN BLDV-MCNC: 20 MG/DL (ref 6–23)
BUN BLDV-MCNC: 20 MG/DL (ref 6–23)
BUN BLDV-MCNC: 21 MG/DL (ref 6–23)
BUN BLDV-MCNC: 21 MG/DL (ref 6–23)
BUN BLDV-MCNC: 22 MG/DL (ref 6–23)
BUN BLDV-MCNC: 22 MG/DL (ref 6–23)
BUN BLDV-MCNC: 23 MG/DL (ref 6–23)
BUN BLDV-MCNC: 24 MG/DL (ref 6–23)
BUN BLDV-MCNC: 25 MG/DL (ref 6–23)
BUN BLDV-MCNC: 26 MG/DL (ref 6–23)
BUN BLDV-MCNC: 27 MG/DL (ref 6–23)
BUN BLDV-MCNC: 29 MG/DL (ref 6–23)
BUN BLDV-MCNC: 32 MG/DL (ref 6–23)
BUN BLDV-MCNC: 44 MG/DL (ref 6–23)
BUN BLDV-MCNC: 8 MG/DL (ref 6–23)
C-REACTIVE PROTEIN, HIGH SENSITIVITY: 61.2 MG/L
CALCIUM IONIZED: 4.48 MG/DL (ref 4.48–5.28)
CALCIUM SERPL-MCNC: 7.7 MG/DL (ref 8.3–10.6)
CALCIUM SERPL-MCNC: 7.8 MG/DL (ref 8.3–10.6)
CALCIUM SERPL-MCNC: 8 MG/DL (ref 8.3–10.6)
CALCIUM SERPL-MCNC: 8.1 MG/DL (ref 8.3–10.6)
CALCIUM SERPL-MCNC: 8.2 MG/DL (ref 8.3–10.6)
CALCIUM SERPL-MCNC: 8.3 MG/DL (ref 8.3–10.6)
CALCIUM SERPL-MCNC: 8.3 MG/DL (ref 8.3–10.6)
CALCIUM SERPL-MCNC: 8.4 MG/DL (ref 8.3–10.6)
CALCIUM SERPL-MCNC: 8.5 MG/DL (ref 8.3–10.6)
CALCIUM SERPL-MCNC: 8.5 MG/DL (ref 8.3–10.6)
CALCIUM SERPL-MCNC: 8.6 MG/DL (ref 8.3–10.6)
CALCIUM SERPL-MCNC: 8.7 MG/DL (ref 8.3–10.6)
CALCIUM SERPL-MCNC: 8.7 MG/DL (ref 8.3–10.6)
CALCIUM SERPL-MCNC: 8.9 MG/DL (ref 8.3–10.6)
CALCIUM SERPL-MCNC: 9 MG/DL (ref 8.3–10.6)
CALCIUM SERPL-MCNC: 9.2 MG/DL (ref 8.3–10.6)
CALCIUM SERPL-MCNC: 9.2 MG/DL (ref 8.3–10.6)
CALCIUM SERPL-MCNC: 9.3 MG/DL (ref 8.3–10.6)
CALCIUM SERPL-MCNC: 9.3 MG/DL (ref 8.3–10.6)
CALCIUM SERPL-MCNC: 9.4 MG/DL (ref 8.3–10.6)
CALCIUM SERPL-MCNC: 9.4 MG/DL (ref 8.3–10.6)
CALCIUM SERPL-MCNC: 9.5 MG/DL (ref 8.3–10.6)
CALCIUM SERPL-MCNC: 9.6 MG/DL (ref 8.3–10.6)
CALCIUM SERPL-MCNC: 9.6 MG/DL (ref 8.3–10.6)
CALCIUM SERPL-MCNC: 9.9 MG/DL (ref 8.3–10.6)
CARBON MONOXIDE, BLOOD: 0.8 % (ref 0–5)
CARBON MONOXIDE, BLOOD: 0.9 % (ref 0–5)
CARBON MONOXIDE, BLOOD: 1 % (ref 0–5)
CARBON MONOXIDE, BLOOD: 1.1 % (ref 0–5)
CARBON MONOXIDE, BLOOD: 1.2 % (ref 0–5)
CARBON MONOXIDE, BLOOD: 1.2 % (ref 0–5)
CARBON MONOXIDE, BLOOD: 1.3 % (ref 0–5)
CARBON MONOXIDE, BLOOD: 1.3 % (ref 0–5)
CARBON MONOXIDE, BLOOD: 1.4 % (ref 0–5)
CARBON MONOXIDE, BLOOD: 1.5 % (ref 0–5)
CARBON MONOXIDE, BLOOD: 1.6 % (ref 0–5)
CARBON MONOXIDE, BLOOD: 1.8 % (ref 0–5)
CARBON MONOXIDE, BLOOD: 2.5 % (ref 0–5)
CARBON MONOXIDE, BLOOD: 2.8 % (ref 0–5)
CARBON MONOXIDE, BLOOD: 2.8 % (ref 0–5)
CHLAMYDOPHILA PNEUMONIA PCR: NOT DETECTED
CHLORIDE BLD-SCNC: 100 MMOL/L (ref 99–110)
CHLORIDE BLD-SCNC: 101 MMOL/L (ref 99–110)
CHLORIDE BLD-SCNC: 103 MMOL/L (ref 99–110)
CHLORIDE BLD-SCNC: 103 MMOL/L (ref 99–110)
CHLORIDE BLD-SCNC: 109 MMOL/L (ref 99–110)
CHLORIDE BLD-SCNC: 80 MMOL/L (ref 99–110)
CHLORIDE BLD-SCNC: 83 MMOL/L (ref 99–110)
CHLORIDE BLD-SCNC: 83 MMOL/L (ref 99–110)
CHLORIDE BLD-SCNC: 86 MMOL/L (ref 99–110)
CHLORIDE BLD-SCNC: 88 MMOL/L (ref 99–110)
CHLORIDE BLD-SCNC: 88 MMOL/L (ref 99–110)
CHLORIDE BLD-SCNC: 90 MMOL/L (ref 99–110)
CHLORIDE BLD-SCNC: 91 MMOL/L (ref 99–110)
CHLORIDE BLD-SCNC: 91 MMOL/L (ref 99–110)
CHLORIDE BLD-SCNC: 92 MMOL/L (ref 99–110)
CHLORIDE BLD-SCNC: 92 MMOL/L (ref 99–110)
CHLORIDE BLD-SCNC: 93 MMOL/L (ref 99–110)
CHLORIDE BLD-SCNC: 94 MMOL/L (ref 99–110)
CHLORIDE BLD-SCNC: 95 MMOL/L (ref 99–110)
CHLORIDE BLD-SCNC: 96 MMOL/L (ref 99–110)
CHLORIDE BLD-SCNC: 96 MMOL/L (ref 99–110)
CHLORIDE BLD-SCNC: 97 MMOL/L (ref 99–110)
CHLORIDE BLD-SCNC: 98 MMOL/L (ref 99–110)
CHLORIDE BLD-SCNC: 99 MMOL/L (ref 99–110)
CHLORIDE URINE RANDOM: 48 MMOL/L (ref 43–210)
CLARITY: CLEAR
CO2 CONTENT: 31.9 MMOL/L (ref 19–24)
CO2 CONTENT: 31.9 MMOL/L (ref 19–24)
CO2 CONTENT: 32.4 MMOL/L (ref 19–24)
CO2 CONTENT: 33.1 MMOL/L (ref 19–24)
CO2 CONTENT: 38.7 MMOL/L (ref 19–24)
CO2 CONTENT: 39.6 MMOL/L (ref 19–24)
CO2 CONTENT: 40.8 MMOL/L (ref 19–24)
CO2 CONTENT: 41 MMOL/L (ref 19–24)
CO2 CONTENT: 41.5 MMOL/L (ref 19–24)
CO2 CONTENT: 42.7 MMOL/L (ref 19–24)
CO2 CONTENT: 43.5 MMOL/L (ref 19–24)
CO2 CONTENT: 47.2 MMOL/L (ref 19–24)
CO2 CONTENT: 49.3 MMOL/L (ref 19–24)
CO2 CONTENT: 50.5 MMOL/L (ref 19–24)
CO2 CONTENT: 52.5 MMOL/L (ref 19–24)
CO2: 26 MMOL/L (ref 21–32)
CO2: 30 MMOL/L (ref 21–32)
CO2: 32 MMOL/L (ref 21–32)
CO2: 33 MMOL/L (ref 21–32)
CO2: 34 MMOL/L (ref 21–32)
CO2: 36 MMOL/L (ref 21–32)
CO2: 37 MMOL/L (ref 21–32)
CO2: 38 MMOL/L (ref 21–32)
CO2: 38 MMOL/L (ref 21–32)
CO2: 39 MMOL/L (ref 21–32)
CO2: 39 MMOL/L (ref 21–32)
CO2: 40 MMOL/L (ref 21–32)
CO2: 41 MMOL/L (ref 21–32)
CO2: 42 MMOL/L (ref 21–32)
CO2: 43 MMOL/L (ref 21–32)
CO2: 44 MMOL/L (ref 21–32)
CO2: 47 MMOL/L (ref 21–32)
CO2: 49 MMOL/L (ref 21–32)
COLOR: COLORLESS
COLOR: YELLOW
COLOR: YELLOW
COMMENT: ABNORMAL
COMPONENT: NORMAL
CORONAVIRUS 229E PCR: NOT DETECTED
CORONAVIRUS HKU1 PCR: NOT DETECTED
CORONAVIRUS NL63 PCR: NOT DETECTED
CORONAVIRUS OC43 PCR: NOT DETECTED
CREAT SERPL-MCNC: 0.4 MG/DL (ref 0.6–1.1)
CREAT SERPL-MCNC: 0.5 MG/DL (ref 0.6–1.1)
CREAT SERPL-MCNC: 0.6 MG/DL (ref 0.6–1.1)
CREAT SERPL-MCNC: 0.7 MG/DL (ref 0.6–1.1)
CREAT SERPL-MCNC: 0.8 MG/DL (ref 0.6–1.1)
CREAT SERPL-MCNC: 1.1 MG/DL (ref 0.6–1.1)
CROSSMATCH RESULT: NORMAL
CULTURE: ABNORMAL
CULTURE: NORMAL
D DIMER: 450 NG/ML(DDU)
DIFFERENTIAL TYPE: ABNORMAL
DOHLE BODIES: PRESENT
DOHLE BODIES: PRESENT
DOSE AMOUNT: ABNORMAL
DOSE AMOUNT: NORMAL
DOSE TIME: ABNORMAL
DOSE TIME: NORMAL
EKG ATRIAL RATE: 103 BPM
EKG ATRIAL RATE: 108 BPM
EKG ATRIAL RATE: 110 BPM
EKG ATRIAL RATE: 110 BPM
EKG ATRIAL RATE: 79 BPM
EKG ATRIAL RATE: 89 BPM
EKG ATRIAL RATE: 91 BPM
EKG ATRIAL RATE: 95 BPM
EKG ATRIAL RATE: 96 BPM
EKG ATRIAL RATE: 99 BPM
EKG DIAGNOSIS: NORMAL
EKG P AXIS: 50 DEGREES
EKG P AXIS: 58 DEGREES
EKG P AXIS: 59 DEGREES
EKG P AXIS: 63 DEGREES
EKG P AXIS: 64 DEGREES
EKG P AXIS: 69 DEGREES
EKG P AXIS: 71 DEGREES
EKG P AXIS: 71 DEGREES
EKG P AXIS: 83 DEGREES
EKG P AXIS: 84 DEGREES
EKG P-R INTERVAL: 130 MS
EKG P-R INTERVAL: 132 MS
EKG P-R INTERVAL: 134 MS
EKG P-R INTERVAL: 134 MS
EKG P-R INTERVAL: 140 MS
EKG P-R INTERVAL: 140 MS
EKG P-R INTERVAL: 148 MS
EKG Q-T INTERVAL: 260 MS
EKG Q-T INTERVAL: 294 MS
EKG Q-T INTERVAL: 298 MS
EKG Q-T INTERVAL: 304 MS
EKG Q-T INTERVAL: 312 MS
EKG Q-T INTERVAL: 324 MS
EKG Q-T INTERVAL: 324 MS
EKG Q-T INTERVAL: 326 MS
EKG Q-T INTERVAL: 334 MS
EKG Q-T INTERVAL: 342 MS
EKG Q-T INTERVAL: 358 MS
EKG QRS DURATION: 66 MS
EKG QRS DURATION: 68 MS
EKG QRS DURATION: 70 MS
EKG QRS DURATION: 70 MS
EKG QRS DURATION: 72 MS
EKG QRS DURATION: 72 MS
EKG QRS DURATION: 74 MS
EKG QRS DURATION: 74 MS
EKG QRS DURATION: 80 MS
EKG QTC CALCULATION (BAZETT): 393 MS
EKG QTC CALCULATION (BAZETT): 396 MS
EKG QTC CALCULATION (BAZETT): 398 MS
EKG QTC CALCULATION (BAZETT): 403 MS
EKG QTC CALCULATION (BAZETT): 407 MS
EKG QTC CALCULATION (BAZETT): 408 MS
EKG QTC CALCULATION (BAZETT): 410 MS
EKG QTC CALCULATION (BAZETT): 411 MS
EKG QTC CALCULATION (BAZETT): 428 MS
EKG QTC CALCULATION (BAZETT): 432 MS
EKG QTC CALCULATION (BAZETT): 439 MS
EKG R AXIS: -14 DEGREES
EKG R AXIS: -5 DEGREES
EKG R AXIS: -55 DEGREES
EKG R AXIS: 19 DEGREES
EKG R AXIS: 29 DEGREES
EKG R AXIS: 32 DEGREES
EKG R AXIS: 42 DEGREES
EKG R AXIS: 60 DEGREES
EKG R AXIS: 66 DEGREES
EKG R AXIS: 84 DEGREES
EKG R AXIS: 88 DEGREES
EKG T AXIS: 25 DEGREES
EKG T AXIS: 38 DEGREES
EKG T AXIS: 43 DEGREES
EKG T AXIS: 46 DEGREES
EKG T AXIS: 5 DEGREES
EKG T AXIS: 50 DEGREES
EKG T AXIS: 56 DEGREES
EKG T AXIS: 58 DEGREES
EKG T AXIS: 61 DEGREES
EKG T AXIS: 61 DEGREES
EKG T AXIS: 67 DEGREES
EKG VENTRICULAR RATE: 103 BPM
EKG VENTRICULAR RATE: 108 BPM
EKG VENTRICULAR RATE: 110 BPM
EKG VENTRICULAR RATE: 110 BPM
EKG VENTRICULAR RATE: 172 BPM
EKG VENTRICULAR RATE: 79 BPM
EKG VENTRICULAR RATE: 89 BPM
EKG VENTRICULAR RATE: 91 BPM
EKG VENTRICULAR RATE: 95 BPM
EKG VENTRICULAR RATE: 96 BPM
EKG VENTRICULAR RATE: 99 BPM
EOSINOPHILS ABSOLUTE: 0 K/CU MM
EOSINOPHILS ABSOLUTE: 0.1 K/CU MM
EOSINOPHILS ABSOLUTE: 0.1 K/CU MM
EOSINOPHILS ABSOLUTE: 0.2 K/CU MM
EOSINOPHILS RELATIVE PERCENT: 0 % (ref 0–3)
EOSINOPHILS RELATIVE PERCENT: 0.2 % (ref 0–3)
EOSINOPHILS RELATIVE PERCENT: 0.3 % (ref 0–3)
EOSINOPHILS RELATIVE PERCENT: 0.5 % (ref 0–3)
EOSINOPHILS RELATIVE PERCENT: 0.9 % (ref 0–3)
EOSINOPHILS RELATIVE PERCENT: 1 % (ref 0–3)
ERYTHROCYTE SEDIMENTATION RATE: 32 MM/HR (ref 0–30)
FERRITIN: 1184 NG/ML (ref 15–150)
FERRITIN: 418 NG/ML (ref 15–150)
FOLATE: 13.1 NG/ML (ref 3.1–17.5)
FUNGUS STAIN: NORMAL
GFR AFRICAN AMERICAN: 55 ML/MIN/1.73M2
GFR AFRICAN AMERICAN: 59 ML/MIN/1.73M2
GFR AFRICAN AMERICAN: >60 ML/MIN/1.73M2
GFR NON-AFRICAN AMERICAN: 45 ML/MIN/1.73M2
GFR NON-AFRICAN AMERICAN: 49 ML/MIN/1.73M2
GFR NON-AFRICAN AMERICAN: >60 ML/MIN/1.73M2
GLUCOSE BLD-MCNC: 100 MG/DL (ref 70–99)
GLUCOSE BLD-MCNC: 105 MG/DL (ref 70–99)
GLUCOSE BLD-MCNC: 105 MG/DL (ref 70–99)
GLUCOSE BLD-MCNC: 106 MG/DL (ref 70–99)
GLUCOSE BLD-MCNC: 106 MG/DL (ref 70–99)
GLUCOSE BLD-MCNC: 107 MG/DL (ref 70–99)
GLUCOSE BLD-MCNC: 107 MG/DL (ref 70–99)
GLUCOSE BLD-MCNC: 108 MG/DL (ref 70–99)
GLUCOSE BLD-MCNC: 109 MG/DL (ref 70–99)
GLUCOSE BLD-MCNC: 109 MG/DL (ref 70–99)
GLUCOSE BLD-MCNC: 110 MG/DL (ref 70–99)
GLUCOSE BLD-MCNC: 111 MG/DL (ref 70–99)
GLUCOSE BLD-MCNC: 112 MG/DL (ref 70–99)
GLUCOSE BLD-MCNC: 113 MG/DL (ref 70–99)
GLUCOSE BLD-MCNC: 116 MG/DL (ref 70–99)
GLUCOSE BLD-MCNC: 118 MG/DL (ref 70–99)
GLUCOSE BLD-MCNC: 118 MG/DL (ref 70–99)
GLUCOSE BLD-MCNC: 121 MG/DL (ref 70–99)
GLUCOSE BLD-MCNC: 122 MG/DL (ref 70–99)
GLUCOSE BLD-MCNC: 124 MG/DL (ref 70–99)
GLUCOSE BLD-MCNC: 126 MG/DL (ref 70–99)
GLUCOSE BLD-MCNC: 128 MG/DL (ref 70–99)
GLUCOSE BLD-MCNC: 128 MG/DL (ref 70–99)
GLUCOSE BLD-MCNC: 129 MG/DL (ref 70–99)
GLUCOSE BLD-MCNC: 136 MG/DL (ref 70–99)
GLUCOSE BLD-MCNC: 137 MG/DL (ref 70–99)
GLUCOSE BLD-MCNC: 143 MG/DL (ref 70–99)
GLUCOSE BLD-MCNC: 144 MG/DL (ref 70–99)
GLUCOSE BLD-MCNC: 159 MG/DL (ref 70–99)
GLUCOSE BLD-MCNC: 162 MG/DL (ref 70–99)
GLUCOSE BLD-MCNC: 188 MG/DL (ref 70–99)
GLUCOSE BLD-MCNC: 73 MG/DL (ref 70–99)
GLUCOSE BLD-MCNC: 76 MG/DL (ref 70–99)
GLUCOSE BLD-MCNC: 80 MG/DL (ref 70–99)
GLUCOSE BLD-MCNC: 82 MG/DL (ref 70–99)
GLUCOSE BLD-MCNC: 93 MG/DL (ref 70–99)
GLUCOSE BLD-MCNC: 93 MG/DL (ref 70–99)
GLUCOSE BLD-MCNC: 98 MG/DL (ref 70–99)
GLUCOSE, URINE: NEGATIVE MG/DL
GRAM SMEAR: ABNORMAL
GRAM SMEAR: NORMAL
HCO3 ARTERIAL: 30.6 MMOL/L (ref 18–23)
HCO3 ARTERIAL: 30.6 MMOL/L (ref 18–23)
HCO3 ARTERIAL: 31.3 MMOL/L (ref 18–23)
HCO3 ARTERIAL: 31.9 MMOL/L (ref 18–23)
HCO3 ARTERIAL: 35.7 MMOL/L (ref 18–23)
HCO3 ARTERIAL: 36.8 MMOL/L (ref 18–23)
HCO3 ARTERIAL: 39.3 MMOL/L (ref 18–23)
HCO3 ARTERIAL: 39.4 MMOL/L (ref 18–23)
HCO3 ARTERIAL: 40 MMOL/L (ref 18–23)
HCO3 ARTERIAL: 41 MMOL/L (ref 18–23)
HCO3 ARTERIAL: 42.1 MMOL/L (ref 18–23)
HCO3 ARTERIAL: 45.2 MMOL/L (ref 18–23)
HCO3 ARTERIAL: 47.3 MMOL/L (ref 18–23)
HCO3 ARTERIAL: 48 MMOL/L (ref 18–23)
HCO3 ARTERIAL: 50.1 MMOL/L (ref 18–23)
HCO3 VENOUS: 26 MMOL/L (ref 19–25)
HCO3 VENOUS: 42.2 MMOL/L (ref 19–25)
HCO3 VENOUS: 53.2 MMOL/L (ref 19–25)
HCT VFR BLD CALC: 17.8 % (ref 37–47)
HCT VFR BLD CALC: 21.8 % (ref 37–47)
HCT VFR BLD CALC: 22.3 % (ref 37–47)
HCT VFR BLD CALC: 22.4 % (ref 37–47)
HCT VFR BLD CALC: 22.8 % (ref 37–47)
HCT VFR BLD CALC: 23 % (ref 37–47)
HCT VFR BLD CALC: 23.3 % (ref 37–47)
HCT VFR BLD CALC: 23.5 % (ref 37–47)
HCT VFR BLD CALC: 24.9 % (ref 37–47)
HCT VFR BLD CALC: 24.9 % (ref 37–47)
HCT VFR BLD CALC: 25.2 % (ref 37–47)
HCT VFR BLD CALC: 26.1 % (ref 37–47)
HCT VFR BLD CALC: 26.4 % (ref 37–47)
HCT VFR BLD CALC: 27 % (ref 37–47)
HCT VFR BLD CALC: 27.4 % (ref 37–47)
HCT VFR BLD CALC: 27.8 % (ref 37–47)
HCT VFR BLD CALC: 28.3 % (ref 37–47)
HCT VFR BLD CALC: 28.4 % (ref 37–47)
HCT VFR BLD CALC: 28.7 % (ref 37–47)
HCT VFR BLD CALC: 28.8 % (ref 37–47)
HCT VFR BLD CALC: 28.9 % (ref 37–47)
HCT VFR BLD CALC: 28.9 % (ref 37–47)
HCT VFR BLD CALC: 29 % (ref 37–47)
HCT VFR BLD CALC: 29.2 % (ref 37–47)
HCT VFR BLD CALC: 29.3 % (ref 37–47)
HCT VFR BLD CALC: 29.3 % (ref 37–47)
HCT VFR BLD CALC: 29.4 % (ref 37–47)
HCT VFR BLD CALC: 29.5 % (ref 37–47)
HCT VFR BLD CALC: 29.6 % (ref 37–47)
HCT VFR BLD CALC: 29.7 % (ref 37–47)
HCT VFR BLD CALC: 29.9 % (ref 37–47)
HCT VFR BLD CALC: 30 % (ref 37–47)
HCT VFR BLD CALC: 30.3 % (ref 37–47)
HCT VFR BLD CALC: 30.4 % (ref 37–47)
HCT VFR BLD CALC: 30.4 % (ref 37–47)
HCT VFR BLD CALC: 30.5 % (ref 37–47)
HCT VFR BLD CALC: 30.5 % (ref 37–47)
HCT VFR BLD CALC: 30.8 % (ref 37–47)
HCT VFR BLD CALC: 31.1 % (ref 37–47)
HCT VFR BLD CALC: 31.6 % (ref 37–47)
HCT VFR BLD CALC: 32.5 % (ref 37–47)
HCT VFR BLD CALC: 33.9 % (ref 37–47)
HCT VFR BLD CALC: 34.2 % (ref 37–47)
HCT VFR BLD CALC: 37.4 % (ref 37–47)
HCT VFR BLD CALC: 40.1 % (ref 37–47)
HCT VFR BLD CALC: 42.4 % (ref 37–47)
HEMOCCULT SP1 STL QL: NEGATIVE
HEMOCCULT SP1 STL QL: POSITIVE
HEMOGLOBIN: 10.1 GM/DL (ref 12.5–16)
HEMOGLOBIN: 10.4 GM/DL (ref 12.5–16)
HEMOGLOBIN: 11.4 GM/DL (ref 12.5–16)
HEMOGLOBIN: 11.7 GM/DL (ref 12.5–16)
HEMOGLOBIN: 12.4 GM/DL (ref 12.5–16)
HEMOGLOBIN: 5.5 GM/DL (ref 12.5–16)
HEMOGLOBIN: 7 GM/DL (ref 12.5–16)
HEMOGLOBIN: 7.1 GM/DL (ref 12.5–16)
HEMOGLOBIN: 7.2 GM/DL (ref 12.5–16)
HEMOGLOBIN: 7.2 GM/DL (ref 12.5–16)
HEMOGLOBIN: 7.3 GM/DL (ref 12.5–16)
HEMOGLOBIN: 7.4 GM/DL (ref 12.5–16)
HEMOGLOBIN: 7.7 GM/DL (ref 12.5–16)
HEMOGLOBIN: 7.9 GM/DL (ref 12.5–16)
HEMOGLOBIN: 8 GM/DL (ref 12.5–16)
HEMOGLOBIN: 8.1 GM/DL (ref 12.5–16)
HEMOGLOBIN: 8.2 GM/DL (ref 12.5–16)
HEMOGLOBIN: 8.3 GM/DL (ref 12.5–16)
HEMOGLOBIN: 8.4 GM/DL (ref 12.5–16)
HEMOGLOBIN: 8.5 GM/DL (ref 12.5–16)
HEMOGLOBIN: 8.6 GM/DL (ref 12.5–16)
HEMOGLOBIN: 8.7 GM/DL (ref 12.5–16)
HEMOGLOBIN: 8.7 GM/DL (ref 12.5–16)
HEMOGLOBIN: 8.8 GM/DL (ref 12.5–16)
HEMOGLOBIN: 8.8 GM/DL (ref 12.5–16)
HEMOGLOBIN: 8.9 GM/DL (ref 12.5–16)
HEMOGLOBIN: 9 GM/DL (ref 12.5–16)
HEMOGLOBIN: 9 GM/DL (ref 12.5–16)
HEMOGLOBIN: 9.1 GM/DL (ref 12.5–16)
HEMOGLOBIN: 9.1 GM/DL (ref 12.5–16)
HEMOGLOBIN: 9.2 GM/DL (ref 12.5–16)
HEMOGLOBIN: 9.3 GM/DL (ref 12.5–16)
HEMOGLOBIN: 9.4 GM/DL (ref 12.5–16)
HEMOGLOBIN: 9.5 GM/DL (ref 12.5–16)
HEMOGLOBIN: 9.7 GM/DL (ref 12.5–16)
HEMOGLOBIN: 9.7 GM/DL (ref 12.5–16)
HEMOGLOBIN: 9.8 GM/DL (ref 12.5–16)
HIGH SENSITIVE C-REACTIVE PROTEIN: 1.5 MG/L
HIGH SENSITIVE C-REACTIVE PROTEIN: 1.5 MG/L
HIGH SENSITIVE C-REACTIVE PROTEIN: 10.1 MG/L
HIGH SENSITIVE C-REACTIVE PROTEIN: 17.6 MG/L
HIGH SENSITIVE C-REACTIVE PROTEIN: 2.1 MG/L
HIGH SENSITIVE C-REACTIVE PROTEIN: 2.1 MG/L
HIGH SENSITIVE C-REACTIVE PROTEIN: 2.2 MG/L
HIGH SENSITIVE C-REACTIVE PROTEIN: 2.6 MG/L
HIGH SENSITIVE C-REACTIVE PROTEIN: 3.3 MG/L
HIGH SENSITIVE C-REACTIVE PROTEIN: 3.6 MG/L
HIGH SENSITIVE C-REACTIVE PROTEIN: 3.7 MG/L
HIGH SENSITIVE C-REACTIVE PROTEIN: 32.3 MG/L
HIGH SENSITIVE C-REACTIVE PROTEIN: 4.8 MG/L
HIGH SENSITIVE C-REACTIVE PROTEIN: 44 MG/L
HIGH SENSITIVE C-REACTIVE PROTEIN: 45.7 MG/L
HIGH SENSITIVE C-REACTIVE PROTEIN: 5.2 MG/L
HIGH SENSITIVE C-REACTIVE PROTEIN: 6.2 MG/L
HIGH SENSITIVE C-REACTIVE PROTEIN: 6.5 MG/L
HUMAN METAPNEUMOVIRUS PCR: NOT DETECTED
HYPOCHROMIA: ABNORMAL
IMMATURE NEUTROPHIL %: 0.2 % (ref 0–0.43)
IMMATURE NEUTROPHIL %: 0.3 % (ref 0–0.43)
IMMATURE NEUTROPHIL %: 0.4 % (ref 0–0.43)
IMMATURE NEUTROPHIL %: 1 % (ref 0–0.43)
IMMATURE NEUTROPHIL %: 1.2 % (ref 0–0.43)
IMMATURE NEUTROPHIL %: 1.5 % (ref 0–0.43)
IMMATURE NEUTROPHIL %: 1.8 % (ref 0–0.43)
IMMATURE NEUTROPHIL %: 1.9 % (ref 0–0.43)
IMMATURE NEUTROPHIL %: 5.2 % (ref 0–0.43)
INFLUENZA A BY PCR: NOT DETECTED
INFLUENZA A H1 (2009) PCR: NOT DETECTED
INFLUENZA A H1 PANDEMIC PCR: NOT DETECTED
INFLUENZA A H3 PCR: NOT DETECTED
INFLUENZA B BY PCR: NOT DETECTED
INR BLD: 0.8 INDEX
INR BLD: 1.05 INDEX
IONIZED CA: 1.12 MMOL/L (ref 1.12–1.32)
IRON: 100 UG/DL (ref 37–145)
IRON: 15 UG/DL (ref 37–145)
KETONES, URINE: NEGATIVE MG/DL
LACTATE: 0.4 MMOL/L (ref 0.4–2)
LACTATE: 0.7 MMOL/L (ref 0.4–2)
LACTATE: 1.2 MMOL/L (ref 0.4–2)
LACTATE: 2.1 MMOL/L (ref 0.4–2)
LACTIC ACID, SEPSIS: 1.8 MMOL/L (ref 0.5–1.9)
LEGIONELLA URINARY AG: NEGATIVE
LEGIONELLA URINARY AG: NEGATIVE
LEUKOCYTE ESTERASE, URINE: NEGATIVE
LV EF: 55 %
LVEF MODALITY: NORMAL
LYMPHOCYTES ABSOLUTE: 0.1 K/CU MM
LYMPHOCYTES ABSOLUTE: 0.2 K/CU MM
LYMPHOCYTES ABSOLUTE: 0.3 K/CU MM
LYMPHOCYTES ABSOLUTE: 0.4 K/CU MM
LYMPHOCYTES ABSOLUTE: 0.5 K/CU MM
LYMPHOCYTES ABSOLUTE: 0.6 K/CU MM
LYMPHOCYTES ABSOLUTE: 0.7 K/CU MM
LYMPHOCYTES ABSOLUTE: 0.8 K/CU MM
LYMPHOCYTES ABSOLUTE: 0.8 K/CU MM
LYMPHOCYTES ABSOLUTE: 0.9 K/CU MM
LYMPHOCYTES ABSOLUTE: 1 K/CU MM
LYMPHOCYTES ABSOLUTE: 1 K/CU MM
LYMPHOCYTES ABSOLUTE: 1.2 K/CU MM
LYMPHOCYTES ABSOLUTE: 1.2 K/CU MM
LYMPHOCYTES ABSOLUTE: 1.6 K/CU MM
LYMPHOCYTES ABSOLUTE: 1.6 K/CU MM
LYMPHOCYTES ABSOLUTE: 2.4 K/CU MM
LYMPHOCYTES ABSOLUTE: 2.9 K/CU MM
LYMPHOCYTES ABSOLUTE: 3.2 K/CU MM
LYMPHOCYTES RELATIVE PERCENT: 1.7 % (ref 24–44)
LYMPHOCYTES RELATIVE PERCENT: 1.7 % (ref 24–44)
LYMPHOCYTES RELATIVE PERCENT: 1.9 % (ref 24–44)
LYMPHOCYTES RELATIVE PERCENT: 10 % (ref 24–44)
LYMPHOCYTES RELATIVE PERCENT: 10.6 % (ref 24–44)
LYMPHOCYTES RELATIVE PERCENT: 11.5 % (ref 24–44)
LYMPHOCYTES RELATIVE PERCENT: 12 % (ref 24–44)
LYMPHOCYTES RELATIVE PERCENT: 13 % (ref 24–44)
LYMPHOCYTES RELATIVE PERCENT: 13 % (ref 24–44)
LYMPHOCYTES RELATIVE PERCENT: 14 % (ref 24–44)
LYMPHOCYTES RELATIVE PERCENT: 14 % (ref 24–44)
LYMPHOCYTES RELATIVE PERCENT: 15 % (ref 24–44)
LYMPHOCYTES RELATIVE PERCENT: 15 % (ref 24–44)
LYMPHOCYTES RELATIVE PERCENT: 17 % (ref 24–44)
LYMPHOCYTES RELATIVE PERCENT: 2 % (ref 24–44)
LYMPHOCYTES RELATIVE PERCENT: 2 % (ref 24–44)
LYMPHOCYTES RELATIVE PERCENT: 3 % (ref 24–44)
LYMPHOCYTES RELATIVE PERCENT: 3.1 % (ref 24–44)
LYMPHOCYTES RELATIVE PERCENT: 31 % (ref 24–44)
LYMPHOCYTES RELATIVE PERCENT: 31 % (ref 24–44)
LYMPHOCYTES RELATIVE PERCENT: 4.1 % (ref 24–44)
LYMPHOCYTES RELATIVE PERCENT: 4.4 % (ref 24–44)
LYMPHOCYTES RELATIVE PERCENT: 4.5 % (ref 24–44)
LYMPHOCYTES RELATIVE PERCENT: 5 % (ref 24–44)
LYMPHOCYTES RELATIVE PERCENT: 5 % (ref 24–44)
LYMPHOCYTES RELATIVE PERCENT: 6 % (ref 24–44)
LYMPHOCYTES RELATIVE PERCENT: 6.1 % (ref 24–44)
LYMPHOCYTES RELATIVE PERCENT: 6.7 % (ref 24–44)
LYMPHOCYTES RELATIVE PERCENT: 7 % (ref 24–44)
LYMPHOCYTES RELATIVE PERCENT: 7 % (ref 24–44)
LYMPHOCYTES RELATIVE PERCENT: 8 % (ref 24–44)
LYMPHOCYTES RELATIVE PERCENT: 8 % (ref 24–44)
LYMPHOCYTES RELATIVE PERCENT: 8.6 % (ref 24–44)
LYMPHOCYTES RELATIVE PERCENT: 9 % (ref 24–44)
LYMPHOCYTES RELATIVE PERCENT: 9.6 % (ref 24–44)
Lab: ABNORMAL
Lab: ABNORMAL
Lab: NORMAL
MAGNESIUM: 1.1 MG/DL (ref 1.8–2.4)
MAGNESIUM: 1.2 MG/DL (ref 1.8–2.4)
MAGNESIUM: 1.2 MG/DL (ref 1.8–2.4)
MAGNESIUM: 1.3 MG/DL (ref 1.8–2.4)
MAGNESIUM: 1.4 MG/DL (ref 1.8–2.4)
MAGNESIUM: 1.6 MG/DL (ref 1.8–2.4)
MAGNESIUM: 1.6 MG/DL (ref 1.8–2.4)
MAGNESIUM: 2.2 MG/DL (ref 1.8–2.4)
MAGNESIUM: 2.5 MG/DL (ref 1.8–2.4)
MAGNESIUM: 3.1 MG/DL (ref 1.8–2.4)
MCH RBC QN AUTO: 27.5 PG (ref 27–31)
MCH RBC QN AUTO: 27.5 PG (ref 27–31)
MCH RBC QN AUTO: 27.6 PG (ref 27–31)
MCH RBC QN AUTO: 27.7 PG (ref 27–31)
MCH RBC QN AUTO: 27.8 PG (ref 27–31)
MCH RBC QN AUTO: 27.8 PG (ref 27–31)
MCH RBC QN AUTO: 27.9 PG (ref 27–31)
MCH RBC QN AUTO: 27.9 PG (ref 27–31)
MCH RBC QN AUTO: 28 PG (ref 27–31)
MCH RBC QN AUTO: 28.2 PG (ref 27–31)
MCH RBC QN AUTO: 28.3 PG (ref 27–31)
MCH RBC QN AUTO: 28.4 PG (ref 27–31)
MCH RBC QN AUTO: 28.5 PG (ref 27–31)
MCH RBC QN AUTO: 28.5 PG (ref 27–31)
MCH RBC QN AUTO: 28.6 PG (ref 27–31)
MCH RBC QN AUTO: 28.7 PG (ref 27–31)
MCH RBC QN AUTO: 28.8 PG (ref 27–31)
MCH RBC QN AUTO: 28.9 PG (ref 27–31)
MCH RBC QN AUTO: 29 PG (ref 27–31)
MCH RBC QN AUTO: 29.1 PG (ref 27–31)
MCH RBC QN AUTO: 29.2 PG (ref 27–31)
MCH RBC QN AUTO: 29.3 PG (ref 27–31)
MCH RBC QN AUTO: 29.3 PG (ref 27–31)
MCH RBC QN AUTO: 29.4 PG (ref 27–31)
MCH RBC QN AUTO: 29.5 PG (ref 27–31)
MCH RBC QN AUTO: 29.7 PG (ref 27–31)
MCH RBC QN AUTO: 29.8 PG (ref 27–31)
MCH RBC QN AUTO: 29.9 PG (ref 27–31)
MCHC RBC AUTO-ENTMCNC: 27.9 % (ref 32–36)
MCHC RBC AUTO-ENTMCNC: 28.5 % (ref 32–36)
MCHC RBC AUTO-ENTMCNC: 29.2 % (ref 32–36)
MCHC RBC AUTO-ENTMCNC: 29.2 % (ref 32–36)
MCHC RBC AUTO-ENTMCNC: 29.4 % (ref 32–36)
MCHC RBC AUTO-ENTMCNC: 29.5 % (ref 32–36)
MCHC RBC AUTO-ENTMCNC: 29.6 % (ref 32–36)
MCHC RBC AUTO-ENTMCNC: 29.7 % (ref 32–36)
MCHC RBC AUTO-ENTMCNC: 29.8 % (ref 32–36)
MCHC RBC AUTO-ENTMCNC: 29.9 % (ref 32–36)
MCHC RBC AUTO-ENTMCNC: 29.9 % (ref 32–36)
MCHC RBC AUTO-ENTMCNC: 30 % (ref 32–36)
MCHC RBC AUTO-ENTMCNC: 30.1 % (ref 32–36)
MCHC RBC AUTO-ENTMCNC: 30.4 % (ref 32–36)
MCHC RBC AUTO-ENTMCNC: 30.5 % (ref 32–36)
MCHC RBC AUTO-ENTMCNC: 30.5 % (ref 32–36)
MCHC RBC AUTO-ENTMCNC: 30.6 % (ref 32–36)
MCHC RBC AUTO-ENTMCNC: 30.6 % (ref 32–36)
MCHC RBC AUTO-ENTMCNC: 30.7 % (ref 32–36)
MCHC RBC AUTO-ENTMCNC: 30.8 % (ref 32–36)
MCHC RBC AUTO-ENTMCNC: 30.9 % (ref 32–36)
MCHC RBC AUTO-ENTMCNC: 30.9 % (ref 32–36)
MCHC RBC AUTO-ENTMCNC: 31.3 % (ref 32–36)
MCHC RBC AUTO-ENTMCNC: 31.3 % (ref 32–36)
MCHC RBC AUTO-ENTMCNC: 31.5 % (ref 32–36)
MCHC RBC AUTO-ENTMCNC: 31.7 % (ref 32–36)
MCHC RBC AUTO-ENTMCNC: 31.8 % (ref 32–36)
MCHC RBC AUTO-ENTMCNC: 31.8 % (ref 32–36)
MCHC RBC AUTO-ENTMCNC: 32.2 % (ref 32–36)
MCHC RBC AUTO-ENTMCNC: 32.3 % (ref 32–36)
MCHC RBC AUTO-ENTMCNC: 32.3 % (ref 32–36)
MCHC RBC AUTO-ENTMCNC: 32.5 % (ref 32–36)
MCHC RBC AUTO-ENTMCNC: 32.6 % (ref 32–36)
MCHC RBC AUTO-ENTMCNC: 32.7 % (ref 32–36)
MCHC RBC AUTO-ENTMCNC: 32.7 % (ref 32–36)
MCV RBC AUTO: 100.3 FL (ref 78–100)
MCV RBC AUTO: 101.6 FL (ref 78–100)
MCV RBC AUTO: 106.6 FL (ref 78–100)
MCV RBC AUTO: 90 FL (ref 78–100)
MCV RBC AUTO: 90.5 FL (ref 78–100)
MCV RBC AUTO: 90.7 FL (ref 78–100)
MCV RBC AUTO: 90.7 FL (ref 78–100)
MCV RBC AUTO: 90.8 FL (ref 78–100)
MCV RBC AUTO: 90.9 FL (ref 78–100)
MCV RBC AUTO: 91 FL (ref 78–100)
MCV RBC AUTO: 91.1 FL (ref 78–100)
MCV RBC AUTO: 91.2 FL (ref 78–100)
MCV RBC AUTO: 91.3 FL (ref 78–100)
MCV RBC AUTO: 91.4 FL (ref 78–100)
MCV RBC AUTO: 91.4 FL (ref 78–100)
MCV RBC AUTO: 91.7 FL (ref 78–100)
MCV RBC AUTO: 91.9 FL (ref 78–100)
MCV RBC AUTO: 91.9 FL (ref 78–100)
MCV RBC AUTO: 92.3 FL (ref 78–100)
MCV RBC AUTO: 92.6 FL (ref 78–100)
MCV RBC AUTO: 92.7 FL (ref 78–100)
MCV RBC AUTO: 92.9 FL (ref 78–100)
MCV RBC AUTO: 93.1 FL (ref 78–100)
MCV RBC AUTO: 93.2 FL (ref 78–100)
MCV RBC AUTO: 93.3 FL (ref 78–100)
MCV RBC AUTO: 93.4 FL (ref 78–100)
MCV RBC AUTO: 93.8 FL (ref 78–100)
MCV RBC AUTO: 94.1 FL (ref 78–100)
MCV RBC AUTO: 94.2 FL (ref 78–100)
MCV RBC AUTO: 94.2 FL (ref 78–100)
MCV RBC AUTO: 94.3 FL (ref 78–100)
MCV RBC AUTO: 94.6 FL (ref 78–100)
MCV RBC AUTO: 95 FL (ref 78–100)
MCV RBC AUTO: 95.2 FL (ref 78–100)
MCV RBC AUTO: 95.3 FL (ref 78–100)
MCV RBC AUTO: 95.5 FL (ref 78–100)
MCV RBC AUTO: 95.5 FL (ref 78–100)
MCV RBC AUTO: 95.6 FL (ref 78–100)
MCV RBC AUTO: 96.2 FL (ref 78–100)
MCV RBC AUTO: 96.3 FL (ref 78–100)
MCV RBC AUTO: 96.3 FL (ref 78–100)
MCV RBC AUTO: 97.7 FL (ref 78–100)
METAMYELOCYTES ABSOLUTE COUNT: 0.01 K/CU MM
METAMYELOCYTES ABSOLUTE COUNT: 0.06 K/CU MM
METAMYELOCYTES ABSOLUTE COUNT: 0.1 K/CU MM
METAMYELOCYTES ABSOLUTE COUNT: 0.12 K/CU MM
METAMYELOCYTES ABSOLUTE COUNT: 0.19 K/CU MM
METAMYELOCYTES ABSOLUTE COUNT: 0.23 K/CU MM
METAMYELOCYTES ABSOLUTE COUNT: 0.41 K/CU MM
METAMYELOCYTES ABSOLUTE COUNT: 0.41 K/CU MM
METAMYELOCYTES ABSOLUTE COUNT: 0.43 K/CU MM
METAMYELOCYTES ABSOLUTE COUNT: 0.49 K/CU MM
METAMYELOCYTES ABSOLUTE COUNT: 0.58 K/CU MM
METAMYELOCYTES PERCENT: 1 %
METAMYELOCYTES PERCENT: 2 %
METAMYELOCYTES PERCENT: 3 %
METAMYELOCYTES PERCENT: 3 %
METAMYELOCYTES PERCENT: 4 %
METHEMOGLOBIN ARTERIAL: 0.3 %
METHEMOGLOBIN ARTERIAL: 0.6 %
METHEMOGLOBIN ARTERIAL: 0.7 %
METHEMOGLOBIN ARTERIAL: 0.7 %
METHEMOGLOBIN ARTERIAL: 0.8 %
METHEMOGLOBIN ARTERIAL: 0.8 %
METHEMOGLOBIN ARTERIAL: 0.9 %
METHEMOGLOBIN ARTERIAL: 0.9 %
METHEMOGLOBIN ARTERIAL: 1 %
METHEMOGLOBIN ARTERIAL: 1 %
METHEMOGLOBIN ARTERIAL: 1.1 %
METHEMOGLOBIN ARTERIAL: 1.2 %
METHEMOGLOBIN ARTERIAL: 1.2 %
METHEMOGLOBIN ARTERIAL: 1.5 %
METHEMOGLOBIN ARTERIAL: 1.6 %
MICROCYTES: ABNORMAL
MICROCYTES: ABNORMAL
MONOCYTES ABSOLUTE: 0 K/CU MM
MONOCYTES ABSOLUTE: 0.1 K/CU MM
MONOCYTES ABSOLUTE: 0.2 K/CU MM
MONOCYTES ABSOLUTE: 0.3 K/CU MM
MONOCYTES ABSOLUTE: 0.4 K/CU MM
MONOCYTES ABSOLUTE: 0.5 K/CU MM
MONOCYTES ABSOLUTE: 0.5 K/CU MM
MONOCYTES ABSOLUTE: 0.6 K/CU MM
MONOCYTES ABSOLUTE: 0.6 K/CU MM
MONOCYTES ABSOLUTE: 0.7 K/CU MM
MONOCYTES ABSOLUTE: 0.8 K/CU MM
MONOCYTES ABSOLUTE: 0.9 K/CU MM
MONOCYTES ABSOLUTE: 1.3 K/CU MM
MONOCYTES ABSOLUTE: 1.4 K/CU MM
MONOCYTES RELATIVE PERCENT: 0.5 % (ref 0–4)
MONOCYTES RELATIVE PERCENT: 1 % (ref 0–4)
MONOCYTES RELATIVE PERCENT: 1.5 % (ref 0–4)
MONOCYTES RELATIVE PERCENT: 10.6 % (ref 0–4)
MONOCYTES RELATIVE PERCENT: 11 % (ref 0–4)
MONOCYTES RELATIVE PERCENT: 11.1 % (ref 0–4)
MONOCYTES RELATIVE PERCENT: 12.6 % (ref 0–4)
MONOCYTES RELATIVE PERCENT: 15.7 % (ref 0–4)
MONOCYTES RELATIVE PERCENT: 2 % (ref 0–4)
MONOCYTES RELATIVE PERCENT: 2.3 % (ref 0–4)
MONOCYTES RELATIVE PERCENT: 2.7 % (ref 0–4)
MONOCYTES RELATIVE PERCENT: 23 % (ref 0–4)
MONOCYTES RELATIVE PERCENT: 25 % (ref 0–4)
MONOCYTES RELATIVE PERCENT: 3 % (ref 0–4)
MONOCYTES RELATIVE PERCENT: 3.3 % (ref 0–4)
MONOCYTES RELATIVE PERCENT: 3.5 % (ref 0–4)
MONOCYTES RELATIVE PERCENT: 3.7 % (ref 0–4)
MONOCYTES RELATIVE PERCENT: 4 % (ref 0–4)
MONOCYTES RELATIVE PERCENT: 4 % (ref 0–4)
MONOCYTES RELATIVE PERCENT: 4.6 % (ref 0–4)
MONOCYTES RELATIVE PERCENT: 5 % (ref 0–4)
MONOCYTES RELATIVE PERCENT: 5 % (ref 0–4)
MONOCYTES RELATIVE PERCENT: 5.3 % (ref 0–4)
MONOCYTES RELATIVE PERCENT: 5.6 % (ref 0–4)
MONOCYTES RELATIVE PERCENT: 7 % (ref 0–4)
MONOCYTES RELATIVE PERCENT: 7.3 % (ref 0–4)
MONOCYTES RELATIVE PERCENT: 8 % (ref 0–4)
MONOCYTES RELATIVE PERCENT: 8 % (ref 0–4)
MUCUS: ABNORMAL HPF
MUCUS: ABNORMAL HPF
MYCOPLASMA PNEUMONIAE PCR: NOT DETECTED
MYELOCYTE PERCENT: 1 %
MYELOCYTE PERCENT: 2 %
MYELOCYTE PERCENT: 3 %
MYELOCYTE PERCENT: 4 %
MYELOCYTES ABSOLUTE COUNT: 0.01 K/CU MM
MYELOCYTES ABSOLUTE COUNT: 0.04 K/CU MM
MYELOCYTES ABSOLUTE COUNT: 0.12 K/CU MM
MYELOCYTES ABSOLUTE COUNT: 0.21 K/CU MM
MYELOCYTES ABSOLUTE COUNT: 0.21 K/CU MM
MYELOCYTES ABSOLUTE COUNT: 0.23 K/CU MM
MYELOCYTES ABSOLUTE COUNT: 0.25 K/CU MM
MYELOCYTES ABSOLUTE COUNT: 0.31 K/CU MM
MYELOCYTES ABSOLUTE COUNT: 0.36 K/CU MM
MYELOCYTES ABSOLUTE COUNT: 0.77 K/CU MM
NITRITE URINE, QUANTITATIVE: NEGATIVE
NON SQUAM EPI CELLS: <1 /HPF
NUCLEATED RBC %: 0 %
NUCLEATED RED BLOOD CELLS: 1
NUCLEATED RED BLOOD CELLS: 2
NUCLEATED RED BLOOD CELLS: 3
NUCLEATED RED BLOOD CELLS: 3
O2 SAT, VEN: 85.2 % (ref 50–70)
O2 SAT, VEN: 89.5 % (ref 50–70)
O2 SAT, VEN: 97 % (ref 50–70)
O2 SATURATION: 72.8 % (ref 96–97)
O2 SATURATION: 86.8 % (ref 96–97)
O2 SATURATION: 90.4 % (ref 96–97)
O2 SATURATION: 91.4 % (ref 96–97)
O2 SATURATION: 93.3 % (ref 96–97)
O2 SATURATION: 93.7 % (ref 96–97)
O2 SATURATION: 94.3 % (ref 96–97)
O2 SATURATION: 94.6 % (ref 96–97)
O2 SATURATION: 95.3 % (ref 96–97)
O2 SATURATION: 95.4 % (ref 96–97)
O2 SATURATION: 96.4 % (ref 96–97)
O2 SATURATION: 96.7 % (ref 96–97)
O2 SATURATION: 96.9 % (ref 96–97)
O2 SATURATION: 97.1 % (ref 96–97)
O2 SATURATION: 97.5 % (ref 96–97)
OSMOLALITY URINE: 167 MOSM/KG (ref 50–800)
OSMOLALITY: 301 MOSM/KG (ref 280–303)
PARAINFLUENZA 1 PCR: NOT DETECTED
PARAINFLUENZA 2 PCR: NOT DETECTED
PARAINFLUENZA 3 PCR: NOT DETECTED
PARAINFLUENZA 4 PCR: NOT DETECTED
PCO2 ARTERIAL: 35 MMHG (ref 32–45)
PCO2 ARTERIAL: 40 MMHG (ref 32–45)
PCO2 ARTERIAL: 42 MMHG (ref 32–45)
PCO2 ARTERIAL: 43 MMHG (ref 32–45)
PCO2 ARTERIAL: 44 MMHG (ref 32–45)
PCO2 ARTERIAL: 47 MMHG (ref 32–45)
PCO2 ARTERIAL: 49 MMHG (ref 32–45)
PCO2 ARTERIAL: 54 MMHG (ref 32–45)
PCO2 ARTERIAL: 55 MMHG (ref 32–45)
PCO2 ARTERIAL: 65 MMHG (ref 32–45)
PCO2 ARTERIAL: 65 MMHG (ref 32–45)
PCO2 ARTERIAL: 79 MMHG (ref 32–45)
PCO2 ARTERIAL: 83 MMHG (ref 32–45)
PCO2 ARTERIAL: 92 MMHG (ref 32–45)
PCO2 ARTERIAL: 98 MMHG (ref 32–45)
PCO2, VEN: 42 MMHG (ref 38–52)
PCO2, VEN: 58 MMHG (ref 38–52)
PCO2, VEN: 84 MMHG (ref 38–52)
PCT TRANSFERRIN: 44 % (ref 10–44)
PCT TRANSFERRIN: 7 % (ref 10–44)
PDW BLD-RTO: 13.3 % (ref 11.7–14.9)
PDW BLD-RTO: 13.7 % (ref 11.7–14.9)
PDW BLD-RTO: 13.7 % (ref 11.7–14.9)
PDW BLD-RTO: 13.9 % (ref 11.7–14.9)
PDW BLD-RTO: 14.2 % (ref 11.7–14.9)
PDW BLD-RTO: 14.4 % (ref 11.7–14.9)
PDW BLD-RTO: 14.5 % (ref 11.7–14.9)
PDW BLD-RTO: 14.7 % (ref 11.7–14.9)
PDW BLD-RTO: 14.9 % (ref 11.7–14.9)
PDW BLD-RTO: 14.9 % (ref 11.7–14.9)
PDW BLD-RTO: 15 % (ref 11.7–14.9)
PDW BLD-RTO: 15 % (ref 11.7–14.9)
PDW BLD-RTO: 15.1 % (ref 11.7–14.9)
PDW BLD-RTO: 15.1 % (ref 11.7–14.9)
PDW BLD-RTO: 15.3 % (ref 11.7–14.9)
PDW BLD-RTO: 15.3 % (ref 11.7–14.9)
PDW BLD-RTO: 15.4 % (ref 11.7–14.9)
PDW BLD-RTO: 15.4 % (ref 11.7–14.9)
PDW BLD-RTO: 15.5 % (ref 11.7–14.9)
PDW BLD-RTO: 15.6 % (ref 11.7–14.9)
PDW BLD-RTO: 15.7 % (ref 11.7–14.9)
PDW BLD-RTO: 15.8 % (ref 11.7–14.9)
PDW BLD-RTO: 15.9 % (ref 11.7–14.9)
PDW BLD-RTO: 16.2 % (ref 11.7–14.9)
PDW BLD-RTO: 16.4 % (ref 11.7–14.9)
PDW BLD-RTO: 16.4 % (ref 11.7–14.9)
PDW BLD-RTO: 16.6 % (ref 11.7–14.9)
PDW BLD-RTO: 17 % (ref 11.7–14.9)
PDW BLD-RTO: 17 % (ref 11.7–14.9)
PDW BLD-RTO: 17.3 % (ref 11.7–14.9)
PDW BLD-RTO: 17.6 % (ref 11.7–14.9)
PDW BLD-RTO: 17.7 % (ref 11.7–14.9)
PDW BLD-RTO: 18.2 % (ref 11.7–14.9)
PDW BLD-RTO: 18.5 % (ref 11.7–14.9)
PH BLOOD: 7.17 (ref 7.34–7.45)
PH BLOOD: 7.21 (ref 7.34–7.45)
PH BLOOD: 7.37 (ref 7.34–7.45)
PH BLOOD: 7.41 (ref 7.34–7.45)
PH BLOOD: 7.45 (ref 7.34–7.45)
PH BLOOD: 7.46 (ref 7.34–7.45)
PH BLOOD: 7.47 (ref 7.34–7.45)
PH BLOOD: 7.48 (ref 7.34–7.45)
PH BLOOD: 7.51 (ref 7.34–7.45)
PH BLOOD: 7.52 (ref 7.34–7.45)
PH BLOOD: 7.56 (ref 7.34–7.45)
PH VENOUS: 7.4 (ref 7.32–7.42)
PH VENOUS: 7.41 (ref 7.32–7.42)
PH VENOUS: 7.47 (ref 7.32–7.42)
PH, URINE: 5 (ref 5–8)
PH, URINE: 5 (ref 5–8)
PH, URINE: 7 (ref 5–8)
PHOSPHORUS: 1.8 MG/DL (ref 2.5–4.9)
PHOSPHORUS: 2.3 MG/DL (ref 2.5–4.9)
PHOSPHORUS: 2.7 MG/DL (ref 2.5–4.9)
PHOSPHORUS: 2.9 MG/DL (ref 2.5–4.9)
PHOSPHORUS: 2.9 MG/DL (ref 2.5–4.9)
PHOSPHORUS: 3 MG/DL (ref 2.5–4.9)
PHOSPHORUS: 3 MG/DL (ref 2.5–4.9)
PHOSPHORUS: 4.1 MG/DL (ref 2.5–4.9)
PHOSPHORUS: 4.4 MG/DL (ref 2.5–4.9)
PLATELET # BLD: 103 K/CU MM (ref 140–440)
PLATELET # BLD: 103 K/CU MM (ref 140–440)
PLATELET # BLD: 111 K/CU MM (ref 140–440)
PLATELET # BLD: 112 K/CU MM (ref 140–440)
PLATELET # BLD: 133 K/CU MM (ref 140–440)
PLATELET # BLD: 140 K/CU MM (ref 140–440)
PLATELET # BLD: 140 K/CU MM (ref 140–440)
PLATELET # BLD: 154 K/CU MM (ref 140–440)
PLATELET # BLD: 158 K/CU MM (ref 140–440)
PLATELET # BLD: 159 K/CU MM (ref 140–440)
PLATELET # BLD: 181 K/CU MM (ref 140–440)
PLATELET # BLD: 184 K/CU MM (ref 140–440)
PLATELET # BLD: 190 K/CU MM (ref 140–440)
PLATELET # BLD: 198 K/CU MM (ref 140–440)
PLATELET # BLD: 207 K/CU MM (ref 140–440)
PLATELET # BLD: 223 K/CU MM (ref 140–440)
PLATELET # BLD: 230 K/CU MM (ref 140–440)
PLATELET # BLD: 253 K/CU MM (ref 140–440)
PLATELET # BLD: 254 K/CU MM (ref 140–440)
PLATELET # BLD: 260 K/CU MM (ref 140–440)
PLATELET # BLD: 262 K/CU MM (ref 140–440)
PLATELET # BLD: 263 K/CU MM (ref 140–440)
PLATELET # BLD: 265 K/CU MM (ref 140–440)
PLATELET # BLD: 265 K/CU MM (ref 140–440)
PLATELET # BLD: 270 K/CU MM (ref 140–440)
PLATELET # BLD: 277 K/CU MM (ref 140–440)
PLATELET # BLD: 280 K/CU MM (ref 140–440)
PLATELET # BLD: 317 K/CU MM (ref 140–440)
PLATELET # BLD: 351 K/CU MM (ref 140–440)
PLATELET # BLD: 353 K/CU MM (ref 140–440)
PLATELET # BLD: 364 K/CU MM (ref 140–440)
PLATELET # BLD: 385 K/CU MM (ref 140–440)
PLATELET # BLD: 390 K/CU MM (ref 140–440)
PLATELET # BLD: 393 K/CU MM (ref 140–440)
PLATELET # BLD: 400 K/CU MM (ref 140–440)
PLATELET # BLD: 420 K/CU MM (ref 140–440)
PLATELET # BLD: 493 K/CU MM (ref 140–440)
PLATELET # BLD: 85 K/CU MM (ref 140–440)
PLATELET # BLD: 85 K/CU MM (ref 140–440)
PLATELET # BLD: 96 K/CU MM (ref 140–440)
PLATELET # BLD: 97 K/CU MM (ref 140–440)
PLATELET # BLD: 97 K/CU MM (ref 140–440)
PLT MORPHOLOGY: ABNORMAL
PMV BLD AUTO: 10 FL (ref 7.5–11.1)
PMV BLD AUTO: 10.1 FL (ref 7.5–11.1)
PMV BLD AUTO: 10.2 FL (ref 7.5–11.1)
PMV BLD AUTO: 10.3 FL (ref 7.5–11.1)
PMV BLD AUTO: 10.3 FL (ref 7.5–11.1)
PMV BLD AUTO: 10.5 FL (ref 7.5–11.1)
PMV BLD AUTO: 10.6 FL (ref 7.5–11.1)
PMV BLD AUTO: 10.8 FL (ref 7.5–11.1)
PMV BLD AUTO: 10.8 FL (ref 7.5–11.1)
PMV BLD AUTO: 10.9 FL (ref 7.5–11.1)
PMV BLD AUTO: 11 FL (ref 7.5–11.1)
PMV BLD AUTO: 11.1 FL (ref 7.5–11.1)
PMV BLD AUTO: 11.2 FL (ref 7.5–11.1)
PMV BLD AUTO: 12 FL (ref 7.5–11.1)
PMV BLD AUTO: 9.3 FL (ref 7.5–11.1)
PMV BLD AUTO: 9.4 FL (ref 7.5–11.1)
PMV BLD AUTO: 9.5 FL (ref 7.5–11.1)
PMV BLD AUTO: 9.6 FL (ref 7.5–11.1)
PMV BLD AUTO: 9.6 FL (ref 7.5–11.1)
PMV BLD AUTO: 9.7 FL (ref 7.5–11.1)
PMV BLD AUTO: 9.8 FL (ref 7.5–11.1)
PMV BLD AUTO: 9.8 FL (ref 7.5–11.1)
PMV BLD AUTO: 9.9 FL (ref 7.5–11.1)
PO2 ARTERIAL: 161 MMHG (ref 75–100)
PO2 ARTERIAL: 38 MMHG (ref 75–100)
PO2 ARTERIAL: 42 MMHG (ref 75–100)
PO2 ARTERIAL: 427 MMHG (ref 75–100)
PO2 ARTERIAL: 62 MMHG (ref 75–100)
PO2 ARTERIAL: 63 MMHG (ref 75–100)
PO2 ARTERIAL: 64 MMHG (ref 75–100)
PO2 ARTERIAL: 67 MMHG (ref 75–100)
PO2 ARTERIAL: 71 MMHG (ref 75–100)
PO2 ARTERIAL: 72 MMHG (ref 75–100)
PO2 ARTERIAL: 73 MMHG (ref 75–100)
PO2 ARTERIAL: 75 MMHG (ref 75–100)
PO2 ARTERIAL: 85 MMHG (ref 75–100)
PO2 ARTERIAL: 97 MMHG (ref 75–100)
PO2 ARTERIAL: 99 MMHG (ref 75–100)
PO2, VEN: 180 MMHG (ref 28–48)
PO2, VEN: 247 MMHG (ref 28–48)
PO2, VEN: 65 MMHG (ref 28–48)
POC BUN: 13 MG/DL (ref 8–26)
POC CALCIUM: 1.14 MMOL/L (ref 1.12–1.32)
POC CHLORIDE: 89 MMOL/L (ref 98–109)
POC CO2: 46 MMOL/L (ref 21–32)
POC CREATININE: 1.2 MG/DL (ref 0.6–1.1)
POLYCHROMASIA: ABNORMAL
POTASSIUM SERPL-SCNC: 2.8 MMOL/L (ref 3.5–5.1)
POTASSIUM SERPL-SCNC: 3.2 MMOL/L (ref 3.5–5.1)
POTASSIUM SERPL-SCNC: 3.3 MMOL/L (ref 3.5–5.1)
POTASSIUM SERPL-SCNC: 3.3 MMOL/L (ref 3.5–5.1)
POTASSIUM SERPL-SCNC: 3.4 MMOL/L (ref 3.5–5.1)
POTASSIUM SERPL-SCNC: 3.5 MMOL/L (ref 3.5–5.1)
POTASSIUM SERPL-SCNC: 3.6 MMOL/L (ref 3.5–5.1)
POTASSIUM SERPL-SCNC: 3.7 MMOL/L (ref 3.5–5.1)
POTASSIUM SERPL-SCNC: 3.7 MMOL/L (ref 3.5–5.1)
POTASSIUM SERPL-SCNC: 3.8 MMOL/L (ref 3.5–4.5)
POTASSIUM SERPL-SCNC: 3.8 MMOL/L (ref 3.5–5.1)
POTASSIUM SERPL-SCNC: 3.9 MMOL/L (ref 3.5–5.1)
POTASSIUM SERPL-SCNC: 4 MMOL/L (ref 3.5–5.1)
POTASSIUM SERPL-SCNC: 4 MMOL/L (ref 3.5–5.1)
POTASSIUM SERPL-SCNC: 4.1 MMOL/L (ref 3.5–5.1)
POTASSIUM SERPL-SCNC: 4.2 MMOL/L (ref 3.5–5.1)
POTASSIUM SERPL-SCNC: 4.4 MMOL/L (ref 3.5–5.1)
POTASSIUM SERPL-SCNC: 4.4 MMOL/L (ref 3.5–5.1)
POTASSIUM SERPL-SCNC: 4.5 MMOL/L (ref 3.5–5.1)
POTASSIUM SERPL-SCNC: 4.7 MMOL/L (ref 3.5–5.1)
POTASSIUM SERPL-SCNC: 4.9 MMOL/L (ref 3.5–5.1)
POTASSIUM SERPL-SCNC: 5 MMOL/L (ref 3.5–5.1)
POTASSIUM SERPL-SCNC: 5.2 MMOL/L (ref 3.5–5.1)
POTASSIUM SERPL-SCNC: 5.4 MMOL/L (ref 3.5–5.1)
POTASSIUM SERPL-SCNC: 5.6 MMOL/L (ref 3.5–5.1)
POTASSIUM, UR: 4.9 MMOL/L (ref 22–119)
PRO-BNP: 1244 PG/ML
PRO-BNP: 126.5 PG/ML
PRO-BNP: 2218 PG/ML
PRO-BNP: 2265 PG/ML
PRO-BNP: 3057 PG/ML
PRO-BNP: 310 PG/ML
PRO-BNP: 448.8 PG/ML
PRO-BNP: 479.3 PG/ML
PRO-BNP: 8008 PG/ML
PROCALCITONIN: 0.12
PROCALCITONIN: 0.12
PROCALCITONIN: 0.14
PROCALCITONIN: 0.15
PROCALCITONIN: 0.15
PROCALCITONIN: 0.17
PROCALCITONIN: 0.18
PROCALCITONIN: 0.24
PROCALCITONIN: 0.25
PROCALCITONIN: 0.25
PROCALCITONIN: 0.28
PROCALCITONIN: 0.29
PROCALCITONIN: 0.29
PROCALCITONIN: 0.3
PROCALCITONIN: 0.3
PROCALCITONIN: 0.34
PROCALCITONIN: 0.35
PROCALCITONIN: 0.37
PROCALCITONIN: 0.37
PROMYELOCYTES ABSOLUTE COUNT: 0.12 K/CU MM
PROMYELOCYTES PERCENT: 1 %
PROTEIN UA: 100 MG/DL
PROTEIN UA: 100 MG/DL
PROTEIN UA: NEGATIVE MG/DL
PROTHROMBIN TIME: 12.7 SECONDS (ref 11.7–14.5)
PROTHROMBIN TIME: 9.6 SECONDS (ref 11.7–14.5)
RBC # BLD: 1.87 M/CU MM (ref 4.2–5.4)
RBC # BLD: 2.42 M/CU MM (ref 4.2–5.4)
RBC # BLD: 2.42 M/CU MM (ref 4.2–5.4)
RBC # BLD: 2.44 M/CU MM (ref 4.2–5.4)
RBC # BLD: 2.53 M/CU MM (ref 4.2–5.4)
RBC # BLD: 2.62 M/CU MM (ref 4.2–5.4)
RBC # BLD: 2.73 M/CU MM (ref 4.2–5.4)
RBC # BLD: 2.75 M/CU MM (ref 4.2–5.4)
RBC # BLD: 2.83 M/CU MM (ref 4.2–5.4)
RBC # BLD: 2.84 M/CU MM (ref 4.2–5.4)
RBC # BLD: 2.86 M/CU MM (ref 4.2–5.4)
RBC # BLD: 2.9 M/CU MM (ref 4.2–5.4)
RBC # BLD: 2.91 M/CU MM (ref 4.2–5.4)
RBC # BLD: 3 M/CU MM (ref 4.2–5.4)
RBC # BLD: 3.01 M/CU MM (ref 4.2–5.4)
RBC # BLD: 3.03 M/CU MM (ref 4.2–5.4)
RBC # BLD: 3.08 M/CU MM (ref 4.2–5.4)
RBC # BLD: 3.1 M/CU MM (ref 4.2–5.4)
RBC # BLD: 3.1 M/CU MM (ref 4.2–5.4)
RBC # BLD: 3.11 M/CU MM (ref 4.2–5.4)
RBC # BLD: 3.11 M/CU MM (ref 4.2–5.4)
RBC # BLD: 3.13 M/CU MM (ref 4.2–5.4)
RBC # BLD: 3.15 M/CU MM (ref 4.2–5.4)
RBC # BLD: 3.16 M/CU MM (ref 4.2–5.4)
RBC # BLD: 3.17 M/CU MM (ref 4.2–5.4)
RBC # BLD: 3.17 M/CU MM (ref 4.2–5.4)
RBC # BLD: 3.22 M/CU MM (ref 4.2–5.4)
RBC # BLD: 3.23 M/CU MM (ref 4.2–5.4)
RBC # BLD: 3.24 M/CU MM (ref 4.2–5.4)
RBC # BLD: 3.24 M/CU MM (ref 4.2–5.4)
RBC # BLD: 3.25 M/CU MM (ref 4.2–5.4)
RBC # BLD: 3.29 M/CU MM (ref 4.2–5.4)
RBC # BLD: 3.3 M/CU MM (ref 4.2–5.4)
RBC # BLD: 3.38 M/CU MM (ref 4.2–5.4)
RBC # BLD: 3.41 M/CU MM (ref 4.2–5.4)
RBC # BLD: 3.57 M/CU MM (ref 4.2–5.4)
RBC # BLD: 3.67 M/CU MM (ref 4.2–5.4)
RBC # BLD: 4.07 M/CU MM (ref 4.2–5.4)
RBC # BLD: 4.24 M/CU MM (ref 4.2–5.4)
RBC # BLD: 4.44 M/CU MM (ref 4.2–5.4)
RBC # BLD: ABNORMAL 10*6/UL
RBC URINE: 1 /HPF (ref 0–6)
RBC URINE: 17 /HPF (ref 0–6)
RBC URINE: 5 /HPF (ref 0–6)
REASON FOR REJECTION: NORMAL
REJECTED TEST: NORMAL
RHINOVIRUS ENTEROVIRUS PCR: NOT DETECTED
RSV PCR: NOT DETECTED
SARS-COV-2, NAAT: NOT DETECTED
SARS-COV-2: NOT DETECTED
SARS-COV-2: NOT DETECTED
SEGMENTED NEUTROPHILS ABSOLUTE COUNT: 0.4 K/CU MM
SEGMENTED NEUTROPHILS ABSOLUTE COUNT: 0.7 K/CU MM
SEGMENTED NEUTROPHILS ABSOLUTE COUNT: 1.3 K/CU MM
SEGMENTED NEUTROPHILS ABSOLUTE COUNT: 1.7 K/CU MM
SEGMENTED NEUTROPHILS ABSOLUTE COUNT: 1.9 K/CU MM
SEGMENTED NEUTROPHILS ABSOLUTE COUNT: 10 K/CU MM
SEGMENTED NEUTROPHILS ABSOLUTE COUNT: 10.4 K/CU MM
SEGMENTED NEUTROPHILS ABSOLUTE COUNT: 10.8 K/CU MM
SEGMENTED NEUTROPHILS ABSOLUTE COUNT: 11.1 K/CU MM
SEGMENTED NEUTROPHILS ABSOLUTE COUNT: 13.5 K/CU MM
SEGMENTED NEUTROPHILS ABSOLUTE COUNT: 13.6 K/CU MM
SEGMENTED NEUTROPHILS ABSOLUTE COUNT: 14.1 K/CU MM
SEGMENTED NEUTROPHILS ABSOLUTE COUNT: 14.3 K/CU MM
SEGMENTED NEUTROPHILS ABSOLUTE COUNT: 14.6 K/CU MM
SEGMENTED NEUTROPHILS ABSOLUTE COUNT: 14.7 K/CU MM
SEGMENTED NEUTROPHILS ABSOLUTE COUNT: 16 K/CU MM
SEGMENTED NEUTROPHILS ABSOLUTE COUNT: 18.5 K/CU MM
SEGMENTED NEUTROPHILS ABSOLUTE COUNT: 18.6 K/CU MM
SEGMENTED NEUTROPHILS ABSOLUTE COUNT: 2.3 K/CU MM
SEGMENTED NEUTROPHILS ABSOLUTE COUNT: 2.3 K/CU MM
SEGMENTED NEUTROPHILS ABSOLUTE COUNT: 2.4 K/CU MM
SEGMENTED NEUTROPHILS ABSOLUTE COUNT: 28.9 K/CU MM
SEGMENTED NEUTROPHILS ABSOLUTE COUNT: 3.2 K/CU MM
SEGMENTED NEUTROPHILS ABSOLUTE COUNT: 3.4 K/CU MM
SEGMENTED NEUTROPHILS ABSOLUTE COUNT: 3.9 K/CU MM
SEGMENTED NEUTROPHILS ABSOLUTE COUNT: 4.2 K/CU MM
SEGMENTED NEUTROPHILS ABSOLUTE COUNT: 4.5 K/CU MM
SEGMENTED NEUTROPHILS ABSOLUTE COUNT: 5.4 K/CU MM
SEGMENTED NEUTROPHILS ABSOLUTE COUNT: 6.3 K/CU MM
SEGMENTED NEUTROPHILS ABSOLUTE COUNT: 6.3 K/CU MM
SEGMENTED NEUTROPHILS ABSOLUTE COUNT: 6.6 K/CU MM
SEGMENTED NEUTROPHILS ABSOLUTE COUNT: 7.6 K/CU MM
SEGMENTED NEUTROPHILS ABSOLUTE COUNT: 8.2 K/CU MM
SEGMENTED NEUTROPHILS ABSOLUTE COUNT: 8.5 K/CU MM
SEGMENTED NEUTROPHILS ABSOLUTE COUNT: 9 K/CU MM
SEGMENTED NEUTROPHILS ABSOLUTE COUNT: 9.2 K/CU MM
SEGMENTED NEUTROPHILS RELATIVE PERCENT: 37 % (ref 36–66)
SEGMENTED NEUTROPHILS RELATIVE PERCENT: 57 % (ref 36–66)
SEGMENTED NEUTROPHILS RELATIVE PERCENT: 58 % (ref 36–66)
SEGMENTED NEUTROPHILS RELATIVE PERCENT: 58 % (ref 36–66)
SEGMENTED NEUTROPHILS RELATIVE PERCENT: 67 % (ref 36–66)
SEGMENTED NEUTROPHILS RELATIVE PERCENT: 70 % (ref 36–66)
SEGMENTED NEUTROPHILS RELATIVE PERCENT: 72.2 % (ref 36–66)
SEGMENTED NEUTROPHILS RELATIVE PERCENT: 73 % (ref 36–66)
SEGMENTED NEUTROPHILS RELATIVE PERCENT: 77 % (ref 36–66)
SEGMENTED NEUTROPHILS RELATIVE PERCENT: 78 % (ref 36–66)
SEGMENTED NEUTROPHILS RELATIVE PERCENT: 79 % (ref 36–66)
SEGMENTED NEUTROPHILS RELATIVE PERCENT: 79 % (ref 36–66)
SEGMENTED NEUTROPHILS RELATIVE PERCENT: 79.6 % (ref 36–66)
SEGMENTED NEUTROPHILS RELATIVE PERCENT: 80 % (ref 36–66)
SEGMENTED NEUTROPHILS RELATIVE PERCENT: 80.3 % (ref 36–66)
SEGMENTED NEUTROPHILS RELATIVE PERCENT: 83 % (ref 36–66)
SEGMENTED NEUTROPHILS RELATIVE PERCENT: 83 % (ref 36–66)
SEGMENTED NEUTROPHILS RELATIVE PERCENT: 83.7 % (ref 36–66)
SEGMENTED NEUTROPHILS RELATIVE PERCENT: 84.5 % (ref 36–66)
SEGMENTED NEUTROPHILS RELATIVE PERCENT: 87 % (ref 36–66)
SEGMENTED NEUTROPHILS RELATIVE PERCENT: 87 % (ref 36–66)
SEGMENTED NEUTROPHILS RELATIVE PERCENT: 88 % (ref 36–66)
SEGMENTED NEUTROPHILS RELATIVE PERCENT: 89.1 % (ref 36–66)
SEGMENTED NEUTROPHILS RELATIVE PERCENT: 90 % (ref 36–66)
SEGMENTED NEUTROPHILS RELATIVE PERCENT: 90.4 % (ref 36–66)
SEGMENTED NEUTROPHILS RELATIVE PERCENT: 92.2 % (ref 36–66)
SEGMENTED NEUTROPHILS RELATIVE PERCENT: 93 % (ref 36–66)
SEGMENTED NEUTROPHILS RELATIVE PERCENT: 93.5 % (ref 36–66)
SEGMENTED NEUTROPHILS RELATIVE PERCENT: 94 % (ref 36–66)
SEGMENTED NEUTROPHILS RELATIVE PERCENT: 96 % (ref 36–66)
SEGMENTED NEUTROPHILS RELATIVE PERCENT: 97.3 % (ref 36–66)
SEGMENTED NEUTROPHILS RELATIVE PERCENT: 98 % (ref 36–66)
SODIUM BLD-SCNC: 129 MMOL/L (ref 135–145)
SODIUM BLD-SCNC: 130 MMOL/L (ref 135–145)
SODIUM BLD-SCNC: 131 MMOL/L (ref 135–145)
SODIUM BLD-SCNC: 132 MMOL/L (ref 135–145)
SODIUM BLD-SCNC: 134 MMOL/L (ref 135–145)
SODIUM BLD-SCNC: 135 MMOL/L (ref 135–145)
SODIUM BLD-SCNC: 135 MMOL/L (ref 135–145)
SODIUM BLD-SCNC: 136 MMOL/L (ref 135–145)
SODIUM BLD-SCNC: 136 MMOL/L (ref 135–145)
SODIUM BLD-SCNC: 137 MMOL/L (ref 135–145)
SODIUM BLD-SCNC: 138 MMOL/L (ref 135–145)
SODIUM BLD-SCNC: 139 MMOL/L (ref 135–145)
SODIUM BLD-SCNC: 140 MMOL/L (ref 135–145)
SODIUM BLD-SCNC: 141 MMOL/L (ref 135–145)
SODIUM BLD-SCNC: 141 MMOL/L (ref 135–145)
SODIUM BLD-SCNC: 141 MMOL/L (ref 138–146)
SODIUM BLD-SCNC: 142 MMOL/L (ref 135–145)
SODIUM BLD-SCNC: 143 MMOL/L (ref 135–145)
SODIUM BLD-SCNC: 148 MMOL/L (ref 135–145)
SODIUM URINE: 55 MMOL/L (ref 35–167)
SOURCE, BLOOD GAS: ABNORMAL
SOURCE: NORMAL
SPECIFIC GRAVITY UA: 1 (ref 1–1.03)
SPECIFIC GRAVITY UA: 1.01 (ref 1–1.03)
SPECIFIC GRAVITY UA: 1.06 (ref 1–1.03)
SPECIMEN: ABNORMAL
SPECIMEN: ABNORMAL
SPECIMEN: NORMAL
SQUAMOUS EPITHELIAL: 1 /HPF
SQUAMOUS EPITHELIAL: <1 /HPF
SQUAMOUS EPITHELIAL: <1 /HPF
STATUS: NORMAL
STOMATOCYTES: ABNORMAL
STREP PNEUMONIAE ANTIGEN: NORMAL
STREP PNEUMONIAE ANTIGEN: NORMAL
T4 FREE: 1.17 NG/DL (ref 0.9–1.8)
TARGET CELLS: ABNORMAL
THEOPHYLLINE LEVEL: 2.3 UG/ML (ref 10–20)
THEOPHYLLINE LEVEL: 2.6 UG/ML (ref 10–20)
THEOPHYLLINE LEVEL: 4.9 UG/ML (ref 10–20)
THEOPHYLLINE LEVEL: 4.9 UG/ML (ref 10–20)
THEOPHYLLINE LEVEL: 6.6 UG/ML (ref 10–20)
TOTAL IMMATURE NEUTOROPHIL: 0.01 K/CU MM
TOTAL IMMATURE NEUTOROPHIL: 0.03 K/CU MM
TOTAL IMMATURE NEUTOROPHIL: 0.05 K/CU MM
TOTAL IMMATURE NEUTOROPHIL: 0.19 K/CU MM
TOTAL IMMATURE NEUTOROPHIL: 0.29 K/CU MM
TOTAL IMMATURE NEUTOROPHIL: 0.3 K/CU MM
TOTAL IMMATURE NEUTOROPHIL: 1.66 K/CU MM
TOTAL IRON BINDING CAPACITY: 215 UG/DL (ref 250–450)
TOTAL IRON BINDING CAPACITY: 229 UG/DL (ref 250–450)
TOTAL NUCLEATED RBC: 0 K/CU MM
TOTAL PROTEIN: 4.4 GM/DL (ref 6.4–8.2)
TOTAL PROTEIN: 4.4 GM/DL (ref 6.4–8.2)
TOTAL PROTEIN: 4.7 GM/DL (ref 6.4–8.2)
TOTAL PROTEIN: 4.8 GM/DL (ref 6.4–8.2)
TOTAL PROTEIN: 5 GM/DL (ref 6.4–8.2)
TOTAL PROTEIN: 5 GM/DL (ref 6.4–8.2)
TOTAL PROTEIN: 5.3 GM/DL (ref 6.4–8.2)
TOTAL PROTEIN: 5.4 GM/DL (ref 6.4–8.2)
TOTAL PROTEIN: 5.4 GM/DL (ref 6.4–8.2)
TOTAL PROTEIN: 5.6 GM/DL (ref 6.4–8.2)
TOTAL PROTEIN: 5.7 GM/DL (ref 6.4–8.2)
TOTAL PROTEIN: 5.8 GM/DL (ref 6.4–8.2)
TOTAL PROTEIN: 5.9 GM/DL (ref 6.4–8.2)
TOTAL PROTEIN: 5.9 GM/DL (ref 6.4–8.2)
TOTAL PROTEIN: 6.1 GM/DL (ref 6.4–8.2)
TOTAL PROTEIN: 6.5 GM/DL (ref 6.4–8.2)
TOXIC GRANULATION: PRESENT
TRANSFUSION STATUS: NORMAL
TRICHOMONAS: ABNORMAL /HPF
TROPONIN T: 0.03 NG/ML
TROPONIN T: 0.04 NG/ML
TROPONIN T: 0.05 NG/ML
TROPONIN T: 0.06 NG/ML
TROPONIN T: <0.01 NG/ML
TSH HIGH SENSITIVITY: 2.29 UIU/ML (ref 0.27–4.2)
UNIT DIVISION: 0
UNIT NUMBER: NORMAL
UNSATURATED IRON BINDING CAPACITY: 129 UG/DL (ref 110–370)
UNSATURATED IRON BINDING CAPACITY: 200 UG/DL (ref 110–370)
UROBILINOGEN, URINE: NEGATIVE MG/DL (ref 0.2–1)
VANCOMYCIN TROUGH: 12.6 UG/ML (ref 10–20)
VANCOMYCIN TROUGH: 13.5 UG/ML (ref 10–20)
VANCOMYCIN TROUGH: 18.4 UG/ML (ref 10–20)
VANCOMYCIN TROUGH: 19.5 UG/ML (ref 10–20)
VITAMIN B-12: 1577 PG/ML (ref 211–911)
WBC # BLD: 0.8 K/CU MM (ref 4–10.5)
WBC # BLD: 1.1 K/CU MM (ref 4–10.5)
WBC # BLD: 1.6 K/CU MM (ref 4–10.5)
WBC # BLD: 10.2 K/CU MM (ref 4–10.5)
WBC # BLD: 10.3 K/CU MM (ref 4–10.5)
WBC # BLD: 11.4 K/CU MM (ref 4–10.5)
WBC # BLD: 11.5 K/CU MM (ref 4–10.5)
WBC # BLD: 11.5 K/CU MM (ref 4–10.5)
WBC # BLD: 12.3 K/CU MM (ref 4–10.5)
WBC # BLD: 12.4 K/CU MM (ref 4–10.5)
WBC # BLD: 12.6 K/CU MM (ref 4–10.5)
WBC # BLD: 13.8 K/CU MM (ref 4–10.5)
WBC # BLD: 15.7 K/CU MM (ref 4–10.5)
WBC # BLD: 15.9 K/CU MM (ref 4–10.5)
WBC # BLD: 16.6 K/CU MM (ref 4–10.5)
WBC # BLD: 17.5 K/CU MM (ref 4–10.5)
WBC # BLD: 18.1 K/CU MM (ref 4–10.5)
WBC # BLD: 18.7 K/CU MM (ref 4–10.5)
WBC # BLD: 19.2 K/CU MM (ref 4–10.5)
WBC # BLD: 19.6 K/CU MM (ref 4–10.5)
WBC # BLD: 19.6 K/CU MM (ref 4–10.5)
WBC # BLD: 2.4 K/CU MM (ref 4–10.5)
WBC # BLD: 2.6 K/CU MM (ref 4–10.5)
WBC # BLD: 2.9 K/CU MM (ref 4–10.5)
WBC # BLD: 2.9 K/CU MM (ref 4–10.5)
WBC # BLD: 20.5 K/CU MM (ref 4–10.5)
WBC # BLD: 20.7 K/CU MM (ref 4–10.5)
WBC # BLD: 21.4 K/CU MM (ref 4–10.5)
WBC # BLD: 3.3 K/CU MM (ref 4–10.5)
WBC # BLD: 3.3 K/CU MM (ref 4–10.5)
WBC # BLD: 3.6 K/CU MM (ref 4–10.5)
WBC # BLD: 32 K/CU MM (ref 4–10.5)
WBC # BLD: 4.1 K/CU MM (ref 4–10.5)
WBC # BLD: 4.3 K/CU MM (ref 4–10.5)
WBC # BLD: 4.6 K/CU MM (ref 4–10.5)
WBC # BLD: 4.9 K/CU MM (ref 4–10.5)
WBC # BLD: 5.3 K/CU MM (ref 4–10.5)
WBC # BLD: 6.7 K/CU MM (ref 4–10.5)
WBC # BLD: 6.7 K/CU MM (ref 4–10.5)
WBC # BLD: 7.9 K/CU MM (ref 4–10.5)
WBC # BLD: 9.1 K/CU MM (ref 4–10.5)
WBC # BLD: 9.4 K/CU MM (ref 4–10.5)
WBC # BLD: ABNORMAL 10*3/UL
WBC # BLD: ABNORMAL 10*3/UL
WBC UA: 1 /HPF (ref 0–5)
WBC UA: 1 /HPF (ref 0–5)
WBC UA: 3 /HPF (ref 0–5)

## 2021-01-01 PROCEDURE — 1200000000 HC SEMI PRIVATE

## 2021-01-01 PROCEDURE — 6370000000 HC RX 637 (ALT 250 FOR IP): Performed by: INTERNAL MEDICINE

## 2021-01-01 PROCEDURE — 83735 ASSAY OF MAGNESIUM: CPT

## 2021-01-01 PROCEDURE — 84484 ASSAY OF TROPONIN QUANT: CPT

## 2021-01-01 PROCEDURE — 6370000000 HC RX 637 (ALT 250 FOR IP): Performed by: CLINICAL NURSE SPECIALIST

## 2021-01-01 PROCEDURE — 6360000002 HC RX W HCPCS: Performed by: INTERNAL MEDICINE

## 2021-01-01 PROCEDURE — 93005 ELECTROCARDIOGRAM TRACING: CPT | Performed by: PHYSICIAN ASSISTANT

## 2021-01-01 PROCEDURE — 94640 AIRWAY INHALATION TREATMENT: CPT

## 2021-01-01 PROCEDURE — 2580000003 HC RX 258: Performed by: STUDENT IN AN ORGANIZED HEALTH CARE EDUCATION/TRAINING PROGRAM

## 2021-01-01 PROCEDURE — 80048 BASIC METABOLIC PNL TOTAL CA: CPT

## 2021-01-01 PROCEDURE — 2500000003 HC RX 250 WO HCPCS: Performed by: INTERNAL MEDICINE

## 2021-01-01 PROCEDURE — 2580000003 HC RX 258: Performed by: HOSPITALIST

## 2021-01-01 PROCEDURE — 7100000000 HC PACU RECOVERY - FIRST 15 MIN

## 2021-01-01 PROCEDURE — 80053 COMPREHEN METABOLIC PANEL: CPT

## 2021-01-01 PROCEDURE — 36600 WITHDRAWAL OF ARTERIAL BLOOD: CPT

## 2021-01-01 PROCEDURE — 97535 SELF CARE MNGMENT TRAINING: CPT

## 2021-01-01 PROCEDURE — 1250000000 HC SEMI PRIVATE HOSPICE R&B

## 2021-01-01 PROCEDURE — 6370000000 HC RX 637 (ALT 250 FOR IP): Performed by: PHYSICIAN ASSISTANT

## 2021-01-01 PROCEDURE — 96413 CHEMO IV INFUSION 1 HR: CPT

## 2021-01-01 PROCEDURE — 36415 COLL VENOUS BLD VENIPUNCTURE: CPT

## 2021-01-01 PROCEDURE — 2140000000 HC CCU INTERMEDIATE R&B

## 2021-01-01 PROCEDURE — 99232 SBSQ HOSP IP/OBS MODERATE 35: CPT | Performed by: INTERNAL MEDICINE

## 2021-01-01 PROCEDURE — 85025 COMPLETE CBC W/AUTO DIFF WBC: CPT

## 2021-01-01 PROCEDURE — 6370000000 HC RX 637 (ALT 250 FOR IP): Performed by: NURSE PRACTITIONER

## 2021-01-01 PROCEDURE — 87075 CULTR BACTERIA EXCEPT BLOOD: CPT

## 2021-01-01 PROCEDURE — 93010 ELECTROCARDIOGRAM REPORT: CPT | Performed by: INTERNAL MEDICINE

## 2021-01-01 PROCEDURE — 51702 INSERT TEMP BLADDER CATH: CPT

## 2021-01-01 PROCEDURE — 71275 CT ANGIOGRAPHY CHEST: CPT

## 2021-01-01 PROCEDURE — 83605 ASSAY OF LACTIC ACID: CPT

## 2021-01-01 PROCEDURE — 2580000003 HC RX 258: Performed by: FAMILY MEDICINE

## 2021-01-01 PROCEDURE — 85379 FIBRIN DEGRADATION QUANT: CPT

## 2021-01-01 PROCEDURE — 6370000000 HC RX 637 (ALT 250 FOR IP)

## 2021-01-01 PROCEDURE — 94761 N-INVAS EAR/PLS OXIMETRY MLT: CPT

## 2021-01-01 PROCEDURE — 2700000000 HC OXYGEN THERAPY PER DAY

## 2021-01-01 PROCEDURE — C9113 INJ PANTOPRAZOLE SODIUM, VIA: HCPCS | Performed by: INTERNAL MEDICINE

## 2021-01-01 PROCEDURE — 85027 COMPLETE CBC AUTOMATED: CPT

## 2021-01-01 PROCEDURE — 99214 OFFICE O/P EST MOD 30 MIN: CPT | Performed by: NURSE PRACTITIONER

## 2021-01-01 PROCEDURE — 6370000000 HC RX 637 (ALT 250 FOR IP): Performed by: SPECIALIST

## 2021-01-01 PROCEDURE — APPSS60 APP SPLIT SHARED TIME 46-60 MINUTES: Performed by: NURSE PRACTITIONER

## 2021-01-01 PROCEDURE — 85007 BL SMEAR W/DIFF WBC COUNT: CPT

## 2021-01-01 PROCEDURE — 2580000003 HC RX 258: Performed by: NURSE PRACTITIONER

## 2021-01-01 PROCEDURE — 99233 SBSQ HOSP IP/OBS HIGH 50: CPT | Performed by: INTERNAL MEDICINE

## 2021-01-01 PROCEDURE — 2580000003 HC RX 258: Performed by: INTERNAL MEDICINE

## 2021-01-01 PROCEDURE — 97110 THERAPEUTIC EXERCISES: CPT

## 2021-01-01 PROCEDURE — 97530 THERAPEUTIC ACTIVITIES: CPT

## 2021-01-01 PROCEDURE — 6360000002 HC RX W HCPCS

## 2021-01-01 PROCEDURE — 84145 PROCALCITONIN (PCT): CPT

## 2021-01-01 PROCEDURE — 74230 X-RAY XM SWLNG FUNCJ C+: CPT

## 2021-01-01 PROCEDURE — 89220 SPUTUM SPECIMEN COLLECTION: CPT

## 2021-01-01 PROCEDURE — 6360000002 HC RX W HCPCS: Performed by: NURSE PRACTITIONER

## 2021-01-01 PROCEDURE — 94664 DEMO&/EVAL PT USE INHALER: CPT

## 2021-01-01 PROCEDURE — 86922 COMPATIBILITY TEST ANTIGLOB: CPT

## 2021-01-01 PROCEDURE — 1111F DSCHRG MED/CURRENT MED MERGE: CPT | Performed by: FAMILY MEDICINE

## 2021-01-01 PROCEDURE — 6360000002 HC RX W HCPCS: Performed by: SPECIALIST

## 2021-01-01 PROCEDURE — 2500000003 HC RX 250 WO HCPCS: Performed by: FAMILY MEDICINE

## 2021-01-01 PROCEDURE — 70553 MRI BRAIN STEM W/O & W/DYE: CPT

## 2021-01-01 PROCEDURE — 82805 BLOOD GASES W/O2 SATURATION: CPT

## 2021-01-01 PROCEDURE — 82803 BLOOD GASES ANY COMBINATION: CPT

## 2021-01-01 PROCEDURE — 86905 BLOOD TYPING RBC ANTIGENS: CPT

## 2021-01-01 PROCEDURE — 3700000001 HC ADD 15 MINUTES (ANESTHESIA): Performed by: SPECIALIST

## 2021-01-01 PROCEDURE — G0378 HOSPITAL OBSERVATION PER HR: HCPCS

## 2021-01-01 PROCEDURE — 83880 ASSAY OF NATRIURETIC PEPTIDE: CPT

## 2021-01-01 PROCEDURE — 96375 TX/PRO/DX INJ NEW DRUG ADDON: CPT

## 2021-01-01 PROCEDURE — 80202 ASSAY OF VANCOMYCIN: CPT

## 2021-01-01 PROCEDURE — 99231 SBSQ HOSP IP/OBS SF/LOW 25: CPT | Performed by: INTERNAL MEDICINE

## 2021-01-01 PROCEDURE — 96365 THER/PROPH/DIAG IV INF INIT: CPT

## 2021-01-01 PROCEDURE — 2709999900 HC NON-CHARGEABLE SUPPLY: Performed by: THORACIC SURGERY (CARDIOTHORACIC VASCULAR SURGERY)

## 2021-01-01 PROCEDURE — 6360000002 HC RX W HCPCS: Performed by: CLINICAL NURSE SPECIALIST

## 2021-01-01 PROCEDURE — 71045 X-RAY EXAM CHEST 1 VIEW: CPT

## 2021-01-01 PROCEDURE — 6370000000 HC RX 637 (ALT 250 FOR IP): Performed by: HOSPITALIST

## 2021-01-01 PROCEDURE — 2000000000 HC ICU R&B

## 2021-01-01 PROCEDURE — 94660 CPAP INITIATION&MGMT: CPT

## 2021-01-01 PROCEDURE — 05HB33Z INSERTION OF INFUSION DEVICE INTO RIGHT BASILIC VEIN, PERCUTANEOUS APPROACH: ICD-10-PCS | Performed by: FAMILY MEDICINE

## 2021-01-01 PROCEDURE — 87103 BLOOD FUNGUS CULTURE: CPT

## 2021-01-01 PROCEDURE — 2500000003 HC RX 250 WO HCPCS: Performed by: NURSE ANESTHETIST, CERTIFIED REGISTERED

## 2021-01-01 PROCEDURE — 7100000000 HC PACU RECOVERY - FIRST 15 MIN: Performed by: THORACIC SURGERY (CARDIOTHORACIC VASCULAR SURGERY)

## 2021-01-01 PROCEDURE — 87070 CULTURE OTHR SPECIMN AEROBIC: CPT

## 2021-01-01 PROCEDURE — 6360000002 HC RX W HCPCS: Performed by: PHYSICIAN ASSISTANT

## 2021-01-01 PROCEDURE — 31500 INSERT EMERGENCY AIRWAY: CPT | Performed by: NURSE ANESTHETIST, CERTIFIED REGISTERED

## 2021-01-01 PROCEDURE — 2580000003 HC RX 258: Performed by: CLINICAL NURSE SPECIALIST

## 2021-01-01 PROCEDURE — 2580000003 HC RX 258: Performed by: EMERGENCY MEDICINE

## 2021-01-01 PROCEDURE — 85610 PROTHROMBIN TIME: CPT

## 2021-01-01 PROCEDURE — 86141 C-REACTIVE PROTEIN HS: CPT

## 2021-01-01 PROCEDURE — 2580000003 HC RX 258: Performed by: PHYSICIAN ASSISTANT

## 2021-01-01 PROCEDURE — 99211 OFF/OP EST MAY X REQ PHY/QHP: CPT

## 2021-01-01 PROCEDURE — 96367 TX/PROPH/DG ADDL SEQ IV INF: CPT

## 2021-01-01 PROCEDURE — 93005 ELECTROCARDIOGRAM TRACING: CPT | Performed by: NURSE PRACTITIONER

## 2021-01-01 PROCEDURE — 84100 ASSAY OF PHOSPHORUS: CPT

## 2021-01-01 PROCEDURE — 2500000003 HC RX 250 WO HCPCS: Performed by: NURSE PRACTITIONER

## 2021-01-01 PROCEDURE — 2500000003 HC RX 250 WO HCPCS: Performed by: EMERGENCY MEDICINE

## 2021-01-01 PROCEDURE — 99233 SBSQ HOSP IP/OBS HIGH 50: CPT | Performed by: NURSE PRACTITIONER

## 2021-01-01 PROCEDURE — 99223 1ST HOSP IP/OBS HIGH 75: CPT | Performed by: INTERNAL MEDICINE

## 2021-01-01 PROCEDURE — 87086 URINE CULTURE/COLONY COUNT: CPT

## 2021-01-01 PROCEDURE — 97166 OT EVAL MOD COMPLEX 45 MIN: CPT

## 2021-01-01 PROCEDURE — 6370000000 HC RX 637 (ALT 250 FOR IP): Performed by: FAMILY MEDICINE

## 2021-01-01 PROCEDURE — 88342 IMHCHEM/IMCYTCHM 1ST ANTB: CPT | Performed by: PATHOLOGY

## 2021-01-01 PROCEDURE — 87635 SARS-COV-2 COVID-19 AMP PRB: CPT

## 2021-01-01 PROCEDURE — 6370000000 HC RX 637 (ALT 250 FOR IP): Performed by: STUDENT IN AN ORGANIZED HEALTH CARE EDUCATION/TRAINING PROGRAM

## 2021-01-01 PROCEDURE — 87077 CULTURE AEROBIC IDENTIFY: CPT

## 2021-01-01 PROCEDURE — 96376 TX/PRO/DX INJ SAME DRUG ADON: CPT

## 2021-01-01 PROCEDURE — 7100000001 HC PACU RECOVERY - ADDTL 15 MIN

## 2021-01-01 PROCEDURE — 7100000010 HC PHASE II RECOVERY - FIRST 15 MIN: Performed by: THORACIC SURGERY (CARDIOTHORACIC VASCULAR SURGERY)

## 2021-01-01 PROCEDURE — 85018 HEMOGLOBIN: CPT

## 2021-01-01 PROCEDURE — 05HY33Z INSERTION OF INFUSION DEVICE INTO UPPER VEIN, PERCUTANEOUS APPROACH: ICD-10-PCS | Performed by: HOSPITALIST

## 2021-01-01 PROCEDURE — 6360000002 HC RX W HCPCS: Performed by: STUDENT IN AN ORGANIZED HEALTH CARE EDUCATION/TRAINING PROGRAM

## 2021-01-01 PROCEDURE — 36569 INSJ PICC 5 YR+ W/O IMAGING: CPT

## 2021-01-01 PROCEDURE — 6360000002 HC RX W HCPCS: Performed by: HOSPITALIST

## 2021-01-01 PROCEDURE — 6360000004 HC RX CONTRAST MEDICATION: Performed by: PHYSICIAN ASSISTANT

## 2021-01-01 PROCEDURE — 86901 BLOOD TYPING SEROLOGIC RH(D): CPT

## 2021-01-01 PROCEDURE — 94003 VENT MGMT INPAT SUBQ DAY: CPT

## 2021-01-01 PROCEDURE — 2580000003 HC RX 258: Performed by: SPECIALIST

## 2021-01-01 PROCEDURE — 96366 THER/PROPH/DIAG IV INF ADDON: CPT

## 2021-01-01 PROCEDURE — 97167 OT EVAL HIGH COMPLEX 60 MIN: CPT

## 2021-01-01 PROCEDURE — 2720000010 HC SURG SUPPLY STERILE

## 2021-01-01 PROCEDURE — 36592 COLLECT BLOOD FROM PICC: CPT

## 2021-01-01 PROCEDURE — 86850 RBC ANTIBODY SCREEN: CPT

## 2021-01-01 PROCEDURE — 6360000004 HC RX CONTRAST MEDICATION: Performed by: THORACIC SURGERY (CARDIOTHORACIC VASCULAR SURGERY)

## 2021-01-01 PROCEDURE — 3609012400 HC EGD TRANSORAL BIOPSY SINGLE/MULTIPLE: Performed by: SPECIALIST

## 2021-01-01 PROCEDURE — 3700000000 HC ANESTHESIA ATTENDED CARE: Performed by: THORACIC SURGERY (CARDIOTHORACIC VASCULAR SURGERY)

## 2021-01-01 PROCEDURE — 99205 OFFICE O/P NEW HI 60 MIN: CPT | Performed by: RADIOLOGY

## 2021-01-01 PROCEDURE — C1751 CATH, INF, PER/CENT/MIDLINE: HCPCS

## 2021-01-01 PROCEDURE — 96368 THER/DIAG CONCURRENT INF: CPT

## 2021-01-01 PROCEDURE — 2709999900 HC NON-CHARGEABLE SUPPLY: Performed by: SPECIALIST

## 2021-01-01 PROCEDURE — 99205 OFFICE O/P NEW HI 60 MIN: CPT | Performed by: INTERNAL MEDICINE

## 2021-01-01 PROCEDURE — 87205 SMEAR GRAM STAIN: CPT

## 2021-01-01 PROCEDURE — 36430 TRANSFUSION BLD/BLD COMPNT: CPT

## 2021-01-01 PROCEDURE — 83550 IRON BINDING TEST: CPT

## 2021-01-01 PROCEDURE — 86140 C-REACTIVE PROTEIN: CPT

## 2021-01-01 PROCEDURE — 96361 HYDRATE IV INFUSION ADD-ON: CPT

## 2021-01-01 PROCEDURE — 31500 INSERT EMERGENCY AIRWAY: CPT

## 2021-01-01 PROCEDURE — 3700000001 HC ADD 15 MINUTES (ANESTHESIA): Performed by: THORACIC SURGERY (CARDIOTHORACIC VASCULAR SURGERY)

## 2021-01-01 PROCEDURE — 87449 NOS EACH ORGANISM AG IA: CPT

## 2021-01-01 PROCEDURE — A9579 GAD-BASE MR CONTRAST NOS,1ML: HCPCS | Performed by: NURSE PRACTITIONER

## 2021-01-01 PROCEDURE — 93005 ELECTROCARDIOGRAM TRACING: CPT | Performed by: EMERGENCY MEDICINE

## 2021-01-01 PROCEDURE — 97116 GAIT TRAINING THERAPY: CPT

## 2021-01-01 PROCEDURE — 93306 TTE W/DOPPLER COMPLETE: CPT

## 2021-01-01 PROCEDURE — 83540 ASSAY OF IRON: CPT

## 2021-01-01 PROCEDURE — 95819 EEG AWAKE AND ASLEEP: CPT

## 2021-01-01 PROCEDURE — 80198 ASSAY OF THEOPHYLLINE: CPT

## 2021-01-01 PROCEDURE — 92526 ORAL FUNCTION THERAPY: CPT

## 2021-01-01 PROCEDURE — 97163 PT EVAL HIGH COMPLEX 45 MIN: CPT

## 2021-01-01 PROCEDURE — 0BH17EZ INSERTION OF ENDOTRACHEAL AIRWAY INTO TRACHEA, VIA NATURAL OR ARTIFICIAL OPENING: ICD-10-PCS | Performed by: INTERNAL MEDICINE

## 2021-01-01 PROCEDURE — 88184 FLOWCYTOMETRY/ TC 1 MARKER: CPT | Performed by: NURSE ANESTHETIST, CERTIFIED REGISTERED

## 2021-01-01 PROCEDURE — 99231 SBSQ HOSP IP/OBS SF/LOW 25: CPT | Performed by: NURSE PRACTITIONER

## 2021-01-01 PROCEDURE — 87116 MYCOBACTERIA CULTURE: CPT

## 2021-01-01 PROCEDURE — 96417 CHEMO IV INFUS EACH ADDL SEQ: CPT

## 2021-01-01 PROCEDURE — 7100000011 HC PHASE II RECOVERY - ADDTL 15 MIN: Performed by: THORACIC SURGERY (CARDIOTHORACIC VASCULAR SURGERY)

## 2021-01-01 PROCEDURE — 82607 VITAMIN B-12: CPT

## 2021-01-01 PROCEDURE — 84443 ASSAY THYROID STIM HORMONE: CPT

## 2021-01-01 PROCEDURE — 92611 MOTION FLUOROSCOPY/SWALLOW: CPT

## 2021-01-01 PROCEDURE — 6360000004 HC RX CONTRAST MEDICATION: Performed by: NURSE PRACTITIONER

## 2021-01-01 PROCEDURE — 97162 PT EVAL MOD COMPLEX 30 MIN: CPT

## 2021-01-01 PROCEDURE — 1111F DSCHRG MED/CURRENT MED MERGE: CPT | Performed by: STUDENT IN AN ORGANIZED HEALTH CARE EDUCATION/TRAINING PROGRAM

## 2021-01-01 PROCEDURE — 2500000003 HC RX 250 WO HCPCS

## 2021-01-01 PROCEDURE — 84439 ASSAY OF FREE THYROXINE: CPT

## 2021-01-01 PROCEDURE — 0202U NFCT DS 22 TRGT SARS-COV-2: CPT

## 2021-01-01 PROCEDURE — 94762 N-INVAS EAR/PLS OXIMTRY CONT: CPT

## 2021-01-01 PROCEDURE — 85014 HEMATOCRIT: CPT

## 2021-01-01 PROCEDURE — 87040 BLOOD CULTURE FOR BACTERIA: CPT

## 2021-01-01 PROCEDURE — 36410 VNPNXR 3YR/> PHY/QHP DX/THER: CPT

## 2021-01-01 PROCEDURE — 88173 CYTOPATH EVAL FNA REPORT: CPT | Performed by: PATHOLOGY

## 2021-01-01 PROCEDURE — 6370000000 HC RX 637 (ALT 250 FOR IP): Performed by: EMERGENCY MEDICINE

## 2021-01-01 PROCEDURE — 99222 1ST HOSP IP/OBS MODERATE 55: CPT | Performed by: INTERNAL MEDICINE

## 2021-01-01 PROCEDURE — 2060000000 HC ICU INTERMEDIATE R&B

## 2021-01-01 PROCEDURE — 99285 EMERGENCY DEPT VISIT HI MDM: CPT

## 2021-01-01 PROCEDURE — U0002 COVID-19 LAB TEST NON-CDC: HCPCS

## 2021-01-01 PROCEDURE — 94002 VENT MGMT INPAT INIT DAY: CPT

## 2021-01-01 PROCEDURE — 99202 OFFICE O/P NEW SF 15 MIN: CPT

## 2021-01-01 PROCEDURE — 82728 ASSAY OF FERRITIN: CPT

## 2021-01-01 PROCEDURE — 87305 ASPERGILLUS AG IA: CPT

## 2021-01-01 PROCEDURE — 2580000003 HC RX 258

## 2021-01-01 PROCEDURE — P9016 RBC LEUKOCYTES REDUCED: HCPCS

## 2021-01-01 PROCEDURE — 31645 BRNCHSC W/THER ASPIR 1ST: CPT

## 2021-01-01 PROCEDURE — 96415 CHEMO IV INFUSION ADDL HR: CPT

## 2021-01-01 PROCEDURE — 85730 THROMBOPLASTIN TIME PARTIAL: CPT

## 2021-01-01 PROCEDURE — 6360000002 HC RX W HCPCS: Performed by: FAMILY MEDICINE

## 2021-01-01 PROCEDURE — 6360000002 HC RX W HCPCS: Performed by: EMERGENCY MEDICINE

## 2021-01-01 PROCEDURE — G0328 FECAL BLOOD SCRN IMMUNOASSAY: HCPCS

## 2021-01-01 PROCEDURE — 31622 DX BRONCHOSCOPE/WASH: CPT

## 2021-01-01 PROCEDURE — 2500000003 HC RX 250 WO HCPCS: Performed by: STUDENT IN AN ORGANIZED HEALTH CARE EDUCATION/TRAINING PROGRAM

## 2021-01-01 PROCEDURE — 82436 ASSAY OF URINE CHLORIDE: CPT

## 2021-01-01 PROCEDURE — 81001 URINALYSIS AUTO W/SCOPE: CPT

## 2021-01-01 PROCEDURE — A9579 GAD-BASE MR CONTRAST NOS,1ML: HCPCS | Performed by: THORACIC SURGERY (CARDIOTHORACIC VASCULAR SURGERY)

## 2021-01-01 PROCEDURE — 96377 APPLICATON ON-BODY INJECTOR: CPT

## 2021-01-01 PROCEDURE — 94669 MECHANICAL CHEST WALL OSCILL: CPT

## 2021-01-01 PROCEDURE — 6360000004 HC RX CONTRAST MEDICATION: Performed by: EMERGENCY MEDICINE

## 2021-01-01 PROCEDURE — 76937 US GUIDE VASCULAR ACCESS: CPT

## 2021-01-01 PROCEDURE — C9803 HOPD COVID-19 SPEC COLLECT: HCPCS

## 2021-01-01 PROCEDURE — 84132 ASSAY OF SERUM POTASSIUM: CPT

## 2021-01-01 PROCEDURE — 87181 SC STD AGAR DILUTION PER AGT: CPT

## 2021-01-01 PROCEDURE — 88177 CYTP FNA EVAL EA ADDL: CPT | Performed by: PATHOLOGY

## 2021-01-01 PROCEDURE — 99214 OFFICE O/P EST MOD 30 MIN: CPT | Performed by: INTERNAL MEDICINE

## 2021-01-01 PROCEDURE — 88185 FLOWCYTOMETRY/TC ADD-ON: CPT | Performed by: NURSE ANESTHETIST, CERTIFIED REGISTERED

## 2021-01-01 PROCEDURE — 74220 X-RAY XM ESOPHAGUS 1CNTRST: CPT

## 2021-01-01 PROCEDURE — 87899 AGENT NOS ASSAY W/OPTIC: CPT

## 2021-01-01 PROCEDURE — 6360000002 HC RX W HCPCS: Performed by: NURSE ANESTHETIST, CERTIFIED REGISTERED

## 2021-01-01 PROCEDURE — 92610 EVALUATE SWALLOWING FUNCTION: CPT

## 2021-01-01 PROCEDURE — 7100000001 HC PACU RECOVERY - ADDTL 15 MIN: Performed by: THORACIC SURGERY (CARDIOTHORACIC VASCULAR SURGERY)

## 2021-01-01 PROCEDURE — 83935 ASSAY OF URINE OSMOLALITY: CPT

## 2021-01-01 PROCEDURE — 0DB58ZX EXCISION OF ESOPHAGUS, VIA NATURAL OR ARTIFICIAL OPENING ENDOSCOPIC, DIAGNOSTIC: ICD-10-PCS | Performed by: SPECIALIST

## 2021-01-01 PROCEDURE — 84133 ASSAY OF URINE POTASSIUM: CPT

## 2021-01-01 PROCEDURE — 2500000003 HC RX 250 WO HCPCS: Performed by: HOSPITALIST

## 2021-01-01 PROCEDURE — 88112 CYTOPATH CELL ENHANCE TECH: CPT | Performed by: PATHOLOGY

## 2021-01-01 PROCEDURE — 93005 ELECTROCARDIOGRAM TRACING: CPT | Performed by: INTERNAL MEDICINE

## 2021-01-01 PROCEDURE — 31653 BRONCH EBUS SAMPLNG 3/> NODE: CPT

## 2021-01-01 PROCEDURE — 87102 FUNGUS ISOLATION CULTURE: CPT

## 2021-01-01 PROCEDURE — 5A1945Z RESPIRATORY VENTILATION, 24-96 CONSECUTIVE HOURS: ICD-10-PCS | Performed by: INTERNAL MEDICINE

## 2021-01-01 PROCEDURE — 94618 PULMONARY STRESS TESTING: CPT

## 2021-01-01 PROCEDURE — 2580000003 HC RX 258: Performed by: ANESTHESIOLOGY

## 2021-01-01 PROCEDURE — 97165 OT EVAL LOW COMPLEX 30 MIN: CPT

## 2021-01-01 PROCEDURE — 5A1955Z RESPIRATORY VENTILATION, GREATER THAN 96 CONSECUTIVE HOURS: ICD-10-PCS | Performed by: INTERNAL MEDICINE

## 2021-01-01 PROCEDURE — 82746 ASSAY OF FOLIC ACID SERUM: CPT

## 2021-01-01 PROCEDURE — 96360 HYDRATION IV INFUSION INIT: CPT

## 2021-01-01 PROCEDURE — 99214 OFFICE O/P EST MOD 30 MIN: CPT | Performed by: STUDENT IN AN ORGANIZED HEALTH CARE EDUCATION/TRAINING PROGRAM

## 2021-01-01 PROCEDURE — APPSS45 APP SPLIT SHARED TIME 31-45 MINUTES: Performed by: NURSE PRACTITIONER

## 2021-01-01 PROCEDURE — 82962 GLUCOSE BLOOD TEST: CPT

## 2021-01-01 PROCEDURE — 88305 TISSUE EXAM BY PATHOLOGIST: CPT

## 2021-01-01 PROCEDURE — 88305 TISSUE EXAM BY PATHOLOGIST: CPT | Performed by: PATHOLOGY

## 2021-01-01 PROCEDURE — 87081 CULTURE SCREEN ONLY: CPT

## 2021-01-01 PROCEDURE — 86870 RBC ANTIBODY IDENTIFICATION: CPT

## 2021-01-01 PROCEDURE — 85652 RBC SED RATE AUTOMATED: CPT

## 2021-01-01 PROCEDURE — 99221 1ST HOSP IP/OBS SF/LOW 40: CPT | Performed by: INTERNAL MEDICINE

## 2021-01-01 PROCEDURE — 99223 1ST HOSP IP/OBS HIGH 75: CPT | Performed by: NURSE PRACTITIONER

## 2021-01-01 PROCEDURE — 3700000000 HC ANESTHESIA ATTENDED CARE: Performed by: SPECIALIST

## 2021-01-01 PROCEDURE — C1726 CATH, BAL DIL, NON-VASCULAR: HCPCS | Performed by: SPECIALIST

## 2021-01-01 PROCEDURE — 31625 BRONCHOSCOPY W/BIOPSY(S): CPT

## 2021-01-01 PROCEDURE — 99232 SBSQ HOSP IP/OBS MODERATE 35: CPT | Performed by: NURSE PRACTITIONER

## 2021-01-01 PROCEDURE — 88312 SPECIAL STAINS GROUP 1: CPT

## 2021-01-01 PROCEDURE — 88112 CYTOPATH CELL ENHANCE TECH: CPT

## 2021-01-01 PROCEDURE — 88341 IMHCHEM/IMCYTCHM EA ADD ANTB: CPT | Performed by: PATHOLOGY

## 2021-01-01 PROCEDURE — 86900 BLOOD TYPING SEROLOGIC ABO: CPT

## 2021-01-01 PROCEDURE — 94760 N-INVAS EAR/PLS OXIMETRY 1: CPT

## 2021-01-01 PROCEDURE — 82330 ASSAY OF CALCIUM: CPT

## 2021-01-01 PROCEDURE — 70450 CT HEAD/BRAIN W/O DYE: CPT

## 2021-01-01 PROCEDURE — 87186 SC STD MICRODIL/AGAR DIL: CPT

## 2021-01-01 PROCEDURE — 99213 OFFICE O/P EST LOW 20 MIN: CPT | Performed by: NURSE PRACTITIONER

## 2021-01-01 PROCEDURE — 83930 ASSAY OF BLOOD OSMOLALITY: CPT

## 2021-01-01 PROCEDURE — 71046 X-RAY EXAM CHEST 2 VIEWS: CPT

## 2021-01-01 PROCEDURE — 93005 ELECTROCARDIOGRAM TRACING: CPT | Performed by: ANESTHESIOLOGY

## 2021-01-01 PROCEDURE — 6360000002 HC RX W HCPCS: Performed by: ANESTHESIOLOGY

## 2021-01-01 PROCEDURE — 84300 ASSAY OF URINE SODIUM: CPT

## 2021-01-01 PROCEDURE — 88172 CYTP DX EVAL FNA 1ST EA SITE: CPT | Performed by: PATHOLOGY

## 2021-01-01 PROCEDURE — 96374 THER/PROPH/DIAG INJ IV PUSH: CPT

## 2021-01-01 RX ORDER — SODIUM CHLORIDE 0.9 % (FLUSH) 0.9 %
5 SYRINGE (ML) INJECTION PRN
Status: ACTIVE | OUTPATIENT
Start: 2021-01-01 | End: 2021-01-01

## 2021-01-01 RX ORDER — DIPHENHYDRAMINE HCL 25 MG
25 TABLET ORAL ONCE
Status: COMPLETED | OUTPATIENT
Start: 2021-01-01 | End: 2021-01-01

## 2021-01-01 RX ORDER — MAGNESIUM OXIDE 400 MG/1
200 TABLET ORAL DAILY
Status: DISCONTINUED | OUTPATIENT
Start: 2021-01-01 | End: 2021-01-01 | Stop reason: HOSPADM

## 2021-01-01 RX ORDER — EPINEPHRINE 1 MG/ML
0.3 INJECTION, SOLUTION, CONCENTRATE INTRAVENOUS PRN
Status: CANCELLED | OUTPATIENT
Start: 2021-01-01

## 2021-01-01 RX ORDER — METHYLPREDNISOLONE 4 MG/1
TABLET ORAL
Qty: 1 KIT | Refills: 0 | Status: SHIPPED | OUTPATIENT
Start: 2021-01-01 | End: 2021-01-01

## 2021-01-01 RX ORDER — ACETAMINOPHEN 325 MG/1
650 TABLET ORAL EVERY 6 HOURS PRN
Status: DISCONTINUED | OUTPATIENT
Start: 2021-01-01 | End: 2021-01-01 | Stop reason: HOSPADM

## 2021-01-01 RX ORDER — SODIUM CHLORIDE 0.9 % (FLUSH) 0.9 %
5 SYRINGE (ML) INJECTION PRN
Status: CANCELLED | OUTPATIENT
Start: 2021-01-01

## 2021-01-01 RX ORDER — PANTOPRAZOLE SODIUM 40 MG/1
40 TABLET, DELAYED RELEASE ORAL
Status: DISCONTINUED | OUTPATIENT
Start: 2021-01-01 | End: 2021-01-01 | Stop reason: HOSPADM

## 2021-01-01 RX ORDER — IPRATROPIUM BROMIDE AND ALBUTEROL SULFATE 2.5; .5 MG/3ML; MG/3ML
1 SOLUTION RESPIRATORY (INHALATION)
Status: DISCONTINUED | OUTPATIENT
Start: 2021-01-01 | End: 2021-01-01

## 2021-01-01 RX ORDER — SODIUM CHLORIDE 0.9 % (FLUSH) 0.9 %
10 SYRINGE (ML) INJECTION PRN
Status: DISCONTINUED | OUTPATIENT
Start: 2021-01-01 | End: 2021-01-01 | Stop reason: HOSPADM

## 2021-01-01 RX ORDER — ONDANSETRON 2 MG/ML
4 INJECTION INTRAMUSCULAR; INTRAVENOUS EVERY 6 HOURS PRN
Status: DISCONTINUED | OUTPATIENT
Start: 2021-01-01 | End: 2021-01-01 | Stop reason: HOSPADM

## 2021-01-01 RX ORDER — METHYLPREDNISOLONE SODIUM SUCCINATE 40 MG/ML
20 INJECTION, POWDER, LYOPHILIZED, FOR SOLUTION INTRAMUSCULAR; INTRAVENOUS DAILY
Status: DISCONTINUED | OUTPATIENT
Start: 2021-01-01 | End: 2021-01-01 | Stop reason: HOSPADM

## 2021-01-01 RX ORDER — IPRATROPIUM BROMIDE AND ALBUTEROL SULFATE 2.5; .5 MG/3ML; MG/3ML
1 SOLUTION RESPIRATORY (INHALATION) EVERY 4 HOURS PRN
Status: DISCONTINUED | OUTPATIENT
Start: 2021-01-01 | End: 2021-01-01 | Stop reason: HOSPADM

## 2021-01-01 RX ORDER — HEPARIN SODIUM (PORCINE) LOCK FLUSH IV SOLN 100 UNIT/ML 100 UNIT/ML
500 SOLUTION INTRAVENOUS PRN
Status: CANCELLED | OUTPATIENT
Start: 2021-01-01

## 2021-01-01 RX ORDER — PROPOFOL 10 MG/ML
INJECTION, EMULSION INTRAVENOUS PRN
Status: DISCONTINUED | OUTPATIENT
Start: 2021-01-01 | End: 2021-01-01 | Stop reason: SDUPTHER

## 2021-01-01 RX ORDER — NICOTINE 21 MG/24HR
1 PATCH, TRANSDERMAL 24 HOURS TRANSDERMAL DAILY
Status: DISCONTINUED | OUTPATIENT
Start: 2021-01-01 | End: 2021-01-01 | Stop reason: HOSPADM

## 2021-01-01 RX ORDER — SODIUM CHLORIDE 0.9 % (FLUSH) 0.9 %
10 SYRINGE (ML) INJECTION EVERY 12 HOURS SCHEDULED
Status: DISCONTINUED | OUTPATIENT
Start: 2021-01-01 | End: 2021-01-01 | Stop reason: HOSPADM

## 2021-01-01 RX ORDER — SODIUM CHLORIDE 0.9 % (FLUSH) 0.9 %
5-40 SYRINGE (ML) INJECTION PRN
Status: DISCONTINUED | OUTPATIENT
Start: 2021-01-01 | End: 2021-01-01 | Stop reason: HOSPADM

## 2021-01-01 RX ORDER — LORAZEPAM 2 MG/ML
1 INJECTION INTRAMUSCULAR ONCE
Status: COMPLETED | OUTPATIENT
Start: 2021-01-01 | End: 2021-01-01

## 2021-01-01 RX ORDER — PANTOPRAZOLE SODIUM 40 MG/10ML
40 INJECTION, POWDER, LYOPHILIZED, FOR SOLUTION INTRAVENOUS DAILY
Status: DISCONTINUED | OUTPATIENT
Start: 2021-01-01 | End: 2021-01-01

## 2021-01-01 RX ORDER — IPRATROPIUM BROMIDE AND ALBUTEROL SULFATE 2.5; .5 MG/3ML; MG/3ML
1 SOLUTION RESPIRATORY (INHALATION) 4 TIMES DAILY
Qty: 360 ML | Refills: 5 | Status: SHIPPED | OUTPATIENT
Start: 2021-01-01

## 2021-01-01 RX ORDER — PROPOFOL 10 MG/ML
20 INJECTION, EMULSION INTRAVENOUS ONCE
Status: COMPLETED | OUTPATIENT
Start: 2021-01-01 | End: 2021-01-01

## 2021-01-01 RX ORDER — SODIUM CHLORIDE 9 MG/ML
INJECTION, SOLUTION INTRAVENOUS CONTINUOUS
Status: CANCELLED | OUTPATIENT
Start: 2021-01-01

## 2021-01-01 RX ORDER — SUCRALFATE ORAL 1 G/10ML
1 SUSPENSION ORAL EVERY 6 HOURS SCHEDULED
Status: DISCONTINUED | OUTPATIENT
Start: 2021-01-01 | End: 2021-01-01

## 2021-01-01 RX ORDER — ACETAMINOPHEN 650 MG/1
650 SUPPOSITORY RECTAL EVERY 6 HOURS PRN
Status: CANCELLED | OUTPATIENT
Start: 2021-01-01

## 2021-01-01 RX ORDER — SODIUM CHLORIDE 9 MG/ML
20 INJECTION, SOLUTION INTRAVENOUS ONCE
Status: CANCELLED | OUTPATIENT
Start: 2021-01-01 | End: 2021-01-01

## 2021-01-01 RX ORDER — DILTIAZEM HYDROCHLORIDE 240 MG/1
240 CAPSULE, COATED, EXTENDED RELEASE ORAL DAILY
Status: DISCONTINUED | OUTPATIENT
Start: 2021-01-01 | End: 2021-01-01 | Stop reason: HOSPADM

## 2021-01-01 RX ORDER — EPINEPHRINE 1 MG/ML
0.3 INJECTION, SOLUTION, CONCENTRATE INTRAVENOUS PRN
Status: DISCONTINUED | OUTPATIENT
Start: 2021-01-01 | End: 2021-01-01 | Stop reason: HOSPADM

## 2021-01-01 RX ORDER — DOCUSATE SODIUM 100 MG/1
100 CAPSULE, LIQUID FILLED ORAL DAILY PRN
Status: DISCONTINUED | OUTPATIENT
Start: 2021-01-01 | End: 2021-01-01 | Stop reason: HOSPADM

## 2021-01-01 RX ORDER — METHYLPREDNISOLONE SODIUM SUCCINATE 125 MG/2ML
125 INJECTION, POWDER, LYOPHILIZED, FOR SOLUTION INTRAMUSCULAR; INTRAVENOUS ONCE
Status: CANCELLED | OUTPATIENT
Start: 2021-01-01 | End: 2021-01-01

## 2021-01-01 RX ORDER — LORAZEPAM 2 MG/ML
0.5 INJECTION INTRAMUSCULAR
Status: DISCONTINUED | OUTPATIENT
Start: 2021-01-01 | End: 2021-01-01 | Stop reason: HOSPADM

## 2021-01-01 RX ORDER — PROPOFOL 10 MG/ML
5-80 INJECTION, EMULSION INTRAVENOUS CONTINUOUS
Status: DISCONTINUED | OUTPATIENT
Start: 2021-01-01 | End: 2021-01-01

## 2021-01-01 RX ORDER — POLYETHYLENE GLYCOL 3350 17 G/17G
17 POWDER, FOR SOLUTION ORAL DAILY PRN
Status: DISCONTINUED | OUTPATIENT
Start: 2021-01-01 | End: 2021-01-01 | Stop reason: HOSPADM

## 2021-01-01 RX ORDER — DIPHENHYDRAMINE HYDROCHLORIDE 50 MG/ML
50 INJECTION INTRAMUSCULAR; INTRAVENOUS ONCE
Status: CANCELLED | OUTPATIENT
Start: 2021-01-01 | End: 2021-01-01

## 2021-01-01 RX ORDER — DOCUSATE SODIUM 100 MG/1
100 CAPSULE, LIQUID FILLED ORAL 2 TIMES DAILY
Status: DISCONTINUED | OUTPATIENT
Start: 2021-01-01 | End: 2021-01-01 | Stop reason: HOSPADM

## 2021-01-01 RX ORDER — DIGOXIN 0.25 MG/ML
125 INJECTION INTRAMUSCULAR; INTRAVENOUS ONCE
Status: DISCONTINUED | OUTPATIENT
Start: 2021-01-01 | End: 2021-01-01

## 2021-01-01 RX ORDER — HEPARIN SODIUM (PORCINE) LOCK FLUSH IV SOLN 100 UNIT/ML 100 UNIT/ML
500 SOLUTION INTRAVENOUS PRN
Status: ACTIVE | OUTPATIENT
Start: 2021-01-01 | End: 2021-01-01

## 2021-01-01 RX ORDER — MINOCYCLINE HYDROCHLORIDE 100 MG/1
100 CAPSULE ORAL 2 TIMES DAILY
Qty: 10 CAPSULE | Refills: 0 | Status: SHIPPED | OUTPATIENT
Start: 2021-01-01 | End: 2021-01-01

## 2021-01-01 RX ORDER — DIPHENHYDRAMINE HCL 25 MG
50 TABLET ORAL EVERY 6 HOURS PRN
COMMUNITY

## 2021-01-01 RX ORDER — HYDROCODONE BITARTRATE AND ACETAMINOPHEN 5; 325 MG/1; MG/1
1 TABLET ORAL ONCE
Status: COMPLETED | OUTPATIENT
Start: 2021-01-01 | End: 2021-01-01

## 2021-01-01 RX ORDER — SODIUM CHLORIDE 0.9 % (FLUSH) 0.9 %
10 SYRINGE (ML) INJECTION PRN
Status: CANCELLED | OUTPATIENT
Start: 2021-01-01

## 2021-01-01 RX ORDER — HYDROXYZINE PAMOATE 25 MG/1
50 CAPSULE ORAL ONCE
Status: COMPLETED | OUTPATIENT
Start: 2021-01-01 | End: 2021-01-01

## 2021-01-01 RX ORDER — MORPHINE SULFATE 2 MG/ML
2 INJECTION, SOLUTION INTRAMUSCULAR; INTRAVENOUS
Status: DISCONTINUED | OUTPATIENT
Start: 2021-01-01 | End: 2021-01-01 | Stop reason: HOSPADM

## 2021-01-01 RX ORDER — SODIUM CHLORIDE 9 MG/ML
20 INJECTION, SOLUTION INTRAVENOUS ONCE
Status: DISCONTINUED | OUTPATIENT
Start: 2021-01-01 | End: 2021-01-01 | Stop reason: HOSPADM

## 2021-01-01 RX ORDER — MORPHINE SULFATE 2 MG/ML
2 INJECTION, SOLUTION INTRAMUSCULAR; INTRAVENOUS
Status: CANCELLED | OUTPATIENT
Start: 2021-01-01

## 2021-01-01 RX ORDER — PROMETHAZINE HYDROCHLORIDE 25 MG/1
12.5 TABLET ORAL EVERY 6 HOURS PRN
Status: DISCONTINUED | OUTPATIENT
Start: 2021-01-01 | End: 2021-01-01 | Stop reason: HOSPADM

## 2021-01-01 RX ORDER — ONDANSETRON HYDROCHLORIDE 8 MG/1
8 TABLET, FILM COATED ORAL EVERY 8 HOURS PRN
Qty: 30 TABLET | Refills: 2 | Status: SHIPPED | OUTPATIENT
Start: 2021-01-01

## 2021-01-01 RX ORDER — MORPHINE SULFATE 2 MG/ML
1 INJECTION, SOLUTION INTRAMUSCULAR; INTRAVENOUS ONCE
Status: COMPLETED | OUTPATIENT
Start: 2021-01-01 | End: 2021-01-01

## 2021-01-01 RX ORDER — CHLORHEXIDINE GLUCONATE 0.12 MG/ML
15 RINSE ORAL 2 TIMES DAILY
Status: DISCONTINUED | OUTPATIENT
Start: 2021-01-01 | End: 2021-01-01

## 2021-01-01 RX ORDER — BUPRENORPHINE AND NALOXONE 8; 2 MG/1; MG/1
1 FILM, SOLUBLE BUCCAL; SUBLINGUAL 3 TIMES DAILY
Status: DISCONTINUED | OUTPATIENT
Start: 2021-01-01 | End: 2021-01-01

## 2021-01-01 RX ORDER — CALCIUM CARBONATE 500(1250)
1 TABLET ORAL DAILY
Status: DISCONTINUED | OUTPATIENT
Start: 2021-01-01 | End: 2021-01-01 | Stop reason: HOSPADM

## 2021-01-01 RX ORDER — MORPHINE SULFATE 20 MG/ML
SOLUTION ORAL
Qty: 30 ML | Refills: 0 | OUTPATIENT
Start: 2021-01-01 | End: 2021-06-04

## 2021-01-01 RX ORDER — HYDROCODONE BITARTRATE AND ACETAMINOPHEN 10; 325 MG/1; MG/1
1 TABLET ORAL EVERY 4 HOURS PRN
Qty: 180 TABLET | Refills: 0 | Status: SHIPPED | OUTPATIENT
Start: 2021-01-01 | End: 2021-01-01 | Stop reason: SDUPTHER

## 2021-01-01 RX ORDER — ALBUTEROL SULFATE 2.5 MG/3ML
2.5 SOLUTION RESPIRATORY (INHALATION)
Status: DISCONTINUED | OUTPATIENT
Start: 2021-01-01 | End: 2021-01-01 | Stop reason: HOSPADM

## 2021-01-01 RX ORDER — DIPHENHYDRAMINE HCL 25 MG
50 TABLET ORAL EVERY 6 HOURS PRN
Status: CANCELLED | OUTPATIENT
Start: 2021-01-01

## 2021-01-01 RX ORDER — ASPIRIN 81 MG/1
81 TABLET ORAL DAILY
Status: DISCONTINUED | OUTPATIENT
Start: 2021-01-01 | End: 2021-01-01 | Stop reason: HOSPADM

## 2021-01-01 RX ORDER — HYDROCODONE BITARTRATE AND ACETAMINOPHEN 10; 325 MG/1; MG/1
1 TABLET ORAL EVERY 4 HOURS PRN
Status: DISCONTINUED | OUTPATIENT
Start: 2021-01-01 | End: 2021-01-01 | Stop reason: HOSPADM

## 2021-01-01 RX ORDER — ALBUTEROL SULFATE 90 UG/1
2 AEROSOL, METERED RESPIRATORY (INHALATION) 4 TIMES DAILY
Status: DISCONTINUED | OUTPATIENT
Start: 2021-01-01 | End: 2021-01-01 | Stop reason: HOSPADM

## 2021-01-01 RX ORDER — SUCRALFATE 1 G/1
1 TABLET ORAL 4 TIMES DAILY
Status: DISCONTINUED | OUTPATIENT
Start: 2021-01-01 | End: 2021-01-01 | Stop reason: HOSPADM

## 2021-01-01 RX ORDER — LORAZEPAM 1 MG/1
1 TABLET ORAL EVERY 6 HOURS PRN
Status: DISCONTINUED | OUTPATIENT
Start: 2021-01-01 | End: 2021-01-01

## 2021-01-01 RX ORDER — LEVOFLOXACIN 750 MG/1
750 TABLET ORAL DAILY
Status: DISCONTINUED | OUTPATIENT
Start: 2021-01-01 | End: 2021-01-01 | Stop reason: HOSPADM

## 2021-01-01 RX ORDER — FENTANYL CITRATE 50 UG/ML
INJECTION, SOLUTION INTRAMUSCULAR; INTRAVENOUS
Status: COMPLETED
Start: 2021-01-01 | End: 2021-01-01

## 2021-01-01 RX ORDER — IPRATROPIUM BROMIDE AND ALBUTEROL SULFATE 2.5; .5 MG/3ML; MG/3ML
1 SOLUTION RESPIRATORY (INHALATION) 4 TIMES DAILY
Status: CANCELLED | OUTPATIENT
Start: 2021-01-01

## 2021-01-01 RX ORDER — SUCCINYLCHOLINE/SOD CL,ISO/PF 200MG/10ML
100 SYRINGE (ML) INTRAVENOUS ONCE
Status: COMPLETED | OUTPATIENT
Start: 2021-01-01 | End: 2021-01-01

## 2021-01-01 RX ORDER — MINOCYCLINE HYDROCHLORIDE 100 MG/1
200 CAPSULE ORAL ONCE
Status: COMPLETED | OUTPATIENT
Start: 2021-01-01 | End: 2021-01-01

## 2021-01-01 RX ORDER — METHYLPREDNISOLONE SODIUM SUCCINATE 40 MG/ML
40 INJECTION, POWDER, LYOPHILIZED, FOR SOLUTION INTRAMUSCULAR; INTRAVENOUS EVERY 8 HOURS
Status: DISCONTINUED | OUTPATIENT
Start: 2021-01-01 | End: 2021-01-01 | Stop reason: HOSPADM

## 2021-01-01 RX ORDER — SODIUM CHLORIDE, SODIUM LACTATE, POTASSIUM CHLORIDE, CALCIUM CHLORIDE 600; 310; 30; 20 MG/100ML; MG/100ML; MG/100ML; MG/100ML
INJECTION, SOLUTION INTRAVENOUS CONTINUOUS
Status: DISCONTINUED | OUTPATIENT
Start: 2021-01-01 | End: 2021-01-01 | Stop reason: HOSPADM

## 2021-01-01 RX ORDER — PALONOSETRON 0.05 MG/ML
0.25 INJECTION, SOLUTION INTRAVENOUS ONCE
Status: COMPLETED | OUTPATIENT
Start: 2021-01-01 | End: 2021-01-01

## 2021-01-01 RX ORDER — ACETAMINOPHEN 650 MG/1
650 SUPPOSITORY RECTAL EVERY 6 HOURS PRN
Status: DISCONTINUED | OUTPATIENT
Start: 2021-01-01 | End: 2021-01-01 | Stop reason: HOSPADM

## 2021-01-01 RX ORDER — CALCIUM CARBONATE 500(1250)
1 TABLET ORAL
Status: DISCONTINUED | OUTPATIENT
Start: 2021-01-01 | End: 2021-01-01 | Stop reason: HOSPADM

## 2021-01-01 RX ORDER — GREEN TEA/HOODIA GORDONII 315-12.5MG
1 CAPSULE ORAL DAILY
Refills: 0 | DISCHARGE
Start: 2021-01-01 | End: 2021-01-01

## 2021-01-01 RX ORDER — MAGNESIUM SULFATE IN WATER 40 MG/ML
2000 INJECTION, SOLUTION INTRAVENOUS ONCE
Status: COMPLETED | OUTPATIENT
Start: 2021-01-01 | End: 2021-01-01

## 2021-01-01 RX ORDER — ACETYLCYSTEINE 200 MG/ML
600 SOLUTION ORAL; RESPIRATORY (INHALATION) 2 TIMES DAILY
Status: DISCONTINUED | OUTPATIENT
Start: 2021-01-01 | End: 2021-01-01 | Stop reason: HOSPADM

## 2021-01-01 RX ORDER — METOPROLOL TARTRATE 5 MG/5ML
2.5 INJECTION INTRAVENOUS EVERY 6 HOURS PRN
Status: DISCONTINUED | OUTPATIENT
Start: 2021-01-01 | End: 2021-01-01 | Stop reason: HOSPADM

## 2021-01-01 RX ORDER — SODIUM CHLORIDE 0.9 % (FLUSH) 0.9 %
5-40 SYRINGE (ML) INJECTION PRN
Status: CANCELLED | OUTPATIENT
Start: 2021-01-01

## 2021-01-01 RX ORDER — IPRATROPIUM BROMIDE AND ALBUTEROL SULFATE 2.5; .5 MG/3ML; MG/3ML
SOLUTION RESPIRATORY (INHALATION)
Status: DISPENSED
Start: 2021-01-01 | End: 2021-01-01

## 2021-01-01 RX ORDER — LABETALOL HYDROCHLORIDE 5 MG/ML
5 INJECTION, SOLUTION INTRAVENOUS EVERY 10 MIN PRN
Status: DISCONTINUED | OUTPATIENT
Start: 2021-01-01 | End: 2021-01-01 | Stop reason: HOSPADM

## 2021-01-01 RX ORDER — HYDRALAZINE HYDROCHLORIDE 20 MG/ML
5 INJECTION INTRAMUSCULAR; INTRAVENOUS EVERY 10 MIN PRN
Status: DISCONTINUED | OUTPATIENT
Start: 2021-01-01 | End: 2021-01-01 | Stop reason: HOSPADM

## 2021-01-01 RX ORDER — GLYCOPYRROLATE 1 MG/5 ML
0.2 SYRINGE (ML) INTRAVENOUS EVERY 4 HOURS PRN
Status: DISCONTINUED | OUTPATIENT
Start: 2021-01-01 | End: 2021-01-01 | Stop reason: HOSPADM

## 2021-01-01 RX ORDER — DOCUSATE SODIUM 100 MG/1
200 CAPSULE, LIQUID FILLED ORAL DAILY
Status: DISCONTINUED | OUTPATIENT
Start: 2021-01-01 | End: 2021-01-01

## 2021-01-01 RX ORDER — PROMETHAZINE HYDROCHLORIDE 25 MG/1
12.5 TABLET ORAL EVERY 6 HOURS PRN
Status: CANCELLED | OUTPATIENT
Start: 2021-01-01

## 2021-01-01 RX ORDER — SODIUM CHLORIDE 9 MG/ML
25 INJECTION, SOLUTION INTRAVENOUS PRN
Status: DISCONTINUED | OUTPATIENT
Start: 2021-01-01 | End: 2021-01-01 | Stop reason: HOSPADM

## 2021-01-01 RX ORDER — DEXAMETHASONE SODIUM PHOSPHATE 4 MG/ML
8 INJECTION, SOLUTION INTRA-ARTICULAR; INTRALESIONAL; INTRAMUSCULAR; INTRAVENOUS; SOFT TISSUE ONCE
Status: COMPLETED | OUTPATIENT
Start: 2021-01-01 | End: 2021-01-01

## 2021-01-01 RX ORDER — DILTIAZEM HYDROCHLORIDE 5 MG/ML
20 INJECTION INTRAVENOUS ONCE
Status: COMPLETED | OUTPATIENT
Start: 2021-01-01 | End: 2021-01-01

## 2021-01-01 RX ORDER — GUAIFENESIN 600 MG/1
600 TABLET, EXTENDED RELEASE ORAL 2 TIMES DAILY
Status: CANCELLED | OUTPATIENT
Start: 2021-01-01

## 2021-01-01 RX ORDER — THEOPHYLLINE 400 MG/1
200 TABLET, EXTENDED RELEASE ORAL DAILY
Status: DISCONTINUED | OUTPATIENT
Start: 2021-01-01 | End: 2021-01-01

## 2021-01-01 RX ORDER — METHYLPREDNISOLONE SODIUM SUCCINATE 40 MG/ML
40 INJECTION, POWDER, LYOPHILIZED, FOR SOLUTION INTRAMUSCULAR; INTRAVENOUS EVERY 6 HOURS
Status: DISCONTINUED | OUTPATIENT
Start: 2021-01-01 | End: 2021-01-01

## 2021-01-01 RX ORDER — METHYLPREDNISOLONE SODIUM SUCCINATE 40 MG/ML
40 INJECTION, POWDER, LYOPHILIZED, FOR SOLUTION INTRAMUSCULAR; INTRAVENOUS EVERY 6 HOURS
Status: COMPLETED | OUTPATIENT
Start: 2021-01-01 | End: 2021-01-01

## 2021-01-01 RX ORDER — SODIUM CHLORIDE 0.9 % (FLUSH) 0.9 %
5-40 SYRINGE (ML) INJECTION EVERY 12 HOURS SCHEDULED
Status: DISCONTINUED | OUTPATIENT
Start: 2021-01-01 | End: 2021-01-01 | Stop reason: HOSPADM

## 2021-01-01 RX ORDER — DOCUSATE SODIUM 100 MG/1
100 CAPSULE, LIQUID FILLED ORAL DAILY
Status: DISCONTINUED | OUTPATIENT
Start: 2021-01-01 | End: 2021-01-01 | Stop reason: HOSPADM

## 2021-01-01 RX ORDER — ETOMIDATE 2 MG/ML
INJECTION INTRAVENOUS DAILY PRN
Status: COMPLETED | OUTPATIENT
Start: 2021-01-01 | End: 2021-01-01

## 2021-01-01 RX ORDER — IPRATROPIUM BROMIDE AND ALBUTEROL SULFATE 2.5; .5 MG/3ML; MG/3ML
3 SOLUTION RESPIRATORY (INHALATION) ONCE
Status: COMPLETED | OUTPATIENT
Start: 2021-01-01 | End: 2021-01-01

## 2021-01-01 RX ORDER — GUAIFENESIN 100 MG/5ML
200 SOLUTION ORAL 3 TIMES DAILY PRN
Status: DISCONTINUED | OUTPATIENT
Start: 2021-01-01 | End: 2021-01-01 | Stop reason: HOSPADM

## 2021-01-01 RX ORDER — LORAZEPAM 1 MG/1
1 TABLET ORAL EVERY 8 HOURS PRN
Status: ON HOLD | COMMUNITY
End: 2021-01-01 | Stop reason: HOSPADM

## 2021-01-01 RX ORDER — LORAZEPAM 2 MG/ML
0.5 INJECTION INTRAMUSCULAR
Status: CANCELLED | OUTPATIENT
Start: 2021-01-01

## 2021-01-01 RX ORDER — DIPHENHYDRAMINE HCL 25 MG
50 TABLET ORAL EVERY 6 HOURS PRN
Status: DISCONTINUED | OUTPATIENT
Start: 2021-01-01 | End: 2021-01-01 | Stop reason: HOSPADM

## 2021-01-01 RX ORDER — DEXTROSE MONOHYDRATE 25 G/50ML
12.5 INJECTION, SOLUTION INTRAVENOUS PRN
Status: DISCONTINUED | OUTPATIENT
Start: 2021-01-01 | End: 2021-01-01 | Stop reason: HOSPADM

## 2021-01-01 RX ORDER — LORAZEPAM 1 MG/1
1 TABLET ORAL EVERY 8 HOURS PRN
Qty: 45 TABLET | Refills: 0 | Status: SHIPPED | OUTPATIENT
Start: 2021-01-01 | End: 2021-05-21

## 2021-01-01 RX ORDER — DIGOXIN 0.25 MG/ML
125 INJECTION INTRAMUSCULAR; INTRAVENOUS ONCE
Status: COMPLETED | OUTPATIENT
Start: 2021-01-01 | End: 2021-01-01

## 2021-01-01 RX ORDER — IPRATROPIUM BROMIDE AND ALBUTEROL SULFATE 2.5; .5 MG/3ML; MG/3ML
1 SOLUTION RESPIRATORY (INHALATION)
Status: DISCONTINUED | OUTPATIENT
Start: 2021-01-01 | End: 2021-01-01 | Stop reason: SDUPTHER

## 2021-01-01 RX ORDER — PREDNISONE 20 MG/1
40 TABLET ORAL DAILY
Status: DISCONTINUED | OUTPATIENT
Start: 2021-01-01 | End: 2021-01-01

## 2021-01-01 RX ORDER — CETIRIZINE HYDROCHLORIDE 10 MG/1
10 TABLET ORAL DAILY
Status: DISCONTINUED | OUTPATIENT
Start: 2021-01-01 | End: 2021-01-01 | Stop reason: HOSPADM

## 2021-01-01 RX ORDER — BUSPIRONE HYDROCHLORIDE 10 MG/1
10 TABLET ORAL 2 TIMES DAILY
Status: DISCONTINUED | OUTPATIENT
Start: 2021-01-01 | End: 2021-01-01 | Stop reason: HOSPADM

## 2021-01-01 RX ORDER — PROPOFOL 10 MG/ML
10 INJECTION, EMULSION INTRAVENOUS CONTINUOUS
Status: DISCONTINUED | OUTPATIENT
Start: 2021-01-01 | End: 2021-01-01

## 2021-01-01 RX ORDER — LIDOCAINE HYDROCHLORIDE 10 MG/ML
5 INJECTION, SOLUTION EPIDURAL; INFILTRATION; INTRACAUDAL; PERINEURAL ONCE
Status: COMPLETED | OUTPATIENT
Start: 2021-01-01 | End: 2021-01-01

## 2021-01-01 RX ORDER — ROCURONIUM BROMIDE 10 MG/ML
INJECTION, SOLUTION INTRAVENOUS DAILY PRN
Status: COMPLETED | OUTPATIENT
Start: 2021-01-01 | End: 2021-01-01

## 2021-01-01 RX ORDER — IPRATROPIUM BROMIDE AND ALBUTEROL SULFATE 2.5; .5 MG/3ML; MG/3ML
1 SOLUTION RESPIRATORY (INHALATION) 4 TIMES DAILY
Status: DISCONTINUED | OUTPATIENT
Start: 2021-01-01 | End: 2021-01-01 | Stop reason: HOSPADM

## 2021-01-01 RX ORDER — NICOTINE POLACRILEX 4 MG
15 LOZENGE BUCCAL PRN
Status: DISCONTINUED | OUTPATIENT
Start: 2021-01-01 | End: 2021-01-01 | Stop reason: HOSPADM

## 2021-01-01 RX ORDER — LISINOPRIL 5 MG/1
5 TABLET ORAL DAILY
Status: DISCONTINUED | OUTPATIENT
Start: 2021-01-01 | End: 2021-01-01 | Stop reason: HOSPADM

## 2021-01-01 RX ORDER — PANTOPRAZOLE SODIUM 40 MG/10ML
40 INJECTION, POWDER, LYOPHILIZED, FOR SOLUTION INTRAVENOUS
Status: DISCONTINUED | OUTPATIENT
Start: 2021-01-01 | End: 2021-01-01

## 2021-01-01 RX ORDER — PROPOFOL 10 MG/ML
INJECTION, EMULSION INTRAVENOUS
Status: COMPLETED
Start: 2021-01-01 | End: 2021-01-01

## 2021-01-01 RX ORDER — HYDROCODONE BITARTRATE AND ACETAMINOPHEN 5; 325 MG/1; MG/1
2 TABLET ORAL ONCE
Status: COMPLETED | OUTPATIENT
Start: 2021-01-01 | End: 2021-01-01

## 2021-01-01 RX ORDER — NICOTINE 21 MG/24HR
1 PATCH, TRANSDERMAL 24 HOURS TRANSDERMAL DAILY
Qty: 30 PATCH | Refills: 3 | DISCHARGE
Start: 2021-01-01

## 2021-01-01 RX ORDER — OMEPRAZOLE 20 MG/1
20 CAPSULE, DELAYED RELEASE ORAL DAILY
COMMUNITY

## 2021-01-01 RX ORDER — MAGNESIUM OXIDE 400 MG/1
400 TABLET ORAL DAILY
Status: DISCONTINUED | OUTPATIENT
Start: 2021-01-01 | End: 2021-01-01 | Stop reason: HOSPADM

## 2021-01-01 RX ORDER — SODIUM CHLORIDE 9 MG/ML
20 INJECTION, SOLUTION INTRAVENOUS ONCE
Status: COMPLETED | OUTPATIENT
Start: 2021-01-01 | End: 2021-01-01

## 2021-01-01 RX ORDER — ALBUTEROL SULFATE 90 UG/1
4 AEROSOL, METERED RESPIRATORY (INHALATION) EVERY 4 HOURS
Status: DISCONTINUED | OUTPATIENT
Start: 2021-01-01 | End: 2021-01-01

## 2021-01-01 RX ORDER — DOCUSATE SODIUM 100 MG/1
200 CAPSULE, LIQUID FILLED ORAL DAILY
Status: DISCONTINUED | OUTPATIENT
Start: 2021-01-01 | End: 2021-01-01 | Stop reason: HOSPADM

## 2021-01-01 RX ORDER — LIDOCAINE HYDROCHLORIDE 20 MG/ML
INJECTION, SOLUTION INTRAVENOUS PRN
Status: DISCONTINUED | OUTPATIENT
Start: 2021-01-01 | End: 2021-01-01 | Stop reason: SDUPTHER

## 2021-01-01 RX ORDER — SUCRALFATE 1 G/1
1 TABLET ORAL 4 TIMES DAILY
Qty: 120 TABLET | Refills: 3 | Status: SHIPPED | OUTPATIENT
Start: 2021-01-01 | End: 2021-01-01

## 2021-01-01 RX ORDER — METHYLPREDNISOLONE SODIUM SUCCINATE 40 MG/ML
40 INJECTION, POWDER, LYOPHILIZED, FOR SOLUTION INTRAMUSCULAR; INTRAVENOUS DAILY
Status: DISCONTINUED | OUTPATIENT
Start: 2021-01-01 | End: 2021-01-01

## 2021-01-01 RX ORDER — ROCURONIUM BROMIDE 10 MG/ML
INJECTION, SOLUTION INTRAVENOUS PRN
Status: DISCONTINUED | OUTPATIENT
Start: 2021-01-01 | End: 2021-01-01 | Stop reason: SDUPTHER

## 2021-01-01 RX ORDER — ATORVASTATIN CALCIUM 10 MG/1
10 TABLET, FILM COATED ORAL DAILY
COMMUNITY

## 2021-01-01 RX ORDER — ALBUTEROL SULFATE 2.5 MG/3ML
2.5 SOLUTION RESPIRATORY (INHALATION)
Status: CANCELLED | OUTPATIENT
Start: 2021-01-01

## 2021-01-01 RX ORDER — SODIUM CHLORIDE 9 MG/ML
INJECTION, SOLUTION INTRAVENOUS CONTINUOUS
Status: DISCONTINUED | OUTPATIENT
Start: 2021-01-01 | End: 2021-01-01

## 2021-01-01 RX ORDER — SODIUM CHLORIDE 0.9 % (FLUSH) 0.9 %
10 SYRINGE (ML) INJECTION PRN
Status: ACTIVE | OUTPATIENT
Start: 2021-01-01 | End: 2021-01-01

## 2021-01-01 RX ORDER — FENTANYL CITRATE 50 UG/ML
25 INJECTION, SOLUTION INTRAMUSCULAR; INTRAVENOUS EVERY 5 MIN PRN
Status: DISCONTINUED | OUTPATIENT
Start: 2021-01-01 | End: 2021-01-01 | Stop reason: HOSPADM

## 2021-01-01 RX ORDER — SUCRALFATE 1 G/1
1 TABLET ORAL EVERY 6 HOURS SCHEDULED
Status: DISCONTINUED | OUTPATIENT
Start: 2021-01-01 | End: 2021-01-01 | Stop reason: HOSPADM

## 2021-01-01 RX ORDER — MAGNESIUM SULFATE 1 G/100ML
1000 INJECTION INTRAVENOUS PRN
Status: DISCONTINUED | OUTPATIENT
Start: 2021-01-01 | End: 2021-01-01 | Stop reason: HOSPADM

## 2021-01-01 RX ORDER — LISINOPRIL 5 MG/1
5 TABLET ORAL DAILY
Status: DISCONTINUED | OUTPATIENT
Start: 2021-01-01 | End: 2021-01-01

## 2021-01-01 RX ORDER — DILTIAZEM HYDROCHLORIDE 120 MG/1
120 CAPSULE, COATED, EXTENDED RELEASE ORAL DAILY
Status: DISCONTINUED | OUTPATIENT
Start: 2021-01-01 | End: 2021-01-01 | Stop reason: HOSPADM

## 2021-01-01 RX ORDER — LORAZEPAM 2 MG/ML
0.5 INJECTION INTRAMUSCULAR EVERY 8 HOURS PRN
Status: DISCONTINUED | OUTPATIENT
Start: 2021-01-01 | End: 2021-01-01 | Stop reason: HOSPADM

## 2021-01-01 RX ORDER — MORPHINE SULFATE 4 MG/ML
4 INJECTION, SOLUTION INTRAMUSCULAR; INTRAVENOUS EVERY 6 HOURS SCHEDULED
Status: DISCONTINUED | OUTPATIENT
Start: 2021-01-01 | End: 2021-01-01 | Stop reason: HOSPADM

## 2021-01-01 RX ORDER — POLYETHYLENE GLYCOL 3350 17 G/17G
17 POWDER, FOR SOLUTION ORAL DAILY PRN
Status: CANCELLED | OUTPATIENT
Start: 2021-01-01

## 2021-01-01 RX ORDER — DIGOXIN 0.25 MG/ML
INJECTION INTRAMUSCULAR; INTRAVENOUS
Status: DISPENSED
Start: 2021-01-01 | End: 2021-01-01

## 2021-01-01 RX ORDER — POTASSIUM CHLORIDE 750 MG/1
10 TABLET, FILM COATED, EXTENDED RELEASE ORAL 2 TIMES DAILY
Status: DISCONTINUED | OUTPATIENT
Start: 2021-01-01 | End: 2021-01-01

## 2021-01-01 RX ORDER — GUAIFENESIN 100 MG/5ML
200 SOLUTION ORAL 3 TIMES DAILY PRN
Status: DISCONTINUED | OUTPATIENT
Start: 2021-01-01 | End: 2021-01-01

## 2021-01-01 RX ORDER — BISACODYL 10 MG
10 SUPPOSITORY, RECTAL RECTAL DAILY PRN
Status: DISCONTINUED | OUTPATIENT
Start: 2021-01-01 | End: 2021-01-01 | Stop reason: HOSPADM

## 2021-01-01 RX ORDER — SODIUM CHLORIDE 9 MG/ML
INJECTION, SOLUTION INTRAVENOUS ONCE
Status: COMPLETED | OUTPATIENT
Start: 2021-01-01 | End: 2021-01-01

## 2021-01-01 RX ORDER — LACTOBACILLUS RHAMNOSUS GG 10B CELL
1 CAPSULE ORAL
Qty: 27 CAPSULE | Refills: 0 | Status: ON HOLD | OUTPATIENT
Start: 2021-01-01 | End: 2021-01-01 | Stop reason: HOSPADM

## 2021-01-01 RX ORDER — DEXAMETHASONE 4 MG/1
TABLET ORAL
Qty: 16 TABLET | Refills: 0 | Status: ON HOLD | OUTPATIENT
Start: 2021-01-01 | End: 2021-01-01 | Stop reason: HOSPADM

## 2021-01-01 RX ORDER — LACTOBACILLUS RHAMNOSUS GG 10B CELL
1 CAPSULE ORAL
Status: DISCONTINUED | OUTPATIENT
Start: 2021-01-01 | End: 2021-01-01 | Stop reason: HOSPADM

## 2021-01-01 RX ORDER — LORAZEPAM 1 MG/1
1 TABLET ORAL EVERY 8 HOURS PRN
Qty: 21 TABLET | Refills: 0 | Status: ON HOLD | OUTPATIENT
Start: 2021-01-01 | End: 2021-01-01 | Stop reason: HOSPADM

## 2021-01-01 RX ORDER — OMEPRAZOLE 20 MG/1
20 CAPSULE, DELAYED RELEASE ORAL DAILY
Status: DISCONTINUED | OUTPATIENT
Start: 2021-01-01 | End: 2021-01-01

## 2021-01-01 RX ORDER — MAGNESIUM SULFATE 4 G/50ML
4000 INJECTION INTRAVENOUS ONCE
Status: COMPLETED | OUTPATIENT
Start: 2021-01-01 | End: 2021-01-01

## 2021-01-01 RX ORDER — MORPHINE SULFATE 4 MG/ML
4 INJECTION, SOLUTION INTRAMUSCULAR; INTRAVENOUS
Status: DISCONTINUED | OUTPATIENT
Start: 2021-01-01 | End: 2021-01-01 | Stop reason: HOSPADM

## 2021-01-01 RX ORDER — MORPHINE SULFATE 4 MG/ML
4 INJECTION, SOLUTION INTRAMUSCULAR; INTRAVENOUS
Status: CANCELLED | OUTPATIENT
Start: 2021-01-01

## 2021-01-01 RX ORDER — SODIUM CHLORIDE 0.9 % (FLUSH) 0.9 %
5-40 SYRINGE (ML) INJECTION EVERY 12 HOURS SCHEDULED
Status: CANCELLED | OUTPATIENT
Start: 2021-01-01

## 2021-01-01 RX ORDER — METHYLPREDNISOLONE SODIUM SUCCINATE 40 MG/ML
40 INJECTION, POWDER, LYOPHILIZED, FOR SOLUTION INTRAMUSCULAR; INTRAVENOUS EVERY 8 HOURS
Status: CANCELLED | OUTPATIENT
Start: 2021-01-01

## 2021-01-01 RX ORDER — POTASSIUM CHLORIDE 7.45 MG/ML
10 INJECTION INTRAVENOUS PRN
Status: DISCONTINUED | OUTPATIENT
Start: 2021-01-01 | End: 2021-01-01 | Stop reason: HOSPADM

## 2021-01-01 RX ORDER — DILTIAZEM HYDROCHLORIDE 240 MG/1
240 CAPSULE, COATED, EXTENDED RELEASE ORAL DAILY
Status: CANCELLED | OUTPATIENT
Start: 2021-01-01

## 2021-01-01 RX ORDER — LORAZEPAM 1 MG/1
1 TABLET ORAL EVERY 8 HOURS PRN
Qty: 45 TABLET | Refills: 0 | Status: ON HOLD | OUTPATIENT
Start: 2021-01-01 | End: 2021-01-01 | Stop reason: SDUPTHER

## 2021-01-01 RX ORDER — LORAZEPAM 1 MG/1
1 TABLET ORAL EVERY 8 HOURS PRN
Status: DISCONTINUED | OUTPATIENT
Start: 2021-01-01 | End: 2021-01-01

## 2021-01-01 RX ORDER — PSEUDOEPHEDRINE HCL 30 MG
100 TABLET ORAL DAILY
Qty: 30 CAPSULE | Refills: 1 | Status: SHIPPED | OUTPATIENT
Start: 2021-01-01

## 2021-01-01 RX ORDER — DILTIAZEM HYDROCHLORIDE 240 MG/1
240 CAPSULE, COATED, EXTENDED RELEASE ORAL DAILY
COMMUNITY

## 2021-01-01 RX ORDER — LEVOFLOXACIN 250 MG/1
250 TABLET ORAL DAILY
Qty: 7 TABLET | Refills: 0 | Status: SHIPPED | OUTPATIENT
Start: 2021-01-01 | End: 2021-01-01

## 2021-01-01 RX ORDER — ACETAMINOPHEN 325 MG/1
650 TABLET ORAL EVERY 6 HOURS PRN
Status: CANCELLED | OUTPATIENT
Start: 2021-01-01

## 2021-01-01 RX ORDER — PROMETHAZINE HYDROCHLORIDE 12.5 MG/1
12.5 TABLET ORAL EVERY 6 HOURS PRN
Status: DISCONTINUED | OUTPATIENT
Start: 2021-01-01 | End: 2021-01-01 | Stop reason: HOSPADM

## 2021-01-01 RX ORDER — HYDROCODONE BITARTRATE AND ACETAMINOPHEN 10; 325 MG/1; MG/1
1 TABLET ORAL ONCE
Status: COMPLETED | OUTPATIENT
Start: 2021-01-01 | End: 2021-01-01

## 2021-01-01 RX ORDER — DILTIAZEM HYDROCHLORIDE 240 MG/1
240 CAPSULE, COATED, EXTENDED RELEASE ORAL DAILY
Qty: 30 CAPSULE | Refills: 3 | Status: SHIPPED | OUTPATIENT
Start: 2021-01-01 | End: 2021-01-01

## 2021-01-01 RX ORDER — FEXOFENADINE HCL 180 MG/1
180 TABLET ORAL DAILY
Status: DISCONTINUED | OUTPATIENT
Start: 2021-01-01 | End: 2021-01-01 | Stop reason: CLARIF

## 2021-01-01 RX ORDER — LIDOCAINE HYDROCHLORIDE 20 MG/ML
INJECTION, SOLUTION EPIDURAL; INFILTRATION; INTRACAUDAL; PERINEURAL PRN
Status: DISCONTINUED | OUTPATIENT
Start: 2021-01-01 | End: 2021-01-01 | Stop reason: SDUPTHER

## 2021-01-01 RX ORDER — MAGNESIUM OXIDE 400 MG/1
200 TABLET ORAL DAILY
Status: DISCONTINUED | OUTPATIENT
Start: 2021-01-01 | End: 2021-01-01

## 2021-01-01 RX ORDER — THEOPHYLLINE 400 MG/1
200 TABLET, EXTENDED RELEASE ORAL 2 TIMES DAILY
Status: DISCONTINUED | OUTPATIENT
Start: 2021-01-01 | End: 2021-01-01 | Stop reason: HOSPADM

## 2021-01-01 RX ORDER — LEVOFLOXACIN 500 MG/1
750 TABLET, FILM COATED ORAL DAILY
Status: COMPLETED | OUTPATIENT
Start: 2021-01-01 | End: 2021-01-01

## 2021-01-01 RX ORDER — DILTIAZEM HYDROCHLORIDE 120 MG/1
120 CAPSULE, COATED, EXTENDED RELEASE ORAL DAILY
Status: DISCONTINUED | OUTPATIENT
Start: 2021-01-01 | End: 2021-01-01

## 2021-01-01 RX ORDER — METOPROLOL TARTRATE 50 MG/1
50 TABLET, FILM COATED ORAL 2 TIMES DAILY
Qty: 60 TABLET | Refills: 3 | DISCHARGE
Start: 2021-01-01

## 2021-01-01 RX ORDER — PALONOSETRON 0.05 MG/ML
0.25 INJECTION, SOLUTION INTRAVENOUS ONCE
Status: CANCELLED | OUTPATIENT
Start: 2021-01-01

## 2021-01-01 RX ORDER — ALBUTEROL SULFATE 2.5 MG/3ML
2.5 SOLUTION RESPIRATORY (INHALATION) EVERY 4 HOURS PRN
Status: DISCONTINUED | OUTPATIENT
Start: 2021-01-01 | End: 2021-01-01 | Stop reason: HOSPADM

## 2021-01-01 RX ORDER — SIMVASTATIN 20 MG
20 TABLET ORAL NIGHTLY
Status: DISCONTINUED | OUTPATIENT
Start: 2021-01-01 | End: 2021-01-01 | Stop reason: CLARIF

## 2021-01-01 RX ORDER — ATORVASTATIN CALCIUM 10 MG/1
10 TABLET, FILM COATED ORAL DAILY
Status: DISCONTINUED | OUTPATIENT
Start: 2021-01-01 | End: 2021-01-01 | Stop reason: HOSPADM

## 2021-01-01 RX ORDER — METOPROLOL TARTRATE 50 MG/1
50 TABLET, FILM COATED ORAL 2 TIMES DAILY
Status: DISCONTINUED | OUTPATIENT
Start: 2021-01-01 | End: 2021-01-01

## 2021-01-01 RX ORDER — ONDANSETRON 2 MG/ML
4 INJECTION INTRAMUSCULAR; INTRAVENOUS
Status: DISCONTINUED | OUTPATIENT
Start: 2021-01-01 | End: 2021-01-01 | Stop reason: HOSPADM

## 2021-01-01 RX ORDER — LORAZEPAM 2 MG/ML
1 INJECTION INTRAMUSCULAR
Status: DISCONTINUED | OUTPATIENT
Start: 2021-01-01 | End: 2021-01-01 | Stop reason: HOSPADM

## 2021-01-01 RX ORDER — MAGNESIUM SULFATE 1 G/100ML
1000 INJECTION INTRAVENOUS PRN
Status: DISCONTINUED | OUTPATIENT
Start: 2021-01-01 | End: 2021-01-01

## 2021-01-01 RX ORDER — LORAZEPAM 2 MG/ML
0.5 INJECTION INTRAMUSCULAR ONCE
Status: COMPLETED | OUTPATIENT
Start: 2021-01-01 | End: 2021-01-01

## 2021-01-01 RX ORDER — SODIUM POLYSTYRENE SULFONATE 15 G/60ML
15 SUSPENSION ORAL; RECTAL ONCE
Status: COMPLETED | OUTPATIENT
Start: 2021-01-01 | End: 2021-01-01

## 2021-01-01 RX ORDER — BUSPIRONE HYDROCHLORIDE 5 MG/1
5 TABLET ORAL 2 TIMES DAILY
Qty: 60 TABLET | Refills: 0 | Status: SHIPPED | OUTPATIENT
Start: 2021-01-01 | End: 2021-01-01

## 2021-01-01 RX ORDER — ZINC OXIDE 216 MG/ML
LOTION TOPICAL
Qty: 60 CAN | Refills: 3 | Status: SHIPPED | OUTPATIENT
Start: 2021-01-01

## 2021-01-01 RX ORDER — LORAZEPAM 1 MG/1
1 TABLET ORAL EVERY 4 HOURS PRN
Status: DISCONTINUED | OUTPATIENT
Start: 2021-01-01 | End: 2021-01-01 | Stop reason: HOSPADM

## 2021-01-01 RX ORDER — HYDROCODONE BITARTRATE AND ACETAMINOPHEN 10; 325 MG/1; MG/1
1 TABLET ORAL EVERY 4 HOURS PRN
Status: CANCELLED | OUTPATIENT
Start: 2021-01-01

## 2021-01-01 RX ORDER — MINOCYCLINE HYDROCHLORIDE 100 MG/1
100 CAPSULE ORAL 2 TIMES DAILY
Status: DISCONTINUED | OUTPATIENT
Start: 2021-01-01 | End: 2021-01-01

## 2021-01-01 RX ORDER — SODIUM CHLORIDE 9 MG/ML
INJECTION, SOLUTION INTRAVENOUS CONTINUOUS
Status: DISCONTINUED | OUTPATIENT
Start: 2021-01-01 | End: 2021-01-01 | Stop reason: HOSPADM

## 2021-01-01 RX ORDER — IPRATROPIUM BROMIDE AND ALBUTEROL SULFATE 2.5; .5 MG/3ML; MG/3ML
SOLUTION RESPIRATORY (INHALATION)
Status: COMPLETED
Start: 2021-01-01 | End: 2021-01-01

## 2021-01-01 RX ORDER — FENTANYL CITRATE 50 UG/ML
50 INJECTION, SOLUTION INTRAMUSCULAR; INTRAVENOUS EVERY 5 MIN PRN
Status: DISCONTINUED | OUTPATIENT
Start: 2021-01-01 | End: 2021-01-01 | Stop reason: HOSPADM

## 2021-01-01 RX ORDER — HYDROCODONE BITARTRATE AND ACETAMINOPHEN 10; 325 MG/1; MG/1
1 TABLET ORAL EVERY 4 HOURS PRN
Qty: 180 TABLET | Refills: 0 | Status: SHIPPED | OUTPATIENT
Start: 2021-01-01 | End: 2021-01-01

## 2021-01-01 RX ORDER — BENZONATATE 100 MG/1
100 CAPSULE ORAL 3 TIMES DAILY PRN
Status: DISCONTINUED | OUTPATIENT
Start: 2021-01-01 | End: 2021-01-01 | Stop reason: HOSPADM

## 2021-01-01 RX ORDER — FLUCONAZOLE 100 MG/1
200 TABLET ORAL DAILY
Status: DISCONTINUED | OUTPATIENT
Start: 2021-01-01 | End: 2021-01-01

## 2021-01-01 RX ORDER — POTASSIUM CHLORIDE 29.8 MG/ML
20 INJECTION INTRAVENOUS PRN
Status: DISCONTINUED | OUTPATIENT
Start: 2021-01-01 | End: 2021-01-01 | Stop reason: HOSPADM

## 2021-01-01 RX ORDER — SODIUM CHLORIDE 9 MG/ML
INJECTION, SOLUTION INTRAVENOUS PRN
Status: DISCONTINUED | OUTPATIENT
Start: 2021-01-01 | End: 2021-01-01 | Stop reason: HOSPADM

## 2021-01-01 RX ORDER — MORPHINE SULFATE 2 MG/ML
4 INJECTION, SOLUTION INTRAMUSCULAR; INTRAVENOUS
Status: DISCONTINUED | OUTPATIENT
Start: 2021-01-01 | End: 2021-01-01

## 2021-01-01 RX ORDER — ALBUTEROL SULFATE 90 UG/1
2 AEROSOL, METERED RESPIRATORY (INHALATION) EVERY 4 HOURS
Status: DISCONTINUED | OUTPATIENT
Start: 2021-01-01 | End: 2021-01-01

## 2021-01-01 RX ORDER — DOXYCYCLINE HYCLATE 100 MG
100 TABLET ORAL DAILY
Qty: 7 TABLET | Refills: 0 | Status: SHIPPED | OUTPATIENT
Start: 2021-01-01 | End: 2021-01-01

## 2021-01-01 RX ORDER — LEVOFLOXACIN 750 MG/1
750 TABLET ORAL DAILY
Refills: 0 | Status: ON HOLD | DISCHARGE
Start: 2021-01-01 | End: 2021-01-01 | Stop reason: HOSPADM

## 2021-01-01 RX ORDER — DILTIAZEM HYDROCHLORIDE 5 MG/ML
10 INJECTION INTRAVENOUS ONCE
Status: COMPLETED | OUTPATIENT
Start: 2021-01-01 | End: 2021-01-01

## 2021-01-01 RX ORDER — LORAZEPAM 2 MG/ML
0.5 INJECTION INTRAMUSCULAR 3 TIMES DAILY PRN
Status: DISCONTINUED | OUTPATIENT
Start: 2021-01-01 | End: 2021-01-01 | Stop reason: HOSPADM

## 2021-01-01 RX ORDER — GUAIFENESIN 600 MG/1
600 TABLET, EXTENDED RELEASE ORAL 2 TIMES DAILY
Status: DISCONTINUED | OUTPATIENT
Start: 2021-01-01 | End: 2021-01-01 | Stop reason: HOSPADM

## 2021-01-01 RX ORDER — ATORVASTATIN CALCIUM 10 MG/1
10 TABLET, FILM COATED ORAL NIGHTLY
Status: DISCONTINUED | OUTPATIENT
Start: 2021-01-01 | End: 2021-01-01 | Stop reason: HOSPADM

## 2021-01-01 RX ORDER — SODIUM CHLORIDE, SODIUM LACTATE, POTASSIUM CHLORIDE, CALCIUM CHLORIDE 600; 310; 30; 20 MG/100ML; MG/100ML; MG/100ML; MG/100ML
INJECTION, SOLUTION INTRAVENOUS CONTINUOUS
Status: DISCONTINUED | OUTPATIENT
Start: 2021-01-01 | End: 2021-01-01

## 2021-01-01 RX ORDER — IPRATROPIUM BROMIDE AND ALBUTEROL SULFATE 2.5; .5 MG/3ML; MG/3ML
1 SOLUTION RESPIRATORY (INHALATION)
Status: DISCONTINUED | OUTPATIENT
Start: 2021-01-01 | End: 2021-01-01 | Stop reason: HOSPADM

## 2021-01-01 RX ORDER — METOPROLOL TARTRATE 50 MG/1
50 TABLET, FILM COATED ORAL 2 TIMES DAILY
Status: CANCELLED | OUTPATIENT
Start: 2021-01-01

## 2021-01-01 RX ORDER — ALBUTEROL SULFATE 90 UG/1
2 AEROSOL, METERED RESPIRATORY (INHALATION) EVERY 6 HOURS PRN
Status: DISCONTINUED | OUTPATIENT
Start: 2021-01-01 | End: 2021-01-01 | Stop reason: HOSPADM

## 2021-01-01 RX ORDER — METHYLPREDNISOLONE SODIUM SUCCINATE 40 MG/ML
40 INJECTION, POWDER, LYOPHILIZED, FOR SOLUTION INTRAMUSCULAR; INTRAVENOUS EVERY 8 HOURS
Status: DISCONTINUED | OUTPATIENT
Start: 2021-01-01 | End: 2021-01-01

## 2021-01-01 RX ORDER — BUSPIRONE HYDROCHLORIDE 10 MG/1
10 TABLET ORAL 2 TIMES DAILY
Status: CANCELLED | OUTPATIENT
Start: 2021-01-01

## 2021-01-01 RX ORDER — CALCIUM CARBONATE 500(1250)
500 TABLET ORAL DAILY
Status: DISCONTINUED | OUTPATIENT
Start: 2021-01-01 | End: 2021-01-01 | Stop reason: HOSPADM

## 2021-01-01 RX ORDER — POTASSIUM CHLORIDE 750 MG/1
10 TABLET, EXTENDED RELEASE ORAL 2 TIMES DAILY
Qty: 10 TABLET | Refills: 0 | Status: SHIPPED | OUTPATIENT
Start: 2021-01-01 | End: 2021-01-01

## 2021-01-01 RX ORDER — LORAZEPAM 1 MG/1
1 TABLET ORAL EVERY 8 HOURS PRN
Qty: 21 TABLET | Refills: 0 | Status: SHIPPED | OUTPATIENT
Start: 2021-01-01 | End: 2021-01-01 | Stop reason: SDUPTHER

## 2021-01-01 RX ORDER — OLANZAPINE 2.5 MG/1
TABLET ORAL
Qty: 16 TABLET | Refills: 0 | Status: ON HOLD | OUTPATIENT
Start: 2021-01-01 | End: 2021-01-01 | Stop reason: HOSPADM

## 2021-01-01 RX ORDER — PANTOPRAZOLE SODIUM 40 MG/1
40 TABLET, DELAYED RELEASE ORAL
Status: CANCELLED | OUTPATIENT
Start: 2021-01-01

## 2021-01-01 RX ORDER — MORPHINE SULFATE 2 MG/ML
INJECTION, SOLUTION INTRAMUSCULAR; INTRAVENOUS
Status: COMPLETED
Start: 2021-01-01 | End: 2021-01-01

## 2021-01-01 RX ORDER — POLYETHYLENE GLYCOL 3350 17 G/17G
17 POWDER, FOR SOLUTION ORAL DAILY PRN
COMMUNITY
End: 2021-01-01

## 2021-01-01 RX ORDER — LISINOPRIL 5 MG/1
5 TABLET ORAL DAILY
Qty: 30 TABLET | Refills: 3 | Status: ON HOLD | DISCHARGE
Start: 2021-01-01 | End: 2021-01-01 | Stop reason: HOSPADM

## 2021-01-01 RX ORDER — MORPHINE SULFATE 2 MG/ML
2 INJECTION, SOLUTION INTRAMUSCULAR; INTRAVENOUS
Status: DISCONTINUED | OUTPATIENT
Start: 2021-01-01 | End: 2021-01-01

## 2021-01-01 RX ORDER — OXYMETAZOLINE HYDROCHLORIDE 0.05 G/100ML
2 SPRAY NASAL 2 TIMES DAILY PRN
Status: DISCONTINUED | OUTPATIENT
Start: 2021-01-01 | End: 2021-01-01 | Stop reason: HOSPADM

## 2021-01-01 RX ORDER — FENTANYL CITRATE 50 UG/ML
INJECTION, SOLUTION INTRAMUSCULAR; INTRAVENOUS PRN
Status: DISCONTINUED | OUTPATIENT
Start: 2021-01-01 | End: 2021-01-01 | Stop reason: SDUPTHER

## 2021-01-01 RX ORDER — FLUTICASONE PROPIONATE 50 MCG
2 SPRAY, SUSPENSION (ML) NASAL 2 TIMES DAILY
Status: DISPENSED | OUTPATIENT
Start: 2021-01-01 | End: 2021-01-01

## 2021-01-01 RX ORDER — SODIUM CHLORIDE 0.9 % (FLUSH) 0.9 %
10 SYRINGE (ML) INJECTION 2 TIMES DAILY
Status: DISCONTINUED | OUTPATIENT
Start: 2021-01-01 | End: 2021-01-01

## 2021-01-01 RX ORDER — DEXTROSE MONOHYDRATE 50 MG/ML
100 INJECTION, SOLUTION INTRAVENOUS PRN
Status: DISCONTINUED | OUTPATIENT
Start: 2021-01-01 | End: 2021-01-01 | Stop reason: HOSPADM

## 2021-01-01 RX ORDER — HYDROCODONE BITARTRATE AND ACETAMINOPHEN 10; 325 MG/1; MG/1
1 TABLET ORAL EVERY 6 HOURS PRN
Status: DISCONTINUED | OUTPATIENT
Start: 2021-01-01 | End: 2021-01-01

## 2021-01-01 RX ORDER — MAGNESIUM OXIDE 400 MG/1
200 TABLET ORAL DAILY
Qty: 30 TABLET | Refills: 1 | Status: SHIPPED | OUTPATIENT
Start: 2021-01-01

## 2021-01-01 RX ORDER — MAGNESIUM SULFATE 1 G/100ML
1000 INJECTION INTRAVENOUS ONCE
Status: DISCONTINUED | OUTPATIENT
Start: 2021-01-01 | End: 2021-01-01 | Stop reason: SDUPTHER

## 2021-01-01 RX ORDER — DOXYCYCLINE HYCLATE 100 MG
100 TABLET ORAL 2 TIMES DAILY
Status: DISCONTINUED | OUTPATIENT
Start: 2021-01-01 | End: 2021-01-01

## 2021-01-01 RX ORDER — POTASSIUM CHLORIDE 20 MEQ/1
40 TABLET, EXTENDED RELEASE ORAL PRN
Status: DISCONTINUED | OUTPATIENT
Start: 2021-01-01 | End: 2021-01-01 | Stop reason: HOSPADM

## 2021-01-01 RX ORDER — LORAZEPAM 2 MG/ML
0.5 INJECTION INTRAMUSCULAR EVERY 8 HOURS SCHEDULED
Status: DISCONTINUED | OUTPATIENT
Start: 2021-01-01 | End: 2021-01-01 | Stop reason: HOSPADM

## 2021-01-01 RX ORDER — MAGNESIUM SULFATE IN WATER 40 MG/ML
2000 INJECTION, SOLUTION INTRAVENOUS
Status: COMPLETED | OUTPATIENT
Start: 2021-01-01 | End: 2021-01-01

## 2021-01-01 RX ORDER — MORPHINE SULFATE 2 MG/ML
2 INJECTION, SOLUTION INTRAMUSCULAR; INTRAVENOUS EVERY 4 HOURS PRN
Status: DISCONTINUED | OUTPATIENT
Start: 2021-01-01 | End: 2021-01-01

## 2021-01-01 RX ORDER — METHYLPREDNISOLONE SODIUM SUCCINATE 125 MG/2ML
125 INJECTION, POWDER, LYOPHILIZED, FOR SOLUTION INTRAMUSCULAR; INTRAVENOUS ONCE
Status: COMPLETED | OUTPATIENT
Start: 2021-01-01 | End: 2021-01-01

## 2021-01-01 RX ORDER — HYOSCYAMINE SULFATE 0.12 MG/1
125 TABLET SUBLINGUAL EVERY 4 HOURS PRN
Qty: 20 EACH | Refills: 0 | OUTPATIENT
Start: 2021-01-01

## 2021-01-01 RX ORDER — LORAZEPAM 0.5 MG/1
0.5 TABLET ORAL 3 TIMES DAILY PRN
Status: DISCONTINUED | OUTPATIENT
Start: 2021-01-01 | End: 2021-01-01

## 2021-01-01 RX ORDER — LORAZEPAM 2 MG/ML
INJECTION INTRAMUSCULAR
Status: DISPENSED
Start: 2021-01-01 | End: 2021-01-01

## 2021-01-01 RX ORDER — LORAZEPAM 0.5 MG/1
TABLET ORAL
Qty: 30 TABLET | Refills: 0 | OUTPATIENT
Start: 2021-01-01 | End: 2021-06-05

## 2021-01-01 RX ORDER — DOCUSATE SODIUM 100 MG/1
200 CAPSULE, LIQUID FILLED ORAL DAILY
Status: CANCELLED | OUTPATIENT
Start: 2021-01-01

## 2021-01-01 RX ORDER — METHYLPREDNISOLONE SODIUM SUCCINATE 125 MG/2ML
125 INJECTION, POWDER, LYOPHILIZED, FOR SOLUTION INTRAMUSCULAR; INTRAVENOUS ONCE
Status: DISCONTINUED | OUTPATIENT
Start: 2021-01-01 | End: 2021-01-01 | Stop reason: HOSPADM

## 2021-01-01 RX ORDER — MINOCYCLINE HYDROCHLORIDE 100 MG/1
100 CAPSULE ORAL 2 TIMES DAILY
Status: DISCONTINUED | OUTPATIENT
Start: 2021-01-01 | End: 2021-01-01 | Stop reason: HOSPADM

## 2021-01-01 RX ORDER — LEVALBUTEROL INHALATION SOLUTION 0.63 MG/3ML
0.63 SOLUTION RESPIRATORY (INHALATION) EVERY 8 HOURS PRN
Status: DISCONTINUED | OUTPATIENT
Start: 2021-01-01 | End: 2021-01-01 | Stop reason: HOSPADM

## 2021-01-01 RX ORDER — DIPHENHYDRAMINE HYDROCHLORIDE 50 MG/ML
50 INJECTION INTRAMUSCULAR; INTRAVENOUS ONCE
Status: DISCONTINUED | OUTPATIENT
Start: 2021-01-01 | End: 2021-01-01 | Stop reason: HOSPADM

## 2021-01-01 RX ORDER — IPRATROPIUM BROMIDE AND ALBUTEROL SULFATE 2.5; .5 MG/3ML; MG/3ML
1 SOLUTION RESPIRATORY (INHALATION) ONCE
Status: COMPLETED | OUTPATIENT
Start: 2021-01-01 | End: 2021-01-01

## 2021-01-01 RX ORDER — LORAZEPAM 2 MG/ML
0.5 INJECTION INTRAMUSCULAR ONCE
Status: DISCONTINUED | OUTPATIENT
Start: 2021-01-01 | End: 2021-01-01

## 2021-01-01 RX ORDER — ETOMIDATE 2 MG/ML
30 INJECTION INTRAVENOUS ONCE
Status: COMPLETED | OUTPATIENT
Start: 2021-01-01 | End: 2021-01-01

## 2021-01-01 RX ORDER — GUAIFENESIN 600 MG/1
600 TABLET, EXTENDED RELEASE ORAL 2 TIMES DAILY
Status: DISCONTINUED | OUTPATIENT
Start: 2021-01-01 | End: 2021-01-01

## 2021-01-01 RX ORDER — BUSPIRONE HYDROCHLORIDE 10 MG/1
10 TABLET ORAL 2 TIMES DAILY
COMMUNITY

## 2021-01-01 RX ORDER — ALBUTEROL SULFATE 90 UG/1
2 AEROSOL, METERED RESPIRATORY (INHALATION)
Status: DISCONTINUED | OUTPATIENT
Start: 2021-01-01 | End: 2021-01-01 | Stop reason: HOSPADM

## 2021-01-01 RX ORDER — LEVOFLOXACIN 750 MG/1
750 TABLET ORAL DAILY
Qty: 26 TABLET | Refills: 0 | Status: ON HOLD | OUTPATIENT
Start: 2021-01-01 | End: 2021-01-01 | Stop reason: SDUPTHER

## 2021-01-01 RX ORDER — LANOLIN ALCOHOL/MO/W.PET/CERES
3 CREAM (GRAM) TOPICAL NIGHTLY PRN
Qty: 30 TABLET | Refills: 1 | Status: SHIPPED | OUTPATIENT
Start: 2021-01-01 | End: 2021-01-01

## 2021-01-01 RX ORDER — LORAZEPAM 2 MG/ML
1 INJECTION INTRAMUSCULAR EVERY 4 HOURS PRN
Status: DISCONTINUED | OUTPATIENT
Start: 2021-01-01 | End: 2021-01-01 | Stop reason: HOSPADM

## 2021-01-01 RX ORDER — PREDNISONE 10 MG/1
TABLET ORAL
Qty: 18 TABLET | Refills: 0 | Status: SHIPPED | OUTPATIENT
Start: 2021-01-01

## 2021-01-01 RX ORDER — MORPHINE SULFATE 2 MG/ML
2 INJECTION, SOLUTION INTRAMUSCULAR; INTRAVENOUS ONCE
Status: COMPLETED | OUTPATIENT
Start: 2021-01-01 | End: 2021-01-01

## 2021-01-01 RX ORDER — PANTOPRAZOLE SODIUM 40 MG/1
40 TABLET, DELAYED RELEASE ORAL
Qty: 30 TABLET | Refills: 3 | Status: SHIPPED | OUTPATIENT
Start: 2021-01-01 | End: 2021-01-01

## 2021-01-01 RX ORDER — DILTIAZEM HYDROCHLORIDE 240 MG/1
240 CAPSULE, COATED, EXTENDED RELEASE ORAL DAILY
Status: DISCONTINUED | OUTPATIENT
Start: 2021-01-01 | End: 2021-01-01

## 2021-01-01 RX ORDER — SIMVASTATIN 20 MG
20 TABLET ORAL NIGHTLY
Status: DISCONTINUED | OUTPATIENT
Start: 2021-01-01 | End: 2021-01-01 | Stop reason: HOSPADM

## 2021-01-01 RX ORDER — BISACODYL 10 MG
10 SUPPOSITORY, RECTAL RECTAL DAILY PRN
Status: CANCELLED | OUTPATIENT
Start: 2021-01-01

## 2021-01-01 RX ORDER — SIMVASTATIN 20 MG
20 TABLET ORAL NIGHTLY
Qty: 90 TABLET | Refills: 1 | Status: SHIPPED | OUTPATIENT
Start: 2021-01-01 | End: 2021-01-01

## 2021-01-01 RX ORDER — LABETALOL HYDROCHLORIDE 5 MG/ML
10 INJECTION, SOLUTION INTRAVENOUS EVERY 4 HOURS PRN
Status: DISCONTINUED | OUTPATIENT
Start: 2021-01-01 | End: 2021-01-01 | Stop reason: HOSPADM

## 2021-01-01 RX ORDER — PANTOPRAZOLE SODIUM 40 MG/1
40 TABLET, DELAYED RELEASE ORAL
Status: DISCONTINUED | OUTPATIENT
Start: 2021-01-01 | End: 2021-01-01

## 2021-01-01 RX ORDER — LORAZEPAM 2 MG/ML
1 INJECTION INTRAMUSCULAR EVERY 6 HOURS PRN
Status: DISCONTINUED | OUTPATIENT
Start: 2021-01-01 | End: 2021-01-01

## 2021-01-01 RX ORDER — LABETALOL HYDROCHLORIDE 5 MG/ML
INJECTION, SOLUTION INTRAVENOUS PRN
Status: DISCONTINUED | OUTPATIENT
Start: 2021-01-01 | End: 2021-01-01 | Stop reason: SDUPTHER

## 2021-01-01 RX ORDER — ALBUTEROL SULFATE 90 UG/1
4 AEROSOL, METERED RESPIRATORY (INHALATION) EVERY 4 HOURS PRN
Status: DISCONTINUED | OUTPATIENT
Start: 2021-01-01 | End: 2021-01-01

## 2021-01-01 RX ORDER — LORAZEPAM 1 MG/1
1 TABLET ORAL ONCE
Status: COMPLETED | OUTPATIENT
Start: 2021-01-01 | End: 2021-01-01

## 2021-01-01 RX ORDER — METHYLPREDNISOLONE SODIUM SUCCINATE 40 MG/ML
40 INJECTION, POWDER, LYOPHILIZED, FOR SOLUTION INTRAMUSCULAR; INTRAVENOUS 2 TIMES DAILY
Status: DISCONTINUED | OUTPATIENT
Start: 2021-01-01 | End: 2021-01-01

## 2021-01-01 RX ORDER — METOPROLOL TARTRATE 50 MG/1
50 TABLET, FILM COATED ORAL 2 TIMES DAILY
Status: DISCONTINUED | OUTPATIENT
Start: 2021-01-01 | End: 2021-01-01 | Stop reason: HOSPADM

## 2021-01-01 RX ORDER — PREDNISONE 20 MG/1
40 TABLET ORAL DAILY
Status: DISCONTINUED | OUTPATIENT
Start: 2021-01-01 | End: 2021-01-01 | Stop reason: HOSPADM

## 2021-01-01 RX ORDER — 0.9 % SODIUM CHLORIDE 0.9 %
500 INTRAVENOUS SOLUTION INTRAVENOUS ONCE
Status: COMPLETED | OUTPATIENT
Start: 2021-01-01 | End: 2021-01-01

## 2021-01-01 RX ORDER — PREDNISONE 10 MG/1
TABLET ORAL
Qty: 20 TABLET | Refills: 0 | Status: ON HOLD | DISCHARGE
Start: 2021-01-01 | End: 2021-01-01 | Stop reason: SDUPTHER

## 2021-01-01 RX ORDER — BUSPIRONE HYDROCHLORIDE 10 MG/1
10 TABLET ORAL 2 TIMES DAILY
Qty: 60 TABLET | Refills: 0 | Status: SHIPPED | OUTPATIENT
Start: 2021-01-01 | End: 2021-01-01

## 2021-01-01 RX ORDER — HEPARIN SODIUM (PORCINE) LOCK FLUSH IV SOLN 100 UNIT/ML 100 UNIT/ML
500 SOLUTION INTRAVENOUS PRN
Status: DISCONTINUED | OUTPATIENT
Start: 2021-01-01 | End: 2021-01-01 | Stop reason: HOSPADM

## 2021-01-01 RX ORDER — LORAZEPAM 1 MG/1
1 TABLET ORAL EVERY 6 HOURS PRN
Status: DISCONTINUED | OUTPATIENT
Start: 2021-01-01 | End: 2021-01-01 | Stop reason: HOSPADM

## 2021-01-01 RX ORDER — GLYCOPYRROLATE 1 MG/5 ML
0.2 SYRINGE (ML) INTRAVENOUS EVERY 4 HOURS PRN
Status: CANCELLED | OUTPATIENT
Start: 2021-01-01

## 2021-01-01 RX ORDER — ACETAMINOPHEN 650 MG/1
650 SUPPOSITORY RECTAL EVERY 6 HOURS PRN
Qty: 12 SUPPOSITORY | Refills: 0 | OUTPATIENT
Start: 2021-01-01

## 2021-01-01 RX ORDER — LORAZEPAM 1 MG/1
1 TABLET ORAL EVERY 8 HOURS PRN
Status: DISCONTINUED | OUTPATIENT
Start: 2021-01-01 | End: 2021-01-01 | Stop reason: HOSPADM

## 2021-01-01 RX ORDER — ALBUTEROL SULFATE 2.5 MG/3ML
2.5 SOLUTION RESPIRATORY (INHALATION)
Status: DISCONTINUED | OUTPATIENT
Start: 2021-01-01 | End: 2021-01-01

## 2021-01-01 RX ORDER — NYSTATIN 100000 U/G
CREAM TOPICAL 2 TIMES DAILY
COMMUNITY

## 2021-01-01 RX ORDER — LIDOCAINE HYDROCHLORIDE 10 MG/ML
5 INJECTION, SOLUTION EPIDURAL; INFILTRATION; INTRACAUDAL; PERINEURAL ONCE
Status: DISCONTINUED | OUTPATIENT
Start: 2021-01-01 | End: 2021-01-01

## 2021-01-01 RX ORDER — SUCRALFATE 1 G/1
1 TABLET ORAL
Status: DISCONTINUED | OUTPATIENT
Start: 2021-01-01 | End: 2021-01-01 | Stop reason: HOSPADM

## 2021-01-01 RX ORDER — ONDANSETRON 2 MG/ML
4 INJECTION INTRAMUSCULAR; INTRAVENOUS EVERY 6 HOURS PRN
Status: CANCELLED | OUTPATIENT
Start: 2021-01-01

## 2021-01-01 RX ORDER — NICOTINE 21 MG/24HR
1 PATCH, TRANSDERMAL 24 HOURS TRANSDERMAL DAILY
Status: CANCELLED | OUTPATIENT
Start: 2021-01-01

## 2021-01-01 RX ORDER — DIGOXIN 0.25 MG/ML
250 INJECTION INTRAMUSCULAR; INTRAVENOUS EVERY 4 HOURS
Status: COMPLETED | OUTPATIENT
Start: 2021-01-01 | End: 2021-01-01

## 2021-01-01 RX ORDER — BUSPIRONE HYDROCHLORIDE 10 MG/1
10 TABLET ORAL 2 TIMES DAILY
Status: DISCONTINUED | OUTPATIENT
Start: 2021-01-01 | End: 2021-01-01

## 2021-01-01 RX ORDER — PROPOFOL 10 MG/ML
5-50 INJECTION, EMULSION INTRAVENOUS
Status: DISCONTINUED | OUTPATIENT
Start: 2021-01-01 | End: 2021-01-01

## 2021-01-01 RX ORDER — DEXAMETHASONE SODIUM PHOSPHATE 4 MG/ML
INJECTION, SOLUTION INTRA-ARTICULAR; INTRALESIONAL; INTRAMUSCULAR; INTRAVENOUS; SOFT TISSUE PRN
Status: DISCONTINUED | OUTPATIENT
Start: 2021-01-01 | End: 2021-01-01 | Stop reason: SDUPTHER

## 2021-01-01 RX ADMIN — NYSTATIN 500000 UNITS: 100000 SUSPENSION ORAL at 18:59

## 2021-01-01 RX ADMIN — PANTOPRAZOLE SODIUM 40 MG: 40 INJECTION, POWDER, FOR SOLUTION INTRAVENOUS at 05:56

## 2021-01-01 RX ADMIN — IOPAMIDOL 75 ML: 755 INJECTION, SOLUTION INTRAVENOUS at 20:08

## 2021-01-01 RX ADMIN — Medication 1000 UNITS: at 09:09

## 2021-01-01 RX ADMIN — HYDROCODONE BITARTRATE AND ACETAMINOPHEN 1 TABLET: 10; 325 TABLET ORAL at 16:04

## 2021-01-01 RX ADMIN — LORAZEPAM 1 MG: 2 INJECTION INTRAMUSCULAR; INTRAVENOUS at 18:08

## 2021-01-01 RX ADMIN — BUSPIRONE HYDROCHLORIDE 10 MG: 10 TABLET ORAL at 08:58

## 2021-01-01 RX ADMIN — PREDNISONE 40 MG: 20 TABLET ORAL at 09:19

## 2021-01-01 RX ADMIN — ASPIRIN 81 MG: 81 TABLET, FILM COATED ORAL at 08:26

## 2021-01-01 RX ADMIN — DILTIAZEM HYDROCHLORIDE 240 MG: 240 CAPSULE, COATED, EXTENDED RELEASE ORAL at 08:52

## 2021-01-01 RX ADMIN — SUCRALFATE 1 G: 1 TABLET ORAL at 22:35

## 2021-01-01 RX ADMIN — NYSTATIN 500000 UNITS: 100000 SUSPENSION ORAL at 08:40

## 2021-01-01 RX ADMIN — LORAZEPAM 1 MG: 1 TABLET ORAL at 17:50

## 2021-01-01 RX ADMIN — DEXTROSE MONOHYDRATE 750 MG: 50 INJECTION, SOLUTION INTRAVENOUS at 22:25

## 2021-01-01 RX ADMIN — PROPOFOL 70 MCG/KG/MIN: 10 INJECTION, EMULSION INTRAVENOUS at 07:38

## 2021-01-01 RX ADMIN — SUCRALFATE 1 G: 1 TABLET ORAL at 06:19

## 2021-01-01 RX ADMIN — METHYLPREDNISOLONE SODIUM SUCCINATE 40 MG: 40 INJECTION, POWDER, LYOPHILIZED, FOR SOLUTION INTRAMUSCULAR; INTRAVENOUS at 12:27

## 2021-01-01 RX ADMIN — FLUTICASONE PROPIONATE 2 SPRAY: 50 SPRAY, METERED NASAL at 12:01

## 2021-01-01 RX ADMIN — HYDROCODONE BITARTRATE AND ACETAMINOPHEN 1 TABLET: 10; 325 TABLET ORAL at 22:41

## 2021-01-01 RX ADMIN — FAMOTIDINE 20 MG: 10 INJECTION, SOLUTION INTRAVENOUS at 20:27

## 2021-01-01 RX ADMIN — DOCUSATE SODIUM 200 MG: 100 CAPSULE, LIQUID FILLED ORAL at 09:55

## 2021-01-01 RX ADMIN — MORPHINE SULFATE 4 MG: 4 INJECTION, SOLUTION INTRAMUSCULAR; INTRAVENOUS at 05:32

## 2021-01-01 RX ADMIN — IPRATROPIUM BROMIDE AND ALBUTEROL SULFATE 1 AMPULE: .5; 3 SOLUTION RESPIRATORY (INHALATION) at 00:33

## 2021-01-01 RX ADMIN — HYDROCODONE BITARTRATE AND ACETAMINOPHEN 1 TABLET: 10; 325 TABLET ORAL at 17:17

## 2021-01-01 RX ADMIN — MEROPENEM 1000 MG: 1 INJECTION, POWDER, FOR SOLUTION INTRAVENOUS at 15:43

## 2021-01-01 RX ADMIN — CHLORHEXIDINE GLUCONATE 0.12% ORAL RINSE 15 ML: 1.2 LIQUID ORAL at 21:33

## 2021-01-01 RX ADMIN — THEOPHYLLINE 200 MG: 400 TABLET, EXTENDED RELEASE ORAL at 10:00

## 2021-01-01 RX ADMIN — FAMOTIDINE 20 MG: 10 INJECTION, SOLUTION INTRAVENOUS at 21:14

## 2021-01-01 RX ADMIN — IPRATROPIUM BROMIDE AND ALBUTEROL SULFATE 1 AMPULE: .5; 3 SOLUTION RESPIRATORY (INHALATION) at 07:48

## 2021-01-01 RX ADMIN — LORAZEPAM 1 MG: 1 TABLET ORAL at 07:47

## 2021-01-01 RX ADMIN — ETOPOSIDE 90 MG: 20 INJECTION INTRAVENOUS at 12:49

## 2021-01-01 RX ADMIN — MAGNESIUM SULFATE HEPTAHYDRATE 1000 MG: 1 INJECTION, SOLUTION INTRAVENOUS at 02:41

## 2021-01-01 RX ADMIN — MEROPENEM 1000 MG: 1 INJECTION, POWDER, FOR SOLUTION INTRAVENOUS at 07:04

## 2021-01-01 RX ADMIN — SUCRALFATE 1 G: 1 TABLET ORAL at 13:17

## 2021-01-01 RX ADMIN — DILTIAZEM HYDROCHLORIDE 120 MG: 120 CAPSULE, COATED, EXTENDED RELEASE ORAL at 08:49

## 2021-01-01 RX ADMIN — LORAZEPAM 0.5 MG: 2 INJECTION INTRAMUSCULAR; INTRAVENOUS at 17:36

## 2021-01-01 RX ADMIN — SIMVASTATIN 20 MG: 20 TABLET, FILM COATED ORAL at 20:56

## 2021-01-01 RX ADMIN — DILTIAZEM HYDROCHLORIDE 120 MG: 120 CAPSULE, COATED, EXTENDED RELEASE ORAL at 10:46

## 2021-01-01 RX ADMIN — NYSTATIN 500000 UNITS: 100000 SUSPENSION ORAL at 16:04

## 2021-01-01 RX ADMIN — SODIUM CHLORIDE, PRESERVATIVE FREE 10 ML: 5 INJECTION INTRAVENOUS at 21:15

## 2021-01-01 RX ADMIN — LISINOPRIL 5 MG: 5 TABLET ORAL at 08:58

## 2021-01-01 RX ADMIN — Medication 500 MG: at 09:02

## 2021-01-01 RX ADMIN — PROPOFOL 30 MCG/KG/MIN: 10 INJECTION, EMULSION INTRAVENOUS at 04:47

## 2021-01-01 RX ADMIN — THEOPHYLLINE ANHYDROUS 100 MG: 100 CAPSULE, EXTENDED RELEASE ORAL at 08:49

## 2021-01-01 RX ADMIN — MEROPENEM 1000 MG: 1 INJECTION, POWDER, FOR SOLUTION INTRAVENOUS at 21:47

## 2021-01-01 RX ADMIN — ALBUTEROL SULFATE 2 PUFF: 90 AEROSOL, METERED RESPIRATORY (INHALATION) at 15:13

## 2021-01-01 RX ADMIN — SUCRALFATE 1 G: 1 TABLET ORAL at 20:35

## 2021-01-01 RX ADMIN — HYDROCODONE BITARTRATE AND ACETAMINOPHEN 1 TABLET: 10; 325 TABLET ORAL at 19:29

## 2021-01-01 RX ADMIN — SUCRALFATE 1 G: 1 TABLET ORAL at 20:56

## 2021-01-01 RX ADMIN — SUCRALFATE 1 G: 1 TABLET ORAL at 21:05

## 2021-01-01 RX ADMIN — SODIUM CHLORIDE, PRESERVATIVE FREE 10 ML: 5 INJECTION INTRAVENOUS at 09:59

## 2021-01-01 RX ADMIN — PANTOPRAZOLE SODIUM 40 MG: 40 INJECTION, POWDER, FOR SOLUTION INTRAVENOUS at 06:12

## 2021-01-01 RX ADMIN — IPRATROPIUM BROMIDE AND ALBUTEROL SULFATE 3 ML: .5; 3 SOLUTION RESPIRATORY (INHALATION) at 15:36

## 2021-01-01 RX ADMIN — SODIUM CHLORIDE, PRESERVATIVE FREE 10 ML: 5 INJECTION INTRAVENOUS at 20:13

## 2021-01-01 RX ADMIN — HYDROCODONE BITARTRATE AND ACETAMINOPHEN 1 TABLET: 10; 325 TABLET ORAL at 00:49

## 2021-01-01 RX ADMIN — HYDROCODONE BITARTRATE AND ACETAMINOPHEN 1 TABLET: 10; 325 TABLET ORAL at 16:53

## 2021-01-01 RX ADMIN — FAMOTIDINE 20 MG: 10 INJECTION, SOLUTION INTRAVENOUS at 08:00

## 2021-01-01 RX ADMIN — METHYLPREDNISOLONE SODIUM SUCCINATE 40 MG: 40 INJECTION, POWDER, FOR SOLUTION INTRAMUSCULAR; INTRAVENOUS at 09:32

## 2021-01-01 RX ADMIN — NYSTATIN 500000 UNITS: 100000 SUSPENSION ORAL at 13:29

## 2021-01-01 RX ADMIN — SODIUM CHLORIDE 150 MG: 900 INJECTION, SOLUTION INTRAVENOUS at 09:23

## 2021-01-01 RX ADMIN — ALBUTEROL SULFATE 2 PUFF: 90 AEROSOL, METERED RESPIRATORY (INHALATION) at 07:18

## 2021-01-01 RX ADMIN — LORAZEPAM 1 MG: 2 INJECTION INTRAMUSCULAR; INTRAVENOUS at 15:16

## 2021-01-01 RX ADMIN — SUCRALFATE 1 G: 1 TABLET ORAL at 06:00

## 2021-01-01 RX ADMIN — ATORVASTATIN CALCIUM 10 MG: 10 TABLET, FILM COATED ORAL at 08:10

## 2021-01-01 RX ADMIN — Medication 100 MG: at 08:53

## 2021-01-01 RX ADMIN — SUCRALFATE 1 G: 1 TABLET ORAL at 21:12

## 2021-01-01 RX ADMIN — DEXTROSE MONOHYDRATE 750 MG: 50 INJECTION, SOLUTION INTRAVENOUS at 02:30

## 2021-01-01 RX ADMIN — Medication 1 TABLET: at 09:05

## 2021-01-01 RX ADMIN — METOPROLOL TARTRATE 2.5 MG: 5 INJECTION INTRAVENOUS at 15:52

## 2021-01-01 RX ADMIN — MORPHINE SULFATE 4 MG: 4 INJECTION, SOLUTION INTRAMUSCULAR; INTRAVENOUS at 00:46

## 2021-01-01 RX ADMIN — SUCRALFATE 1 G: 1 TABLET ORAL at 16:16

## 2021-01-01 RX ADMIN — Medication 500 MG: at 08:43

## 2021-01-01 RX ADMIN — AZITHROMYCIN MONOHYDRATE 500 MG: 500 INJECTION, POWDER, LYOPHILIZED, FOR SOLUTION INTRAVENOUS at 11:05

## 2021-01-01 RX ADMIN — IPRATROPIUM BROMIDE AND ALBUTEROL SULFATE 3 ML: .5; 3 SOLUTION RESPIRATORY (INHALATION) at 16:21

## 2021-01-01 RX ADMIN — Medication 1 CAPSULE: at 08:57

## 2021-01-01 RX ADMIN — Medication 100 MCG/HR: at 22:45

## 2021-01-01 RX ADMIN — Medication 2 PUFF: at 07:47

## 2021-01-01 RX ADMIN — SUCRALFATE 1 G: 1 TABLET ORAL at 17:20

## 2021-01-01 RX ADMIN — NYSTATIN 500000 UNITS: 100000 SUSPENSION ORAL at 22:33

## 2021-01-01 RX ADMIN — BUSPIRONE HYDROCHLORIDE 10 MG: 10 TABLET ORAL at 16:34

## 2021-01-01 RX ADMIN — METHYLPREDNISOLONE SODIUM SUCCINATE 40 MG: 40 INJECTION, POWDER, LYOPHILIZED, FOR SOLUTION INTRAMUSCULAR; INTRAVENOUS at 13:25

## 2021-01-01 RX ADMIN — CISPLATIN: 50 INJECTION, SOLUTION INTRAVENOUS at 11:00

## 2021-01-01 RX ADMIN — SIMVASTATIN 20 MG: 20 TABLET, FILM COATED ORAL at 21:14

## 2021-01-01 RX ADMIN — MAGNESIUM OXIDE 200 MG: 400 TABLET ORAL at 08:18

## 2021-01-01 RX ADMIN — IPRATROPIUM BROMIDE AND ALBUTEROL SULFATE 1 AMPULE: .5; 3 SOLUTION RESPIRATORY (INHALATION) at 11:41

## 2021-01-01 RX ADMIN — DIPHENHYDRAMINE HCL 25 MG: 25 TABLET ORAL at 12:07

## 2021-01-01 RX ADMIN — Medication 1 SPRAY: at 15:33

## 2021-01-01 RX ADMIN — AZITHROMYCIN MONOHYDRATE 500 MG: 500 INJECTION, POWDER, LYOPHILIZED, FOR SOLUTION INTRAVENOUS at 10:58

## 2021-01-01 RX ADMIN — SODIUM CHLORIDE, PRESERVATIVE FREE 10 ML: 5 INJECTION INTRAVENOUS at 21:50

## 2021-01-01 RX ADMIN — SODIUM CHLORIDE, PRESERVATIVE FREE 10 ML: 5 INJECTION INTRAVENOUS at 20:59

## 2021-01-01 RX ADMIN — ASPIRIN 81 MG: 81 TABLET, FILM COATED ORAL at 09:08

## 2021-01-01 RX ADMIN — BUSPIRONE HYDROCHLORIDE 10 MG: 10 TABLET ORAL at 09:10

## 2021-01-01 RX ADMIN — LORAZEPAM 0.5 MG: 2 INJECTION INTRAMUSCULAR; INTRAVENOUS at 12:24

## 2021-01-01 RX ADMIN — HYDROCODONE BITARTRATE AND ACETAMINOPHEN 1 TABLET: 10; 325 TABLET ORAL at 06:38

## 2021-01-01 RX ADMIN — BUSPIRONE HYDROCHLORIDE 10 MG: 10 TABLET ORAL at 17:10

## 2021-01-01 RX ADMIN — BUSPIRONE HYDROCHLORIDE 10 MG: 10 TABLET ORAL at 09:59

## 2021-01-01 RX ADMIN — PREDNISONE 40 MG: 20 TABLET ORAL at 08:40

## 2021-01-01 RX ADMIN — Medication 2 PUFF: at 20:06

## 2021-01-01 RX ADMIN — PROPOFOL 75 MCG/KG/MIN: 10 INJECTION, EMULSION INTRAVENOUS at 03:50

## 2021-01-01 RX ADMIN — HYDROCODONE BITARTRATE AND ACETAMINOPHEN 1 TABLET: 10; 325 TABLET ORAL at 06:03

## 2021-01-01 RX ADMIN — SUCRALFATE 1 G: 1 TABLET ORAL at 06:03

## 2021-01-01 RX ADMIN — Medication 100 MCG/HR: at 04:56

## 2021-01-01 RX ADMIN — LORAZEPAM 1 MG: 1 TABLET ORAL at 08:57

## 2021-01-01 RX ADMIN — DILTIAZEM HYDROCHLORIDE 120 MG: 120 CAPSULE, COATED, EXTENDED RELEASE ORAL at 08:27

## 2021-01-01 RX ADMIN — BUSPIRONE HYDROCHLORIDE 10 MG: 10 TABLET ORAL at 09:12

## 2021-01-01 RX ADMIN — CEFEPIME HYDROCHLORIDE 2000 MG: 2 INJECTION, POWDER, FOR SOLUTION INTRAVENOUS at 20:38

## 2021-01-01 RX ADMIN — METHYLPREDNISOLONE SODIUM SUCCINATE 40 MG: 40 INJECTION, POWDER, FOR SOLUTION INTRAMUSCULAR; INTRAVENOUS at 04:23

## 2021-01-01 RX ADMIN — SUCRALFATE 1 G: 1 TABLET ORAL at 11:17

## 2021-01-01 RX ADMIN — HYDROCODONE BITARTRATE AND ACETAMINOPHEN 1 TABLET: 10; 325 TABLET ORAL at 04:57

## 2021-01-01 RX ADMIN — ENOXAPARIN SODIUM 40 MG: 40 INJECTION SUBCUTANEOUS at 09:10

## 2021-01-01 RX ADMIN — IPRATROPIUM BROMIDE AND ALBUTEROL SULFATE 1 AMPULE: .5; 3 SOLUTION RESPIRATORY (INHALATION) at 20:40

## 2021-01-01 RX ADMIN — SODIUM CHLORIDE, PRESERVATIVE FREE 10 ML: 5 INJECTION INTRAVENOUS at 08:03

## 2021-01-01 RX ADMIN — APIXABAN 5 MG: 5 TABLET, FILM COATED ORAL at 21:33

## 2021-01-01 RX ADMIN — SODIUM CHLORIDE, PRESERVATIVE FREE 10 ML: 5 INJECTION INTRAVENOUS at 18:59

## 2021-01-01 RX ADMIN — SODIUM CHLORIDE, PRESERVATIVE FREE 10 ML: 5 INJECTION INTRAVENOUS at 08:16

## 2021-01-01 RX ADMIN — Medication 500 MG: at 08:25

## 2021-01-01 RX ADMIN — SUCRALFATE 1 G: 1 TABLET ORAL at 16:48

## 2021-01-01 RX ADMIN — DILTIAZEM HYDROCHLORIDE 120 MG: 120 CAPSULE, COATED, EXTENDED RELEASE ORAL at 09:12

## 2021-01-01 RX ADMIN — POTASSIUM CHLORIDE 10 MEQ: 750 TABLET, FILM COATED, EXTENDED RELEASE ORAL at 21:12

## 2021-01-01 RX ADMIN — SODIUM CHLORIDE, PRESERVATIVE FREE 10 ML: 5 INJECTION INTRAVENOUS at 08:06

## 2021-01-01 RX ADMIN — Medication 2 PUFF: at 15:47

## 2021-01-01 RX ADMIN — Medication 1 CAPSULE: at 09:13

## 2021-01-01 RX ADMIN — CEFTRIAXONE 1000 MG: 1 INJECTION, POWDER, FOR SOLUTION INTRAMUSCULAR; INTRAVENOUS at 11:16

## 2021-01-01 RX ADMIN — MEROPENEM 1000 MG: 1 INJECTION, POWDER, FOR SOLUTION INTRAVENOUS at 06:52

## 2021-01-01 RX ADMIN — METHYLPREDNISOLONE SODIUM SUCCINATE 40 MG: 40 INJECTION, POWDER, LYOPHILIZED, FOR SOLUTION INTRAMUSCULAR; INTRAVENOUS at 05:26

## 2021-01-01 RX ADMIN — Medication 2 PUFF: at 15:03

## 2021-01-01 RX ADMIN — IPRATROPIUM BROMIDE AND ALBUTEROL SULFATE 1 AMPULE: .5; 3 SOLUTION RESPIRATORY (INHALATION) at 15:12

## 2021-01-01 RX ADMIN — FENTANYL CITRATE 50 MCG: 50 INJECTION INTRAMUSCULAR; INTRAVENOUS at 09:15

## 2021-01-01 RX ADMIN — NYSTATIN 500000 UNITS: 100000 SUSPENSION ORAL at 13:04

## 2021-01-01 RX ADMIN — BUSPIRONE HYDROCHLORIDE 10 MG: 10 TABLET ORAL at 08:48

## 2021-01-01 RX ADMIN — Medication 2 PUFF: at 22:12

## 2021-01-01 RX ADMIN — GUAIFENESIN 600 MG: 600 TABLET ORAL at 09:29

## 2021-01-01 RX ADMIN — IPRATROPIUM BROMIDE AND ALBUTEROL SULFATE 3 ML: .5; 3 SOLUTION RESPIRATORY (INHALATION) at 11:18

## 2021-01-01 RX ADMIN — MEROPENEM 1000 MG: 1 INJECTION, POWDER, FOR SOLUTION INTRAVENOUS at 17:18

## 2021-01-01 RX ADMIN — NYSTATIN 500000 UNITS: 100000 SUSPENSION ORAL at 16:34

## 2021-01-01 RX ADMIN — IPRATROPIUM BROMIDE AND ALBUTEROL SULFATE 3 ML: .5; 3 SOLUTION RESPIRATORY (INHALATION) at 08:05

## 2021-01-01 RX ADMIN — Medication 1000 UNITS: at 09:05

## 2021-01-01 RX ADMIN — LORAZEPAM 0.5 MG: 2 INJECTION INTRAMUSCULAR; INTRAVENOUS at 07:22

## 2021-01-01 RX ADMIN — THEOPHYLLINE ANHYDROUS 100 MG: 100 CAPSULE, EXTENDED RELEASE ORAL at 08:48

## 2021-01-01 RX ADMIN — SODIUM CHLORIDE, PRESERVATIVE FREE 10 ML: 5 INJECTION INTRAVENOUS at 20:31

## 2021-01-01 RX ADMIN — Medication 500 MG: at 08:26

## 2021-01-01 RX ADMIN — METHYLPREDNISOLONE SODIUM SUCCINATE 40 MG: 40 INJECTION, POWDER, LYOPHILIZED, FOR SOLUTION INTRAMUSCULAR; INTRAVENOUS at 02:13

## 2021-01-01 RX ADMIN — CETIRIZINE HYDROCHLORIDE 10 MG: 10 TABLET, FILM COATED ORAL at 08:26

## 2021-01-01 RX ADMIN — SUCRALFATE 1 G: 1 TABLET ORAL at 17:29

## 2021-01-01 RX ADMIN — ATORVASTATIN CALCIUM 10 MG: 10 TABLET, FILM COATED ORAL at 22:23

## 2021-01-01 RX ADMIN — METHYLPREDNISOLONE SODIUM SUCCINATE 40 MG: 40 INJECTION, POWDER, LYOPHILIZED, FOR SOLUTION INTRAMUSCULAR; INTRAVENOUS at 03:25

## 2021-01-01 RX ADMIN — LORAZEPAM 0.5 MG: 2 INJECTION INTRAMUSCULAR; INTRAVENOUS at 16:24

## 2021-01-01 RX ADMIN — LORAZEPAM 0.5 MG: 2 INJECTION INTRAMUSCULAR; INTRAVENOUS at 21:58

## 2021-01-01 RX ADMIN — MORPHINE SULFATE 2 MG: 2 INJECTION, SOLUTION INTRAMUSCULAR; INTRAVENOUS at 15:53

## 2021-01-01 RX ADMIN — MAGNESIUM SULFATE 2000 MG: 2 INJECTION INTRAVENOUS at 15:44

## 2021-01-01 RX ADMIN — IPRATROPIUM BROMIDE AND ALBUTEROL SULFATE 1 AMPULE: .5; 3 SOLUTION RESPIRATORY (INHALATION) at 08:31

## 2021-01-01 RX ADMIN — IPRATROPIUM BROMIDE AND ALBUTEROL SULFATE 1 AMPULE: .5; 3 SOLUTION RESPIRATORY (INHALATION) at 19:54

## 2021-01-01 RX ADMIN — SUCRALFATE 1 G: 1 TABLET ORAL at 17:45

## 2021-01-01 RX ADMIN — LORAZEPAM 1 MG: 2 INJECTION INTRAMUSCULAR; INTRAVENOUS at 04:20

## 2021-01-01 RX ADMIN — APIXABAN 5 MG: 5 TABLET, FILM COATED ORAL at 21:05

## 2021-01-01 RX ADMIN — ACETYLCYSTEINE 600 MG: 200 SOLUTION ORAL; RESPIRATORY (INHALATION) at 19:58

## 2021-01-01 RX ADMIN — CEFEPIME HYDROCHLORIDE 2000 MG: 2 INJECTION, POWDER, FOR SOLUTION INTRAVENOUS at 20:21

## 2021-01-01 RX ADMIN — MAGNESIUM OXIDE 400 MG (241.3 MG MAGNESIUM) TABLET 200 MG: TABLET at 09:55

## 2021-01-01 RX ADMIN — DEXTROSE MONOHYDRATE 750 MG: 50 INJECTION, SOLUTION INTRAVENOUS at 04:57

## 2021-01-01 RX ADMIN — BUSPIRONE HYDROCHLORIDE 10 MG: 10 TABLET ORAL at 11:30

## 2021-01-01 RX ADMIN — APIXABAN 5 MG: 5 TABLET, FILM COATED ORAL at 07:32

## 2021-01-01 RX ADMIN — DEXTROSE MONOHYDRATE 750 MG: 50 INJECTION, SOLUTION INTRAVENOUS at 06:03

## 2021-01-01 RX ADMIN — ASPIRIN 81 MG: 81 TABLET, COATED ORAL at 11:30

## 2021-01-01 RX ADMIN — HYDROCODONE BITARTRATE AND ACETAMINOPHEN 1 TABLET: 10; 325 TABLET ORAL at 12:43

## 2021-01-01 RX ADMIN — HYDROCODONE BITARTRATE AND ACETAMINOPHEN 1 TABLET: 10; 325 TABLET ORAL at 07:59

## 2021-01-01 RX ADMIN — BUSPIRONE HYDROCHLORIDE 10 MG: 10 TABLET ORAL at 08:53

## 2021-01-01 RX ADMIN — MEROPENEM 1000 MG: 1 INJECTION, POWDER, FOR SOLUTION INTRAVENOUS at 15:34

## 2021-01-01 RX ADMIN — SUCRALFATE 1 G: 1 TABLET ORAL at 13:22

## 2021-01-01 RX ADMIN — METHYLPREDNISOLONE SODIUM SUCCINATE 40 MG: 40 INJECTION, POWDER, LYOPHILIZED, FOR SOLUTION INTRAMUSCULAR; INTRAVENOUS at 17:00

## 2021-01-01 RX ADMIN — ALBUTEROL SULFATE 2 PUFF: 90 AEROSOL, METERED RESPIRATORY (INHALATION) at 11:56

## 2021-01-01 RX ADMIN — METHYLPREDNISOLONE SODIUM SUCCINATE 40 MG: 40 INJECTION, POWDER, FOR SOLUTION INTRAMUSCULAR; INTRAVENOUS at 08:03

## 2021-01-01 RX ADMIN — IPRATROPIUM BROMIDE AND ALBUTEROL SULFATE 1 AMPULE: .5; 3 SOLUTION RESPIRATORY (INHALATION) at 19:44

## 2021-01-01 RX ADMIN — IOPAMIDOL 79 ML: 755 INJECTION, SOLUTION INTRAVENOUS at 13:05

## 2021-01-01 RX ADMIN — ALBUTEROL SULFATE 2 PUFF: 90 AEROSOL, METERED RESPIRATORY (INHALATION) at 23:01

## 2021-01-01 RX ADMIN — POTASSIUM CHLORIDE 20 MEQ: 400 INJECTION, SOLUTION INTRAVENOUS at 17:47

## 2021-01-01 RX ADMIN — NYSTATIN 500000 UNITS: 100000 SUSPENSION ORAL at 08:22

## 2021-01-01 RX ADMIN — NYSTATIN 500000 UNITS: 100000 SUSPENSION ORAL at 08:53

## 2021-01-01 RX ADMIN — SODIUM CHLORIDE, PRESERVATIVE FREE 10 ML: 5 INJECTION INTRAVENOUS at 08:27

## 2021-01-01 RX ADMIN — HYDROCODONE BITARTRATE AND ACETAMINOPHEN 1 TABLET: 10; 325 TABLET ORAL at 22:18

## 2021-01-01 RX ADMIN — LORAZEPAM 0.5 MG: 2 INJECTION INTRAMUSCULAR; INTRAVENOUS at 07:18

## 2021-01-01 RX ADMIN — MINOCYCLINE HYDROCHLORIDE 100 MG: 100 CAPSULE ORAL at 08:39

## 2021-01-01 RX ADMIN — LORAZEPAM 0.5 MG: 2 INJECTION INTRAMUSCULAR; INTRAVENOUS at 21:51

## 2021-01-01 RX ADMIN — HYDROCODONE BITARTRATE AND ACETAMINOPHEN 1 TABLET: 10; 325 TABLET ORAL at 11:55

## 2021-01-01 RX ADMIN — PROPOFOL 170 MG: 10 INJECTION, EMULSION INTRAVENOUS at 07:10

## 2021-01-01 RX ADMIN — Medication 4 MG: at 07:21

## 2021-01-01 RX ADMIN — ALBUTEROL SULFATE 2 PUFF: 90 AEROSOL, METERED RESPIRATORY (INHALATION) at 07:48

## 2021-01-01 RX ADMIN — ENOXAPARIN SODIUM 60 MG: 60 INJECTION SUBCUTANEOUS at 22:36

## 2021-01-01 RX ADMIN — LISINOPRIL 5 MG: 5 TABLET ORAL at 10:33

## 2021-01-01 RX ADMIN — DILTIAZEM HYDROCHLORIDE 240 MG: 240 CAPSULE, COATED, EXTENDED RELEASE ORAL at 10:00

## 2021-01-01 RX ADMIN — SODIUM CHLORIDE, PRESERVATIVE FREE 10 ML: 5 INJECTION INTRAVENOUS at 07:58

## 2021-01-01 RX ADMIN — SODIUM CHLORIDE 500 ML: 9 INJECTION, SOLUTION INTRAVENOUS at 22:18

## 2021-01-01 RX ADMIN — ACETYLCYSTEINE 600 MG: 200 SOLUTION ORAL; RESPIRATORY (INHALATION) at 08:05

## 2021-01-01 RX ADMIN — APIXABAN 5 MG: 5 TABLET, FILM COATED ORAL at 20:38

## 2021-01-01 RX ADMIN — PROPOFOL 110 MG: 10 INJECTION, EMULSION INTRAVENOUS at 08:15

## 2021-01-01 RX ADMIN — SUCRALFATE 1 G: 1 TABLET ORAL at 09:58

## 2021-01-01 RX ADMIN — HYDROCODONE BITARTRATE AND ACETAMINOPHEN 1 TABLET: 10; 325 TABLET ORAL at 08:58

## 2021-01-01 RX ADMIN — SODIUM CHLORIDE, PRESERVATIVE FREE 10 ML: 5 INJECTION INTRAVENOUS at 09:00

## 2021-01-01 RX ADMIN — HYDROCODONE BITARTRATE AND ACETAMINOPHEN 1 TABLET: 10; 325 TABLET ORAL at 05:08

## 2021-01-01 RX ADMIN — DILTIAZEM HYDROCHLORIDE 240 MG: 240 CAPSULE, COATED, EXTENDED RELEASE ORAL at 09:58

## 2021-01-01 RX ADMIN — LORAZEPAM 0.5 MG: 2 INJECTION INTRAMUSCULAR; INTRAVENOUS at 08:00

## 2021-01-01 RX ADMIN — DOXYCYCLINE 100 MG: 100 INJECTION, POWDER, LYOPHILIZED, FOR SOLUTION INTRAVENOUS at 22:40

## 2021-01-01 RX ADMIN — FAMOTIDINE 20 MG: 10 INJECTION, SOLUTION INTRAVENOUS at 09:17

## 2021-01-01 RX ADMIN — LORAZEPAM 1 MG: 1 TABLET ORAL at 19:40

## 2021-01-01 RX ADMIN — FLUTICASONE PROPIONATE 2 SPRAY: 50 SPRAY, METERED NASAL at 08:49

## 2021-01-01 RX ADMIN — ALBUTEROL SULFATE 2 PUFF: 90 AEROSOL, METERED RESPIRATORY (INHALATION) at 15:01

## 2021-01-01 RX ADMIN — DILTIAZEM HYDROCHLORIDE 240 MG: 240 CAPSULE, COATED, EXTENDED RELEASE ORAL at 08:22

## 2021-01-01 RX ADMIN — SODIUM CHLORIDE, PRESERVATIVE FREE 10 ML: 5 INJECTION INTRAVENOUS at 21:48

## 2021-01-01 RX ADMIN — SUCRALFATE 1 G: 1 TABLET ORAL at 23:00

## 2021-01-01 RX ADMIN — IPRATROPIUM BROMIDE AND ALBUTEROL SULFATE 1 AMPULE: .5; 3 SOLUTION RESPIRATORY (INHALATION) at 07:18

## 2021-01-01 RX ADMIN — METHYLPREDNISOLONE SODIUM SUCCINATE 40 MG: 40 INJECTION, POWDER, FOR SOLUTION INTRAMUSCULAR; INTRAVENOUS at 02:48

## 2021-01-01 RX ADMIN — THEOPHYLLINE ANHYDROUS 200 MG: 200 CAPSULE, EXTENDED RELEASE ORAL at 08:39

## 2021-01-01 RX ADMIN — SODIUM CHLORIDE 20 ML/HR: 9 INJECTION, SOLUTION INTRAVENOUS at 22:58

## 2021-01-01 RX ADMIN — METHYLPREDNISOLONE SODIUM SUCCINATE 40 MG: 40 INJECTION, POWDER, FOR SOLUTION INTRAMUSCULAR; INTRAVENOUS at 05:00

## 2021-01-01 RX ADMIN — BUSPIRONE HYDROCHLORIDE 10 MG: 10 TABLET ORAL at 16:13

## 2021-01-01 RX ADMIN — ALBUTEROL SULFATE 2 PUFF: 90 AEROSOL, METERED RESPIRATORY (INHALATION) at 12:35

## 2021-01-01 RX ADMIN — MORPHINE SULFATE 4 MG: 4 INJECTION, SOLUTION INTRAMUSCULAR; INTRAVENOUS at 17:51

## 2021-01-01 RX ADMIN — APIXABAN 5 MG: 5 TABLET, FILM COATED ORAL at 20:36

## 2021-01-01 RX ADMIN — SODIUM CHLORIDE, PRESERVATIVE FREE 10 ML: 5 INJECTION INTRAVENOUS at 18:04

## 2021-01-01 RX ADMIN — FENTANYL CITRATE 50 MCG: 50 INJECTION INTRAMUSCULAR; INTRAVENOUS at 08:13

## 2021-01-01 RX ADMIN — IPRATROPIUM BROMIDE AND ALBUTEROL SULFATE 3 ML: .5; 3 SOLUTION RESPIRATORY (INHALATION) at 15:30

## 2021-01-01 RX ADMIN — MORPHINE SULFATE 4 MG: 4 INJECTION, SOLUTION INTRAMUSCULAR; INTRAVENOUS at 11:57

## 2021-01-01 RX ADMIN — LEVOFLOXACIN 750 MG: 750 TABLET, FILM COATED ORAL at 08:48

## 2021-01-01 RX ADMIN — LORAZEPAM 1 MG: 2 INJECTION INTRAMUSCULAR; INTRAVENOUS at 18:57

## 2021-01-01 RX ADMIN — IPRATROPIUM BROMIDE AND ALBUTEROL SULFATE 3 ML: .5; 3 SOLUTION RESPIRATORY (INHALATION) at 07:54

## 2021-01-01 RX ADMIN — IPRATROPIUM BROMIDE AND ALBUTEROL SULFATE 1 AMPULE: .5; 3 SOLUTION RESPIRATORY (INHALATION) at 16:20

## 2021-01-01 RX ADMIN — SODIUM CHLORIDE, PRESERVATIVE FREE 10 ML: 5 INJECTION INTRAVENOUS at 08:53

## 2021-01-01 RX ADMIN — HYDROCODONE BITARTRATE AND ACETAMINOPHEN 1 TABLET: 10; 325 TABLET ORAL at 09:59

## 2021-01-01 RX ADMIN — IPRATROPIUM BROMIDE AND ALBUTEROL SULFATE 1 AMPULE: .5; 3 SOLUTION RESPIRATORY (INHALATION) at 07:41

## 2021-01-01 RX ADMIN — ONDANSETRON 4 MG: 2 INJECTION INTRAMUSCULAR; INTRAVENOUS at 14:11

## 2021-01-01 RX ADMIN — SODIUM CHLORIDE, PRESERVATIVE FREE 10 ML: 5 INJECTION INTRAVENOUS at 08:22

## 2021-01-01 RX ADMIN — Medication 0.2 MG: at 02:02

## 2021-01-01 RX ADMIN — IPRATROPIUM BROMIDE AND ALBUTEROL SULFATE 1 AMPULE: .5; 3 SOLUTION RESPIRATORY (INHALATION) at 11:03

## 2021-01-01 RX ADMIN — FLUTICASONE PROPIONATE 2 SPRAY: 50 SPRAY, METERED NASAL at 20:21

## 2021-01-01 RX ADMIN — DILTIAZEM HYDROCHLORIDE 120 MG: 120 CAPSULE, COATED, EXTENDED RELEASE ORAL at 09:03

## 2021-01-01 RX ADMIN — ENOXAPARIN SODIUM 30 MG: 30 INJECTION SUBCUTANEOUS at 08:05

## 2021-01-01 RX ADMIN — PROPOFOL 55 MCG/KG/MIN: 10 INJECTION, EMULSION INTRAVENOUS at 20:38

## 2021-01-01 RX ADMIN — SUCRALFATE 1 G: 1 TABLET ORAL at 11:40

## 2021-01-01 RX ADMIN — SODIUM CHLORIDE, PRESERVATIVE FREE 10 ML: 5 INJECTION INTRAVENOUS at 10:48

## 2021-01-01 RX ADMIN — AZITHROMYCIN MONOHYDRATE 500 MG: 500 INJECTION, POWDER, LYOPHILIZED, FOR SOLUTION INTRAVENOUS at 16:36

## 2021-01-01 RX ADMIN — SODIUM CHLORIDE, PRESERVATIVE FREE 10 ML: 5 INJECTION INTRAVENOUS at 22:24

## 2021-01-01 RX ADMIN — SODIUM CHLORIDE, PRESERVATIVE FREE 10 ML: 5 INJECTION INTRAVENOUS at 08:00

## 2021-01-01 RX ADMIN — LORAZEPAM 1 MG: 1 TABLET ORAL at 08:58

## 2021-01-01 RX ADMIN — LORAZEPAM 0.5 MG: 2 INJECTION INTRAMUSCULAR; INTRAVENOUS at 13:32

## 2021-01-01 RX ADMIN — METHYLPREDNISOLONE SODIUM SUCCINATE 40 MG: 40 INJECTION, POWDER, LYOPHILIZED, FOR SOLUTION INTRAMUSCULAR; INTRAVENOUS at 08:56

## 2021-01-01 RX ADMIN — Medication 500 MG: at 09:18

## 2021-01-01 RX ADMIN — HYDROCODONE BITARTRATE AND ACETAMINOPHEN 1 TABLET: 10; 325 TABLET ORAL at 08:59

## 2021-01-01 RX ADMIN — METHYLPREDNISOLONE SODIUM SUCCINATE 40 MG: 40 INJECTION, POWDER, FOR SOLUTION INTRAMUSCULAR; INTRAVENOUS at 12:53

## 2021-01-01 RX ADMIN — HYDROCODONE BITARTRATE AND ACETAMINOPHEN 1 TABLET: 10; 325 TABLET ORAL at 16:10

## 2021-01-01 RX ADMIN — IPRATROPIUM BROMIDE AND ALBUTEROL SULFATE 3 ML: .5; 3 SOLUTION RESPIRATORY (INHALATION) at 16:19

## 2021-01-01 RX ADMIN — SUCRALFATE 1 G: 1 TABLET ORAL at 10:00

## 2021-01-01 RX ADMIN — POTASSIUM CHLORIDE: 2 INJECTION, SOLUTION, CONCENTRATE INTRAVENOUS at 14:07

## 2021-01-01 RX ADMIN — LORAZEPAM 1 MG: 1 TABLET ORAL at 11:12

## 2021-01-01 RX ADMIN — BUSPIRONE HYDROCHLORIDE 10 MG: 10 TABLET ORAL at 16:05

## 2021-01-01 RX ADMIN — THEOPHYLLINE ANHYDROUS 200 MG: 200 CAPSULE, EXTENDED RELEASE ORAL at 20:56

## 2021-01-01 RX ADMIN — MEROPENEM 1000 MG: 1 INJECTION, POWDER, FOR SOLUTION INTRAVENOUS at 07:28

## 2021-01-01 RX ADMIN — HYDROCODONE BITARTRATE AND ACETAMINOPHEN 1 TABLET: 10; 325 TABLET ORAL at 19:08

## 2021-01-01 RX ADMIN — METHYLPREDNISOLONE SODIUM SUCCINATE 40 MG: 40 INJECTION, POWDER, LYOPHILIZED, FOR SOLUTION INTRAMUSCULAR; INTRAVENOUS at 22:35

## 2021-01-01 RX ADMIN — IPRATROPIUM BROMIDE AND ALBUTEROL SULFATE 1 AMPULE: .5; 3 SOLUTION RESPIRATORY (INHALATION) at 15:42

## 2021-01-01 RX ADMIN — HYDROCODONE BITARTRATE AND ACETAMINOPHEN 1 TABLET: 10; 325 TABLET ORAL at 21:04

## 2021-01-01 RX ADMIN — DEXTROSE MONOHYDRATE 750 MG: 50 INJECTION, SOLUTION INTRAVENOUS at 20:43

## 2021-01-01 RX ADMIN — Medication 1000 UNITS: at 08:58

## 2021-01-01 RX ADMIN — PROPOFOL 20 MG: 10 INJECTION, EMULSION INTRAVENOUS at 14:06

## 2021-01-01 RX ADMIN — METHYLPREDNISOLONE SODIUM SUCCINATE 40 MG: 40 INJECTION, POWDER, FOR SOLUTION INTRAMUSCULAR; INTRAVENOUS at 07:22

## 2021-01-01 RX ADMIN — THEOPHYLLINE 200 MG: 400 TABLET, EXTENDED RELEASE ORAL at 09:13

## 2021-01-01 RX ADMIN — LORAZEPAM 1 MG: 1 TABLET ORAL at 23:08

## 2021-01-01 RX ADMIN — METHYLPREDNISOLONE SODIUM SUCCINATE 40 MG: 40 INJECTION, POWDER, LYOPHILIZED, FOR SOLUTION INTRAMUSCULAR; INTRAVENOUS at 21:07

## 2021-01-01 RX ADMIN — DOCUSATE SODIUM 100 MG: 100 CAPSULE, LIQUID FILLED ORAL at 08:48

## 2021-01-01 RX ADMIN — METOPROLOL TARTRATE 50 MG: 50 TABLET, FILM COATED ORAL at 09:28

## 2021-01-01 RX ADMIN — ACETYLCYSTEINE 600 MG: 200 SOLUTION ORAL; RESPIRATORY (INHALATION) at 07:07

## 2021-01-01 RX ADMIN — HYDROCODONE BITARTRATE AND ACETAMINOPHEN 1 TABLET: 10; 325 TABLET ORAL at 03:15

## 2021-01-01 RX ADMIN — MEROPENEM 1000 MG: 1 INJECTION, POWDER, FOR SOLUTION INTRAVENOUS at 00:00

## 2021-01-01 RX ADMIN — METOPROLOL TARTRATE 50 MG: 50 TABLET, FILM COATED ORAL at 08:14

## 2021-01-01 RX ADMIN — HYDROCODONE BITARTRATE AND ACETAMINOPHEN 1 TABLET: 10; 325 TABLET ORAL at 12:44

## 2021-01-01 RX ADMIN — LEVETIRACETAM 500 MG: 100 INJECTION, SOLUTION INTRAVENOUS at 18:05

## 2021-01-01 RX ADMIN — PANTOPRAZOLE SODIUM 40 MG: 40 TABLET, DELAYED RELEASE ORAL at 05:25

## 2021-01-01 RX ADMIN — MAGNESIUM SULFATE HEPTAHYDRATE: 500 INJECTION, SOLUTION INTRAMUSCULAR; INTRAVENOUS at 08:43

## 2021-01-01 RX ADMIN — APIXABAN 5 MG: 5 TABLET, FILM COATED ORAL at 08:19

## 2021-01-01 RX ADMIN — CEFEPIME HYDROCHLORIDE 2000 MG: 2 INJECTION, POWDER, FOR SOLUTION INTRAVENOUS at 21:15

## 2021-01-01 RX ADMIN — Medication 1 CAPSULE: at 08:18

## 2021-01-01 RX ADMIN — NYSTATIN 500000 UNITS: 100000 SUSPENSION ORAL at 17:45

## 2021-01-01 RX ADMIN — DILTIAZEM HYDROCHLORIDE 20 MG: 5 INJECTION INTRAVENOUS at 10:01

## 2021-01-01 RX ADMIN — SODIUM CHLORIDE, PRESERVATIVE FREE 10 ML: 5 INJECTION INTRAVENOUS at 20:22

## 2021-01-01 RX ADMIN — MEROPENEM 1000 MG: 1 INJECTION, POWDER, FOR SOLUTION INTRAVENOUS at 09:04

## 2021-01-01 RX ADMIN — NYSTATIN 500000 UNITS: 100000 SUSPENSION ORAL at 21:15

## 2021-01-01 RX ADMIN — NYSTATIN 500000 UNITS: 100000 SUSPENSION ORAL at 10:41

## 2021-01-01 RX ADMIN — DEXAMETHASONE SODIUM PHOSPHATE 8 MG: 4 INJECTION, SOLUTION INTRAMUSCULAR; INTRAVENOUS at 11:17

## 2021-01-01 RX ADMIN — DIGOXIN 250 MCG: 0.25 INJECTION INTRAMUSCULAR; INTRAVENOUS at 16:09

## 2021-01-01 RX ADMIN — LEVETIRACETAM 500 MG: 100 INJECTION, SOLUTION INTRAVENOUS at 06:21

## 2021-01-01 RX ADMIN — MAGNESIUM OXIDE 200 MG: 400 TABLET ORAL at 08:41

## 2021-01-01 RX ADMIN — FOSAPREPITANT 150 MG: 150 INJECTION, POWDER, LYOPHILIZED, FOR SOLUTION INTRAVENOUS at 09:24

## 2021-01-01 RX ADMIN — Medication 0.2 MG: at 00:45

## 2021-01-01 RX ADMIN — IPRATROPIUM BROMIDE AND ALBUTEROL SULFATE 3 ML: .5; 3 SOLUTION RESPIRATORY (INHALATION) at 19:41

## 2021-01-01 RX ADMIN — ACETYLCYSTEINE 600 MG: 200 SOLUTION ORAL; RESPIRATORY (INHALATION) at 20:51

## 2021-01-01 RX ADMIN — BUSPIRONE HYDROCHLORIDE 10 MG: 10 TABLET ORAL at 19:42

## 2021-01-01 RX ADMIN — Medication 1 SPRAY: at 08:49

## 2021-01-01 RX ADMIN — LEVOFLOXACIN 750 MG: 500 TABLET, FILM COATED ORAL at 09:57

## 2021-01-01 RX ADMIN — HYDROCODONE BITARTRATE AND ACETAMINOPHEN 1 TABLET: 10; 325 TABLET ORAL at 21:11

## 2021-01-01 RX ADMIN — METHYLPREDNISOLONE SODIUM SUCCINATE 40 MG: 40 INJECTION, POWDER, FOR SOLUTION INTRAMUSCULAR; INTRAVENOUS at 11:39

## 2021-01-01 RX ADMIN — MEROPENEM 1000 MG: 1 INJECTION, POWDER, FOR SOLUTION INTRAVENOUS at 17:03

## 2021-01-01 RX ADMIN — MEROPENEM 1000 MG: 1 INJECTION, POWDER, FOR SOLUTION INTRAVENOUS at 15:45

## 2021-01-01 RX ADMIN — SUCRALFATE 1 G: 1 TABLET ORAL at 12:53

## 2021-01-01 RX ADMIN — SODIUM CHLORIDE, PRESERVATIVE FREE 10 ML: 5 INJECTION INTRAVENOUS at 21:05

## 2021-01-01 RX ADMIN — DEXTROSE MONOHYDRATE 750 MG: 50 INJECTION, SOLUTION INTRAVENOUS at 10:51

## 2021-01-01 RX ADMIN — DOCUSATE SODIUM 100 MG: 100 CAPSULE, LIQUID FILLED ORAL at 08:39

## 2021-01-01 RX ADMIN — LORAZEPAM 0.5 MG: 2 INJECTION INTRAMUSCULAR; INTRAVENOUS at 03:33

## 2021-01-01 RX ADMIN — CEFEPIME HYDROCHLORIDE 2000 MG: 2 INJECTION, POWDER, FOR SOLUTION INTRAVENOUS at 10:35

## 2021-01-01 RX ADMIN — APIXABAN 5 MG: 5 TABLET, FILM COATED ORAL at 08:41

## 2021-01-01 RX ADMIN — DEXTROSE MONOHYDRATE 750 MG: 50 INJECTION, SOLUTION INTRAVENOUS at 10:34

## 2021-01-01 RX ADMIN — MORPHINE SULFATE 4 MG: 4 INJECTION, SOLUTION INTRAMUSCULAR; INTRAVENOUS at 17:31

## 2021-01-01 RX ADMIN — CHLORHEXIDINE GLUCONATE 0.12% ORAL RINSE 15 ML: 1.2 LIQUID ORAL at 22:00

## 2021-01-01 RX ADMIN — FAMOTIDINE 20 MG: 10 INJECTION, SOLUTION INTRAVENOUS at 20:38

## 2021-01-01 RX ADMIN — SERTRALINE HYDROCHLORIDE 50 MG: 50 TABLET ORAL at 09:12

## 2021-01-01 RX ADMIN — FLUCONAZOLE 200 MG: 100 TABLET ORAL at 10:39

## 2021-01-01 RX ADMIN — ACETYLCYSTEINE 600 MG: 200 SOLUTION ORAL; RESPIRATORY (INHALATION) at 07:43

## 2021-01-01 RX ADMIN — BUSPIRONE HYDROCHLORIDE 10 MG: 10 TABLET ORAL at 08:49

## 2021-01-01 RX ADMIN — SODIUM CHLORIDE, PRESERVATIVE FREE 10 ML: 5 INJECTION INTRAVENOUS at 12:56

## 2021-01-01 RX ADMIN — SUCRALFATE 1 G: 1 TABLET ORAL at 20:37

## 2021-01-01 RX ADMIN — NYSTATIN 500000 UNITS: 100000 SUSPENSION ORAL at 22:01

## 2021-01-01 RX ADMIN — ASPIRIN 81 MG: 81 TABLET, COATED ORAL at 08:07

## 2021-01-01 RX ADMIN — ENOXAPARIN SODIUM 30 MG: 30 INJECTION SUBCUTANEOUS at 08:49

## 2021-01-01 RX ADMIN — BUSPIRONE HYDROCHLORIDE 10 MG: 10 TABLET ORAL at 16:48

## 2021-01-01 RX ADMIN — ATORVASTATIN CALCIUM 10 MG: 10 TABLET, FILM COATED ORAL at 21:11

## 2021-01-01 RX ADMIN — DOCUSATE SODIUM 100 MG: 100 CAPSULE, LIQUID FILLED ORAL at 09:11

## 2021-01-01 RX ADMIN — METOPROLOL TARTRATE 2.5 MG: 5 INJECTION INTRAVENOUS at 23:41

## 2021-01-01 RX ADMIN — SODIUM CHLORIDE 20 ML/HR: 9 INJECTION, SOLUTION INTRAVENOUS at 10:42

## 2021-01-01 RX ADMIN — METHYLPREDNISOLONE SODIUM SUCCINATE 40 MG: 40 INJECTION, POWDER, FOR SOLUTION INTRAMUSCULAR; INTRAVENOUS at 23:28

## 2021-01-01 RX ADMIN — SUCRALFATE 1 G: 1 TABLET ORAL at 16:29

## 2021-01-01 RX ADMIN — Medication 1 TABLET: at 08:13

## 2021-01-01 RX ADMIN — THEOPHYLLINE 200 MG: 400 TABLET, EXTENDED RELEASE ORAL at 21:11

## 2021-01-01 RX ADMIN — NYSTATIN 500000 UNITS: 100000 SUSPENSION ORAL at 15:55

## 2021-01-01 RX ADMIN — MORPHINE SULFATE 2 MG: 2 INJECTION, SOLUTION INTRAMUSCULAR; INTRAVENOUS at 08:01

## 2021-01-01 RX ADMIN — SUCRALFATE 1 G: 1 TABLET ORAL at 16:54

## 2021-01-01 RX ADMIN — NYSTATIN 500000 UNITS: 100000 SUSPENSION ORAL at 21:10

## 2021-01-01 RX ADMIN — DOCUSATE SODIUM 200 MG: 100 CAPSULE, LIQUID FILLED ORAL at 08:59

## 2021-01-01 RX ADMIN — SODIUM CHLORIDE, PRESERVATIVE FREE 10 ML: 5 INJECTION INTRAVENOUS at 09:20

## 2021-01-01 RX ADMIN — METOPROLOL TARTRATE 50 MG: 50 TABLET, FILM COATED ORAL at 08:09

## 2021-01-01 RX ADMIN — MEROPENEM 1000 MG: 1 INJECTION, POWDER, FOR SOLUTION INTRAVENOUS at 23:42

## 2021-01-01 RX ADMIN — FAMOTIDINE 20 MG: 10 INJECTION, SOLUTION INTRAVENOUS at 08:22

## 2021-01-01 RX ADMIN — PANTOPRAZOLE SODIUM 40 MG: 40 TABLET, DELAYED RELEASE ORAL at 05:08

## 2021-01-01 RX ADMIN — FLUTICASONE PROPIONATE 2 SPRAY: 50 SPRAY, METERED NASAL at 08:27

## 2021-01-01 RX ADMIN — SODIUM CHLORIDE, PRESERVATIVE FREE 10 ML: 5 INJECTION INTRAVENOUS at 12:16

## 2021-01-01 RX ADMIN — METHYLPREDNISOLONE SODIUM SUCCINATE 40 MG: 40 INJECTION, POWDER, FOR SOLUTION INTRAMUSCULAR; INTRAVENOUS at 17:02

## 2021-01-01 RX ADMIN — IPRATROPIUM BROMIDE AND ALBUTEROL SULFATE 1 AMPULE: .5; 3 SOLUTION RESPIRATORY (INHALATION) at 11:13

## 2021-01-01 RX ADMIN — SODIUM CHLORIDE, PRESERVATIVE FREE 10 ML: 5 INJECTION INTRAVENOUS at 20:36

## 2021-01-01 RX ADMIN — AZITHROMYCIN MONOHYDRATE 500 MG: 500 INJECTION, POWDER, LYOPHILIZED, FOR SOLUTION INTRAVENOUS at 09:34

## 2021-01-01 RX ADMIN — METHYLPREDNISOLONE SODIUM SUCCINATE 40 MG: 40 INJECTION, POWDER, FOR SOLUTION INTRAMUSCULAR; INTRAVENOUS at 10:58

## 2021-01-01 RX ADMIN — LEVETIRACETAM 500 MG: 100 INJECTION, SOLUTION INTRAVENOUS at 06:30

## 2021-01-01 RX ADMIN — NYSTATIN 500000 UNITS: 100000 SUSPENSION ORAL at 20:38

## 2021-01-01 RX ADMIN — HYDROCODONE BITARTRATE AND ACETAMINOPHEN 1 TABLET: 10; 325 TABLET ORAL at 16:15

## 2021-01-01 RX ADMIN — MEROPENEM 1000 MG: 1 INJECTION, POWDER, FOR SOLUTION INTRAVENOUS at 16:17

## 2021-01-01 RX ADMIN — LORAZEPAM 1 MG: 2 INJECTION INTRAMUSCULAR; INTRAVENOUS at 10:53

## 2021-01-01 RX ADMIN — SODIUM CHLORIDE, PRESERVATIVE FREE 10 ML: 5 INJECTION INTRAVENOUS at 12:01

## 2021-01-01 RX ADMIN — LORAZEPAM 0.5 MG: 2 INJECTION INTRAMUSCULAR; INTRAVENOUS at 04:49

## 2021-01-01 RX ADMIN — IPRATROPIUM BROMIDE AND ALBUTEROL SULFATE 1 AMPULE: .5; 3 SOLUTION RESPIRATORY (INHALATION) at 15:34

## 2021-01-01 RX ADMIN — SERTRALINE HYDROCHLORIDE 50 MG: 50 TABLET ORAL at 08:39

## 2021-01-01 RX ADMIN — Medication 2 PUFF: at 11:58

## 2021-01-01 RX ADMIN — SODIUM CHLORIDE, PRESERVATIVE FREE 10 ML: 5 INJECTION INTRAVENOUS at 09:41

## 2021-01-01 RX ADMIN — BUSPIRONE HYDROCHLORIDE 10 MG: 10 TABLET ORAL at 22:01

## 2021-01-01 RX ADMIN — DILTIAZEM HYDROCHLORIDE 240 MG: 240 CAPSULE, COATED, EXTENDED RELEASE ORAL at 08:14

## 2021-01-01 RX ADMIN — SODIUM CHLORIDE, PRESERVATIVE FREE 10 ML: 5 INJECTION INTRAVENOUS at 08:20

## 2021-01-01 RX ADMIN — THEOPHYLLINE ANHYDROUS 200 MG: 200 CAPSULE, EXTENDED RELEASE ORAL at 20:28

## 2021-01-01 RX ADMIN — METHYLPREDNISOLONE SODIUM SUCCINATE 40 MG: 40 INJECTION, POWDER, LYOPHILIZED, FOR SOLUTION INTRAMUSCULAR; INTRAVENOUS at 09:57

## 2021-01-01 RX ADMIN — HYDROCODONE BITARTRATE AND ACETAMINOPHEN 1 TABLET: 10; 325 TABLET ORAL at 11:21

## 2021-01-01 RX ADMIN — SODIUM CHLORIDE 0.2 MCG/KG/HR: 9 INJECTION, SOLUTION INTRAVENOUS at 13:22

## 2021-01-01 RX ADMIN — LORAZEPAM 1 MG: 2 INJECTION INTRAMUSCULAR; INTRAVENOUS at 22:18

## 2021-01-01 RX ADMIN — ACETYLCYSTEINE 600 MG: 200 SOLUTION ORAL; RESPIRATORY (INHALATION) at 19:45

## 2021-01-01 RX ADMIN — DOCUSATE SODIUM 200 MG: 100 CAPSULE, LIQUID FILLED ORAL at 08:09

## 2021-01-01 RX ADMIN — SUCRALFATE 1 G: 1 TABLET ORAL at 20:32

## 2021-01-01 RX ADMIN — DILTIAZEM HYDROCHLORIDE 120 MG: 120 CAPSULE, COATED, EXTENDED RELEASE ORAL at 09:09

## 2021-01-01 RX ADMIN — Medication 2 PUFF: at 08:33

## 2021-01-01 RX ADMIN — ATORVASTATIN CALCIUM 10 MG: 10 TABLET, FILM COATED ORAL at 11:15

## 2021-01-01 RX ADMIN — SODIUM CHLORIDE, PRESERVATIVE FREE 10 ML: 5 INJECTION INTRAVENOUS at 11:13

## 2021-01-01 RX ADMIN — CEFEPIME HYDROCHLORIDE 2000 MG: 2 INJECTION, POWDER, FOR SOLUTION INTRAVENOUS at 20:06

## 2021-01-01 RX ADMIN — ACETAMINOPHEN 650 MG: 650 SUPPOSITORY RECTAL at 22:20

## 2021-01-01 RX ADMIN — METHYLPREDNISOLONE SODIUM SUCCINATE 40 MG: 40 INJECTION, POWDER, FOR SOLUTION INTRAMUSCULAR; INTRAVENOUS at 16:05

## 2021-01-01 RX ADMIN — METHYLPREDNISOLONE SODIUM SUCCINATE 40 MG: 40 INJECTION, POWDER, LYOPHILIZED, FOR SOLUTION INTRAMUSCULAR; INTRAVENOUS at 00:09

## 2021-01-01 RX ADMIN — CETIRIZINE HYDROCHLORIDE 10 MG: 10 TABLET, FILM COATED ORAL at 10:42

## 2021-01-01 RX ADMIN — DILTIAZEM HYDROCHLORIDE 120 MG: 120 CAPSULE, COATED, EXTENDED RELEASE ORAL at 07:59

## 2021-01-01 RX ADMIN — METHYLPREDNISOLONE SODIUM SUCCINATE 40 MG: 40 INJECTION, POWDER, FOR SOLUTION INTRAMUSCULAR; INTRAVENOUS at 15:55

## 2021-01-01 RX ADMIN — SERTRALINE HYDROCHLORIDE 50 MG: 50 TABLET ORAL at 10:40

## 2021-01-01 RX ADMIN — ALBUTEROL SULFATE 2 PUFF: 90 AEROSOL, METERED RESPIRATORY (INHALATION) at 21:12

## 2021-01-01 RX ADMIN — DILTIAZEM HYDROCHLORIDE 120 MG: 120 CAPSULE, COATED, EXTENDED RELEASE ORAL at 10:40

## 2021-01-01 RX ADMIN — NYSTATIN 500000 UNITS: 100000 SUSPENSION ORAL at 18:00

## 2021-01-01 RX ADMIN — Medication 500 MG: at 09:55

## 2021-01-01 RX ADMIN — BUSPIRONE HYDROCHLORIDE 10 MG: 10 TABLET ORAL at 07:58

## 2021-01-01 RX ADMIN — METOPROLOL TARTRATE 50 MG: 50 TABLET, FILM COATED ORAL at 20:33

## 2021-01-01 RX ADMIN — BUSPIRONE HYDROCHLORIDE 10 MG: 10 TABLET ORAL at 08:10

## 2021-01-01 RX ADMIN — LEVOFLOXACIN 750 MG: 750 TABLET, FILM COATED ORAL at 08:49

## 2021-01-01 RX ADMIN — LORAZEPAM 0.5 MG: 2 INJECTION INTRAMUSCULAR; INTRAVENOUS at 23:55

## 2021-01-01 RX ADMIN — HYDROCODONE BITARTRATE AND ACETAMINOPHEN 1 TABLET: 10; 325 TABLET ORAL at 11:19

## 2021-01-01 RX ADMIN — PROPOFOL 50 MCG/KG/MIN: 10 INJECTION, EMULSION INTRAVENOUS at 09:18

## 2021-01-01 RX ADMIN — AZITHROMYCIN MONOHYDRATE 500 MG: 500 INJECTION, POWDER, LYOPHILIZED, FOR SOLUTION INTRAVENOUS at 17:41

## 2021-01-01 RX ADMIN — DIGOXIN 250 MCG: 0.25 INJECTION INTRAMUSCULAR; INTRAVENOUS at 01:03

## 2021-01-01 RX ADMIN — MEROPENEM 1000 MG: 1 INJECTION, POWDER, FOR SOLUTION INTRAVENOUS at 16:54

## 2021-01-01 RX ADMIN — LIDOCAINE HYDROCHLORIDE 100 MG: 20 INJECTION, SOLUTION INTRAVENOUS at 08:15

## 2021-01-01 RX ADMIN — METOPROLOL TARTRATE 50 MG: 50 TABLET, FILM COATED ORAL at 20:56

## 2021-01-01 RX ADMIN — NYSTATIN 500000 UNITS: 100000 SUSPENSION ORAL at 09:47

## 2021-01-01 RX ADMIN — FLUTICASONE PROPIONATE 2 SPRAY: 50 SPRAY, METERED NASAL at 09:37

## 2021-01-01 RX ADMIN — DEXTROSE MONOHYDRATE 750 MG: 50 INJECTION, SOLUTION INTRAVENOUS at 17:21

## 2021-01-01 RX ADMIN — HYDROCODONE BITARTRATE AND ACETAMINOPHEN 1 TABLET: 10; 325 TABLET ORAL at 06:31

## 2021-01-01 RX ADMIN — PANTOPRAZOLE SODIUM 40 MG: 40 INJECTION, POWDER, FOR SOLUTION INTRAVENOUS at 18:41

## 2021-01-01 RX ADMIN — THEOPHYLLINE 200 MG: 400 TABLET, EXTENDED RELEASE ORAL at 08:14

## 2021-01-01 RX ADMIN — SODIUM CHLORIDE, PRESERVATIVE FREE 10 ML: 5 INJECTION INTRAVENOUS at 21:23

## 2021-01-01 RX ADMIN — METOPROLOL TARTRATE 50 MG: 50 TABLET, FILM COATED ORAL at 08:18

## 2021-01-01 RX ADMIN — LORAZEPAM 0.5 MG: 2 INJECTION INTRAMUSCULAR; INTRAVENOUS at 15:12

## 2021-01-01 RX ADMIN — SUCRALFATE 1 G: 1 TABLET ORAL at 06:37

## 2021-01-01 RX ADMIN — METHYLPREDNISOLONE SODIUM SUCCINATE 40 MG: 40 INJECTION, POWDER, FOR SOLUTION INTRAMUSCULAR; INTRAVENOUS at 21:58

## 2021-01-01 RX ADMIN — SUCRALFATE 1 G: 1 TABLET ORAL at 08:41

## 2021-01-01 RX ADMIN — LEVETIRACETAM 500 MG: 100 INJECTION, SOLUTION INTRAVENOUS at 04:59

## 2021-01-01 RX ADMIN — SUCRALFATE 1 G: 1 TABLET ORAL at 22:52

## 2021-01-01 RX ADMIN — MORPHINE SULFATE 2 MG: 2 INJECTION, SOLUTION INTRAMUSCULAR; INTRAVENOUS at 20:00

## 2021-01-01 RX ADMIN — SODIUM CHLORIDE: 9 INJECTION, SOLUTION INTRAVENOUS at 17:01

## 2021-01-01 RX ADMIN — IPRATROPIUM BROMIDE AND ALBUTEROL SULFATE 1 AMPULE: .5; 3 SOLUTION RESPIRATORY (INHALATION) at 20:55

## 2021-01-01 RX ADMIN — POTASSIUM CHLORIDE 10 MEQ: 750 TABLET, FILM COATED, EXTENDED RELEASE ORAL at 19:42

## 2021-01-01 RX ADMIN — IPRATROPIUM BROMIDE AND ALBUTEROL SULFATE 1 AMPULE: .5; 3 SOLUTION RESPIRATORY (INHALATION) at 20:24

## 2021-01-01 RX ADMIN — DEXTROSE MONOHYDRATE 750 MG: 50 INJECTION, SOLUTION INTRAVENOUS at 00:47

## 2021-01-01 RX ADMIN — LORAZEPAM 0.5 MG: 2 INJECTION INTRAMUSCULAR; INTRAVENOUS at 19:51

## 2021-01-01 RX ADMIN — THEOPHYLLINE ANHYDROUS 100 MG: 100 CAPSULE, EXTENDED RELEASE ORAL at 20:35

## 2021-01-01 RX ADMIN — SODIUM CHLORIDE, PRESERVATIVE FREE 10 ML: 5 INJECTION INTRAVENOUS at 08:46

## 2021-01-01 RX ADMIN — MEROPENEM 1000 MG: 1 INJECTION, POWDER, FOR SOLUTION INTRAVENOUS at 22:09

## 2021-01-01 RX ADMIN — Medication 2 MCG/MIN: at 21:07

## 2021-01-01 RX ADMIN — Medication 2 PUFF: at 07:50

## 2021-01-01 RX ADMIN — MORPHINE SULFATE 4 MG: 4 INJECTION, SOLUTION INTRAMUSCULAR; INTRAVENOUS at 16:30

## 2021-01-01 RX ADMIN — METOPROLOL TARTRATE 50 MG: 50 TABLET, FILM COATED ORAL at 09:55

## 2021-01-01 RX ADMIN — IPRATROPIUM BROMIDE AND ALBUTEROL SULFATE 3 ML: .5; 3 SOLUTION RESPIRATORY (INHALATION) at 16:13

## 2021-01-01 RX ADMIN — POTASSIUM CHLORIDE 20 MEQ: 400 INJECTION, SOLUTION INTRAVENOUS at 07:34

## 2021-01-01 RX ADMIN — CEFEPIME HYDROCHLORIDE 2000 MG: 2 INJECTION, POWDER, FOR SOLUTION INTRAVENOUS at 09:17

## 2021-01-01 RX ADMIN — SUCRALFATE 1 G: 1 TABLET ORAL at 17:21

## 2021-01-01 RX ADMIN — AZITHROMYCIN MONOHYDRATE 500 MG: 500 INJECTION, POWDER, LYOPHILIZED, FOR SOLUTION INTRAVENOUS at 11:39

## 2021-01-01 RX ADMIN — MORPHINE SULFATE 4 MG: 4 INJECTION, SOLUTION INTRAMUSCULAR; INTRAVENOUS at 12:33

## 2021-01-01 RX ADMIN — SODIUM CHLORIDE 20 ML/HR: 9 INJECTION, SOLUTION INTRAVENOUS at 10:41

## 2021-01-01 RX ADMIN — ACETYLCYSTEINE 600 MG: 200 SOLUTION ORAL; RESPIRATORY (INHALATION) at 11:42

## 2021-01-01 RX ADMIN — SODIUM CHLORIDE, PRESERVATIVE FREE 10 ML: 5 INJECTION INTRAVENOUS at 14:19

## 2021-01-01 RX ADMIN — DILTIAZEM HYDROCHLORIDE 240 MG: 240 CAPSULE, COATED, EXTENDED RELEASE ORAL at 08:18

## 2021-01-01 RX ADMIN — SUCRALFATE 1 G: 1 TABLET ORAL at 10:58

## 2021-01-01 RX ADMIN — IPRATROPIUM BROMIDE AND ALBUTEROL SULFATE 1 AMPULE: .5; 3 SOLUTION RESPIRATORY (INHALATION) at 20:01

## 2021-01-01 RX ADMIN — SODIUM CHLORIDE 20 ML/HR: 9 INJECTION, SOLUTION INTRAVENOUS at 08:43

## 2021-01-01 RX ADMIN — METOPROLOL TARTRATE 50 MG: 50 TABLET, FILM COATED ORAL at 21:11

## 2021-01-01 RX ADMIN — Medication 500 MG: at 07:58

## 2021-01-01 RX ADMIN — HYDROCODONE BITARTRATE AND ACETAMINOPHEN 1 TABLET: 10; 325 TABLET ORAL at 17:50

## 2021-01-01 RX ADMIN — PREDNISONE 40 MG: 20 TABLET ORAL at 11:14

## 2021-01-01 RX ADMIN — METHYLPREDNISOLONE SODIUM SUCCINATE 40 MG: 40 INJECTION, POWDER, FOR SOLUTION INTRAMUSCULAR; INTRAVENOUS at 16:54

## 2021-01-01 RX ADMIN — IPRATROPIUM BROMIDE AND ALBUTEROL SULFATE 1 AMPULE: .5; 3 SOLUTION RESPIRATORY (INHALATION) at 11:58

## 2021-01-01 RX ADMIN — NYSTATIN 500000 UNITS: 100000 SUSPENSION ORAL at 16:57

## 2021-01-01 RX ADMIN — SODIUM CHLORIDE, PRESERVATIVE FREE 10 ML: 5 INJECTION INTRAVENOUS at 11:36

## 2021-01-01 RX ADMIN — CEFEPIME HYDROCHLORIDE 2000 MG: 2 INJECTION, POWDER, FOR SOLUTION INTRAVENOUS at 18:41

## 2021-01-01 RX ADMIN — ACETYLCYSTEINE 600 MG: 200 SOLUTION ORAL; RESPIRATORY (INHALATION) at 19:48

## 2021-01-01 RX ADMIN — THEOPHYLLINE ANHYDROUS 200 MG: 200 CAPSULE, EXTENDED RELEASE ORAL at 09:10

## 2021-01-01 RX ADMIN — Medication 1000 UNITS: at 08:18

## 2021-01-01 RX ADMIN — ATORVASTATIN CALCIUM 10 MG: 10 TABLET, FILM COATED ORAL at 10:02

## 2021-01-01 RX ADMIN — Medication 1 TABLET: at 09:47

## 2021-01-01 RX ADMIN — ASPIRIN 81 MG: 81 TABLET, COATED ORAL at 08:05

## 2021-01-01 RX ADMIN — Medication 1 CAPSULE: at 08:47

## 2021-01-01 RX ADMIN — Medication 2 PUFF: at 12:36

## 2021-01-01 RX ADMIN — DILTIAZEM HYDROCHLORIDE 240 MG: 240 CAPSULE, COATED, EXTENDED RELEASE ORAL at 08:58

## 2021-01-01 RX ADMIN — METHYLPREDNISOLONE SODIUM SUCCINATE 40 MG: 40 INJECTION, POWDER, FOR SOLUTION INTRAMUSCULAR; INTRAVENOUS at 09:28

## 2021-01-01 RX ADMIN — APIXABAN 2.5 MG: 2.5 TABLET, FILM COATED ORAL at 20:45

## 2021-01-01 RX ADMIN — METHYLPREDNISOLONE SODIUM SUCCINATE 40 MG: 40 INJECTION, POWDER, FOR SOLUTION INTRAMUSCULAR; INTRAVENOUS at 00:46

## 2021-01-01 RX ADMIN — LORAZEPAM 1 MG: 1 TABLET ORAL at 13:26

## 2021-01-01 RX ADMIN — SUCRALFATE 1 G: 1 TABLET ORAL at 16:34

## 2021-01-01 RX ADMIN — ETOPOSIDE 90 MG: 20 INJECTION INTRAVENOUS at 21:09

## 2021-01-01 RX ADMIN — IPRATROPIUM BROMIDE AND ALBUTEROL SULFATE 1 AMPULE: .5; 3 SOLUTION RESPIRATORY (INHALATION) at 20:52

## 2021-01-01 RX ADMIN — MORPHINE SULFATE 4 MG: 4 INJECTION, SOLUTION INTRAMUSCULAR; INTRAVENOUS at 05:45

## 2021-01-01 RX ADMIN — LORAZEPAM 0.5 MG: 2 INJECTION INTRAMUSCULAR; INTRAVENOUS at 14:27

## 2021-01-01 RX ADMIN — METOPROLOL TARTRATE 50 MG: 50 TABLET, FILM COATED ORAL at 22:23

## 2021-01-01 RX ADMIN — PREDNISONE 40 MG: 20 TABLET ORAL at 08:59

## 2021-01-01 RX ADMIN — PANTOPRAZOLE SODIUM 40 MG: 40 TABLET, DELAYED RELEASE ORAL at 05:56

## 2021-01-01 RX ADMIN — NYSTATIN 500000 UNITS: 100000 SUSPENSION ORAL at 20:27

## 2021-01-01 RX ADMIN — MORPHINE SULFATE 2 MG: 2 INJECTION, SOLUTION INTRAMUSCULAR; INTRAVENOUS at 14:27

## 2021-01-01 RX ADMIN — BUSPIRONE HYDROCHLORIDE 10 MG: 10 TABLET ORAL at 21:12

## 2021-01-01 RX ADMIN — ATORVASTATIN CALCIUM 10 MG: 10 TABLET, FILM COATED ORAL at 10:41

## 2021-01-01 RX ADMIN — SODIUM CHLORIDE, PRESERVATIVE FREE 10 ML: 5 INJECTION INTRAVENOUS at 20:38

## 2021-01-01 RX ADMIN — FAMOTIDINE 20 MG: 10 INJECTION, SOLUTION INTRAVENOUS at 08:05

## 2021-01-01 RX ADMIN — DEXAMETHASONE SODIUM PHOSPHATE 8 MG: 4 INJECTION, SOLUTION INTRAMUSCULAR; INTRAVENOUS at 10:42

## 2021-01-01 RX ADMIN — HYDROCODONE BITARTRATE AND ACETAMINOPHEN 1 TABLET: 10; 325 TABLET ORAL at 10:55

## 2021-01-01 RX ADMIN — GUAIFENESIN 600 MG: 600 TABLET, EXTENDED RELEASE ORAL at 19:40

## 2021-01-01 RX ADMIN — MEROPENEM 1000 MG: 1 INJECTION, POWDER, FOR SOLUTION INTRAVENOUS at 23:15

## 2021-01-01 RX ADMIN — PREDNISONE 40 MG: 20 TABLET ORAL at 10:43

## 2021-01-01 RX ADMIN — Medication 25 MCG/HR: at 17:33

## 2021-01-01 RX ADMIN — Medication 500 MG: at 08:07

## 2021-01-01 RX ADMIN — DILTIAZEM HYDROCHLORIDE 240 MG: 240 CAPSULE, COATED, EXTENDED RELEASE ORAL at 11:27

## 2021-01-01 RX ADMIN — IPRATROPIUM BROMIDE AND ALBUTEROL SULFATE 3 ML: .5; 3 SOLUTION RESPIRATORY (INHALATION) at 16:31

## 2021-01-01 RX ADMIN — LORAZEPAM 1 MG: 2 INJECTION INTRAMUSCULAR; INTRAVENOUS at 18:30

## 2021-01-01 RX ADMIN — SUCRALFATE 1 G: 1 TABLET ORAL at 06:35

## 2021-01-01 RX ADMIN — MEROPENEM 1000 MG: 1 INJECTION, POWDER, FOR SOLUTION INTRAVENOUS at 06:38

## 2021-01-01 RX ADMIN — FLUCONAZOLE 200 MG: 100 TABLET ORAL at 10:46

## 2021-01-01 RX ADMIN — PANTOPRAZOLE SODIUM 40 MG: 40 TABLET, DELAYED RELEASE ORAL at 05:35

## 2021-01-01 RX ADMIN — Medication 175 MCG/HR: at 21:14

## 2021-01-01 RX ADMIN — THEOPHYLLINE 200 MG: 400 TABLET, EXTENDED RELEASE ORAL at 21:04

## 2021-01-01 RX ADMIN — DEXAMETHASONE SODIUM PHOSPHATE 12 MG: 4 INJECTION, SOLUTION INTRAMUSCULAR; INTRAVENOUS at 10:03

## 2021-01-01 RX ADMIN — FLUCONAZOLE 200 MG: 100 TABLET ORAL at 09:09

## 2021-01-01 RX ADMIN — THEOPHYLLINE 200 MG: 400 TABLET, EXTENDED RELEASE ORAL at 08:09

## 2021-01-01 RX ADMIN — POTASSIUM CHLORIDE: 2 INJECTION, SOLUTION, CONCENTRATE INTRAVENOUS at 09:12

## 2021-01-01 RX ADMIN — METHYLPREDNISOLONE SODIUM SUCCINATE 40 MG: 40 INJECTION, POWDER, LYOPHILIZED, FOR SOLUTION INTRAMUSCULAR; INTRAVENOUS at 11:39

## 2021-01-01 RX ADMIN — BUSPIRONE HYDROCHLORIDE 10 MG: 10 TABLET ORAL at 16:03

## 2021-01-01 RX ADMIN — BUSPIRONE HYDROCHLORIDE 10 MG: 10 TABLET ORAL at 08:57

## 2021-01-01 RX ADMIN — Medication 2 PUFF: at 17:11

## 2021-01-01 RX ADMIN — POTASSIUM & SODIUM PHOSPHATES POWDER PACK 280-160-250 MG 500 MG: 280-160-250 PACK at 08:06

## 2021-01-01 RX ADMIN — PANTOPRAZOLE SODIUM 40 MG: 40 TABLET, DELAYED RELEASE ORAL at 09:17

## 2021-01-01 RX ADMIN — METHYLPREDNISOLONE SODIUM SUCCINATE 40 MG: 40 INJECTION, POWDER, LYOPHILIZED, FOR SOLUTION INTRAMUSCULAR; INTRAVENOUS at 12:17

## 2021-01-01 RX ADMIN — SODIUM CHLORIDE, PRESERVATIVE FREE 10 ML: 5 INJECTION INTRAVENOUS at 17:19

## 2021-01-01 RX ADMIN — ALBUTEROL SULFATE 2 PUFF: 90 AEROSOL, METERED RESPIRATORY (INHALATION) at 21:06

## 2021-01-01 RX ADMIN — Medication 0.2 MG: at 06:59

## 2021-01-01 RX ADMIN — DOCUSATE SODIUM 200 MG: 100 CAPSULE, LIQUID FILLED ORAL at 09:28

## 2021-01-01 RX ADMIN — IPRATROPIUM BROMIDE AND ALBUTEROL SULFATE 1 AMPULE: .5; 3 SOLUTION RESPIRATORY (INHALATION) at 12:30

## 2021-01-01 RX ADMIN — DEXTROSE MONOHYDRATE 750 MG: 50 INJECTION, SOLUTION INTRAVENOUS at 22:51

## 2021-01-01 RX ADMIN — ETOPOSIDE 90 MG: 20 INJECTION INTRAVENOUS at 12:28

## 2021-01-01 RX ADMIN — ATORVASTATIN CALCIUM 10 MG: 10 TABLET, FILM COATED ORAL at 20:27

## 2021-01-01 RX ADMIN — LORAZEPAM 0.5 MG: 2 INJECTION INTRAMUSCULAR; INTRAVENOUS at 01:57

## 2021-01-01 RX ADMIN — PROPOFOL 60 MCG/KG/MIN: 10 INJECTION, EMULSION INTRAVENOUS at 01:03

## 2021-01-01 RX ADMIN — HYDROCODONE BITARTRATE AND ACETAMINOPHEN 1 TABLET: 10; 325 TABLET ORAL at 12:33

## 2021-01-01 RX ADMIN — IPRATROPIUM BROMIDE AND ALBUTEROL SULFATE 3 ML: .5; 3 SOLUTION RESPIRATORY (INHALATION) at 15:39

## 2021-01-01 RX ADMIN — IPRATROPIUM BROMIDE AND ALBUTEROL SULFATE 1 AMPULE: .5; 3 SOLUTION RESPIRATORY (INHALATION) at 10:48

## 2021-01-01 RX ADMIN — LORAZEPAM 1 MG: 2 INJECTION INTRAMUSCULAR; INTRAVENOUS at 06:35

## 2021-01-01 RX ADMIN — NYSTATIN 500000 UNITS: 100000 SUSPENSION ORAL at 13:17

## 2021-01-01 RX ADMIN — SERTRALINE HYDROCHLORIDE 50 MG: 50 TABLET ORAL at 08:48

## 2021-01-01 RX ADMIN — DEXAMETHASONE SODIUM PHOSPHATE 8 MG: 4 INJECTION, SOLUTION INTRAMUSCULAR; INTRAVENOUS at 11:28

## 2021-01-01 RX ADMIN — MAGNESIUM OXIDE 200 MG: 400 TABLET ORAL at 16:05

## 2021-01-01 RX ADMIN — METHYLPREDNISOLONE SODIUM SUCCINATE 40 MG: 40 INJECTION, POWDER, LYOPHILIZED, FOR SOLUTION INTRAMUSCULAR; INTRAVENOUS at 11:55

## 2021-01-01 RX ADMIN — SODIUM CHLORIDE, PRESERVATIVE FREE 10 ML: 5 INJECTION INTRAVENOUS at 11:35

## 2021-01-01 RX ADMIN — BUSPIRONE HYDROCHLORIDE 10 MG: 10 TABLET ORAL at 20:36

## 2021-01-01 RX ADMIN — ALBUTEROL SULFATE 2.5 MG: 2.5 SOLUTION RESPIRATORY (INHALATION) at 07:43

## 2021-01-01 RX ADMIN — DILTIAZEM HYDROCHLORIDE 240 MG: 240 CAPSULE, COATED, EXTENDED RELEASE ORAL at 08:57

## 2021-01-01 RX ADMIN — GUAIFENESIN 600 MG: 600 TABLET, EXTENDED RELEASE ORAL at 09:37

## 2021-01-01 RX ADMIN — IPRATROPIUM BROMIDE AND ALBUTEROL SULFATE 1 AMPULE: .5; 3 SOLUTION RESPIRATORY (INHALATION) at 11:10

## 2021-01-01 RX ADMIN — SODIUM CHLORIDE, PRESERVATIVE FREE 10 ML: 5 INJECTION INTRAVENOUS at 09:29

## 2021-01-01 RX ADMIN — BUSPIRONE HYDROCHLORIDE 10 MG: 10 TABLET ORAL at 15:54

## 2021-01-01 RX ADMIN — IPRATROPIUM BROMIDE AND ALBUTEROL SULFATE 1 AMPULE: .5; 3 SOLUTION RESPIRATORY (INHALATION) at 16:14

## 2021-01-01 RX ADMIN — METHYLPREDNISOLONE SODIUM SUCCINATE 40 MG: 40 INJECTION, POWDER, LYOPHILIZED, FOR SOLUTION INTRAMUSCULAR; INTRAVENOUS at 09:11

## 2021-01-01 RX ADMIN — DILTIAZEM HYDROCHLORIDE 240 MG: 240 CAPSULE, COATED, EXTENDED RELEASE ORAL at 09:47

## 2021-01-01 RX ADMIN — SODIUM CHLORIDE, PRESERVATIVE FREE 10 ML: 5 INJECTION INTRAVENOUS at 20:39

## 2021-01-01 RX ADMIN — SUCRALFATE 1 G: 1 TABLET ORAL at 05:56

## 2021-01-01 RX ADMIN — PROPOFOL 60 MCG/KG/MIN: 10 INJECTION, EMULSION INTRAVENOUS at 06:48

## 2021-01-01 RX ADMIN — IPRATROPIUM BROMIDE AND ALBUTEROL SULFATE 1 AMPULE: .5; 3 SOLUTION RESPIRATORY (INHALATION) at 11:51

## 2021-01-01 RX ADMIN — ASPIRIN 81 MG: 81 TABLET, FILM COATED ORAL at 08:47

## 2021-01-01 RX ADMIN — DOCUSATE SODIUM 100 MG: 100 CAPSULE, LIQUID FILLED ORAL at 17:24

## 2021-01-01 RX ADMIN — Medication 1000 UNITS: at 09:12

## 2021-01-01 RX ADMIN — METHYLPREDNISOLONE SODIUM SUCCINATE 40 MG: 40 INJECTION, POWDER, LYOPHILIZED, FOR SOLUTION INTRAMUSCULAR; INTRAVENOUS at 11:32

## 2021-01-01 RX ADMIN — IPRATROPIUM BROMIDE AND ALBUTEROL SULFATE 3 ML: .5; 3 SOLUTION RESPIRATORY (INHALATION) at 16:35

## 2021-01-01 RX ADMIN — PANTOPRAZOLE SODIUM 40 MG: 40 TABLET, DELAYED RELEASE ORAL at 11:35

## 2021-01-01 RX ADMIN — SUCRALFATE 1 G: 1 TABLET ORAL at 05:09

## 2021-01-01 RX ADMIN — IPRATROPIUM BROMIDE AND ALBUTEROL SULFATE 1 AMPULE: .5; 3 SOLUTION RESPIRATORY (INHALATION) at 11:11

## 2021-01-01 RX ADMIN — LORAZEPAM 0.5 MG: 2 INJECTION INTRAMUSCULAR; INTRAVENOUS at 22:59

## 2021-01-01 RX ADMIN — SODIUM CHLORIDE, PRESERVATIVE FREE 10 ML: 5 INJECTION INTRAVENOUS at 07:22

## 2021-01-01 RX ADMIN — MEROPENEM 1000 MG: 1 INJECTION, POWDER, FOR SOLUTION INTRAVENOUS at 07:25

## 2021-01-01 RX ADMIN — HYDROCODONE BITARTRATE AND ACETAMINOPHEN 1 TABLET: 10; 325 TABLET ORAL at 18:08

## 2021-01-01 RX ADMIN — Medication 2 PUFF: at 11:00

## 2021-01-01 RX ADMIN — LORAZEPAM 1 MG: 2 INJECTION INTRAMUSCULAR; INTRAVENOUS at 23:27

## 2021-01-01 RX ADMIN — DOXYCYCLINE HYCLATE 100 MG: 100 TABLET, COATED ORAL at 08:25

## 2021-01-01 RX ADMIN — METHYLPREDNISOLONE SODIUM SUCCINATE 40 MG: 40 INJECTION, POWDER, FOR SOLUTION INTRAMUSCULAR; INTRAVENOUS at 09:17

## 2021-01-01 RX ADMIN — LORAZEPAM 0.5 MG: 2 INJECTION INTRAMUSCULAR; INTRAVENOUS at 01:17

## 2021-01-01 RX ADMIN — Medication 2 PUFF: at 16:45

## 2021-01-01 RX ADMIN — ONDANSETRON 4 MG: 2 INJECTION INTRAMUSCULAR; INTRAVENOUS at 17:33

## 2021-01-01 RX ADMIN — DEXTROSE MONOHYDRATE 750 MG: 50 INJECTION, SOLUTION INTRAVENOUS at 10:58

## 2021-01-01 RX ADMIN — MEROPENEM 1000 MG: 1 INJECTION, POWDER, FOR SOLUTION INTRAVENOUS at 10:00

## 2021-01-01 RX ADMIN — MORPHINE SULFATE 4 MG: 4 INJECTION, SOLUTION INTRAMUSCULAR; INTRAVENOUS at 13:38

## 2021-01-01 RX ADMIN — BUSPIRONE HYDROCHLORIDE 10 MG: 10 TABLET ORAL at 08:39

## 2021-01-01 RX ADMIN — FAMOTIDINE 20 MG: 10 INJECTION, SOLUTION INTRAVENOUS at 08:19

## 2021-01-01 RX ADMIN — PROPOFOL 55 MCG/KG/MIN: 10 INJECTION, EMULSION INTRAVENOUS at 12:00

## 2021-01-01 RX ADMIN — MEROPENEM 1000 MG: 1 INJECTION, POWDER, FOR SOLUTION INTRAVENOUS at 22:54

## 2021-01-01 RX ADMIN — GUAIFENESIN 600 MG: 600 TABLET, EXTENDED RELEASE ORAL at 20:36

## 2021-01-01 RX ADMIN — SUCRALFATE 1 G: 1 TABLET ORAL at 23:44

## 2021-01-01 RX ADMIN — HYDROCODONE BITARTRATE AND ACETAMINOPHEN 1 TABLET: 10; 325 TABLET ORAL at 05:44

## 2021-01-01 RX ADMIN — HYDROCODONE BITARTRATE AND ACETAMINOPHEN 2 TABLET: 5; 325 TABLET ORAL at 17:45

## 2021-01-01 RX ADMIN — METHYLPREDNISOLONE SODIUM SUCCINATE 40 MG: 40 INJECTION, POWDER, FOR SOLUTION INTRAMUSCULAR; INTRAVENOUS at 21:29

## 2021-01-01 RX ADMIN — ATORVASTATIN CALCIUM 10 MG: 10 TABLET, FILM COATED ORAL at 08:58

## 2021-01-01 RX ADMIN — METHYLPREDNISOLONE SODIUM SUCCINATE 40 MG: 40 INJECTION, POWDER, FOR SOLUTION INTRAMUSCULAR; INTRAVENOUS at 09:00

## 2021-01-01 RX ADMIN — SODIUM CHLORIDE, PRESERVATIVE FREE 10 ML: 5 INJECTION INTRAVENOUS at 21:31

## 2021-01-01 RX ADMIN — FAMOTIDINE 20 MG: 10 INJECTION, SOLUTION INTRAVENOUS at 09:59

## 2021-01-01 RX ADMIN — VANCOMYCIN HYDROCHLORIDE 1250 MG: 5 INJECTION, POWDER, LYOPHILIZED, FOR SOLUTION INTRAVENOUS at 15:00

## 2021-01-01 RX ADMIN — THEOPHYLLINE ANHYDROUS 200 MG: 200 CAPSULE, EXTENDED RELEASE ORAL at 22:01

## 2021-01-01 RX ADMIN — ATORVASTATIN CALCIUM 10 MG: 10 TABLET, FILM COATED ORAL at 09:19

## 2021-01-01 RX ADMIN — Medication 75 MCG/HR: at 04:33

## 2021-01-01 RX ADMIN — CHLORHEXIDINE GLUCONATE 0.12% ORAL RINSE 15 ML: 1.2 LIQUID ORAL at 20:38

## 2021-01-01 RX ADMIN — MAGNESIUM OXIDE 200 MG: 400 TABLET ORAL at 11:40

## 2021-01-01 RX ADMIN — HYDROCODONE BITARTRATE AND ACETAMINOPHEN 1 TABLET: 10; 325 TABLET ORAL at 06:04

## 2021-01-01 RX ADMIN — ASPIRIN 81 MG: 81 TABLET, COATED ORAL at 09:59

## 2021-01-01 RX ADMIN — ENOXAPARIN SODIUM 40 MG: 40 INJECTION SUBCUTANEOUS at 08:40

## 2021-01-01 RX ADMIN — DOCUSATE SODIUM 100 MG: 100 CAPSULE, LIQUID FILLED ORAL at 11:12

## 2021-01-01 RX ADMIN — MORPHINE SULFATE 2 MG: 2 INJECTION, SOLUTION INTRAMUSCULAR; INTRAVENOUS at 02:23

## 2021-01-01 RX ADMIN — IPRATROPIUM BROMIDE AND ALBUTEROL SULFATE 3 ML: .5; 3 SOLUTION RESPIRATORY (INHALATION) at 11:09

## 2021-01-01 RX ADMIN — GUAIFENESIN 600 MG: 600 TABLET, EXTENDED RELEASE ORAL at 09:55

## 2021-01-01 RX ADMIN — ACETYLCYSTEINE 600 MG: 200 SOLUTION ORAL; RESPIRATORY (INHALATION) at 20:02

## 2021-01-01 RX ADMIN — CETIRIZINE HYDROCHLORIDE 10 MG: 10 TABLET, FILM COATED ORAL at 09:18

## 2021-01-01 RX ADMIN — IPRATROPIUM BROMIDE AND ALBUTEROL SULFATE 3 ML: .5; 3 SOLUTION RESPIRATORY (INHALATION) at 15:10

## 2021-01-01 RX ADMIN — MORPHINE SULFATE 4 MG: 4 INJECTION, SOLUTION INTRAMUSCULAR; INTRAVENOUS at 00:38

## 2021-01-01 RX ADMIN — MAGNESIUM OXIDE 200 MG: 400 TABLET ORAL at 08:22

## 2021-01-01 RX ADMIN — DOXYCYCLINE HYCLATE 100 MG: 100 TABLET, COATED ORAL at 01:07

## 2021-01-01 RX ADMIN — Medication 0.2 MG: at 07:50

## 2021-01-01 RX ADMIN — METHYLPREDNISOLONE SODIUM SUCCINATE 40 MG: 40 INJECTION, POWDER, LYOPHILIZED, FOR SOLUTION INTRAMUSCULAR; INTRAVENOUS at 04:23

## 2021-01-01 RX ADMIN — PANTOPRAZOLE SODIUM 40 MG: 40 TABLET, DELAYED RELEASE ORAL at 06:35

## 2021-01-01 RX ADMIN — APIXABAN 5 MG: 5 TABLET, FILM COATED ORAL at 20:52

## 2021-01-01 RX ADMIN — SUCRALFATE 1 G: 1 TABLET ORAL at 22:22

## 2021-01-01 RX ADMIN — MORPHINE SULFATE 4 MG: 4 INJECTION, SOLUTION INTRAMUSCULAR; INTRAVENOUS at 17:29

## 2021-01-01 RX ADMIN — THEOPHYLLINE ANHYDROUS 200 MG: 200 CAPSULE, EXTENDED RELEASE ORAL at 08:59

## 2021-01-01 RX ADMIN — METHYLPREDNISOLONE SODIUM SUCCINATE 40 MG: 40 INJECTION, POWDER, FOR SOLUTION INTRAMUSCULAR; INTRAVENOUS at 02:52

## 2021-01-01 RX ADMIN — IPRATROPIUM BROMIDE AND ALBUTEROL SULFATE 1 AMPULE: .5; 3 SOLUTION RESPIRATORY (INHALATION) at 07:08

## 2021-01-01 RX ADMIN — LORAZEPAM 0.5 MG: 2 INJECTION INTRAMUSCULAR; INTRAVENOUS at 21:34

## 2021-01-01 RX ADMIN — SODIUM CHLORIDE, PRESERVATIVE FREE 10 ML: 5 INJECTION INTRAVENOUS at 09:37

## 2021-01-01 RX ADMIN — DEXAMETHASONE SODIUM PHOSPHATE 8 MG: 4 INJECTION, SOLUTION INTRAMUSCULAR; INTRAVENOUS at 09:50

## 2021-01-01 RX ADMIN — HYDROCODONE BITARTRATE AND ACETAMINOPHEN 1 TABLET: 10; 325 TABLET ORAL at 20:46

## 2021-01-01 RX ADMIN — Medication 1 SPRAY: at 08:27

## 2021-01-01 RX ADMIN — NYSTATIN 500000 UNITS: 100000 SUSPENSION ORAL at 13:52

## 2021-01-01 RX ADMIN — FAMOTIDINE 20 MG: 10 INJECTION, SOLUTION INTRAVENOUS at 20:45

## 2021-01-01 RX ADMIN — CETIRIZINE HYDROCHLORIDE 10 MG: 10 TABLET, FILM COATED ORAL at 09:11

## 2021-01-01 RX ADMIN — APIXABAN 5 MG: 5 TABLET, FILM COATED ORAL at 20:33

## 2021-01-01 RX ADMIN — MINOCYCLINE HYDROCHLORIDE 100 MG: 100 CAPSULE ORAL at 17:45

## 2021-01-01 RX ADMIN — CEFEPIME HYDROCHLORIDE 2000 MG: 2 INJECTION, POWDER, FOR SOLUTION INTRAVENOUS at 09:37

## 2021-01-01 RX ADMIN — MAGNESIUM OXIDE 400 MG (241.3 MG MAGNESIUM) TABLET 200 MG: TABLET at 08:09

## 2021-01-01 RX ADMIN — NYSTATIN 500000 UNITS: 100000 SUSPENSION ORAL at 17:09

## 2021-01-01 RX ADMIN — SODIUM CHLORIDE, PRESERVATIVE FREE 10 ML: 5 INJECTION INTRAVENOUS at 21:49

## 2021-01-01 RX ADMIN — SODIUM CHLORIDE, PRESERVATIVE FREE 5 ML: 5 INJECTION INTRAVENOUS at 07:47

## 2021-01-01 RX ADMIN — Medication 1000 UNITS: at 10:40

## 2021-01-01 RX ADMIN — SODIUM CHLORIDE, PRESERVATIVE FREE 10 ML: 5 INJECTION INTRAVENOUS at 09:32

## 2021-01-01 RX ADMIN — PROPOFOL 55 MCG/KG/MIN: 10 INJECTION, EMULSION INTRAVENOUS at 15:38

## 2021-01-01 RX ADMIN — HYDROCODONE BITARTRATE AND ACETAMINOPHEN 1 TABLET: 10; 325 TABLET ORAL at 03:11

## 2021-01-01 RX ADMIN — FAMOTIDINE 20 MG: 10 INJECTION, SOLUTION INTRAVENOUS at 09:33

## 2021-01-01 RX ADMIN — METOPROLOL TARTRATE 50 MG: 50 TABLET, FILM COATED ORAL at 08:47

## 2021-01-01 RX ADMIN — HYDROCODONE BITARTRATE AND ACETAMINOPHEN 1 TABLET: 10; 325 TABLET ORAL at 14:39

## 2021-01-01 RX ADMIN — Medication 1000 UNITS: at 09:10

## 2021-01-01 RX ADMIN — NYSTATIN 500000 UNITS: 100000 SUSPENSION ORAL at 20:12

## 2021-01-01 RX ADMIN — METHYLPREDNISOLONE SODIUM SUCCINATE 40 MG: 40 INJECTION, POWDER, FOR SOLUTION INTRAMUSCULAR; INTRAVENOUS at 04:10

## 2021-01-01 RX ADMIN — LEVOFLOXACIN 750 MG: 750 TABLET, FILM COATED ORAL at 08:00

## 2021-01-01 RX ADMIN — THEOPHYLLINE ANHYDROUS 200 MG: 200 CAPSULE, EXTENDED RELEASE ORAL at 20:12

## 2021-01-01 RX ADMIN — MORPHINE SULFATE 2 MG: 2 INJECTION, SOLUTION INTRAMUSCULAR; INTRAVENOUS at 08:25

## 2021-01-01 RX ADMIN — DIGOXIN 125 MCG: 0.25 INJECTION INTRAMUSCULAR; INTRAVENOUS at 21:37

## 2021-01-01 RX ADMIN — SODIUM CHLORIDE, PRESERVATIVE FREE 10 ML: 5 INJECTION INTRAVENOUS at 21:12

## 2021-01-01 RX ADMIN — NYSTATIN 500000 UNITS: 100000 SUSPENSION ORAL at 18:02

## 2021-01-01 RX ADMIN — IPRATROPIUM BROMIDE AND ALBUTEROL SULFATE 3 ML: .5; 3 SOLUTION RESPIRATORY (INHALATION) at 07:17

## 2021-01-01 RX ADMIN — SODIUM CHLORIDE, PRESERVATIVE FREE 10 ML: 5 INJECTION INTRAVENOUS at 00:28

## 2021-01-01 RX ADMIN — IPRATROPIUM BROMIDE AND ALBUTEROL SULFATE 3 ML: .5; 3 SOLUTION RESPIRATORY (INHALATION) at 17:24

## 2021-01-01 RX ADMIN — ASPIRIN 81 MG: 81 TABLET, FILM COATED ORAL at 09:12

## 2021-01-01 RX ADMIN — METHYLPREDNISOLONE SODIUM SUCCINATE 125 MG: 125 INJECTION, POWDER, FOR SOLUTION INTRAMUSCULAR; INTRAVENOUS at 09:38

## 2021-01-01 RX ADMIN — SALINE NASAL SPRAY 2 SPRAY: 1.5 SOLUTION NASAL at 09:38

## 2021-01-01 RX ADMIN — CEFEPIME HYDROCHLORIDE 2000 MG: 2 INJECTION, POWDER, FOR SOLUTION INTRAVENOUS at 10:40

## 2021-01-01 RX ADMIN — SERTRALINE HYDROCHLORIDE 50 MG: 50 TABLET ORAL at 08:59

## 2021-01-01 RX ADMIN — BUSPIRONE HYDROCHLORIDE 10 MG: 10 TABLET ORAL at 09:37

## 2021-01-01 RX ADMIN — SODIUM CHLORIDE 20 ML/HR: 9 INJECTION, SOLUTION INTRAVENOUS at 10:59

## 2021-01-01 RX ADMIN — IPRATROPIUM BROMIDE AND ALBUTEROL SULFATE 1 AMPULE: .5; 3 SOLUTION RESPIRATORY (INHALATION) at 08:08

## 2021-01-01 RX ADMIN — BUSPIRONE HYDROCHLORIDE 10 MG: 10 TABLET ORAL at 20:28

## 2021-01-01 RX ADMIN — DOXYCYCLINE 100 MG: 100 INJECTION, POWDER, LYOPHILIZED, FOR SOLUTION INTRAVENOUS at 21:54

## 2021-01-01 RX ADMIN — ATORVASTATIN CALCIUM 10 MG: 10 TABLET, FILM COATED ORAL at 20:38

## 2021-01-01 RX ADMIN — FAMOTIDINE 20 MG: 10 INJECTION, SOLUTION INTRAVENOUS at 21:12

## 2021-01-01 RX ADMIN — METHYLPREDNISOLONE SODIUM SUCCINATE 40 MG: 40 INJECTION, POWDER, FOR SOLUTION INTRAMUSCULAR; INTRAVENOUS at 21:14

## 2021-01-01 RX ADMIN — METHYLPREDNISOLONE SODIUM SUCCINATE 40 MG: 40 INJECTION, POWDER, FOR SOLUTION INTRAMUSCULAR; INTRAVENOUS at 17:10

## 2021-01-01 RX ADMIN — SODIUM CHLORIDE, PRESERVATIVE FREE 10 ML: 5 INJECTION INTRAVENOUS at 21:13

## 2021-01-01 RX ADMIN — NYSTATIN 500000 UNITS: 100000 SUSPENSION ORAL at 11:35

## 2021-01-01 RX ADMIN — ALBUTEROL SULFATE 2 PUFF: 90 AEROSOL, METERED RESPIRATORY (INHALATION) at 05:19

## 2021-01-01 RX ADMIN — METHYLPREDNISOLONE SODIUM SUCCINATE 40 MG: 40 INJECTION, POWDER, FOR SOLUTION INTRAMUSCULAR; INTRAVENOUS at 08:22

## 2021-01-01 RX ADMIN — POTASSIUM CHLORIDE: 2 INJECTION, SOLUTION, CONCENTRATE INTRAVENOUS at 21:23

## 2021-01-01 RX ADMIN — SUCRALFATE 1 G: 1 TABLET ORAL at 10:52

## 2021-01-01 RX ADMIN — SUCRALFATE 1 G: 1 TABLET ORAL at 10:33

## 2021-01-01 RX ADMIN — LORAZEPAM 1 MG: 1 TABLET ORAL at 09:43

## 2021-01-01 RX ADMIN — NYSTATIN 500000 UNITS: 100000 SUSPENSION ORAL at 09:04

## 2021-01-01 RX ADMIN — IPRATROPIUM BROMIDE AND ALBUTEROL SULFATE 1 AMPULE: .5; 3 SOLUTION RESPIRATORY (INHALATION) at 15:38

## 2021-01-01 RX ADMIN — MAGNESIUM SULFATE HEPTAHYDRATE: 500 INJECTION, SOLUTION INTRAMUSCULAR; INTRAVENOUS at 12:23

## 2021-01-01 RX ADMIN — DOCUSATE SODIUM 100 MG: 100 CAPSULE, LIQUID FILLED ORAL at 08:49

## 2021-01-01 RX ADMIN — Medication 4 MG: at 03:25

## 2021-01-01 RX ADMIN — IPRATROPIUM BROMIDE AND ALBUTEROL SULFATE 1 AMPULE: .5; 3 SOLUTION RESPIRATORY (INHALATION) at 15:15

## 2021-01-01 RX ADMIN — SODIUM CHLORIDE, PRESERVATIVE FREE 10 ML: 5 INJECTION INTRAVENOUS at 08:17

## 2021-01-01 RX ADMIN — THEOPHYLLINE 200 MG: 400 TABLET, EXTENDED RELEASE ORAL at 09:55

## 2021-01-01 RX ADMIN — IPRATROPIUM BROMIDE AND ALBUTEROL SULFATE 3 ML: .5; 3 SOLUTION RESPIRATORY (INHALATION) at 09:33

## 2021-01-01 RX ADMIN — ASPIRIN 81 MG: 81 TABLET, FILM COATED ORAL at 09:09

## 2021-01-01 RX ADMIN — LORAZEPAM 0.5 MG: 2 INJECTION INTRAMUSCULAR; INTRAVENOUS at 15:54

## 2021-01-01 RX ADMIN — HYDROCODONE BITARTRATE AND ACETAMINOPHEN 1 TABLET: 10; 325 TABLET ORAL at 06:37

## 2021-01-01 RX ADMIN — SUCRALFATE 1 G: 1 TABLET ORAL at 11:55

## 2021-01-01 RX ADMIN — IPRATROPIUM BROMIDE AND ALBUTEROL SULFATE 1 AMPULE: .5; 3 SOLUTION RESPIRATORY (INHALATION) at 15:17

## 2021-01-01 RX ADMIN — METOPROLOL TARTRATE 50 MG: 50 TABLET, FILM COATED ORAL at 21:18

## 2021-01-01 RX ADMIN — SODIUM CHLORIDE, PRESERVATIVE FREE 10 ML: 5 INJECTION INTRAVENOUS at 21:24

## 2021-01-01 RX ADMIN — ACETYLCYSTEINE 600 MG: 200 SOLUTION ORAL; RESPIRATORY (INHALATION) at 08:15

## 2021-01-01 RX ADMIN — MINOCYCLINE HYDROCHLORIDE 200 MG: 100 CAPSULE ORAL at 15:14

## 2021-01-01 RX ADMIN — BUSPIRONE HYDROCHLORIDE 10 MG: 10 TABLET ORAL at 20:56

## 2021-01-01 RX ADMIN — Medication 200 MCG/HR: at 03:39

## 2021-01-01 RX ADMIN — IPRATROPIUM BROMIDE AND ALBUTEROL SULFATE 1 AMPULE: .5; 3 SOLUTION RESPIRATORY (INHALATION) at 20:22

## 2021-01-01 RX ADMIN — MORPHINE SULFATE 4 MG: 4 INJECTION, SOLUTION INTRAMUSCULAR; INTRAVENOUS at 12:24

## 2021-01-01 RX ADMIN — ALBUTEROL SULFATE 2 PUFF: 90 AEROSOL, METERED RESPIRATORY (INHALATION) at 12:40

## 2021-01-01 RX ADMIN — APIXABAN 5 MG: 5 TABLET, FILM COATED ORAL at 20:27

## 2021-01-01 RX ADMIN — Medication 1000 UNITS: at 11:13

## 2021-01-01 RX ADMIN — SUCRALFATE 1 G: 1 TABLET ORAL at 17:11

## 2021-01-01 RX ADMIN — ENOXAPARIN SODIUM 30 MG: 30 INJECTION SUBCUTANEOUS at 08:00

## 2021-01-01 RX ADMIN — IPRATROPIUM BROMIDE AND ALBUTEROL SULFATE 3 ML: .5; 3 SOLUTION RESPIRATORY (INHALATION) at 20:06

## 2021-01-01 RX ADMIN — MEROPENEM 1000 MG: 1 INJECTION, POWDER, FOR SOLUTION INTRAVENOUS at 15:55

## 2021-01-01 RX ADMIN — MEROPENEM 1000 MG: 1 INJECTION, POWDER, FOR SOLUTION INTRAVENOUS at 09:17

## 2021-01-01 RX ADMIN — APIXABAN 5 MG: 5 TABLET, FILM COATED ORAL at 09:05

## 2021-01-01 RX ADMIN — SUCRALFATE 1 G: 1 TABLET ORAL at 05:17

## 2021-01-01 RX ADMIN — APIXABAN 2.5 MG: 2.5 TABLET, FILM COATED ORAL at 19:40

## 2021-01-01 RX ADMIN — DEXAMETHASONE SODIUM PHOSPHATE 12 MG: 4 INJECTION, SOLUTION INTRAMUSCULAR; INTRAVENOUS at 21:22

## 2021-01-01 RX ADMIN — POTASSIUM CHLORIDE 10 MEQ: 750 TABLET, FILM COATED, EXTENDED RELEASE ORAL at 08:48

## 2021-01-01 RX ADMIN — PANTOPRAZOLE SODIUM 40 MG: 40 TABLET, DELAYED RELEASE ORAL at 06:04

## 2021-01-01 RX ADMIN — IPRATROPIUM BROMIDE AND ALBUTEROL SULFATE 1 AMPULE: .5; 3 SOLUTION RESPIRATORY (INHALATION) at 11:18

## 2021-01-01 RX ADMIN — LORAZEPAM 1 MG: 1 TABLET ORAL at 18:37

## 2021-01-01 RX ADMIN — LORAZEPAM 1 MG: 1 TABLET ORAL at 20:46

## 2021-01-01 RX ADMIN — NYSTATIN 500000 UNITS: 100000 SUSPENSION ORAL at 08:47

## 2021-01-01 RX ADMIN — ASPIRIN 81 MG: 81 TABLET, FILM COATED ORAL at 09:18

## 2021-01-01 RX ADMIN — NYSTATIN 500000 UNITS: 100000 SUSPENSION ORAL at 23:25

## 2021-01-01 RX ADMIN — HYDROCODONE BITARTRATE AND ACETAMINOPHEN 1 TABLET: 10; 325 TABLET ORAL at 04:52

## 2021-01-01 RX ADMIN — BUSPIRONE HYDROCHLORIDE 10 MG: 10 TABLET ORAL at 20:21

## 2021-01-01 RX ADMIN — IPRATROPIUM BROMIDE AND ALBUTEROL SULFATE 1 AMPULE: .5; 3 SOLUTION RESPIRATORY (INHALATION) at 10:36

## 2021-01-01 RX ADMIN — DOXYCYCLINE 100 MG: 100 INJECTION, POWDER, LYOPHILIZED, FOR SOLUTION INTRAVENOUS at 21:59

## 2021-01-01 RX ADMIN — SUCRALFATE 1 G: 1 TABLET ORAL at 12:13

## 2021-01-01 RX ADMIN — ALBUTEROL SULFATE 2 PUFF: 90 AEROSOL, METERED RESPIRATORY (INHALATION) at 15:20

## 2021-01-01 RX ADMIN — SUCRALFATE 1 G: 1 TABLET ORAL at 17:50

## 2021-01-01 RX ADMIN — THEOPHYLLINE 200 MG: 400 TABLET, EXTENDED RELEASE ORAL at 08:56

## 2021-01-01 RX ADMIN — DILTIAZEM HYDROCHLORIDE 240 MG: 240 CAPSULE, COATED, EXTENDED RELEASE ORAL at 09:04

## 2021-01-01 RX ADMIN — METHYLPREDNISOLONE SODIUM SUCCINATE 40 MG: 40 INJECTION, POWDER, FOR SOLUTION INTRAMUSCULAR; INTRAVENOUS at 03:42

## 2021-01-01 RX ADMIN — FLUTICASONE PROPIONATE 2 SPRAY: 50 SPRAY, METERED NASAL at 19:42

## 2021-01-01 RX ADMIN — IPRATROPIUM BROMIDE AND ALBUTEROL SULFATE 1 AMPULE: .5; 3 SOLUTION RESPIRATORY (INHALATION) at 12:02

## 2021-01-01 RX ADMIN — ALBUTEROL SULFATE 2 PUFF: 90 AEROSOL, METERED RESPIRATORY (INHALATION) at 11:20

## 2021-01-01 RX ADMIN — Medication 75 MCG/HR: at 17:10

## 2021-01-01 RX ADMIN — THEOPHYLLINE ANHYDROUS 100 MG: 100 CAPSULE, EXTENDED RELEASE ORAL at 22:34

## 2021-01-01 RX ADMIN — LORAZEPAM 1 MG: 2 INJECTION INTRAMUSCULAR; INTRAVENOUS at 13:25

## 2021-01-01 RX ADMIN — MORPHINE SULFATE 2 MG: 2 INJECTION, SOLUTION INTRAMUSCULAR; INTRAVENOUS at 07:50

## 2021-01-01 RX ADMIN — NYSTATIN 500000 UNITS: 100000 SUSPENSION ORAL at 17:11

## 2021-01-01 RX ADMIN — MEROPENEM 1000 MG: 1 INJECTION, POWDER, FOR SOLUTION INTRAVENOUS at 06:39

## 2021-01-01 RX ADMIN — SUCRALFATE 1 G: 1 TABLET ORAL at 11:39

## 2021-01-01 RX ADMIN — SUCRALFATE 1 G: 1 TABLET ORAL at 00:00

## 2021-01-01 RX ADMIN — IPRATROPIUM BROMIDE AND ALBUTEROL SULFATE 3 ML: .5; 3 SOLUTION RESPIRATORY (INHALATION) at 11:58

## 2021-01-01 RX ADMIN — DEXAMETHASONE SODIUM PHOSPHATE 12 MG: 4 INJECTION, SOLUTION INTRAMUSCULAR; INTRAVENOUS at 10:01

## 2021-01-01 RX ADMIN — IPRATROPIUM BROMIDE AND ALBUTEROL SULFATE 1 AMPULE: .5; 3 SOLUTION RESPIRATORY (INHALATION) at 11:39

## 2021-01-01 RX ADMIN — ALBUTEROL SULFATE 2 PUFF: 90 AEROSOL, METERED RESPIRATORY (INHALATION) at 20:06

## 2021-01-01 RX ADMIN — SODIUM CHLORIDE, PRESERVATIVE FREE 10 ML: 5 INJECTION INTRAVENOUS at 11:28

## 2021-01-01 RX ADMIN — ATORVASTATIN CALCIUM 10 MG: 10 TABLET, FILM COATED ORAL at 09:14

## 2021-01-01 RX ADMIN — SUCRALFATE 1 G: 1 TABLET ORAL at 08:09

## 2021-01-01 RX ADMIN — SUCRALFATE 1 G: 1 TABLET ORAL at 11:12

## 2021-01-01 RX ADMIN — HYDROCODONE BITARTRATE AND ACETAMINOPHEN 1 TABLET: 10; 325 TABLET ORAL at 08:16

## 2021-01-01 RX ADMIN — MORPHINE SULFATE 2 MG: 2 INJECTION, SOLUTION INTRAMUSCULAR; INTRAVENOUS at 10:28

## 2021-01-01 RX ADMIN — NYSTATIN 500000 UNITS: 100000 SUSPENSION ORAL at 09:12

## 2021-01-01 RX ADMIN — LEVETIRACETAM 500 MG: 100 INJECTION, SOLUTION INTRAVENOUS at 06:04

## 2021-01-01 RX ADMIN — Medication 2 PUFF: at 04:52

## 2021-01-01 RX ADMIN — Medication 500 MG: at 08:59

## 2021-01-01 RX ADMIN — Medication 0.2 MG: at 16:23

## 2021-01-01 RX ADMIN — BUSPIRONE HYDROCHLORIDE 10 MG: 10 TABLET ORAL at 08:05

## 2021-01-01 RX ADMIN — DEXAMETHASONE SODIUM PHOSPHATE 8 MG: 4 INJECTION, SOLUTION INTRAMUSCULAR; INTRAVENOUS at 10:35

## 2021-01-01 RX ADMIN — DOXYCYCLINE 100 MG: 100 INJECTION, POWDER, LYOPHILIZED, FOR SOLUTION INTRAVENOUS at 12:05

## 2021-01-01 RX ADMIN — LABETALOL HYDROCHLORIDE 10 MG: 5 INJECTION, SOLUTION INTRAVENOUS at 04:10

## 2021-01-01 RX ADMIN — IOPAMIDOL 80 ML: 755 INJECTION, SOLUTION INTRAVENOUS at 12:26

## 2021-01-01 RX ADMIN — THEOPHYLLINE 200 MG: 400 TABLET, EXTENDED RELEASE ORAL at 09:52

## 2021-01-01 RX ADMIN — IPRATROPIUM BROMIDE AND ALBUTEROL SULFATE 1 AMPULE: .5; 3 SOLUTION RESPIRATORY (INHALATION) at 07:09

## 2021-01-01 RX ADMIN — DEXTROSE MONOHYDRATE 750 MG: 50 INJECTION, SOLUTION INTRAVENOUS at 02:40

## 2021-01-01 RX ADMIN — ONDANSETRON 4 MG: 2 INJECTION INTRAMUSCULAR; INTRAVENOUS at 08:51

## 2021-01-01 RX ADMIN — MEROPENEM 1000 MG: 1 INJECTION, POWDER, FOR SOLUTION INTRAVENOUS at 00:46

## 2021-01-01 RX ADMIN — IPRATROPIUM BROMIDE AND ALBUTEROL SULFATE 1 AMPULE: .5; 3 SOLUTION RESPIRATORY (INHALATION) at 07:36

## 2021-01-01 RX ADMIN — HYDROCODONE BITARTRATE AND ACETAMINOPHEN 1 TABLET: 10; 325 TABLET ORAL at 04:47

## 2021-01-01 RX ADMIN — LORAZEPAM 0.5 MG: 2 INJECTION INTRAMUSCULAR; INTRAVENOUS at 05:54

## 2021-01-01 RX ADMIN — DIPHENHYDRAMINE HYDROCHLORIDE 25 MG: 25 TABLET ORAL at 23:26

## 2021-01-01 RX ADMIN — HYDROCODONE BITARTRATE AND ACETAMINOPHEN 1 TABLET: 10; 325 TABLET ORAL at 20:45

## 2021-01-01 RX ADMIN — SIMVASTATIN 20 MG: 20 TABLET, FILM COATED ORAL at 23:28

## 2021-01-01 RX ADMIN — SODIUM CHLORIDE, PRESERVATIVE FREE 10 ML: 5 INJECTION INTRAVENOUS at 08:26

## 2021-01-01 RX ADMIN — BUSPIRONE HYDROCHLORIDE 10 MG: 10 TABLET ORAL at 08:13

## 2021-01-01 RX ADMIN — PROPOFOL 70 MCG/KG/MIN: 10 INJECTION, EMULSION INTRAVENOUS at 12:08

## 2021-01-01 RX ADMIN — FAMOTIDINE 20 MG: 10 INJECTION, SOLUTION INTRAVENOUS at 07:58

## 2021-01-01 RX ADMIN — Medication 4 MG: at 18:59

## 2021-01-01 RX ADMIN — ENOXAPARIN SODIUM 40 MG: 40 INJECTION SUBCUTANEOUS at 10:47

## 2021-01-01 RX ADMIN — METOPROLOL TARTRATE 50 MG: 50 TABLET, FILM COATED ORAL at 09:51

## 2021-01-01 RX ADMIN — LORAZEPAM 0.5 MG: 2 INJECTION INTRAMUSCULAR; INTRAVENOUS at 13:38

## 2021-01-01 RX ADMIN — MAGNESIUM OXIDE 400 MG (241.3 MG MAGNESIUM) TABLET 200 MG: TABLET at 09:13

## 2021-01-01 RX ADMIN — SODIUM CHLORIDE, PRESERVATIVE FREE 10 ML: 5 INJECTION INTRAVENOUS at 21:30

## 2021-01-01 RX ADMIN — PANTOPRAZOLE SODIUM 40 MG: 40 TABLET, DELAYED RELEASE ORAL at 06:19

## 2021-01-01 RX ADMIN — CEFEPIME HYDROCHLORIDE 2000 MG: 2 INJECTION, POWDER, FOR SOLUTION INTRAVENOUS at 11:08

## 2021-01-01 RX ADMIN — LORAZEPAM 1 MG: 2 INJECTION INTRAMUSCULAR; INTRAVENOUS at 00:12

## 2021-01-01 RX ADMIN — IPRATROPIUM BROMIDE AND ALBUTEROL SULFATE 1 AMPULE: .5; 3 SOLUTION RESPIRATORY (INHALATION) at 15:19

## 2021-01-01 RX ADMIN — IPRATROPIUM BROMIDE AND ALBUTEROL SULFATE 1 AMPULE: .5; 3 SOLUTION RESPIRATORY (INHALATION) at 00:38

## 2021-01-01 RX ADMIN — ETOPOSIDE 90 MG: 20 INJECTION INTRAVENOUS at 11:21

## 2021-01-01 RX ADMIN — MEROPENEM 1000 MG: 1 INJECTION, POWDER, FOR SOLUTION INTRAVENOUS at 16:30

## 2021-01-01 RX ADMIN — NYSTATIN 500000 UNITS: 100000 SUSPENSION ORAL at 16:30

## 2021-01-01 RX ADMIN — MEROPENEM 1000 MG: 1 INJECTION, POWDER, FOR SOLUTION INTRAVENOUS at 09:19

## 2021-01-01 RX ADMIN — LORAZEPAM 0.5 MG: 2 INJECTION INTRAMUSCULAR; INTRAVENOUS at 00:38

## 2021-01-01 RX ADMIN — MORPHINE SULFATE 4 MG: 4 INJECTION, SOLUTION INTRAMUSCULAR; INTRAVENOUS at 05:54

## 2021-01-01 RX ADMIN — SODIUM CHLORIDE, PRESERVATIVE FREE 10 ML: 5 INJECTION INTRAVENOUS at 22:36

## 2021-01-01 RX ADMIN — LORAZEPAM 0.5 MG: 2 INJECTION INTRAMUSCULAR; INTRAVENOUS at 18:58

## 2021-01-01 RX ADMIN — CHLORHEXIDINE GLUCONATE 0.12% ORAL RINSE 15 ML: 1.2 LIQUID ORAL at 21:25

## 2021-01-01 RX ADMIN — ENOXAPARIN SODIUM 30 MG: 30 INJECTION SUBCUTANEOUS at 11:56

## 2021-01-01 RX ADMIN — Medication 1000 UNITS: at 11:30

## 2021-01-01 RX ADMIN — LORAZEPAM 1 MG: 1 TABLET ORAL at 21:07

## 2021-01-01 RX ADMIN — GUAIFENESIN 600 MG: 600 TABLET, EXTENDED RELEASE ORAL at 10:01

## 2021-01-01 RX ADMIN — SODIUM CHLORIDE: 9 INJECTION, SOLUTION INTRAVENOUS at 20:07

## 2021-01-01 RX ADMIN — Medication 2 PUFF: at 21:13

## 2021-01-01 RX ADMIN — Medication 1000 UNITS: at 08:14

## 2021-01-01 RX ADMIN — METOPROLOL TARTRATE 50 MG: 50 TABLET, FILM COATED ORAL at 08:58

## 2021-01-01 RX ADMIN — Medication 2 PUFF: at 11:49

## 2021-01-01 RX ADMIN — CHLORHEXIDINE GLUCONATE 0.12% ORAL RINSE 15 ML: 1.2 LIQUID ORAL at 09:17

## 2021-01-01 RX ADMIN — ATORVASTATIN CALCIUM 10 MG: 10 TABLET, FILM COATED ORAL at 09:37

## 2021-01-01 RX ADMIN — METHYLPREDNISOLONE SODIUM SUCCINATE 40 MG: 40 INJECTION, POWDER, LYOPHILIZED, FOR SOLUTION INTRAMUSCULAR; INTRAVENOUS at 05:17

## 2021-01-01 RX ADMIN — DEXTROSE MONOHYDRATE 750 MG: 50 INJECTION, SOLUTION INTRAVENOUS at 17:09

## 2021-01-01 RX ADMIN — ASPIRIN 81 MG: 81 TABLET, COATED ORAL at 08:48

## 2021-01-01 RX ADMIN — BUSPIRONE HYDROCHLORIDE 10 MG: 10 TABLET ORAL at 16:31

## 2021-01-01 RX ADMIN — ALBUTEROL SULFATE 2 PUFF: 90 AEROSOL, METERED RESPIRATORY (INHALATION) at 08:45

## 2021-01-01 RX ADMIN — POTASSIUM & SODIUM PHOSPHATES POWDER PACK 280-160-250 MG 250 MG: 280-160-250 PACK at 11:55

## 2021-01-01 RX ADMIN — LORAZEPAM 0.5 MG: 2 INJECTION INTRAMUSCULAR; INTRAVENOUS at 11:13

## 2021-01-01 RX ADMIN — LABETALOL HYDROCHLORIDE 10 MG: 5 INJECTION, SOLUTION INTRAVENOUS at 09:45

## 2021-01-01 RX ADMIN — SUCRALFATE 1 G: 1 TABLET ORAL at 21:47

## 2021-01-01 RX ADMIN — IPRATROPIUM BROMIDE AND ALBUTEROL SULFATE 1 AMPULE: .5; 3 SOLUTION RESPIRATORY (INHALATION) at 11:33

## 2021-01-01 RX ADMIN — MEROPENEM 1000 MG: 1 INJECTION, POWDER, FOR SOLUTION INTRAVENOUS at 08:21

## 2021-01-01 RX ADMIN — METHYLPREDNISOLONE SODIUM SUCCINATE 40 MG: 40 INJECTION, POWDER, FOR SOLUTION INTRAMUSCULAR; INTRAVENOUS at 15:46

## 2021-01-01 RX ADMIN — SODIUM CHLORIDE, PRESERVATIVE FREE 10 ML: 5 INJECTION INTRAVENOUS at 07:59

## 2021-01-01 RX ADMIN — SODIUM CHLORIDE, PRESERVATIVE FREE 10 ML: 5 INJECTION INTRAVENOUS at 20:46

## 2021-01-01 RX ADMIN — BUSPIRONE HYDROCHLORIDE 10 MG: 10 TABLET ORAL at 08:22

## 2021-01-01 RX ADMIN — SODIUM CHLORIDE 20 ML/HR: 9 INJECTION, SOLUTION INTRAVENOUS at 10:35

## 2021-01-01 RX ADMIN — METHYLPREDNISOLONE SODIUM SUCCINATE 40 MG: 40 INJECTION, POWDER, LYOPHILIZED, FOR SOLUTION INTRAMUSCULAR; INTRAVENOUS at 21:11

## 2021-01-01 RX ADMIN — SODIUM CHLORIDE, PRESERVATIVE FREE 10 ML: 5 INJECTION INTRAVENOUS at 20:49

## 2021-01-01 RX ADMIN — DOCUSATE SODIUM 200 MG: 100 CAPSULE, LIQUID FILLED ORAL at 10:00

## 2021-01-01 RX ADMIN — SUCRALFATE 1 G: 1 TABLET ORAL at 08:22

## 2021-01-01 RX ADMIN — DOXYCYCLINE 100 MG: 100 INJECTION, POWDER, LYOPHILIZED, FOR SOLUTION INTRAVENOUS at 11:00

## 2021-01-01 RX ADMIN — IPRATROPIUM BROMIDE AND ALBUTEROL SULFATE 3 ML: .5; 3 SOLUTION RESPIRATORY (INHALATION) at 19:58

## 2021-01-01 RX ADMIN — METOPROLOL TARTRATE 50 MG: 50 TABLET, FILM COATED ORAL at 20:37

## 2021-01-01 RX ADMIN — HYDROCODONE BITARTRATE AND ACETAMINOPHEN 1 TABLET: 10; 325 TABLET ORAL at 06:00

## 2021-01-01 RX ADMIN — IPRATROPIUM BROMIDE AND ALBUTEROL SULFATE 3 ML: .5; 3 SOLUTION RESPIRATORY (INHALATION) at 15:27

## 2021-01-01 RX ADMIN — METHYLPREDNISOLONE SODIUM SUCCINATE 40 MG: 40 INJECTION, POWDER, FOR SOLUTION INTRAMUSCULAR; INTRAVENOUS at 09:41

## 2021-01-01 RX ADMIN — LORAZEPAM 1 MG: 2 INJECTION INTRAMUSCULAR; INTRAVENOUS at 10:47

## 2021-01-01 RX ADMIN — HYDROCODONE BITARTRATE AND ACETAMINOPHEN 1 TABLET: 10; 325 TABLET ORAL at 16:08

## 2021-01-01 RX ADMIN — FLUCONAZOLE 200 MG: 100 TABLET ORAL at 09:18

## 2021-01-01 RX ADMIN — Medication 500 MG: at 09:12

## 2021-01-01 RX ADMIN — Medication 4 MG: at 02:00

## 2021-01-01 RX ADMIN — Medication 1000 UNITS: at 08:47

## 2021-01-01 RX ADMIN — PROPOFOL 75 MCG/KG/MIN: 10 INJECTION, EMULSION INTRAVENOUS at 20:37

## 2021-01-01 RX ADMIN — GUAIFENESIN 600 MG: 600 TABLET, EXTENDED RELEASE ORAL at 21:05

## 2021-01-01 RX ADMIN — SERTRALINE HYDROCHLORIDE 50 MG: 50 TABLET ORAL at 09:18

## 2021-01-01 RX ADMIN — ENOXAPARIN SODIUM 30 MG: 30 INJECTION SUBCUTANEOUS at 08:07

## 2021-01-01 RX ADMIN — SUCRALFATE 1 G: 1 TABLET ORAL at 15:57

## 2021-01-01 RX ADMIN — HYDROCODONE BITARTRATE AND ACETAMINOPHEN 1 TABLET: 10; 325 TABLET ORAL at 21:59

## 2021-01-01 RX ADMIN — THEOPHYLLINE ANHYDROUS 200 MG: 200 CAPSULE, EXTENDED RELEASE ORAL at 21:14

## 2021-01-01 RX ADMIN — METHYLPREDNISOLONE SODIUM SUCCINATE 40 MG: 40 INJECTION, POWDER, FOR SOLUTION INTRAMUSCULAR; INTRAVENOUS at 09:06

## 2021-01-01 RX ADMIN — ASPIRIN 81 MG: 81 TABLET, COATED ORAL at 09:51

## 2021-01-01 RX ADMIN — IPRATROPIUM BROMIDE AND ALBUTEROL SULFATE 1 AMPULE: .5; 3 SOLUTION RESPIRATORY (INHALATION) at 11:15

## 2021-01-01 RX ADMIN — SODIUM CHLORIDE, PRESERVATIVE FREE 10 ML: 5 INJECTION INTRAVENOUS at 08:07

## 2021-01-01 RX ADMIN — LORAZEPAM 0.5 MG: 2 INJECTION INTRAMUSCULAR; INTRAVENOUS at 06:45

## 2021-01-01 RX ADMIN — METHYLPREDNISOLONE SODIUM SUCCINATE 40 MG: 40 INJECTION, POWDER, LYOPHILIZED, FOR SOLUTION INTRAMUSCULAR; INTRAVENOUS at 18:52

## 2021-01-01 RX ADMIN — SODIUM CHLORIDE, PRESERVATIVE FREE 10 ML: 5 INJECTION INTRAVENOUS at 20:27

## 2021-01-01 RX ADMIN — IPRATROPIUM BROMIDE AND ALBUTEROL SULFATE 1 AMPULE: .5; 3 SOLUTION RESPIRATORY (INHALATION) at 20:06

## 2021-01-01 RX ADMIN — IPRATROPIUM BROMIDE AND ALBUTEROL SULFATE 1 AMPULE: .5; 3 SOLUTION RESPIRATORY (INHALATION) at 06:03

## 2021-01-01 RX ADMIN — IPRATROPIUM BROMIDE AND ALBUTEROL SULFATE 1 AMPULE: .5; 3 SOLUTION RESPIRATORY (INHALATION) at 20:13

## 2021-01-01 RX ADMIN — ATORVASTATIN CALCIUM 10 MG: 10 TABLET, FILM COATED ORAL at 21:12

## 2021-01-01 RX ADMIN — HYDROCODONE BITARTRATE AND ACETAMINOPHEN 1 TABLET: 10; 325 TABLET ORAL at 19:47

## 2021-01-01 RX ADMIN — ENOXAPARIN SODIUM 40 MG: 40 INJECTION SUBCUTANEOUS at 08:48

## 2021-01-01 RX ADMIN — NYSTATIN 500000 UNITS: 100000 SUSPENSION ORAL at 09:18

## 2021-01-01 RX ADMIN — SUCRALFATE 1 G: 1 TABLET ORAL at 05:53

## 2021-01-01 RX ADMIN — THEOPHYLLINE ANHYDROUS 200 MG: 200 CAPSULE, EXTENDED RELEASE ORAL at 10:40

## 2021-01-01 RX ADMIN — SUCRALFATE 1 G: 1 TABLET ORAL at 14:20

## 2021-01-01 RX ADMIN — METHYLPREDNISOLONE SODIUM SUCCINATE 40 MG: 40 INJECTION, POWDER, FOR SOLUTION INTRAMUSCULAR; INTRAVENOUS at 20:56

## 2021-01-01 RX ADMIN — Medication 1 TABLET: at 16:21

## 2021-01-01 RX ADMIN — MORPHINE SULFATE 2 MG: 2 INJECTION, SOLUTION INTRAMUSCULAR; INTRAVENOUS at 16:24

## 2021-01-01 RX ADMIN — ACETYLCYSTEINE 600 MG: 200 SOLUTION ORAL; RESPIRATORY (INHALATION) at 07:25

## 2021-01-01 RX ADMIN — PANTOPRAZOLE SODIUM 40 MG: 40 TABLET, DELAYED RELEASE ORAL at 06:03

## 2021-01-01 RX ADMIN — HYDROCODONE BITARTRATE AND ACETAMINOPHEN 1 TABLET: 10; 325 TABLET ORAL at 20:41

## 2021-01-01 RX ADMIN — IPRATROPIUM BROMIDE AND ALBUTEROL SULFATE 3 ML: .5; 3 SOLUTION RESPIRATORY (INHALATION) at 16:10

## 2021-01-01 RX ADMIN — DOXYCYCLINE 100 MG: 100 INJECTION, POWDER, LYOPHILIZED, FOR SOLUTION INTRAVENOUS at 11:57

## 2021-01-01 RX ADMIN — LORAZEPAM 0.5 MG: 2 INJECTION INTRAMUSCULAR; INTRAVENOUS at 00:46

## 2021-01-01 RX ADMIN — SODIUM CHLORIDE, PRESERVATIVE FREE 10 ML: 5 INJECTION INTRAVENOUS at 09:52

## 2021-01-01 RX ADMIN — ENOXAPARIN SODIUM 40 MG: 40 INJECTION SUBCUTANEOUS at 08:22

## 2021-01-01 RX ADMIN — Medication 1 CAPSULE: at 08:09

## 2021-01-01 RX ADMIN — IPRATROPIUM BROMIDE AND ALBUTEROL SULFATE 1 AMPULE: .5; 3 SOLUTION RESPIRATORY (INHALATION) at 00:46

## 2021-01-01 RX ADMIN — MAGNESIUM SULFATE HEPTAHYDRATE 1000 MG: 1 INJECTION, SOLUTION INTRAVENOUS at 23:15

## 2021-01-01 RX ADMIN — PANTOPRAZOLE SODIUM 40 MG: 40 TABLET, DELAYED RELEASE ORAL at 09:07

## 2021-01-01 RX ADMIN — MORPHINE SULFATE 1 MG: 2 INJECTION, SOLUTION INTRAMUSCULAR; INTRAVENOUS at 13:47

## 2021-01-01 RX ADMIN — MAGNESIUM OXIDE 200 MG: 400 TABLET ORAL at 08:13

## 2021-01-01 RX ADMIN — PREDNISONE 40 MG: 20 TABLET ORAL at 09:09

## 2021-01-01 RX ADMIN — ALBUTEROL SULFATE 2 PUFF: 90 AEROSOL, METERED RESPIRATORY (INHALATION) at 22:13

## 2021-01-01 RX ADMIN — IPRATROPIUM BROMIDE AND ALBUTEROL SULFATE 3 ML: .5; 3 SOLUTION RESPIRATORY (INHALATION) at 07:58

## 2021-01-01 RX ADMIN — DOCUSATE SODIUM 200 MG: 100 CAPSULE, LIQUID FILLED ORAL at 16:19

## 2021-01-01 RX ADMIN — DEXTROSE MONOHYDRATE 5 MG/HR: 50 INJECTION, SOLUTION INTRAVENOUS at 18:03

## 2021-01-01 RX ADMIN — CEFEPIME HYDROCHLORIDE 2000 MG: 2 INJECTION, POWDER, FOR SOLUTION INTRAVENOUS at 05:56

## 2021-01-01 RX ADMIN — IPRATROPIUM BROMIDE AND ALBUTEROL SULFATE 1 AMPULE: .5; 3 SOLUTION RESPIRATORY (INHALATION) at 15:14

## 2021-01-01 RX ADMIN — Medication 500 MG: at 09:10

## 2021-01-01 RX ADMIN — APIXABAN 5 MG: 5 TABLET, FILM COATED ORAL at 07:58

## 2021-01-01 RX ADMIN — PROPOFOL 50 MCG/KG/MIN: 10 INJECTION, EMULSION INTRAVENOUS at 18:42

## 2021-01-01 RX ADMIN — HYDROCODONE BITARTRATE AND ACETAMINOPHEN 1 TABLET: 10; 325 TABLET ORAL at 23:42

## 2021-01-01 RX ADMIN — MEROPENEM 1000 MG: 1 INJECTION, POWDER, FOR SOLUTION INTRAVENOUS at 15:58

## 2021-01-01 RX ADMIN — IPRATROPIUM BROMIDE AND ALBUTEROL SULFATE 1 AMPULE: .5; 3 SOLUTION RESPIRATORY (INHALATION) at 14:53

## 2021-01-01 RX ADMIN — NYSTATIN 500000 UNITS: 100000 SUSPENSION ORAL at 08:13

## 2021-01-01 RX ADMIN — METOPROLOL TARTRATE 50 MG: 50 TABLET, FILM COATED ORAL at 09:13

## 2021-01-01 RX ADMIN — METHYLPREDNISOLONE SODIUM SUCCINATE 40 MG: 40 INJECTION, POWDER, LYOPHILIZED, FOR SOLUTION INTRAMUSCULAR; INTRAVENOUS at 10:52

## 2021-01-01 RX ADMIN — FAMOTIDINE 20 MG: 10 INJECTION, SOLUTION INTRAVENOUS at 21:29

## 2021-01-01 RX ADMIN — Medication 1 CAPSULE: at 07:57

## 2021-01-01 RX ADMIN — APIXABAN 2.5 MG: 2.5 TABLET, FILM COATED ORAL at 09:55

## 2021-01-01 RX ADMIN — APIXABAN 5 MG: 5 TABLET, FILM COATED ORAL at 21:12

## 2021-01-01 RX ADMIN — METOPROLOL TARTRATE 50 MG: 50 TABLET, FILM COATED ORAL at 19:40

## 2021-01-01 RX ADMIN — Medication 500 MG: at 10:03

## 2021-01-01 RX ADMIN — Medication 1000 UNITS: at 08:26

## 2021-01-01 RX ADMIN — SUCRALFATE 1 G: 1 TABLET ORAL at 09:13

## 2021-01-01 RX ADMIN — DEXTROSE MONOHYDRATE 750 MG: 50 INJECTION, SOLUTION INTRAVENOUS at 21:00

## 2021-01-01 RX ADMIN — PROPOFOL 55 MCG/KG/MIN: 10 INJECTION, EMULSION INTRAVENOUS at 09:23

## 2021-01-01 RX ADMIN — MAGNESIUM OXIDE 400 MG (241.3 MG MAGNESIUM) TABLET 200 MG: TABLET at 08:58

## 2021-01-01 RX ADMIN — FAMOTIDINE 20 MG: 10 INJECTION, SOLUTION INTRAVENOUS at 20:56

## 2021-01-01 RX ADMIN — BUSPIRONE HYDROCHLORIDE 10 MG: 10 TABLET ORAL at 17:19

## 2021-01-01 RX ADMIN — HYDROXYZINE PAMOATE 50 MG: 25 CAPSULE ORAL at 00:30

## 2021-01-01 RX ADMIN — HYDROCODONE BITARTRATE AND ACETAMINOPHEN 1 TABLET: 10; 325 TABLET ORAL at 11:40

## 2021-01-01 RX ADMIN — SUCRALFATE 1 G: 1 TABLET ORAL at 12:06

## 2021-01-01 RX ADMIN — SODIUM CHLORIDE, PRESERVATIVE FREE 10 ML: 5 INJECTION INTRAVENOUS at 09:14

## 2021-01-01 RX ADMIN — SUCRALFATE 1 G: 1 TABLET ORAL at 18:48

## 2021-01-01 RX ADMIN — ATORVASTATIN CALCIUM 10 MG: 10 TABLET, FILM COATED ORAL at 09:58

## 2021-01-01 RX ADMIN — HYDROCODONE BITARTRATE AND ACETAMINOPHEN 1 TABLET: 10; 325 TABLET ORAL at 03:49

## 2021-01-01 RX ADMIN — PANTOPRAZOLE SODIUM 40 MG: 40 TABLET, DELAYED RELEASE ORAL at 04:58

## 2021-01-01 RX ADMIN — SODIUM CHLORIDE, PRESERVATIVE FREE 10 ML: 5 INJECTION INTRAVENOUS at 20:34

## 2021-01-01 RX ADMIN — SODIUM CHLORIDE, PRESERVATIVE FREE 10 ML: 5 INJECTION INTRAVENOUS at 22:59

## 2021-01-01 RX ADMIN — Medication 1 TABLET: at 08:52

## 2021-01-01 RX ADMIN — HYDROCODONE BITARTRATE AND ACETAMINOPHEN 1 TABLET: 10; 325 TABLET ORAL at 19:30

## 2021-01-01 RX ADMIN — Medication 4 MG: at 14:18

## 2021-01-01 RX ADMIN — IPRATROPIUM BROMIDE AND ALBUTEROL SULFATE 3 ML: .5; 3 SOLUTION RESPIRATORY (INHALATION) at 21:25

## 2021-01-01 RX ADMIN — SERTRALINE HYDROCHLORIDE 50 MG: 50 TABLET ORAL at 11:13

## 2021-01-01 RX ADMIN — NYSTATIN 500000 UNITS: 100000 SUSPENSION ORAL at 20:41

## 2021-01-01 RX ADMIN — ATORVASTATIN CALCIUM 10 MG: 10 TABLET, FILM COATED ORAL at 10:40

## 2021-01-01 RX ADMIN — DOCUSATE SODIUM 100 MG: 100 CAPSULE, LIQUID FILLED ORAL at 07:59

## 2021-01-01 RX ADMIN — IPRATROPIUM BROMIDE AND ALBUTEROL SULFATE 3 ML: .5; 3 SOLUTION RESPIRATORY (INHALATION) at 19:42

## 2021-01-01 RX ADMIN — METOPROLOL TARTRATE 50 MG: 50 TABLET, FILM COATED ORAL at 20:36

## 2021-01-01 RX ADMIN — DOXYCYCLINE 100 MG: 100 INJECTION, POWDER, LYOPHILIZED, FOR SOLUTION INTRAVENOUS at 11:28

## 2021-01-01 RX ADMIN — IPRATROPIUM BROMIDE AND ALBUTEROL SULFATE 3 ML: .5; 3 SOLUTION RESPIRATORY (INHALATION) at 19:57

## 2021-01-01 RX ADMIN — METHYLPREDNISOLONE SODIUM SUCCINATE 40 MG: 40 INJECTION, POWDER, FOR SOLUTION INTRAMUSCULAR; INTRAVENOUS at 07:58

## 2021-01-01 RX ADMIN — ATORVASTATIN CALCIUM 10 MG: 10 TABLET, FILM COATED ORAL at 21:04

## 2021-01-01 RX ADMIN — DILTIAZEM HYDROCHLORIDE 120 MG: 120 CAPSULE, COATED, EXTENDED RELEASE ORAL at 11:12

## 2021-01-01 RX ADMIN — Medication 500 MG: at 08:47

## 2021-01-01 RX ADMIN — DILTIAZEM HYDROCHLORIDE 120 MG: 120 CAPSULE, COATED, EXTENDED RELEASE ORAL at 08:39

## 2021-01-01 RX ADMIN — LORAZEPAM 1 MG: 2 INJECTION INTRAMUSCULAR; INTRAVENOUS at 12:17

## 2021-01-01 RX ADMIN — Medication 125 MCG/HR: at 01:03

## 2021-01-01 RX ADMIN — IPRATROPIUM BROMIDE AND ALBUTEROL SULFATE 1 AMPULE: .5; 3 SOLUTION RESPIRATORY (INHALATION) at 08:16

## 2021-01-01 RX ADMIN — Medication 500 MG: at 08:09

## 2021-01-01 RX ADMIN — SERTRALINE HYDROCHLORIDE 50 MG: 50 TABLET ORAL at 08:26

## 2021-01-01 RX ADMIN — MAGNESIUM OXIDE 400 MG: 400 TABLET ORAL at 08:48

## 2021-01-01 RX ADMIN — NYSTATIN 500000 UNITS: 100000 SUSPENSION ORAL at 16:03

## 2021-01-01 RX ADMIN — SODIUM CHLORIDE, PRESERVATIVE FREE 10 ML: 5 INJECTION INTRAVENOUS at 09:45

## 2021-01-01 RX ADMIN — SODIUM CHLORIDE 20 ML/HR: 9 INJECTION, SOLUTION INTRAVENOUS at 11:27

## 2021-01-01 RX ADMIN — LORAZEPAM 0.5 MG: 2 INJECTION INTRAMUSCULAR; INTRAVENOUS at 00:48

## 2021-01-01 RX ADMIN — SODIUM CHLORIDE, PRESERVATIVE FREE 10 ML: 5 INJECTION INTRAVENOUS at 08:23

## 2021-01-01 RX ADMIN — DOXYCYCLINE 100 MG: 100 INJECTION, POWDER, LYOPHILIZED, FOR SOLUTION INTRAVENOUS at 23:09

## 2021-01-01 RX ADMIN — LORAZEPAM 0.5 MG: 2 INJECTION INTRAMUSCULAR; INTRAVENOUS at 13:05

## 2021-01-01 RX ADMIN — CETIRIZINE HYDROCHLORIDE 10 MG: 10 TABLET, FILM COATED ORAL at 08:59

## 2021-01-01 RX ADMIN — NYSTATIN 500000 UNITS: 100000 SUSPENSION ORAL at 17:29

## 2021-01-01 RX ADMIN — LORAZEPAM 1 MG: 1 TABLET ORAL at 13:41

## 2021-01-01 RX ADMIN — Medication 75 MCG/HR: at 10:49

## 2021-01-01 RX ADMIN — IPRATROPIUM BROMIDE AND ALBUTEROL SULFATE 1 AMPULE: .5; 3 SOLUTION RESPIRATORY (INHALATION) at 07:17

## 2021-01-01 RX ADMIN — DILTIAZEM HYDROCHLORIDE 120 MG: 120 CAPSULE, COATED, EXTENDED RELEASE ORAL at 08:59

## 2021-01-01 RX ADMIN — CETIRIZINE HYDROCHLORIDE 10 MG: 10 TABLET, FILM COATED ORAL at 09:09

## 2021-01-01 RX ADMIN — ACETYLCYSTEINE 600 MG: 200 SOLUTION ORAL; RESPIRATORY (INHALATION) at 07:17

## 2021-01-01 RX ADMIN — MAGNESIUM SULFATE HEPTAHYDRATE 1000 MG: 1 INJECTION, SOLUTION INTRAVENOUS at 07:34

## 2021-01-01 RX ADMIN — SUCRALFATE 1 G: 1 TABLET ORAL at 08:58

## 2021-01-01 RX ADMIN — BUSPIRONE HYDROCHLORIDE 10 MG: 10 TABLET ORAL at 16:08

## 2021-01-01 RX ADMIN — METHYLPREDNISOLONE SODIUM SUCCINATE 40 MG: 40 INJECTION, POWDER, LYOPHILIZED, FOR SOLUTION INTRAMUSCULAR; INTRAVENOUS at 10:01

## 2021-01-01 RX ADMIN — ALBUTEROL SULFATE 2 PUFF: 90 AEROSOL, METERED RESPIRATORY (INHALATION) at 17:12

## 2021-01-01 RX ADMIN — PHENYLEPHRINE HYDROCHLORIDE 250 MCG: 10 INJECTION INTRAVENOUS at 08:23

## 2021-01-01 RX ADMIN — LORAZEPAM 0.5 MG: 2 INJECTION INTRAMUSCULAR; INTRAVENOUS at 02:24

## 2021-01-01 RX ADMIN — DILTIAZEM HYDROCHLORIDE 240 MG: 240 CAPSULE, COATED, EXTENDED RELEASE ORAL at 09:13

## 2021-01-01 RX ADMIN — APIXABAN 5 MG: 5 TABLET, FILM COATED ORAL at 20:37

## 2021-01-01 RX ADMIN — LORAZEPAM 0.5 MG: 0.5 TABLET ORAL at 14:37

## 2021-01-01 RX ADMIN — IPRATROPIUM BROMIDE AND ALBUTEROL SULFATE 3 ML: .5; 3 SOLUTION RESPIRATORY (INHALATION) at 11:45

## 2021-01-01 RX ADMIN — METHYLPREDNISOLONE SODIUM SUCCINATE 40 MG: 40 INJECTION, POWDER, LYOPHILIZED, FOR SOLUTION INTRAMUSCULAR; INTRAVENOUS at 00:25

## 2021-01-01 RX ADMIN — Medication 500 MG: at 09:57

## 2021-01-01 RX ADMIN — MEROPENEM 1000 MG: 1 INJECTION, POWDER, FOR SOLUTION INTRAVENOUS at 15:36

## 2021-01-01 RX ADMIN — LEVETIRACETAM 500 MG: 100 INJECTION, SOLUTION INTRAVENOUS at 18:02

## 2021-01-01 RX ADMIN — ALBUTEROL SULFATE 2 PUFF: 90 AEROSOL, METERED RESPIRATORY (INHALATION) at 19:41

## 2021-01-01 RX ADMIN — Medication 500 MG: at 08:56

## 2021-01-01 RX ADMIN — HYDROCODONE BITARTRATE AND ACETAMINOPHEN 1 TABLET: 10; 325 TABLET ORAL at 02:26

## 2021-01-01 RX ADMIN — ATORVASTATIN CALCIUM 10 MG: 10 TABLET, FILM COATED ORAL at 08:40

## 2021-01-01 RX ADMIN — SUCRALFATE 1 G: 1 TABLET ORAL at 16:36

## 2021-01-01 RX ADMIN — LEVALBUTEROL HYDROCHLORIDE 0.63 MG: 0.63 SOLUTION RESPIRATORY (INHALATION) at 10:31

## 2021-01-01 RX ADMIN — POTASSIUM CHLORIDE: 2 INJECTION, SOLUTION, CONCENTRATE INTRAVENOUS at 08:53

## 2021-01-01 RX ADMIN — ETOPOSIDE 90 MG: 20 INJECTION, SOLUTION, CONCENTRATE INTRAVENOUS at 11:12

## 2021-01-01 RX ADMIN — ASPIRIN 81 MG: 81 TABLET, FILM COATED ORAL at 10:41

## 2021-01-01 RX ADMIN — SUCRALFATE 1 G: 1 TABLET ORAL at 17:09

## 2021-01-01 RX ADMIN — THEOPHYLLINE ANHYDROUS 100 MG: 100 CAPSULE, EXTENDED RELEASE ORAL at 12:13

## 2021-01-01 RX ADMIN — ATORVASTATIN CALCIUM 10 MG: 10 TABLET, FILM COATED ORAL at 21:33

## 2021-01-01 RX ADMIN — ENOXAPARIN SODIUM 30 MG: 30 INJECTION SUBCUTANEOUS at 07:59

## 2021-01-01 RX ADMIN — BUSPIRONE HYDROCHLORIDE 10 MG: 10 TABLET ORAL at 20:12

## 2021-01-01 RX ADMIN — IPRATROPIUM BROMIDE AND ALBUTEROL SULFATE 3 ML: .5; 3 SOLUTION RESPIRATORY (INHALATION) at 12:17

## 2021-01-01 RX ADMIN — Medication 1 CAPSULE: at 08:14

## 2021-01-01 RX ADMIN — ASPIRIN 81 MG: 81 TABLET, COATED ORAL at 08:14

## 2021-01-01 RX ADMIN — MAGNESIUM SULFATE HEPTAHYDRATE 1000 MG: 1 INJECTION, SOLUTION INTRAVENOUS at 23:33

## 2021-01-01 RX ADMIN — SUCRALFATE 1 G: 1 TABLET ORAL at 10:12

## 2021-01-01 RX ADMIN — DILTIAZEM HYDROCHLORIDE 10 MG: 5 INJECTION INTRAVENOUS at 18:03

## 2021-01-01 RX ADMIN — ACETYLCYSTEINE 600 MG: 200 SOLUTION ORAL; RESPIRATORY (INHALATION) at 20:40

## 2021-01-01 RX ADMIN — APIXABAN 2.5 MG: 2.5 TABLET, FILM COATED ORAL at 09:57

## 2021-01-01 RX ADMIN — Medication 2 PUFF: at 12:41

## 2021-01-01 RX ADMIN — IPRATROPIUM BROMIDE AND ALBUTEROL SULFATE 1 AMPULE: .5; 3 SOLUTION RESPIRATORY (INHALATION) at 11:07

## 2021-01-01 RX ADMIN — IPRATROPIUM BROMIDE AND ALBUTEROL SULFATE 1 AMPULE: .5; 3 SOLUTION RESPIRATORY (INHALATION) at 13:59

## 2021-01-01 RX ADMIN — DOXYCYCLINE 100 MG: 100 INJECTION, POWDER, LYOPHILIZED, FOR SOLUTION INTRAVENOUS at 10:23

## 2021-01-01 RX ADMIN — MAGNESIUM OXIDE 400 MG: 400 TABLET ORAL at 08:52

## 2021-01-01 RX ADMIN — HYDROCODONE BITARTRATE AND ACETAMINOPHEN 1 TABLET: 10; 325 TABLET ORAL at 20:36

## 2021-01-01 RX ADMIN — IPRATROPIUM BROMIDE AND ALBUTEROL SULFATE 3 ML: .5; 3 SOLUTION RESPIRATORY (INHALATION) at 09:18

## 2021-01-01 RX ADMIN — BUSPIRONE HYDROCHLORIDE 10 MG: 10 TABLET ORAL at 16:16

## 2021-01-01 RX ADMIN — LISINOPRIL 5 MG: 5 TABLET ORAL at 09:05

## 2021-01-01 RX ADMIN — PANTOPRAZOLE SODIUM 40 MG: 40 TABLET, DELAYED RELEASE ORAL at 06:37

## 2021-01-01 RX ADMIN — ETOPOSIDE 120 MG: 20 INJECTION INTRAVENOUS at 11:03

## 2021-01-01 RX ADMIN — POLYETHYLENE GLYCOL (3350) 17 G: 17 POWDER, FOR SOLUTION ORAL at 12:38

## 2021-01-01 RX ADMIN — METHYLPREDNISOLONE SODIUM SUCCINATE 40 MG: 40 INJECTION, POWDER, LYOPHILIZED, FOR SOLUTION INTRAMUSCULAR; INTRAVENOUS at 06:12

## 2021-01-01 RX ADMIN — MEROPENEM 1000 MG: 1 INJECTION, POWDER, FOR SOLUTION INTRAVENOUS at 08:06

## 2021-01-01 RX ADMIN — MAGNESIUM OXIDE 400 MG: 400 TABLET ORAL at 09:04

## 2021-01-01 RX ADMIN — MORPHINE SULFATE 4 MG: 4 INJECTION, SOLUTION INTRAMUSCULAR; INTRAVENOUS at 05:49

## 2021-01-01 RX ADMIN — SUCRALFATE 1 G: 1 TABLET ORAL at 10:55

## 2021-01-01 RX ADMIN — SUCRALFATE 1 G: 1 TABLET ORAL at 15:55

## 2021-01-01 RX ADMIN — CETIRIZINE HYDROCHLORIDE 10 MG: 10 TABLET, FILM COATED ORAL at 10:40

## 2021-01-01 RX ADMIN — METHYLPREDNISOLONE SODIUM SUCCINATE 40 MG: 40 INJECTION, POWDER, FOR SOLUTION INTRAMUSCULAR; INTRAVENOUS at 15:57

## 2021-01-01 RX ADMIN — MORPHINE SULFATE 4 MG: 4 INJECTION, SOLUTION INTRAMUSCULAR; INTRAVENOUS at 23:49

## 2021-01-01 RX ADMIN — PANTOPRAZOLE SODIUM 40 MG: 40 TABLET, DELAYED RELEASE ORAL at 05:20

## 2021-01-01 RX ADMIN — Medication 500 MG: at 09:37

## 2021-01-01 RX ADMIN — Medication 200 MCG/HR: at 09:20

## 2021-01-01 RX ADMIN — ROCURONIUM BROMIDE 100 MG: 10 SOLUTION INTRAVENOUS at 10:27

## 2021-01-01 RX ADMIN — IPRATROPIUM BROMIDE AND ALBUTEROL SULFATE 1 AMPULE: .5; 3 SOLUTION RESPIRATORY (INHALATION) at 17:30

## 2021-01-01 RX ADMIN — ETOPOSIDE 90 MG: 20 INJECTION INTRAVENOUS at 22:58

## 2021-01-01 RX ADMIN — METHYLPREDNISOLONE SODIUM SUCCINATE 40 MG: 40 INJECTION, POWDER, LYOPHILIZED, FOR SOLUTION INTRAMUSCULAR; INTRAVENOUS at 14:17

## 2021-01-01 RX ADMIN — Medication 500 MG: at 09:14

## 2021-01-01 RX ADMIN — MORPHINE SULFATE 2 MG: 2 INJECTION, SOLUTION INTRAMUSCULAR; INTRAVENOUS at 18:57

## 2021-01-01 RX ADMIN — SUCRALFATE 1 G: 1 TABLET ORAL at 14:08

## 2021-01-01 RX ADMIN — DILTIAZEM HYDROCHLORIDE 240 MG: 240 CAPSULE, COATED, EXTENDED RELEASE ORAL at 07:58

## 2021-01-01 RX ADMIN — LORAZEPAM 0.5 MG: 2 INJECTION INTRAMUSCULAR; INTRAVENOUS at 08:25

## 2021-01-01 RX ADMIN — SUCRALFATE 1 G: 1 TABLET ORAL at 05:30

## 2021-01-01 RX ADMIN — ALBUTEROL SULFATE 2 PUFF: 90 AEROSOL, METERED RESPIRATORY (INHALATION) at 15:47

## 2021-01-01 RX ADMIN — Medication 1 TABLET: at 08:41

## 2021-01-01 RX ADMIN — SUCRALFATE 1 G: 1 TABLET ORAL at 15:54

## 2021-01-01 RX ADMIN — METHYLPREDNISOLONE SODIUM SUCCINATE 40 MG: 40 INJECTION, POWDER, LYOPHILIZED, FOR SOLUTION INTRAMUSCULAR; INTRAVENOUS at 18:35

## 2021-01-01 RX ADMIN — PROPOFOL 70.02 MCG/KG/MIN: 10 INJECTION, EMULSION INTRAVENOUS at 07:29

## 2021-01-01 RX ADMIN — DOXYCYCLINE 100 MG: 100 INJECTION, POWDER, LYOPHILIZED, FOR SOLUTION INTRAVENOUS at 22:18

## 2021-01-01 RX ADMIN — HYDROCODONE BITARTRATE AND ACETAMINOPHEN 1 TABLET: 10; 325 TABLET ORAL at 05:29

## 2021-01-01 RX ADMIN — PANTOPRAZOLE SODIUM 40 MG: 40 TABLET, DELAYED RELEASE ORAL at 05:54

## 2021-01-01 RX ADMIN — FLUCONAZOLE 200 MG: 100 TABLET ORAL at 09:11

## 2021-01-01 RX ADMIN — HYDROCODONE BITARTRATE AND ACETAMINOPHEN 1 TABLET: 10; 325 TABLET ORAL at 22:05

## 2021-01-01 RX ADMIN — METOPROLOL TARTRATE 50 MG: 50 TABLET, FILM COATED ORAL at 21:04

## 2021-01-01 RX ADMIN — Medication 500 MG: at 08:58

## 2021-01-01 RX ADMIN — Medication 1000 UNITS: at 08:48

## 2021-01-01 RX ADMIN — MAGNESIUM OXIDE 400 MG (241.3 MG MAGNESIUM) TABLET 200 MG: TABLET at 09:58

## 2021-01-01 RX ADMIN — SUCRALFATE 1 G: 1 TABLET ORAL at 05:20

## 2021-01-01 RX ADMIN — BUSPIRONE HYDROCHLORIDE 10 MG: 10 TABLET ORAL at 09:04

## 2021-01-01 RX ADMIN — HYDROCODONE BITARTRATE AND ACETAMINOPHEN 1 TABLET: 10; 325 TABLET ORAL at 18:14

## 2021-01-01 RX ADMIN — ASPIRIN 81 MG: 81 TABLET, FILM COATED ORAL at 08:59

## 2021-01-01 RX ADMIN — METHYLPREDNISOLONE SODIUM SUCCINATE 40 MG: 40 INJECTION, POWDER, LYOPHILIZED, FOR SOLUTION INTRAMUSCULAR; INTRAVENOUS at 20:51

## 2021-01-01 RX ADMIN — SODIUM CHLORIDE, PRESERVATIVE FREE 10 ML: 5 INJECTION INTRAVENOUS at 08:54

## 2021-01-01 RX ADMIN — ALBUTEROL SULFATE 2.5 MG: 2.5 SOLUTION RESPIRATORY (INHALATION) at 11:15

## 2021-01-01 RX ADMIN — ONDANSETRON 4 MG: 2 INJECTION INTRAMUSCULAR; INTRAVENOUS at 14:18

## 2021-01-01 RX ADMIN — ASPIRIN 81 MG: 81 TABLET, FILM COATED ORAL at 10:40

## 2021-01-01 RX ADMIN — MEROPENEM 1000 MG: 1 INJECTION, POWDER, FOR SOLUTION INTRAVENOUS at 01:19

## 2021-01-01 RX ADMIN — PREDNISONE 40 MG: 20 TABLET ORAL at 08:26

## 2021-01-01 RX ADMIN — SUCRALFATE 1 G: 1 TABLET ORAL at 20:45

## 2021-01-01 RX ADMIN — LORAZEPAM 1 MG: 1 TABLET ORAL at 00:06

## 2021-01-01 RX ADMIN — METHYLPREDNISOLONE SODIUM SUCCINATE 40 MG: 40 INJECTION, POWDER, FOR SOLUTION INTRAMUSCULAR; INTRAVENOUS at 12:30

## 2021-01-01 RX ADMIN — Medication 1 CAPSULE: at 09:58

## 2021-01-01 RX ADMIN — CHLORHEXIDINE GLUCONATE 0.12% ORAL RINSE 15 ML: 1.2 LIQUID ORAL at 07:32

## 2021-01-01 RX ADMIN — LORAZEPAM 0.5 MG: 2 INJECTION INTRAMUSCULAR; INTRAVENOUS at 18:04

## 2021-01-01 RX ADMIN — Medication 1 CAPSULE: at 09:05

## 2021-01-01 RX ADMIN — METOPROLOL TARTRATE 50 MG: 50 TABLET, FILM COATED ORAL at 20:39

## 2021-01-01 RX ADMIN — AZITHROMYCIN MONOHYDRATE 500 MG: 500 INJECTION, POWDER, LYOPHILIZED, FOR SOLUTION INTRAVENOUS at 17:35

## 2021-01-01 RX ADMIN — IPRATROPIUM BROMIDE AND ALBUTEROL SULFATE 3 ML: .5; 3 SOLUTION RESPIRATORY (INHALATION) at 08:35

## 2021-01-01 RX ADMIN — Medication 1 CAPSULE: at 09:51

## 2021-01-01 RX ADMIN — AZITHROMYCIN MONOHYDRATE 500 MG: 500 INJECTION, POWDER, LYOPHILIZED, FOR SOLUTION INTRAVENOUS at 12:13

## 2021-01-01 RX ADMIN — IPRATROPIUM BROMIDE AND ALBUTEROL SULFATE 3 ML: .5; 3 SOLUTION RESPIRATORY (INHALATION) at 08:31

## 2021-01-01 RX ADMIN — HYDROCODONE BITARTRATE AND ACETAMINOPHEN 1 TABLET: 10; 325 TABLET ORAL at 21:07

## 2021-01-01 RX ADMIN — IPRATROPIUM BROMIDE AND ALBUTEROL SULFATE 1 AMPULE: .5; 3 SOLUTION RESPIRATORY (INHALATION) at 08:04

## 2021-01-01 RX ADMIN — IPRATROPIUM BROMIDE AND ALBUTEROL SULFATE 1 AMPULE: .5; 3 SOLUTION RESPIRATORY (INHALATION) at 19:39

## 2021-01-01 RX ADMIN — ETOPOSIDE 90 MG: 20 INJECTION INTRAVENOUS at 11:36

## 2021-01-01 RX ADMIN — LORAZEPAM 1 MG: 1 TABLET ORAL at 16:29

## 2021-01-01 RX ADMIN — METOPROLOL TARTRATE 50 MG: 50 TABLET, FILM COATED ORAL at 21:05

## 2021-01-01 RX ADMIN — FAMOTIDINE 20 MG: 10 INJECTION, SOLUTION INTRAVENOUS at 21:49

## 2021-01-01 RX ADMIN — SODIUM CHLORIDE 20 ML/HR: 9 INJECTION, SOLUTION INTRAVENOUS at 10:10

## 2021-01-01 RX ADMIN — METHYLPREDNISOLONE SODIUM SUCCINATE 40 MG: 40 INJECTION, POWDER, FOR SOLUTION INTRAMUSCULAR; INTRAVENOUS at 17:18

## 2021-01-01 RX ADMIN — Medication 2 PUFF: at 15:20

## 2021-01-01 RX ADMIN — METHYLPREDNISOLONE SODIUM SUCCINATE 20 MG: 40 INJECTION, POWDER, FOR SOLUTION INTRAMUSCULAR; INTRAVENOUS at 08:40

## 2021-01-01 RX ADMIN — NYSTATIN 500000 UNITS: 100000 SUSPENSION ORAL at 12:07

## 2021-01-01 RX ADMIN — Medication 1000 UNITS: at 08:59

## 2021-01-01 RX ADMIN — MEROPENEM 1000 MG: 1 INJECTION, POWDER, FOR SOLUTION INTRAVENOUS at 00:03

## 2021-01-01 RX ADMIN — LORAZEPAM 0.5 MG: 2 INJECTION INTRAMUSCULAR; INTRAVENOUS at 11:27

## 2021-01-01 RX ADMIN — LORAZEPAM 0.5 MG: 2 INJECTION INTRAMUSCULAR; INTRAVENOUS at 02:58

## 2021-01-01 RX ADMIN — ACETYLCYSTEINE 600 MG: 200 SOLUTION ORAL; RESPIRATORY (INHALATION) at 18:46

## 2021-01-01 RX ADMIN — ASPIRIN 81 MG: 81 TABLET, COATED ORAL at 09:33

## 2021-01-01 RX ADMIN — METOPROLOL TARTRATE 50 MG: 50 TABLET, FILM COATED ORAL at 20:45

## 2021-01-01 RX ADMIN — IPRATROPIUM BROMIDE AND ALBUTEROL SULFATE 1 AMPULE: .5; 3 SOLUTION RESPIRATORY (INHALATION) at 20:59

## 2021-01-01 RX ADMIN — ASPIRIN 81 MG: 81 TABLET, FILM COATED ORAL at 08:39

## 2021-01-01 RX ADMIN — SODIUM CHLORIDE, PRESERVATIVE FREE 10 ML: 5 INJECTION INTRAVENOUS at 21:07

## 2021-01-01 RX ADMIN — PANTOPRAZOLE SODIUM 40 MG: 40 TABLET, DELAYED RELEASE ORAL at 05:16

## 2021-01-01 RX ADMIN — Medication 500 MG: at 09:09

## 2021-01-01 RX ADMIN — FLUCONAZOLE 200 MG: 100 TABLET ORAL at 10:42

## 2021-01-01 RX ADMIN — TBO-FILGRASTIM 300 MCG: 300 INJECTION, SOLUTION SUBCUTANEOUS at 09:12

## 2021-01-01 RX ADMIN — LORAZEPAM 1 MG: 1 TABLET ORAL at 22:09

## 2021-01-01 RX ADMIN — SUCRALFATE 1 G: 1 TABLET ORAL at 13:19

## 2021-01-01 RX ADMIN — SUCRALFATE 1 G: 1 TABLET ORAL at 18:14

## 2021-01-01 RX ADMIN — LORAZEPAM 0.5 MG: 2 INJECTION INTRAMUSCULAR; INTRAVENOUS at 15:39

## 2021-01-01 RX ADMIN — IPRATROPIUM BROMIDE AND ALBUTEROL SULFATE 1 AMPULE: .5; 3 SOLUTION RESPIRATORY (INHALATION) at 04:28

## 2021-01-01 RX ADMIN — Medication 500 MG: at 07:59

## 2021-01-01 RX ADMIN — ONDANSETRON 4 MG: 2 INJECTION INTRAMUSCULAR; INTRAVENOUS at 01:53

## 2021-01-01 RX ADMIN — DILTIAZEM HYDROCHLORIDE 240 MG: 240 CAPSULE, COATED, EXTENDED RELEASE ORAL at 08:47

## 2021-01-01 RX ADMIN — CISPLATIN: 1 INJECTION INTRAVENOUS at 10:43

## 2021-01-01 RX ADMIN — MAGNESIUM OXIDE 200 MG: 400 TABLET ORAL at 07:32

## 2021-01-01 RX ADMIN — DOXYCYCLINE 100 MG: 100 INJECTION, POWDER, LYOPHILIZED, FOR SOLUTION INTRAVENOUS at 10:46

## 2021-01-01 RX ADMIN — PANTOPRAZOLE SODIUM 40 MG: 40 INJECTION, POWDER, FOR SOLUTION INTRAVENOUS at 06:39

## 2021-01-01 RX ADMIN — BUSPIRONE HYDROCHLORIDE 10 MG: 10 TABLET ORAL at 16:04

## 2021-01-01 RX ADMIN — Medication 1000 UNITS: at 08:39

## 2021-01-01 RX ADMIN — SUCRALFATE 1 G: 1 TABLET ORAL at 21:03

## 2021-01-01 RX ADMIN — IPRATROPIUM BROMIDE AND ALBUTEROL SULFATE 1 AMPULE: .5; 3 SOLUTION RESPIRATORY (INHALATION) at 08:05

## 2021-01-01 RX ADMIN — Medication 100 MCG/HR: at 13:59

## 2021-01-01 RX ADMIN — Medication 500 MG: at 10:46

## 2021-01-01 RX ADMIN — ACETYLCYSTEINE 600 MG: 200 SOLUTION ORAL; RESPIRATORY (INHALATION) at 21:00

## 2021-01-01 RX ADMIN — SUCRALFATE 1 G: 1 TABLET ORAL at 21:11

## 2021-01-01 RX ADMIN — CHLORHEXIDINE GLUCONATE 0.12% ORAL RINSE 15 ML: 1.2 LIQUID ORAL at 08:41

## 2021-01-01 RX ADMIN — PANTOPRAZOLE SODIUM 40 MG: 40 INJECTION, POWDER, FOR SOLUTION INTRAVENOUS at 06:00

## 2021-01-01 RX ADMIN — BUSPIRONE HYDROCHLORIDE 10 MG: 10 TABLET ORAL at 09:58

## 2021-01-01 RX ADMIN — ROCURONIUM BROMIDE 50 MG: 10 INJECTION INTRAVENOUS at 08:15

## 2021-01-01 RX ADMIN — LORAZEPAM 1 MG: 1 TABLET ORAL at 20:35

## 2021-01-01 RX ADMIN — ASPIRIN 81 MG: 81 TABLET, COATED ORAL at 08:22

## 2021-01-01 RX ADMIN — ATORVASTATIN CALCIUM 10 MG: 10 TABLET, FILM COATED ORAL at 09:10

## 2021-01-01 RX ADMIN — LORAZEPAM 1 MG: 2 INJECTION INTRAMUSCULAR; INTRAVENOUS at 00:39

## 2021-01-01 RX ADMIN — SODIUM CHLORIDE, PRESERVATIVE FREE 10 ML: 5 INJECTION INTRAVENOUS at 21:14

## 2021-01-01 RX ADMIN — Medication 2 PUFF: at 11:05

## 2021-01-01 RX ADMIN — ALBUTEROL SULFATE 2.5 MG: 2.5 SOLUTION RESPIRATORY (INHALATION) at 15:26

## 2021-01-01 RX ADMIN — SUCRALFATE 1 G: 1 TABLET ORAL at 21:18

## 2021-01-01 RX ADMIN — THEOPHYLLINE 200 MG: 400 TABLET, EXTENDED RELEASE ORAL at 08:52

## 2021-01-01 RX ADMIN — LEVOFLOXACIN 750 MG: 500 TABLET, FILM COATED ORAL at 08:58

## 2021-01-01 RX ADMIN — THEOPHYLLINE ANHYDROUS 200 MG: 200 CAPSULE, EXTENDED RELEASE ORAL at 20:36

## 2021-01-01 RX ADMIN — SUCRALFATE 1 G: 1 TABLET ORAL at 06:12

## 2021-01-01 RX ADMIN — ETOPOSIDE 120 MG: 20 INJECTION INTRAVENOUS at 11:05

## 2021-01-01 RX ADMIN — POTASSIUM CHLORIDE 20 MEQ: 400 INJECTION, SOLUTION INTRAVENOUS at 13:41

## 2021-01-01 RX ADMIN — PEGFILGRASTIM 6 MG: KIT SUBCUTANEOUS at 12:49

## 2021-01-01 RX ADMIN — HYDROCODONE BITARTRATE AND ACETAMINOPHEN 1 TABLET: 10; 325 TABLET ORAL at 18:01

## 2021-01-01 RX ADMIN — BUSPIRONE HYDROCHLORIDE 10 MG: 10 TABLET ORAL at 09:52

## 2021-01-01 RX ADMIN — LORAZEPAM 1 MG: 1 TABLET ORAL at 22:59

## 2021-01-01 RX ADMIN — BUSPIRONE HYDROCHLORIDE 10 MG: 10 TABLET ORAL at 07:32

## 2021-01-01 RX ADMIN — POTASSIUM CHLORIDE 20 MEQ: 400 INJECTION, SOLUTION INTRAVENOUS at 12:34

## 2021-01-01 RX ADMIN — Medication 500 MG: at 11:14

## 2021-01-01 RX ADMIN — METHYLPREDNISOLONE SODIUM SUCCINATE 40 MG: 40 INJECTION, POWDER, FOR SOLUTION INTRAMUSCULAR; INTRAVENOUS at 03:09

## 2021-01-01 RX ADMIN — THEOPHYLLINE ANHYDROUS 200 MG: 200 CAPSULE, EXTENDED RELEASE ORAL at 10:31

## 2021-01-01 RX ADMIN — AZITHROMYCIN MONOHYDRATE 500 MG: 500 INJECTION, POWDER, LYOPHILIZED, FOR SOLUTION INTRAVENOUS at 09:18

## 2021-01-01 RX ADMIN — DILTIAZEM HYDROCHLORIDE 240 MG: 240 CAPSULE, COATED, EXTENDED RELEASE ORAL at 08:08

## 2021-01-01 RX ADMIN — MEROPENEM 1000 MG: 1 INJECTION, POWDER, FOR SOLUTION INTRAVENOUS at 22:40

## 2021-01-01 RX ADMIN — MORPHINE SULFATE 4 MG: 4 INJECTION, SOLUTION INTRAMUSCULAR; INTRAVENOUS at 21:22

## 2021-01-01 RX ADMIN — SODIUM CHLORIDE, PRESERVATIVE FREE 10 ML: 5 INJECTION INTRAVENOUS at 20:24

## 2021-01-01 RX ADMIN — METHYLPREDNISOLONE SODIUM SUCCINATE 40 MG: 40 INJECTION, POWDER, FOR SOLUTION INTRAMUSCULAR; INTRAVENOUS at 17:15

## 2021-01-01 RX ADMIN — DOCUSATE SODIUM 100 MG: 100 CAPSULE, LIQUID FILLED ORAL at 20:28

## 2021-01-01 RX ADMIN — LORAZEPAM 1 MG: 1 TABLET ORAL at 09:58

## 2021-01-01 RX ADMIN — DEXAMETHASONE SODIUM PHOSPHATE 4 MG: 4 INJECTION, SOLUTION INTRAMUSCULAR; INTRAVENOUS at 08:19

## 2021-01-01 RX ADMIN — METHYLPREDNISOLONE SODIUM SUCCINATE 40 MG: 40 INJECTION, POWDER, FOR SOLUTION INTRAMUSCULAR; INTRAVENOUS at 21:47

## 2021-01-01 RX ADMIN — BUSPIRONE HYDROCHLORIDE 10 MG: 10 TABLET ORAL at 08:26

## 2021-01-01 RX ADMIN — MAGNESIUM OXIDE 200 MG: 400 TABLET ORAL at 09:47

## 2021-01-01 RX ADMIN — IPRATROPIUM BROMIDE AND ALBUTEROL SULFATE 1 AMPULE: .5; 3 SOLUTION RESPIRATORY (INHALATION) at 15:10

## 2021-01-01 RX ADMIN — Medication 2 PUFF: at 08:46

## 2021-01-01 RX ADMIN — PALONOSETRON 0.25 MG: 0.25 INJECTION, SOLUTION INTRAVENOUS at 09:24

## 2021-01-01 RX ADMIN — LORAZEPAM 0.5 MG: 2 INJECTION INTRAMUSCULAR; INTRAVENOUS at 05:45

## 2021-01-01 RX ADMIN — BUSPIRONE HYDROCHLORIDE 10 MG: 10 TABLET ORAL at 15:34

## 2021-01-01 RX ADMIN — CETIRIZINE HYDROCHLORIDE 10 MG: 10 TABLET, FILM COATED ORAL at 08:48

## 2021-01-01 RX ADMIN — SODIUM POLYSTYRENE SULFONATE 15 G: 15 SUSPENSION ORAL; RECTAL at 16:17

## 2021-01-01 RX ADMIN — APIXABAN 5 MG: 5 TABLET, FILM COATED ORAL at 08:22

## 2021-01-01 RX ADMIN — GUAIFENESIN 600 MG: 600 TABLET, EXTENDED RELEASE ORAL at 20:39

## 2021-01-01 RX ADMIN — METHYLPREDNISOLONE SODIUM SUCCINATE 40 MG: 40 INJECTION, POWDER, LYOPHILIZED, FOR SOLUTION INTRAMUSCULAR; INTRAVENOUS at 21:14

## 2021-01-01 RX ADMIN — METHYLPREDNISOLONE SODIUM SUCCINATE 40 MG: 40 INJECTION, POWDER, LYOPHILIZED, FOR SOLUTION INTRAMUSCULAR; INTRAVENOUS at 09:41

## 2021-01-01 RX ADMIN — ALBUTEROL SULFATE 2 PUFF: 90 AEROSOL, METERED RESPIRATORY (INHALATION) at 11:50

## 2021-01-01 RX ADMIN — DILTIAZEM HYDROCHLORIDE 120 MG: 120 CAPSULE, COATED, EXTENDED RELEASE ORAL at 08:25

## 2021-01-01 RX ADMIN — LORAZEPAM 1 MG: 1 TABLET ORAL at 12:06

## 2021-01-01 RX ADMIN — BUSPIRONE HYDROCHLORIDE 10 MG: 10 TABLET ORAL at 09:14

## 2021-01-01 RX ADMIN — Medication 2 PUFF: at 05:18

## 2021-01-01 RX ADMIN — METHYLPREDNISOLONE SODIUM SUCCINATE 40 MG: 40 INJECTION, POWDER, FOR SOLUTION INTRAMUSCULAR; INTRAVENOUS at 23:15

## 2021-01-01 RX ADMIN — IPRATROPIUM BROMIDE AND ALBUTEROL SULFATE 3 ML: .5; 3 SOLUTION RESPIRATORY (INHALATION) at 21:12

## 2021-01-01 RX ADMIN — ENOXAPARIN SODIUM 40 MG: 40 INJECTION SUBCUTANEOUS at 09:12

## 2021-01-01 RX ADMIN — MEROPENEM 1000 MG: 1 INJECTION, POWDER, FOR SOLUTION INTRAVENOUS at 01:27

## 2021-01-01 RX ADMIN — NYSTATIN 500000 UNITS: 100000 SUSPENSION ORAL at 08:19

## 2021-01-01 RX ADMIN — APIXABAN 5 MG: 5 TABLET, FILM COATED ORAL at 22:23

## 2021-01-01 RX ADMIN — HYDROCODONE BITARTRATE AND ACETAMINOPHEN 1 TABLET: 10; 325 TABLET ORAL at 00:02

## 2021-01-01 RX ADMIN — ALBUTEROL SULFATE 2 PUFF: 90 AEROSOL, METERED RESPIRATORY (INHALATION) at 10:59

## 2021-01-01 RX ADMIN — Medication 1 CAPSULE: at 11:50

## 2021-01-01 RX ADMIN — SUCRALFATE 1 G: 1 TABLET ORAL at 12:44

## 2021-01-01 RX ADMIN — ALBUTEROL SULFATE 2 PUFF: 90 AEROSOL, METERED RESPIRATORY (INHALATION) at 07:47

## 2021-01-01 RX ADMIN — MORPHINE SULFATE 4 MG: 4 INJECTION, SOLUTION INTRAMUSCULAR; INTRAVENOUS at 06:14

## 2021-01-01 RX ADMIN — SUCRALFATE 1 G: 1 TABLET ORAL at 08:18

## 2021-01-01 RX ADMIN — MORPHINE SULFATE 4 MG: 4 INJECTION, SOLUTION INTRAMUSCULAR; INTRAVENOUS at 11:59

## 2021-01-01 RX ADMIN — ACETYLCYSTEINE 600 MG: 200 SOLUTION ORAL; RESPIRATORY (INHALATION) at 10:59

## 2021-01-01 RX ADMIN — BUSPIRONE HYDROCHLORIDE 10 MG: 10 TABLET ORAL at 09:55

## 2021-01-01 RX ADMIN — CHLORHEXIDINE GLUCONATE 0.12% ORAL RINSE 15 ML: 1.2 LIQUID ORAL at 21:47

## 2021-01-01 RX ADMIN — HYDROCODONE BITARTRATE AND ACETAMINOPHEN 1 TABLET: 10; 325 TABLET ORAL at 14:08

## 2021-01-01 RX ADMIN — SUCRALFATE 1 G: 1 TABLET ORAL at 12:00

## 2021-01-01 RX ADMIN — MORPHINE SULFATE 4 MG: 4 INJECTION, SOLUTION INTRAMUSCULAR; INTRAVENOUS at 23:55

## 2021-01-01 RX ADMIN — DILTIAZEM HYDROCHLORIDE 120 MG: 120 CAPSULE, COATED, EXTENDED RELEASE ORAL at 08:05

## 2021-01-01 RX ADMIN — HYDROCODONE BITARTRATE AND ACETAMINOPHEN 1 TABLET: 10; 325 TABLET ORAL at 14:05

## 2021-01-01 RX ADMIN — Medication 1000 UNITS: at 08:24

## 2021-01-01 RX ADMIN — IPRATROPIUM BROMIDE AND ALBUTEROL SULFATE 1 AMPULE: .5; 3 SOLUTION RESPIRATORY (INHALATION) at 07:49

## 2021-01-01 RX ADMIN — SODIUM CHLORIDE, PRESERVATIVE FREE 10 ML: 5 INJECTION INTRAVENOUS at 10:00

## 2021-01-01 RX ADMIN — SUCRALFATE 1 G: 1 TABLET ORAL at 21:59

## 2021-01-01 RX ADMIN — MORPHINE SULFATE 2 MG: 2 INJECTION, SOLUTION INTRAMUSCULAR; INTRAVENOUS at 06:43

## 2021-01-01 RX ADMIN — DIGOXIN 250 MCG: 0.25 INJECTION INTRAMUSCULAR; INTRAVENOUS at 22:35

## 2021-01-01 RX ADMIN — FOSAPREPITANT 150 MG: 150 INJECTION, POWDER, LYOPHILIZED, FOR SOLUTION INTRAVENOUS at 10:18

## 2021-01-01 RX ADMIN — PROPOFOL 10 MCG/KG/MIN: 10 INJECTION, EMULSION INTRAVENOUS at 16:07

## 2021-01-01 RX ADMIN — Medication 4 MG: at 21:34

## 2021-01-01 RX ADMIN — LORAZEPAM 1 MG: 1 TABLET ORAL at 15:13

## 2021-01-01 RX ADMIN — METHYLPREDNISOLONE SODIUM SUCCINATE 40 MG: 40 INJECTION, POWDER, FOR SOLUTION INTRAMUSCULAR; INTRAVENOUS at 21:32

## 2021-01-01 RX ADMIN — SODIUM CHLORIDE, PRESERVATIVE FREE 10 ML: 5 INJECTION INTRAVENOUS at 09:31

## 2021-01-01 RX ADMIN — APIXABAN 2.5 MG: 2.5 TABLET, FILM COATED ORAL at 20:38

## 2021-01-01 RX ADMIN — BUSPIRONE HYDROCHLORIDE 10 MG: 10 TABLET ORAL at 08:00

## 2021-01-01 RX ADMIN — ETOPOSIDE 120 MG: 20 INJECTION INTRAVENOUS at 10:11

## 2021-01-01 RX ADMIN — IPRATROPIUM BROMIDE AND ALBUTEROL SULFATE 1 AMPULE: .5; 3 SOLUTION RESPIRATORY (INHALATION) at 07:28

## 2021-01-01 RX ADMIN — THEOPHYLLINE 200 MG: 400 TABLET, EXTENDED RELEASE ORAL at 08:59

## 2021-01-01 RX ADMIN — FOSAPREPITANT 150 MG: 150 INJECTION, POWDER, LYOPHILIZED, FOR SOLUTION INTRAVENOUS at 21:22

## 2021-01-01 RX ADMIN — METHYLPREDNISOLONE SODIUM SUCCINATE 40 MG: 40 INJECTION, POWDER, FOR SOLUTION INTRAMUSCULAR; INTRAVENOUS at 06:04

## 2021-01-01 RX ADMIN — THEOPHYLLINE ANHYDROUS 200 MG: 200 CAPSULE, EXTENDED RELEASE ORAL at 09:18

## 2021-01-01 RX ADMIN — BUSPIRONE HYDROCHLORIDE 10 MG: 10 TABLET ORAL at 18:12

## 2021-01-01 RX ADMIN — SODIUM CHLORIDE, PRESERVATIVE FREE 10 ML: 5 INJECTION INTRAVENOUS at 23:26

## 2021-01-01 RX ADMIN — LORAZEPAM 0.5 MG: 2 INJECTION INTRAMUSCULAR; INTRAVENOUS at 13:58

## 2021-01-01 RX ADMIN — DILTIAZEM HYDROCHLORIDE 120 MG: 120 CAPSULE, COATED, EXTENDED RELEASE ORAL at 10:00

## 2021-01-01 RX ADMIN — PANTOPRAZOLE SODIUM 40 MG: 40 TABLET, DELAYED RELEASE ORAL at 05:05

## 2021-01-01 RX ADMIN — Medication 500 MG: at 10:00

## 2021-01-01 RX ADMIN — DILTIAZEM HYDROCHLORIDE 120 MG: 120 CAPSULE, COATED, EXTENDED RELEASE ORAL at 08:07

## 2021-01-01 RX ADMIN — IPRATROPIUM BROMIDE AND ALBUTEROL SULFATE 1 AMPULE: .5; 3 SOLUTION RESPIRATORY (INHALATION) at 21:18

## 2021-01-01 RX ADMIN — BUSPIRONE HYDROCHLORIDE 10 MG: 10 TABLET ORAL at 20:49

## 2021-01-01 RX ADMIN — MAGNESIUM SULFATE HEPTAHYDRATE 1000 MG: 1 INJECTION, SOLUTION INTRAVENOUS at 01:51

## 2021-01-01 RX ADMIN — FENTANYL CITRATE 50 MCG: 50 INJECTION INTRAMUSCULAR; INTRAVENOUS at 08:34

## 2021-01-01 RX ADMIN — ASPIRIN 81 MG: 81 TABLET, COATED ORAL at 08:18

## 2021-01-01 RX ADMIN — CEFEPIME HYDROCHLORIDE 2000 MG: 2 INJECTION, POWDER, FOR SOLUTION INTRAVENOUS at 09:13

## 2021-01-01 RX ADMIN — CISPLATIN: 100 INJECTION, SOLUTION INTRAVENOUS at 22:57

## 2021-01-01 RX ADMIN — APIXABAN 5 MG: 5 TABLET, FILM COATED ORAL at 08:48

## 2021-01-01 RX ADMIN — ATORVASTATIN CALCIUM 10 MG: 10 TABLET, FILM COATED ORAL at 20:32

## 2021-01-01 RX ADMIN — MEROPENEM 1000 MG: 1 INJECTION, POWDER, FOR SOLUTION INTRAVENOUS at 00:19

## 2021-01-01 RX ADMIN — Medication 2 PUFF: at 20:44

## 2021-01-01 RX ADMIN — MEROPENEM 1000 MG: 1 INJECTION, POWDER, FOR SOLUTION INTRAVENOUS at 08:19

## 2021-01-01 RX ADMIN — Medication 1 TABLET: at 08:28

## 2021-01-01 RX ADMIN — Medication 1 CAPSULE: at 08:22

## 2021-01-01 RX ADMIN — CEFEPIME HYDROCHLORIDE 2000 MG: 2 INJECTION, POWDER, FOR SOLUTION INTRAVENOUS at 09:09

## 2021-01-01 RX ADMIN — HYDROCODONE BITARTRATE AND ACETAMINOPHEN 1 TABLET: 10; 325 TABLET ORAL at 03:50

## 2021-01-01 RX ADMIN — IPRATROPIUM BROMIDE AND ALBUTEROL SULFATE 3 ML: .5; 3 SOLUTION RESPIRATORY (INHALATION) at 15:45

## 2021-01-01 RX ADMIN — Medication 1 CAPSULE: at 08:59

## 2021-01-01 RX ADMIN — HYDROCODONE BITARTRATE AND ACETAMINOPHEN 1 TABLET: 10; 325 TABLET ORAL at 12:15

## 2021-01-01 RX ADMIN — Medication 1 TABLET: at 08:48

## 2021-01-01 RX ADMIN — POLYETHYLENE GLYCOL (3350) 17 G: 17 POWDER, FOR SOLUTION ORAL at 20:56

## 2021-01-01 RX ADMIN — ASPIRIN 81 MG: 81 TABLET, COATED ORAL at 09:04

## 2021-01-01 RX ADMIN — PANTOPRAZOLE SODIUM 40 MG: 40 TABLET, DELAYED RELEASE ORAL at 09:55

## 2021-01-01 RX ADMIN — MORPHINE SULFATE 4 MG: 4 INJECTION, SOLUTION INTRAMUSCULAR; INTRAVENOUS at 04:36

## 2021-01-01 RX ADMIN — MEROPENEM 1000 MG: 1 INJECTION, POWDER, FOR SOLUTION INTRAVENOUS at 05:00

## 2021-01-01 RX ADMIN — NYSTATIN 500000 UNITS: 100000 SUSPENSION ORAL at 14:08

## 2021-01-01 RX ADMIN — IPRATROPIUM BROMIDE AND ALBUTEROL SULFATE 1 AMPULE: .5; 3 SOLUTION RESPIRATORY (INHALATION) at 11:52

## 2021-01-01 RX ADMIN — PANTOPRAZOLE SODIUM 40 MG: 40 TABLET, DELAYED RELEASE ORAL at 05:28

## 2021-01-01 RX ADMIN — METHYLPREDNISOLONE SODIUM SUCCINATE 40 MG: 40 INJECTION, POWDER, LYOPHILIZED, FOR SOLUTION INTRAMUSCULAR; INTRAVENOUS at 17:59

## 2021-01-01 RX ADMIN — ACETYLCYSTEINE 600 MG: 200 SOLUTION ORAL; RESPIRATORY (INHALATION) at 07:49

## 2021-01-01 RX ADMIN — CEFEPIME HYDROCHLORIDE 2000 MG: 2 INJECTION, POWDER, FOR SOLUTION INTRAVENOUS at 20:48

## 2021-01-01 RX ADMIN — PALONOSETRON 0.25 MG: 0.25 INJECTION, SOLUTION INTRAVENOUS at 21:22

## 2021-01-01 RX ADMIN — PROPOFOL 70 MCG/KG/MIN: 10 INJECTION, EMULSION INTRAVENOUS at 17:09

## 2021-01-01 RX ADMIN — SODIUM CHLORIDE 20 ML/HR: 9 INJECTION, SOLUTION INTRAVENOUS at 22:55

## 2021-01-01 RX ADMIN — ENOXAPARIN SODIUM 30 MG: 30 INJECTION SUBCUTANEOUS at 09:33

## 2021-01-01 RX ADMIN — LORAZEPAM 0.5 MG: 2 INJECTION INTRAMUSCULAR; INTRAVENOUS at 21:22

## 2021-01-01 RX ADMIN — METHYLPREDNISOLONE SODIUM SUCCINATE 40 MG: 40 INJECTION, POWDER, FOR SOLUTION INTRAMUSCULAR; INTRAVENOUS at 07:50

## 2021-01-01 RX ADMIN — METHYLPREDNISOLONE SODIUM SUCCINATE 40 MG: 40 INJECTION, POWDER, LYOPHILIZED, FOR SOLUTION INTRAMUSCULAR; INTRAVENOUS at 08:17

## 2021-01-01 RX ADMIN — MORPHINE SULFATE 4 MG: 4 INJECTION, SOLUTION INTRAMUSCULAR; INTRAVENOUS at 01:38

## 2021-01-01 RX ADMIN — BUSPIRONE HYDROCHLORIDE 10 MG: 10 TABLET ORAL at 08:07

## 2021-01-01 RX ADMIN — CETIRIZINE HYDROCHLORIDE 10 MG: 10 TABLET, FILM COATED ORAL at 09:12

## 2021-01-01 RX ADMIN — THEOPHYLLINE ANHYDROUS 100 MG: 100 CAPSULE, EXTENDED RELEASE ORAL at 07:58

## 2021-01-01 RX ADMIN — DEXAMETHASONE SODIUM PHOSPHATE 8 MG: 4 INJECTION, SOLUTION INTRAMUSCULAR; INTRAVENOUS at 22:56

## 2021-01-01 RX ADMIN — HYDROCODONE BITARTRATE AND ACETAMINOPHEN 1 TABLET: 10; 325 TABLET ORAL at 17:34

## 2021-01-01 RX ADMIN — NYSTATIN 500000 UNITS: 100000 SUSPENSION ORAL at 12:28

## 2021-01-01 RX ADMIN — PROPOFOL 10 MCG/KG/MIN: 10 INJECTION, EMULSION INTRAVENOUS at 17:34

## 2021-01-01 RX ADMIN — DOXYCYCLINE HYCLATE 100 MG: 100 TABLET, COATED ORAL at 08:48

## 2021-01-01 RX ADMIN — SUCRALFATE 1 G: 1 TABLET ORAL at 09:55

## 2021-01-01 RX ADMIN — NYSTATIN 500000 UNITS: 100000 SUSPENSION ORAL at 10:12

## 2021-01-01 RX ADMIN — SUCRALFATE 1 G: 1 TABLET ORAL at 16:13

## 2021-01-01 RX ADMIN — POTASSIUM CHLORIDE 20 MEQ: 400 INJECTION, SOLUTION INTRAVENOUS at 06:49

## 2021-01-01 RX ADMIN — ETOMIDATE 30 MG: 2 INJECTION, SOLUTION INTRAVENOUS at 08:50

## 2021-01-01 RX ADMIN — ALBUTEROL SULFATE 2.5 MG: 2.5 SOLUTION RESPIRATORY (INHALATION) at 19:58

## 2021-01-01 RX ADMIN — IPRATROPIUM BROMIDE AND ALBUTEROL SULFATE 1 AMPULE: .5; 3 SOLUTION RESPIRATORY (INHALATION) at 11:40

## 2021-01-01 RX ADMIN — ASPIRIN 81 MG: 81 TABLET, COATED ORAL at 07:58

## 2021-01-01 RX ADMIN — SIMVASTATIN 20 MG: 20 TABLET, FILM COATED ORAL at 21:49

## 2021-01-01 RX ADMIN — THEOPHYLLINE ANHYDROUS 100 MG: 100 CAPSULE, EXTENDED RELEASE ORAL at 09:59

## 2021-01-01 RX ADMIN — MORPHINE SULFATE 2 MG: 2 INJECTION, SOLUTION INTRAMUSCULAR; INTRAVENOUS at 08:27

## 2021-01-01 RX ADMIN — PROPOFOL 70.02 MCG/KG/MIN: 10 INJECTION, EMULSION INTRAVENOUS at 03:04

## 2021-01-01 RX ADMIN — LEVETIRACETAM 500 MG: 100 INJECTION, SOLUTION INTRAVENOUS at 18:56

## 2021-01-01 RX ADMIN — DOCUSATE SODIUM 200 MG: 100 CAPSULE, LIQUID FILLED ORAL at 09:58

## 2021-01-01 RX ADMIN — SUCRALFATE 1 G: 1 TABLET ORAL at 13:01

## 2021-01-01 RX ADMIN — Medication 500 MG: at 08:48

## 2021-01-01 RX ADMIN — CEFEPIME HYDROCHLORIDE 2000 MG: 2 INJECTION, POWDER, FOR SOLUTION INTRAVENOUS at 20:12

## 2021-01-01 RX ADMIN — SUCRALFATE 1 G: 1 TABLET ORAL at 23:25

## 2021-01-01 RX ADMIN — MEROPENEM 1000 MG: 1 INJECTION, POWDER, FOR SOLUTION INTRAVENOUS at 16:53

## 2021-01-01 RX ADMIN — IPRATROPIUM BROMIDE AND ALBUTEROL SULFATE 3 ML: .5; 3 SOLUTION RESPIRATORY (INHALATION) at 11:30

## 2021-01-01 RX ADMIN — IPRATROPIUM BROMIDE AND ALBUTEROL SULFATE 1 AMPULE: .5; 3 SOLUTION RESPIRATORY (INHALATION) at 10:58

## 2021-01-01 RX ADMIN — ENOXAPARIN SODIUM 60 MG: 60 INJECTION SUBCUTANEOUS at 08:51

## 2021-01-01 RX ADMIN — DEXAMETHASONE SODIUM PHOSPHATE 8 MG: 4 INJECTION, SOLUTION INTRAMUSCULAR; INTRAVENOUS at 21:07

## 2021-01-01 RX ADMIN — IPRATROPIUM BROMIDE AND ALBUTEROL SULFATE 1 AMPULE: .5; 3 SOLUTION RESPIRATORY (INHALATION) at 07:42

## 2021-01-01 RX ADMIN — POTASSIUM CHLORIDE 10 MEQ: 750 TABLET, FILM COATED, EXTENDED RELEASE ORAL at 08:25

## 2021-01-01 RX ADMIN — HYDROCODONE BITARTRATE AND ACETAMINOPHEN 1 TABLET: 10; 325 TABLET ORAL at 23:27

## 2021-01-01 RX ADMIN — ALBUTEROL SULFATE 2 PUFF: 90 AEROSOL, METERED RESPIRATORY (INHALATION) at 04:51

## 2021-01-01 RX ADMIN — DEXTROSE MONOHYDRATE 750 MG: 50 INJECTION, SOLUTION INTRAVENOUS at 21:16

## 2021-01-01 RX ADMIN — PALONOSETRON 0.25 MG: 0.25 INJECTION, SOLUTION INTRAVENOUS at 10:03

## 2021-01-01 RX ADMIN — METHYLPREDNISOLONE SODIUM SUCCINATE 40 MG: 40 INJECTION, POWDER, FOR SOLUTION INTRAMUSCULAR; INTRAVENOUS at 05:56

## 2021-01-01 RX ADMIN — FAMOTIDINE 20 MG: 10 INJECTION, SOLUTION INTRAVENOUS at 09:03

## 2021-01-01 RX ADMIN — SIMVASTATIN 20 MG: 20 TABLET, FILM COATED ORAL at 20:35

## 2021-01-01 RX ADMIN — IPRATROPIUM BROMIDE AND ALBUTEROL SULFATE 3 ML: .5; 3 SOLUTION RESPIRATORY (INHALATION) at 11:42

## 2021-01-01 RX ADMIN — SODIUM CHLORIDE, PRESERVATIVE FREE 10 ML: 5 INJECTION INTRAVENOUS at 08:09

## 2021-01-01 RX ADMIN — MORPHINE SULFATE 4 MG: 4 INJECTION, SOLUTION INTRAMUSCULAR; INTRAVENOUS at 00:37

## 2021-01-01 RX ADMIN — MORPHINE SULFATE 2 MG: 2 INJECTION, SOLUTION INTRAMUSCULAR; INTRAVENOUS at 19:50

## 2021-01-01 RX ADMIN — LORAZEPAM 0.5 MG: 2 INJECTION INTRAMUSCULAR; INTRAVENOUS at 02:44

## 2021-01-01 RX ADMIN — SODIUM CHLORIDE, PRESERVATIVE FREE 10 ML: 5 INJECTION INTRAVENOUS at 08:49

## 2021-01-01 RX ADMIN — IPRATROPIUM BROMIDE AND ALBUTEROL SULFATE 3 ML: .5; 3 SOLUTION RESPIRATORY (INHALATION) at 11:01

## 2021-01-01 RX ADMIN — Medication 2 PUFF: at 19:41

## 2021-01-01 RX ADMIN — FAMOTIDINE 20 MG: 10 INJECTION, SOLUTION INTRAVENOUS at 07:32

## 2021-01-01 RX ADMIN — IPRATROPIUM BROMIDE AND ALBUTEROL SULFATE 1 AMPULE: .5; 3 SOLUTION RESPIRATORY (INHALATION) at 20:31

## 2021-01-01 RX ADMIN — ATORVASTATIN CALCIUM 10 MG: 10 TABLET, FILM COATED ORAL at 08:24

## 2021-01-01 RX ADMIN — DOCUSATE SODIUM 200 MG: 100 CAPSULE, LIQUID FILLED ORAL at 09:11

## 2021-01-01 RX ADMIN — SODIUM CHLORIDE, PRESERVATIVE FREE 10 ML: 5 INJECTION INTRAVENOUS at 13:38

## 2021-01-01 RX ADMIN — MEROPENEM 1000 MG: 1 INJECTION, POWDER, FOR SOLUTION INTRAVENOUS at 17:19

## 2021-01-01 RX ADMIN — PANTOPRAZOLE SODIUM 40 MG: 40 INJECTION, POWDER, FOR SOLUTION INTRAVENOUS at 06:41

## 2021-01-01 RX ADMIN — HYDROCODONE BITARTRATE AND ACETAMINOPHEN 1 TABLET: 10; 325 TABLET ORAL at 00:34

## 2021-01-01 RX ADMIN — NYSTATIN 500000 UNITS: 100000 SUSPENSION ORAL at 20:28

## 2021-01-01 RX ADMIN — HYDROCODONE BITARTRATE AND ACETAMINOPHEN 1 TABLET: 10; 325 TABLET ORAL at 05:05

## 2021-01-01 RX ADMIN — HYDROCODONE BITARTRATE AND ACETAMINOPHEN 1 TABLET: 10; 325 TABLET ORAL at 16:03

## 2021-01-01 RX ADMIN — IPRATROPIUM BROMIDE AND ALBUTEROL SULFATE 3 ML: .5; 3 SOLUTION RESPIRATORY (INHALATION) at 21:16

## 2021-01-01 RX ADMIN — THEOPHYLLINE ANHYDROUS 200 MG: 200 CAPSULE, EXTENDED RELEASE ORAL at 11:14

## 2021-01-01 RX ADMIN — IPRATROPIUM BROMIDE AND ALBUTEROL SULFATE 1 AMPULE: .5; 3 SOLUTION RESPIRATORY (INHALATION) at 07:24

## 2021-01-01 RX ADMIN — ASPIRIN 81 MG: 81 TABLET, FILM COATED ORAL at 11:11

## 2021-01-01 RX ADMIN — PROPOFOL 30 MCG/KG/MIN: 10 INJECTION, EMULSION INTRAVENOUS at 05:07

## 2021-01-01 RX ADMIN — SERTRALINE HYDROCHLORIDE 50 MG: 50 TABLET ORAL at 10:42

## 2021-01-01 RX ADMIN — SODIUM CHLORIDE, PRESERVATIVE FREE 10 ML: 5 INJECTION INTRAVENOUS at 08:41

## 2021-01-01 RX ADMIN — MORPHINE SULFATE 4 MG: 4 INJECTION, SOLUTION INTRAMUSCULAR; INTRAVENOUS at 18:22

## 2021-01-01 RX ADMIN — IPRATROPIUM BROMIDE AND ALBUTEROL SULFATE 1 AMPULE: .5; 3 SOLUTION RESPIRATORY (INHALATION) at 08:23

## 2021-01-01 RX ADMIN — LORAZEPAM 1 MG: 1 TABLET ORAL at 17:17

## 2021-01-01 RX ADMIN — CEFEPIME HYDROCHLORIDE 2000 MG: 2 INJECTION, POWDER, FOR SOLUTION INTRAVENOUS at 08:23

## 2021-01-01 RX ADMIN — IPRATROPIUM BROMIDE AND ALBUTEROL SULFATE 1 AMPULE: .5; 3 SOLUTION RESPIRATORY (INHALATION) at 15:53

## 2021-01-01 RX ADMIN — SODIUM CHLORIDE, PRESERVATIVE FREE 10 ML: 5 INJECTION INTRAVENOUS at 21:37

## 2021-01-01 RX ADMIN — CISPLATIN: 1 INJECTION INTRAVENOUS at 12:22

## 2021-01-01 RX ADMIN — IPRATROPIUM BROMIDE AND ALBUTEROL SULFATE 1 AMPULE: .5; 3 SOLUTION RESPIRATORY (INHALATION) at 16:12

## 2021-01-01 RX ADMIN — HYDROCODONE BITARTRATE AND ACETAMINOPHEN 1 TABLET: 10; 325 TABLET ORAL at 22:34

## 2021-01-01 RX ADMIN — AZITHROMYCIN MONOHYDRATE 500 MG: 500 INJECTION, POWDER, LYOPHILIZED, FOR SOLUTION INTRAVENOUS at 16:39

## 2021-01-01 RX ADMIN — HYDROCODONE BITARTRATE AND ACETAMINOPHEN 1 TABLET: 10; 325 TABLET ORAL at 19:42

## 2021-01-01 RX ADMIN — ONDANSETRON 4 MG: 2 INJECTION INTRAMUSCULAR; INTRAVENOUS at 00:12

## 2021-01-01 RX ADMIN — IPRATROPIUM BROMIDE AND ALBUTEROL SULFATE 1 AMPULE: .5; 3 SOLUTION RESPIRATORY (INHALATION) at 21:12

## 2021-01-01 RX ADMIN — DILTIAZEM HYDROCHLORIDE 240 MG: 240 CAPSULE, COATED, EXTENDED RELEASE ORAL at 09:28

## 2021-01-01 RX ADMIN — APIXABAN 2.5 MG: 2.5 TABLET, FILM COATED ORAL at 10:00

## 2021-01-01 RX ADMIN — CETIRIZINE HYDROCHLORIDE 10 MG: 10 TABLET, FILM COATED ORAL at 08:39

## 2021-01-01 RX ADMIN — IPRATROPIUM BROMIDE AND ALBUTEROL SULFATE 1 AMPULE: .5; 3 SOLUTION RESPIRATORY (INHALATION) at 11:31

## 2021-01-01 RX ADMIN — MEROPENEM 1000 MG: 1 INJECTION, POWDER, FOR SOLUTION INTRAVENOUS at 21:30

## 2021-01-01 RX ADMIN — SODIUM CHLORIDE, POTASSIUM CHLORIDE, SODIUM LACTATE AND CALCIUM CHLORIDE: 600; 310; 30; 20 INJECTION, SOLUTION INTRAVENOUS at 06:45

## 2021-01-01 RX ADMIN — PROPOFOL 55 MCG/KG/MIN: 10 INJECTION, EMULSION INTRAVENOUS at 17:09

## 2021-01-01 RX ADMIN — BUSPIRONE HYDROCHLORIDE 10 MG: 10 TABLET ORAL at 17:20

## 2021-01-01 RX ADMIN — MORPHINE SULFATE 4 MG: 4 INJECTION, SOLUTION INTRAMUSCULAR; INTRAVENOUS at 05:18

## 2021-01-01 RX ADMIN — LEVETIRACETAM 500 MG: 100 INJECTION, SOLUTION INTRAVENOUS at 16:37

## 2021-01-01 RX ADMIN — Medication 2 PUFF: at 22:13

## 2021-01-01 RX ADMIN — LORAZEPAM 1 MG: 1 TABLET ORAL at 22:00

## 2021-01-01 RX ADMIN — METHYLPREDNISOLONE SODIUM SUCCINATE 40 MG: 40 INJECTION, POWDER, LYOPHILIZED, FOR SOLUTION INTRAMUSCULAR; INTRAVENOUS at 05:53

## 2021-01-01 RX ADMIN — METHYLPREDNISOLONE SODIUM SUCCINATE 40 MG: 40 INJECTION, POWDER, FOR SOLUTION INTRAMUSCULAR; INTRAVENOUS at 22:39

## 2021-01-01 RX ADMIN — NYSTATIN 500000 UNITS: 100000 SUSPENSION ORAL at 10:40

## 2021-01-01 RX ADMIN — LORAZEPAM 0.5 MG: 2 INJECTION INTRAMUSCULAR; INTRAVENOUS at 15:56

## 2021-01-01 RX ADMIN — FAMOTIDINE 20 MG: 10 INJECTION, SOLUTION INTRAVENOUS at 21:47

## 2021-01-01 RX ADMIN — Medication 0.2 MG: at 12:26

## 2021-01-01 RX ADMIN — METHYLPREDNISOLONE SODIUM SUCCINATE 40 MG: 40 INJECTION, POWDER, FOR SOLUTION INTRAMUSCULAR; INTRAVENOUS at 03:24

## 2021-01-01 RX ADMIN — SODIUM CHLORIDE: 9 INJECTION, SOLUTION INTRAVENOUS at 20:37

## 2021-01-01 RX ADMIN — SODIUM CHLORIDE, PRESERVATIVE FREE 10 ML: 5 INJECTION INTRAVENOUS at 09:53

## 2021-01-01 RX ADMIN — PANTOPRAZOLE SODIUM 40 MG: 40 TABLET, DELAYED RELEASE ORAL at 06:45

## 2021-01-01 RX ADMIN — THEOPHYLLINE ANHYDROUS 100 MG: 100 CAPSULE, EXTENDED RELEASE ORAL at 21:07

## 2021-01-01 RX ADMIN — MAGNESIUM SULFATE HEPTAHYDRATE 4000 MG: 80 INJECTION, SOLUTION INTRAVENOUS at 22:15

## 2021-01-01 RX ADMIN — Medication 100 MCG/HR: at 15:38

## 2021-01-01 RX ADMIN — BUSPIRONE HYDROCHLORIDE 10 MG: 10 TABLET ORAL at 16:29

## 2021-01-01 RX ADMIN — PREDNISONE 40 MG: 20 TABLET ORAL at 09:11

## 2021-01-01 RX ADMIN — GUAIFENESIN 600 MG: 600 TABLET, EXTENDED RELEASE ORAL at 09:14

## 2021-01-01 RX ADMIN — SUCRALFATE 1 G: 1 TABLET ORAL at 13:29

## 2021-01-01 RX ADMIN — APIXABAN 5 MG: 5 TABLET, FILM COATED ORAL at 08:14

## 2021-01-01 RX ADMIN — SUCRALFATE 1 G: 1 TABLET ORAL at 21:14

## 2021-01-01 RX ADMIN — NYSTATIN 500000 UNITS: 100000 SUSPENSION ORAL at 09:09

## 2021-01-01 RX ADMIN — NYSTATIN 500000 UNITS: 100000 SUSPENSION ORAL at 11:55

## 2021-01-01 RX ADMIN — ENOXAPARIN SODIUM 40 MG: 40 INJECTION SUBCUTANEOUS at 11:13

## 2021-01-01 RX ADMIN — IPRATROPIUM BROMIDE AND ALBUTEROL SULFATE 1 AMPULE: .5; 3 SOLUTION RESPIRATORY (INHALATION) at 15:25

## 2021-01-01 RX ADMIN — PROPOFOL 70 MCG/KG/MIN: 10 INJECTION, EMULSION INTRAVENOUS at 03:09

## 2021-01-01 RX ADMIN — THEOPHYLLINE 200 MG: 400 TABLET, EXTENDED RELEASE ORAL at 08:18

## 2021-01-01 RX ADMIN — LORAZEPAM 0.5 MG: 2 INJECTION INTRAMUSCULAR; INTRAVENOUS at 08:28

## 2021-01-01 RX ADMIN — APIXABAN 2.5 MG: 2.5 TABLET, FILM COATED ORAL at 09:11

## 2021-01-01 RX ADMIN — ENOXAPARIN SODIUM 40 MG: 40 INJECTION SUBCUTANEOUS at 09:11

## 2021-01-01 RX ADMIN — SUCRALFATE 1 G: 1 TABLET ORAL at 05:04

## 2021-01-01 RX ADMIN — NYSTATIN 500000 UNITS: 100000 SUSPENSION ORAL at 12:05

## 2021-01-01 RX ADMIN — DILTIAZEM HYDROCHLORIDE 240 MG: 240 CAPSULE, COATED, EXTENDED RELEASE ORAL at 09:55

## 2021-01-01 RX ADMIN — ASPIRIN 81 MG: 81 TABLET, COATED ORAL at 09:03

## 2021-01-01 RX ADMIN — IPRATROPIUM BROMIDE AND ALBUTEROL SULFATE 3 ML: .5; 3 SOLUTION RESPIRATORY (INHALATION) at 07:49

## 2021-01-01 RX ADMIN — METHYLPREDNISOLONE SODIUM SUCCINATE 40 MG: 40 INJECTION, POWDER, FOR SOLUTION INTRAMUSCULAR; INTRAVENOUS at 16:17

## 2021-01-01 RX ADMIN — IPRATROPIUM BROMIDE AND ALBUTEROL SULFATE 3 ML: .5; 3 SOLUTION RESPIRATORY (INHALATION) at 15:50

## 2021-01-01 RX ADMIN — HYDROCODONE BITARTRATE AND ACETAMINOPHEN 1 TABLET: 10; 325 TABLET ORAL at 08:40

## 2021-01-01 RX ADMIN — LISINOPRIL 5 MG: 5 TABLET ORAL at 08:53

## 2021-01-01 RX ADMIN — ENOXAPARIN SODIUM 40 MG: 40 INJECTION SUBCUTANEOUS at 10:22

## 2021-01-01 RX ADMIN — SODIUM CHLORIDE, PRESERVATIVE FREE 10 ML: 5 INJECTION INTRAVENOUS at 21:18

## 2021-01-01 RX ADMIN — BUSPIRONE HYDROCHLORIDE 10 MG: 10 TABLET ORAL at 10:31

## 2021-01-01 RX ADMIN — SODIUM CHLORIDE, PRESERVATIVE FREE 10 ML: 5 INJECTION INTRAVENOUS at 07:50

## 2021-01-01 RX ADMIN — IPRATROPIUM BROMIDE AND ALBUTEROL SULFATE 1 AMPULE: .5; 3 SOLUTION RESPIRATORY (INHALATION) at 16:15

## 2021-01-01 RX ADMIN — METHYLPREDNISOLONE SODIUM SUCCINATE 40 MG: 40 INJECTION, POWDER, FOR SOLUTION INTRAMUSCULAR; INTRAVENOUS at 11:45

## 2021-01-01 RX ADMIN — LORAZEPAM 0.5 MG: 2 INJECTION INTRAMUSCULAR; INTRAVENOUS at 08:03

## 2021-01-01 RX ADMIN — IPRATROPIUM BROMIDE AND ALBUTEROL SULFATE 1 AMPULE: .5; 3 SOLUTION RESPIRATORY (INHALATION) at 21:15

## 2021-01-01 RX ADMIN — IPRATROPIUM BROMIDE AND ALBUTEROL SULFATE 1 AMPULE: .5; 3 SOLUTION RESPIRATORY (INHALATION) at 21:53

## 2021-01-01 RX ADMIN — THEOPHYLLINE 200 MG: 400 TABLET, EXTENDED RELEASE ORAL at 08:47

## 2021-01-01 RX ADMIN — HYDROCODONE BITARTRATE AND ACETAMINOPHEN 1 TABLET: 10; 325 TABLET ORAL at 04:59

## 2021-01-01 RX ADMIN — IPRATROPIUM BROMIDE AND ALBUTEROL SULFATE 3 ML: .5; 3 SOLUTION RESPIRATORY (INHALATION) at 19:48

## 2021-01-01 RX ADMIN — MAGNESIUM SULFATE HEPTAHYDRATE 1000 MG: 1 INJECTION, SOLUTION INTRAVENOUS at 00:37

## 2021-01-01 RX ADMIN — THEOPHYLLINE 200 MG: 400 TABLET, EXTENDED RELEASE ORAL at 16:08

## 2021-01-01 RX ADMIN — THEOPHYLLINE 200 MG: 400 TABLET, EXTENDED RELEASE ORAL at 20:52

## 2021-01-01 RX ADMIN — ETOMIDATE 20 MG: 2 INJECTION, SOLUTION INTRAVENOUS at 10:26

## 2021-01-01 RX ADMIN — METOPROLOL TARTRATE 50 MG: 50 TABLET, FILM COATED ORAL at 09:57

## 2021-01-01 RX ADMIN — THEOPHYLLINE ANHYDROUS 200 MG: 200 CAPSULE, EXTENDED RELEASE ORAL at 20:49

## 2021-01-01 RX ADMIN — SUCRALFATE 1 G: 1 TABLET ORAL at 16:05

## 2021-01-01 RX ADMIN — BUSPIRONE HYDROCHLORIDE 10 MG: 10 TABLET ORAL at 17:16

## 2021-01-01 RX ADMIN — SODIUM CHLORIDE, PRESERVATIVE FREE 10 ML: 5 INJECTION INTRAVENOUS at 22:21

## 2021-01-01 RX ADMIN — SODIUM CHLORIDE, PRESERVATIVE FREE 10 ML: 5 INJECTION INTRAVENOUS at 19:43

## 2021-01-01 RX ADMIN — MEROPENEM 1000 MG: 1 INJECTION, POWDER, FOR SOLUTION INTRAVENOUS at 00:45

## 2021-01-01 RX ADMIN — DOCUSATE SODIUM 100 MG: 100 CAPSULE, LIQUID FILLED ORAL at 08:07

## 2021-01-01 RX ADMIN — BUSPIRONE HYDROCHLORIDE 10 MG: 10 TABLET ORAL at 09:28

## 2021-01-01 RX ADMIN — Medication 500 MG: at 10:40

## 2021-01-01 RX ADMIN — LORAZEPAM 1 MG: 2 INJECTION INTRAMUSCULAR; INTRAVENOUS at 18:17

## 2021-01-01 RX ADMIN — PREDNISONE 40 MG: 20 TABLET ORAL at 10:40

## 2021-01-01 RX ADMIN — PROPOFOL 70 MCG/KG/MIN: 10 INJECTION, EMULSION INTRAVENOUS at 23:27

## 2021-01-01 RX ADMIN — SUCRALFATE 1 G: 1 TABLET ORAL at 06:39

## 2021-01-01 RX ADMIN — ALBUTEROL SULFATE 2 PUFF: 90 AEROSOL, METERED RESPIRATORY (INHALATION) at 22:10

## 2021-01-01 RX ADMIN — Medication 2 PUFF: at 07:35

## 2021-01-01 RX ADMIN — SODIUM CHLORIDE, PRESERVATIVE FREE 10 ML: 5 INJECTION INTRAVENOUS at 09:03

## 2021-01-01 RX ADMIN — MINOCYCLINE HYDROCHLORIDE 100 MG: 100 CAPSULE ORAL at 09:08

## 2021-01-01 RX ADMIN — ENOXAPARIN SODIUM 40 MG: 40 INJECTION SUBCUTANEOUS at 09:19

## 2021-01-01 RX ADMIN — MORPHINE SULFATE 2 MG: 2 INJECTION, SOLUTION INTRAMUSCULAR; INTRAVENOUS at 21:22

## 2021-01-01 RX ADMIN — SUCRALFATE 1 G: 1 TABLET ORAL at 07:33

## 2021-01-01 RX ADMIN — ENOXAPARIN SODIUM 40 MG: 40 INJECTION SUBCUTANEOUS at 10:41

## 2021-01-01 RX ADMIN — METOPROLOL TARTRATE 50 MG: 50 TABLET, FILM COATED ORAL at 20:52

## 2021-01-01 RX ADMIN — NYSTATIN 500000 UNITS: 100000 SUSPENSION ORAL at 20:49

## 2021-01-01 RX ADMIN — ASPIRIN 81 MG: 81 TABLET, COATED ORAL at 07:59

## 2021-01-01 RX ADMIN — LORAZEPAM 0.5 MG: 2 INJECTION INTRAMUSCULAR; INTRAVENOUS at 20:01

## 2021-01-01 RX ADMIN — POTASSIUM CHLORIDE: 2 INJECTION, SOLUTION, CONCENTRATE INTRAVENOUS at 13:55

## 2021-01-01 RX ADMIN — METHYLPREDNISOLONE SODIUM SUCCINATE 40 MG: 40 INJECTION, POWDER, LYOPHILIZED, FOR SOLUTION INTRAMUSCULAR; INTRAVENOUS at 14:11

## 2021-01-01 RX ADMIN — Medication 2 PUFF: at 11:20

## 2021-01-01 RX ADMIN — ALBUTEROL SULFATE 2 PUFF: 90 AEROSOL, METERED RESPIRATORY (INHALATION) at 05:20

## 2021-01-01 RX ADMIN — APIXABAN 5 MG: 5 TABLET, FILM COATED ORAL at 11:31

## 2021-01-01 RX ADMIN — Medication 12.5 MCG/HR: at 09:45

## 2021-01-01 RX ADMIN — SUCRALFATE 1 G: 1 TABLET ORAL at 21:29

## 2021-01-01 RX ADMIN — FAMOTIDINE 20 MG: 10 INJECTION, SOLUTION INTRAVENOUS at 08:07

## 2021-01-01 RX ADMIN — MEROPENEM 1000 MG: 1 INJECTION, POWDER, FOR SOLUTION INTRAVENOUS at 23:57

## 2021-01-01 RX ADMIN — LORAZEPAM 0.5 MG: 2 INJECTION INTRAMUSCULAR; INTRAVENOUS at 20:26

## 2021-01-01 RX ADMIN — MORPHINE SULFATE 4 MG: 4 INJECTION, SOLUTION INTRAMUSCULAR; INTRAVENOUS at 11:13

## 2021-01-01 RX ADMIN — ACETYLCYSTEINE 600 MG: 200 SOLUTION ORAL; RESPIRATORY (INHALATION) at 20:57

## 2021-01-01 RX ADMIN — Medication 1000 UNITS: at 09:17

## 2021-01-01 RX ADMIN — THEOPHYLLINE 200 MG: 400 TABLET, EXTENDED RELEASE ORAL at 22:22

## 2021-01-01 RX ADMIN — LORAZEPAM 1 MG: 1 TABLET ORAL at 07:18

## 2021-01-01 RX ADMIN — BUSPIRONE HYDROCHLORIDE 10 MG: 10 TABLET ORAL at 10:00

## 2021-01-01 RX ADMIN — GADOTERIDOL 13 ML: 279.3 INJECTION, SOLUTION INTRAVENOUS at 11:46

## 2021-01-01 RX ADMIN — BUSPIRONE HYDROCHLORIDE 10 MG: 10 TABLET ORAL at 11:14

## 2021-01-01 RX ADMIN — SUCRALFATE 1 G: 1 TABLET ORAL at 11:13

## 2021-01-01 RX ADMIN — HYDROCODONE BITARTRATE AND ACETAMINOPHEN 1 TABLET: 10; 325 TABLET ORAL at 04:38

## 2021-01-01 RX ADMIN — CHLORHEXIDINE GLUCONATE 0.12% ORAL RINSE 15 ML: 1.2 LIQUID ORAL at 08:23

## 2021-01-01 RX ADMIN — LEVOFLOXACIN 750 MG: 500 TABLET, FILM COATED ORAL at 08:59

## 2021-01-01 RX ADMIN — DOXYCYCLINE 100 MG: 100 INJECTION, POWDER, LYOPHILIZED, FOR SOLUTION INTRAVENOUS at 12:45

## 2021-01-01 RX ADMIN — THEOPHYLLINE 200 MG: 400 TABLET, EXTENDED RELEASE ORAL at 08:21

## 2021-01-01 RX ADMIN — SODIUM CHLORIDE, PRESERVATIVE FREE 10 ML: 5 INJECTION INTRAVENOUS at 15:57

## 2021-01-01 RX ADMIN — DEXTROSE MONOHYDRATE 750 MG: 50 INJECTION, SOLUTION INTRAVENOUS at 16:37

## 2021-01-01 RX ADMIN — SUCRALFATE 1 G: 1 TABLET ORAL at 12:05

## 2021-01-01 RX ADMIN — SUCRALFATE 1 G: 1 TABLET ORAL at 20:36

## 2021-01-01 RX ADMIN — DILTIAZEM HYDROCHLORIDE 240 MG: 240 CAPSULE, COATED, EXTENDED RELEASE ORAL at 08:48

## 2021-01-01 RX ADMIN — CEFEPIME HYDROCHLORIDE 2000 MG: 2 INJECTION, POWDER, FOR SOLUTION INTRAVENOUS at 23:25

## 2021-01-01 RX ADMIN — HYDROCODONE BITARTRATE AND ACETAMINOPHEN 1 TABLET: 10; 325 TABLET ORAL at 05:30

## 2021-01-01 RX ADMIN — BUSPIRONE HYDROCHLORIDE 10 MG: 10 TABLET ORAL at 16:54

## 2021-01-01 RX ADMIN — METHYLPREDNISOLONE SODIUM SUCCINATE 40 MG: 40 INJECTION, POWDER, LYOPHILIZED, FOR SOLUTION INTRAMUSCULAR; INTRAVENOUS at 01:07

## 2021-01-01 RX ADMIN — MORPHINE SULFATE 4 MG: 4 INJECTION, SOLUTION INTRAMUSCULAR; INTRAVENOUS at 13:05

## 2021-01-01 RX ADMIN — AZITHROMYCIN MONOHYDRATE 500 MG: 500 INJECTION, POWDER, LYOPHILIZED, FOR SOLUTION INTRAVENOUS at 16:17

## 2021-01-01 RX ADMIN — MORPHINE SULFATE 2 MG: 2 INJECTION, SOLUTION INTRAMUSCULAR; INTRAVENOUS at 06:59

## 2021-01-01 RX ADMIN — MORPHINE SULFATE 4 MG: 4 INJECTION, SOLUTION INTRAMUSCULAR; INTRAVENOUS at 17:36

## 2021-01-01 RX ADMIN — THEOPHYLLINE 200 MG: 400 TABLET, EXTENDED RELEASE ORAL at 09:05

## 2021-01-01 RX ADMIN — NYSTATIN 500000 UNITS: 100000 SUSPENSION ORAL at 21:05

## 2021-01-01 RX ADMIN — ATORVASTATIN CALCIUM 10 MG: 10 TABLET, FILM COATED ORAL at 09:55

## 2021-01-01 RX ADMIN — BUSPIRONE HYDROCHLORIDE 10 MG: 10 TABLET ORAL at 08:41

## 2021-01-01 RX ADMIN — IPRATROPIUM BROMIDE AND ALBUTEROL SULFATE 3 ML: .5; 3 SOLUTION RESPIRATORY (INHALATION) at 07:56

## 2021-01-01 RX ADMIN — FAMOTIDINE 20 MG: 10 INJECTION, SOLUTION INTRAVENOUS at 08:41

## 2021-01-01 RX ADMIN — DEXAMETHASONE SODIUM PHOSPHATE 8 MG: 4 INJECTION, SOLUTION INTRAMUSCULAR; INTRAVENOUS at 10:59

## 2021-01-01 RX ADMIN — Medication 1 SPRAY: at 00:21

## 2021-01-01 RX ADMIN — HYDROCODONE BITARTRATE AND ACETAMINOPHEN 1 TABLET: 10; 325 TABLET ORAL at 16:39

## 2021-01-01 RX ADMIN — SUCRALFATE 1 G: 1 TABLET ORAL at 15:34

## 2021-01-01 RX ADMIN — SUCRALFATE 1 G: 1 TABLET ORAL at 16:03

## 2021-01-01 RX ADMIN — LIDOCAINE HYDROCHLORIDE 100 MG: 20 INJECTION, SOLUTION EPIDURAL; INFILTRATION; INTRACAUDAL; PERINEURAL at 07:10

## 2021-01-01 RX ADMIN — SUCRALFATE 1 G: 1 TABLET ORAL at 05:28

## 2021-01-01 RX ADMIN — NYSTATIN 500000 UNITS: 100000 SUSPENSION ORAL at 21:12

## 2021-01-01 RX ADMIN — PANTOPRAZOLE SODIUM 40 MG: 40 TABLET, DELAYED RELEASE ORAL at 09:28

## 2021-01-01 RX ADMIN — METOPROLOL TARTRATE 50 MG: 50 TABLET, FILM COATED ORAL at 09:05

## 2021-01-01 RX ADMIN — APIXABAN 2.5 MG: 2.5 TABLET, FILM COATED ORAL at 08:09

## 2021-01-01 RX ADMIN — SUCRALFATE 1 G: 1 TABLET ORAL at 20:27

## 2021-01-01 RX ADMIN — SODIUM CHLORIDE, PRESERVATIVE FREE 10 ML: 5 INJECTION INTRAVENOUS at 12:26

## 2021-01-01 RX ADMIN — SODIUM CHLORIDE, PRESERVATIVE FREE 10 ML: 5 INJECTION INTRAVENOUS at 08:15

## 2021-01-01 RX ADMIN — DOXYCYCLINE 100 MG: 100 INJECTION, POWDER, LYOPHILIZED, FOR SOLUTION INTRAVENOUS at 00:07

## 2021-01-01 RX ADMIN — Medication 1 CAPSULE: at 08:49

## 2021-01-01 RX ADMIN — SUCRALFATE 1 G: 1 TABLET ORAL at 06:52

## 2021-01-01 RX ADMIN — THEOPHYLLINE 200 MG: 400 TABLET, EXTENDED RELEASE ORAL at 11:33

## 2021-01-01 RX ADMIN — Medication 1 CAPSULE: at 08:48

## 2021-01-01 RX ADMIN — ACETYLCYSTEINE 600 MG: 200 SOLUTION ORAL; RESPIRATORY (INHALATION) at 07:48

## 2021-01-01 RX ADMIN — NYSTATIN 500000 UNITS: 100000 SUSPENSION ORAL at 20:56

## 2021-01-01 RX ADMIN — PROPOFOL 70 MCG/KG/MIN: 10 INJECTION, EMULSION INTRAVENOUS at 19:17

## 2021-01-01 RX ADMIN — HYDROCODONE BITARTRATE AND ACETAMINOPHEN 1 TABLET: 10; 325 TABLET ORAL at 08:17

## 2021-01-01 RX ADMIN — ONDANSETRON 4 MG: 2 INJECTION INTRAMUSCULAR; INTRAVENOUS at 07:58

## 2021-01-01 RX ADMIN — ALBUTEROL SULFATE 2 PUFF: 90 AEROSOL, METERED RESPIRATORY (INHALATION) at 16:08

## 2021-01-01 RX ADMIN — ASPIRIN 81 MG: 81 TABLET, FILM COATED ORAL at 08:58

## 2021-01-01 RX ADMIN — IPRATROPIUM BROMIDE AND ALBUTEROL SULFATE 1 AMPULE: .5; 3 SOLUTION RESPIRATORY (INHALATION) at 00:28

## 2021-01-01 RX ADMIN — Medication 150 MCG/HR: at 06:47

## 2021-01-01 RX ADMIN — ENOXAPARIN SODIUM 40 MG: 40 INJECTION SUBCUTANEOUS at 08:25

## 2021-01-01 RX ADMIN — ETOPOSIDE 90 MG: 20 INJECTION INTRAVENOUS at 00:45

## 2021-01-01 RX ADMIN — CEFEPIME HYDROCHLORIDE 2000 MG: 2 INJECTION, POWDER, FOR SOLUTION INTRAVENOUS at 09:00

## 2021-01-01 RX ADMIN — MAGNESIUM SULFATE HEPTAHYDRATE 1000 MG: 1 INJECTION, SOLUTION INTRAVENOUS at 22:23

## 2021-01-01 RX ADMIN — METOPROLOL TARTRATE 50 MG: 50 TABLET, FILM COATED ORAL at 08:53

## 2021-01-01 RX ADMIN — METHYLPREDNISOLONE SODIUM SUCCINATE 40 MG: 40 INJECTION, POWDER, FOR SOLUTION INTRAMUSCULAR; INTRAVENOUS at 08:43

## 2021-01-01 RX ADMIN — THEOPHYLLINE ANHYDROUS 200 MG: 200 CAPSULE, EXTENDED RELEASE ORAL at 23:25

## 2021-01-01 RX ADMIN — SUCRALFATE 1 G: 1 TABLET ORAL at 20:52

## 2021-01-01 RX ADMIN — IPRATROPIUM BROMIDE AND ALBUTEROL SULFATE 1 AMPULE: .5; 3 SOLUTION RESPIRATORY (INHALATION) at 18:46

## 2021-01-01 RX ADMIN — METHYLPREDNISOLONE SODIUM SUCCINATE 40 MG: 40 INJECTION, POWDER, LYOPHILIZED, FOR SOLUTION INTRAMUSCULAR; INTRAVENOUS at 09:56

## 2021-01-01 RX ADMIN — SERTRALINE HYDROCHLORIDE 50 MG: 50 TABLET ORAL at 09:09

## 2021-01-01 RX ADMIN — NYSTATIN 500000 UNITS: 100000 SUSPENSION ORAL at 16:36

## 2021-01-01 RX ADMIN — IPRATROPIUM BROMIDE AND ALBUTEROL SULFATE 1 AMPULE: .5; 3 SOLUTION RESPIRATORY (INHALATION) at 12:36

## 2021-01-01 RX ADMIN — HYDROCODONE BITARTRATE AND ACETAMINOPHEN 1 TABLET: 10; 325 TABLET ORAL at 23:08

## 2021-01-01 RX ADMIN — CEFEPIME HYDROCHLORIDE 2000 MG: 2 INJECTION, POWDER, FOR SOLUTION INTRAVENOUS at 22:04

## 2021-01-01 RX ADMIN — ENOXAPARIN SODIUM 30 MG: 30 INJECTION SUBCUTANEOUS at 09:03

## 2021-01-01 RX ADMIN — LISINOPRIL 5 MG: 5 TABLET ORAL at 11:31

## 2021-01-01 RX ADMIN — METHYLPREDNISOLONE SODIUM SUCCINATE 40 MG: 40 INJECTION, POWDER, FOR SOLUTION INTRAMUSCULAR; INTRAVENOUS at 06:00

## 2021-01-01 RX ADMIN — APIXABAN 2.5 MG: 2.5 TABLET, FILM COATED ORAL at 20:36

## 2021-01-01 RX ADMIN — DOCUSATE SODIUM 100 MG: 100 CAPSULE, LIQUID FILLED ORAL at 09:19

## 2021-01-01 RX ADMIN — NYSTATIN 500000 UNITS: 100000 SUSPENSION ORAL at 12:31

## 2021-01-01 RX ADMIN — THEOPHYLLINE 200 MG: 400 TABLET, EXTENDED RELEASE ORAL at 09:58

## 2021-01-01 RX ADMIN — LORAZEPAM 0.5 MG: 2 INJECTION INTRAMUSCULAR; INTRAVENOUS at 04:53

## 2021-01-01 RX ADMIN — IPRATROPIUM BROMIDE AND ALBUTEROL SULFATE 1 AMPULE: .5; 3 SOLUTION RESPIRATORY (INHALATION) at 16:24

## 2021-01-01 RX ADMIN — THEOPHYLLINE ANHYDROUS 100 MG: 100 CAPSULE, EXTENDED RELEASE ORAL at 20:45

## 2021-01-01 RX ADMIN — SODIUM CHLORIDE, PRESERVATIVE FREE 10 ML: 5 INJECTION INTRAVENOUS at 20:37

## 2021-01-01 RX ADMIN — MORPHINE SULFATE 2 MG: 2 INJECTION, SOLUTION INTRAMUSCULAR; INTRAVENOUS at 09:57

## 2021-01-01 RX ADMIN — SUCRALFATE 1 G: 1 TABLET ORAL at 06:41

## 2021-01-01 RX ADMIN — ACETYLCYSTEINE 600 MG: 200 SOLUTION ORAL; RESPIRATORY (INHALATION) at 07:20

## 2021-01-01 RX ADMIN — PANTOPRAZOLE SODIUM 40 MG: 40 TABLET, DELAYED RELEASE ORAL at 06:15

## 2021-01-01 RX ADMIN — IPRATROPIUM BROMIDE AND ALBUTEROL SULFATE 3 ML: .5; 3 SOLUTION RESPIRATORY (INHALATION) at 08:27

## 2021-01-01 RX ADMIN — SODIUM CHLORIDE 20 ML/HR: 9 INJECTION, SOLUTION INTRAVENOUS at 11:16

## 2021-01-01 RX ADMIN — IPRATROPIUM BROMIDE AND ALBUTEROL SULFATE 1 AMPULE: .5; 3 SOLUTION RESPIRATORY (INHALATION) at 15:13

## 2021-01-01 RX ADMIN — THEOPHYLLINE ANHYDROUS 200 MG: 200 CAPSULE, EXTENDED RELEASE ORAL at 09:12

## 2021-01-01 RX ADMIN — MEROPENEM 1000 MG: 1 INJECTION, POWDER, FOR SOLUTION INTRAVENOUS at 15:38

## 2021-01-01 RX ADMIN — LORAZEPAM 0.5 MG: 2 INJECTION INTRAMUSCULAR; INTRAVENOUS at 08:01

## 2021-01-01 RX ADMIN — THEOPHYLLINE ANHYDROUS 200 MG: 200 CAPSULE, EXTENDED RELEASE ORAL at 20:06

## 2021-01-01 RX ADMIN — PROPOFOL 20 MCG/KG/MIN: 10 INJECTION, EMULSION INTRAVENOUS at 07:36

## 2021-01-01 RX ADMIN — SUCRALFATE 1 G: 1 TABLET ORAL at 19:40

## 2021-01-01 RX ADMIN — GADOTERIDOL 10 ML: 279.3 INJECTION, SOLUTION INTRAVENOUS at 10:30

## 2021-01-01 RX ADMIN — IPRATROPIUM BROMIDE AND ALBUTEROL SULFATE 3 ML: .5; 3 SOLUTION RESPIRATORY (INHALATION) at 12:40

## 2021-01-01 RX ADMIN — HYDROCODONE BITARTRATE AND ACETAMINOPHEN 1 TABLET: 10; 325 TABLET ORAL at 19:06

## 2021-01-01 RX ADMIN — HYDROCODONE BITARTRATE AND ACETAMINOPHEN 1 TABLET: 10; 325 TABLET ORAL at 12:28

## 2021-01-01 RX ADMIN — SUCRALFATE 1 G: 1 TABLET ORAL at 12:31

## 2021-01-01 RX ADMIN — MEROPENEM 1000 MG: 1 INJECTION, POWDER, FOR SOLUTION INTRAVENOUS at 02:51

## 2021-01-01 RX ADMIN — THEOPHYLLINE ANHYDROUS 200 MG: 200 CAPSULE, EXTENDED RELEASE ORAL at 08:26

## 2021-01-01 RX ADMIN — IPRATROPIUM BROMIDE AND ALBUTEROL SULFATE 1 AMPULE: .5; 3 SOLUTION RESPIRATORY (INHALATION) at 07:20

## 2021-01-01 RX ADMIN — SUCRALFATE 1 G: 1 TABLET ORAL at 21:36

## 2021-01-01 RX ADMIN — AZITHROMYCIN MONOHYDRATE 500 MG: 500 INJECTION, POWDER, LYOPHILIZED, FOR SOLUTION INTRAVENOUS at 10:00

## 2021-01-01 RX ADMIN — LISINOPRIL 5 MG: 5 TABLET ORAL at 08:48

## 2021-01-01 RX ADMIN — MORPHINE SULFATE 2 MG: 2 INJECTION, SOLUTION INTRAMUSCULAR; INTRAVENOUS at 23:18

## 2021-01-01 RX ADMIN — ALUMINUM HYDROXIDE, MAGNESIUM HYDROXIDE, DIMETHICONE 5 ML: 200; 200; 20 LIQUID ORAL at 17:51

## 2021-01-01 RX ADMIN — HYDROCODONE BITARTRATE AND ACETAMINOPHEN 1 TABLET: 10; 325 TABLET ORAL at 16:13

## 2021-01-01 RX ADMIN — MAGNESIUM SULFATE HEPTAHYDRATE 1000 MG: 1 INJECTION, SOLUTION INTRAVENOUS at 21:18

## 2021-01-01 RX ADMIN — LISINOPRIL 5 MG: 5 TABLET ORAL at 09:47

## 2021-01-01 RX ADMIN — LORAZEPAM 0.5 MG: 2 INJECTION INTRAMUSCULAR; INTRAVENOUS at 14:17

## 2021-01-01 RX ADMIN — SUCRALFATE 1 G: 1 TABLET ORAL at 16:53

## 2021-01-01 RX ADMIN — NYSTATIN 500000 UNITS: 100000 SUSPENSION ORAL at 17:23

## 2021-01-01 RX ADMIN — IPRATROPIUM BROMIDE AND ALBUTEROL SULFATE 1 AMPULE: .5; 3 SOLUTION RESPIRATORY (INHALATION) at 15:35

## 2021-01-01 RX ADMIN — ONDANSETRON 4 MG: 2 INJECTION INTRAMUSCULAR; INTRAVENOUS at 15:05

## 2021-01-01 RX ADMIN — METHYLPREDNISOLONE SODIUM SUCCINATE 40 MG: 40 INJECTION, POWDER, LYOPHILIZED, FOR SOLUTION INTRAMUSCULAR; INTRAVENOUS at 20:36

## 2021-01-01 RX ADMIN — Medication 75 MCG/HR: at 22:35

## 2021-01-01 RX ADMIN — MAGNESIUM SULFATE 2000 MG: 2 INJECTION INTRAVENOUS at 11:40

## 2021-01-01 RX ADMIN — METHYLPREDNISOLONE SODIUM SUCCINATE 40 MG: 40 INJECTION, POWDER, LYOPHILIZED, FOR SOLUTION INTRAMUSCULAR; INTRAVENOUS at 03:49

## 2021-01-01 RX ADMIN — MAGNESIUM SULFATE HEPTAHYDRATE 1000 MG: 1 INJECTION, SOLUTION INTRAVENOUS at 01:01

## 2021-01-01 RX ADMIN — MEROPENEM 1000 MG: 1 INJECTION, POWDER, FOR SOLUTION INTRAVENOUS at 16:36

## 2021-01-01 RX ADMIN — LORAZEPAM 1 MG: 2 INJECTION INTRAMUSCULAR; INTRAVENOUS at 05:41

## 2021-01-01 RX ADMIN — HYDROCODONE BITARTRATE AND ACETAMINOPHEN 1 TABLET: 10; 325 TABLET ORAL at 13:52

## 2021-01-01 RX ADMIN — METHYLPREDNISOLONE SODIUM SUCCINATE 40 MG: 40 INJECTION, POWDER, LYOPHILIZED, FOR SOLUTION INTRAMUSCULAR; INTRAVENOUS at 04:57

## 2021-01-01 RX ADMIN — IPRATROPIUM BROMIDE AND ALBUTEROL SULFATE 3 AMPULE: .5; 3 SOLUTION RESPIRATORY (INHALATION) at 09:24

## 2021-01-01 RX ADMIN — GUAIFENESIN 200 MG: 200 SOLUTION ORAL at 00:11

## 2021-01-01 RX ADMIN — LORAZEPAM 0.5 MG: 2 INJECTION INTRAMUSCULAR; INTRAVENOUS at 05:32

## 2021-01-01 RX ADMIN — BUSPIRONE HYDROCHLORIDE 10 MG: 10 TABLET ORAL at 15:57

## 2021-01-01 RX ADMIN — IPRATROPIUM BROMIDE AND ALBUTEROL SULFATE 1 AMPULE: .5; 3 SOLUTION RESPIRATORY (INHALATION) at 15:32

## 2021-01-01 RX ADMIN — ALBUTEROL SULFATE 2 PUFF: 90 AEROSOL, METERED RESPIRATORY (INHALATION) at 08:09

## 2021-01-01 RX ADMIN — LEVOFLOXACIN 750 MG: 750 TABLET, FILM COATED ORAL at 12:43

## 2021-01-01 RX ADMIN — DOXYCYCLINE 100 MG: 100 INJECTION, POWDER, LYOPHILIZED, FOR SOLUTION INTRAVENOUS at 11:40

## 2021-01-01 RX ADMIN — HYDROCODONE BITARTRATE AND ACETAMINOPHEN 1 TABLET: 10; 325 TABLET ORAL at 02:27

## 2021-01-01 RX ADMIN — IPRATROPIUM BROMIDE AND ALBUTEROL SULFATE 1 AMPULE: .5; 3 SOLUTION RESPIRATORY (INHALATION) at 11:26

## 2021-01-01 RX ADMIN — METHYLPREDNISOLONE SODIUM SUCCINATE 40 MG: 40 INJECTION, POWDER, FOR SOLUTION INTRAMUSCULAR; INTRAVENOUS at 04:56

## 2021-01-01 RX ADMIN — DEXTROSE MONOHYDRATE 750 MG: 50 INJECTION, SOLUTION INTRAVENOUS at 00:23

## 2021-01-01 RX ADMIN — ATORVASTATIN CALCIUM 10 MG: 10 TABLET, FILM COATED ORAL at 08:48

## 2021-01-01 RX ADMIN — METHYLPREDNISOLONE SODIUM SUCCINATE 40 MG: 40 INJECTION, POWDER, FOR SOLUTION INTRAMUSCULAR; INTRAVENOUS at 19:39

## 2021-01-01 RX ADMIN — FENTANYL CITRATE 25 MCG: 50 INJECTION INTRAMUSCULAR; INTRAVENOUS at 10:18

## 2021-01-01 RX ADMIN — SUCRALFATE 1 G: 1 TABLET ORAL at 19:00

## 2021-01-01 RX ADMIN — BUSPIRONE HYDROCHLORIDE 10 MG: 10 TABLET ORAL at 08:56

## 2021-01-01 RX ADMIN — POTASSIUM CHLORIDE: 2 INJECTION, SOLUTION, CONCENTRATE INTRAVENOUS at 02:33

## 2021-01-01 RX ADMIN — SUCRALFATE 1 G: 1 TABLET ORAL at 13:00

## 2021-01-01 RX ADMIN — MEROPENEM 1000 MG: 1 INJECTION, POWDER, FOR SOLUTION INTRAVENOUS at 23:28

## 2021-01-01 RX ADMIN — IPRATROPIUM BROMIDE AND ALBUTEROL SULFATE 1 AMPULE: .5; 3 SOLUTION RESPIRATORY (INHALATION) at 07:05

## 2021-01-01 RX ADMIN — LIDOCAINE HYDROCHLORIDE ANHYDROUS 5 ML: 10 INJECTION, SOLUTION INFILTRATION at 18:35

## 2021-01-01 RX ADMIN — POTASSIUM CHLORIDE 20 MEQ: 400 INJECTION, SOLUTION INTRAVENOUS at 16:45

## 2021-01-01 RX ADMIN — ASPIRIN 81 MG: 81 TABLET, FILM COATED ORAL at 08:24

## 2021-01-01 RX ADMIN — IPRATROPIUM BROMIDE AND ALBUTEROL SULFATE 3 ML: .5; 3 SOLUTION RESPIRATORY (INHALATION) at 21:17

## 2021-01-01 RX ADMIN — Medication 500 MG: at 08:39

## 2021-01-01 RX ADMIN — DOXYCYCLINE HYCLATE 100 MG: 100 TABLET, COATED ORAL at 19:42

## 2021-01-01 RX ADMIN — SUCRALFATE 1 G: 1 TABLET ORAL at 06:04

## 2021-01-01 RX ADMIN — IPRATROPIUM BROMIDE AND ALBUTEROL SULFATE 1 AMPULE: .5; 3 SOLUTION RESPIRATORY (INHALATION) at 05:38

## 2021-01-01 RX ADMIN — MORPHINE SULFATE 2 MG: 2 INJECTION, SOLUTION INTRAMUSCULAR; INTRAVENOUS at 02:58

## 2021-01-01 RX ADMIN — ALBUTEROL SULFATE 2 PUFF: 90 AEROSOL, METERED RESPIRATORY (INHALATION) at 08:32

## 2021-01-01 RX ADMIN — SODIUM CHLORIDE: 9 INJECTION, SOLUTION INTRAVENOUS at 02:39

## 2021-01-01 RX ADMIN — PANTOPRAZOLE SODIUM 40 MG: 40 TABLET, DELAYED RELEASE ORAL at 05:39

## 2021-01-01 RX ADMIN — IPRATROPIUM BROMIDE AND ALBUTEROL SULFATE 3 ML: .5; 3 SOLUTION RESPIRATORY (INHALATION) at 11:50

## 2021-01-01 RX ADMIN — Medication 1 CAPSULE: at 10:00

## 2021-01-01 RX ADMIN — LORAZEPAM 0.5 MG: 2 INJECTION INTRAMUSCULAR; INTRAVENOUS at 05:49

## 2021-01-01 RX ADMIN — MEROPENEM 1000 MG: 1 INJECTION, POWDER, FOR SOLUTION INTRAVENOUS at 08:53

## 2021-01-01 RX ADMIN — IPRATROPIUM BROMIDE AND ALBUTEROL SULFATE 1 AMPULE: .5; 3 SOLUTION RESPIRATORY (INHALATION) at 19:48

## 2021-01-01 RX ADMIN — NYSTATIN 500000 UNITS: 100000 SUSPENSION ORAL at 07:32

## 2021-01-01 RX ADMIN — METHYLPREDNISOLONE SODIUM SUCCINATE 40 MG: 40 INJECTION, POWDER, FOR SOLUTION INTRAMUSCULAR; INTRAVENOUS at 16:36

## 2021-01-01 RX ADMIN — DEXAMETHASONE SODIUM PHOSPHATE 12 MG: 4 INJECTION, SOLUTION INTRAMUSCULAR; INTRAVENOUS at 09:48

## 2021-01-01 RX ADMIN — ACETYLCYSTEINE 600 MG: 200 SOLUTION ORAL; RESPIRATORY (INHALATION) at 21:15

## 2021-01-01 RX ADMIN — ALBUTEROL SULFATE 2.5 MG: 2.5 SOLUTION RESPIRATORY (INHALATION) at 03:23

## 2021-01-01 RX ADMIN — MORPHINE SULFATE 4 MG: 4 INJECTION, SOLUTION INTRAMUSCULAR; INTRAVENOUS at 22:59

## 2021-01-01 RX ADMIN — LORAZEPAM 0.5 MG: 2 INJECTION INTRAMUSCULAR; INTRAVENOUS at 05:18

## 2021-01-01 RX ADMIN — Medication 1 TABLET: at 08:19

## 2021-01-01 RX ADMIN — PROPOFOL 60 MCG/KG/MIN: 10 INJECTION, EMULSION INTRAVENOUS at 11:39

## 2021-01-01 RX ADMIN — FLUTICASONE PROPIONATE 2 SPRAY: 50 SPRAY, METERED NASAL at 21:11

## 2021-01-01 RX ADMIN — ASPIRIN 81 MG: 81 TABLET, COATED ORAL at 08:53

## 2021-01-01 RX ADMIN — BUSPIRONE HYDROCHLORIDE 10 MG: 10 TABLET ORAL at 16:36

## 2021-01-01 RX ADMIN — Medication 1 TABLET: at 07:33

## 2021-01-01 RX ADMIN — HYDROCODONE BITARTRATE AND ACETAMINOPHEN 1 TABLET: 10; 325 TABLET ORAL at 04:42

## 2021-01-01 RX ADMIN — ETOPOSIDE 90 MG: 20 INJECTION INTRAVENOUS at 11:32

## 2021-01-01 RX ADMIN — METOPROLOL TARTRATE 50 MG: 50 TABLET, FILM COATED ORAL at 11:30

## 2021-01-01 RX ADMIN — LORAZEPAM 1 MG: 2 INJECTION INTRAMUSCULAR; INTRAVENOUS at 22:53

## 2021-01-01 RX ADMIN — SUCRALFATE 1 G: 1 TABLET ORAL at 20:38

## 2021-01-01 RX ADMIN — Medication 110 MCG/HR: at 15:24

## 2021-01-01 RX ADMIN — DOCUSATE SODIUM 100 MG: 100 CAPSULE, LIQUID FILLED ORAL at 09:59

## 2021-01-01 RX ADMIN — BUSPIRONE HYDROCHLORIDE 10 MG: 10 TABLET ORAL at 16:53

## 2021-01-01 RX ADMIN — SODIUM CHLORIDE, PRESERVATIVE FREE 10 ML: 5 INJECTION INTRAVENOUS at 21:51

## 2021-01-01 RX ADMIN — HYDROCODONE BITARTRATE AND ACETAMINOPHEN 1 TABLET: 10; 325 TABLET ORAL at 12:26

## 2021-01-01 RX ADMIN — IPRATROPIUM BROMIDE AND ALBUTEROL SULFATE 1 AMPULE: .5; 3 SOLUTION RESPIRATORY (INHALATION) at 03:21

## 2021-01-01 RX ADMIN — APIXABAN 2.5 MG: 2.5 TABLET, FILM COATED ORAL at 21:05

## 2021-01-01 RX ADMIN — ALBUTEROL SULFATE 2 PUFF: 90 AEROSOL, METERED RESPIRATORY (INHALATION) at 07:35

## 2021-01-01 RX ADMIN — SUCRALFATE 1 G: 1 TABLET ORAL at 17:59

## 2021-01-01 RX ADMIN — METHYLPREDNISOLONE SODIUM SUCCINATE 40 MG: 40 INJECTION, POWDER, LYOPHILIZED, FOR SOLUTION INTRAMUSCULAR; INTRAVENOUS at 20:45

## 2021-01-01 RX ADMIN — SODIUM CHLORIDE, PRESERVATIVE FREE 10 ML: 5 INJECTION INTRAVENOUS at 07:33

## 2021-01-01 RX ADMIN — ENOXAPARIN SODIUM 40 MG: 40 INJECTION SUBCUTANEOUS at 09:37

## 2021-01-01 RX ADMIN — ATORVASTATIN CALCIUM 10 MG: 10 TABLET, FILM COATED ORAL at 08:26

## 2021-01-01 RX ADMIN — IPRATROPIUM BROMIDE AND ALBUTEROL SULFATE 1 AMPULE: .5; 3 SOLUTION RESPIRATORY (INHALATION) at 17:29

## 2021-01-01 RX ADMIN — PANTOPRAZOLE SODIUM 40 MG: 40 TABLET, DELAYED RELEASE ORAL at 05:09

## 2021-01-01 RX ADMIN — PANTOPRAZOLE SODIUM 40 MG: 40 TABLET, DELAYED RELEASE ORAL at 05:30

## 2021-01-01 RX ADMIN — IPRATROPIUM BROMIDE AND ALBUTEROL SULFATE 3 ML: .5; 3 SOLUTION RESPIRATORY (INHALATION) at 12:58

## 2021-01-01 RX ADMIN — ALBUTEROL SULFATE 2 PUFF: 90 AEROSOL, METERED RESPIRATORY (INHALATION) at 20:08

## 2021-01-01 RX ADMIN — CHLORHEXIDINE GLUCONATE 0.12% ORAL RINSE 15 ML: 1.2 LIQUID ORAL at 07:58

## 2021-01-01 RX ADMIN — SUCRALFATE 1 G: 1 TABLET ORAL at 21:07

## 2021-01-01 RX ADMIN — BUSPIRONE HYDROCHLORIDE 10 MG: 10 TABLET ORAL at 20:06

## 2021-01-01 RX ADMIN — IPRATROPIUM BROMIDE AND ALBUTEROL SULFATE 3 ML: .5; 3 SOLUTION RESPIRATORY (INHALATION) at 11:23

## 2021-01-01 RX ADMIN — SUCRALFATE 1 G: 1 TABLET ORAL at 16:04

## 2021-01-01 RX ADMIN — HYDROCODONE BITARTRATE AND ACETAMINOPHEN 1 TABLET: 10; 325 TABLET ORAL at 21:32

## 2021-01-01 RX ADMIN — Medication 2 PUFF: at 16:09

## 2021-01-01 RX ADMIN — HYDROCODONE BITARTRATE AND ACETAMINOPHEN 1 TABLET: 5; 325 TABLET ORAL at 16:29

## 2021-01-01 RX ADMIN — MIDAZOLAM 2 MG/HR: 5 INJECTION INTRAMUSCULAR; INTRAVENOUS at 09:45

## 2021-01-01 RX ADMIN — SUCRALFATE 1 G: 1 TABLET ORAL at 16:31

## 2021-01-01 RX ADMIN — LORAZEPAM 1 MG: 2 INJECTION INTRAMUSCULAR; INTRAVENOUS at 18:19

## 2021-01-01 RX ADMIN — DEXTROSE MONOHYDRATE 750 MG: 50 INJECTION, SOLUTION INTRAVENOUS at 11:45

## 2021-01-01 RX ADMIN — METHYLPREDNISOLONE SODIUM SUCCINATE 40 MG: 40 INJECTION, POWDER, LYOPHILIZED, FOR SOLUTION INTRAMUSCULAR; INTRAVENOUS at 06:35

## 2021-01-01 RX ADMIN — METHYLPREDNISOLONE SODIUM SUCCINATE 40 MG: 40 INJECTION, POWDER, LYOPHILIZED, FOR SOLUTION INTRAMUSCULAR; INTRAVENOUS at 14:00

## 2021-01-01 RX ADMIN — IPRATROPIUM BROMIDE AND ALBUTEROL SULFATE 1 AMPULE: .5; 3 SOLUTION RESPIRATORY (INHALATION) at 19:56

## 2021-01-01 RX ADMIN — PREDNISONE 40 MG: 20 TABLET ORAL at 09:13

## 2021-01-01 RX ADMIN — SUCRALFATE 1 G: 1 TABLET ORAL at 20:39

## 2021-01-01 RX ADMIN — Medication 2 PUFF: at 15:14

## 2021-01-01 RX ADMIN — MAGNESIUM OXIDE 400 MG: 400 TABLET ORAL at 11:31

## 2021-01-01 RX ADMIN — Medication 1 CAPSULE: at 09:55

## 2021-01-01 RX ADMIN — IPRATROPIUM BROMIDE AND ALBUTEROL SULFATE 1 AMPULE: .5; 3 SOLUTION RESPIRATORY (INHALATION) at 08:17

## 2021-01-01 RX ADMIN — MEROPENEM 1000 MG: 1 INJECTION, POWDER, FOR SOLUTION INTRAVENOUS at 08:10

## 2021-01-01 RX ADMIN — SODIUM CHLORIDE, PRESERVATIVE FREE 10 ML: 5 INJECTION INTRAVENOUS at 09:11

## 2021-01-01 RX ADMIN — SODIUM CHLORIDE: 9 INJECTION, SOLUTION INTRAVENOUS at 10:21

## 2021-01-01 RX ADMIN — SUCRALFATE 1 G: 1 TABLET ORAL at 09:05

## 2021-01-01 RX ADMIN — IPRATROPIUM BROMIDE AND ALBUTEROL SULFATE 3 ML: .5; 3 SOLUTION RESPIRATORY (INHALATION) at 08:02

## 2021-01-01 RX ADMIN — HYDROCODONE BITARTRATE AND ACETAMINOPHEN 1 TABLET: 10; 325 TABLET ORAL at 07:28

## 2021-01-01 RX ADMIN — ALBUTEROL SULFATE 2 PUFF: 90 AEROSOL, METERED RESPIRATORY (INHALATION) at 16:44

## 2021-01-01 RX ADMIN — Medication 500 MG: at 08:05

## 2021-01-01 RX ADMIN — MORPHINE SULFATE 2 MG: 2 INJECTION, SOLUTION INTRAMUSCULAR; INTRAVENOUS at 02:24

## 2021-01-01 RX ADMIN — BUSPIRONE HYDROCHLORIDE 10 MG: 10 TABLET ORAL at 10:42

## 2021-01-01 RX ADMIN — HYDROCODONE BITARTRATE AND ACETAMINOPHEN 1 TABLET: 10; 325 TABLET ORAL at 12:59

## 2021-01-01 RX ADMIN — SUCRALFATE 1 G: 1 TABLET ORAL at 21:49

## 2021-01-01 RX ADMIN — LORAZEPAM 1 MG: 2 INJECTION INTRAMUSCULAR; INTRAVENOUS at 21:46

## 2021-01-01 RX ADMIN — IPRATROPIUM BROMIDE AND ALBUTEROL SULFATE 3 ML: .5; 3 SOLUTION RESPIRATORY (INHALATION) at 08:17

## 2021-01-01 RX ADMIN — SODIUM CHLORIDE, PRESERVATIVE FREE 10 ML: 5 INJECTION INTRAVENOUS at 20:41

## 2021-01-01 RX ADMIN — BUSPIRONE HYDROCHLORIDE 10 MG: 10 TABLET ORAL at 21:14

## 2021-01-01 RX ADMIN — DILTIAZEM HYDROCHLORIDE 120 MG: 120 CAPSULE, COATED, EXTENDED RELEASE ORAL at 08:48

## 2021-01-01 RX ADMIN — SUCRALFATE 1 G: 1 TABLET ORAL at 12:01

## 2021-01-01 RX ADMIN — ACETAMINOPHEN 650 MG: 325 TABLET ORAL at 16:05

## 2021-01-01 RX ADMIN — Medication 2 PUFF: at 07:18

## 2021-01-01 RX ADMIN — DIGOXIN 250 MCG: 0.25 INJECTION INTRAMUSCULAR; INTRAVENOUS at 11:56

## 2021-01-01 RX ADMIN — HYDROCODONE BITARTRATE AND ACETAMINOPHEN 1 TABLET: 10; 325 TABLET ORAL at 17:45

## 2021-01-01 RX ADMIN — Medication 2 PUFF: at 23:00

## 2021-01-01 RX ADMIN — SUCRALFATE 1 G: 1 TABLET ORAL at 23:15

## 2021-01-01 RX ADMIN — ACETYLCYSTEINE 600 MG: 200 SOLUTION ORAL; RESPIRATORY (INHALATION) at 23:21

## 2021-01-01 RX ADMIN — CHLORHEXIDINE GLUCONATE 0.12% ORAL RINSE 15 ML: 1.2 LIQUID ORAL at 09:33

## 2021-01-01 RX ADMIN — Medication 1000 UNITS: at 09:47

## 2021-01-01 RX ADMIN — IPRATROPIUM BROMIDE AND ALBUTEROL SULFATE 1 AMPULE: .5; 3 SOLUTION RESPIRATORY (INHALATION) at 22:04

## 2021-01-01 RX ADMIN — BUSPIRONE HYDROCHLORIDE 10 MG: 10 TABLET ORAL at 15:38

## 2021-01-01 RX ADMIN — HYDROCODONE BITARTRATE AND ACETAMINOPHEN 1 TABLET: 10; 325 TABLET ORAL at 09:46

## 2021-01-01 RX ADMIN — LORAZEPAM 1 MG: 1 TABLET ORAL at 09:56

## 2021-01-01 RX ADMIN — DOCUSATE SODIUM 100 MG: 100 CAPSULE, LIQUID FILLED ORAL at 20:56

## 2021-01-01 RX ADMIN — SODIUM CHLORIDE, PRESERVATIVE FREE 10 ML: 5 INJECTION INTRAVENOUS at 08:51

## 2021-01-01 RX ADMIN — DEXTROSE MONOHYDRATE 750 MG: 50 INJECTION, SOLUTION INTRAVENOUS at 05:35

## 2021-01-01 RX ADMIN — ATORVASTATIN CALCIUM 10 MG: 10 TABLET, FILM COATED ORAL at 09:12

## 2021-01-01 RX ADMIN — THEOPHYLLINE ANHYDROUS 200 MG: 200 CAPSULE, EXTENDED RELEASE ORAL at 09:11

## 2021-01-01 RX ADMIN — NYSTATIN 500000 UNITS: 100000 SUSPENSION ORAL at 21:32

## 2021-01-01 RX ADMIN — AZITHROMYCIN MONOHYDRATE 500 MG: 500 INJECTION, POWDER, LYOPHILIZED, FOR SOLUTION INTRAVENOUS at 17:10

## 2021-01-01 RX ADMIN — SODIUM CHLORIDE: 9 INJECTION, SOLUTION INTRAVENOUS at 10:04

## 2021-01-01 RX ADMIN — HYDROCODONE BITARTRATE AND ACETAMINOPHEN 1 TABLET: 10; 325 TABLET ORAL at 16:29

## 2021-01-01 RX ADMIN — FAMOTIDINE 20 MG: 10 INJECTION, SOLUTION INTRAVENOUS at 21:32

## 2021-01-01 RX ADMIN — HYDROCODONE BITARTRATE AND ACETAMINOPHEN 1 TABLET: 10; 325 TABLET ORAL at 14:33

## 2021-01-01 RX ADMIN — PEGFILGRASTIM 6 MG: KIT SUBCUTANEOUS at 11:25

## 2021-01-01 RX ADMIN — THEOPHYLLINE 200 MG: 400 TABLET, EXTENDED RELEASE ORAL at 07:32

## 2021-01-01 RX ADMIN — HYDROCODONE BITARTRATE AND ACETAMINOPHEN 1 TABLET: 10; 325 TABLET ORAL at 02:49

## 2021-01-01 RX ADMIN — Medication 1000 UNITS: at 08:52

## 2021-01-01 RX ADMIN — DEXTROSE MONOHYDRATE 750 MG: 50 INJECTION, SOLUTION INTRAVENOUS at 11:47

## 2021-01-01 RX ADMIN — ATORVASTATIN CALCIUM 10 MG: 10 TABLET, FILM COATED ORAL at 20:52

## 2021-01-01 RX ADMIN — ACETYLCYSTEINE 600 MG: 200 SOLUTION ORAL; RESPIRATORY (INHALATION) at 07:18

## 2021-01-01 RX ADMIN — MAGNESIUM SULFATE HEPTAHYDRATE 2000 MG: 2 INJECTION, SOLUTION INTRAVENOUS at 12:07

## 2021-01-01 RX ADMIN — MEROPENEM 1000 MG: 1 INJECTION, POWDER, FOR SOLUTION INTRAVENOUS at 16:04

## 2021-01-01 RX ADMIN — ALBUTEROL SULFATE 2 PUFF: 90 AEROSOL, METERED RESPIRATORY (INHALATION) at 23:00

## 2021-01-01 RX ADMIN — Medication 1000 UNITS: at 10:41

## 2021-01-01 RX ADMIN — DILTIAZEM HYDROCHLORIDE 120 MG: 120 CAPSULE, COATED, EXTENDED RELEASE ORAL at 09:19

## 2021-01-01 RX ADMIN — HYDROCODONE BITARTRATE AND ACETAMINOPHEN 1 TABLET: 10; 325 TABLET ORAL at 01:24

## 2021-01-01 RX ADMIN — SUCRALFATE 1 G: 1 TABLET ORAL at 17:16

## 2021-01-01 RX ADMIN — ENOXAPARIN SODIUM 30 MG: 30 INJECTION SUBCUTANEOUS at 09:59

## 2021-01-01 RX ADMIN — APIXABAN 5 MG: 5 TABLET, FILM COATED ORAL at 09:51

## 2021-01-01 RX ADMIN — Medication 2 PUFF: at 23:02

## 2021-01-01 RX ADMIN — SUCRALFATE 1 G: 1 TABLET ORAL at 18:02

## 2021-01-01 RX ADMIN — ALBUTEROL SULFATE 2 PUFF: 90 AEROSOL, METERED RESPIRATORY (INHALATION) at 11:03

## 2021-01-01 RX ADMIN — Medication 1 TABLET: at 11:29

## 2021-01-01 RX ADMIN — APIXABAN 5 MG: 5 TABLET, FILM COATED ORAL at 08:53

## 2021-01-01 RX ADMIN — SODIUM CHLORIDE, PRESERVATIVE FREE 10 ML: 5 INJECTION INTRAVENOUS at 11:21

## 2021-01-01 RX ADMIN — IPRATROPIUM BROMIDE AND ALBUTEROL SULFATE 1 AMPULE: .5; 3 SOLUTION RESPIRATORY (INHALATION) at 16:02

## 2021-01-01 RX ADMIN — CEFEPIME HYDROCHLORIDE 2000 MG: 2 INJECTION, POWDER, FOR SOLUTION INTRAVENOUS at 20:57

## 2021-01-01 RX ADMIN — LORAZEPAM 1 MG: 2 INJECTION INTRAMUSCULAR; INTRAVENOUS at 15:10

## 2021-01-01 RX ADMIN — DOXYCYCLINE HYCLATE 100 MG: 100 TABLET, COATED ORAL at 21:12

## 2021-01-01 RX ADMIN — BUSPIRONE HYDROCHLORIDE 10 MG: 10 TABLET ORAL at 14:20

## 2021-01-01 RX ADMIN — BUSPIRONE HYDROCHLORIDE 10 MG: 10 TABLET ORAL at 09:19

## 2021-01-01 RX ADMIN — Medication 2 PUFF: at 08:10

## 2021-01-01 RX ADMIN — POLYETHYLENE GLYCOL 3350 17 G: 17 POWDER, FOR SOLUTION ORAL at 10:27

## 2021-01-01 RX ADMIN — MAGNESIUM SULFATE HEPTAHYDRATE 1000 MG: 1 INJECTION, SOLUTION INTRAVENOUS at 06:48

## 2021-01-01 RX ADMIN — DEXTROSE MONOHYDRATE 750 MG: 50 INJECTION, SOLUTION INTRAVENOUS at 14:09

## 2021-01-01 RX ADMIN — THEOPHYLLINE ANHYDROUS 200 MG: 200 CAPSULE, EXTENDED RELEASE ORAL at 08:47

## 2021-01-01 RX ADMIN — SUCRALFATE 1 G: 1 TABLET ORAL at 15:39

## 2021-01-01 RX ADMIN — SUCRALFATE 1 G: 1 TABLET ORAL at 23:27

## 2021-01-01 RX ADMIN — METHYLPREDNISOLONE SODIUM SUCCINATE 40 MG: 40 INJECTION, POWDER, FOR SOLUTION INTRAMUSCULAR; INTRAVENOUS at 03:45

## 2021-01-01 RX ADMIN — LORAZEPAM 0.5 MG: 2 INJECTION INTRAMUSCULAR; INTRAVENOUS at 06:14

## 2021-01-01 RX ADMIN — IPRATROPIUM BROMIDE AND ALBUTEROL SULFATE 1 AMPULE: .5; 3 SOLUTION RESPIRATORY (INHALATION) at 15:41

## 2021-01-01 RX ADMIN — PROPOFOL 10 MCG/KG/MIN: 10 INJECTION, EMULSION INTRAVENOUS at 12:09

## 2021-01-01 RX ADMIN — SUCRALFATE 1 G: 1 TABLET ORAL at 21:32

## 2021-01-01 RX ADMIN — BUSPIRONE HYDROCHLORIDE 10 MG: 10 TABLET ORAL at 08:18

## 2021-01-01 RX ADMIN — SUCRALFATE 1 G: 1 TABLET ORAL at 09:03

## 2021-01-01 RX ADMIN — HYDROCODONE BITARTRATE AND ACETAMINOPHEN 1 TABLET: 10; 325 TABLET ORAL at 10:12

## 2021-01-01 RX ADMIN — CETIRIZINE HYDROCHLORIDE 10 MG: 10 TABLET, FILM COATED ORAL at 11:12

## 2021-01-01 RX ADMIN — DOXYCYCLINE 100 MG: 100 INJECTION, POWDER, LYOPHILIZED, FOR SOLUTION INTRAVENOUS at 20:51

## 2021-01-01 RX ADMIN — MAGNESIUM OXIDE 400 MG (241.3 MG MAGNESIUM) TABLET 200 MG: TABLET at 10:00

## 2021-01-01 RX ADMIN — METHYLPREDNISOLONE SODIUM SUCCINATE 40 MG: 40 INJECTION, POWDER, FOR SOLUTION INTRAMUSCULAR; INTRAVENOUS at 10:33

## 2021-01-01 RX ADMIN — SERTRALINE HYDROCHLORIDE 50 MG: 50 TABLET ORAL at 07:59

## 2021-01-01 RX ADMIN — Medication 1 CAPSULE: at 08:53

## 2021-01-01 RX ADMIN — ASPIRIN 81 MG: 81 TABLET, COATED ORAL at 08:47

## 2021-01-01 RX ADMIN — NYSTATIN 500000 UNITS: 100000 SUSPENSION ORAL at 11:32

## 2021-01-01 RX ADMIN — SUCRALFATE 1 G: 1 TABLET ORAL at 12:07

## 2021-01-01 RX ADMIN — DOCUSATE SODIUM 100 MG: 100 CAPSULE, LIQUID FILLED ORAL at 21:14

## 2021-01-01 RX ADMIN — ONDANSETRON 4 MG: 2 INJECTION INTRAMUSCULAR; INTRAVENOUS at 03:11

## 2021-01-01 RX ADMIN — FAMOTIDINE 20 MG: 10 INJECTION, SOLUTION INTRAVENOUS at 21:58

## 2021-01-01 RX ADMIN — HYDROCODONE BITARTRATE AND ACETAMINOPHEN 1 TABLET: 10; 325 TABLET ORAL at 18:39

## 2021-01-01 RX ADMIN — SODIUM CHLORIDE, PRESERVATIVE FREE 10 ML: 5 INJECTION INTRAVENOUS at 09:06

## 2021-01-01 RX ADMIN — Medication 1 CAPSULE: at 07:32

## 2021-01-01 RX ADMIN — SUCRALFATE 1 G: 1 TABLET ORAL at 11:21

## 2021-01-01 RX ADMIN — SODIUM CHLORIDE, PRESERVATIVE FREE 10 ML: 5 INJECTION INTRAVENOUS at 12:15

## 2021-01-01 RX ADMIN — PANTOPRAZOLE SODIUM 40 MG: 40 INJECTION, POWDER, FOR SOLUTION INTRAVENOUS at 06:52

## 2021-01-01 RX ADMIN — Medication 500 MG: at 08:49

## 2021-01-01 RX ADMIN — IPRATROPIUM BROMIDE AND ALBUTEROL SULFATE 3 ML: .5; 3 SOLUTION RESPIRATORY (INHALATION) at 12:05

## 2021-01-01 RX ADMIN — Medication 2 PUFF: at 21:06

## 2021-01-01 RX ADMIN — Medication 2 PUFF: at 05:21

## 2021-01-01 RX ADMIN — LEVETIRACETAM 500 MG: 100 INJECTION, SOLUTION INTRAVENOUS at 18:51

## 2021-01-01 RX ADMIN — SUCRALFATE 1 G: 1 TABLET ORAL at 20:46

## 2021-01-01 RX ADMIN — THEOPHYLLINE 200 MG: 400 TABLET, EXTENDED RELEASE ORAL at 20:37

## 2021-01-01 RX ADMIN — METHYLPREDNISOLONE SODIUM SUCCINATE 40 MG: 40 INJECTION, POWDER, LYOPHILIZED, FOR SOLUTION INTRAMUSCULAR; INTRAVENOUS at 08:10

## 2021-01-01 RX ADMIN — LORAZEPAM 1 MG: 1 TABLET ORAL at 20:56

## 2021-01-01 RX ADMIN — PROPOFOL 75 MCG/KG/MIN: 10 INJECTION, EMULSION INTRAVENOUS at 00:26

## 2021-01-01 RX ADMIN — SODIUM CHLORIDE, PRESERVATIVE FREE 10 ML: 5 INJECTION INTRAVENOUS at 19:41

## 2021-01-01 RX ADMIN — LORAZEPAM 0.5 MG: 2 INJECTION INTRAMUSCULAR; INTRAVENOUS at 12:15

## 2021-01-01 RX ADMIN — Medication 2 PUFF: at 20:08

## 2021-01-01 RX ADMIN — METHYLPREDNISOLONE SODIUM SUCCINATE 40 MG: 40 INJECTION, POWDER, FOR SOLUTION INTRAMUSCULAR; INTRAVENOUS at 21:11

## 2021-01-01 RX ADMIN — CEFEPIME HYDROCHLORIDE 2000 MG: 2 INJECTION, POWDER, FOR SOLUTION INTRAVENOUS at 08:40

## 2021-01-01 RX ADMIN — PALONOSETRON 0.25 MG: 0.25 INJECTION, SOLUTION INTRAVENOUS at 09:48

## 2021-01-01 RX ADMIN — SODIUM CHLORIDE, PRESERVATIVE FREE 10 ML: 5 INJECTION INTRAVENOUS at 22:08

## 2021-01-01 RX ADMIN — MAGNESIUM SULFATE HEPTAHYDRATE 2000 MG: 2 INJECTION, SOLUTION INTRAVENOUS at 19:53

## 2021-01-01 RX ADMIN — HYDROCODONE BITARTRATE AND ACETAMINOPHEN 1 TABLET: 10; 325 TABLET ORAL at 00:26

## 2021-01-01 ASSESSMENT — PAIN DESCRIPTION - LOCATION
LOCATION: BACK
LOCATION: FOOT;LEG
LOCATION: BACK;LEG
LOCATION: BACK
LOCATION: LEG
LOCATION: ABDOMEN
LOCATION: ABDOMEN
LOCATION: BACK
LOCATION: BACK
LOCATION: BACK;GENERALIZED
LOCATION: ABDOMEN
LOCATION: BACK
LOCATION: BACK;LEG
LOCATION: COCCYX
LOCATION: BACK
LOCATION: LEG
LOCATION: BACK;GENERALIZED
LOCATION: GENERALIZED
LOCATION: BUTTOCKS
LOCATION: ABDOMEN
LOCATION: BACK
LOCATION: GENERALIZED;BACK
LOCATION: BACK
LOCATION: BACK
LOCATION: GENERALIZED
LOCATION: BACK
LOCATION: LEG
LOCATION: BACK
LOCATION: GENERALIZED
LOCATION: GENERALIZED
LOCATION: BACK
LOCATION: ABDOMEN
LOCATION: GENERALIZED;BACK
LOCATION: BACK
LOCATION: BACK
LOCATION: BACK;LEG
LOCATION: ABDOMEN
LOCATION: LEG

## 2021-01-01 ASSESSMENT — PULMONARY FUNCTION TESTS
PIF_VALUE: 27
PIF_VALUE: 0
PIF_VALUE: 0
PIF_VALUE: 29
PIF_VALUE: 7
PIF_VALUE: 35
PIF_VALUE: 35
PIF_VALUE: 41
PIF_VALUE: 26
PIF_VALUE: 1
PIF_VALUE: 36
PIF_VALUE: 34
PIF_VALUE: 29
PIF_VALUE: 26
PIF_VALUE: 28
PIF_VALUE: 28
PIF_VALUE: 40
PIF_VALUE: 27
PIF_VALUE: 14
PIF_VALUE: 35
PIF_VALUE: 31
PIF_VALUE: 35
PIF_VALUE: 29
PIF_VALUE: 34
PIF_VALUE: 28
PIF_VALUE: 31
PIF_VALUE: 36
PIF_VALUE: 28
PIF_VALUE: 0
PIF_VALUE: 37
PIF_VALUE: 34
PIF_VALUE: 39
PIF_VALUE: 9
PIF_VALUE: 30
PIF_VALUE: 7
PIF_VALUE: 35
PIF_VALUE: 0
PIF_VALUE: 34
PIF_VALUE: 31
PIF_VALUE: 19
PIF_VALUE: 29
PIF_VALUE: 28
PIF_VALUE: 29
PIF_VALUE: 30
PIF_VALUE: 25
PIF_VALUE: 33
PIF_VALUE: 0
PIF_VALUE: 32
PIF_VALUE: 29
PIF_VALUE: 34
PIF_VALUE: 29
PIF_VALUE: 41
PIF_VALUE: 33
PIF_VALUE: 35
PIF_VALUE: 31
PIF_VALUE: 40
PIF_VALUE: 35
PIF_VALUE: 36
PIF_VALUE: 32
PIF_VALUE: 40
PIF_VALUE: 33
PIF_VALUE: 36
PIF_VALUE: 24
PIF_VALUE: 37
PIF_VALUE: 33
PIF_VALUE: 30
PIF_VALUE: 22
PIF_VALUE: 32
PIF_VALUE: 32
PIF_VALUE: 33
PIF_VALUE: 27
PIF_VALUE: 21
PIF_VALUE: 21
PIF_VALUE: 6
PIF_VALUE: 29
PIF_VALUE: 19
PIF_VALUE: 0
PIF_VALUE: 32
PIF_VALUE: 24
PIF_VALUE: 2
PIF_VALUE: 31
PIF_VALUE: 31
PIF_VALUE: 29
PIF_VALUE: 34
PIF_VALUE: 23
PIF_VALUE: 32
PIF_VALUE: 40
PIF_VALUE: 29
PIF_VALUE: 35
PIF_VALUE: 9
PIF_VALUE: 21
PIF_VALUE: 23
PIF_VALUE: 20
PIF_VALUE: 31
PIF_VALUE: 24
PIF_VALUE: 26
PIF_VALUE: 23
PIF_VALUE: 32
PIF_VALUE: 23
PIF_VALUE: 31
PIF_VALUE: 29
PIF_VALUE: 32
PIF_VALUE: 6
PIF_VALUE: 4
PIF_VALUE: 31
PIF_VALUE: 31
PIF_VALUE: 29
PIF_VALUE: 2

## 2021-01-01 ASSESSMENT — PAIN SCALES - GENERAL
PAINLEVEL_OUTOF10: 8
PAINLEVEL_OUTOF10: 8
PAINLEVEL_OUTOF10: 7
PAINLEVEL_OUTOF10: 0
PAINLEVEL_OUTOF10: 9
PAINLEVEL_OUTOF10: 2
PAINLEVEL_OUTOF10: 3
PAINLEVEL_OUTOF10: 6
PAINLEVEL_OUTOF10: 0
PAINLEVEL_OUTOF10: 10
PAINLEVEL_OUTOF10: 0
PAINLEVEL_OUTOF10: 7
PAINLEVEL_OUTOF10: 4
PAINLEVEL_OUTOF10: 6
PAINLEVEL_OUTOF10: 8
PAINLEVEL_OUTOF10: 7
PAINLEVEL_OUTOF10: 6
PAINLEVEL_OUTOF10: 1
PAINLEVEL_OUTOF10: 6
PAINLEVEL_OUTOF10: 8
PAINLEVEL_OUTOF10: 3
PAINLEVEL_OUTOF10: 7
PAINLEVEL_OUTOF10: 2
PAINLEVEL_OUTOF10: 0
PAINLEVEL_OUTOF10: 0
PAINLEVEL_OUTOF10: 5
PAINLEVEL_OUTOF10: 4
PAINLEVEL_OUTOF10: 4
PAINLEVEL_OUTOF10: 8
PAINLEVEL_OUTOF10: 0
PAINLEVEL_OUTOF10: 0
PAINLEVEL_OUTOF10: 6
PAINLEVEL_OUTOF10: 0
PAINLEVEL_OUTOF10: 0
PAINLEVEL_OUTOF10: 10
PAINLEVEL_OUTOF10: 0
PAINLEVEL_OUTOF10: 7
PAINLEVEL_OUTOF10: 0
PAINLEVEL_OUTOF10: 5
PAINLEVEL_OUTOF10: 7
PAINLEVEL_OUTOF10: 8
PAINLEVEL_OUTOF10: 3
PAINLEVEL_OUTOF10: 7
PAINLEVEL_OUTOF10: 0
PAINLEVEL_OUTOF10: 1
PAINLEVEL_OUTOF10: 4
PAINLEVEL_OUTOF10: 4
PAINLEVEL_OUTOF10: 6
PAINLEVEL_OUTOF10: 8
PAINLEVEL_OUTOF10: 0
PAINLEVEL_OUTOF10: 7
PAINLEVEL_OUTOF10: 4
PAINLEVEL_OUTOF10: 7
PAINLEVEL_OUTOF10: 6
PAINLEVEL_OUTOF10: 0
PAINLEVEL_OUTOF10: 7
PAINLEVEL_OUTOF10: 0
PAINLEVEL_OUTOF10: 6
PAINLEVEL_OUTOF10: 0
PAINLEVEL_OUTOF10: 0
PAINLEVEL_OUTOF10: 5
PAINLEVEL_OUTOF10: 7
PAINLEVEL_OUTOF10: 0
PAINLEVEL_OUTOF10: 7
PAINLEVEL_OUTOF10: 6
PAINLEVEL_OUTOF10: 5
PAINLEVEL_OUTOF10: 0
PAINLEVEL_OUTOF10: 7
PAINLEVEL_OUTOF10: 8
PAINLEVEL_OUTOF10: 6
PAINLEVEL_OUTOF10: 7
PAINLEVEL_OUTOF10: 0
PAINLEVEL_OUTOF10: 9
PAINLEVEL_OUTOF10: 7
PAINLEVEL_OUTOF10: 5
PAINLEVEL_OUTOF10: 10
PAINLEVEL_OUTOF10: 3
PAINLEVEL_OUTOF10: 9
PAINLEVEL_OUTOF10: 6
PAINLEVEL_OUTOF10: 8
PAINLEVEL_OUTOF10: 7
PAINLEVEL_OUTOF10: 6
PAINLEVEL_OUTOF10: 4
PAINLEVEL_OUTOF10: 0
PAINLEVEL_OUTOF10: 6
PAINLEVEL_OUTOF10: 7
PAINLEVEL_OUTOF10: 8
PAINLEVEL_OUTOF10: 0
PAINLEVEL_OUTOF10: 7
PAINLEVEL_OUTOF10: 0
PAINLEVEL_OUTOF10: 0
PAINLEVEL_OUTOF10: 8
PAINLEVEL_OUTOF10: 0
PAINLEVEL_OUTOF10: 5
PAINLEVEL_OUTOF10: 5
PAINLEVEL_OUTOF10: 9
PAINLEVEL_OUTOF10: 0
PAINLEVEL_OUTOF10: 7
PAINLEVEL_OUTOF10: 7
PAINLEVEL_OUTOF10: 8
PAINLEVEL_OUTOF10: 0
PAINLEVEL_OUTOF10: 2
PAINLEVEL_OUTOF10: 8
PAINLEVEL_OUTOF10: 8
PAINLEVEL_OUTOF10: 7
PAINLEVEL_OUTOF10: 8
PAINLEVEL_OUTOF10: 0
PAINLEVEL_OUTOF10: 8
PAINLEVEL_OUTOF10: 10
PAINLEVEL_OUTOF10: 6
PAINLEVEL_OUTOF10: 0
PAINLEVEL_OUTOF10: 4
PAINLEVEL_OUTOF10: 0
PAINLEVEL_OUTOF10: 7
PAINLEVEL_OUTOF10: 7
PAINLEVEL_OUTOF10: 9
PAINLEVEL_OUTOF10: 9
PAINLEVEL_OUTOF10: 10
PAINLEVEL_OUTOF10: 8
PAINLEVEL_OUTOF10: 7
PAINLEVEL_OUTOF10: 0
PAINLEVEL_OUTOF10: 7
PAINLEVEL_OUTOF10: 4
PAINLEVEL_OUTOF10: 7
PAINLEVEL_OUTOF10: 0
PAINLEVEL_OUTOF10: 4
PAINLEVEL_OUTOF10: 8
PAINLEVEL_OUTOF10: 7
PAINLEVEL_OUTOF10: 5
PAINLEVEL_OUTOF10: 5
PAINLEVEL_OUTOF10: 9
PAINLEVEL_OUTOF10: 0
PAINLEVEL_OUTOF10: 0
PAINLEVEL_OUTOF10: 3
PAINLEVEL_OUTOF10: 4
PAINLEVEL_OUTOF10: 0
PAINLEVEL_OUTOF10: 6
PAINLEVEL_OUTOF10: 5
PAINLEVEL_OUTOF10: 10
PAINLEVEL_OUTOF10: 0
PAINLEVEL_OUTOF10: 10
PAINLEVEL_OUTOF10: 6
PAINLEVEL_OUTOF10: 9
PAINLEVEL_OUTOF10: 7
PAINLEVEL_OUTOF10: 10
PAINLEVEL_OUTOF10: 2

## 2021-01-01 ASSESSMENT — PAIN DESCRIPTION - ONSET
ONSET: ON-GOING
ONSET: AWAKENED FROM SLEEP
ONSET: ON-GOING
ONSET: GRADUAL
ONSET: ON-GOING
ONSET: GRADUAL
ONSET: ON-GOING
ONSET: ON-GOING

## 2021-01-01 ASSESSMENT — PAIN DESCRIPTION - DESCRIPTORS
DESCRIPTORS: ACHING
DESCRIPTORS: ACHING;DISCOMFORT
DESCRIPTORS: ACHING
DESCRIPTORS: ACHING;DISCOMFORT
DESCRIPTORS: ACHING;DISCOMFORT
DESCRIPTORS: ACHING
DESCRIPTORS: DULL;ACHING;BURNING
DESCRIPTORS: THROBBING
DESCRIPTORS: ACHING
DESCRIPTORS: ACHING;CONSTANT;DULL
DESCRIPTORS: ACHING
DESCRIPTORS: ACHING;DISCOMFORT
DESCRIPTORS: ACHING;DISCOMFORT
DESCRIPTORS: ACHING
DESCRIPTORS: ACHING;DISCOMFORT
DESCRIPTORS: ACHING
DESCRIPTORS: ACHING;DISCOMFORT
DESCRIPTORS: ACHING
DESCRIPTORS: ACHING;DISCOMFORT
DESCRIPTORS: ACHING

## 2021-01-01 ASSESSMENT — PAIN DESCRIPTION - PAIN TYPE
TYPE: CHRONIC PAIN
TYPE: ACUTE PAIN
TYPE: CHRONIC PAIN
TYPE: ACUTE PAIN
TYPE: CHRONIC PAIN

## 2021-01-01 ASSESSMENT — PAIN - FUNCTIONAL ASSESSMENT

## 2021-01-01 ASSESSMENT — PAIN SCALES - WONG BAKER
WONGBAKER_NUMERICALRESPONSE: 0
WONGBAKER_NUMERICALRESPONSE: 2
WONGBAKER_NUMERICALRESPONSE: 0
WONGBAKER_NUMERICALRESPONSE: 2
WONGBAKER_NUMERICALRESPONSE: 0
WONGBAKER_NUMERICALRESPONSE: 2
WONGBAKER_NUMERICALRESPONSE: 0
WONGBAKER_NUMERICALRESPONSE: 2
WONGBAKER_NUMERICALRESPONSE: 0
WONGBAKER_NUMERICALRESPONSE: 6
WONGBAKER_NUMERICALRESPONSE: 0
WONGBAKER_NUMERICALRESPONSE: 2
WONGBAKER_NUMERICALRESPONSE: 0

## 2021-01-01 ASSESSMENT — PAIN DESCRIPTION - ORIENTATION
ORIENTATION: LOWER
ORIENTATION: LOWER;MID
ORIENTATION: LOWER
ORIENTATION: LOWER;MID
ORIENTATION: LEFT;LOWER
ORIENTATION: LOWER
ORIENTATION: LOWER
ORIENTATION: LOWER;MID
ORIENTATION: LOWER
ORIENTATION: LEFT
ORIENTATION: LEFT;LOWER
ORIENTATION: LEFT;LOWER
ORIENTATION: MID;LOWER
ORIENTATION: LOWER
ORIENTATION: POSTERIOR;MID
ORIENTATION: LOWER
ORIENTATION: LEFT;LOWER
ORIENTATION: LOWER
ORIENTATION: LOWER
ORIENTATION: LEFT
ORIENTATION: LEFT;LOWER
ORIENTATION: LOWER

## 2021-01-01 ASSESSMENT — PAIN DESCRIPTION - FREQUENCY
FREQUENCY: INTERMITTENT
FREQUENCY: CONTINUOUS
FREQUENCY: INTERMITTENT
FREQUENCY: INTERMITTENT
FREQUENCY: CONTINUOUS
FREQUENCY: INTERMITTENT
FREQUENCY: INTERMITTENT
FREQUENCY: CONTINUOUS
FREQUENCY: INTERMITTENT
FREQUENCY: CONTINUOUS
FREQUENCY: INTERMITTENT
FREQUENCY: CONTINUOUS
FREQUENCY: INTERMITTENT

## 2021-01-01 ASSESSMENT — ENCOUNTER SYMPTOMS
SHORTNESS OF BREATH: 0
VOMITING: 0
DIARRHEA: 0
WHEEZING: 1
ANAL BLEEDING: 0
SORE THROAT: 0
SHORTNESS OF BREATH: 1
CONSTIPATION: 0
SORE THROAT: 0
SHORTNESS OF BREATH: 1
CHEST TIGHTNESS: 0
BACK PAIN: 0
RHINORRHEA: 0
NAUSEA: 1
ABDOMINAL PAIN: 1
COLOR CHANGE: 0
COUGH: 1
BLOOD IN STOOL: 0
NAUSEA: 0
ABDOMINAL PAIN: 0
WHEEZING: 0
SHORTNESS OF BREATH: 1

## 2021-01-01 ASSESSMENT — PAIN DESCRIPTION - PROGRESSION
CLINICAL_PROGRESSION: NOT CHANGED
CLINICAL_PROGRESSION: GRADUALLY IMPROVING
CLINICAL_PROGRESSION: GRADUALLY IMPROVING
CLINICAL_PROGRESSION: NOT CHANGED
CLINICAL_PROGRESSION: GRADUALLY WORSENING
CLINICAL_PROGRESSION: NOT CHANGED
CLINICAL_PROGRESSION: NOT CHANGED
CLINICAL_PROGRESSION: GRADUALLY WORSENING
CLINICAL_PROGRESSION: NOT CHANGED
CLINICAL_PROGRESSION: GRADUALLY WORSENING
CLINICAL_PROGRESSION: GRADUALLY IMPROVING
CLINICAL_PROGRESSION: NOT CHANGED
CLINICAL_PROGRESSION: GRADUALLY IMPROVING
CLINICAL_PROGRESSION: GRADUALLY IMPROVING
CLINICAL_PROGRESSION: NOT CHANGED
CLINICAL_PROGRESSION: GRADUALLY WORSENING
CLINICAL_PROGRESSION: GRADUALLY IMPROVING
CLINICAL_PROGRESSION: GRADUALLY IMPROVING
CLINICAL_PROGRESSION: NOT CHANGED
CLINICAL_PROGRESSION: NOT CHANGED
CLINICAL_PROGRESSION: GRADUALLY IMPROVING
CLINICAL_PROGRESSION: GRADUALLY IMPROVING
CLINICAL_PROGRESSION: NOT CHANGED
CLINICAL_PROGRESSION: GRADUALLY WORSENING
CLINICAL_PROGRESSION: NOT CHANGED
CLINICAL_PROGRESSION: GRADUALLY WORSENING
CLINICAL_PROGRESSION: GRADUALLY IMPROVING
CLINICAL_PROGRESSION: NOT CHANGED
CLINICAL_PROGRESSION: NOT CHANGED
CLINICAL_PROGRESSION: GRADUALLY IMPROVING
CLINICAL_PROGRESSION: NOT CHANGED
CLINICAL_PROGRESSION: GRADUALLY IMPROVING
CLINICAL_PROGRESSION: GRADUALLY IMPROVING
CLINICAL_PROGRESSION: NOT CHANGED
CLINICAL_PROGRESSION: GRADUALLY IMPROVING
CLINICAL_PROGRESSION: GRADUALLY IMPROVING
CLINICAL_PROGRESSION: NOT CHANGED
CLINICAL_PROGRESSION: NOT CHANGED
CLINICAL_PROGRESSION: GRADUALLY IMPROVING
CLINICAL_PROGRESSION: GRADUALLY IMPROVING
CLINICAL_PROGRESSION: NOT CHANGED
CLINICAL_PROGRESSION: GRADUALLY IMPROVING
CLINICAL_PROGRESSION: NOT CHANGED
CLINICAL_PROGRESSION: GRADUALLY IMPROVING
CLINICAL_PROGRESSION: NOT CHANGED
CLINICAL_PROGRESSION: GRADUALLY IMPROVING
CLINICAL_PROGRESSION: NOT CHANGED
CLINICAL_PROGRESSION: NOT CHANGED
CLINICAL_PROGRESSION: GRADUALLY IMPROVING
CLINICAL_PROGRESSION: NOT CHANGED
CLINICAL_PROGRESSION: GRADUALLY IMPROVING
CLINICAL_PROGRESSION: NOT CHANGED
CLINICAL_PROGRESSION: GRADUALLY IMPROVING
CLINICAL_PROGRESSION: NOT CHANGED
CLINICAL_PROGRESSION: GRADUALLY WORSENING

## 2021-01-01 ASSESSMENT — PATIENT HEALTH QUESTIONNAIRE - PHQ9
SUM OF ALL RESPONSES TO PHQ QUESTIONS 1-9: 1
SUM OF ALL RESPONSES TO PHQ9 QUESTIONS 1 & 2: 1
SUM OF ALL RESPONSES TO PHQ QUESTIONS 1-9: 1
1. LITTLE INTEREST OR PLEASURE IN DOING THINGS: 0

## 2021-01-01 ASSESSMENT — COPD QUESTIONNAIRES: CAT_SEVERITY: SEVERE

## 2021-01-02 NOTE — CARE COORDINATION
[facility] Transitions Initial Follow Up Call    Call within 2 business days of discharge: Yes    Patient: Srinivas Neumann Patient : 1950   MRN: [id number]  Reason for Admission: [address]  Discharge Date: 20 RARS: Readmission Risk Score: 21  Facility: [address] [place] [address] Discharge [facility]       Complaint Diagnosis Description Type Department Provider    20 Shortness of Breath COPD exacerbation ([address] . .. ED to Hosp-Admission (Discharged) (ADMITTED) Inez Salinas MD; Adrienne Sun. .. Non-face-to-face services provided:  Obtained and reviewed discharge summary and/or continuity of care documents  Assessment and support for treatment adherence and medication management-Zithromax, Prednisone    Care Transitions 24 Hour Call    Do you have any ongoing symptoms?: Yes  Patient-reported symptoms: Cough, Other (Comment: Wheezing)  Interventions for patient-reported symptoms: Other (Comment: No need identified, reported)  Do you have a copy of your discharge instructions?: Yes  Do you have all of your prescriptions and are they filled?: Yes  Have you been contacted by a [address]?: No  Have you scheduled your follow up appointment?: No (Comment: Patient to schedule on next business day)  Were you discharged with any Home Care or Post Acute Services: No  Do you feel like you have everything you need to keep you well at home?: Yes  Care Transitions Interventions   Home Care Waiver: Declined        DME Assistance: Declined     Senior Services: Declined         Follow Up  Future Appointments   Date Time Provider Jourdan Funez   2021  1:45 PM Eusebio Loyola MD [facility]  [facility]   10/12/2021  4:00 PM Fauzia Lo LPN [facility]    11:15 AM David Montaño MD [address] [place] [address]   . Spoke briefly w/ Patient for initial Care Transition/Covid19 Risk Monitoring discharge call. Reports Copd sx including cough, wheezing and sob on exertion at baseline.  Denies

## 2021-01-05 NOTE — ANESTHESIA PRE PROCEDURE
Department of Anesthesiology  Preprocedure Note       Name:  Cheryln Scheuermann   Age:  65 y.o.  :  1950                                          MRN:  [id number]         Date:  2021      Surgeon: Zan Leary):  Britni Kohli MD    Procedure: Procedure(s):  BRONCHOSCOPY ENDOBRONCHIAL ULTRASOUND    Medications prior to admission:   Prior to Admission medications    Medication Sig Start Date End Date Taking? Authorizing Provider   predniSONE (DELTASONE) 20 MG tablet Take 2 tablets by mouth daily for 10 days 21  Catalina Landa MD   fluticasone (FLONASE) 50 MCG/ACT nasal spray 1 spray by Each Nostril route daily    Historical Provider, MD   HYDROcodone-acetaminophen (NORCO)  MG per tablet Take 1 tablet by mouth every 4 hours as needed for Pain for up to 30 days.  Indications: 180 20  Srini Murillo PA-C   dilTIAZem (CARDIZEM CD) 120 MG extended release capsule Take 1 capsule by mouth daily 20   Radha Phan MD   simvastatin (ZOCOR) 20 MG tablet Take 1 tablet by mouth nightly 20   Samaria Monge MD   theophylline (UNIPHYL) 400 MG extended release tablet Take 0.5 tablets by mouth daily 10/8/20   Alexander Ruggiero MD   ipratropium-albuterol (DUONEB) 0.5-2.5 (3) MG/3ML SOLN nebulizer solution Inhale 3 mLs into the lungs 4 times daily 20   Alexander Ruggiero MD   albuterol sulfate HFA (PROAIR HFA) 108 (90 Base) MCG/ACT inhaler Inhale 2 puffs into the lungs every 6 hours as needed for Wheezing 20   Samaria Monge MD   Cholecalciferol (VITAMIN D3) 25 MCG (1000 UT) TABS Take by mouth daily     Historical Provider, MD   calcium carbonate 600 MG TABS tablet Take 1 tablet by mouth daily    Historical Provider, MD   aspirin 81 MG EC tablet Take 81 mg by mouth daily    Historical Provider, MD   Fexofenadine HCl (MUCINEX ALLERGY PO) Take by mouth 2 times daily     Historical Provider, MD       Current medications: No current facility-administered medications for this visit. No current outpatient medications on file. Facility-Administered Medications Ordered in Other Visits   Medication Dose Route Frequency Provider Last Rate Last Admin    lactated ringers infusion   Intravenous Continuous Meredith Sutton  mL/hr at 01/05/21 0645 New Bag at 01/05/21 0645       Allergies: Allergies   Allergen Reactions    Formaldehyde     Gabapentin Other (See Comments)    Norflex Tablets [Orphenadrine]     Cranberry Rash       Problem List:    Patient Active Problem List   Diagnosis Code    Hyperlipidemia E78.5    COPD (chronic obstructive pulmonary disease) ([facility] [address] J44.9    Leg pain M79.606    Hypertension I10    Tobacco abuse Z72.0    Abdominal aortic aneurysm (AAA) without rupture ([facility]) I71.4    Degeneration of lumbar or lumbosacral intervertebral disc M51.37    Osteopenia M85.80    Anxiety F41.9    Acute on chronic respiratory failure with hypoxia ([facility]) J96.21    Lung mass R91.8    COPD exacerbation ([facility]) J44.1    Acute exacerbation of chronic obstructive pulmonary disease (COPD) ([facility] J44.1       Past Medical History:        Diagnosis Date    Arthritis     [facility]     follow with Dr Yohannes Delgado Full dentures     H/O cardiovascular stress test 11/18/2009    thallium, normal no ischemia EF70%     H/O cardiovascular stress test 10/11/2013    thallium,EF70%, normal, no ischemia    H/O Doppler ultrasound 10/13/2010    carotid, right normal, left normal    H/O Doppler ultrasound 10/07/2014    Carotid mild bilateral internal carotid artery disease less than 50% in severity. Normal vertebral artery flows with good velocities. Incidental finding of possible thyroid nodule in the left lobe.      History of 24 hour EKG monitoring 03/11/2011    NSR no ectopy    History of nuclear stress test 10/21/2016 Comment: rare\"twice per year\"                                Counseling given: Not Answered      Vital Signs (Current): There were no vitals filed for this visit. BP Readings from Last 3 Encounters:   01/05/21 (!) 160/74   12/31/20 (!) 140/74   12/18/20 (!) 160/80       NPO Status:                                                                                 BMI:   Wt Readings from Last 3 Encounters:   01/05/21 125 lb (56.7 kg)   12/31/20 125 lb 1.6 oz (56.7 kg)   12/18/20 124 lb 12.8 oz (56.6 kg)     There is no height or weight on file to calculate BMI.    CBC:   Lab Results   Component Value Date    WBC 13.8 01/05/2021    RBC 4.24 01/05/2021    HGB 11.7 01/05/2021    HCT 40.1 01/05/2021    MCV 94.6 01/05/2021    RDW 15.6 01/05/2021     01/05/2021       CMP:   Lab Results   Component Value Date     01/05/2021    K 3.5 01/05/2021    CL 98 01/05/2021    CO2 37 01/05/2021    BUN 18 01/05/2021    CREATININE 0.6 01/05/2021    GFRAA >60 01/05/2021    AGRATIO 1.6 08/28/2020    LABGLOM >60 01/05/2021    GLUCOSE 106 01/05/2021    PROT 6.1 12/31/2020    PROT 6.5 03/06/2015    CALCIUM 8.9 01/05/2021    BILITOT 0.1 12/31/2020    ALKPHOS 77 12/31/2020    AST 10 12/31/2020    ALT 7 12/31/2020       POC Tests: No results for input(s): POCGLU, POCNA, POCK, POCCL, POCBUN, POCHEMO, POCHCT in the last 72 hours.     Coags:   Lab Results   Component Value Date    PROTIME 9.6 01/05/2021    INR 0.80 01/05/2021    APTT 23.3 01/05/2021       HCG (If Applicable): No results found for: PREGTESTUR, PREGSERUM, HCG, HCGQUANT     ABGs:   Lab Results   Component Value Date    PO2ART 73 12/03/2020    YZY7JCV 66.0 12/03/2020    PMO3LMX 40.9 12/03/2020        Type & Screen (If Applicable):  No results found for: LABABO, LABRH    Drug/Infectious Status (If Applicable):  No results found for: HIV, HEPCAB    COVID-19 Screening (If Applicable):   Lab Results   Component Value Date COVID19 NOT DETECTED 2020    COVID19 NOT DETECTED 2020         Anesthesia Evaluation  Patient summary reviewed  Airway: Mallampati: II  TM distance: >3 FB   Neck ROM: full  Mouth opening: > = 3 FB Dental:    (+) lower dentures, upper dentures and edentulous      Pulmonary:   (+) COPD:  decreased breath sounds,                            ROS comment: Chest CT 2020: Impression   1. Persistent mediastinal adenopathy and right upper and lower lobe pulmonary   nodule is suspicious for malignancy.  Recommend tissue sampling. 2. Ground-glass opacity seen within the left lung on the prior PET-CT are no   longer visualized. Acute exacerbation of chronic obstructive pulmonary disease    Former Smoker - 52 pack years   Quit Smokin20        Cardiovascular:  Exercise tolerance: poor (<4 METS),   (+) hypertension:, valvular problems/murmurs: AS, dysrhythmias: SVT, hyperlipidemia        Rhythm: regular  Rate: normal           Beta Blocker:  Not on Beta Blocker      ROS comment: Echo 2020:  Summary   Technically difficult study patient sitting up during exam.   Left ventricular systolic function is normal.   Ejection fraction is visually estimated at 50-55%. Mild left ventricular hypertrophy. Grade II diastolic dysfunction. Calcified aortic valve with restricted motion of the non coronary cusp; mild   aortic stenosis. No evidence of any pericardial effusion. Neuro/Psych:   Negative Neuro/Psych ROS              GI/Hepatic/Renal: Neg GI/Hepatic/Renal ROS            Endo/Other: Negative Endo/Other ROS   (+) electrolyte abnormalities, . Abdominal:       Abdomen: soft. Vascular:           ROS comment: Abdominal aortic aneurysm (AAA) without rupture. Anesthesia Plan      general     ASA 4       Induction: intravenous. MIPS: Postoperative opioids intended and Prophylactic antiemetics administered. Anesthetic plan and risks discussed with patient. Use of blood products discussed with patient whom consented to blood products. Plan discussed with attending and CRNA. HELIO Pelletier - CRNA   1/5/2021         Pre Anesthesia Assessment complete.  Chart reviewed on 1/5/2021

## 2021-01-05 NOTE — PROGRESS NOTES
Bronch:  Assisted Dr. Javier Barnard with bronchoscopy. Scope used #  [id number] & [id number]  Prepared patient for EBUS bronchoscopy. Scope 0516 was used pre and post EBUS to clear airway. Scope [id number] EBUS scope was used biopsy procedure. See Physicians note for details.

## 2021-01-05 NOTE — PROGRESS NOTES
0951-pt arrived from endo suite, monitors applied. Report received from [facility] and [facility]. PT awake with labored breathing and expiratory wheezes. 99% on 6L simple mask. HR 80's with bursts into the 130's. BP stable. 12-  at bedside STAT EKG ordered. PT sitting in high fowlers complaining of SOB, 95%/4LNC. 1007- pt now 88% on 5L/NC. Mercedes Perdomo from heart center at bedside to perform EKG. PT unable to lay flat for procedure. Placed on 6L Simple mask and coached to take slow deep breaths. Oxygenation increased to 96%. 1014- EKG complete, Dr. Jason Grier at bedside to review. Pt placed back on 4L/NC. New orders received. Awaiting xopenex breathing treatment from pharmacy. PT complaining of pain and anxiety stating she hasn't had her Vicodin today and she is having nicotine withdrawal. Pt educated about the effect of pain medication on breathing. Pt denies that it has any effect on her breathing. Emotional support given. 1018- pt getting more anxious and is adamant that she receives something for pain or anxiety. Pain medication given. Will remain at bedside. CXR on way. 1028-pt had coughing spell and HR increased to 130's briefly. Then returned to NSR 80's. 1031- breathing treatment started. Radiology here for post op CXR. 1045- breathing treatment complete, lungs diminished otherwise clear. Pt breathing improved. Denies SOB, stating she feels like she is breathing normal for her. Pt on 4L/NC, which is what she wears continuously at home. will continue to monitor. 1052-  at bedside to speak with pt post op. He reviewed CXR and ok to transfer to [facility] when appropriate. 1105- pt 88%-91% on 4L/NC.  updated, okay to transfer to [facility]. 1017- pt to [facility], report given to [facility] at bedside.

## 2021-01-05 NOTE — OP NOTE
[address] [place] [address], [address] [place] [address]                                OPERATIVE REPORT    PATIENT NAME: Baron Calhoun                   :        1950  MED REC NO:   [id number]                          ROOM:  ACCOUNT NO:   [de-identified]                           ADMIT DATE: 2021  PROVIDER:     Chidi Martinez MD    DATE OF PROCEDURE:  2021    PREOPERATIVE DIAGNOSES:  PET-avid mediastinal lymph node and multiple  pleural lesions. POSTPROCEDURE DIAGNOSES:  PET-avid mediastinal lymph node and multiple  pleural lesions. PROCEDURES:  1. Diagnostic bronchoscopy. 2.  Endobronchial ultrasound with biopsy of subcarinal lymph nodes using  both a 19-gauge needle and 21-gauge needle. SURGEON:  Chidi Martinez MD    PREOP:  This is a 25-year-old female who has been in the hospital  multiple times for COPD exacerbation. She was found to have PET-avid  mediastinal lymph node and pleural nodules, neither were able to be  biopsied by Intervention Radiology and the pleural nodules were not well  accessible by navigational bronchoscopy. I did perform a  mediastinoscopy on her; however, her neck was not able to be bent back  and we were not able to see the nodule that we were trying to biopsy. Two lymph nodes were sampled at that time and they both came back benign  along with a nodule in the thyroid. Risks and benefits of endobronchial  ultrasound were discussed in detail with the patient. The patient  understood and agreed to proceed. FINDINGS:  We were able to visualize the both 19-gauge and 21-gauge  needles entering into the mass in the appropriate position as expected  by CAT scan. ESTIMATED BLOOD LOSS:  10 mL. DESCRIPTION OF PROCEDURE:  The patient was identified, brought to the  bronchoscopy suite, laid in supine position. She was intubated by  Anesthesia. Prior to beginning, a timeout was performed.   We first

## 2021-01-05 NOTE — ANESTHESIA POSTPROCEDURE EVALUATION
Department of Anesthesiology  Postprocedure Note    Patient: Ronald Caro  MRN: [id number]  YOB: 1950  Date of evaluation: 1/5/2021  Time:  10:00 AM     Procedure Summary     Date: 01/05/21 Room / Location: [address] [place] [address]    Anesthesia Start: 9641 Anesthesia Stop: 1000    Procedure: BRONCHOSCOPY ENDOBRONCHIAL ULTRASOUND (N/A ) Diagnosis: (lung nodule)    Surgeons: Conchita Flores MD Responsible Provider: Lloyd Medina MD    Anesthesia Type: general ASA Status: 4          Anesthesia Type: general    Sam Phase I: Sam Score: 8    Sam Phase II:      Last vitals: Reviewed and per EMR flowsheets.        Anesthesia Post Evaluation    Patient location during evaluation: PACU  Patient participation: complete - patient participated  Level of consciousness: awake and alert  Pain score: 0  Airway patency: patent  Nausea & Vomiting: no vomiting and no nausea  Complications: no  Cardiovascular status: blood pressure returned to baseline and hemodynamically stable  Respiratory status: acceptable, spontaneous ventilation, nonlabored ventilation and face mask  Hydration status: stable

## 2021-01-05 NOTE — BRIEF OP NOTE
Brief Postoperative Note      Patient: Rohini Osorio  YOB: 1950  MRN: [id number]    Date of Procedure: 1/5/2021    Pre-Op Diagnosis: lung nodule    Post-Op Diagnosis: Same       Procedure(s):  BRONCHOSCOPY ENDOBRONCHIAL ULTRASOUND    Surgeon(s):  Reena Strong MD    Assistant:  * No surgical staff found *    Anesthesia: General    Estimated Blood Loss (mL): Minimal    Complications: None    Specimens:   * No specimens in log *    Implants:  * No implants in log *      Drains: * No LDAs found *    Findings: [id number]    Electronically signed by Estefania Farmer MD on 1/5/2021 at 11:00 AM

## 2021-01-05 NOTE — PROGRESS NOTES
1117 pt recd from PACU to same day. Report recd at bedside from [facility]. Patient denies pain or nausea. Call light in reach  1123 hot tea provided  1145 denies needs, sipping on tea.  Call light in reach  1200 denies needs  [phone] discharge instructions reviewed with patient and patients friend Sabrina Good via phone, verbalized understanding  [phone] pt dressing  [phone] discharged to car via wheelchair

## 2021-01-05 NOTE — H&P
Patient was seen in pre-op. I have reviewed the history and physical.  I have examined the patient today. There are no changes noted from the H & P available in chart. .The patient was counseled at length about the risks of vandana Covid-19 during their perioperative period and any recovery window from their procedure. The patient was made aware that vandana Covid-19  may worsen their prognosis for recovering from their procedure  and lend to a higher morbidity and/or mortality risk. All material risks, benefits, and reasonable alternatives including postponing the procedure were discussed. The patient does wish to proceed with the procedure at this time.

## 2021-01-11 PROBLEM — C34.90 SMALL CELL LUNG CANCER (HCC): Status: ACTIVE | Noted: 2021-01-01

## 2021-01-11 NOTE — ED PROVIDER NOTES
12 lead EKG per my interpretation:  Normal Sinus Rhythm 96 PAC's  Axis is   Left  QTc is  432  There is no specific T wave changes appreciated. There is no specific ST wave changes appreciated.     Prior EKG to compare with was not available         Omega Benitez DO  01/11/21 4711

## 2021-01-11 NOTE — CARE COORDINATION
Patient identified as potential readmission. Last admission 12/28-12/31 for COPD exacerbation. Patient here today for decreased O2 saturation. Per triage documentation \"Pt presents to ED by EMS from 's office. pts oxygen tank ran out and O2 sats dropped into the 70's. Pt wears 4L NC continuously. Pt is now on 4L with oxygen saturation of 97%. A&O x4\". CM met with patient to begin discharge planning. CM introduced self and CM role. Patient states she presents to ER via EMS from her doctor's office due to running out of oxygen. Patient states she had 2 portable oxygen tanks with her, but when the office staff attempted to turn second tank on all of the oxygen escaped the tank. Patient states she has 6 portable oxygen tanks and a concentrator at home. Patient states she lives with her granddaughter Skye in a single story home in [place] [facility]. Patient has a PCP and insurance which assists with medication affordability. Patient uses the following DME: O2 @ 4LPM continuously from [employer] [place]. Patient does not drive, but depends on her granddaughter for transportation to and from [address] [place] [address]. Patient denies [address] and states she does not feel as if she needs HHC. States her granddaughter assists as needed. Patient plan discharge home. Readmission was avoided and patient was able to be discharged home.

## 2021-01-11 NOTE — ED PROVIDER NOTES
I independently examined and evaluated Mahsa Benton. In brief, patient presents to the emergency department shortness of breath. She does have chronic respiratory failure and wears 4 L at baseline. She is at an outpatient appointment today and her oxygen canister was left on room and out of oxygen. She was then brought to the emergency department for evaluation. In the emergency department, she states that she simply just needed oxygen. Denies any acute complaints. She is on her home oxygen level with unremarkable vitals. .    Focused exam revealed diminished breath sounds bilaterally, no significant signs respiratory distress. Abdomen soft, nontender nonperitoneal.  Neuro exam is nonfocal.    ED course: Labs and imaging are obtained. Chest x-ray is at baseline. Active saturations and other vitals have been unremarkable emergency department. She does want to go home. Normal laboratory values back when she decided she want to go home. Given that her she seems to been that she was out of oxygen and is now resolved, is not unreasonable. Labs were later obtained and did show a potassium of 2.8. We did call patient to discuss the low potassium level and she is written outpatient prescription for potassium replacement. She will follow-up outpatient. Return precautions for worsening concerns were discussed. She is discharged home in stable condition. All diagnostic, treatment, and disposition decisions were made by myself in conjunction with the advanced practice provider. For all further details of the patient's emergency department visit, please see the advanced practice provider's documentation. Comment: Please note this report has been produced using speech recognition software and may contain errors related to that system including errors in grammar, punctuation, and spelling, as well as words and phrases that may be inappropriate. If there are any questions or concerns please feel free to contact the dictating provider for clarification.         Deloris Hassan MD  01/11/21 [id number]

## 2021-01-11 NOTE — ED TRIAGE NOTES
Pt presents to ED by EMS from 's office. pts oxygen tank ran out and O2 sats dropped into the 70's. Pt wears 4L NC continuously. Pt is now on 4L with oxygen saturation of 97%.  A&O x4 No

## 2021-01-11 NOTE — ED PROVIDER NOTES
EMERGENCY DEPARTMENT ENCOUNTER      PCP: Soy Quezada MD    CHIEF COMPLAINT    Chief Complaint   Patient presents with    Shortness of Breath       I have seen and evaluated this patient with Dr. Katiuska Jha. HPI     Willard is a 65 y.o. female who presents with shortness of breath. Onset was 230, she was at the doctor's office. They forgot to close the valve on her tank and she ran out of oxygen. They sent her here by squad. She admits to mild wheezing, a chronic cough and chest congestion due to COPD. She is also feeling very anxious due to these events and missing her 2:30 dose of vicodin for her back pain. She thinks this is contributing to her mild sob. Denies fevers, sore throat, nasal congestion or chest pain. No n/v/d. Says she knows she has an irregular heart beat for the last month but doesn't think it is a fib    REVIEW OF SYSTEMS    Constitutional:  Denies fever, chills, weight loss or weakness   HENT:  Denies sore throat or ear pain   Cardiovascular:  No chest pain. No palpitations, No syncope  Respiratory:  See HPI. GI:  Denies abdominal pain, nausea, vomiting, or diarrhea  :  Denies any urinary symptoms or vaginal symptoms.    Musculoskeletal:  Denies back pain,   Skin:  Denies rash  Neurologic:  Denies headache, focal weakness or sensory changes   Endocrine:  Denies polyuria or polydypsia   Lymphatic:  Denies swollen glands     All other review of systems are negative  See HPI and nursing notes for additional information       PAST MEDICAL & SURGICAL HISTORY    Past Medical History:   Diagnosis Date    Arthritis     COPD (chronic obstructive pulmonary disease) ([facility] [address]     follow with Dr Javad Odom Full dentures     H/O cardiovascular stress test 11/18/2009    thallium, normal no ischemia EF70%     H/O cardiovascular stress test 10/11/2013    thallium,EF70%, normal, no ischemia    H/O Doppler ultrasound 10/13/2010    carotid, right normal, left normal  H/O Doppler ultrasound 10/07/2014    Carotid mild bilateral internal carotid artery disease less than 50% in severity. Normal vertebral artery flows with good velocities. Incidental finding of possible thyroid nodule in the left lobe.  History of 24 hour EKG monitoring 03/11/2011    NSR no ectopy    History of nuclear stress test 10/21/2016    lexiscan-normal,no ischemia,EF70%,enlarged right ventricle    Hx of chest x-ray 01/02/2015    WNL    Hx of Doppler echocardiogram 12/01/2020    EF 50-55% mild LV hypertrophy. Grade 2 diastolic dysfunction. Mild AS.     Hx of echocardiogram 10/11/2013    10/13 Abn lt ventricular diastolic function, mile MR, EF 57%,10/10 EF55%, mild mitral annular calcification    Hx of echocardiogram 05/02/2019    EF 77-15%, Grade I diastolic dysfunction, Mild aortic stenosis, Calcific thickening of posterior leaflet of mitral valve, Mild Pulm HTN    Hx of pelvic x-ray 01/02/2015    Severe RT his osterarothrosis    Hx of x-ray of hip 01/02/2015    Severe Rt hip osteosrthrosis    Hyperlipidemia     Hypertension     follows with dr Zora Recio Leg pain     Lung nodules     scheduled for bronch 1/5/2020    On home oxygen therapy     \"on 4 liters 24 hours per day\"    Small cell lung cancer ([facility] [place] [address] 1/11/2021    Wears glasses     to read     Past Surgical History:   Procedure Laterality Date    BACK SURGERY      \"about 12 yrs ago - surgery my mid to low back \" done in [address] N/A 1/5/2021    BRONCHOSCOPY ENDOBRONCHIAL ULTRASOUND performed by Cinthya Baig MD at [facility]      \"last one done in my age 61's    CYST REMOVAL  1970's    left arm    EYE SURGERY  2010?    svetlana cataract ext     FOOT SURGERY Right 1970's    \"have screw in 2nd toe\"    HYSTERECTOMY      1999\"took everything \"    JOINT REPLACEMENT Right 2014??    hip    LAPAROSCOPIC APPENDECTOMY N/A 5/18/2019    APPENDECTOMY LAPAROSCOPIC performed by Phuc Coleman MD at [facility]  MEDIASTINOSCOPY N/A 12/10/2020    MEDIASTINOSCOPY performed by Angélica Vallecillo MD at [facility]  1970's       CURRENT MEDICATIONS    Current Outpatient Rx   Medication Sig Dispense Refill    predniSONE (DELTASONE) 20 MG tablet Take 2 tablets by mouth daily for 10 days 20 tablet 0    fluticasone (FLONASE) 50 MCG/ACT nasal spray 1 spray by Each Nostril route daily      HYDROcodone-acetaminophen (NORCO)  MG per tablet Take 1 tablet by mouth every 4 hours as needed for Pain for up to 30 days. Indications: 180 180 tablet 0    dilTIAZem (CARDIZEM CD) 120 MG extended release capsule Take 1 capsule by mouth daily 30 capsule 3    simvastatin (ZOCOR) 20 MG tablet Take 1 tablet by mouth nightly 90 tablet 1    theophylline (UNIPHYL) 400 MG extended release tablet Take 0.5 tablets by mouth daily 15 tablet 3    ipratropium-albuterol (DUONEB) 0.5-2.5 (3) MG/3ML SOLN nebulizer solution Inhale 3 mLs into the lungs 4 times daily 360 mL 5    albuterol sulfate HFA (PROAIR HFA) 108 (90 Base) MCG/ACT inhaler Inhale 2 puffs into the lungs every 6 hours as needed for Wheezing 1 Inhaler 6    Cholecalciferol (VITAMIN D3) 25 MCG (1000 UT) TABS Take by mouth daily       calcium carbonate 600 MG TABS tablet Take 1 tablet by mouth daily      aspirin 81 MG EC tablet Take 81 mg by mouth daily      Fexofenadine HCl (MUCINEX ALLERGY PO) Take by mouth 2 times daily          ALLERGIES    Allergies   Allergen Reactions    Formaldehyde     Gabapentin Other (See Comments)    Norflex Tablets [Orphenadrine]     Cranberry Rash       SOCIAL & FAMILY HISTORY    Social History     Socioeconomic History    Marital status:       Spouse name: None    Number of children: None    Years of education: None    Highest education level: None   Occupational History    None   Social Needs    Financial resource strain: None    Food insecurity     Worry: None     Inability: None    Transportation needs     Medical: None Non-medical: None   Tobacco Use    Smoking status: Former Smoker     Packs/day: 1.50     Years: 47.00     Pack years: 70.50     Types: Cigarettes     Start date: 1973     Quit date: 2020     Years since quittin.1    Smokeless tobacco: Never Used   Substance and Sexual Activity    Alcohol use: Yes     Alcohol/week: 0.0 standard drinks     Comment: rare\"twice per year\"    Drug use: No    Sexual activity: None     Comment:    Lifestyle    Physical activity     Days per week: None     Minutes per session: None    Stress: None   Relationships    Social connections     Talks on phone: None     Gets together: None     Attends Alevism service: None     Active member of club or organization: None     Attends meetings of clubs or organizations: None     Relationship status: None    Intimate partner violence     Fear of current or ex partner: None     Emotionally abused: None     Physically abused: None     Forced sexual activity: None   Other Topics Concern    None   Social History Narrative    None     Family History   Problem Relation Age of Onset    Stroke Mother     Dementia Mother     Heart Attack Mother 61    Coronary Art Dis Mother     Hypertension Mother     High Cholesterol Mother     Cancer Father     Stroke Father     Heart Attack Father 61    Diabetes Sister     Heart Attack Sister     Hypertension Sister     Cancer Sister     COPD Sister     Stroke Sister     Hypertension Sister     Hypertension Sister     Irritable Bowel Syndrome Sister     Pacemaker Sister        PHYSICAL EXAM    VITAL SIGNS: BP (!) 171/71   Pulse 96   Temp 98.6 °F (37 °C)   Resp 18   SpO2 98%    Constitutional:  Well developed, well nourished, no acute distress   HENT:  Atraumatic, moist mucus membranes  Neck/Lymphatics: supple, no JVD, no swollen nodes  Respiratory:  purselipped breathing. Rate 18.    Lungs scattered mild insp and expt wheezing, no retractions Cardiovascular:  Rate 96, irreg Rhythm,  no murmurs/rubs/gallops. No carotid bruits or murmurs heard in carotids. No JVD  GI:  Soft, nontender, normal bowel sounds  Musculoskeletal:    There is no edema, asymmetry, or calf / thigh tenderness bilaterally. No cyanosis. No cool or pale-appearing limb.   Distal cap refill and pulses intact bilateral upper and lower extremities  Bilateral upper and lower extremity ROM intact without pain or obvious deficit  Integument:  Skin is warm and dry, no petechiae   Neurologic:  Alert & oriented, no slurred speech  Psych: Pleasant affect, no hallucinations    XR CHEST PORTABLE   Final Result   Stable chest demonstrating persistent right basilar opacity that could   represent scarring, atelectasis or pneumonia             Results for orders placed or performed during the hospital encounter of 01/11/21   CBC Auto Differential   Result Value Ref Range    WBC 16.6 (H) 4.0 - 10.5 K/CU MM    RBC 4.44 4.2 - 5.4 M/CU MM    Hemoglobin 12.4 (L) 12.5 - 16.0 GM/DL    Hematocrit 42.4 37 - 47 %    MCV 95.5 78 - 100 FL    MCH 27.9 27 - 31 PG    MCHC 29.2 (L) 32.0 - 36.0 %    RDW 15.9 (H) 11.7 - 14.9 %    Platelets 680 867 - 564 K/CU MM    MPV 10.6 7.5 - 11.1 FL    Differential Type AUTOMATED DIFFERENTIAL     Segs Relative 88.0 (H) 36 - 66 %    Lymphocytes % 6.1 (L) 24 - 44 %    Monocytes % 3.7 0 - 4 %    Eosinophils % 0.2 0 - 3 %    Basophils % 0.2 0 - 1 %    Segs Absolute 14.7 K/CU MM    Lymphocytes Absolute 1.0 K/CU MM    Monocytes Absolute 0.6 K/CU MM    Eosinophils Absolute 0.0 K/CU MM    Basophils Absolute 0.0 K/CU MM    Nucleated RBC % 0.0 %    Total Nucleated RBC 0.0 K/CU MM    Total Immature Neutrophil 0.30 K/CU MM    Immature Neutrophil % 1.8 (H) 0 - 0.43 %   COVID-19    Specimen: Nasopharyngeal Swab   Result Value Ref Range    Source UNKNOWN     SARS-CoV-2, NAAT NOT DETECTED          ED COURSE & MEDICAL DECISION MAKING

## 2021-01-13 NOTE — PROGRESS NOTES
Patient Name:  Niurka Washington  Patient :  1950  Patient MRN:  [id number]     Primary Oncologist: Gloria Barnett MD  Referring Provider: Chika Romero MD     Date of Service: 2021      Reason for Consult: For evaluation of small cell carcinoma of the lung     Chief Complaint:    Chief Complaint   Patient presents with    New Patient      Patient Active Problem List:     Hyperlipidemia     COPD (chronic obstructive pulmonary disease) ([address]     Leg pain     Hypertension     Tobacco abuse     Abdominal aortic aneurysm (AAA) without rupture (HCC)     Degeneration of lumbar or lumbosacral intervertebral disc     Osteopenia     Anxiety     Acute on chronic respiratory failure with hypoxia ([address]     Lung mass     COPD exacerbation ([address]     Acute exacerbation of chronic obstructive pulmonary disease (COPD) ([address]     Small cell lung cancer (HCC)     HPI:   Alejandra Arango is a pleasant 78-year-old  female patient who was referred for evaluation of 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 She has underlying COPD and has been followed by pulmonologist  for the history of lung mass. She has been prescribed nebulizer home oxygen and inhaler. CT chest on 2010 showed  1. Stable size and appearance of noncalcified right lower lobe   nodule compared to multiple prior studies dating back to   2008. This lesion measures approximately 8 mm in greatest   dimension on the current study and is most consistent with a   benign etiology, stable for greater than two years. Otherwise, no   CT evidence of acute thoracic abnormality is noted. 2. Multiple hypodense masses within the thyroid gland are not   significantly changed compared to prior studies dating back to   2008. CT abdomen pelvis in May 2019 revealed:  1.  Prior right hip arthroplasty results in streak artifact through the right   aspect of the pelvis which severely limits the exam.  There are right   hemipelvic findings suggesting acute appendicitis though the appendiceal   origin is not identified due to the artifact.   No evidence of bowel   obstruction, perforation, or abscess. 2. Nonspecific jejunal wall thickening which may relate to nondistention   versus enteritis. 3. Bilateral nonobstructing nephrolithiasis. 4. Abdominal aortic atherosclerosis with 2.6 cm distal infrarenal abdominal   aortic aneurysm, see recommendations.       RECOMMENDATIONS:   Managing Abdominal Aortic Aneurysms       2.6-2.9 cm: Every 5 years*       3.0-3.4 cm: Every 3 years.       3.5-3.9 cm: Every 1 year.       4.0-4.4 cm: Every 1 year. Recommend vascular consultation.       4.5-5.4 cm: Every 6 months. Recommend vascular consultation.       Greater than or equal to 5.5 cm: Referral to vascular surgeon. CTA chest on September 18, 2020 revealed:      There is a mass within the right lung apex measuring 16 mm in diameter as   well as a 16 x 16 mm spiculated mass in the right lower lobe.  A smaller 9.6   mm nodule is present within the right lower lobe. PET scan on October 22, 2020 showed  1. Dominant 1.6 cm nodules in the medial right upper lobe and central right   lower lobe are hypermetabolic and suspicious for malignancy, possibly   synchronous lung cancers. 2. A 3 x 2 cm precarinal merrick mass is intensely hypermetabolic and   consistent with metastatic disease. 3. A smaller 1 cm nodule in the lateral right lower lobe has not   significantly changed since 2009 and demonstrates only minimal uptake,   consistent with benign etiology. 4. Patchy ground-glass opacity throughout the left lung demonstrates mild   uptake and is favored to be infectious or inflammatory. 5. No distant metastatic disease. CT chest on December 7, 2020 showed  1. Persistent mediastinal adenopathy and right upper and lower lobe pulmonary   nodule is suspicious for malignancy.  Recommend tissue sampling.    2. Ground-glass opacity seen within the left lung on the prior PET-CT are no   longer visualized. She had mediastinoscopy on December 10, 2020. Pathology report showed  Final Pathologic Diagnosis:   A. Submitted as \"cervical lymph node\", biopsy:   -     Thyroid tissue, benign  B. Lymph node, 4R, biopsy:   -     Benign lymph node with sinus histiocystosis. C. Lymph node, 4R #2, biopsy:   -     Benign lymph node with sinus histiocystosis. She had EBUS on January 5, 2021. Pathology report showed  Final Cytologic Diagnosis:   A. Mediastinal mass, fine needle aspirate with cell block:   -     SMALL CELL CARCINOMA.     B. Bronchoalveolar lavage with cell block: -  Few atypical cells seen. She is scheduled for MRI of the brain in January 2021  We discussed about of concomitant chemo and radiation therapy for limited stage of small cell of the lung  I referred to radiation oncologist and schedule for the chemo education. Hopefully she will be able to start the chemotherapy within 2 weeks. We discussed about the risk and benefit of chemotherapy. I strongly recommend she follow-up with pulmonologist for COPD exacerbation. She complains of increasing shortness of breath this morning. She has wheezing. I recommend to use nebulizer, inhaler and Mucinex. She has difficulty to clear her throat. Past Medical History:   Arthritis, COPD, dyslipidemia, hypertension, right lower lung nodule, depression and anxiety. Past Surgery History:    Back surgery, endoscopic bronchial ultrasound study on January 5, 2021, colonoscopy in her 62s, cyst removal from the left arm, bilateral cataract surgery in 2010, right foot surgery in the 70s, complete hysterectomy in 1999, right hip replacement in 2014, laparoscopic appendectomy in May 2019, mediastinoscopy on December 10, 2020, tubal ligation in the 70s. Social History:  She smokes about 1 pack/day for more than 50 years. She denies any alcohol or illicit drug use.   She has 2 children. Family History:  Father had small cell cancer of the lung. He was a smoker. Allergies   Allergen Reactions    Formaldehyde     Gabapentin Other (See Comments)    Norflex Tablets [Orphenadrine]     Cranberry Rash       Current Outpatient Medications on File Prior to Visit   Medication Sig Dispense Refill    potassium chloride (KLOR-CON M) 10 MEQ extended release tablet Take 1 tablet by mouth 2 times daily 10 tablet 0    fluticasone (FLONASE) 50 MCG/ACT nasal spray 1 spray by Each Nostril route daily      HYDROcodone-acetaminophen (NORCO)  MG per tablet Take 1 tablet by mouth every 4 hours as needed for Pain for up to 30 days. Indications: 180 180 tablet 0    dilTIAZem (CARDIZEM CD) 120 MG extended release capsule Take 1 capsule by mouth daily 30 capsule 3    simvastatin (ZOCOR) 20 MG tablet Take 1 tablet by mouth nightly 90 tablet 1    theophylline (UNIPHYL) 400 MG extended release tablet Take 0.5 tablets by mouth daily 15 tablet 3    ipratropium-albuterol (DUONEB) 0.5-2.5 (3) MG/3ML SOLN nebulizer solution Inhale 3 mLs into the lungs 4 times daily 360 mL 5    albuterol sulfate HFA (PROAIR HFA) 108 (90 Base) MCG/ACT inhaler Inhale 2 puffs into the lungs every 6 hours as needed for Wheezing 1 Inhaler 6    Cholecalciferol (VITAMIN D3) 25 MCG (1000 UT) TABS Take by mouth daily       calcium carbonate 600 MG TABS tablet Take 1 tablet by mouth daily      aspirin 81 MG EC tablet Take 81 mg by mouth daily      Fexofenadine HCl (MUCINEX ALLERGY PO) Take by mouth 2 times daily        No current facility-administered medications on file prior to visit. Review of Systems:    Constitutional:  No weight loss, No fever, No chills, No night sweats. Energy level low. Eyes:  No diplopia, No transient or permanent loss of vision, No scotomata. ENT / Mouth:  No epistaxis, No dysphagia, No hoarseness, No oral ulcers, No gingival bleeding.   No sore throat, No postnasal drip, No nasal drip, No mouth pain, No sinus pain, No tinnitus, Normal hearing. Cardiovascular:  No chest pain, No palpitations, No syncope, No upper extremity edema, No lower extremity edema, No calf discomfort. Respiratory:  No cough. No hemoptysis, No pleurisy, + wheezing,  Dyspnea on exertion. Breast:  No breast mass, No pain, No nipple discharge, No change in size, No change in shape. Gastrointestinal:  No abdominal pain, No abdominal cramping, No nausea, No vomiting, No constipation, No diarrhea, No hematochezia, No melena, No jaundice, No dyspepsia, No dysphagia. Urinary:  No dysuria, No hematuria, No urinary incontinence. Gynecological:  s/p hysterectomy  Musculoskeletal:  No muscle pain, No swollen joints, No joint redness, No bone pain, No spine tenderness. Skin:  No rash, No nodules, No pruritus, No lesions. Neurologic:  No confusion, No seizures, No syncope, No tremor, No speech change, No headache, No hiccups, No abnormal gait, No sensory changes, No weakness. Psychiatric:  + depression, + anxiety, Concentration normal.  Endocrine:  No polyuria, No polydipsia, No hot flashes, No thyroid symptoms. Hematologic:  No epistaxis, No gingival bleeding, No petechiae, No ecchymosis. Lymphatic:  No lymphadenopathy, No lymphedema. Allergy / Immunologic:  No eczema, No frequent mucous infections, No frequent respiratory infections, No recurrent urticarial, No frequent skin infections.      Vital Signs: BP (!) 143/73 (Site: Right Upper Arm, Position: Sitting, Cuff Size: Medium Adult)   Pulse 103   Temp 97.9 °F (36.6 °C) (Infrared)   Ht 5' (1.524 m)   Wt 119 lb 3.2 oz (54.1 kg)   SpO2 91%   BMI 23.28 kg/m²      Physical Exam:  CONSTITUTIONAL: awake, alert, cooperative, no apparent distress   EYES: pupils equal, round and reactive to light, sclera clear and conjunctiva normal  ENT: Normocephalic, without obvious abnormality, atraumatic  NECK: supple, symmetrical, no jugular venous distension and no carotid bruits HEMATOLOGIC/LYMPHATIC: no cervical, supraclavicular or axillary lymphadenopathy   LUNGS: Diminished breath sound bilaterally. Bilateral wheezing. CARDIOVASCULAR: regular rate and rhythm, normal S1 and S2, no murmur noted  ABDOMEN: normal bowel sounds x 4, soft, non-distended, non-tender, no masses palpated, no hepatosplenomegaly   MUSCULOSKELETAL: full range of motion noted, tone is normal  NEUROLOGIC: awake, alert, oriented to name, place and time. Motor skills grossly intact. SKIN: No jaundice. appears intact. Dry skin   EXTREMITIES: no LE edema. No cyanosis.       Labs:  Hematology:  Lab Results   Component Value Date    WBC 16.6 (H) 01/11/2021    RBC 4.44 01/11/2021    HGB 12.4 (L) 01/11/2021    HCT 42.4 01/11/2021    MCV 95.5 01/11/2021    MCH 27.9 01/11/2021    MCHC 29.2 (L) 01/11/2021    RDW 15.9 (H) 01/11/2021     01/11/2021    MPV 10.6 01/11/2021    BANDSPCT 1 (L) 12/07/2020    SEGSPCT 88.0 (H) 01/11/2021    EOSRELPCT 0.2 01/11/2021    BASOPCT 0.2 01/11/2021    LYMPHOPCT 6.1 (L) 01/11/2021    MONOPCT 3.7 01/11/2021    BANDABS 0.12 12/07/2020    SEGSABS 14.7 01/11/2021    EOSABS 0.0 01/11/2021    BASOSABS 0.0 01/11/2021    LYMPHSABS 1.0 01/11/2021    MONOSABS 0.6 01/11/2021    DIFFTYPE AUTOMATED DIFFERENTIAL 01/11/2021    POLYCHROM 1+ 12/07/2020    WBCMORP OCCASIONAL 12/07/2020    PLTM FEW 12/07/2020     Chemistry:  Lab Results   Component Value Date     01/11/2021    K 2.8 (LL) 01/11/2021    CL 91 (L) 01/11/2021    CO2 44 (H) 01/11/2021    BUN 21 01/11/2021    CREATININE 0.7 01/11/2021    GLUCOSE 122 (H) 01/11/2021    CALCIUM 9.4 01/11/2021    PROT 6.5 01/11/2021    LABALBU 3.7 01/11/2021    BILITOT 0.3 01/11/2021    ALKPHOS 75 01/11/2021    AST 11 (L) 01/11/2021    ALT 8 (L) 01/11/2021    LABGLOM >60 01/11/2021    GFRAA >60 01/11/2021    AGRATIO 1.6 08/28/2020    GLOB 2.7 08/28/2020    PHOS 3.0 12/30/2020    MG 1.6 (L) 12/30/2020     Lab Results   Component Value Date     09/19/2020     Lab Results   Component Value Date    TSHHS 0.910 12/28/2020     Immunology:  Lab Results   Component Value Date    PROT 6.5 01/11/2021     Coagulation Panel:  Lab Results   Component Value Date    PROTIME 9.6 (L) 01/05/2021    INR 0.80 01/05/2021    APTT 23.3 (L) 01/05/2021    DDIMER 243 (H) 09/18/2020     Tumor Markers:  Lab Results   Component Value Date    CEA 3.8 12/04/2020      Observations:  PHQ-9 Total Score: 1 (1/13/2021 11:30 AM)       Assessment & Plan:    1. Clinically C could have limited stage of small cell cancer of the right lung. I referred to radiation oncologist for concomitant chemo and radiation. We discussed the risk and benefits of chemotherapy. She is scheduled for MRI of the brain in January 2021. I scheduled for treatment education. Hopefully she will be able to start the chemo within 2 weeks. 2.  She has likely reactive leukocytosis. She has mild chronic anemia. Ferritin in September 2020 was 234.    3.  She has depression and anxiety. She is on antidepression. I will prescribe her buspirone for the anxiety. 4.  She has COPD. I recommend to follow-up with the pulmonologist.    5.  We discussed about lifestyle and healthy diet. I recommend smoking cessation. I recommend she stay active and maintain her body weight. Return to clinic in 3 weeks or sooner. All of the question has been answered for today. I have discussed the above stated plan with the patient and they verbalized understanding and agreed with the plan. Thank you for allowing us to participate in this patient's care.       Electronically signed by Jazmine Edwards MD on 1/13/21 at 9:05 AM EST

## 2021-01-13 NOTE — PROGRESS NOTES
MA Rooming Questions  Patient: Chiquita Poag  MRN: [id number]    Date: 1/13/2021        NEW PATIENT  Patient states she has had a dizzy spell this morning that was followed by light headedness. She is wanting to know if she should continue with the lasix and the potassium since the swelling is going down. 5. Did the patient have a depression screening completed today?  Yes    PHQ-9 Total Score: 1 (1/13/2021 11:30 AM)       PHQ-9 Given to (if applicable):               PHQ-9 Score (if applicable):                     [] Positive     []  Negative              Does question #9 need addressed (if applicable)                     [] Yes    []  No               Lizzette Randolph MA

## 2021-01-13 NOTE — CARE COORDINATION
[facility] Transitions Follow Up Call    2021    Patient: Justino Meadows  Patient : 1950   MRN: [id number]  Reason for Admission: [address]  Discharge Date: 21 RARS: Readmission Risk Score: 21    Care Transitions Subsequent and Final Call    Subsequent and Final Calls  Do you have any ongoing symptoms?: Yes  Onset of Patient-reported symptoms: Other  Patient-reported symptoms: Shortness of Breath, Cough  Interventions for patient-reported symptoms: Other  Have your medications changed?: No  Do you have any questions related to your medications?: No  Do you currently have any active services?: No  Do you have any needs or concerns that I can assist you with?: No  Identified Barriers: Other, Stress  Care Transitions Interventions   Home Care Waiver: Declined        DME Assistance: Declined     Senior Services: Declined    Other Interventions: Follow Up  Future Appointments   Date Time Provider Jourdan Funez   2021 10:00 AM [facility] MRI ROOM 1 [facility] [address] [facility]   2021  2:45 PM Gagandeep Kolb MD [facility] SPR HRT  [facility] SPR HRT   10/12/2021  4:00 PM Vinicio Martin LPN [facility] MMA    11:15 AM Conner Kaufman MD [facility]   . Per chart review noted to have been evaluated in ED yesterday. Patient sent by squad from [facility] w/ Dr Mj Jose d/t sob, low O2 sats to 70's, anxiety. Per notes, valve on O2 tank was left open causing tank to run out of O2. Spoke w/ Care Transition/Covid19 Risk Monitoring follow up call. Patient reports Copd sx at baseline. Denies ac resp distress. Reports she currently has functioning concentrator and 4 tanks, with plan for additional tank deliver this Thursday. Reports portable concentrator was not functioning, returned to [facility] for repair. Advised Patient to contact [facility] re: anticipated return date of portable concentrator. Reports Dr Mj Jose informed her bx results returned as  small cell lung cancer.  Patient states \"I really didn't think it was cancer\", \"Im going to fight this\". Support given. Plan for MRI on 1/19/2021 to r/o brain mets. Referral made to Dr Janki Hart. Follow up appt w/ Dr Duggan Slight 1/25/2021. Confirmed transportation for appts. Continues to decline resource needs including hhc, dme, community assistance. Advised contact PCP 24/7 re: any health concerns, sx of Copd exacerbation. Discussed importance of early intervention to avoid hospitalization. Dicussed benefits of Urgent Care, Flu Clinic.  Agreeable to ongoing CT follow up    Upon next call: RENETTA Jones

## 2021-01-15 NOTE — TELEPHONE ENCOUNTER
Called patient with her appt times for chemo education on Tues. 01/19 @ 1300, along w/start of chemo, patient states understanding

## 2021-01-20 NOTE — PROGRESS NOTES
Assisted to infusion area via wheelchair. Here to initiate chemo. Patient had treatment planning and labs yesterday. All questions answered. Lab results verified and reviewed with patient. Chemo administered as ordered. Call light within reach. Instructed to notify nurse with any issues or concerns. . Tolerated chemo and hydration without incident. RTC tomorrow for chemo day 2. AVS provided. Patient will have office visit and labs Jan 27th. Patient's status assessed and documented appropriately. All labs and required results were also reviewed today. Treatment parameters have been reviewed. Today's treatment has been approved by the provider. Treatment orders and medication sequencing (when applicable) was verified by 2 registered nurses. The treatment plan was confirmed with the patient prior to administration, and the patient understands the need to report any treatment-related symptoms. Prior to administration, when applicable, the following 8 elements of medication administration were reviewed with 2nd Registered Nurse prior to dosing: drug name, drug dose, infusion volume when prepared in a syringe, rate of administration, expiration dates and/or times, appearance and integrity of drug(s), and rate of pump for infusion. The 5 rights of medication administration have been verified.

## 2021-01-21 NOTE — CARE COORDINATION
[facility] Transitions Follow Up Call    2021    Patient: Tristian Helton  Patient : 1950   MRN: [id number]  Reason for Admission: [address]  Discharge Date: 21 RARS: Readmission Risk Score: 21    Care Transitions Subsequent and Final Call    Schedule Follow Up Appointment with PCP: Declined  Subsequent and Final Calls  Do you have any ongoing symptoms?: Yes  Onset of Patient-reported symptoms: In the past 7 days  Patient-reported symptoms: Other  Interventions for patient-reported symptoms: Other  Have your medications changed?: Yes  Patient Reports: 2021 Started chemo; + Zyprexa, Zofran, Dexamethasone  Do you have any questions related to your medications?: No  Do you currently have any active services?: No  Do you have any needs or concerns that I can assist you with?: Yes  Patient-reported Needs or Concerns: See 2021 CTN note  Identified Barriers: Other, Stress  Care Transitions Interventions   Home Care Waiver: Declined        DME Assistance: Completed     Senior Services: Declined    Other Interventions:          Follow Up  Future Appointments   Date Time Provider Jourdan Funez   2021  9:15 AM [facility] MED ONC TREATMENT [facility] MED ONC [place]   2021  2:45 PM Shawnee Kim MD [facility] SPR HRT  [facility] SPR HRT   2021 11:30 AM [facility], MED ONC NURSE [facility] MED ONC [place]   2021 11:45 AM Franky Castro, APRN - CNP [facility] CC MMA   2021 12:45 PM Rome Burgess MD [address] [facility] RAD ONC [place]   2021 12:30 PM Fahad Alberto MD [facility] [address] [place] [address] AFL Fercho Ran   2/10/2021  8:15 AM SCHEDULE, [address] [facility] MED ONC TREATMENT [facility] MED ONC [place]   2021 10:30 AM SCHEDULE, [facility] MED ONC TREATMENT [facility] MED ONC [place]   2021 10:30 AM SCHEDULE, [facility] MED ONC TREATMENT [facility] MED ONC [place]   3/3/2021  8:15 AM SCHEDULE, [address] [facility] MED ONC TREATMENT [facility] MED ONC [place]   3/4/2021 10:00 AM SCHEDULE, [facility] MED ONC TREATMENT [facility] MED ONC [place] Perales   3/5/2021

## 2021-01-22 NOTE — PROGRESS NOTES
0930: RN Assessment    Pt here for Etoposide only. A&OX3. Pt on 4 liters oxygen n/c,     She reports she lives with her granddaughter and 3 great-grand kids ages 6, 6 and 15 yrs old    Pt reports she gets short of breath on exertion and with long distance. She reports she relys on a wheelchair to get around. At the grocery stores, she says she uses an electric scooter that the store has available. She reports that she has edema in her left foot/ankle. Noticeable swelling compared to the right foot. +2 pitting edema. She also reports arthritis in her left foot. Pt reports she took Lasix 1 year ago for BLE edema and that it did eventually subside. She reports she stopped taking Lasix until this week. She also reports she has a scratch on her left foot caused by the cat. She states she has been putting triple antibiotic ointment on it and it has not been getting better    She reports she had swelling of her left foot/ankle on her 1st day of tx this week and that she went home and took a Lasix pill . She reports she will take another tonight when she goes home. She said her right foot is more swollen than baseline too, however, not as swollen as the left foot    I did inform Dr. Christina Queen of this and he ordered Doxycycline 100mg daily X 7 days and pt to f/u with her PCP if symptoms don't resolve. I notified the pt of this and Raúl Marie sent the prescription to the pts pharmacy. Treatment    0950: Dexamethasone 8mg given IVP today without issues    1011: Etoposide infusion started, set to infuse over 1 hour. PIV flushed with NS and no issues at site. 1058: Pt eating snacks, watching TV. Warm blanket provided    neulasta On Body injector video given to the pt and she watched it. Pt has no questions    1120 : Tx completed. Onbody injector applied to RUQ abdomen. Due to where the pts waistband hits, it was not applied in the RLQ area. Pt taken via wheelchair to the lobby to wait for her friend. The  is working on obtaining her friends # to call. Pt put back on 4 liters portable tank n/c    Patient's status assessed and documented appropriately. All labs and required results were also reviewed today. Treatment parameters have been reviewed. Today's treatment has been approved by the provider. Treatment orders and medication sequencing (when applicable) was verified by 2 registered nurses. The treatment plan was confirmed with the patient prior to administration, and the patient understands the need to report any treatment-related symptoms. Prior to administration, when applicable, the following 8 elements of medication administration were reviewed with 2nd Registered Nurse prior to dosing: drug name, drug dose, infusion volume when prepared in a syringe, rate of administration, expiration dates and/or times, appearance and integrity of drug(s), and rate of pump for infusion. The 5 rights of medication administration have been verified.

## 2021-01-27 NOTE — PROGRESS NOTES
MA Rooming Questions  Patient: Cheryln Scheuermann  MRN: [id number]    Date: 1/27/2021        1. Do you have any new issues? yes - trouble swallowing         2. Do you need any refills on medications?    no    3. Have you had any imaging done since your last visit?   no    4. Have you been hospitalized or seen in the emergency room since your last visit here?   no    5. Did the patient have a depression screening completed today?  No    No data recorded     PHQ-9 Given to (if applicable):               PHQ-9 Score (if applicable):                     [] Positive     []  Negative              Does question #9 need addressed (if applicable)                     [] Yes    []  No               Priscilla Andino MA

## 2021-01-27 NOTE — PROGRESS NOTES
Niurka Washington  1/27/2021    CONSULT     There were no vitals filed for this visit. Wt Readings from Last 3 Encounters:   01/27/21 126 lb (57.2 kg)   01/25/21 126 lb (57.2 kg)   01/22/21 126 lb 12.8 oz (57.5 kg)           Denies Need for Intervention    Fall Risk:    Fall risk  Shortness of Breath, Taking Blood Thinner, Uses Wheelchair, Assist with Transfer/Ambulation, Provide Wheelchair PRN, Educate on Assistive Devices and Re-Evaluate Weekly    Nutritional Alteration:  Loss of Appetite  Moderate Dysphagia  Tolerating Soft Foods    Mediport: no    Pacemaker/ICD: no    Implants: yes, Right hip replacement, has screw in right foot, second toe. Previous XRT: no    Assessment:     Past Medical History:   Diagnosis Date    Arthritis     COPD (chronic obstructive pulmonary disease) ([facility] [address]     follow with Dr Lilli Charles Full dentures     H/O cardiovascular stress test 11/18/2009    thallium, normal no ischemia EF70%     H/O cardiovascular stress test 10/11/2013    thallium,EF70%, normal, no ischemia    H/O Doppler ultrasound 10/13/2010    carotid, right normal, left normal    H/O Doppler ultrasound 10/07/2014    Carotid mild bilateral internal carotid artery disease less than 50% in severity. Normal vertebral artery flows with good velocities. Incidental finding of possible thyroid nodule in the left lobe.  History of 24 hour EKG monitoring 03/11/2011    NSR no ectopy    History of nuclear stress test 10/21/2016    lexiscan-normal,no ischemia,EF70%,enlarged right ventricle    Hx of chest x-ray 01/02/2015    WNL    Hx of Doppler echocardiogram 12/01/2020    EF 50-55% mild LV hypertrophy. Grade 2 diastolic dysfunction. Mild AS.     Hx of echocardiogram 10/11/2013    10/13 Abn lt ventricular diastolic function, mile MR, EF 57%,10/10 EF55%, mild mitral annular calcification    Hx of echocardiogram 05/02/2019 EF 02-40%, Grade I diastolic dysfunction, Mild aortic stenosis, Calcific thickening of posterior leaflet of mitral valve, Mild Pulm HTN    Hx of pelvic x-ray 01/02/2015    Severe RT his osterarothrosis    Hx of x-ray of hip 01/02/2015    Severe Rt hip osteosrthrosis    Hyperlipidemia     Hypertension     follows with dr Nam Martinez Leg pain     Lung nodules     scheduled for bronch 1/5/2020    On home oxygen therapy     \"on 4 liters 24 hours per day\"    Small cell lung cancer ([address] 1/11/2021    Wears glasses     to read       Past Surgical History:   Procedure Laterality Date    BACK SURGERY      \"about 12 yrs ago - surgery my mid to low back \" done in [address] N/A 1/5/2021    BRONCHOSCOPY ENDOBRONCHIAL ULTRASOUND performed by Britni Kohli MD at [facility]      \"last one done in my age 61's    CYST REMOVAL  1970's    left arm    EYE SURGERY  2010?    svetlana cataract ext     FOOT SURGERY Right 1970's    \"have screw in 2nd toe\"    HYSTERECTOMY      1999\"took everything \"    JOINT REPLACEMENT Right 2014??    hip    LAPAROSCOPIC APPENDECTOMY N/A 5/18/2019    APPENDECTOMY LAPAROSCOPIC performed by Vidhi Shelton MD at [address] [place] [address] 12/10/2020    MEDIASTINOSCOPY performed by Britni Kohli MD at [address] [place] [address]  1970's       Allergies   Allergen Reactions    Formaldehyde     Gabapentin Other (See Comments)    Norflex Tablets [Orphenadrine]     Cranberry Rash          Current Outpatient Medications:     doxycycline hyclate (VIBRA-TABS) 100 MG tablet, Take 1 tablet by mouth daily for 7 days, Disp: 7 tablet, Rfl: 0    ondansetron (ZOFRAN) 8 MG tablet, Take 1 tablet by mouth every 8 hours as needed for Nausea or Vomiting, Disp: 30 tablet, Rfl: 2    OLANZapine (ZYPREXA) 2.5 MG tablet, Take the night prior to each chemotherapy, then for three additional nights with each chemotherapy, Disp: 16 tablet, Rfl: 0   dexamethasone (DECADRON) 4 MG tablet, Take 1 tablet x 4 days after each chemotherapy, Disp: 16 tablet, Rfl: 0    HYDROcodone-acetaminophen (NORCO)  MG per tablet, Take 1 tablet by mouth every 4 hours as needed for Pain for up to 30 days. Indications: 180, Disp: 180 tablet, Rfl: 0    busPIRone (BUSPAR) 5 MG tablet, Take 1 tablet by mouth 2 times daily, Disp: 60 tablet, Rfl: 0    potassium chloride (KLOR-CON M) 10 MEQ extended release tablet, Take 1 tablet by mouth 2 times daily, Disp: 10 tablet, Rfl: 0    fluticasone (FLONASE) 50 MCG/ACT nasal spray, 1 spray by Each Nostril route daily, Disp: , Rfl:     dilTIAZem (CARDIZEM CD) 120 MG extended release capsule, Take 1 capsule by mouth daily, Disp: 30 capsule, Rfl: 3    simvastatin (ZOCOR) 20 MG tablet, Take 1 tablet by mouth nightly, Disp: 90 tablet, Rfl: 1    theophylline (UNIPHYL) 400 MG extended release tablet, Take 0.5 tablets by mouth daily, Disp: 15 tablet, Rfl: 3    ipratropium-albuterol (DUONEB) 0.5-2.5 (3) MG/3ML SOLN nebulizer solution, Inhale 3 mLs into the lungs 4 times daily, Disp: 360 mL, Rfl: 5    albuterol sulfate HFA (PROAIR HFA) 108 (90 Base) MCG/ACT inhaler, Inhale 2 puffs into the lungs every 6 hours as needed for Wheezing, Disp: 1 Inhaler, Rfl: 6    Cholecalciferol (VITAMIN D3) 25 MCG (1000 UT) TABS, Take by mouth daily , Disp: , Rfl:     calcium carbonate 600 MG TABS tablet, Take 1 tablet by mouth daily, Disp: , Rfl:     aspirin 81 MG EC tablet, Take 81 mg by mouth daily, Disp: , Rfl:     Fexofenadine HCl (MUCINEX ALLERGY PO), Take by mouth 2 times daily , Disp: , Rfl:     ADDITIONAL COMMENTS: Pt here for a new consult with RAD to discuss [address] options. States she does have SOB, nausea, loss of appetite and trouble swallowing.     Electronically signed by Bishop Johan MA on 1/27/2021 at 12:01 PM

## 2021-01-27 NOTE — PROGRESS NOTES
Radiation Oncology Consultation  Encounter Date: 2021 1:42 PM        Ms. Alaina Garcia is a 65 y.o. female  : 1950  MRN: [id number]  Acct Number: [de-identified]  Requesting Provider: Dr. Jennifer Sweeney: Bethany Courts:   Primary Care: Teena Hernandez MD       REASON FOR CONSULTATION:   Small Cell Lung Cancer      Cancer Staging  Small cell lung cancer [facility])  Staging form: Lung, AJCC 8th Edition  - Clinical: Stage IIIB (cT3, cN2, cM0) - Unsigned    Extensive Stage         WORKUP AND TX COURSE:  Oncology History   Small cell lung cancer ([facility] [address]   2021 Initial Diagnosis    Small cell lung cancer ([facility] [address]         HPI:   Alaina Garcia is a 65 y.o. female with the above referenced diagnosis. Long h/o COPD and followed by pulm for a lung mass    CT chest on 2010 showed  1. Stable size and appearance of noncalcified right lower lobe   nodule compared to multiple prior studies dating back to   2008. This lesion measures approximately 8 mm in greatest   dimension on the current study and is most consistent with a   benign etiology, stable for greater than two years. Otherwise, no   CT evidence of acute thoracic abnormality is noted. 2. Multiple hypodense masses within the thyroid gland are not   significantly changed compared to prior studies dating back to   2008.      CT abdomen pelvis in May 2019 revealed:  1. Prior right hip arthroplasty results in streak artifact through the right   aspect of the pelvis which severely limits the exam.  There are right   hemipelvic findings suggesting acute appendicitis though the appendiceal   origin is not identified due to the artifact.   No evidence of bowel   obstruction, perforation, or abscess. 2. Nonspecific jejunal wall thickening which may relate to nondistention   versus enteritis. 3. Bilateral nonobstructing nephrolithiasis. 4. Abdominal aortic atherosclerosis with 2.6 cm distal infrarenal abdominal   aortic aneurysm, see recommendations.       RECOMMENDATIONS:   Managing Abdominal Aortic Aneurysms       2.6-2.9 cm: Every 5 years*       3.0-3.4 cm: Every 3 years.       3.5-3.9 cm: Every 1 year.       4.0-4.4 cm: Every 1 year. Recommend vascular consultation.       4.5-5.4 cm: Every 6 months. Recommend vascular consultation.       Greater than or equal to 5.5 cm: Referral to vascular surgeon.      CTA chest on September 18, 2020 revealed:      There is a mass within the right lung apex measuring 16 mm in diameter as   well as a 16 x 16 mm spiculated mass in the right lower lobe.  A smaller 9.6   mm nodule is present within the right lower lobe.      PET scan on October 22, 2020 showed  1. Dominant 1.6 cm nodules in the medial right upper lobe and central right   lower lobe are hypermetabolic and suspicious for malignancy, possibly   synchronous lung cancers. 2. A 3 x 2 cm precarinal merrick mass is intensely hypermetabolic and   consistent with metastatic disease. 3. A smaller 1 cm nodule in the lateral right lower lobe has not   significantly changed since 2009 and demonstrates only minimal uptake,   consistent with benign etiology. 4. Patchy ground-glass opacity throughout the left lung demonstrates mild   uptake and is favored to be infectious or inflammatory. 5. No distant metastatic disease.      CT chest on December 7, 2020 showed  1. Persistent mediastinal adenopathy and right upper and lower lobe pulmonary   nodule is suspicious for malignancy.  Recommend tissue sampling. 2. Ground-glass opacity seen within the left lung on the prior PET-CT are no   longer visualized.      She had mediastinoscopy on December 10, 2020. Pathology report showed  Final Pathologic Diagnosis:   A. Submitted as \"cervical lymph node\", biopsy:   -     Thyroid tissue, benign  B.  Lymph node, 4R, biopsy: -     Benign lymph node with sinus histiocystosis. C. Lymph node, 4R #2, biopsy:   -     Benign lymph node with sinus histiocystosis. Unable to reach the suspicious enlarged mediastinal LN on mediastinoscopy    She had EBUS on January 5, 2021. Pathology report showed  Final Cytologic Diagnosis:   A. Mediastinal mass, fine needle aspirate with cell block:   -     SMALL CELL CARCINOMA.     B. Bronchoalveolar lavage with cell block: -  Few atypical cells seen. Brain MRI - 1/19/20201  No metastatic disease    She has started systemic therapy with cisplatin/etoposide with cycle 1 on 1/19/20201. Current symptoms include chronic cough, occasionally productive of clear or yellow mucous, and frequent episodes of hypoxia and COPD exacerbation for which she gets hospitalized. She wears 4L O2 at all times. Past Medical History:   Diagnosis Date    Arthritis     COPD (chronic obstructive pulmonary disease) ([facility] [address]     follow with Dr Mesha Bae Full dentures     H/O cardiovascular stress test 11/18/2009    thallium, normal no ischemia EF70%     H/O cardiovascular stress test 10/11/2013    thallium,EF70%, normal, no ischemia    H/O Doppler ultrasound 10/13/2010    carotid, right normal, left normal    H/O Doppler ultrasound 10/07/2014    Carotid mild bilateral internal carotid artery disease less than 50% in severity. Normal vertebral artery flows with good velocities. Incidental finding of possible thyroid nodule in the left lobe.  History of 24 hour EKG monitoring 03/11/2011    NSR no ectopy    History of nuclear stress test 10/21/2016    lexiscan-normal,no ischemia,EF70%,enlarged right ventricle    Hx of chest x-ray 01/02/2015    WNL    Hx of Doppler echocardiogram 12/01/2020    EF 50-55% mild LV hypertrophy. Grade 2 diastolic dysfunction. Mild AS.     Hx of echocardiogram 10/11/2013    10/13 Abn lt ventricular diastolic function, mile MR, EF 57%,10/10 EF55%, mild mitral annular calcification  Hx of echocardiogram 05/02/2019    EF 27-10%, Grade I diastolic dysfunction, Mild aortic stenosis, Calcific thickening of posterior leaflet of mitral valve, Mild Pulm HTN    Hx of pelvic x-ray 01/02/2015    Severe RT his osterarothrosis    Hx of x-ray of hip 01/02/2015    Severe Rt hip osteosrthrosis    Hyperlipidemia     Hypertension     follows with dr Mallory Maldonado Leg pain     Lung nodules     scheduled for bronch 1/5/2020    On home oxygen therapy     \"on 4 liters 24 hours per day\"    Small cell lung cancer ([address] 1/11/2021    Wears glasses     to read        Past Surgical History:   Procedure Laterality Date    BACK SURGERY      \"about 12 yrs ago - surgery my mid to low back \" done in [address] N/A 1/5/2021    BRONCHOSCOPY ENDOBRONCHIAL ULTRASOUND performed by Magda Ellison MD at [address] [place] [address] one done in my age 55's    CYST REMOVAL  1970's    left arm    EYE SURGERY  2010?    svetlana cataract ext     FOOT SURGERY Right 1970's    \"have screw in 2nd toe\"    HYSTERECTOMY      1999\"took everything \"    JOINT REPLACEMENT Right 2014??    hip    LAPAROSCOPIC APPENDECTOMY N/A 5/18/2019    APPENDECTOMY LAPAROSCOPIC performed by Duane Castleman, MD at [facility] 12/10/2020    MEDIASTINOSCOPY performed by Magda Ellison MD at [address] [place] [address]  1970's       Family History   Problem Relation Age of Onset    Stroke Mother     Dementia Mother     Heart Attack Mother 61    Coronary Art Dis Mother     Hypertension Mother     High Cholesterol Mother     Cancer Father     Stroke Father     Heart Attack Father 61    Diabetes Sister     Heart Attack Sister     Hypertension Sister     Cancer Sister    [facility] COPD Sister     Stroke Sister     Hypertension Sister     Hypertension Sister     Irritable Bowel Syndrome Sister     Pacemaker Sister        Social History     Socioeconomic History    Marital status:   Spouse name: Not on file    Number of children: Not on file    Years of education: Not on file    Highest education level: Not on file   Occupational History    Not on file   Social Needs    Financial resource strain: Not on file    Food insecurity     Worry: Not on file     Inability: Not on file    Transportation needs     Medical: Not on file     Non-medical: Not on file   Tobacco Use    Smoking status: Former Smoker     Packs/day: 1.50     Years: 47.00     Pack years: 70.50     Types: Cigarettes     Start date: 1973     Quit date: 2020     Years since quittin.1    Smokeless tobacco: Never Used   Substance and Sexual Activity    Alcohol use:  Yes     Alcohol/week: 0.0 standard drinks     Comment: rare\"twice per year\"    Drug use: No    Sexual activity: Not on file     Comment:    Lifestyle    Physical activity     Days per week: Not on file     Minutes per session: Not on file    Stress: Not on file   Relationships    Social connections     Talks on phone: Not on file     Gets together: Not on file     Attends Taoism service: Not on file     Active member of club or organization: Not on file     Attends meetings of clubs or organizations: Not on file     Relationship status: Not on file    Intimate partner violence     Fear of current or ex partner: Not on file     Emotionally abused: Not on file     Physically abused: Not on file     Forced sexual activity: Not on file   Other Topics Concern    Not on file   Social History Narrative    Not on file       ALLERGIES:   Allergies   Allergen Reactions    Formaldehyde     Gabapentin Other (See Comments)    Norflex Tablets [Orphenadrine]     Cranberry Rash        Current Outpatient Medications:     doxycycline hyclate (VIBRA-TABS) 100 MG tablet, Take 1 tablet by mouth daily for 7 days, Disp: 7 tablet, Rfl: 0   ondansetron (ZOFRAN) 8 MG tablet, Take 1 tablet by mouth every 8 hours as needed for Nausea or Vomiting, Disp: 30 tablet, Rfl: 2    OLANZapine (ZYPREXA) 2.5 MG tablet, Take the night prior to each chemotherapy, then for three additional nights with each chemotherapy, Disp: 16 tablet, Rfl: 0    dexamethasone (DECADRON) 4 MG tablet, Take 1 tablet x 4 days after each chemotherapy, Disp: 16 tablet, Rfl: 0    HYDROcodone-acetaminophen (NORCO)  MG per tablet, Take 1 tablet by mouth every 4 hours as needed for Pain for up to 30 days.  Indications: 180, Disp: 180 tablet, Rfl: 0    busPIRone (BUSPAR) 5 MG tablet, Take 1 tablet by mouth 2 times daily, Disp: 60 tablet, Rfl: 0    potassium chloride (KLOR-CON M) 10 MEQ extended release tablet, Take 1 tablet by mouth 2 times daily, Disp: 10 tablet, Rfl: 0    fluticasone (FLONASE) 50 MCG/ACT nasal spray, 1 spray by Each Nostril route daily, Disp: , Rfl:     dilTIAZem (CARDIZEM CD) 120 MG extended release capsule, Take 1 capsule by mouth daily, Disp: 30 capsule, Rfl: 3    simvastatin (ZOCOR) 20 MG tablet, Take 1 tablet by mouth nightly, Disp: 90 tablet, Rfl: 1    theophylline (UNIPHYL) 400 MG extended release tablet, Take 0.5 tablets by mouth daily, Disp: 15 tablet, Rfl: 3    ipratropium-albuterol (DUONEB) 0.5-2.5 (3) MG/3ML SOLN nebulizer solution, Inhale 3 mLs into the lungs 4 times daily, Disp: 360 mL, Rfl: 5    albuterol sulfate HFA (PROAIR HFA) 108 (90 Base) MCG/ACT inhaler, Inhale 2 puffs into the lungs every 6 hours as needed for Wheezing, Disp: 1 Inhaler, Rfl: 6    Cholecalciferol (VITAMIN D3) 25 MCG (1000 UT) TABS, Take by mouth daily , Disp: , Rfl:     calcium carbonate 600 MG TABS tablet, Take 1 tablet by mouth daily, Disp: , Rfl:     aspirin 81 MG EC tablet, Take 81 mg by mouth daily, Disp: , Rfl:     Fexofenadine HCl (MUCINEX ALLERGY PO), Take by mouth 2 times daily , Disp: , Rfl:        REVIEW OF SYSTEMS: Pertinents as in HPI, the remainder of the 14-point ROS is otherwise negative and has been signed and included in the medical record. PHYSICAL EXAMINATION:   VITAL SIGNS: There were no vitals taken for this visit.   KARNOFSKY PERFORMANCE STATUS: 70    PAIN RATIN    A&Ox3, NAD  Sclera anicteric, EOMI, no temporalis muscle wasting  No cervical, SCV LAD  Lungs CTAB, no wheezing or rhonchi, poor air movement in all fields  Heart RRR, no m/r/g  Abd soft, NTND, no HSM  No spinal, paraspinal, or other bony tenderness to palpation  No UE/LE edema  CN 2-12 grossly intact without focal motor or sensory deficit  Normal mood, affect, judgement      LABORATORY STUDIES:   CBC:   Lab Results   Component Value Date    WBC 7.9 2021    RBC 4.07 2021    HGB 11.4 2021    HCT 37.4 2021    MCV 91.9 2021    MCH 28.0 2021    MCHC 30.5 2021    RDW 15.7 2021     2021    MPV 9.9 2021     CMP:  Lab Results   Component Value Date     2021    K 4.2 2021    CL 94 2021    CO2 37 2021    BUN 13 2021    CREATININE 0.7 2021    GFRAA >60 2021    AGRATIO 1.6 2020    LABGLOM >60 2021    GLUCOSE 80 2021    PROT 5.9 2021    PROT 6.5 2015    LABALBU 3.7 2021    CALCIUM 9.6 2021    BILITOT 0.2 2021    ALKPHOS 90 2021    AST 11 2021    ALT 7 2021     Onc labs:   Lab Results   Component Value Date    CEA 3.8 2020     2020       RADIOLOGIC STUDIES:     Described in HPI    Mri Brain W Wo Contrast    Result Date: 2021 EXAMINATION: MRI OF THE BRAIN WITHOUT AND WITH CONTRAST,  1/19/2021 10:00 am TECHNIQUE: Multiplanar multisequence MRI of the head/brain was performed without and with the administration of intravenous contrast. COMPARISON: None HISTORY: ORDERING SYSTEM PROVIDED HISTORY: Small cell lung cancer ([address] TECHNOLOGIST PROVIDED HISTORY: Reason for Exam: SM CELL LUNG CA//POSSIBLE METS FINDINGS: INTRACRANIAL STRUCTURES/VENTRICLES:  There are no areas of restricted diffusion to suggest an acute infarct. The cerebral and cerebellar parenchyma demonstrate volume loss. Scattered and confluent areas of increased T2 signal are noted supratentorially as well as centrally in the reji, compatible with moderate chronic microvascular white matter ischemic disease. No abnormal extra-axial fluid collections. The ventricles are proportional to the cerebral sulci. Normal major intracranial flow voids are noted. There are no areas of blooming artifact noted on the gradient echo sequences to suggest sequela of acute or chronic hemorrhage. No areas of abnormal enhancement to suggest metastatic disease. ORBITS: Lens implants from prior cataract surgery are noted. The orbits are otherwise unremarkable. SINUSES: Susceptibility artifact from dental hardware obscures portions of the paranasal sinuses, primarily the maxillary sinuses. The paranasal sinuses and mastoid air cells are well aerated. BONES/SOFT TISSUES: The bone marrow signal intensity and craniocervical junction are unremarkable. Pituitary gland is normal in appearance. 1. No evidence of metastatic disease, mass, or acute infarct. 2. Cerebral parenchymal volume loss with moderate chronic microvascular white matter ischemic disease. I have personally reviewed the relevant radiographic images.       ASSESSMENT/PLAN:  63yo woman with severe COPD and small cell lung cancer I would consider this extensive stage as the primary pulmonary nodules are  throughout the most superior to inferior portions of the right lung as well as the most medial to lateral.  Delivering radiation to all areas would be be too high risk for radiation pneumonitis as the majority of the right lung would be exposed to high doses of radiation. Therefore I recommend proceeding with systemic therapy only and re-evaluating after to see if consolidative mediastinal RT or PCI would be beneficial.    Questions answered to patient's satisfaction and plan discussed with Dr. Betsey Rivera. Thank-you for allowing me to participate in the care of this patient and please do not hesitate to contact me for any questions or concerns. MEDICAL DECISION MAKING    Number/Complexity of Problems Addressed  [] 1 self-limited of minor problem (20898/10959)  [] >=2 self-limited or minor problems, 1 stable chronic illness, 1 acute/uncomplicated illness/injury (18953/97648)  [x]Chronic illnesses with exacerbation/progression/side effects of treatment, >=2 stable chronic illnesses, 1 undiagnosed new problem with uncertain prognosis, 1 acute illness with systemic symptoms, 1 acute complicated injury (45938/38860)  []Chronic illnesses with severe exacerbation/progression/treatment side effects, acute or chronic illness or injury that poses a threat to life or bodily function (25856/87314)    Amount and/or Complexity of Data Reviewed  [] Minimal or none (80219/73249)  [] Limited - meets 1/2 categories (38186/43878)  1. At least 2 of: review of prior external notes from each unique source, review results of each unique test, ordering of each unique test  2. Assessment requiring an independent historian  [] Moderate - meets 1/3 categories (80128/87633)  1.  Any 3 of: Review of prior external notes from each unique source, review results of each unique test, ordering of each unique test, assessment requiring an independent historian 2. Independent interpretation of tests  3. Discussion of management or test interpretation  [x] Extensive - meets 2/3 categories (21300/23631)  1. Any 3 of: Review of prior external notes from each unique source, review results of each unique test, ordering of each unique test, assessment requiring an independent historian  2. Independent interpretation of tests  3.  Discussion of management or test interpretation    Risk of complications and/or morbidity/mortality of patient management  [] Minimal (41623/95732)  [] Low (06932/57358)  [] Moderate (84668/47004)  Examples: Rx drug management, decision regarding minor surgery with identified patient or procedure risk factors, decision regarding elective major surgery without identified patient or procedure risk factors, diagnosis or treatment significantly limited by social determinants of health  [x] High (15725/01426)  Examples: drug therapy requiring intensive monitoring for toxicity, decision regarding elective major surgery with identified patient/procedure risk factors, decision regarding emergency major surgery, decision regarding hospitalization, decision for DNR or de-escalation of care because of poor prognosis      LEVEL OF MDM (based on 2/3 above categories)  [] Straightforward (32838/75714)  [] Low (74602/69623)  [] Moderate (77183/88924)  [x] High (17895/12789)      Electronically signed by Electronically signed by Ta Coffman MD on 1/27/2021 at 1:42 PM

## 2021-01-27 NOTE — PROGRESS NOTES
Patient Name:  Miles Persaud  Patient :  1950  Patient MRN:  [id number]     Primary Oncologist: Bev Weinberg MD  Referring Provider: Zan Esquivel MD     Date of Service: 2021      Reason for Consult: For evaluation of small cell carcinoma of the lung     Chief Complaint:    Chief Complaint   Patient presents with    [place] [id number] planning      Patient Active Problem List:     Hyperlipidemia     COPD (chronic obstructive pulmonary disease) ([address]     Leg pain     Hypertension     Tobacco abuse     Abdominal aortic aneurysm (AAA) without rupture (HCC)     Degeneration of lumbar or lumbosacral intervertebral disc     Osteopenia     Anxiety     Acute on chronic respiratory failure with hypoxia ([address]     Lung mass     COPD exacerbation ([address]     Acute exacerbation of chronic obstructive pulmonary disease (COPD) ([address]     Small cell lung cancer (HCC)     HPI:   Fatimah Godinez is a pleasant 78-year-old  female patient who was referred for evaluation of 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 She has underlying COPD and has been followed by pulmonologist  for the history of lung mass. She has been prescribed nebulizer home oxygen and inhaler. CT chest on 2010 showed  1. Stable size and appearance of noncalcified right lower lobe   nodule compared to multiple prior studies dating back to   2008. This lesion measures approximately 8 mm in greatest   dimension on the current study and is most consistent with a   benign etiology, stable for greater than two years. Otherwise, no   CT evidence of acute thoracic abnormality is noted. 2. Multiple hypodense masses within the thyroid gland are not   significantly changed compared to prior studies dating back to   2008. CT abdomen pelvis in May 2019 revealed:  1.  Prior right hip arthroplasty results in streak artifact through the right   aspect of the pelvis which severely limits the exam.  There are right   hemipelvic findings suggesting acute appendicitis though the appendiceal   origin is not identified due to the artifact.   No evidence of bowel   obstruction, perforation, or abscess. 2. Nonspecific jejunal wall thickening which may relate to nondistention   versus enteritis. 3. Bilateral nonobstructing nephrolithiasis. 4. Abdominal aortic atherosclerosis with 2.6 cm distal infrarenal abdominal   aortic aneurysm, see recommendations.       RECOMMENDATIONS:   Managing Abdominal Aortic Aneurysms       2.6-2.9 cm: Every 5 years*       3.0-3.4 cm: Every 3 years.       3.5-3.9 cm: Every 1 year.       4.0-4.4 cm: Every 1 year. Recommend vascular consultation.       4.5-5.4 cm: Every 6 months. Recommend vascular consultation.       Greater than or equal to 5.5 cm: Referral to vascular surgeon. CTA chest on September 18, 2020 revealed:      There is a mass within the right lung apex measuring 16 mm in diameter as   well as a 16 x 16 mm spiculated mass in the right lower lobe.  A smaller 9.6   mm nodule is present within the right lower lobe. PET scan on October 22, 2020 showed  1. Dominant 1.6 cm nodules in the medial right upper lobe and central right   lower lobe are hypermetabolic and suspicious for malignancy, possibly   synchronous lung cancers. 2. A 3 x 2 cm precarinal merrick mass is intensely hypermetabolic and   consistent with metastatic disease. 3. A smaller 1 cm nodule in the lateral right lower lobe has not   significantly changed since 2009 and demonstrates only minimal uptake,   consistent with benign etiology. 4. Patchy ground-glass opacity throughout the left lung demonstrates mild   uptake and is favored to be infectious or inflammatory. 5. No distant metastatic disease. CT chest on December 7, 2020 showed  1. Persistent mediastinal adenopathy and right upper and lower lobe pulmonary   nodule is suspicious for malignancy.  Recommend tissue sampling.    2. Ground-glass opacity seen within the left lung on the prior PET-CT are no   longer visualized. She had mediastinoscopy on December 10, 2020. Pathology report showed  Final Pathologic Diagnosis:   A. Submitted as \"cervical lymph node\", biopsy:   -     Thyroid tissue, benign  B. Lymph node, 4R, biopsy:   -     Benign lymph node with sinus histiocystosis. C. Lymph node, 4R #2, biopsy:   -     Benign lymph node with sinus histiocystosis. She had EBUS on January 5, 2021. Pathology report showed  Final Cytologic Diagnosis:   A. Mediastinal mass, fine needle aspirate with cell block:   -     SMALL CELL CARCINOMA.     B. Bronchoalveolar lavage with cell block: -  Few atypical cells seen. She is scheduled for MRI of the brain in January 2021  We discussed about of concomitant chemo and radiation therapy for limited stage of small cell of the lung  I referred to radiation oncologist and schedule for the chemo education. Hopefully she will be able to start the chemotherapy within 2 weeks. We discussed about the risk and benefit of chemotherapy. I strongly recommend she follow-up with pulmonologist for COPD exacerbation. Presented on January 27, 2021She complains of increasing shortness of breath this morning. She has wheezing. I recommend to use nebulizer, inhaler and Mucinex. She has difficulty to clear her throat. This has been present for eight months. We will trial magic mouthwash and follow up with endoscopy if indicated. She denies AE form treatment with cisplatin and etoposide. She is encouraged to use zofran as needed. She is given forms for advance directive. She will meet with radiation oncology today. Past Medical History:   Arthritis, COPD, dyslipidemia, hypertension, right lower lung nodule, depression and anxiety.      Past Surgery History:    Back surgery, endoscopic bronchial ultrasound study on January 5, 2021, colonoscopy in her 62s, cyst removal from the left arm, bilateral cataract surgery in 2010, right foot surgery in the 70s, complete hysterectomy in 1999, right hip replacement in 2014, laparoscopic appendectomy in May 2019, mediastinoscopy on December 10, 2020, tubal ligation in the 70s. Social History:  She smokes about 1 pack/day for more than 50 years. She denies any alcohol or illicit drug use. She has 2 children. Family History:  Father had small cell cancer of the lung. He was a smoker. Allergies   Allergen Reactions    Formaldehyde     Gabapentin Other (See Comments)    Norflex Tablets [Orphenadrine]     Cranberry Rash       Current Outpatient Medications on File Prior to Visit   Medication Sig Dispense Refill    doxycycline hyclate (VIBRA-TABS) 100 MG tablet Take 1 tablet by mouth daily for 7 days 7 tablet 0    ondansetron (ZOFRAN) 8 MG tablet Take 1 tablet by mouth every 8 hours as needed for Nausea or Vomiting 30 tablet 2    OLANZapine (ZYPREXA) 2.5 MG tablet Take the night prior to each chemotherapy, then for three additional nights with each chemotherapy 16 tablet 0    dexamethasone (DECADRON) 4 MG tablet Take 1 tablet x 4 days after each chemotherapy 16 tablet 0    HYDROcodone-acetaminophen (NORCO)  MG per tablet Take 1 tablet by mouth every 4 hours as needed for Pain for up to 30 days.  Indications: 180 180 tablet 0    busPIRone (BUSPAR) 5 MG tablet Take 1 tablet by mouth 2 times daily 60 tablet 0    potassium chloride (KLOR-CON M) 10 MEQ extended release tablet Take 1 tablet by mouth 2 times daily 10 tablet 0    fluticasone (FLONASE) 50 MCG/ACT nasal spray 1 spray by Each Nostril route daily      dilTIAZem (CARDIZEM CD) 120 MG extended release capsule Take 1 capsule by mouth daily 30 capsule 3    simvastatin (ZOCOR) 20 MG tablet Take 1 tablet by mouth nightly 90 tablet 1    theophylline (UNIPHYL) 400 MG extended release tablet Take 0.5 tablets by mouth daily 15 tablet 3    ipratropium-albuterol (DUONEB) 0.5-2.5 (3) MG/3ML SOLN 12/07/2020    SEGSPCT 79.6 (H) 01/19/2021    EOSRELPCT 0.5 01/19/2021    BASOPCT 0.3 01/19/2021    LYMPHOPCT 9.0 (L) 01/19/2021    MONOPCT 10.6 (H) 01/19/2021    BANDABS 0.12 12/07/2020    SEGSABS 6.3 01/19/2021    EOSABS 0.0 01/19/2021    BASOSABS 0.0 01/19/2021    LYMPHSABS 0.7 01/19/2021    MONOSABS 0.8 01/19/2021    DIFFTYPE AUTOMATED DIFFERENTIAL 01/19/2021    POLYCHROM 1+ 12/07/2020    WBCMORP OCCASIONAL 12/07/2020    PLTM FEW 12/07/2020     Chemistry:  Lab Results   Component Value Date     01/19/2021    K 4.2 01/19/2021    CL 94 (L) 01/19/2021    CO2 37 (H) 01/19/2021    BUN 13 01/19/2021    CREATININE 0.7 01/19/2021    GLUCOSE 80 01/19/2021    CALCIUM 9.6 01/19/2021    PROT 5.9 (L) 01/19/2021    LABALBU 3.7 01/19/2021    BILITOT 0.2 01/19/2021    ALKPHOS 90 01/19/2021    AST 11 (L) 01/19/2021    ALT 7 (L) 01/19/2021    LABGLOM >60 01/19/2021    GFRAA >60 01/19/2021    AGRATIO 1.6 08/28/2020    GLOB 2.7 08/28/2020    PHOS 3.0 12/30/2020    MG 1.3 (L) 01/19/2021     Lab Results   Component Value Date     09/19/2020     Lab Results   Component Value Date    TSHHS 0.910 12/28/2020     Immunology:  Lab Results   Component Value Date    PROT 5.9 (L) 01/19/2021     Coagulation Panel:  Lab Results   Component Value Date    PROTIME 9.6 (L) 01/05/2021    INR 0.80 01/05/2021    APTT 23.3 (L) 01/05/2021    DDIMER 243 (H) 09/18/2020     Tumor Markers:  Lab Results   Component Value Date    CEA 3.8 12/04/2020      Observations:  No data recorded       Assessment & Plan:    1. Clinically C could have limited stage of small cell cancer of the right lung. I referred to radiation oncologist for concomitant chemo and radiation. We discussed the risk and benefits of chemotherapy. She is scheduled for MRI of the brain in January 2021. I scheduled for treatment education. Presented for OCM treatment planning. She is C1D9. We will check cbc today. We reviewed AE. She has difficulty swallowing.  We will trial

## 2021-01-28 NOTE — TELEPHONE ENCOUNTER
Patient called asking if she is to have a refill on the doxycycline, reviewed note and it stated that is was only for 7 days and if symptoms didn't resolve for patient to f/u w/PCP, advised patient of this and she stated understanding

## 2021-02-02 NOTE — CARE COORDINATION
completed as directed, site improved/resolved. Continues to use 02 at 4L as directed. Michelle MANLEY just arrived to home w/ face mask etc. Seen by Dr Naye Smith today with plan to rto in 2 months. Continues active w/ Dr Thea Galloway for ongoing chemo. Advised to contact Dr Mauro/PCP 24/7 re: any health concerns, chemo side effects or sx of Copd Exac. Discussed benefits of SW at [facility], encouraged to utilize services. Advised to continue previously discussed Covid19 preventative measures. Denies need for ongoing CT follow up as well supported by [facility]. Encouraged to contact this CTN should resource needs arise. Episode closed, no further outreach scheduled.   Khloe Thompson RN

## 2021-02-10 NOTE — PROGRESS NOTES
MA Rooming Questions  Patient: Niurka Washington  MRN: [id number]    Date: 2/10/2021        1. Do you have any new issues?   no         2. Do you need any refills on medications?    no    3. Have you had any imaging done since your last visit?   no    4. Have you been hospitalized or seen in the emergency room since your last visit here?   no    5. Did the patient have a depression screening completed today?  No    No data recorded     PHQ-9 Given to (if applicable):               PHQ-9 Score (if applicable):                     [] Positive     []  Negative              Does question #9 need addressed (if applicable)                     [] Yes    []  No               Naomi Huggins MA

## 2021-02-10 NOTE — PROGRESS NOTES
Patient arrived to treatment suite for blood draw, pre-medications, pre- and post-hydration, and chemotherapy infusion. PIV started in left wrist area and blood drawn from site and sent to lab for processing. Patient stated not feeling well and has pitting edema BLE, but has no questions or concerns for the doctor at this time. On home oxygen, so placed on our oxygen unit while in the treatment suite. Patient had some difficulties with oxygen levels dropping during treatment and had to be monitored closely and Dr. Josué Gomez came to the treatment suite to evaluate. It was determined to given only 500ml of the post-hydration flush after her chemotherapy. Patient utilized home inhalers while in suite and this did help with oxygen levels. Treatment approved and given. Patient tolerated well. Left treatment suite with assistance in wheelchair. Patient did not wish to leave IV intact for tomorrow's treatment. Discharge instructions provided. Patient's status assessed and documented appropriately. All labs and required results were also reviewed today. Treatment parameters have been reviewed. Today's treatment has been approved by the provider. Treatment orders and medication sequencing (when applicable) was verified by 2 registered nurses. The treatment plan was confirmed with the patient prior to administration, and the patient understands the need to report any treatment-related symptoms. Prior to administration, when applicable, the following 8 elements of medication administration were reviewed with 2nd Registered Nurse prior to dosing: drug name, drug dose, infusion volume when prepared in a syringe, rate of administration, expiration dates and/or times, appearance and integrity of drug(s), and rate of pump for infusion. The 5 rights of medication administration have been verified.

## 2021-02-10 NOTE — PROGRESS NOTES
Patient Name:  Jaclyn Smith  Patient :  1950  Patient MRN:  [id number]     Primary Oncologist: Jayson Montoya MD  Referring Provider: Jackie Zhang MD     Date of Service: 2/10/2021      Reason for Consult: For evaluation of small cell carcinoma of the lung     Chief Complaint:    Chief Complaint   Patient presents with    Chemotherapy      Patient Active Problem List:     Hyperlipidemia     COPD (chronic obstructive pulmonary disease) ([address]     Leg pain     Hypertension     Tobacco abuse     Abdominal aortic aneurysm (AAA) without rupture (HCC)     Degeneration of lumbar or lumbosacral intervertebral disc     Osteopenia     Anxiety     Acute on chronic respiratory failure with hypoxia ([address]     Lung mass     COPD exacerbation (HCC)     Acute exacerbation of chronic obstructive pulmonary disease (COPD) ([address]     Small cell lung cancer (HCC)     HPI:   Tobias Riley is a pleasant 68-year-old  female patient who was referred for evaluation of 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 She has underlying COPD and has been followed by pulmonologist  for the history of lung mass. She has been prescribed nebulizer home oxygen and inhaler. CT chest on 2010 showed  1. Stable size and appearance of noncalcified right lower lobe   nodule compared to multiple prior studies dating back to   2008. This lesion measures approximately 8 mm in greatest   dimension on the current study and is most consistent with a   benign etiology, stable for greater than two years. Otherwise, no   CT evidence of acute thoracic abnormality is noted. 2. Multiple hypodense masses within the thyroid gland are not   significantly changed compared to prior studies dating back to   2008. CT abdomen pelvis in May 2019 revealed:  1.  Prior right hip arthroplasty results in streak artifact through the right   aspect of the pelvis which severely limits the exam.  There are right   hemipelvic findings suggesting acute appendicitis though the appendiceal   origin is not identified due to the artifact.   No evidence of bowel   obstruction, perforation, or abscess. 2. Nonspecific jejunal wall thickening which may relate to nondistention   versus enteritis. 3. Bilateral nonobstructing nephrolithiasis. 4. Abdominal aortic atherosclerosis with 2.6 cm distal infrarenal abdominal   aortic aneurysm, see recommendations.       RECOMMENDATIONS:   Managing Abdominal Aortic Aneurysms       2.6-2.9 cm: Every 5 years*       3.0-3.4 cm: Every 3 years.       3.5-3.9 cm: Every 1 year.       4.0-4.4 cm: Every 1 year. Recommend vascular consultation.       4.5-5.4 cm: Every 6 months. Recommend vascular consultation.       Greater than or equal to 5.5 cm: Referral to vascular surgeon. CTA chest on September 18, 2020 revealed:      There is a mass within the right lung apex measuring 16 mm in diameter as   well as a 16 x 16 mm spiculated mass in the right lower lobe.  A smaller 9.6   mm nodule is present within the right lower lobe. PET scan on October 22, 2020 showed  1. Dominant 1.6 cm nodules in the medial right upper lobe and central right   lower lobe are hypermetabolic and suspicious for malignancy, possibly   synchronous lung cancers. 2. A 3 x 2 cm precarinal merrick mass is intensely hypermetabolic and   consistent with metastatic disease. 3. A smaller 1 cm nodule in the lateral right lower lobe has not   significantly changed since 2009 and demonstrates only minimal uptake,   consistent with benign etiology. 4. Patchy ground-glass opacity throughout the left lung demonstrates mild   uptake and is favored to be infectious or inflammatory. 5. No distant metastatic disease. CT chest on December 7, 2020 showed  1. Persistent mediastinal adenopathy and right upper and lower lobe pulmonary   nodule is suspicious for malignancy.  Recommend tissue sampling.    2. Ground-glass opacity seen within the left lung on the prior PET-CT are no   longer visualized. She had mediastinoscopy on December 10, 2020. Pathology report showed  Final Pathologic Diagnosis:   A. Submitted as \"cervical lymph node\", biopsy:   -     Thyroid tissue, benign  B. Lymph node, 4R, biopsy:   -     Benign lymph node with sinus histiocystosis. C. Lymph node, 4R #2, biopsy:   -     Benign lymph node with sinus histiocystosis. She had EBUS on January 5, 2021. Pathology report showed  Final Cytologic Diagnosis:   A. Mediastinal mass, fine needle aspirate with cell block:   -     SMALL CELL CARCINOMA.     B. Bronchoalveolar lavage with cell block: -  Few atypical cells seen. She is scheduled for MRI of the brain in January 2021  We discussed about of concomitant chemo and radiation therapy for limited stage of small cell of the lung  I referred to radiation oncologist and schedule for the chemo education. Hopefully she will be able to start the chemotherapy within 2 weeks. We discussed about the risk and benefit of chemotherapy. I strongly recommend she follow-up with pulmonologist for COPD exacerbation. Presented on February 10, 2021 for scheduled follow up and treatment. She is noted to have decreased 02 sat (77-90) on 4L per NC. She does not appear short of breath. She reports she \"is always like this\" as she monitors sats at home. Due to concern for fluid overload she is given 500 ml post treatment fluid instead of 1L. No crackles audible but she has diffuse wheezing. She is encouraged to use her home inhalers as prescribed. She has not taken her Lasix today and is offered IV lasix which she refuses. Reports she will take PO lasix when she is in the comfort of her home. She has worsening cough, wbc noted to be 20.7 today. Due to concern for COPD exacerbation we will cover with levaquin 250 mg x 7 days (cr 1.2) and medrol dose pack. She refuses xray, or evaluation in ED.  She reports she will be compliant with medications prescribed today, and that if she has worsening respiratory issues overnight she will present to ED. She continues to have difficulty swallowing despite trial of magic mouthwash. We will refer to GI. She reports appetite is intact but she fears eating as she is afraid it will get stuck. She is asked to take nutritional supplementation bid. Dr. Claudia Cruz was consulted by treatment suite nurse due to respiratory status. I reviewed treatment plan after seeing patient and he agrees to Plan of care. We will see her in office tomorrow for re-evaluation. Past Medical History:   Arthritis, COPD, dyslipidemia, hypertension, right lower lung nodule, depression and anxiety. Past Surgery History:    Back surgery, endoscopic bronchial ultrasound study on January 5, 2021, colonoscopy in her 62s, cyst removal from the left arm, bilateral cataract surgery in 2010, right foot surgery in the 70s, complete hysterectomy in 1999, right hip replacement in 2014, laparoscopic appendectomy in May 2019, mediastinoscopy on December 10, 2020, tubal ligation in the 70s. Social History:  She smokes about 1 pack/day for more than 50 years. She denies any alcohol or illicit drug use. She has 2 children. Family History:  Father had small cell cancer of the lung. He was a smoker.     Allergies   Allergen Reactions    Formaldehyde     Gabapentin Other (See Comments)    Norflex Tablets [Orphenadrine]     Cranberry Rash       Current Outpatient Medications on File Prior to Visit   Medication Sig Dispense Refill    theophylline (UNIPHYL) 400 MG extended release tablet Take 0.5 tablets by mouth daily 15 tablet 3    Magic Mouthwash (MIRACLE MOUTHWASH) Swish and swallow 5 mLs 4 times daily as needed for Irritation 1:1:1 diphenhydramine, nystatin, lidocaine 240 mL 1    ondansetron (ZOFRAN) 8 MG tablet Take 1 tablet by mouth every 8 hours as needed for Nausea or Vomiting 30 tablet 2    OLANZapine (ZYPREXA) 2.5 MG tablet Take the night prior to each chemotherapy, then for three additional nights with each chemotherapy 16 tablet 0    dexamethasone (DECADRON) 4 MG tablet Take 1 tablet x 4 days after each chemotherapy 16 tablet 0    HYDROcodone-acetaminophen (NORCO)  MG per tablet Take 1 tablet by mouth every 4 hours as needed for Pain for up to 30 days.  Indications: 180 180 tablet 0    busPIRone (BUSPAR) 5 MG tablet Take 1 tablet by mouth 2 times daily 60 tablet 0    potassium chloride (KLOR-CON M) 10 MEQ extended release tablet Take 1 tablet by mouth 2 times daily 10 tablet 0    fluticasone (FLONASE) 50 MCG/ACT nasal spray 1 spray by Each Nostril route daily      dilTIAZem (CARDIZEM CD) 120 MG extended release capsule Take 1 capsule by mouth daily 30 capsule 3    simvastatin (ZOCOR) 20 MG tablet Take 1 tablet by mouth nightly 90 tablet 1    ipratropium-albuterol (DUONEB) 0.5-2.5 (3) MG/3ML SOLN nebulizer solution Inhale 3 mLs into the lungs 4 times daily 360 mL 5    albuterol sulfate HFA (PROAIR HFA) 108 (90 Base) MCG/ACT inhaler Inhale 2 puffs into the lungs every 6 hours as needed for Wheezing 1 Inhaler 6    Cholecalciferol (VITAMIN D3) 25 MCG (1000 UT) TABS Take by mouth daily       calcium carbonate 600 MG TABS tablet Take 1 tablet by mouth daily      aspirin 81 MG EC tablet Take 81 mg by mouth daily      Fexofenadine HCl (MUCINEX ALLERGY PO) Take by mouth 2 times daily        Current Facility-Administered Medications on File Prior to Visit   Medication Dose Route Frequency Provider Last Rate Last Admin    0.9 % sodium chloride infusion  20 mL/hr Intravenous Once Evita Burnette MD 20 mL/hr at 02/10/21 1042 20 mL/hr at 02/10/21 1042    potassium chloride 10 mEq, magnesium sulfate 1,000 mg in sodium chloride 0.9 % 1,000 mL IVPB   Intravenous Once Evita Burnette MD 1,000 mL/hr at 02/10/21 1355 New Bag at 02/10/21 1355    sodium chloride flush 0.9 % injection 10 mL  10 mL Intravenous PRN Evita Burnette MD Review of Systems:    Difficulty swallowing  Shortness of breath  Cough productive of white sputum  Weight loss  Occasional nausea  Lower extremity edema     Vital Signs: BP (!) 134/58 (Site: Right Upper Arm, Position: Sitting, Cuff Size: Medium Adult)   Pulse 99   Temp 97.3 °F (36.3 °C) (Infrared)   Resp 18   Ht 5' (1.524 m)   Wt 118 lb 3.2 oz (53.6 kg)   SpO2 91%   BMI 23.08 kg/m²      Physical Exam:  CONSTITUTIONAL: awake, alert, cooperative, no apparent distress   EYES:sclera clear and conjunctiva normal  ENT: Normocephalic, without obvious abnormality, atraumatic  NECK: supple, symmetrical  HEMATOLOGIC/LYMPHATIC: no cervical, supraclavicular  lymphadenopathy   LUNGS: Diminished breath sound bilaterally. Diffuse wheezing, respirations even and unlabored  CARDIOVASCULAR: regular rate and rhythm, normal S1 and S2, no murmur noted  ABDOMEN: normal bowel sounds x 4, soft, non-distended, non-tender, no masses palpated, no hepatosplenomegaly   MUSCULOSKELETAL: full range of motion noted, tone is normal  NEUROLOGIC: awake, alert, oriented to name, place and time. Motor skills grossly intact. SKIN: No jaundice. appears intact. Dry skin   EXTREMITIES: no LE edema. No cyanosis.       Labs:  Hematology:  Lab Results   Component Value Date    WBC 20.7 (H) 02/10/2021    RBC 3.67 (L) 02/10/2021    HGB 10.1 (L) 02/10/2021    HCT 34.2 (L) 02/10/2021    MCV 93.2 02/10/2021    MCH 27.5 02/10/2021    MCHC 29.5 (L) 02/10/2021    RDW 17.0 (H) 02/10/2021     02/10/2021    MPV 10.5 02/10/2021    BANDSPCT 7 02/10/2021    SEGSPCT 77.0 (H) 02/10/2021    EOSRELPCT 0.5 01/19/2021    BASOPCT 0.3 01/19/2021    LYMPHOPCT 6.0 (L) 02/10/2021    MONOPCT 7.0 (H) 02/10/2021    BANDABS 1.45 02/10/2021    SEGSABS 16.0 02/10/2021    EOSABS 0.0 01/19/2021    BASOSABS 0.0 01/19/2021    LYMPHSABS 1.2 02/10/2021    MONOSABS 1.4 02/10/2021    DIFFTYPE MANUAL DIFFERENTIAL 02/10/2021    ANISOCYTOSIS 1+ 02/10/2021    POLYCHROM 1+ 12/07/2020 [facility] OCCASIONAL 12/07/2020    PLTM FEW 12/07/2020     Chemistry:  Lab Results   Component Value Date     02/10/2021    K 3.8 02/10/2021    CL 88 (L) 02/10/2021    CO2 40 (H) 02/10/2021    BUN 13 02/10/2021    CREATININE 1.2 (H) 02/10/2021    GLUCOSE 107 (H) 02/10/2021    CALCIUM 9.4 02/10/2021    PROT 6.1 (L) 02/10/2021    LABALBU 3.8 02/10/2021    BILITOT 0.2 02/10/2021    ALKPHOS 108 02/10/2021    AST 20 02/10/2021    ALT 11 02/10/2021    LABGLOM 45 (L) 02/10/2021    GFRAA 55 (L) 02/10/2021    AGRATIO 1.6 08/28/2020    GLOB 2.7 08/28/2020    PHOS 3.0 12/30/2020    MG 1.2 (L) 02/10/2021    POCCA 1.14 02/10/2021    POCGLU 121 (H) 02/10/2021     Lab Results   Component Value Date     09/19/2020     Lab Results   Component Value Date    TSHHS 0.910 12/28/2020     Immunology:  Lab Results   Component Value Date    PROT 6.1 (L) 02/10/2021     Coagulation Panel:  Lab Results   Component Value Date    PROTIME 9.6 (L) 01/05/2021    INR 0.80 01/05/2021    APTT 23.3 (L) 01/05/2021    DDIMER 243 (H) 09/18/2020     Tumor Markers:  Lab Results   Component Value Date    CEA 3.8 12/04/2020      Observations:  No data recorded       Assessment & Plan:    1. Clinically C could have limited stage of small cell cancer of the right lung. I referred to radiation oncologist for concomitant chemo and radiation. We discussed the risk and benefits of chemotherapy. MRI of the brain in January 2021 was negative for metastatic disease. Radiation oncology has seen and evaluated without recommendation for radiation. C2D1 fluids were reduced for that day due to concern for overload. 2.  She has likely reactive leukocytosis. She has mild chronic anemia. Ferritin in September 2020 was 234.    3.  She has depression and anxiety. She is on antidepression. I will prescribe her buspirone for the anxiety. 4.  She has COPD.  She has increased sputum production, worsening cough and increased WBC above baseline leukocytosis. We will give medrol dose pack and antibiotics for presumed copd exacerbation. 5.  We discussed about lifestyle and healthy diet. I recommend smoking cessation. I recommend she stay active and maintain her body weight. Return to clinic in 3 weeks or sooner. All of the question has been answered for today.

## 2021-02-11 NOTE — PROGRESS NOTES
Patient arrived to treatment suite for day 2 of Etoposide. PIV started in right arm and good blood return noted. Patient has less shortness of breath than yesterday, and was placed on our oxygen upon arrival to suite. She states that she DID take her Lasix after leaving yesterday. Treatment approved and given. Patient tolerated well. IV left accessed for tomorrow's treatment. Left treatment suite with assistance in wheelchair. Patient's status assessed and documented appropriately. All labs and required results were also reviewed today. Treatment parameters have been reviewed. Today's treatment has been approved by the provider. Treatment orders and medication sequencing (when applicable) was verified by 2 registered nurses. The treatment plan was confirmed with the patient prior to administration, and the patient understands the need to report any treatment-related symptoms. Prior to administration, when applicable, the following 8 elements of medication administration were reviewed with 2nd Registered Nurse prior to dosing: drug name, drug dose, infusion volume when prepared in a syringe, rate of administration, expiration dates and/or times, appearance and integrity of drug(s), and rate of pump for infusion. The 5 rights of medication administration have been verified.

## 2021-02-12 NOTE — PROGRESS NOTES
Pt. Here for treatment. Peripheral IV left in place from visit yesterday, but IV occluded. Peripheral IV restarted without difficulty, good blood return noted. Treatment administered without incident. On-body injector applied to right abdomen.

## 2021-02-15 NOTE — TELEPHONE ENCOUNTER
Called patient and discussed on body injector. She states that the red light did not come on but the gauge did go to empty. Informed her that as long as it was not beeping that this was ok. Discussed calling the on call phone number if she has issues over the weekend or evenings when the office was closed. Patient verbalized understanding. She states that she has removed the on body injector when it stated it was empty.

## 2021-02-17 NOTE — TELEPHONE ENCOUNTER
Pt called to report increased swelling in BLE, she self increased lasix to 40 mg daily, Breathing is sometimes labored, on phone  can have conversation without sounding labored/shortness. I will follow up with GI referral. She is asked to come to office for follow up for interim counts. Requesting refill of burpar. To schedule appt later this week or early next week.

## 2021-02-19 NOTE — PROGRESS NOTES
Patient Name:  Rohini Osorio  Patient :  1950  Patient MRN:  [id number]     Primary Oncologist: Ceasar Hale MD  Referring Provider: Soy Quezada MD     Date of Service: 2021      Reason for Consult: For evaluation of small cell carcinoma of the lung     Chief Complaint:    Chief Complaint   Patient presents with    Follow-up      Patient Active Problem List:     Hyperlipidemia     COPD (chronic obstructive pulmonary disease) ([address]     Leg pain     Hypertension     Tobacco abuse     Abdominal aortic aneurysm (AAA) without rupture (HCC)     Degeneration of lumbar or lumbosacral intervertebral disc     Osteopenia     Anxiety     Acute on chronic respiratory failure with hypoxia ([address]     Lung mass     COPD exacerbation ([address]     Acute exacerbation of chronic obstructive pulmonary disease (COPD) ([address]     Small cell lung cancer (HCC)     HPI:   Roderick Chacon is a pleasant 68-year-old  female patient who was referred for evaluation of 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 She has underlying COPD and has been followed by pulmonologist  for the history of lung mass. She has been prescribed nebulizer home oxygen and inhaler. CT chest on 2010 showed  1. Stable size and appearance of noncalcified right lower lobe   nodule compared to multiple prior studies dating back to   2008. This lesion measures approximately 8 mm in greatest   dimension on the current study and is most consistent with a   benign etiology, stable for greater than two years. Otherwise, no   CT evidence of acute thoracic abnormality is noted. 2. Multiple hypodense masses within the thyroid gland are not   significantly changed compared to prior studies dating back to   2008. CT abdomen pelvis in May 2019 revealed:  1.  Prior right hip arthroplasty results in streak artifact through the right   aspect of the pelvis which severely limits the exam.  There are right hemipelvic findings suggesting acute appendicitis though the appendiceal   origin is not identified due to the artifact.   No evidence of bowel   obstruction, perforation, or abscess. 2. Nonspecific jejunal wall thickening which may relate to nondistention   versus enteritis. 3. Bilateral nonobstructing nephrolithiasis. 4. Abdominal aortic atherosclerosis with 2.6 cm distal infrarenal abdominal   aortic aneurysm, see recommendations.       RECOMMENDATIONS:   Managing Abdominal Aortic Aneurysms       2.6-2.9 cm: Every 5 years*       3.0-3.4 cm: Every 3 years.       3.5-3.9 cm: Every 1 year.       4.0-4.4 cm: Every 1 year. Recommend vascular consultation.       4.5-5.4 cm: Every 6 months. Recommend vascular consultation.       Greater than or equal to 5.5 cm: Referral to vascular surgeon. CTA chest on September 18, 2020 revealed:      There is a mass within the right lung apex measuring 16 mm in diameter as   well as a 16 x 16 mm spiculated mass in the right lower lobe.  A smaller 9.6   mm nodule is present within the right lower lobe. PET scan on October 22, 2020 showed  1. Dominant 1.6 cm nodules in the medial right upper lobe and central right   lower lobe are hypermetabolic and suspicious for malignancy, possibly   synchronous lung cancers. 2. A 3 x 2 cm precarinal merrick mass is intensely hypermetabolic and   consistent with metastatic disease. 3. A smaller 1 cm nodule in the lateral right lower lobe has not   significantly changed since 2009 and demonstrates only minimal uptake,   consistent with benign etiology. 4. Patchy ground-glass opacity throughout the left lung demonstrates mild   uptake and is favored to be infectious or inflammatory. 5. No distant metastatic disease. CT chest on December 7, 2020 showed  1. Persistent mediastinal adenopathy and right upper and lower lobe pulmonary   nodule is suspicious for malignancy.  Recommend tissue sampling.    2. Ground-glass opacity seen within the left lung on the prior PET-CT are no   longer visualized. She had mediastinoscopy on December 10, 2020. Pathology report showed  Final Pathologic Diagnosis:   A. Submitted as \"cervical lymph node\", biopsy:   -     Thyroid tissue, benign  B. Lymph node, 4R, biopsy:   -     Benign lymph node with sinus histiocystosis. C. Lymph node, 4R #2, biopsy:   -     Benign lymph node with sinus histiocystosis. She had EBUS on January 5, 2021. Pathology report showed  Final Cytologic Diagnosis:   A. Mediastinal mass, fine needle aspirate with cell block:   -     SMALL CELL CARCINOMA.     B. Bronchoalveolar lavage with cell block: -  Few atypical cells seen. She is scheduled for MRI of the brain in January 2021  We discussed about of concomitant chemo and radiation therapy for limited stage of small cell of the lung  I referred to radiation oncologist and schedule for the chemo education. Hopefully she will be able to start the chemotherapy within 2 weeks. We discussed about the risk and benefit of chemotherapy. I strongly recommend she follow-up with pulmonologist for COPD exacerbation. Presented on 2/19/21 for sick visit. She reports increased lower extremity edema without calf pain. She was worried about cat bit to left foot. This appears stable. She just completed levaquin and steroids for COPD exacerbation. She is taking lasix daily. She continues to have issues swallowing. We will monitor interim counts today. She denies worsening shortness of breath, fatigue is stable. She denies fever, chills, night sweats, no easy bleeding or bruising. Past Medical History:   Arthritis, COPD, dyslipidemia, hypertension, right lower lung nodule, depression and anxiety.      Past Surgery History:    Back surgery, endoscopic bronchial ultrasound study on January 5, 2021, colonoscopy in her 62s, cyst removal from the left arm, bilateral cataract surgery in 2010, right foot surgery in the 70s, complete Inhale 3 mLs into the lungs 4 times daily 360 mL 5    albuterol sulfate HFA (PROAIR HFA) 108 (90 Base) MCG/ACT inhaler Inhale 2 puffs into the lungs every 6 hours as needed for Wheezing 1 Inhaler 6    Cholecalciferol (VITAMIN D3) 25 MCG (1000 UT) TABS Take by mouth daily       calcium carbonate 600 MG TABS tablet Take 1 tablet by mouth daily      aspirin 81 MG EC tablet Take 81 mg by mouth daily      Fexofenadine HCl (MUCINEX ALLERGY PO) Take by mouth 2 times daily        No current facility-administered medications on file prior to visit. Review of Systems:    Difficulty swallowing  Shortness of breath  Cough  nausea     Vital Signs: BP (!) 141/63 (Site: Right Upper Arm, Position: Sitting, Cuff Size: Medium Adult)   Pulse 108   Temp 97.1 °F (36.2 °C) (Infrared)   Resp 16   Ht 5' (1.524 m)   Wt 118 lb 3.2 oz (53.6 kg)   SpO2 100%   BMI 23.08 kg/m²      Physical Exam:  CONSTITUTIONAL: awake, alert, cooperative, no apparent distress   EYES:sclera clear and conjunctiva normal  ENT: Normocephalic, without obvious abnormality, atraumatic  NECK: supple, symmetrical  HEMATOLOGIC/LYMPHATIC: no cervical, supraclavicular  lymphadenopathy   LUNGS: Diminished breath sound bilaterally. Bilateral wheezing. CARDIOVASCULAR: regular rate and rhythm, normal S1 and S2, no murmur noted  ABDOMEN: normal bowel sounds x 4, soft, non-distended, non-tender, no masses palpated, no hepatosplenomegaly   MUSCULOSKELETAL: full range of motion noted, tone is normal  NEUROLOGIC: awake, alert, oriented to name, place and time. Motor skills grossly intact. SKIN: No jaundice. appears intact. Dry skin   EXTREMITIES:  BLE edema. Right slightly larger than left. No cyanosis.        Labs:  Hematology:  Lab Results   Component Value Date    WBC 9.1 02/19/2021    RBC 3.13 (L) 02/19/2021    HGB 8.6 (L) 02/19/2021    HCT 28.7 (L) 02/19/2021    MCV 91.7 02/19/2021    MCH 27.5 02/19/2021    MCHC 30.0 (L) 02/19/2021    RDW 17.0 (H) 02/19/2021     (L) 02/19/2021    MPV 10.2 02/19/2021    BANDSPCT 7 02/10/2021    SEGSPCT 83.7 (H) 02/19/2021    EOSRELPCT 0.3 02/19/2021    BASOPCT 0.1 02/19/2021    LYMPHOPCT 8.6 (L) 02/19/2021    MONOPCT 7.3 (H) 02/19/2021    BANDABS 1.45 02/10/2021    SEGSABS 7.6 02/19/2021    EOSABS 0.0 02/19/2021    BASOSABS 0.0 02/19/2021    LYMPHSABS 0.8 02/19/2021    MONOSABS 0.7 02/19/2021    DIFFTYPE AUTOMATED DIFFERENTIAL 02/19/2021    ANISOCYTOSIS 1+ 02/10/2021    POLYCHROM 1+ 12/07/2020    WBCMORP OCCASIONAL 12/07/2020    PLTM FEW 12/07/2020     Chemistry:  Lab Results   Component Value Date     (L) 02/19/2021    K 4.1 02/19/2021    CL 83 (L) 02/19/2021    CO2 47 (H) 02/19/2021    BUN 25 (H) 02/19/2021    CREATININE 0.8 02/19/2021    GLUCOSE 111 (H) 02/19/2021    CALCIUM 9.3 02/19/2021    PROT 5.7 (L) 02/19/2021    LABALBU 4.0 02/19/2021    BILITOT 0.2 02/19/2021    ALKPHOS 123 02/19/2021    AST 10 (L) 02/19/2021    ALT 9 (L) 02/19/2021    LABGLOM >60 02/19/2021    GFRAA >60 02/19/2021    AGRATIO 1.6 08/28/2020    GLOB 2.7 08/28/2020    PHOS 3.0 12/30/2020    MG 1.2 (L) 02/10/2021    POCCA 1.14 02/10/2021    POCGLU 121 (H) 02/10/2021     Lab Results   Component Value Date     09/19/2020     Lab Results   Component Value Date    TSHHS 0.910 12/28/2020     Immunology:  Lab Results   Component Value Date    PROT 5.7 (L) 02/19/2021     Coagulation Panel:  Lab Results   Component Value Date    PROTIME 9.6 (L) 01/05/2021    INR 0.80 01/05/2021    APTT 23.3 (L) 01/05/2021    DDIMER 243 (H) 09/18/2020     Tumor Markers:  Lab Results   Component Value Date    CEA 3.8 12/04/2020      Observations:  No data recorded       Assessment & Plan:    1. Clinically C could have limited stage of small cell cancer of the right lung. I referred to radiation oncologist for concomitant chemo and radiation. We discussed the risk and benefits of chemotherapy. She is scheduled for MRI of the brain in January 2021.   I No complaints

## 2021-02-19 NOTE — PROGRESS NOTES
MA Rooming Questions  Patient: Christie Duty  MRN: [id number]    Date: 2/19/2021        1. Do you have any new issues? yes - swelling and fluid build up getting worse  ; anxiety         2. Do you need any refills on medications?    no    3. Have you had any imaging done since your last visit?   no    4. Have you been hospitalized or seen in the emergency room since your last visit here?   no    5. Did the patient have a depression screening completed today?  No    No data recorded     PHQ-9 Given to (if applicable):               PHQ-9 Score (if applicable):                     [] Positive     []  Negative              Does question #9 need addressed (if applicable)                     [] Yes    []  No               Tanya Sharp MA

## 2021-02-24 NOTE — TELEPHONE ENCOUNTER
Patient called to let us know that she hasn't heard from anyone about her throat, called [facility], spoke to Kwabena Freeman, he is going to reach out to the patient today to schedule appt

## 2021-02-24 NOTE — TELEPHONE ENCOUNTER
[facility]aki[facility] Collins office called to check on the status of the referral that they sent over. Called and spoke to [facility], scheduled consult with Jalil Harmon on 3/2/21 at 0930.

## 2021-02-25 NOTE — TELEPHONE ENCOUNTER
Patient is having increased anxiety, states anxiety is really bad today. No shortness of breath or any other symptoms.

## 2021-02-25 NOTE — PROGRESS NOTES
Called patient. She states that she has been taking Buspar 10 mg BID. She reports that she is having panic attacks and anxiety. Discussed with Dr Andrew Mays. He states that patient can increase Buspar to 10 mg TID and ordered a psychology consult if patient wants one. Patient declined consult but states that she will increase the Buspar to TID.

## 2021-02-26 NOTE — TELEPHONE ENCOUNTER
Buspirone is not working very well and wants to know if you can switch her to something else? ? Was on ativan at one point and it didn't work either.

## 2021-02-26 NOTE — TELEPHONE ENCOUNTER
Pt called back and stated that they already tried 15mg 2 times a day and it does not help with her anxiety at all . She needs something else

## 2021-02-26 NOTE — TELEPHONE ENCOUNTER
Sent prescription for sertraline 50 mg daily  Tell her to take 1/2 tablet daily for 4 days and then increase to the whole tablet and follow-up in 4 weeks

## 2021-02-26 NOTE — TELEPHONE ENCOUNTER
Called pt and gave her the info and pt voiced understanding and will call back to schedule 4 week f/u

## 2021-02-28 PROBLEM — J96.22 ACUTE ON CHRONIC RESPIRATORY FAILURE WITH HYPOXIA AND HYPERCAPNIA (HCC): Status: ACTIVE | Noted: 2021-01-01

## 2021-02-28 PROBLEM — J96.21 ACUTE ON CHRONIC RESPIRATORY FAILURE WITH HYPOXIA AND HYPERCAPNIA (HCC): Status: ACTIVE | Noted: 2021-01-01

## 2021-02-28 NOTE — ED PROVIDER NOTES
12 lead EKG per my interpretation:  Normal Sinus Rhythm 95  Axis is   Normal  QTc is  407  There is no specific T wave changes appreciated. There is no specific ST wave changes appreciated.     Prior EKG to compare with was not available         Devendra Guillory DO  02/28/21 [id number]

## 2021-02-28 NOTE — ED NOTES
Bed: ED-32  Expected date:   Expected time:   Means of arrival:   Comments:  EMS  TORY Granado RN  02/28/21 [id number]

## 2021-02-28 NOTE — ED PROVIDER NOTES
[facility] [address] EMERGENCY DEPARTMENT    Patient: Steve Alfred  MRN: [id number]  : 1950  Date of Evaluation: 2021  ED Provider: Viridiana Quintanilla PA-C    Chief Complaint       Chief Complaint   Patient presents with    Shortness of Breath     COPD, lung cancer, has been going on all        Candstevie Mai is a 65 y.o. female who presents to the emergency department for shortness of breath. Patient with COPD and small cell lung cancer, currently on chemotherapy. She states she has been feeling short of breath all day today. She uses 4L nasal canula oxygen at all times--arrived to the ED with sats in the 80s. She denies any cough or congestion. Denies any chest pain. Denies fever or chills. Denies any known sick contacts. ROS     CONSTITUTIONAL:  Denies fever. EYES:  Denies visual changes. HEAD:  Denies headache. ENT:  Denies earache, nasal congestion, sore throat. NECK:  Denies neck pain. RESPIRATORY:  + shortness of breath. CARDIOVASCULAR:  Denies chest pain. GI:  Denies nausea or vomiting. :  Denies urinary symptoms. MUSCULOSKELETAL:  Denies extremity pain or swelling. BACK:  Denies back pain. INTEGUMENT:  Denies skin changes. LYMPHATIC:  Denies lymphadenopathy. NEUROLOGIC:  Denies any numbness/tingling. PSYCHIATRIC:  Denies SI/HI. Past History     Past Medical History:   Diagnosis Date    Arthritis     COPD (chronic obstructive pulmonary disease) ([facility] [address]     follow with Dr Rosemarie Rodriguez Full dentures     H/O cardiovascular stress test 2009    thallium, normal no ischemia EF70%     H/O cardiovascular stress test 10/11/2013    thallium,EF70%, normal, no ischemia    H/O Doppler ultrasound 10/13/2010    carotid, right normal, left normal    H/O Doppler ultrasound 10/07/2014    Carotid mild bilateral internal carotid artery disease less than 50% in severity.  Normal vertebral artery flows with good velocities. Incidental finding of possible thyroid nodule in the left lobe.  History of 24 hour EKG monitoring 03/11/2011    NSR no ectopy    History of nuclear stress test 10/21/2016    lexiscan-normal,no ischemia,EF70%,enlarged right ventricle    Hx of chest x-ray 01/02/2015    WNL    Hx of Doppler echocardiogram 12/01/2020    EF 50-55% mild LV hypertrophy. Grade 2 diastolic dysfunction. Mild AS.     Hx of echocardiogram 10/11/2013    10/13 Abn lt ventricular diastolic function, mile MR, EF 57%,10/10 EF55%, mild mitral annular calcification    Hx of echocardiogram 05/02/2019    EF 24-35%, Grade I diastolic dysfunction, Mild aortic stenosis, Calcific thickening of posterior leaflet of mitral valve, Mild Pulm HTN    Hx of pelvic x-ray 01/02/2015    Severe RT his osterarothrosis    Hx of x-ray of hip 01/02/2015    Severe Rt hip osteosrthrosis    Hyperlipidemia     Hypertension     follows with dr George Guzmán Leg pain     Lung nodules     scheduled for bronch 1/5/2020    On home oxygen therapy     \"on 4 liters 24 hours per day\"    Small cell lung cancer ([facility] [address] 1/11/2021    Wears glasses     to read     Past Surgical History:   Procedure Laterality Date    BACK SURGERY      \"about 12 yrs ago - surgery my mid to low back \" done in [address] N/A 1/5/2021    BRONCHOSCOPY ENDO[facility]IAL [facility]TR[facility]      \"last one done in my age 61's    CYST REMOVAL  1970's    left arm    EYE SURGERY  2010?    svetlana cataract ext     FOOT SURGERY Right 1970's    \"have screw in 2nd toe\"    HYSTERECTOMY      1999\"took everything \"    JOINT REPLACEMENT Right 2014??    hip    LAPAROSCOPIC APPENDECTOMY N/A 5/18/2019    APPENDECTOMY LAPAROSCOPIC performed by Rupinder Weinstein MD at [address] [place] [address] 12/10/2020    MEDIASTINOSCOPY performed by Pretty Scott MD at [address] [place] [address]  1970's     Social History     Socioeconomic History    Marital status:      Spouse name: None    Number of children: None    Years of education: None    Highest education level: None   Occupational History    None   Social Needs    Financial resource strain: None    Food insecurity     Worry: None     Inability: None    Transportation needs     Medical: None     Non-medical: None   Tobacco Use    Smoking status: Former Smoker     Packs/day: 1.50     Years: 47.00     Pack years: 70.50     Types: Cigarettes     Start date: 1973     Quit date: 2020     Years since quittin.2    Smokeless tobacco: Never Used   Substance and Sexual Activity    Alcohol use:  Yes     Alcohol/week: 0.0 standard drinks     Comment: rare\"twice per year\"    Drug use: No    Sexual activity: None     Comment:    Lifestyle    Physical activity     Days per week: None     Minutes per session: None    Stress: None   Relationships    Social connections     Talks on phone: None     Gets together: None     Attends [employer] service: None     Active member of club or organization: None     Attends meetings of clubs or organizations: None     Relationship status: None    Intimate partner violence     Fear of current or ex partner: None     Emotionally abused: None     Physically abused: None     Forced sexual activity: None   Other Topics Concern    None   Social History Narrative    None       Medications/Allergies     Previous Medications    ALBUTEROL SULFATE HFA (PROAIR HFA) 108 (90 BASE) MCG/ACT INHALER    Inhale 2 puffs into the lungs every 6 hours as needed for Wheezing    ASPIRIN 81 MG EC TABLET    Take 81 mg by mouth daily    BUSPIRONE (BUSPAR) 10 MG TABLET    Take 1 tablet by mouth 2 times daily    CALCIUM CARBONATE 600 MG TABS TABLET    Take 1 tablet by mouth daily    CHOLECALCIFEROL (VITAMIN D3) 25 MCG (1000 UT) TABS    Take by mouth daily     DEXAMETHASONE (DECADRON) 4 MG TABLET    Take 1 tablet x 4 days after each chemotherapy DILTIAZEM (CARDIZEM CD) 120 MG EXTENDED RELEASE CAPSULE    Take 1 capsule by mouth daily    FEXOFENADINE HCL (MUCINEX ALLERGY PO)    Take by mouth 2 times daily     FLUTICASONE (FLONASE) 50 MCG/ACT NASAL SPRAY    1 spray by Each Nostril route daily    HYDROCODONE-ACETAMINOPHEN (NORCO)  MG PER TABLET    Take 1 tablet by mouth every 4 hours as needed for Pain for up to 30 days. Indications: 180    IPRATROPIUM-ALBUTEROL (DUONEB) 0.5-2.5 (3) MG/3ML SOLN NEBULIZER SOLUTION    Inhale 3 mLs into the lungs 4 times daily    MAGIC MOUTHWASH (MIRACLE MOUTHWASH)    Swish and swallow 5 mLs 4 times daily as needed for Irritation 1:1:1 diphenhydramine, nystatin, lidocaine    NUTRITIONAL SUPPLEMENT LIQD    2 cans by mouth daily. Boost strawberry if available. OLANZAPINE (ZYPREXA) 2.5 MG TABLET    Take the night prior to each chemotherapy, then for three additional nights with each chemotherapy    ONDANSETRON (ZOFRAN) 8 MG TABLET    Take 1 tablet by mouth every 8 hours as needed for Nausea or Vomiting    POTASSIUM CHLORIDE (KLOR-CON M) 10 MEQ EXTENDED RELEASE TABLET    Take 1 tablet by mouth 2 times daily    SERTRALINE (ZOLOFT) 50 MG TABLET    Take 1 tablet by mouth daily    SIMVASTATIN (ZOCOR) 20 MG TABLET    Take 1 tablet by mouth nightly    THEOPHYLLINE (UNIPHYL) 400 MG EXTENDED RELEASE TABLET    Take 0.5 tablets by mouth daily     Allergies   Allergen Reactions    Formaldehyde     Gabapentin Other (See Comments)    Norflex Tablets [Orphenadrine]     Cranberry Rash        Physical Exam       ED Triage Vitals   BP Temp Temp Source Pulse Resp SpO2 Height Weight   02/28/21 1809 02/28/21 1810 02/28/21 1810 02/28/21 1809 02/28/21 1809 02/28/21 1809 -- --   (!) 144/61 98.3 °F (36.8 °C) Oral 100 16 100 %       GENERAL APPEARANCE:  Well-developed, chronically ill appearing female. HEAD:  NC/AT. EYES:  Sclera anicteric. ENT:  Ears, nose, mouth normal.     NECK:  Supple. CARDIO:  Mildly tachy.     LUNGS:   Diminished, respirations moderately labored. On 6L nasal canula. ABDOMEN:  Soft, non-distended, non-tender. BS active. EXTREMITIES:  No acute deformities. SKIN:  Warm and dry. NEUROLOGICAL:  Alert and oriented. PSYCHIATRIC:  Normal mood.      Diagnostics     Labs:  Labs Reviewed   CBC WITH AUTO DIFFERENTIAL - Abnormal; Notable for the following components:       Result Value    WBC 18.7 (*)     RBC 3.16 (*)     Hemoglobin 8.8 (*)     Hematocrit 29.3 (*)     MCHC 30.0 (*)     RDW 16.4 (*)     Metamyelocytes Relative 1 (*)     Bands Relative 2 (*)     Segs Relative 73.0 (*)     Lymphocytes % 17.0 (*)     Monocytes % 7.0 (*)     All other components within normal limits   COMPREHENSIVE METABOLIC PANEL W/ REFLEX TO MG FOR LOW K - Abnormal; Notable for the following components:    Sodium 129 (*)     Chloride 80 (*)     CO2 49 (*)     Glucose 105 (*)     Total Protein 5.6 (*)     Anion Gap 0 (*)     All other components within normal limits   BRAIN NATRIURETIC PEPTIDE - Abnormal; Notable for the following components:    Pro-.0 (*)     All other components within normal limits   BLOOD GAS, VENOUS - Abnormal; Notable for the following components:    pCO2, Tad 84 (*)     pO2, Tad 65 (*)     Base Exc, Mixed 23.2 (*)     HCO3, Venous 53.2 (*)     O2 Sat, Tad 89.5 (*)     All other components within normal limits   COVID-19, RAPID   RESPIRATORY PANEL, MOLECULAR, WITH COVID-19   LEGIONELLA ANTIGEN, URINE   STREP PNEUMONIAE ANTIGEN   TROPONIN   LACTIC ACID, PLASMA   COMPREHENSIVE METABOLIC PANEL W/ REFLEX TO MG FOR LOW K   CBC   PROCALCITONIN     Radiographs:  Xr Chest Portable    Result Date: 2/28/2021  EXAMINATION: ONE XRAY VIEW OF THE CHEST 2/28/2021 3:27 pm COMPARISON: 01/11/2021 HISTORY: ORDERING SYSTEM PROVIDED HISTORY: sob TECHNOLOGIST PROVIDED HISTORY: Reason for exam:->sob FINDINGS: Nodular opacities are again identified within both lungs, presumably reflecting metastatic disease in this patient with a known history of small cell lung cancer. No acute focal infiltrate is identified. There is mild blunting of the left costophrenic angle. Cardial pericardial silhouette is unremarkable. No pneumothorax. No free air. No acute bony abnormality. Minimal blunting of the left costophrenic angle, which may reflect a very small joint effusion. Otherwise no acute abnormality detected. Cta Pulmonary W Contrast    Result Date: 2/28/2021  EXAMINATION: CTA OF THE CHEST 2/28/2021 5:03 pm TECHNIQUE: CTA of the chest was performed after the administration of intravenous contrast.  Multiplanar reformatted images are provided for review. MIP images are provided for review. Dose modulation, iterative reconstruction, and/or weight based adjustment of the mA/kV was utilized to reduce the radiation dose to as low as reasonably achievable. COMPARISON: Chest CT, 12/07/2020 CT PA, 09/18/2020 HISTORY: ORDERING SYSTEM PROVIDED HISTORY: sob, lung cancer TECHNOLOGIST PROVIDED HISTORY: Reason for exam:->sob, lung cancer Decision Support Exception->Emergency Medical Condition (MA) Reason for Exam: sob, lung cancer Acuity: Acute Type of Exam: Initial Relevant Medical/Surgical History: lung cancer FINDINGS: Pulmonary Arteries: The pulmonary arteries are adequately opacified. No filling defects are identified within the pulmonary arteries to suggest pulmonary embolism. Mediastinum: Thyroid is stable. No mediastinal or hilar lymphadenopathy is identified. Esophagus is unremarkable. Cardiac chambers unremarkable. No pericardial effusion. Thoracic aorta normal in caliber without evidence of dissection. Lungs/pleura: There is a lobulated mass in the right lower lobe, stable when compared to the previous exam, measuring 1.8 x 1.6 cm (series 3, image 74). Nodule within the right lung apex is decreased in size, measuring 0.9 x 0.8 cm (series 3, image 24).  Multiple additional smaller nodules are detected within the lungs, and these appear similar Yes when compared to the previous exam. There is a background of emphysema. No acute focal infiltrate is identified. No pneumothorax. No pleural effusion. Upper Abdomen: Unremarkable. Soft Tissues/Bones: No osteolytic or osteoblastic bone lesions are identified. No acute bony abnormalities are detected. No evidence of pulmonary embolism or aortic dissection. Overall, no acute abnormality identified. Multiple nodules within both lungs, with a mixed response to therapy. The majority of the nodules are similar in size. However, at least 1 nodule within the right lung apex is decreased in size. Procedures/EKG:   Please see Dr. Riggs [place] note for interpretation. ED Course and MDM   -  Patient seen and evaluated in the emergency department. -  Triage and nursing notes reviewed and incorporated. -  Old chart records reviewed and incorporated. -  Supervising physician was Dr. Zaida Choi. Patient was seen independently. -  Differential diagnosis includes:  Worsening malignancy, pneumothorax, infection, others  -  Work-up included:  See above  -  ED medications:  Norco, Ativan, Duonebs, NS  -  Results discussed with patient. CTA chest appears mostly stable, no evidence of infectious process, no pneumothorax. She has a leukocytosis of 18,700. Na 129. Negative troponin. Negative COVID. She was started on BiPAP but did not want to wear the mask. She is tolerating O2 sats at 100% on 6L nasal canula and although slightly labored, prefers to continue on nasal canula at this time. I spoke with Dr. Ron Alejandra, hospitalist, who will admit. CRITICAL CARE NOTE:  There was a high probability of clinically significant life-threatening deterioration of the patient's condition requiring my urgent intervention due to hypoxia. Telemetry monitoring, review of labs and imaging, BiPAP, consult hospitalist was performed to address this. Total critical care time is at least  30 minutes.     This includes vital sign monitoring, pulse oximetry monitoring, telemetry monitoring, clinical response to the IV medications, reviewing the nursing notes, consultation time, dictation/documentation time, and interpretation of the lab work. This time excludes time spent performing procedures and separately billable procedures and family discussion time. In light of current events, I did utilize appropriate PPE (including N95 and surgical face mask, safety glasses, and gloves, as recommended by the health facility/national standard best practice, during my bedside interactions with the patient. Final Impression      1. Shortness of breath    2. Hypoxia    3. Small cell lung cancer ([facility] [address]    4. Leukocytosis, unspecified type    5.  Hyponatremia            DISPOSITION        Lisa Prakash PA-C  [address] [place], [place]  03/01/21 0022

## 2021-03-01 NOTE — H&P
History and Physical      Name:  Chika Romero /Age/Sex: 1950  (65 y.o. female)   MRN & CSN:  [id number] & [id number] Admission Date/Time: 2021  6:04 PM   [facility] PCP: Andrea Pearce, [address] Day: 1    Assessment and Plan:   Chika Romero is a 65 y.o.  female  who presents with Acute on chronic respiratory failure with hypoxia and hypercapnia ([facility] [address]    1. Acute on chronic hypoxic respiratory failure with hypoxia and hypercapnia  2. Acute COPD exacerbation  3. Small cell lung cancer, right lung, stage III  4. Leukocytosis  5. Tobacco abuse   Admit to stepdown unit with continuous pulse ox   Patient on 6 L per nasal cannula up from 4 L at home. Patient did not tolerate BiPAP in ED. Had discussion with her in regards to need for BiPAP given hypercarbia versus potential intubation later. Patient will try BiPAP if respiratory status worsens she states   Doxycycline, steroid taper, DuoNebs, Mucomyst, chest physiotherapy   Procalcitonin, Legionella, and strep pneumo. A.m. labs    6. Hyponatremia   Sodium 129, down from 134 on 2021   Paraneoplastic syndrome versus poor oral intake?  Patient received 500 cc bolus of NS and ED, we will hold further fluids   A.m. labs    7. Right nasal congestion   Increase fluticasone and start Afrin with stop dates    Other chronic medical conditions:  Continue all home meds except stated above or contraindicated.    Hyperlipidemia   Hypertension   Osteopenia   Depression with anxiety   Chronic pain    Diet No diet orders on file   DVT Prophylaxis [x] Lovenox, []  Heparin, [] SCDs, [] Ambulation   GI Prophylaxis [] PPI,  [] H2 Blocker,  [] Carafate,  [] Diet/Tube Feeds   Code Status Prior   Disposition Patient requires continued admission due to Acute on chronic respiratory failure with hypoxia and hypercapnia ([facility] [address]   MDM [] Low, [] Moderate,[x]  High  Patient's risk as above due to acuity of condition with potential Psychiatric/Behavioral: Negative for confusion. Objective:   No intake or output data in the 24 hours ending 21 2231   Vitals:   Vitals:    21 2200   BP:    Pulse: 110   Resp: 20   Temp:    SpO2: 100%     Physical Exam:   Physical Exam  Vitals signs and nursing note reviewed. Constitutional:       General: She is awake. She is not in acute distress. Appearance: Normal appearance. She is underweight. She is ill-appearing and toxic-appearing. She is not diaphoretic. Interventions: She is not intubated. Comments: BiPAP at bedside   HENT:      Head: Atraumatic. Right Ear: External ear normal.      Left Ear: External ear normal.      Nose: Nose normal. No rhinorrhea. Mouth/Throat:      Mouth: Mucous membranes are moist.   Eyes:      General: No scleral icterus. Conjunctiva/sclera: Conjunctivae normal.      Pupils: Pupils are equal, round, and reactive to light. Neck:      Musculoskeletal: Neck supple. Cardiovascular:      Rate and Rhythm: Regular rhythm. Tachycardia present. Pulses: Normal pulses. Heart sounds: Normal heart sounds. No murmur. No gallop. Pulmonary:      Effort: Pulmonary effort is normal. Tachypnea and prolonged expiration present. She is not intubated. Breath sounds: Decreased air movement present. No stridor. Decreased breath sounds and wheezing present. No rhonchi or rales. Comments: Conversationally dyspneic  Abdominal:      General: Abdomen is flat. Bowel sounds are normal. There is no distension. Palpations: Abdomen is soft. Tenderness: There is no abdominal tenderness. There is no guarding or rebound. Negative signs include Lopes's sign and Rovsing's sign. Musculoskeletal: Normal range of motion. Right lower le+ Pitting Edema present. Left lower le+ Pitting Edema present. Skin:     General: Skin is warm and dry. Capillary Refill: Capillary refill takes less than 2 seconds.    Neurological: General: No focal deficit present. Mental Status: She is alert and oriented to person, place, and time. Mental status is at baseline. Cranial Nerves: No cranial nerve deficit, dysarthria or facial asymmetry. Motor: No tremor or seizure activity. Psychiatric:         Attention and Perception: Attention normal.         Speech: Speech normal. Speech is not slurred. Behavior: Behavior is cooperative. Past Medical History:      Past Medical History:   Diagnosis Date    Arthritis     COPD (chronic obstructive pulmonary disease) ([address]     follow with Dr Margy Bey Full dentures     H/O cardiovascular stress test 11/18/2009    thallium, normal no ischemia EF70%     H/O cardiovascular stress test 10/11/2013    thallium,EF70%, normal, no ischemia    H/O Doppler ultrasound 10/13/2010    carotid, right normal, left normal    H/O Doppler ultrasound 10/07/2014    Carotid mild bilateral internal carotid artery disease less than 50% in severity. Normal vertebral artery flows with good velocities. Incidental finding of possible thyroid nodule in the left lobe.  History of 24 hour EKG monitoring 03/11/2011    NSR no ectopy    History of nuclear stress test 10/21/2016    lexiscan-normal,no ischemia,EF70%,enlarged right ventricle    Hx of chest x-ray 01/02/2015    WNL    Hx of Doppler echocardiogram 12/01/2020    EF 50-55% mild LV hypertrophy. Grade 2 diastolic dysfunction. Mild AS.     Hx of echocardiogram 10/11/2013    10/13 Abn lt ventricular diastolic function, mile MR, EF 57%,10/10 EF55%, mild mitral annular calcification    Hx of echocardiogram 05/02/2019    EF 89-98%, Grade I diastolic dysfunction, Mild aortic stenosis, Calcific thickening of posterior leaflet of mitral valve, Mild Pulm HTN    Hx of pelvic x-ray 01/02/2015    Severe RT his osterarothrosis    Hx of x-ray of hip 01/02/2015    Severe Rt hip osteosrthrosis    Hyperlipidemia     Hypertension     follows with dr Wilberto Cobb Leg pain     Lung nodules     scheduled for bronch 2020    On home oxygen therapy     \"on 4 liters 24 hours per day\"    Small cell lung cancer ([facility] [address] 2021    Wears glasses     to read     PSHX:  has a past surgical history that includes cyst removal (); Tubal ligation (); laparoscopic appendectomy (N/A, 2019); Mediastinoscopy (N/A, 12/10/2020); back surgery; Colonoscopy; Hysterectomy; eye surgery (?); joint replacement (Right, ? ?); Foot surgery (Right, ); and bronchoscopy (N/A, 2021). Allergies: Allergies   Allergen Reactions    Formaldehyde     Gabapentin Other (See Comments)    Norflex Tablets [Orphenadrine]     Cranberry Rash       FAM HX: family history includes COPD in her sister; Cancer in her father and sister; Coronary Art Dis in her mother; Dementia in her mother; Diabetes in her sister; Heart Attack in her sister; Heart Attack (age of onset: 61) in her father and mother; High Cholesterol in her mother; Hypertension in her mother, sister, sister, and sister; Irritable Bowel Syndrome in her sister; Pacemaker in her sister; Stroke in her father, mother, and sister. Soc HX:   Social History     Socioeconomic History    Marital status:      Spouse name: None    Number of children: None    Years of education: None    Highest education level: None   Occupational History    None   Social Needs    Financial resource strain: None    Food insecurity     Worry: None     Inability: None    Transportation needs     Medical: None     Non-medical: None   Tobacco Use    Smoking status: Former Smoker     Packs/day: 1.50     Years: 47.00     Pack years: 70.50     Types: Cigarettes     Start date: 1973     Quit date: 2020     Years since quittin.2    Smokeless tobacco: Never Used   Substance and Sexual Activity    Alcohol use:  Yes     Alcohol/week: 0.0 standard drinks     Comment: rare\"twice per year\"    Drug use: No    Sexual activity: None     Comment:    Lifestyle    Physical activity     Days per week: None     Minutes per session: None    Stress: None   Relationships    Social connections     Talks on phone: None     Gets together: None     Attends Mandaeism service: None     Active member of club or organization: None     Attends meetings of clubs or organizations: None     Relationship status: None    Intimate partner violence     Fear of current or ex partner: None     Emotionally abused: None     Physically abused: None     Forced sexual activity: None   Other Topics Concern    None   Social History Narrative    None       Medications:   Home Medications:   Prior to Admission medications    Medication Sig Start Date End Date Taking? Authorizing Provider   sertraline (ZOLOFT) 50 MG tablet Take 1 tablet by mouth daily 2/26/21   Ruby Urrutia MD   Nutritional Supplement LIQD 2 cans by mouth daily. Boost strawberry if available. 2/19/21   HELIO Llamas CNP   busPIRone (BUSPAR) 10 MG tablet Take 1 tablet by mouth 2 times daily 2/17/21 3/19/21  HELIO Llamas CNP   HYDROcodone-acetaminophen (NORCO)  MG per tablet Take 1 tablet by mouth every 4 hours as needed for Pain for up to 30 days.  Indications: 180 2/15/21 3/17/21  Ruby Urrutia MD   theophylline (UNIPHYL) 400 MG extended release tablet Take 0.5 tablets by mouth daily 2/2/21   Fercho Irvin MD   Magic Mouthwash (MIRACLE MOUTHWASH) Swish and swallow 5 mLs 4 times daily as needed for Irritation 1:1:1 diphenhydramine, nystatin, lidocaine 1/27/21   HELIO Llamas CNP   ondansetron (ZOFRAN) 8 MG tablet Take 1 tablet by mouth every 8 hours as needed for Nausea or Vomiting 1/19/21   Jackie Balderas MD   OLANZapine (ZYPREXA) 2.5 MG tablet Take the night prior to each chemotherapy, then for three additional nights with each chemotherapy 1/19/21   Jackie Balderas MD   dexamethasone (DECADRON) 4 MG tablet Take 1 tablet x 4 days after each chemotherapy 1/19/21   Serina Alexandra MD   potassium chloride (KLOR-CON M) 10 MEQ extended release tablet Take 1 tablet by mouth 2 times daily 1/11/21   GLENDA Ludwig   fluticasone (FLONASE) 50 MCG/ACT nasal spray 1 spray by Each Nostril route daily    Historical Provider, MD   dilTIAZem (CARDIZEM CD) 120 MG extended release capsule Take 1 capsule by mouth daily 12/12/20   Alexandro Ferrari MD   simvastatin (ZOCOR) 20 MG tablet Take 1 tablet by mouth nightly 12/1/20   Alisha Jasso MD   ipratropium-albuterol (DUONEB) 0.5-2.5 (3) MG/3ML SOLN nebulizer solution Inhale 3 mLs into the lungs 4 times daily 7/7/20   Felicia Britt MD   albuterol sulfate HFA (PROAIR HFA) 108 (90 Base) MCG/ACT inhaler Inhale 2 puffs into the lungs every 6 hours as needed for Wheezing 6/9/20   Alisha Jasso MD   Cholecalciferol (VITAMIN D3) 25 MCG (1000 UT) TABS Take by mouth daily     Historical Provider, MD   calcium carbonate 600 MG TABS tablet Take 1 tablet by mouth daily    Historical Provider, MD   aspirin 81 MG EC tablet Take 81 mg by mouth daily    Historical Provider, MD   Fexofenadine HCl (MUCINEX ALLERGY PO) Take by mouth 2 times daily     Historical Provider, MD     Medications:    sodium chloride flush  10 mL Intravenous BID    acetylcysteine  600 mg Inhalation BID    ipratropium-albuterol        sodium chloride  500 mL Intravenous Once      Infusions:   PRN Meds:      Electronically signed by Gustavo Momin DO on 2/28/2021 at 10:31 PM      This dictation was created with voice recognition software. While attempts have been made to review the dictation as it is transcribed, on occasion the spoken word can be misinterpreted by the technology leading to omissions or inappropriate words, phrases or sentences.

## 2021-03-01 NOTE — PROGRESS NOTES
02/28/21 2101   NIV Type   $NIV $Daily Charge   Equipment Type v60   Mode Bilevel   Mask Type Full face mask   Mask Size Small   Settings/Measurements   IPAP 10 cmH20   CPAP/EPAP 5 cmH2O   Rate Ordered 12   Resp 25   Insp Rise Time (%) 2 %   FiO2  28 %   I Time/ I Time % 1.1 s   Vt Exhaled 753 mL   Minute Volume 11.9 Liters   Mask Leak (lpm) 21 lpm   Comfort Level Poor   Using Accessory Muscles No   SpO2 99   Breath Sounds   Right Upper Lobe Diminished   Right Middle Lobe Diminished   Right Lower Lobe Diminished   Left Upper Lobe Diminished   Left Lower Lobe Diminished   Alarm Settings   Alarms On Y   Press Low Alarm 5 cmH2O   High Pressure Alarm 25 cmH2O   Delay Alarm 20 sec(s)   Apnea (secs) 20 secs   Resp Rate Low Alarm 13   High Respiratory Rate 40 br/min

## 2021-03-01 NOTE — TELEPHONE ENCOUNTER
Lane Adams is calling to request Home Care for patient (received a referral)----    Skilled Nursing    Physical Therapy     Occupational Therapy

## 2021-03-01 NOTE — CARE COORDINATION
Chart reviewed and call to pt room to initiate discharge plan. CM introduced self and explained role. Pt is alert and oriented. Pt states she lives at home w/ her granddaughter, who assists her with transport if needed. Pt has PCP and insurance with affordable RX copays. Pt has the following DME at home: walker, cane, wheelchair, BSC and shower chair. Pt has home oxygen through [employer]. Pt would like HHC at discharge and has no preference in companies. Referral sent to [address] liaison. Pt will need a consult to Tyrese  order at discharge please. 3:24 PM Per Dana Ramos at [facility], pt needs a new order for her wheelchair at home. Dr. Carli Olivo, please copy and paste the following in your daily progress note, if willing. Thank you. Chika Romero was evaluated today and a DME order was entered for a standard walker because she requires this to successfully complete daily living tasks of eating, bathing, toileting, personal cares, ambulating, grooming and hygiene. A standard walker is necessary due to the patient's impaired ambulation and mobility restrictions and she can ambulate only by using a walker instead of a lesser assistive device, such as a cane or crutch. The need for this equipment was discussed with the patient and she understands and is in agreement.

## 2021-03-01 NOTE — PROGRESS NOTES
Placed bipap on pt and after about 5 min pt stated that she could not tolerate it and wanted it taken off pt orally suctioned with 14 [place] catheter and a large amount thick white /yellow thick mucous obtained  This therapidt requested breathing treatment with duoneb and mucomyst which is being given at this time

## 2021-03-01 NOTE — PROGRESS NOTES
[facility] PROGRESS NOTE  Date: 3/1/2021   Name: Miles Persaud   MRN: [id number]   YOB: 1950  Chief Complaint   Patient presents with    Shortness of Breath     COPD, lung cancer, has been going on all         Subjective/Interval Hx:     Yesterday morning she couldn't breath  Then started again last night was too tired and called the squad  Not productive cough, feels congested. No fever. Wheezing. No chest pain. Has swelling in feet. Wears 4LNC. Dr. Caterina Reddy is lung doctor. Last chemo was a couple weeks ago, and is scheduled for chemo this Wednesday. ROS: negative unless mentioned above  Objective:   Physical Exam:   BP (!) 109/51   Pulse 109   Temp 98.8 °F (37.1 °C) (Oral)   Resp 20   SpO2 (!) 88%   CONSTITUTIONAL:  No acute distress, appears fatigued  EYES:  No discharge  HENT:  NCAT  LUNGS:  Wheeze with cough  CARDIOVASCULAR:  s1s2 tachycardia  ABDOMEN:  Soft nt nd  MUSCULOSKELETAL/Extremities:  Nontender, trace edema in feet  NEUROLOGIC:  No gross deficits, aao  SKIN:  No gross lesions    Assessment/Plan:       1. Acute on chronic hypoxic respiratory failure with hypoxia and hypercapnia  on 7 L nasal cannula. Consult pulmonology. Chest physiotherapy. CT chest: No PE or dissection; multiple nodules within both lungs. 2. Acute COPD exacerbation  on Solu-Medrol. Bronchodilators. On Mucomyst.  On doxycycline. Procalcitonin 0.34. Respiratory PCR negative. COVID-19 negative. 3. Small cell lung cancer, right lung, stage III  CT chest shows multiple nodules bilaterally with mixed response. Consult heme-onc. Patient scheduled for chemo this week. 4. Tobacco abuse  5. Hyponatremia  Sodium 129, down from 134 on 2/19/2021  Paraneoplastic syndrome versus poor oral intake? Patient received 500 cc bolus of NS and ED, we will hold further fluids  Improved sodium 132.  6. Right nasal congestion  Increase fluticasone and start Afrin with stop dates  7.  Anemia PRN      promethazine, 12.5 mg, Q6H PRN    Or      ondansetron, 4 mg, Q6H PRN      polyethylene glycol, 17 g, Daily PRN      acetaminophen, 650 mg, Q6H PRN    Or      acetaminophen, 650 mg, Q6H PRN      benzonatate, 100 mg, TID PRN        Data/Labs:     Recent Labs     02/28/21 1830 03/01/21  0509   WBC 18.7* 17.5*   HGB 8.8* 8.7*   HCT 29.3* 28.9*    317      Recent Labs     02/28/21 1830 03/01/21  0509   * 132*   K 3.8 4.0   CL 80* 83*   CO2 49* 43*   BUN 12 8   CREATININE 0.8 0.7     Recent Labs     02/28/21 1830 03/01/21  0509   AST 18 19   ALT 13 12   BILITOT 0.1 0.1   ALKPHOS 99 93     No results for input(s): INR in the last 72 hours. Recent Labs     02/28/21 1830   TROPONINT <0.010     HgBA1c: No results found for: LABA1C  CALCIUM:  9.2/43 (03/01 0509)    No intake/output data recorded.   No intake or output data in the 24 hours ending 03/01/21 0748

## 2021-03-01 NOTE — ED NOTES
Dr. Linda Wisdom at bedside. Pt requesting food, he states she can have whatever she wants.      Ina Key RN  02/28/21 1054

## 2021-03-02 NOTE — CONSULTS
Department of Internal Medicine  Gastroenterology Consult Note  Matthias Pinon. Shavon JUSTIN      Reason for Consult:  dysphagia    Primary Care Physician:  Andrea Pearce MD    History Obtained From:  patient    HISTORY OF PRESENT ILLNESS:              The patient is a 65 y.o.  female with stage 3 lung cancer admitted with respiratory difficulty. She has had dysphagia for solids for over 6 months and I was asked to see her for that. She has occasional heartburn but denies vomiting. She last had a colonoscopy years ago and cannot remember exactly when or by whom    Past Medical History:        Diagnosis Date    Arthritis     COPD (chronic obstructive pulmonary disease) ([facility]     follow with Dr Alverto Waterman Full dentures     H/O cardiovascular stress test 11/18/2009    thallium, normal no ischemia EF70%     H/O cardiovascular stress test 10/11/2013    thallium,EF70%, normal, no ischemia    H/O Doppler ultrasound 10/13/2010    carotid, right normal, left normal    H/O Doppler ultrasound 10/07/2014    Carotid mild bilateral internal carotid artery disease less than 50% in severity. Normal vertebral artery flows with good velocities. Incidental finding of possible thyroid nodule in the left lobe.  History of 24 hour EKG monitoring 03/11/2011    NSR no ectopy    History of nuclear stress test 10/21/2016    lexiscan-normal,no ischemia,EF70%,enlarged right ventricle    Hx of chest x-ray 01/02/2015    WNL    Hx of Doppler echocardiogram 12/01/2020    EF 50-55% mild LV hypertrophy. Grade 2 diastolic dysfunction. Mild AS.     Hx of echocardiogram 10/11/2013    10/13 Abn lt ventricular diastolic function, mile MR, EF 57%,10/10 EF55%, mild mitral annular calcification    Hx of echocardiogram 05/02/2019    EF 82-23%, Grade I diastolic dysfunction, Mild aortic stenosis, Calcific thickening of posterior leaflet of mitral valve, Mild Pulm HTN    Hx of pelvic x-ray 01/02/2015    Severe RT his osterarothrosis    Hx of x-ray of hip 01/02/2015    Severe Rt hip osteosrthrosis    Hyperlipidemia     Hypertension     follows with dr Mukesh Singer Leg pain     Lung nodules     scheduled for bronch 1/5/2020    On home oxygen therapy     \"on 4 liters 24 hours per day\"    Small cell lung cancer ([address] 1/11/2021    Wears glasses     to read       Past Surgical History:        Procedure Laterality Date    BACK SURGERY      \"about 12 yrs ago - surgery my mid to low back \" done in [address] N/A 1/5/2021    BRONCHOSCOPY ENDOBRONCHIAL ULTRASOUND performed by Kj Munguia MD at [facility]      \"last one done in my age 61's    CYST REMOVAL  1970's    left arm    EYE SURGERY  2010?    svetlana cataract ext     FOOT SURGERY Right 1970's    \"have screw in 2nd toe\"    HYSTERECTOMY      1999\"took everything \"    JOINT REPLACEMENT Right 2014??    hip    LAPAROSCOPIC APPENDECTOMY N/A 5/18/2019    APPENDECTOMY LAPAROSCOPIC performed by Trinidad Ayers MD at [address] [place] [address] 12/10/2020    MEDIASTINOSCOPY performed by Kj Munguia MD at [address] [place] [address]  1970's       Medications Prior to Admission:    Prior to Admission medications    Medication Sig Start Date End Date Taking? Authorizing Provider   busPIRone (BUSPAR) 10 MG tablet Take 1 tablet by mouth 2 times daily 2/17/21 3/19/21 Yes HELIO Trinidad CNP   HYDROcodone-acetaminophen (NORCO)  MG per tablet Take 1 tablet by mouth every 4 hours as needed for Pain for up to 30 days.  Indications: 180 2/15/21 3/17/21 Yes Baron Cifuentes MD   Magic Mouthwash (MIRACLE MOUTHWASH) Swish and swallow 5 mLs 4 times daily as needed for Irritation 1:1:1 diphenhydramine, nystatin, lidocaine 1/27/21  Yes HELIO Trinidad CNP   OLANZapine (ZYPREXA) 2.5 MG tablet Take the night prior to each chemotherapy, then for three additional nights with each chemotherapy 1/19/21  Yes Magda Wilson MD   dexamethasone (DECADRON) 4 MG tablet Take 1 tablet x 4 days after each chemotherapy 1/19/21  Yes Junior Reynaldo MD   potassium chloride (KLOR-CON M) 10 MEQ extended release tablet Take 1 tablet by mouth 2 times daily 1/11/21  Yes GLENDA Hoffman   fluticasone (FLONASE) 50 MCG/ACT nasal spray 1 spray by Each Nostril route daily   Yes Historical Provider, MD   ipratropium-albuterol (DUONEB) 0.5-2.5 (3) MG/3ML SOLN nebulizer solution Inhale 3 mLs into the lungs 4 times daily 7/7/20  Yes Dayne Ellison MD   albuterol sulfate HFA (PROAIR HFA) 108 (90 Base) MCG/ACT inhaler Inhale 2 puffs into the lungs every 6 hours as needed for Wheezing 6/9/20  Yes Isabel Hickman MD   Cholecalciferol (VITAMIN D3) 25 MCG (1000 UT) TABS Take by mouth daily    Yes Historical Provider, MD   calcium carbonate 600 MG TABS tablet Take 1 tablet by mouth daily   Yes Historical Provider, MD   aspirin 81 MG EC tablet Take 81 mg by mouth daily   Yes Historical Provider, MD   sertraline (ZOLOFT) 50 MG tablet Take 1 tablet by mouth daily 2/26/21   Isabel Hickman MD   Nutritional Supplement LIQD 2 cans by mouth daily. Boost strawberry if available. 2/19/21   HELIO Sena - CNP   theophylline (UNIPHYL) 400 MG extended release tablet Take 0.5 tablets by mouth daily 2/2/21   Dayne Ellison MD   ondansetron (ZOFRAN) 8 MG tablet Take 1 tablet by mouth every 8 hours as needed for Nausea or Vomiting 1/19/21   Junior Reynaldo MD   dilTIAZem (CARDIZEM CD) 120 MG extended release capsule Take 1 capsule by mouth daily 12/12/20   Radha Alfred MD   simvastatin (ZOCOR) 20 MG tablet Take 1 tablet by mouth nightly 12/1/20   Isabel Hickman MD   Fexofenadine HCl [facility] ALLER[facility] PO) Take by mouth 2 times daily     Historical Provider, MD       Allergies: Allergies   Allergen Reactions    Formaldehyde     Gabapentin Other (See Comments)    Norflex Tablets [Orphenadrine]     Cranberry Rash   .     Social History:    TOBACCO:  No  ETOH:  yes    Family History:   Family History   Problem Relation Age of Onset    Stroke Mother     Dementia Mother     Heart Attack Mother 61    Coronary Art Dis Mother     Hypertension Mother     High Cholesterol Mother    Kimo Olmstead Cancer Father     Stroke Father     Heart Attack Father 61    Diabetes Sister     Heart Attack Sister     Hypertension Sister     Cancer Sister    Kimo Olmstead COPD Sister    Kimo Olmstead Stroke Sister     Hypertension Sister     Hypertension Sister     Irritable Bowel Syndrome Sister     Pacemaker Sister        REVIEW OF SYSTEMS: (POSITIVES WILL BE UNDERLINED)  CONSTITUTIONAL:    Weight change,fatigue, fever, chills  EYES:  Diplopia, change in vision  EARS:  hearing loss, tinnitus, vertigo  NOSE:   epistaxis  MOUTH/THROAT:     hoarseness, sore throat. RESPIRATORY:  SOB,  cough, sputum, hemoptysis  CARDIOVASCULAR : chest pain,palpitations, dyspnea exertion, orthopnea, paroxysmal nocturnal dyspnea, pedal edema. GASTROINTESTINAL:  See HPI  GENITOURINARY:   dysuria, hematuria, . HEMATOLOGIC/LYMPHATIC:   Anemia, bleeding tendencies.   MUSCULOSKELETAL:    myalgias,  joint pain  NEUROLOGICAL:   Loss of Consciousness, paresthesias, anesthesias, focal weakness  SKIN :   History of dermatitis, rashes  PSYCHIATRIC:  depression, , anxiety past psychosis  ENDOCRINE:  History of diabetes, thyroid disease  ALL/IMM : allergies, rashes    PHYSICAL EXAM:    Vitals:  /67   Pulse 99   Temp 98.6 °F (37 °C) (Oral)   Resp 18   Ht 5' (1.524 m)   Wt 118 lb 2.7 oz (53.6 kg)   SpO2 98%   BMI 23.08 kg/m²   CONSTITUTIONAL: alert, cooperative, no apparent distress,   EYES:  pupils equal, round and reactive to light and sclera clear  ENT:  normocepalic, without obvious abnormality  NECK:  supple, symmetrical, trachea midline  HEMATOLOGIC/LYMPHATICS:  no cervical lymphadenopathy and no supraclavicular lymphadenopathy  LUNGS:  clear to auscultation  CARDIOVASCULAR:  regular rate and rhythm and no murmur noted  ABDOMEN:  Soft, non-tender with normal bowel sounds. No organomegaly or masses  NEUROLOGIC: no focal deficit detected  SKIN:  no lesions  EXTREMITIES: no clubbing, cyanosis, or edema    IMPRESSION:  1) lung cancer  2) COPD  3) dysphagia    RECOMMENDATIONS:  1) EGD /dil in am          Tejinder Siddiqui M.D

## 2021-03-02 NOTE — PLAN OF CARE
Problem: Pain:  Goal: Pain level will decrease  Description: Pain level will decrease  3/2/2021 0946 by Tabby Vsaquez LPN  Outcome: Ongoing  3/2/2021 0210 by Ayden Valdez LPN  Outcome: Ongoing  Goal: Control of acute pain  Description: Control of acute pain  3/2/2021 0946 by Tabby Vasquez LPN  Outcome: Ongoing  3/2/2021 0210 by Ayden Valdez LPN  Outcome: Ongoing  Goal: Control of chronic pain  Description: Control of chronic pain  3/2/2021 0946 by Tabby Vasquez LPN  Outcome: Ongoing  3/2/2021 0210 by Ayden Valdez LPN  Outcome: Ongoing     Problem: Falls - Risk of:  Goal: Will remain free from falls  Description: Will remain free from falls  Outcome: Ongoing  Goal: Absence of physical injury  Description: Absence of physical injury  Outcome: Ongoing

## 2021-03-02 NOTE — ANESTHESIA PRE PROCEDURE
daily   Yes Historical Provider, MD   aspirin 81 MG EC tablet Take 81 mg by mouth daily   Yes Historical Provider, MD   sertraline (ZOLOFT) 50 MG tablet Take 1 tablet by mouth daily 2/26/21   Kala Perrin MD   Nutritional Supplement LIQD 2 cans by mouth daily. Boost strawberry if available.  2/19/21   HELIO Cardona - CNP   theophylline (UNIPHYL) 400 MG extended release tablet Take 0.5 tablets by mouth daily 2/2/21   Alfredo Armendariz MD   ondansetron (ZOFRAN) 8 MG tablet Take 1 tablet by mouth every 8 hours as needed for Nausea or Vomiting 1/19/21   Will Samaniego MD   dilTIAZem (CARDIZEM CD) 120 MG extended release capsule Take 1 capsule by mouth daily 12/12/20   Demian Sosa MD   simvastatin (ZOCOR) 20 MG tablet Take 1 tablet by mouth nightly 12/1/20   Kala Perrin MD   Fexofenadine HCl [facility] ALLERGY PO) Take by mouth 2 times daily     Historical Provider, MD       Current medications:    Current Facility-Administered Medications   Medication Dose Route Frequency Provider Last Rate Last Admin    ipratropium-albuterol (DUONEB) nebulizer solution 1 ampule  1 ampule Inhalation Q4H HELIO Gill - CNP   1 ampule at 03/02/21 1519    albuterol (PROVENTIL) nebulizer solution 2.5 mg  2.5 mg Nebulization Q4H PRN Fercho Jackson MD        cefTRIAXone (ROCEPHIN) 1000 mg IVPB in 50 mL D5W minibag  1,000 mg Intravenous Q24H Fercho Jimenez MD   Stopped at 03/02/21 1208    aspirin EC tablet 81 mg  81 mg Oral Daily Albino Sunni, DO   81 mg at 03/02/21 0847    busPIRone (BUSPAR) tablet 10 mg  10 mg Oral BID Albino Sunni, DO   10 mg at 03/02/21 0848    calcium elemental (OSCAL) tablet 500 mg  500 mg Oral Daily Albino Sunni, DO   500 mg at 03/02/21 0847    vitamin D CAPS 1,000 Units  1,000 Units Oral Daily Albino Sunni, DO   1,000 Units at 03/02/21 0848    dilTIAZem (CARDIZEM CD) extended release capsule 120 mg  120 mg Oral Daily Albino Sunni, DO   120 mg at 03/02/21 0848    fluticasone (FLONASE) 50 MCG/ACT nasal spray 2 spray  2 spray Each Nostril BID Odalis Quiet, DO   2 spray at 03/02/21 0849    HYDROcodone-acetaminophen (NORCO)  MG per tablet 1 tablet  1 tablet Oral Q4H PRN Odalis Quiet, DO   1 tablet at 03/02/21 1608    potassium chloride (KLOR-CON) extended release tablet 10 mEq  10 mEq Oral BID Odalis Quiet, DO   10 mEq at 03/02/21 0848    sertraline (ZOLOFT) tablet 50 mg  50 mg Oral Daily Odalis Quiet, DO        atorvastatin (LIPITOR) tablet 10 mg  10 mg Oral Daily Odalis Quiet, DO   10 mg at 03/02/21 0848    theophylline (BECKY-24) extended release capsule 200 mg  200 mg Oral Daily Odalis Quiet, DO   200 mg at 03/02/21 0847    sodium chloride flush 0.9 % injection 10 mL  10 mL Intravenous 2 times per day Odalis Quiet, DO   10 mL at 03/02/21 0849    sodium chloride flush 0.9 % injection 10 mL  10 mL Intravenous PRN Odalis Quiet, DO        promethazine (PHENERGAN) tablet 12.5 mg  12.5 mg Oral Q6H PRN Odalis Quiet, DO        Or    ondansetron (ZOFRAN) injection 4 mg  4 mg Intravenous Q6H PRN Odalis Quiet, DO        polyethylene glycol (GLYCOLAX) packet 17 g  17 g Oral Daily PRN Odalis Quiet, DO        enoxaparin (LOVENOX) injection 40 mg  40 mg Subcutaneous Daily Odalis Quiet, DO   40 mg at 03/02/21 0848    acetaminophen (TYLENOL) tablet 650 mg  650 mg Oral Q6H PRN Odalis Quiet, DO        Or    acetaminophen (TYLENOL) suppository 650 mg  650 mg Rectal Q6H PRN Odalis Quiet, DO        phenylephrine (DEEP-SYNEPHRINE) 0.5 % nasal spray 1 spray  1 spray Each Nostril 3 times per day Odalis Quiet, DO   1 spray at 03/02/21 1533    benzonatate (TESSALON) capsule 100 mg  100 mg Oral TID PRN Odalis Quiet, DO        methylPREDNISolone sodium (SOLU-MEDROL) injection 40 mg  40 mg Intravenous Q6H Tex Solis DO   40 mg at 03/02/21 1759    Followed by   Alicia Larsen ON 3/3/2021] predniSONE (DELTASONE) tablet 40 mg  40 mg Oral Daily Glenard Lot, DO        doxycycline hyclate (VIBRA-TABS) tablet 100 mg  100 mg Oral BID Glenard Lot, DO   100 mg at 03/02/21 0848    cetirizine (ZYRTEC) tablet 10 mg  10 mg Oral Daily Glenard Lot, DO   10 mg at 03/02/21 0848    acetylcysteine (MUCOMYST) 20 % solution 600 mg  600 mg Inhalation BID Glenard Lot, DO   600 mg at 03/02/21 7154       Allergies: Allergies   Allergen Reactions    Formaldehyde     Gabapentin Other (See Comments)    Norflex Tablets [Orphenadrine]     Cranberry Rash       Problem List:    Patient Active Problem List   Diagnosis Code    Hyperlipidemia E78.5    [facility] J44.9    Leg pain M79.606    Hypertension I10    Tobacco abuse Z72.0    Abdominal aortic aneurysm (AAA) without rupture ([facility]) I71.4    Degeneration of lumbar or lumbosacral intervertebral disc M51.37    Osteopenia M85.80    Anxiety F41.9    Acute on chronic respiratory failure with hypoxia (HCC) J96.21    Lung mass R91.8    COPD exacerbation (HCC) J44.1    Acute exacerbation of chronic obstructive pulmonary disease (COPD) (HCC) J44.1    Small cell lung cancer (HCC) C34.90    Acute on chronic respiratory failure with hypoxia and hypercapnia (HCC) J96.21, J96.22       Past Medical History:        Diagnosis Date    Arthritis     COPD (chronic obstructive pulmonary disease) ([facility]     follow with Dr Jazmine Barlow Full dentures     H/O cardiovascular stress test 11/18/2009    thallium, normal no ischemia EF70%     H/O cardiovascular stress test 10/11/2013    thallium,EF70%, normal, no ischemia    H/O Doppler ultrasound 10/13/2010    carotid, right normal, left normal    H/O Doppler ultrasound 10/07/2014    Carotid mild bilateral internal carotid artery disease less than 50% in severity. Normal vertebral artery flows with good velocities. Incidental finding of possible thyroid nodule in the left lobe.      History of 24 hour EKG monitoring 03/11/2011    NSR no ectopy    History of nuclear stress test 10/21/2016    lexiscan-normal,no ischemia,EF70%,enlarged right ventricle    Hx of chest x-ray 01/02/2015    WNL    Hx of Doppler echocardiogram 12/01/2020    EF 50-55% mild LV hypertrophy. Grade 2 diastolic dysfunction. Mild AS.     Hx of echocardiogram 10/11/2013    10/13 Abn lt ventricular diastolic function, mile MR, EF 57%,10/10 EF55%, mild mitral annular calcification    Hx of echocardiogram 05/02/2019    EF 98-04%, Grade I diastolic dysfunction, Mild aortic stenosis, Calcific thickening of posterior leaflet of mitral valve, Mild Pulm HTN    Hx of pelvic x-ray 01/02/2015    Severe RT his osterarothrosis    Hx of x-ray of hip 01/02/2015    Severe Rt hip osteosrthrosis    Hyperlipidemia     Hypertension     follows with dr Ingrid Gan Leg pain     Lung nodules     scheduled for bronch 1/5/2020    On home oxygen therapy     \"on 4 liters 24 hours per day\"    Small cell lung cancer ([facility] 1/11/2021    Wears glasses     to read       Past Surgical History:        Procedure Laterality Date    BACK SURGERY      \"about 12 yrs ago - surgery my mid to low back \" done in [address] N/A 1/5/2021    BRONCHOSCOPY ENDOBRONCHIAL ULTRASOUND performed by Judah Denver, MD at [facility]      \"last one done in my age 61's    CYST REMOVAL  1970's    left arm    EYE SURGERY  2010?    svetlana cataract ext     FOOT SURGERY Right 1970's    \"have screw in 2nd toe\"    HYSTERECTOMY      1999\"took everything \"    JOINT REPLACEMENT Right 2014??    hip    LAPAROSCOPIC APPENDECTOMY N/A 5/18/2019    APPENDECTOMY LAPAROSCOPIC performed by Nan Rapp MD at [address] [place] [address] 12/10/2020    MEDIASTINOSCOPY performed by Judah Denver, MD at [address] [place] [address]  1970's       Social History:    Social History     Tobacco Use    Smoking status: Former Smoker     Packs/day: 1.50     Years: 47.00     Pack years: 70.50 Types: Cigarettes     Start date: 1973     Quit date: 2020     Years since quittin.2    Smokeless tobacco: Never Used   Substance Use Topics    Alcohol use: Yes     Alcohol/week: 0.0 standard drinks     Comment: rare\"twice per year\"                                Counseling given: Not Answered      Vital Signs (Current):   Vitals:    21 1100 21 1121 21 1524 21 1635   BP:    128/60   Pulse:    116   Resp:  18 15 20   Temp: 37 °C (98.6 °F)   36.9 °C (98.5 °F)   TempSrc: Oral   Oral   SpO2:  98% 100% 100%   Weight:       Height:                                                  BP Readings from Last 3 Encounters:   21 128/60   21 (!) 141/63   21 136/61       NPO Status:                                                                                 BMI:   Wt Readings from Last 3 Encounters:   21 118 lb 2.7 oz (53.6 kg)   21 118 lb 3.2 oz (53.6 kg)   21 118 lb (53.5 kg)     Body mass index is 23.08 kg/m². CBC:   Lab Results   Component Value Date    WBC 17.5 2021    RBC 3.13 2021    HGB 8.7 2021    HCT 28.9 2021    MCV 92.3 2021    RDW 16.6 2021     2021       CMP:   Lab Results   Component Value Date     2021    K 4.0 2021    CL 83 2021    CO2 43 2021    BUN 8 2021    CREATININE 0.7 2021    GFRAA >60 2021    AGRATIO 1.6 2020    LABGLOM >60 2021    GLUCOSE 106 2021    PROT 5.0 2021    PROT 6.5 2015    CALCIUM 9.2 2021    BILITOT 0.1 2021    ALKPHOS 93 2021    AST 19 2021    ALT 12 2021       POC Tests: No results for input(s): POCGLU, POCNA, POCK, POCCL, POCBUN, POCHEMO, POCHCT in the last 72 hours.     Coags:   Lab Results   Component Value Date    PROTIME 9.6 2021    INR 0.80 2021    APTT 23.3 2021       HCG (If Applicable): No results found for: Pepper Pain, HCG, HCGQUANT     ABGs:   Lab Results   Component Value Date    PO2ART 73 2020    WXH8FEL 66.0 2020    FJC0JXM 40.9 2020        Type & Screen (If Applicable):  No results found for: LABABO, LABRH    Drug/Infectious Status (If Applicable):  No results found for: HIV, HEPCAB    COVID-19 Screening (If Applicable):   Lab Results   Component Value Date    COVID19 NOT DETECTED 2021         Anesthesia Evaluation  Patient summary reviewed  Airway:         Dental:          Pulmonary:   (+) COPD:                            ROS comment: Chest CT 2021:  Impression  No evidence of pulmonary embolism or aortic dissection.  Overall, no acute  abnormality identified.     Multiple nodules within both lungs, with a mixed response to therapy.  The  majority of the nodules are similar in size.  However, at least 1 nodule  within the right lung apex is decreased in size. CXR 2021:  Impression  Minimal blunting of the left costophrenic angle, which may reflect a very  small joint effusion.  Otherwise no acute abnormality detected. Small cell cancer of right lung, stage IIIb    Former Smoker - 70.5 pack years  Quit Smokin20    home oxygen therapy \"on 4 liters 24 hours per day\"    Cardiovascular:    (+) hypertension:, valvular problems/murmurs: AS, hyperlipidemia               ROS comment: Echo 2020:  Summary   Technically difficult study patient sitting up during exam.   Left ventricular systolic function is normal.   Ejection fraction is visually estimated at 50-55%. Mild left ventricular hypertrophy. Grade II diastolic dysfunction. Calcified aortic valve with restricted motion of the non coronary cusp; mild   aortic stenosis. No evidence of any pericardial effusion. Neuro/Psych:   Negative Neuro/Psych ROS              GI/Hepatic/Renal:            ROS comment: dysphagia.    Endo/Other:    (+) blood dyscrasia: anemia:., .                 Abdominal:           Vascular: negative vascular ROS. Anesthesia Plan      MAC     ASA 4       Induction: intravenous. HELIO Pena - CRNA   3/2/2021         Pre Anesthesia Assessment complete.  Chart reviewed on 3/2/2021

## 2021-03-02 NOTE — CONSULTS
prior to that. She  drinks alcohol off and on. No history of drug abuse. MEDICATIONS:  Reviewed. ALLERGIES:  She is allergic to FORMALDEHYDE, GABAPENTIN, NORFLEX, and  CRANBERRY. REVIEW OF SYSTEMS:  A 10- to 14-point review of system was reviewed and  is negative except for what is mentioned in the history of present  illness. PHYSICAL EXAMINATION:  GENERAL:  The patient is alert and oriented x3, in no acute respiratory  distress. VITAL SIGNS:  Her blood pressure is 132/67 mmHg, pulse of 99 per minute,  and respiratory rate of 20 per minute. She is afebrile. Her saturation  is 97% on high-flow nasal cannula. Her T-max was 99.2 degrees  Fahrenheit. HEENT:  Essentially unremarkable. NECK:  There is no JVD. The neck is supple. LUNGS:  Revealed diminished breath sounds and occasional rhonchi. HEART:  Showed normal S1 and S2. There was no S3 or S4 noted. ABDOMEN:  Benign. There is no evidence of any organomegaly. The bowel  sounds are present. NEUROLOGIC:  Reveals that she is awake and responsive, answering  questions appropriately, and moving her extremities. IMAGING:  Her CAT scan of the chest as mentioned in the history of  present illness. LABORATORY DATA:  Her electrolytes showed a sodium of 132, potassium  4.0, chloride 83, carbon dioxide 43, BUN 8, creatinine 0.7. Her CBC  showed a white count of 17.5, hemoglobin of 8.7, and hematocrit of 28.9. IMPRESSION:  1. Acute-on-chronic respiratory failure, secondary to acute  exacerbation of COPD. 2.  Acute exacerbation of COPD. 3.  History of recent diagnosis of small cell lung cancer, on  chemotherapy. PLAN:  1. Continue doxycycline. We will add Rocephin. 2.  Solu-Medrol 40 mg q.6 h.  3.  Check theophylline level. 4.  Sputum for culture  and sensitivity. 5.  DuoNeb q.4 h. while awake. 6.  Check magnesium and phosphorus. 7.  Wean FiO2  to keep saturation greater than 90%. 8.  As per orders.         Liz Marcus MD D: 03/02/2021 10:40:04       T: 03/02/2021 11:54:04     [email or web address]  Job#: [id number]     Doc#: [id number]    CC:

## 2021-03-02 NOTE — PROGRESS NOTES
Hospitalist Progress Note      Name:  Ekaterina Preciado /Age/Sex: 1950  (65 y.o. female)   MRN & CSN:  [id number] & [id number] Admission Date/Time: 2021  6:04 PM   Location:  [facility]/3106-A PCP: Katerina Banks, [address] Day: 3    History of Present Illness:     Chief Complaint: Acute on chronic respiratory failure with hypoxia and hypercapnia ([address]  Ekaterina Preciado is a 65 y.o.  female  who presents with shortness of breath     Patient seen and examined at bedside. She says feeling better today. Denies having any chest pain or shortness of breath. Ten point ROS reviewed negative, unless as noted above    Objective:        Intake/Output Summary (Last 24 hours) at 3/2/2021 1540  Last data filed at 3/1/2021 1942  Gross per 24 hour   Intake 10 ml   Output    Net 10 ml      Vitals:   Vitals:    21 1524   BP:    Pulse:    Resp: 15   Temp:    SpO2: 100%     Physical Exam:   General Appearance: alert and oriented to person, place and time, in no acute distress  Cardiovascular: normal rate, regular rhythm, normal S1 and S2  Pulmonary/Chest: Decreased breath sounds bilaterally  Abdomen: soft, non-tender, non-distended, normal bowel sounds, no masses   Extremities: no cyanosis, clubbing or edema, pulse   Skin: warm and dry, no rash or erythema  Head: normocephalic and atraumatic  Eyes: pupils equal, round, and reactive to light  Neck: supple and non-tender without mass, no thyromegaly   Musculoskeletal: normal range of motion, no joint swelling, deformity or tenderness  Neurological: alert, oriented, normal speech, no focal findings or movement disorder noted    Medications:   Medications:    ipratropium-albuterol  1 ampule Inhalation Q4H WA    cefTRIAXone (ROCEPHIN) IV  1,000 mg Intravenous Q24H    aspirin  81 mg Oral Daily    busPIRone  10 mg Oral BID    calcium elemental  500 mg Oral Daily    vitamin D  1,000 Units Oral Daily    dilTIAZem  120 mg Oral Daily    fluticasone  2 spray Each Nostril BID    potassium chloride  10 mEq Oral BID    sertraline  50 mg Oral Daily    atorvastatin  10 mg Oral Daily    theophylline  200 mg Oral Daily    sodium chloride flush  10 mL Intravenous 2 times per day    enoxaparin  40 mg Subcutaneous Daily    phenylephrine  1 spray Each Nostril 3 times per day    methylPREDNISolone  40 mg Intravenous Q6H    Followed by   Leah Mcdonald ON 3/3/2021] predniSONE  40 mg Oral Daily    doxycycline hyclate  100 mg Oral BID    cetirizine  10 mg Oral Daily    acetylcysteine  600 mg Inhalation BID      Infusions:   PRN Meds:     albuterol, 2.5 mg, Q4H PRN      HYDROcodone-acetaminophen, 1 tablet, Q4H PRN      sodium chloride flush, 10 mL, PRN      promethazine, 12.5 mg, Q6H PRN    Or      ondansetron, 4 mg, Q6H PRN      polyethylene glycol, 17 g, Daily PRN      acetaminophen, 650 mg, Q6H PRN    Or      acetaminophen, 650 mg, Q6H PRN      benzonatate, 100 mg, TID PRN            Pertinent New Labs & Imaging Studies     CBC with Differential:    Lab Results   Component Value Date    WBC 17.5 03/01/2021    RBC 3.13 03/01/2021    HGB 8.7 03/01/2021    HCT 28.9 03/01/2021     03/01/2021    MCV 92.3 03/01/2021    MCH 27.8 03/01/2021    MCHC 30.1 03/01/2021    RDW 16.6 03/01/2021    NRBC 2 02/10/2021    SEGSPCT 73.0 02/28/2021    BANDSPCT 2 02/28/2021    LYMPHOPCT 17.0 02/28/2021    MONOPCT 7.0 02/28/2021    MYELOPCT 1 02/10/2021    BASOPCT 0.1 02/19/2021    MONOSABS 1.3 02/28/2021    LYMPHSABS 3.2 02/28/2021    EOSABS 0.0 02/19/2021    BASOSABS 0.0 02/19/2021    DIFFTYPE MANUAL DIFFERENTIAL 02/28/2021     CMP:    Lab Results   Component Value Date     03/01/2021    K 4.0 03/01/2021    CL 83 03/01/2021    CO2 43 03/01/2021    BUN 8 03/01/2021    CREATININE 0.7 03/01/2021    GFRAA >60 03/01/2021    AGRATIO 1.6 08/28/2020    LABGLOM >60 03/01/2021    GLUCOSE 106 03/01/2021    PROT 5.0 03/01/2021    PROT 6.5 03/06/2015    LABALBU 3.2 03/01/2021 CALCIUM 9.2 03/01/2021    BILITOT 0.1 03/01/2021    ALKPHOS 93 03/01/2021    AST 19 03/01/2021    ALT 12 03/01/2021     Xr Chest Portable    Result Date: 2/28/2021  EXAMINATION: ONE XRAY VIEW OF THE CHEST 2/28/2021 3:27 pm COMPARISON: 01/11/2021 HISTORY: ORDERING SYSTEM PROVIDED HISTORY: sob TECHNOLOGIST PROVIDED HISTORY: Reason for exam:->sob FINDINGS: Nodular opacities are again identified within both lungs, presumably reflecting metastatic disease in this patient with a known history of small cell lung cancer. No acute focal infiltrate is identified. There is mild blunting of the left costophrenic angle. Cardial pericardial silhouette is unremarkable. No pneumothorax. No free air. No acute bony abnormality. Minimal blunting of the left costophrenic angle, which may reflect a very small joint effusion. Otherwise no acute abnormality detected. Cta Pulmonary W Contrast    Result Date: 2/28/2021  EXAMINATION: CTA OF THE CHEST 2/28/2021 5:03 pm TECHNIQUE: CTA of the chest was performed after the administration of intravenous contrast.  Multiplanar reformatted images are provided for review. MIP images are provided for review. Dose modulation, iterative reconstruction, and/or weight based adjustment of the mA/kV was utilized to reduce the radiation dose to as low as reasonably achievable. COMPARISON: Chest CT, 12/07/2020 CT PA, 09/18/2020 HISTORY: ORDERING SYSTEM PROVIDED HISTORY: sob, lung cancer TECHNOLOGIST PROVIDED HISTORY: Reason for exam:->sob, lung cancer Decision Support Exception->Emergency Medical Condition (MA) Reason for Exam: sob, lung cancer Acuity: Acute Type of Exam: Initial Relevant Medical/Surgical History: lung cancer FINDINGS: Pulmonary Arteries: The pulmonary arteries are adequately opacified. No filling defects are identified within the pulmonary arteries to suggest pulmonary embolism. Mediastinum: Thyroid is stable. No mediastinal or hilar lymphadenopathy is identified. Esophagus is unremarkable. Cardiac chambers unremarkable. No pericardial effusion. Thoracic aorta normal in caliber without evidence of dissection. Lungs/pleura: There is a lobulated mass in the right lower lobe, stable when compared to the previous exam, measuring 1.8 x 1.6 cm (series 3, image 74). Nodule within the right lung apex is decreased in size, measuring 0.9 x 0.8 cm (series 3, image 24). Multiple additional smaller nodules are detected within the lungs, and these appear similar when compared to the previous exam. There is a background of emphysema. No acute focal infiltrate is identified. No pneumothorax. No pleural effusion. Upper Abdomen: Unremarkable. Soft Tissues/Bones: No osteolytic or osteoblastic bone lesions are identified. No acute bony abnormalities are detected. No evidence of pulmonary embolism or aortic dissection. Overall, no acute abnormality identified. Multiple nodules within both lungs, with a mixed response to therapy. The majority of the nodules are similar in size. However, at least 1 nodule within the right lung apex is decreased in size.        Assessment and Plan:   Steve Alfred is a 65 y.o.  female  who presents with Acute on chronic respiratory failure with hypoxia and hypercapnia (HCC)    Acute on chronic hypoxemic and hypercapnic respiratory failure  Acute exacerbation of COPD  Possible pneumonia  Currently she is down to 7 L of nasal cannula  CTA chest ruled out with decreased size of nodule  Legionella and strep antigen negative  Respiratory panel with COVID-19 is negative  Continue breathing treatments  Continue steroids  Continue theophylline  Continue Mucomyst  Elevated white count, improving  Continue empiric ceftriaxone and doxycycline  Pulmonology recommendations appreciated    Small cell cancer of right lung, stage IIIb  CT scan showed some response to treatment  Heme-onc recommendations appreciated-s/p 2 cycles of cisplatin/etoposide    Dysphagia  Noted GI consult-plan for EGD tomorrow with possible dilatation    Hyponatremia-improved  Likely from poor oral intake, improved with fluids    Anemia of chronic disorders  Monitor CBC     Depression/anxiety  Continue BuSpar and Zoloft  Continue Vistaril    Hypertension  Continue Cardizem    Hyperlipidemia-continue Lipitor  Diet Dietary Nutrition Supplements: Standard High Calorie Oral Supplement  DIET GENERAL;  Diet NPO, After Midnight   DVT Prophylaxis [x] Lovenox, []  Heparin, [] SCDs, [] Ambulation   GI Prophylaxis [] PPI,  [] H2 Blocker,  [] Carafate,  [x] Diet/Tube Feeds   Code Status Full Code   Disposition Patient requires continued admission due to acute respiratory failure   MDM [] Low, [x] Moderate,[]  High     Electronically signed by Lara Dandy, MD on 3/2/2021 at 3:40 PM

## 2021-03-02 NOTE — PROGRESS NOTES
Messaged hospitalist LEON [place] because pt stating that she doesn't feel like shes getting any air, her sat is 91% on 12 L NC. She states she felt better with whatever they gave her in ED the only thing different that I seen that she had was BIPAP which she is refusing and states she only had on for two min and she got a duoneb where she is getting just albuterol here. Pt maybe experiencing some anxiety, she received Ativan once in the ED as a well. New orders placed for vistaril and duoneb.

## 2021-03-02 NOTE — PLAN OF CARE
Problem: Pain:  Goal: Pain level will decrease  Description: Pain level will decrease  3/2/2021 0210 by Elliot Johnson LPN  Outcome: Ongoing  3/1/2021 1253 by Natanael Vargas LPN  Outcome: Ongoing  Goal: Control of acute pain  Description: Control of acute pain  3/2/2021 0210 by Elliot Johnson LPN  Outcome: Ongoing  3/1/2021 1253 by Natanael Vargas LPN  Outcome: Ongoing  Goal: Control of chronic pain  Description: Control of chronic pain  3/2/2021 0210 by Elliot Johnson LPN  Outcome: Ongoing  3/1/2021 1253 by Natanael Vargas LPN  Outcome: Ongoing

## 2021-03-02 NOTE — CONSULTS
Hematology/Oncology  Consult Note      Reason for Consult:  Lung Cancer  Requesting Physician:  Dr. Jimena Arreaga:    Chief Complaint   Patient presents with    Shortness of Breath     COPD, lung cancer, has been going on all      History Obtained From:  patient, electronic medical record    HISTORY OF PRESENT ILLNESS:      The patient is a 65 y.o. female with stage IIIB small cell cancer of the lung. She is status post two cycles of cisplatin/etoposide. She is due Cycle 3 on March 3, 2021. She presented with worsening shortness of breath. She reports ongoing productive cough nausea and lower extremity edema. She was started on antibiotics, DuoNebs, and mucomyst. She had CT chest which showed: Impression   No evidence of pulmonary embolism or aortic dissection.  Overall, no acute   abnormality identified.       Multiple nodules within both lungs, with a mixed response to therapy.  The   majority of the nodules are similar in size.  However, at least 1 nodule   within the right lung apex is decreased in size. Reports she continues to smoke. She is resting comfortably at the time of our interview, able to speak in full sentences without distress. She reports that she has difficulty swallowing and feels like food gets stuck in her throat. She has tried nystatin without relief. She has associated weight loss due to fear of food getting stuck. Past Medical History:        Diagnosis Date    Arthritis     COPD (chronic obstructive pulmonary disease) ([facility]     follow with Dr Ena Gandhi Full dentures     H/O cardiovascular stress test 11/18/2009    thallium, normal no ischemia EF70%     H/O cardiovascular stress test 10/11/2013    thallium,EF70%, normal, no ischemia    H/O Doppler ultrasound 10/13/2010    carotid, right normal, left normal    H/O Doppler ultrasound 10/07/2014    Carotid mild bilateral internal carotid artery disease less than 50% in severity.  Normal vertebral artery flows with good velocities. Incidental finding of possible thyroid nodule in the left lobe.  History of 24 hour EKG monitoring 03/11/2011    NSR no ectopy    History of nuclear stress test 10/21/2016    lexiscan-normal,no ischemia,EF70%,enlarged right ventricle    Hx of chest x-ray 01/02/2015    WNL    Hx of Doppler echocardiogram 12/01/2020    EF 50-55% mild LV hypertrophy. Grade 2 diastolic dysfunction. Mild AS.     Hx of echocardiogram 10/11/2013    10/13 Abn lt ventricular diastolic function, mile MR, EF 57%,10/10 EF55%, mild mitral annular calcification    Hx of echocardiogram 05/02/2019    EF 56-54%, Grade I diastolic dysfunction, Mild aortic stenosis, Calcific thickening of posterior leaflet of mitral valve, Mild Pulm HTN    Hx of pelvic x-ray 01/02/2015    Severe RT his osterarothrosis    Hx of x-ray of hip 01/02/2015    Severe Rt hip osteosrthrosis    Hyperlipidemia     Hypertension     follows with dr Wilberto Cobb Leg pain     Lung nodules     scheduled for bronch 1/5/2020    On home oxygen therapy     \"on 4 liters 24 hours per day\"    Small cell lung cancer ([facility] [address] 1/11/2021    Wears glasses     to read     Past Surgical History:        Procedure Laterality Date    BACK SURGERY      \"about 12 yrs ago - surgery my mid to low back \" done in [address] N/A 1/5/2021    BRONCHOSCOPY ENDOBRONCHIAL ULTRASOUND performed by Eusebio Loyola MD at \[facility]\"" 80      \"last one done in my age 61's    CYST REMOVAL  1970's    left arm    EYE SURGERY  2010?    svetlana cataract ext     FOOT SURGERY Right 1970's    \"have screw in 2nd toe\"    HYSTERECTOMY      1999\"took everything \"    JOINT REPLACEMENT Right 2014??    hip    LAPAROSCOPIC APPENDECTOMY N/A 5/18/2019    APPENDECTOMY LAPAROSCOPIC performed by Arlette Saldana MD at [address] [place] [address] 12/10/2020    MEDIASTINOSCOPY performed by Eusebio Loyola MD at [address] [place] [address]  1970's       Current Medications: Daily  acetylcysteine (MUCOMYST) 20 % solution 600 mg, 600 mg, Inhalation, BID    Allergies:  Formaldehyde, Gabapentin, Norflex tablets [orphenadrine], and Cranberry    Social History:    TOBACCO:   reports that she quit smoking about 3 months ago. Her smoking use included cigarettes. She started smoking about 47 years ago. She has a 70.50 pack-year smoking history. She has never used smokeless tobacco.  ETOH:   reports current alcohol use. DRUGS:   reports no history of drug use. Family History:       Problem Relation Age of Onset    Stroke Mother     Dementia Mother     Heart Attack Mother 61    Coronary Art Dis Mother     Hypertension Mother     High Cholesterol Mother    Lloyd Barajas Cancer Father     Stroke Father     Heart Attack Father 61    Diabetes Sister     Heart Attack Sister     Hypertension Sister     Cancer Sister    Lloyd Barajas COPD Sister    Lloyd Barajas Stroke Sister     Hypertension Sister     Hypertension Sister     Irritable Bowel Syndrome Sister     Pacemaker Sister      PHYSICAL EXAM:      Vitals:    /66   Pulse 98   Temp 98.1 °F (36.7 °C) (Oral)   Resp 12   Ht 5' (1.524 m)   Wt 118 lb 2.7 oz (53.6 kg)   SpO2 100%   BMI 23.08 kg/m²     CONSTITUTIONAL:  awake, alert, cooperative, no apparent distress, and appears chronically ill  EYES:  Lids and lashes normal, extra ocular muscles intact, sclera clear, conjunctiva normal  LUNGS: expiratory wheezing, diminished  CARDIOVASCULAR:  regular rate and rhythm, normal S1 and S2, no S3 or S4, and no murmur noted  ABDOMEN:   normal bowel sounds, soft, non-distended, non-tender  MUSCULOSKELETAL:  There is no redness, warmth, or swelling of the joints. Full range of motion noted. NEUROLOGIC:  Awake, alert, oriented to name, place and time. Cranial nerves II-XII are grossly intact. SKIN:  no bruising or bleeding      IMPRESSION/RECOMMENDATIONS:      Stage IIIB small cell lung cancer: S/P two cycles cisplatin/etoposide.  Due for treatment 3/3/21 which we will defer until acute issues are resolved. Review of CT scan shows some response to treatment. She will need to follow up at the cancer center after discharge. She reports ongoing dysphagia with associated weight loss. GI is consulted. Nutrition- continue supplements    Anemia- likely secondary to chemotherapy. Will check Iron studies. COPD- pulmonary consulted. Please continue all supportive care. I saw and examined patient myself. Agreed with above. Thank you for allowing us to participate in the care of this patient.        Electronically signed by HELIO Wyatt CNP on 3/2/2021 at 7:56 AM

## 2021-03-03 NOTE — PROGRESS NOTES
Report called nick JACKSON. Pt arouses and talking to staff. Warm blankets provided. Vitals 132/79 110-19 94% on 6l nc. Pt may have soft diet small bite size pieces.

## 2021-03-03 NOTE — PROCEDURES
INSTRUMENTAL SWALLOW REPORT  MODIFIED BARIUM SWALLOW    NAME: Yvan Hernandez   : 1950  MRN: [id number]       Date of Eval: 3/3/2021     Ordering Physician: Dr. Pankaj Vega  Radiologist: Available upon consult  ENT: n/a  Referring Diagnosis(es): Referring Diagnosis: dysphagia    Past Medical History:  has a past medical history of Arthritis, COPD (chronic obstructive pulmonary disease) ([facility] [address] Full dentures, H/O cardiovascular stress test, H/O cardiovascular stress test, H/O Doppler ultrasound, H/O Doppler ultrasound, History of 24 hour EKG monitoring, History of nuclear stress test, Hx of chest x-ray, Hx of Doppler echocardiogram, Hx of echocardiogram, Hx of echocardiogram, Hx of pelvic x-ray, Hx of x-ray of hip, Hyperlipidemia, Hypertension, Leg pain, Lung nodules, On home oxygen therapy, Small cell lung cancer ([facility] [address] and Wears glasses. Past Surgical History:  has a past surgical history that includes cyst removal (); Tubal ligation (); laparoscopic appendectomy (N/A, 2019); Mediastinoscopy (N/A, 12/10/2020); back surgery; Colonoscopy; Hysterectomy; eye surgery (?); joint replacement (Right, ? ?); Foot surgery (Right, ); and bronchoscopy (N/A, 2021). Current Diet Solid Consistency: Regular;Dysphagia Soft and Bite-Sized (Dysphagia III)  Current Diet Liquid Consistency: Thin    Date of Prior Study: n/a  Type of Study: Initial MBS  Results of Prior Study: n/a      Patient Complaints/Reason for Referral:  Yvan Hernandez was referred for a MBS to assess the efficiency of his/her swallow function, assess for aspiration, and to make recommendations regarding safe dietary consistencies, effective compensatory strategies, and safe eating environment. Patient complaints: food getting stuck in a pocket    Onset of problem:   Date of Onset: this admission            Behavior/Cognition/Vision/Hearing:  Behavior/Cognition: Alert; Cooperative;Pleasant mood  Vision: Within Functional Limits  Hearing: Within functional limits    Impressions:  Yvan Hernandez was seen for a modified barium swallow study after being admitted to [facility] with SOB. Pt was alert and cooperative throughout assessment. Relevant medical hx includes COPD, lung cancer and hypertension. Pt has a hx of dysphagia with regurgitation from the esophagus and feeling as if food is \"getting stuck in a pocket\". She had an EGD completed this AM 3/03/21 which indicated severe esophagitis. For today's assessment pt was positioned upright 90 degrees and filmed in the lateral view. PO trials of puree, soft solids and thin liquids by spoon/cup/straw sips were given. Pt presents with a mild oropharyngeal swallow. Oral swallow characterized by prolonged mastication (she did not have dentures in place for study) and slow oral A-P transit. Pt demonstrate adequate oral clearance with all trials given. Pharyngeal swallow appeared timely with minimal pooling observed in the valleculae. Pt presented with reduced laryngeal elevation and adequate epiglottic inversion. No penetration or aspiration was observed with all PO trials given. Minimal pharyngeal residue was present in the pyriforms and at the UES which pt cleared spontaneously with a second swallow. Suspect esophageal dysphagia with narrowing and slow motility observed in the upper esophagus. No backflow into the pharynx was observed at this time. Recommend minced and moist solids/thin liquids with strict aspiration and reflux precautions. ST reviewed results of MBSS, images and recommendations for safe feeding protocol with pt and pt's nurse following assessment. Pt was in agreement with POC at this time. ST will continue to follow. Patient Position: Lateral and Patient Degrees: 90 degrees upright      Consistencies Administered: Dysphagia Minced and Moist (Dysphagia II); Thin cup; Thin teaspoon; Thin straw;Dysphagia Pureed (Dysphagia I) Dysphagia Outcome Severity Scale: Level 5: Mild dysphagia- Distant supervision. May need one diet consistency restricted  Penetration-Aspiration Scale (PAS): 1 - Material does not enter the airway    Recommended Diet:  Solid consistency: Dysphagia Minced and Moist (Dysphagia II)  Liquid consistency: Thin  Liquid administration via: Cup    Medication administration: Meds in puree    Safe Swallow Protocol:  Supervision: Independent  Compensatory Swallowing Strategies: Alternate solids and liquids;Upright as possible for all oral intake;Remain upright for 30-45 minutes after meals              Recommendations/Treatment  Requires SLP Intervention: Yes        D/C Recommendations: To be determined  Postural Changes and/or Swallow Maneuvers: Upright 90 degrees      Recommended Exercises:    Therapeutic Interventions: Diet tolerance monitoring; Therapeutic PO trials with SLP         Education: Images and recommendations were reviewed with Scott Echols  following this exam.   Patient Education: recommendations/POC  Patient Education Response: Verbalizes understanding    Prognosis  Prognosis for safe diet advancement: good  Duration/Frequency of Treatment  Duration/Frequency of Treatment: 1-2 xs weekly/ LOS or until goals are met  Safety Devices  Safety Devices in place: Yes  Type of devices:  All fall risk precautions in place      Goals:    Long Term:               Short Term:     Goal 1: Pt will tolerate dysphagia 2 diet/thin liquids without clinical evidence of aspiration 100%  Goal 2: Pt/caregivers will demonstrate comprehension of recommendations/POC Goal targeted this date 03/03/21 - reviewed images and results after study                        Oral Preparation / Oral Phase  Oral Phase: Impaired  Oral Phase - Major Contributing Deficits  Poor Mastication: Reg solid  Piecemeal Swallowing: Reg solid  Fatigue of Mechanism: Reg solid        Pharyngeal Phase  Pharyngeal Phase: [facility]      Esophageal Phase  Esophageal Screen: Impaired  Upper Esophageal Screen- Major Contributing Deficits  Decreased Esophageal Peristalsis: All  Esophageal Backflow Into The Upper Esophagus:  All        Pain   Patient Currently in Pain: Yes  Pain Level: 5  Pain Type: Chronic pain  Pain Location: Back      Therapy Time:   Individual Concurrent Group Co-treatment   Time In 1400         Time Out 1500         Minutes Lucia Hernandez 51, Talat Shan 87, CCC-SLP, 3/3/2021

## 2021-03-03 NOTE — PROGRESS NOTES
Physical Therapy  Pt just received aerosol treatment. Unable to be seen by therapy for 1 hour per RT. Will re-attempt tomorrow for PT/OT evals.

## 2021-03-03 NOTE — ANESTHESIA PRE PROCEDURE
Department of Anesthesiology  Preprocedure Note       Name:  Chika Romero   Age:  65 y.o.  :  1950                                          MRN:  [id number]         Date:  3/3/2021      Surgeon: Darlene Cai):  Rita Valerio MD    Procedure: Procedure(s):  EGD ESOPHAGOGASTRODUODENOSCOPY    Medications prior to admission:   Prior to Admission medications    Medication Sig Start Date End Date Taking? Authorizing Provider   busPIRone (BUSPAR) 10 MG tablet Take 1 tablet by mouth 2 times daily 2/17/21 3/19/21 Yes HELIO Kasper CNP   HYDROcodone-acetaminophen (NORCO)  MG per tablet Take 1 tablet by mouth every 4 hours as needed for Pain for up to 30 days.  Indications: 180 2/15/21 3/17/21 Yes Andrea Pearce MD   Magic Mouthwash (MIRACLE MOUTHWASH) Swish and swallow 5 mLs 4 times daily as needed for Irritation 1:1:1 diphenhydramine, nystatin, lidocaine 21  Yes HELIO Kasper CNP   OLANZapine (ZYPREXA) 2.5 MG tablet Take the night prior to each chemotherapy, then for three additional nights with each chemotherapy 21  Yes Carol Ann Escalante MD   dexamethasone (DECADRON) 4 MG tablet Take 1 tablet x 4 days after each chemotherapy 21  Yes Carol Ann Escalante MD   potassium chloride (KLOR-CON M) 10 MEQ extended release tablet Take 1 tablet by mouth 2 times daily 21  Yes GLENDA Lewis   fluticasone (FLONASE) 50 MCG/ACT nasal spray 1 spray by Each Nostril route daily   Yes Historical Provider, MD   ipratropium-albuterol (DUONEB) 0.5-2.5 (3) MG/3ML SOLN nebulizer solution Inhale 3 mLs into the lungs 4 times daily 20  Yes Cris Perera MD   albuterol sulfate HFA (PROAIR HFA) 108 (90 Base) MCG/ACT inhaler Inhale 2 puffs into the lungs every 6 hours as needed for Wheezing 20  Yes Andrea Pearce MD   Cholecalciferol (VITAMIN D3) 25 MCG (1000 UT) TABS Take by mouth daily    Yes Historical Provider, MD   calcium carbonate 600 MG TABS tablet Take 1 tablet by mouth daily   Yes Historical Provider, MD   aspirin 81 MG EC tablet Take 81 mg by mouth daily   Yes Historical Provider, MD   sertraline (ZOLOFT) 50 MG tablet Take 1 tablet by mouth daily 2/26/21   Milla Red MD   Nutritional Supplement LIQD 2 cans by mouth daily. Boost strawberry if available.  2/19/21   HELIO Nuñez CNP   theophylline (UNIPHYL) 400 MG extended release tablet Take 0.5 tablets by mouth daily 2/2/21   Elda Su MD   ondansetron (ZOFRAN) 8 MG tablet Take 1 tablet by mouth every 8 hours as needed for Nausea or Vomiting 1/19/21   Sean Carlin MD   dilTIAZem (CARDIZEM CD) 120 MG extended release capsule Take 1 capsule by mouth daily 12/12/20   Nathan Rizvi MD   simvastatin (ZOCOR) 20 MG tablet Take 1 tablet by mouth nightly 12/1/20   Milla Red MD   Fexofenadine HCl [facility] ALLERGY PO) Take by mouth 2 times daily     Historical Provider, MD       Current medications:    Current Facility-Administered Medications   Medication Dose Route Frequency Provider Last Rate Last Admin    lactated ringers infusion   Intravenous Continuous Anupam Morrison MD        ipratropium-albuterol (DUONEB) nebulizer solution 1 ampule  1 ampule Inhalation Q4H WA HELIO Hobson CNP   1 ampule at 03/03/21 0603    albuterol (PROVENTIL) nebulizer solution 2.5 mg  2.5 mg Nebulization Q4H PRN Fercho Crisostomo MD        cefTRIAXone (ROCEPHIN) 1000 mg IVPB in 50 mL D5W minibag  1,000 mg Intravenous Q24H Fercho Murrell MD   Stopped at 03/02/21 1208    aspirin EC tablet 81 mg  81 mg Oral Daily [facility], DO   81 mg at 03/02/21 0847    busPIRone (BUSPAR) tablet 10 mg  10 mg Oral BID [facility], DO   10 mg at 03/02/21 2112    calcium elemental (OSCAL) tablet 500 mg  500 mg Oral Daily [facility], DO   500 mg at 03/02/21 0847    vitamin D CAPS 1,000 Units  1,000 Units Oral Daily [facility], DO   1,000 Units at 03/02/21 0837    dilTIAZem (CARDIZEM CD) extended release capsule 120 mg  120 mg Oral Daily Iris Muscat, DO   120 mg at 03/02/21 0848    fluticasone (FLONASE) 50 MCG/ACT nasal spray 2 spray  2 spray Each Nostril BID Iris Muscat, DO   2 spray at 03/02/21 2111    HYDROcodone-acetaminophen (NORCO)  MG per tablet 1 tablet  1 tablet Oral Q4H PRN Iris Muscat, DO   1 tablet at 03/03/21 0249    potassium chloride (KLOR-CON) extended release tablet 10 mEq  10 mEq Oral BID Iris Muscat, DO   10 mEq at 03/02/21 2112    sertraline (ZOLOFT) tablet 50 mg  50 mg Oral Daily Iris Muscat, DO        atorvastatin (LIPITOR) tablet 10 mg  10 mg Oral Daily Iris Muscat, DO   10 mg at 03/02/21 0848    theophylline (BECKY-24) extended release capsule 200 mg  200 mg Oral Daily Iris Muscat, DO   200 mg at 03/02/21 0847    sodium chloride flush 0.9 % injection 10 mL  10 mL Intravenous 2 times per day Iris Muscat, DO   10 mL at 03/03/21 0028    sodium chloride flush 0.9 % injection 10 mL  10 mL Intravenous PRN Iris Muscat, DO        promethazine (PHENERGAN) tablet 12.5 mg  12.5 mg Oral Q6H PRN Iris Muscat, DO        Or    ondansetron (ZOFRAN) injection 4 mg  4 mg Intravenous Q6H PRN Iris Muscat, DO        polyethylene glycol (GLYCOLAX) packet 17 g  17 g Oral Daily PRN Iris Muscat, DO        enoxaparin (LOVENOX) injection 40 mg  40 mg Subcutaneous Daily Iris Muscat, DO   40 mg at 03/02/21 0848    acetaminophen (TYLENOL) tablet 650 mg  650 mg Oral Q6H PRN Iris Muscat, DO        Or    acetaminophen (TYLENOL) suppository 650 mg  650 mg Rectal Q6H PRN Iris Muscat, DO        benzonatate (TESSALON) capsule 100 mg  100 mg Oral TID PRN Iris Muscat, DO        predniSONE (DELTASONE) tablet 40 mg  40 mg Oral Daily Iris Muscat, DO        doxycycline hyclate (VIBRA-TABS) tablet 100 mg  100 mg Oral BID Iris Muscat, DO   100 mg at 03/02/21 2112    cetirizine (ZYRTEC) tablet 10 mg  10 mg Oral Daily Iris Muscat, DO   10 mg at 03/02/21 0848    acetylcysteine (MUCOMYST) 20 % solution 600 mg  600 mg Inhalation BID [facility], DO   600 mg at 03/1950       Allergies: Allergies   Allergen Reactions    Formaldehyde     Gabapentin Other (See Comments)    Norflex Tablets [Orphenadrine]     Cranberry Rash       Problem List:    Patient Active Problem List   Diagnosis Code    Hyperlipidemia E78.5    COPD (chronic obstructive pulmonary disease) ([facility] J44.9    Leg pain M79.606    Hypertension I10    Tobacco abuse Z72.0    Abdominal aortic aneurysm (AAA) without rupture ([facility]) I71.4    Degeneration of lumbar or lumbosacral intervertebral disc M51.37    Osteopenia M85.80    Anxiety F41.9    Acute on chronic respiratory failure with hypoxia ([facility]) J96.21    Lung mass R91.8    COPD exacerbation ([facility]) J44.1    Acute exacerbation of chronic obstructive pulmonary disease (COPD) ([facility]) J44.1    Small cell lung cancer ([facility]) C34.90    Acute on chronic respiratory failure with hypoxia and hypercapnia ([facility]) J96.21, J96.22       Past Medical History:        Diagnosis Date    Arthritis     COPD (chronic obstructive pulmonary disease) ([facility]     follow with Dr Rodolfo Gutierrez Full dentures     H/O cardiovascular stress test 11/18/2009    thallium, normal no ischemia EF70%     H/O cardiovascular stress test 10/11/2013    thallium,EF70%, normal, no ischemia    H/O Doppler ultrasound 10/13/2010    carotid, right normal, left normal    H/O Doppler ultrasound 10/07/2014    Carotid mild bilateral internal carotid artery disease less than 50% in severity. Normal vertebral artery flows with good velocities. Incidental finding of possible thyroid nodule in the left lobe.      History of 24 hour EKG monitoring 03/11/2011    NSR no ectopy    History of nuclear stress test 10/21/2016    lexiscan-normal,no ischemia,EF70%,enlarged right ventricle    Hx of chest x-ray 01/02/2015    WNL    Hx of Doppler echocardiogram 12/01/2020    EF 50-55% mild LV hypertrophy. Grade 2 diastolic dysfunction. Mild AS.  Hx of echocardiogram 10/11/2013    10/13 Abn lt ventricular diastolic function, mile MR, EF 57%,10/10 EF55%, mild mitral annular calcification    Hx of echocardiogram 2019    EF 31-01%, Grade I diastolic dysfunction, Mild aortic stenosis, Calcific thickening of posterior leaflet of mitral valve, Mild Pulm HTN    Hx of pelvic x-ray 2015    Severe RT his osterarothrosis    Hx of x-ray of hip 2015    Severe Rt hip osteosrthrosis    Hyperlipidemia     Hypertension     follows with dr Darien Asher Leg pain     Lung nodules     scheduled for bronch 2020    On home oxygen therapy     \"on 4 liters 24 hours per day\"    Small cell lung cancer ([address] 2021    Wears glasses     to read       Past Surgical History:        Procedure Laterality Date    BACK SURGERY      \"about 12 yrs ago - surgery my mid to low back \" done in [address] N/A 2021    BRONCHOSCOPY ENDOBRONCHIAL ULTRASOUND performed by Malaika Heaton MD at [address] [facility]      \"last one done in my age 61's    CYST REMOVAL  's    left arm    EYE SURGERY  ?    svetlana cataract ext     FOOT SURGERY Right     \"have screw in 2nd toe\"    HYSTERECTOMY      \"took everything \"    JOINT REPLACEMENT Right ??    hip    LAPAROSCOPIC APPENDECTOMY N/A 2019    APPENDECTOMY LAPAROSCOPIC performed by Roseanne Macias MD at [address] [place] [address] 12/10/2020    MEDIASTINOSCOPY performed by Malaika Heaton MD at [facility]         Social History:    Social History     Tobacco Use    Smoking status: Former Smoker     Packs/day: 1.50     Years: 47.00     Pack years: 70.50     Types: Cigarettes     Start date: 1973     Quit date: 2020     Years since quittin.2    Smokeless tobacco: Never Used   Substance Use Topics    Alcohol use:  Yes     Alcohol/week: 0.0 standard drinks     Comment: rare\"twice per year\"                                Counseling given: Not Answered      Vital Signs (Current):   Vitals:    03/02/21 2132 03/03/21 0000 03/03/21 0046 03/03/21 0431   BP:  (!) 142/66  124/64   Pulse: 123 116  108   Resp:  15 13 13   Temp:  36.7 °C (98.1 °F)  37.1 °C (98.8 °F)   TempSrc:  Oral  Oral   SpO2:  96% 100% 99%   Weight:    125 lb 3.2 oz (56.8 kg)   Height:                                                  BP Readings from Last 3 Encounters:   03/03/21 124/64   02/19/21 (!) 141/63   02/12/21 136/61       NPO Status: Time of last liquid consumption: 0000                        Time of last solid consumption: 0000                        Date of last liquid consumption: 03/02/21                        Date of last solid food consumption: 03/02/21    BMI:   Wt Readings from Last 3 Encounters:   03/03/21 125 lb 3.2 oz (56.8 kg)   02/19/21 118 lb 3.2 oz (53.6 kg)   02/12/21 118 lb (53.5 kg)     Body mass index is 24.45 kg/m². CBC:   Lab Results   Component Value Date    WBC 32.0 03/03/2021    RBC 3.03 03/03/2021    HGB 8.4 03/03/2021    HCT 28.3 03/03/2021    MCV 93.4 03/03/2021    RDW 17.3 03/03/2021     03/03/2021       CMP:   Lab Results   Component Value Date     03/03/2021    K 5.4 03/03/2021    CL 90 03/03/2021    CO2 44 03/03/2021    BUN 25 03/03/2021    CREATININE 0.8 03/03/2021    GFRAA >60 03/03/2021    AGRATIO 1.6 08/28/2020    LABGLOM >60 03/03/2021    GLUCOSE 110 03/03/2021    PROT 5.3 03/03/2021    PROT 6.5 03/06/2015    CALCIUM 8.9 03/03/2021    BILITOT 0.2 03/03/2021    ALKPHOS 85 03/03/2021    AST 15 03/03/2021    ALT 10 03/03/2021       POC Tests: No results for input(s): POCGLU, POCNA, POCK, POCCL, POCBUN, POCHEMO, POCHCT in the last 72 hours.     Coags:   Lab Results   Component Value Date    PROTIME 9.6 01/05/2021    INR 0.80 01/05/2021    APTT 23.3 01/05/2021       HCG (If Applicable): No results found for: PREGTESTUR, PREGSERUM, HCG, HCGQUANT     ABGs:   Lab

## 2021-03-03 NOTE — CONSULTS
Infectious Disease Consult Note  3/3/2021   Patient Name: Mahsa Benton : 1950   Impression   Sepsis Secondary to Acute on Chronic Hypoxic and Hypercapnic Respiratory Failure, Acute Exacerbation COPD:   · Afebrile  · Leukocytosis max 32.0 on 3/2  · 3/2-Respiratory culture pending  · 3/2-RDP, Legionella and Strep Pna negative  · Pct 0.340, 0.238  · -CTA Pulmonary W Contrast: No PE, Multiple nodules within both lungs with a mixed response to therapy. · Dr. Brenda Higginbotham, pulmonology, onboard, rec Solu-Medrol 40 mg q6h, duonebs, weaning oxygen keep sats >90%     Right Lung SCLC Stage III:   · Dr. Juan Kirby onboard  · Next chemo of cisplatin/etoposide due 3/3, deferred until stabilized  · CT scan showed some response to treatment    · Tobacco Abuse    · HTN    · Dysphagia with Weight Loss:  · Dr. Tanner Laurent onboard  · 3/3-S/P per Dr. Tanner Laurent: EGD: Imp of severe suspicious for medication esophagitis (likely potassium capsule or vibramycin)    · Plan: await biopsies, DC K capsule and vibramycin or change to IV or liquid prep  ·    Multi-morbidity: per PMHx:  Arthritis, COPD, HLD, HTN, home oxygen 4 L/nc , appey, hyster, right joint replacement, tobacco abuse    Plan:   Continue IV doxycycline and cefepime   Check Pct and CRP, ordered  · Await respiratory culture  · Ask patient why she was on oral doxycycline prior to admission  · Check fungitel and aspergillus, orders placed    Thank you for allowing me to consult in the care of this patient.  ------------------------  REASON FOR CONSULT: Infective syndrome     Requested by: Obey Luque    HPI:Patient is a 65 y.o.  female with PMH os right lung small cell lung cancer stage III on chemotherapy who was admitted 2021 for further evaluation and management of shortness of breath with sudden onset the day of admission. Reports she was unable to get her breath and used pursed lip breathing with leaning forward to manage her breathing. Her chemotherapy treatments are scheduled every 3 weeks with the next one due 3/3/2021. ? Infectious diseases service was consulted to evaluate the pt, and recommend further investigative and therapeutic measures. ROS: Other systems reviewed Including eyes, ENT, respiratory, cardiovascular, GI, , dermatologic, neurologic, psych, hem/lymphatic, musculoskeletal and endocrine were negative other than what is mentioned above. Patient Active Problem List    Diagnosis Date Noted    Acute on chronic respiratory failure with hypoxia and hypercapnia (HCC) 02/28/2021    Small cell lung cancer ([address] 01/11/2021    Acute exacerbation of chronic obstructive pulmonary disease (COPD) ([address] 12/02/2020    COPD exacerbation ([address] 12/01/2020    Lung mass 10/08/2020    Acute on chronic respiratory failure with hypoxia ([address] 09/18/2020    Osteopenia 06/09/2019    Anxiety 06/09/2019    Tobacco abuse 05/18/2019    Abdominal aortic aneurysm (AAA) without rupture ([address] 05/18/2019    Hypertension     Degeneration of lumbar or lumbosacral intervertebral disc 07/08/2014    Hyperlipidemia     COPD (chronic obstructive pulmonary disease) (HCC)     Leg pain      Past Medical History:   Diagnosis Date    Arthritis     COPD (chronic obstructive pulmonary disease) ([address]     follow with Dr Brny Stevenson Full dentures     H/O cardiovascular stress test 11/18/2009    thallium, normal no ischemia EF70%     H/O cardiovascular stress test 10/11/2013    thallium,EF70%, normal, no ischemia    H/O Doppler ultrasound 10/13/2010    carotid, right normal, left normal    H/O Doppler ultrasound 10/07/2014    Carotid mild bilateral internal carotid artery disease less than 50% in severity. Normal vertebral artery flows with good velocities. Incidental finding of possible thyroid nodule in the left lobe.      History of 24 hour EKG monitoring 03/11/2011    NSR no ectopy    History of nuclear stress test 10/21/2016 lexiscan-normal,no ischemia,EF70%,enlarged right ventricle    Hx of chest x-ray 01/02/2015    WNL    Hx of Doppler echocardiogram 12/01/2020    EF 50-55% mild LV hypertrophy. Grade 2 diastolic dysfunction. Mild AS.     Hx of echocardiogram 10/11/2013    10/13 Abn lt ventricular diastolic function, mile MR, EF 57%,10/10 EF55%, mild mitral annular calcification    Hx of echocardiogram 05/02/2019    EF 40-26%, Grade I diastolic dysfunction, Mild aortic stenosis, Calcific thickening of posterior leaflet of mitral valve, Mild Pulm HTN    Hx of pelvic x-ray 01/02/2015    Severe RT his osterarothrosis    Hx of x-ray of hip 01/02/2015    Severe Rt hip osteosrthrosis    Hyperlipidemia     Hypertension     follows with dr Jairo Cushing Leg pain     Lung nodules     scheduled for bronch 1/5/2020    On home oxygen therapy     \"on 4 liters 24 hours per day\"    Small cell lung cancer ([address] 1/11/2021    Wears glasses     to read      Past Surgical History:   Procedure Laterality Date    BACK SURGERY      \"about 12 yrs ago - surgery my mid to low back \" done in [address] N/A 1/5/2021    BRONCHOSCOPY ENDOBRONCHIAL ULTRASOUND performed by Main Roldan MD at [facility]      \"last one done in my age 61's   [facility]  1970's    left arm    EYE SURGERY  2010?    svetlana cataract ext     FOOT SURGERY Right 1970's    \"have screw in 2nd toe\"    HYSTERECTOMY      1999\"took everything \"    JOINT REPLACEMENT Right 2014??    hip    LAPAROSCOPIC APPENDECTOMY N/A 5/18/2019    APPENDECTOMY LAPAROSCOPIC performed by Brianna Florez MD at [facility] N/A 12/10/2020    MEDIASTINOSCOPY performed by Main Roldan MD at [address] [place] [address]  1970's      Family History   Problem Relation Age of Onset    Stroke Mother     Dementia Mother     Heart Attack Mother 61    Coronary Art Dis Mother     Hypertension Mother     High Cholesterol Mother     Cancer Father     Stroke Father  Heart Attack Father 61    Diabetes Sister     Heart Attack Sister     Hypertension Sister     Cancer Sister    Rock Samples COPD Sister     Stroke Sister     Hypertension Sister     Hypertension Sister     Irritable Bowel Syndrome Sister     Pacemaker Sister       Infectious disease related family history - not contibutory. SOCIAL HISTORY  Social History     Tobacco Use    Smoking status: Former Smoker     Packs/day: 1.50     Years: 47.00     Pack years: 70.50     Types: Cigarettes     Start date: 1973     Quit date: 2020     Years since quittin.2    Smokeless tobacco: Never Used   Substance Use Topics    Alcohol use: Yes     Alcohol/week: 0.0 standard drinks     Comment: rare\"twice per year\"       Born:  [address] 37 Occupation:   No recent travel of significance.  No recent unusual exposures.  NO pets   ? ALLERGIES  Allergies   Allergen Reactions    Formaldehyde     Gabapentin Other (See Comments)    Norflex Tablets [Orphenadrine]     Cranberry Rash      MEDICATIONS  Reviewed and are per the chart/EMR. ? Antibiotics:   Present:  Cefepime 3/3-  Doxycycline 3/-    Past:  Ceftriaxone 3/2-3    ?  -------------------------------------------------------------------------------------------------------------------    Vital Signs:  Vitals:    21 0800   BP: 118/61   Pulse: 106   Resp: 27   Temp: 98.8 °F (37.1 °C)   SpO2: 91%         Exam:    VS: noted; wt 125 lb (56.8 kg) 5' Height  Gen: alert and oriented X3, no distress  Skin: no stigmata of endocarditis  Wounds: C/D/I  HEMT: AT/NC Oropharynx pink, moist, and without lesions or exudates; dentition in good state of repair  Eyes: PERRLA, EOMI, conjunctiva pink, sclera anicteric. Neck: Supple. Trachea midline. No LAD. Chest: no distress and CTA. Good air movement. Heart: RRR and no MRG. Abd: soft, non-distended, no tenderness, no hepatomegaly. Normoactive bowel sounds.   Ext: no clubbing, cyanosis, or edema  Neuro: Mental status intact. CN 2-12 intact and no focal sensory or motor deficits    ? Diagnostic Studies: reviewed  2/28/21 XR Chest Portable:  Impression   Minimal blunting of the left costophrenic angle, which may reflect a very   small joint effusion.  Otherwise no acute abnormality detected     2/28/21 CTA Pulmonary W Contrast:  Impression   No evidence of pulmonary embolism or aortic dissection.  Overall, no acute   abnormality identified.       Multiple nodules within both lungs, with a mixed response to therapy.  The   majority of the nodules are similar in size.  However, at least 1 nodule   within the right lung apex is decreased in size. 3/3/21 XR Chest (2VW):  Impression   No acute cardiopulmonary disease.  The lung nodules noted on CT from 2 days   earlier are not well perceived on the conventional radiograph. 3/3/21 Fluoroscopy modified barium swallow with video:    ?? I have examined this patient and available medical records on this date and have made the above observations, conclusions and recommendations. Electronically signed by: Electronically signed by Chino Hurtado.  HELIO Mayberry CNP on 3/3/2021 at 10:19 AM

## 2021-03-03 NOTE — PROGRESS NOTES
Hospitalist Progress Note      Name:  Chiquita Sandhu /Age/Sex: 1950  (65 y.o. female)   MRN & CSN:  [id number] & [id number] Admission Date/Time: 2021  6:04 PM   Location:  [address] [place] [address] PCP: Baron Cifuentes, [address] Day: 4    History of Present Illness:     Chief Complaint: Acute on chronic respiratory failure with hypoxia and hypercapnia ([address]  Chiquita Sandhu is a 65 y.o.  female  who presents with shortness of breath     Patient seen and examined at bedside. She says has difficulty with swallowing food and everything stays in the throat. Denies having any chest pain or shortness of breath. Ten point ROS reviewed negative, unless as noted above    Objective:        Intake/Output Summary (Last 24 hours) at 3/3/2021 1420  Last data filed at 3/3/2021 0028  Gross per 24 hour   Intake 10 ml   Output    Net 10 ml      Vitals:   Vitals:    21 1103   BP:    Pulse:    Resp:    Temp:    SpO2: 97%     Physical Exam:   General Appearance: alert and oriented to person, place and time, in no acute distress  Cardiovascular: normal rate, regular rhythm, normal S1 and S2  Pulmonary/Chest: Decreased breath sounds bilaterally  Abdomen: soft, non-tender, non-distended, normal bowel sounds, no masses   Extremities: no cyanosis, clubbing or edema, pulse   Skin: warm and dry, no rash or erythema  Head: normocephalic and atraumatic  Eyes: pupils equal, round, and reactive to light  Neck: supple and non-tender without mass, no thyromegaly   Musculoskeletal: normal range of motion, no joint swelling, deformity or tenderness  Neurological: alert, oriented, normal speech, no focal findings or movement disorder noted    Medications:   Medications:    pantoprazole  40 mg Oral QAM AC    cefepime  2,000 mg Intravenous Q12H    sucralfate  1 g Oral 4 times per day    doxycycline (VIBRAMYCIN) IV  100 mg Intravenous Q12H    ipratropium-albuterol  1 ampule Inhalation Q4H WA    aspirin  81 mg Oral Daily    busPIRone  10 mg Oral BID    calcium elemental  500 mg Oral Daily    vitamin D  1,000 Units Oral Daily    dilTIAZem  120 mg Oral Daily    fluticasone  2 spray Each Nostril BID    sertraline  50 mg Oral Daily    atorvastatin  10 mg Oral Daily    theophylline  200 mg Oral Daily    sodium chloride flush  10 mL Intravenous 2 times per day    enoxaparin  40 mg Subcutaneous Daily    predniSONE  40 mg Oral Daily    cetirizine  10 mg Oral Daily    acetylcysteine  600 mg Inhalation BID      Infusions:   PRN Meds:     albuterol, 2.5 mg, Q4H PRN      HYDROcodone-acetaminophen, 1 tablet, Q4H PRN      sodium chloride flush, 10 mL, PRN      promethazine, 12.5 mg, Q6H PRN    Or      ondansetron, 4 mg, Q6H PRN      polyethylene glycol, 17 g, Daily PRN      acetaminophen, 650 mg, Q6H PRN    Or      acetaminophen, 650 mg, Q6H PRN      benzonatate, 100 mg, TID PRN            Pertinent New Labs & Imaging Studies     CBC with Differential:    Lab Results   Component Value Date    WBC 32.0 03/03/2021    RBC 3.03 03/03/2021    HGB 8.4 03/03/2021    HCT 28.3 03/03/2021     03/03/2021    MCV 93.4 03/03/2021    MCH 27.7 03/03/2021    MCHC 29.7 03/03/2021    RDW 17.3 03/03/2021    NRBC 2 02/10/2021    SEGSPCT 90.4 03/03/2021    BANDSPCT 2 02/28/2021    LYMPHOPCT 1.9 03/03/2021    MONOPCT 2.3 03/03/2021    MYELOPCT 1 02/10/2021    BASOPCT 0.2 03/03/2021    MONOSABS 0.7 03/03/2021    LYMPHSABS 0.6 03/03/2021    EOSABS 0.0 03/03/2021    BASOSABS 0.1 03/03/2021    DIFFTYPE AUTOMATED DIFFERENTIAL 03/03/2021     CMP:    Lab Results   Component Value Date     03/03/2021    K 5.4 03/03/2021    CL 90 03/03/2021    CO2 44 03/03/2021    BUN 25 03/03/2021    CREATININE 0.8 03/03/2021    GFRAA >60 03/03/2021    AGRATIO 1.6 08/28/2020    LABGLOM >60 03/03/2021    GLUCOSE 110 03/03/2021    PROT 5.3 03/03/2021    PROT 6.5 03/06/2015    LABALBU 3.7 03/03/2021    CALCIUM 8.9 03/03/2021    BILITOT 0.2 03/03/2021 ALKPHOS 85 03/03/2021    AST 15 03/03/2021    ALT 10 03/03/2021     Xr Chest Portable    Result Date: 2/28/2021  EXAMINATION: ONE XRAY VIEW OF THE CHEST 2/28/2021 3:27 pm COMPARISON: 01/11/2021 HISTORY: ORDERING SYSTEM PROVIDED HISTORY: sob TECHNOLOGIST PROVIDED HISTORY: Reason for exam:->sob FINDINGS: Nodular opacities are again identified within both lungs, presumably reflecting metastatic disease in this patient with a known history of small cell lung cancer. No acute focal infiltrate is identified. There is mild blunting of the left costophrenic angle. Cardial pericardial silhouette is unremarkable. No pneumothorax. No free air. No acute bony abnormality. Minimal blunting of the left costophrenic angle, which may reflect a very small joint effusion. Otherwise no acute abnormality detected. Cta Pulmonary W Contrast    Result Date: 2/28/2021  EXAMINATION: CTA OF THE CHEST 2/28/2021 5:03 pm TECHNIQUE: CTA of the chest was performed after the administration of intravenous contrast.  Multiplanar reformatted images are provided for review. MIP images are provided for review. Dose modulation, iterative reconstruction, and/or weight based adjustment of the mA/kV was utilized to reduce the radiation dose to as low as reasonably achievable. COMPARISON: Chest CT, 12/07/2020 CT PA, 09/18/2020 HISTORY: ORDERING SYSTEM PROVIDED HISTORY: mary, lung cancer TECHNOLOGIST PROVIDED HISTORY: Reason for exam:->sob, lung cancer Decision Support Exception->Emergency Medical Condition (MA) Reason for Exam: sob, lung cancer Acuity: Acute Type of Exam: Initial Relevant Medical/Surgical History: lung cancer FINDINGS: Pulmonary Arteries: The pulmonary arteries are adequately opacified. No filling defects are identified within the pulmonary arteries to suggest pulmonary embolism. Mediastinum: Thyroid is stable. No mediastinal or hilar lymphadenopathy is identified. Esophagus is unremarkable. Cardiac chambers unremarkable. No pericardial effusion. Thoracic aorta normal in caliber without evidence of dissection. Lungs/pleura: There is a lobulated mass in the right lower lobe, stable when compared to the previous exam, measuring 1.8 x 1.6 cm (series 3, image 74). Nodule within the right lung apex is decreased in size, measuring 0.9 x 0.8 cm (series 3, image 24). Multiple additional smaller nodules are detected within the lungs, and these appear similar when compared to the previous exam. There is a background of emphysema. No acute focal infiltrate is identified. No pneumothorax. No pleural effusion. Upper Abdomen: Unremarkable. Soft Tissues/Bones: No osteolytic or osteoblastic bone lesions are identified. No acute bony abnormalities are detected. No evidence of pulmonary embolism or aortic dissection. Overall, no acute abnormality identified. Multiple nodules within both lungs, with a mixed response to therapy. The majority of the nodules are similar in size. However, at least 1 nodule within the right lung apex is decreased in size.        Assessment and Plan:   Ekaterina Preciado is a 65 y.o.  female  who presents with Acute on chronic respiratory failure with hypoxia and hypercapnia (HCC)    Acute on chronic hypoxemic and hypercapnic respiratory failure  Acute exacerbation of COPD  Possible pneumonia with sepsis  Currently she is on 14 L of nasal cannula  CTA chest ruled out with decreased size of nodule  Legionella and strep antigen negative  Respiratory panel with COVID-19 is negative  Continue breathing treatments  Continue steroids  Continue theophylline  Continue Mucomyst  Elevated white count, improving  Continue empiric ceftriaxone and doxycycline  Pulmonology recommendations appreciated  ID consult placed    Small cell cancer of right lung, stage IIIb  CT scan showed some response to treatment  Heme-onc recommendations appreciated-s/p 2 cycles of cisplatin/etoposide    Dysphagia  Severe Exudative esophagitis  GI recommendations appreciated-S/p EGD on 3/3/21 with exudative esophagitis likely pill induced with potassium or doxycycline.   To use liquid potassium for replacements  Changed oral doxy to IV  SLP recommendations appreciated, with dysphagia III phase diet recommendations    Hyponatremia-improved  Likely from poor oral intake, improved with fluids    Anemia of chronic disorders  Monitor CBC     Depression/anxiety  Continue BuSpar and Zoloft  Continue Vistaril    Hypertension  Continue Cardizem    Hyperlipidemia-continue Lipitor  Diet DIET GENERAL; Dysphagia Soft and Bite-Sized  Dietary Nutrition Supplements: Standard High Calorie Oral Supplement   DVT Prophylaxis [x] Lovenox, []  Heparin, [] SCDs, [] Ambulation   GI Prophylaxis [] PPI,  [] H2 Blocker,  [] Carafate,  [x] Diet/Tube Feeds   Code Status Full Code   Disposition Patient requires continued admission due to acute respiratory failure   MDM [] Low, [x] Moderate,[]  High     Electronically signed by Isela Fritz MD on 3/3/2021 at 2:20 PM

## 2021-03-03 NOTE — CONSULTS
Comprehensive Nutrition Assessment    Type and Reason for Visit:  Initial, Consult(diet ed, pt with h/o CA)    Nutrition Recommendations/Plan:   · Continue current diet, smaller more frequent meals/snacks  · Resume standard oral nutrition supplement, between meals, pt prefers strawberry  · Renal oral nutrition supplement available, if needed    Nutrition Assessment:  Pt admitted with SOB, h/o R lung small cell lung cancer stage III on chemotherapy. States intake has been poor x 6 months as food getting stuck in esophagus. S/P EGD today. Soft and bite sized diet ordered. No recent wt loss noted. Willing to drink oral supplement while here, between meals recommended. Will continue to follow as moderate nutrition risk. Malnutrition Assessment:  Malnutrition Status: At risk for malnutrition  Context:  Chronic Illness     Findings of the 6 clinical characteristics of malnutrition:  Energy Intake:   75% or less estimated energy requirements for 1 month or longer  Weight Loss:  No significant weight loss     Body Fat Loss:  No significant body fat loss     Muscle Mass Loss:  No significant muscle mass loss    Fluid Accumulation:  No significant fluid accumulation     Strength:  Not Performed    Estimated Daily Nutrient Needs:  Energy (kcal):  4260-1636 (30-32 kcal/kg); Weight Used for Energy Requirements:  Current     Protein (g):  57-68 (1-1.2 g/kg);  Weight Used for Protein Requirements:  Current        Fluid (ml/day):  2451-5651; Method Used for Fluid Requirements:  1 ml/kcal      Nutrition Related Findings:  K+ 5.4, Mg 1.3      Wounds:  None       Current Nutrition Therapies:    DIET GENERAL; Dysphagia Soft and Bite-Sized    Anthropometric Measures:  · Height: 5' (152.4 cm)  · Current Body Weight: 125 lb 3.2 oz (56.8 kg)   · Admission Body Weight: 125 lb 3.2 oz (56.8 kg)    · Usual Body Weight: 120 lb 3.2 oz (54.5 kg)(3/6/20)     · Ideal Body Weight: 100 lbs; % Ideal Body Weight 125.2 %   · BMI: 24.5  · BMI Categories: Normal Weight (BMI 18.5-24. 9)       Nutrition Diagnosis:   · Predicted inadequate energy intake related to swallowing difficulty as evidenced by poor intake prior to admission, intake 0-25%, intake 26-50%    Nutrition Interventions:   Food and/or Nutrient Delivery:  Continue Current Diet, Start Oral Nutrition Supplement  Nutrition Education/Counseling:  Education initiated   Coordination of Nutrition Care:  Continue to monitor while inpatient, Speech Therapy, Swallow Evaluation    Goals:  Patient will continue to consume at least 75% at meals during stay       Nutrition Monitoring and Evaluation:   Behavioral-Environmental Outcomes:  None Identified   Food/Nutrient Intake Outcomes:  Food and Nutrient Intake, Supplement Intake  Physical Signs/Symptoms Outcomes:  Biochemical Data, Chewing or Swallowing, Nausea or Vomiting, Meal Time Behavior, Weight     Discharge Planning:    Continue Oral Nutrition Supplement     Electronically signed by Faraz Tong RD, LD on 3/3/21 at 12:29 PM EST    Contact: [phone]

## 2021-03-03 NOTE — CARE COORDINATION
Pt is from home with granddaughter and a referral was made to [facility].. Pt is on 14L O2 and is very weak per nurse. PT/OT ordered.  CM will f/u for d/c planning when PT/OT recommendations are in.  TE

## 2021-03-03 NOTE — PROGRESS NOTES
Speech Language Pathology  Facility/Department: [facility]   CLINICAL BEDSIDE SWALLOW EVALUATION    NAME: Rekha Flores  : 1950  MRN: [id number]    ADMISSION DATE: 2021  ADMITTING DIAGNOSIS: has Hyperlipidemia; COPD (chronic obstructive pulmonary disease) ([address] Leg pain; Hypertension; Shortness of breath; Tobacco abuse; Abdominal aortic aneurysm (AAA) without rupture ([address] Degeneration of lumbar or lumbosacral intervertebral disc; Osteopenia; Anxiety; Acute on chronic respiratory failure with hypoxia ([address] Lung mass; COPD exacerbation ([address] Acute exacerbation of chronic obstructive pulmonary disease (COPD) ([address] Small cell lung cancer ([address] and Acute on chronic respiratory failure with hypoxia and hypercapnia ([address] on their problem list.      Impressions: Rekha Flores was seen for a bedside swallowing after being admitted to [address] [place] [address] with SOB. Pt was pleasant and alert throughout assessment. She had an EGD completed this AM 3/03/21 which indicated severe esophagitis. Relevant medical hx includes COPD, lung cancer and hypertension. Pt reports having difficulty with food getting stuck \"in a pocket in my throat\" for several months. She stated when the food gets stuck she has to cough it up and expel it into a napkin. For today's assessment pt was positioned upright in bed and presented with a strong volitional cough. Oral mechanism examination was [facility] with no focal orofacial weakness. Prolonged mastication was observed with trials of soft solids. Suspect mild pharyngeal dysphagia characterized by timely swallow initiation and reduced laryngeal elevation. Pt presented with a delayed cough with all PO trials given. She was utilizing oral suctioning to remove bolus from oral cavity. Recommend modified barium swallow study to further assess swallowing function and rule out aspiration. ST paged physician for order and will plan to complete study later today.      ONSET DATE: this admission Date of Eval: 3/3/2021  Evaluating Therapist: Gail Lopez    Current Diet level:  Current Diet : Dysphagia Soft and Bite-Sized (Dysphagia III)  Current Liquid Diet : Thin      Primary Complaint  Patient Complaint: weakness    Pain:  Pain Assessment  Pain Assessment: 0-10  Pain Level: 4  Patient's Stated Pain Goal: 2  Pain Type: Chronic pain  Pain Location: Back  Pain Orientation: Lower  Pain Descriptors: Aching  Pain Frequency: Continuous  Pain Onset: On-going  Clinical Progression: Gradually improving  Functional Pain Assessment: Prevents or interferes some active activities and ADLs  Non-Pharmaceutical Pain Intervention(s): Repositioned  Response to Pain Intervention: Patient Satisfied  RASS Score: Alert and calm    Reason for Referral  Satnam Saldivar was referred for a bedside swallow evaluation to assess the efficiency of her swallow function, identify signs and symptoms of aspiration and make recommendations regarding safe dietary consistencies, effective compensatory strategies, and safe eating environment. Impression  Dysphagia Diagnosis: Mild to moderate oral stage dysphagia;Mild to moderate pharyngeal stage dysphagia  Dysphagia Outcome Severity Scale: Level 4: Mild moderate dysphagia- Intermittent supervision/cueing. One - two diet consistencies restricted     Treatment Plan  Requires SLP Intervention: Yes  Duration/Frequency of Treatment: TBD after MBSS  D/C Recommendations: To be determined       Recommended Diet and Intervention  Diet Solids Recommendation: Dysphagia Minced and Moist (Dysphagia II)  Liquid Consistency Recommendation: Thin  Recommended Form of Meds: Crushed in puree as able     Therapeutic Interventions: Diet tolerance monitoring    Compensatory Swallowing Strategies  Compensatory Swallowing Strategies:  Alternate solids and liquids;Upright as possible for all oral intake    Treatment/Goals  Short-term Goals  Timeframe for Short-term Goals: LOS or until goals are met  Goal 1: Pt will participate in modified barium swallow study to rule out aspiration and determine POC    General  Chart Reviewed: Yes  Behavior/Cognition: Alert; Cooperative;Pleasant mood  Respiratory Status: O2 via nasual cannula  O2 Device: Nasal cannula  Communication Observation: Functional  Follows Directions: Simple  Dentition: Adequate  Patient Positioning: Upright in bed  Baseline Vocal Quality: Dysphonic  Volitional Cough: Strong  Prior Dysphagia History: recommendations/POC  Consistencies Administered: Dysphagia Soft and Bite-Sized (Dysphagia III); Thin - teaspoon; Thin - cup; Thin - straw;Dysphagia Pureed (Dysphagia I)           Vision/Hearing  Vision  Vision: Within Functional Limits  Hearing  Hearing: Within functional limits    Oral Motor Deficits  Oral/Motor  Oral Motor: Within functional limits    Oral Phase Dysfunction  Oral Phase  Oral Phase: Exceptions  Oral Phase Dysfunction  Impaired Mastication: All  Decreased Anterior to Posterior Transit: All  Lingual/Palatal Residue: All     Indicators of Pharyngeal Phase Dysfunction   Pharyngeal Phase  Pharyngeal Phase: Exceptions    Prognosis  Prognosis  Prognosis for safe diet advancement: fair  Individuals consulted  Consulted and agree with results and recommendations: Patient;RN;MD    Education  Patient Education: recommendations/POC  Patient Education Response: Verbalizes understanding  Safety Devices in place: Yes  Type of devices:  All fall risk precautions in place       Therapy Time  SLP Individual Minutes  Time In: 1100  Time Out: 36  Minutes: [address], Faby Jacobo, 3/3/2021

## 2021-03-03 NOTE — BRIEF OP NOTE
BRIEF EGD REPORT:     Photos and full EGD report available by going to [facility] review\" then \"procedures\" then  \"EGD\" then \"View Endoscopy Report\"     IMPRESSION :   1) segment of severe exudative esophagitis and mild narrowing between 30-35 cm from the incisors with normal mucosa above and below-SUSPICIOUS FOR MEDICATION ESOPHAGITIS- (LIKELY POTASSIUM CAPSULE OR VIBRAMYCIN)  2) above segment biopsied, brushed for cytology and dilated 12-15 mm as described  3) otherwise normal    PLAN :   1) Await biopsies  2) D/C K+ capsule and Vibramycin or change to IV or liquid prep    Segmental exudative esophagitis--- --??  Med-induced      Same- after exudate removed

## 2021-03-03 NOTE — PROGRESS NOTES
Pre procedure checklist items verified with pt including procedure, npo status and allergies. Pt states she takes no BB or blood thinners. No questions or concerns voiced. Pt states daughter in waiting room. Report from [facility]. Pt transported to [facility] for procedure.

## 2021-03-03 NOTE — PROGRESS NOTES
Pulmonary and Critical Care  Progress Note    Subjective: The patient has slightly improved. Had EGD and dilatation today. Shortness of breath has improved  Chest pain none  Addressing respiratory complaints Patient is negative for  hemoptysis and cyanosis  CONSTITUTIONAL:  negative for fevers and chills      Past Medical History:     has a past medical history of Arthritis, COPD (chronic obstructive pulmonary disease) ([facility] [address] Full dentures, H/O cardiovascular stress test, H/O cardiovascular stress test, H/O Doppler ultrasound, H/O Doppler ultrasound, History of 24 hour EKG monitoring, History of nuclear stress test, Hx of chest x-ray, Hx of Doppler echocardiogram, Hx of echocardiogram, Hx of echocardiogram, Hx of pelvic x-ray, Hx of x-ray of hip, Hyperlipidemia, Hypertension, Leg pain, Lung nodules, On home oxygen therapy, Small cell lung cancer ([facility] [address] and Wears glasses. has a past surgical history that includes cyst removal (1970's); Tubal ligation (1970's); laparoscopic appendectomy (N/A, 5/18/2019); Mediastinoscopy (N/A, 12/10/2020); back surgery; Colonoscopy; Hysterectomy; eye surgery (2010?); joint replacement (Right, 2014? ?); Foot surgery (Right, 1970's); and bronchoscopy (N/A, 1/5/2021). reports that she quit smoking about 3 months ago. Her smoking use included cigarettes. She started smoking about 47 years ago. She has a 70.50 pack-year smoking history. She has never used smokeless tobacco. She reports current alcohol use. She reports that she does not use drugs. Family history:  family history includes COPD in her sister; Cancer in her father and sister; Coronary Art Dis in her mother; Dementia in her mother; Diabetes in her sister; Heart Attack in her sister;  Heart Attack (age of onset: 61) in her father and mother; High Cholesterol in her mother; Hypertension in her mother, sister, sister, and sister; Irritable Bowel Syndrome in her sister; Pacemaker in her sister; Stroke in her father, mother, and sister. Allergies   Allergen Reactions    Formaldehyde     Gabapentin Other (See Comments)    Norflex Tablets [Orphenadrine]     Cranberry Rash     Social History:    Reviewed; no changes    Objective:   PHYSICAL EXAM:        VITALS:  BP (!) 145/69   Pulse 104   Temp 98.7 °F (37.1 °C) (Oral)   Resp 23   Ht 5' (1.524 m)   Wt 125 lb 3.2 oz (56.8 kg)   SpO2 96%   BMI 24.45 kg/m²     24HR INTAKE/OUTPUT:      Intake/Output Summary (Last 24 hours) at 3/3/2021 1712  Last data filed at 3/3/2021 0028  Gross per 24 hour   Intake 10 ml   Output    Net 10 ml       CONSTITUTIONAL:  awake  LUNGS:  decreased breath sounds  CARDIOVASCULAR:  normal S1 and S2 and negative JVD  ABD:Abdomen soft, non-tender. BS normal. No masses,  No organomegaly  NEURO:Alert. DATA:    CBC:  Recent Labs     02/28/21 1830 03/01/21  0509 03/03/21  0353   WBC 18.7* 17.5* 32.0*   RBC 3.16* 3.13* 3.03*   HGB 8.8* 8.7* 8.4*   HCT 29.3* 28.9* 28.3*    317 400   MCV 92.7 92.3 93.4   MCH 27.8 27.8 27.7   MCHC 30.0* 30.1* 29.7*   RDW 16.4* 16.6* 17.3*   SEGSPCT 73.0*  --  90.4*   BANDSPCT 2*  --   --       BMP:  Recent Labs     02/28/21 1830 03/01/21  0509 03/03/21  0353   * 132* 136   K 3.8 4.0 5.4*   CL 80* 83* 90*   CO2 49* 43* 44*   BUN 12 8 25*   CREATININE 0.8 0.7 0.8   CALCIUM 9.9 9.2 8.9   GLUCOSE 105* 106* 110*      ABG:  No results for input(s): PH, PO2ART, GDG5STV, HCO3, BEART, O2SAT in the last 72 hours. Lab Results   Component Value Date    PROBNP 1,244 (H) 03/03/2021    PROBNP 310.0 (H) 02/28/2021    PROBNP 126.5 01/11/2021    THEOPH 2.6 (L) 03/03/2021     No results found for: [facility]    Radiology Review:  Pertinent images / reports were reviewed as a part of this visit.     Assessment:     Patient Active Problem List   Diagnosis    Hyperlipidemia    [facility] (chronic obstructive [facility] disease) [facility]    Leg pain    Hypertension    Shortness of breath    Tobacco abuse    Abdominal aortic aneurysm (AAA) without rupture ([address]    Degeneration of lumbar or lumbosacral intervertebral disc    Osteopenia    Anxiety    Acute on chronic respiratory failure with hypoxia (HCC)    Lung mass    COPD exacerbation (HCC)    Acute exacerbation of chronic obstructive pulmonary disease (COPD) (HCC)    Small cell lung cancer ([address]    Acute on chronic respiratory failure with hypoxia and hypercapnia ([address]       Plan:   1. Overall the patient has slightly improved. 2. Wean FiO2.  3. A/B as per ID.     Saadia Colon MD  3/3/2021  5:12 PM

## 2021-03-03 NOTE — ANESTHESIA POSTPROCEDURE EVALUATION
Department of Anesthesiology  Postprocedure Note    Patient: Dallas Parnell  MRN: [id number]  YOB: 1950  Date of evaluation: 3/3/2021  Time:  7:28 AM     Procedure Summary     Date: 03/03/21 Room / Location: [address]    Anesthesia Start: 0703 Anesthesia Stop: 6485    Procedure: EGD BIOPSY AND BRUSHING (N/A ) Diagnosis: (-)    Surgeons: Meet Mckeon MD Responsible Provider: Manish Dunlap DO    Anesthesia Type: MAC ASA Status: 4          Anesthesia Type: MAC    Sam Phase I:      Sam Phase II:      Last vitals: Reviewed and per EMR flowsheets.        Anesthesia Post Evaluation    Patient location during evaluation: bedside  Patient participation: complete - patient participated  Level of consciousness: awake and alert  Pain score: 0  Airway patency: patent  Nausea & Vomiting: no nausea and no vomiting  Complications: no  Cardiovascular status: blood pressure returned to baseline  Respiratory status: acceptable  Hydration status: euvolemic

## 2021-03-04 NOTE — PROGRESS NOTES
Infectious Disease Progress Note  3/4/2021   Patient Name: Bruna Hart : 1950   Impression  · Sepsis Secondary to Acute on Chronic Hypoxic and Hypercapnic Respiratory Failure, Acute Exacerbation COPD:   § Afebrile  § Leukocytosis max 32.0 on 3/2  § 3/2-Respiratory culture pending  § 3/2-RDP, Legionella and Strep Pna negative  § Pct 0.340, 0.238  § -CTA Pulmonary W Contrast: No PE, Multiple nodules within both lungs with a mixed response to therapy. § Dr. Wiliam Pavon, pulmonology, onboard, rec Solu-Medrol 40 mg q6h, duonebs, weaning oxygen keep sats >90%     · Right Lung SCLC Stage III:   § Dr. Babatunde Gupta Next chemo of cisplatin/etoposide due 3/3, deferred until stabilized  § CT scan showed some response to treatment     ? Tobacco Abuse     ? HTN     ? Dysphagia with Weight Loss:  § Dr. Hunter Stands 3/3-S/P per Dr. Garcia Ashlyn: EGD: Imp of severe suspicious for medication esophagitis (likely potassium capsule or vibramycin)    § Plan: await biopsies, DC K capsule and vibramycin or change to IV or liquid prep  ?    · Multi-morbidity: per PMHx:  Arthritis, COPD, HLD, HTN, home oxygen 4 L/nc , appey, hyster, right joint replacement, tobacco abuse     Plan:  · Continue IV doxycycline and cefepime  · Check Pct and CRP, ordered  ? Await respiratory culture  ? Check fungitel and aspergillus, orders placed  ? Check D-dimer    Ongoing Antimicrobial Therapy  Cefepime 3/3-  Doxycycline 3/1-? Completed Antimicrobial Therapy  Ceftriaxone 3/2-3? History:? Interval history noted. Chief complaint: Sepsis secondry to acute on chronic hypoxic and hypercapnic respiratory failure, acute exacerbation of COPD. Denies n/v/d/f or untoward effects of antibiotics. Sitting on bedside, weak, states is feeling weak and tired. Daughter at side.      Physical Exam:  Vital Signs: /72   Pulse 100   Temp 98.7 °F (37.1 °C) (Oral)   Resp 15   Ht 5' (1.524 m)   Wt 125 lb 3.2 oz (56.8 kg)   SpO2 97% BMI 24.45 kg/m²     Gen: alert and oriented X3, no distress  Skin: no stigmata of endocarditis  Wounds: C/D/I  HEMT: AT/NC Oropharynx pink, moist, and without lesions or exudates; dentition in good state of repair  Eyes: PERRLA, EOMI, conjunctiva pink, sclera anicteric. Neck: Supple. Trachea midline. No LAD. Chest: no distress and CTA. Good air movement. Oxygen per vapotherm. Heart: RRR to ST and no MRG. Abd: soft, non-distended, no tenderness, no hepatomegaly. Normoactive bowel sounds. Ext: no clubbing, cyanosis, or edema  Neuro: Mental status intact.  CN 2-12 intact and no focal sensory or motor deficits     Radiologic / Imaging / TESTING  2/28/21 XR Chest Portable:  Impression   Minimal blunting of the left costophrenic angle, which may reflect a very   small joint effusion.  Otherwise no acute abnormality detected      2/28/21 CTA Pulmonary W Contrast:  Impression   No evidence of pulmonary embolism or aortic dissection.  Overall, no acute   abnormality identified.       Multiple nodules within both lungs, with a mixed response to therapy.  The   majority of the nodules are similar in size.  However, at least 1 nodule   within the right lung apex is decreased in size.      3/3/21 XR Chest (2VW):  Impression   No acute cardiopulmonary disease.  The lung nodules noted on CT from 2 days   earlier are not well perceived on the conventional radiograph.      3/3/21 Fluoroscopy modified barium swallow with video:  Impression   Swallowing mechanism grossly within normal limits without evidence of   aspiration.       Please see separate speech pathology report for full discussion of findings   and recommendations.             Labs:    Recent Results (from the past 24 hour(s))   C-Reactive Protein    Collection Time: 03/04/21  5:26 AM   Result Value Ref Range    CRP, High Sensitivity 2.6 mg/L   Procalcitonin    Collection Time: 03/04/21  5:26 AM   Result Value Ref Range    Procalcitonin 0.251    Magnesium Collection Time: 03/04/21  5:26 AM   Result Value Ref Range    Magnesium 3.1 (H) 1.8 - 2.4 mg/dl     CULTURE results: Invalid input(s): BLOOD CULTURE,  URINE CULTURE, SURGICAL CULTURE    Diagnosis:  Patient Active Problem List   Diagnosis    Hyperlipidemia    [facility]) [facility]    Leg pain    Hypertension    Shortness of breath    Tobacco abuse    Abdominal aortic aneurysm (AAA) without rupture (HCC)    Degeneration of lumbar or lumbosacral intervertebral disc    Osteopenia    Anxiety    Acute on chronic respiratory failure with hypoxia (HCC)    Lung mass    COPD exacerbation (HCC)    Acute exacerbation of chronic obstructive pulmonary disease (COPD) ([facility]    [facility]    Acute on chronic respiratory failure with hypoxia and hypercapnia (HCC)       Active Problems  Principal Problem:    Acute on chronic respiratory failure with hypoxia and hypercapnia (HCC)  Active Problems:    COPD (chronic obstructive pulmonary disease) (HCC)    Shortness of breath    Small cell lung cancer (HCC)  Resolved Problems:    * No resolved hospital problems. *    Electronically signed by: Electronically signed by HELIO Goss CNP on 3/4/2021 at 11:38 AM

## 2021-03-04 NOTE — PROGRESS NOTES
She is having her breakfast.  I recommend to eat slowly and chew properly. EGD on March 2021 showed  IMPRESSION :   1) segment of severe exudative esophagitis and mild narrowing between 30-35 cm from the incisors with normal mucosa above and below-SUSPICIOUS FOR MEDICATION ESOPHAGITIS- (LIKELY POTASSIUM CAPSULE OR VIBRAMYCIN)  2) above segment biopsied, brushed for cytology and dilated 12-15 mm as described  3) otherwise normal     PLAN :   1) Await biopsies  2) D/C K+ capsule and Vibramycin or change to IV or liquid prep    I will delay the chemo until she is discharged home. To continue with the present care. I strongly advised her to follow-up with me at the cancer center upon discharge.

## 2021-03-04 NOTE — PROGRESS NOTES
Occupational [address] [place] [address] ACUTE CARE OCCUPATIONAL THERAPY EVALUATION  Simona Oreilly, 1950, 3106/3106-A, 3/4/2021    History  [place]:  The primary encounter diagnosis was Shortness of breath. Diagnoses of Hypoxia, Small cell lung cancer ([address] Leukocytosis, unspecified type, and Hyponatremia were also pertinent to this visit. Patient  has a past medical history of Arthritis, COPD (chronic obstructive pulmonary disease) ([address] Full dentures, H/O cardiovascular stress test, H/O cardiovascular stress test, H/O Doppler ultrasound, H/O Doppler ultrasound, History of 24 hour EKG monitoring, History of nuclear stress test, Hx of chest x-ray, Hx of Doppler echocardiogram, Hx of echocardiogram, Hx of echocardiogram, Hx of pelvic x-ray, Hx of x-ray of hip, Hyperlipidemia, Hypertension, Leg pain, Lung nodules, On home oxygen therapy, Small cell lung cancer ([address] and Wears glasses. Patient  has a past surgical history that includes cyst removal (1970's); Tubal ligation (1970's); laparoscopic appendectomy (N/A, 5/18/2019); Mediastinoscopy (N/A, 12/10/2020); back surgery; Colonoscopy; Hysterectomy; eye surgery (2010?); joint replacement (Right, 2014? ?); Foot surgery (Right, 1970's); and bronchoscopy (N/A, 1/5/2021). Subjective:  Patient states:  \"I do everything for myself at home\". Pain:  Denies. Communication with other providers:  Handoff to RN, co-eval with PT. Restrictions: Pt on humid oxygen, General Precautions, Fall Risk    Home Setup/Prior level of function  Social/Functional History  Lives With: Family(granddaughter and grandchildren, daughter will be able to stay with her for a week.  Family can provide 24/7 supervision/assist)  Type of Home: House  Home Layout: Two level, Able to Live on Main level with bedroom/bathroom  Home Access: Level entry  Bathroom Shower/Tub: Tub/Shower unit, Shower chair with back  [employer]: Bedside commode  Bathroom Equipment: Grab bars in shower  Bathroom Accessibility: Accessible  Home Equipment: Cane(PRN)  ADL Assistance: Independent  Homemaking Assistance: Independent  Homemaking Responsibilities: Yes  Ambulation Assistance: Independent  Transfer Assistance: Independent  Active : Yes  Mode of Transportation: Car  Additional Comments: Pt's daughter reports no recent falls    Examination of body systems (includes body structures/functions, activity/participation limitations):  · Observation:  Supine in bed upon arrival, daughter in room, pt sleepy possible due to recent ativan  · [facility] [place]  · Hearing:  [facility] [place]  · Cardiopulmonary: On humid high flow oxygen      Body Systems and functions:  · ROM R/L:  WFL. · Strength R/L:  5/5,   · Sensation: Denies numbness  · Tone: Normal  · Coordination: WFL  · Perception: WNL    Activities of Daily Living (ADLs):  · Feeding: Ziggy  · Grooming: SBA (washing face w/ warm washcloth)  · UB bathing: SBA  · LB bathing: SBA  · UB dressing: SBA  · LB dressing: SBA (donning socks sitting EOB)  · Toileting: SBA    Cognitive and Psychosocial Functioning:  · Overall cognitive status: WFL  · Affect: Drowsy       Mobility:  · Supine to sit:  SBA  · Transfers: SBA up to RW  · Sitting balance:  SBA. · Standing balance:  SBA w/ RW  · Functional Mobility: SBA w/ RW ~5 feet (unable to go farther due to oxygen line).   · Toilet/Shower Transfers: DNT             AM-PAC Daily Activity Inpatient   How much help for putting on and taking off regular lower body clothing?: A Little  How much help for Bathing?: A Little  How much help for Toileting?: A Little  How much help for putting on and taking off regular upper body clothing?: A Little  How much help for taking care of personal grooming?: A Little  How much help for eating meals?: None  AM-PAC Inpatient Daily Activity Raw Score: 19  AM-PAC Inpatient ADL T-Scale Score : 40.22  ADL Inpatient CMS 0-100% Score: 42.8  ADL Inpatient CMS G-Code Modifier : CK    Treatment:  Self activity tolerance    Recommendations for NURSING activity: Up to chair for all 3 meals and up to [facility] for all toileting needs    Time:   Time in: 1319  Time out: 1340  Timed treatment minutes: 11 minutes  Total time: 21 minutes    Electronically signed by:    Nathan ASHER/EDWIN [id number]  3:04 FT,4/7/6812

## 2021-03-04 NOTE — PROGRESS NOTES
Hospitalist Progress Note      Name:  Christie Harvey /Age/Sex: 1950  (65 y.o. female)   MRN & CSN:  [id number] & [id number] Admission Date/Time: 2021  6:04 PM   [facility]  [facility]A PCP: Kodi Guerrero, [address] Day: 5    History of Present Illness:     Chief Complaint: Acute on chronic respiratory failure with hypoxia and hypercapnia ([address]  Christie Harvey is a 65 y.o.  female  who presents with shortness of breath     Patient seen and examined at bedside. She says has been coughing up stuff. Says breathing has improved. Ten point ROS reviewed negative, unless as noted above    Objective:        Intake/Output Summary (Last 24 hours) at 3/4/2021 1400  Last data filed at 3/3/2021 2238  Gross per 24 hour   Intake    Output 250 ml   Net -250 ml      Vitals:   Vitals:    21 1237   BP: 121/60   Pulse: 104   Resp: 19   Temp: 98.2 °F (36.8 °C)   SpO2: 93%     Physical Exam:   General Appearance: alert and oriented to person, place and time, in no acute distress  Cardiovascular: normal rate, regular rhythm, normal S1 and S2  Pulmonary/Chest: Decreased breath sounds bilaterally  Abdomen: soft, non-tender, non-distended, normal bowel sounds, no masses   Extremities: no cyanosis, clubbing or edema, pulse   Skin: warm and dry, no rash or erythema  Head: normocephalic and atraumatic  Eyes: pupils equal, round, and reactive to light  Neck: supple and non-tender without mass, no thyromegaly   Musculoskeletal: normal range of motion, no joint swelling, deformity or tenderness  Neurological: alert, oriented, normal speech, no focal findings or movement disorder noted    Medications:   Medications:    theophylline  200 mg Oral BID    pantoprazole  40 mg Oral QAM AC    cefepime  2,000 mg Intravenous Q12H    sucralfate  1 g Oral 4 times per day    doxycycline (VIBRAMYCIN) IV  100 mg Intravenous Q12H    ipratropium-albuterol  1 ampule Inhalation Q4H WA    aspirin  81 mg Oral Daily  busPIRone  10 mg Oral BID    calcium elemental  500 mg Oral Daily    vitamin D  1,000 Units Oral Daily    dilTIAZem  120 mg Oral Daily    fluticasone  2 spray Each Nostril BID    sertraline  50 mg Oral Daily    atorvastatin  10 mg Oral Daily    sodium chloride flush  10 mL Intravenous 2 times per day    enoxaparin  40 mg Subcutaneous Daily    predniSONE  40 mg Oral Daily    cetirizine  10 mg Oral Daily    acetylcysteine  600 mg Inhalation BID      Infusions:   PRN Meds:     LORazepam, 1 mg, Q6H PRN      docusate sodium, 100 mg, Daily PRN      magnesium sulfate, 1,000 mg, PRN      albuterol, 2.5 mg, Q4H PRN      HYDROcodone-acetaminophen, 1 tablet, Q4H PRN      sodium chloride flush, 10 mL, PRN      promethazine, 12.5 mg, Q6H PRN    Or      ondansetron, 4 mg, Q6H PRN      polyethylene glycol, 17 g, Daily PRN      acetaminophen, 650 mg, Q6H PRN    Or      acetaminophen, 650 mg, Q6H PRN      benzonatate, 100 mg, TID PRN            Pertinent New Labs & Imaging Studies     CBC with Differential:    Lab Results   Component Value Date    WBC 32.0 03/03/2021    RBC 3.03 03/03/2021    HGB 8.4 03/03/2021    HCT 28.3 03/03/2021     03/03/2021    MCV 93.4 03/03/2021    MCH 27.7 03/03/2021    MCHC 29.7 03/03/2021    RDW 17.3 03/03/2021    NRBC 2 02/10/2021    SEGSPCT 90.4 03/03/2021    BANDSPCT 2 02/28/2021    LYMPHOPCT 1.9 03/03/2021    MONOPCT 2.3 03/03/2021    MYELOPCT 1 02/10/2021    BASOPCT 0.2 03/03/2021    MONOSABS 0.7 03/03/2021    LYMPHSABS 0.6 03/03/2021    EOSABS 0.0 03/03/2021    BASOSABS 0.1 03/03/2021    DIFFTYPE AUTOMATED DIFFERENTIAL 03/03/2021     CMP:    Lab Results   Component Value Date     03/03/2021    K 5.4 03/03/2021    CL 90 03/03/2021    CO2 44 03/03/2021    BUN 25 03/03/2021    CREATININE 0.8 03/03/2021    GFRAA >60 03/03/2021    AGRATIO 1.6 08/28/2020    LABGLOM >60 03/03/2021    GLUCOSE 110 03/03/2021    PROT 5.3 03/03/2021    PROT 6.5 03/06/2015    LABALBU 3.7 03/03/2021    CALCIUM 8.9 03/03/2021    BILITOT 0.2 03/03/2021    ALKPHOS 85 03/03/2021    AST 15 03/03/2021    ALT 10 03/03/2021     Xr Chest Portable    Result Date: 2/28/2021  EXAMINATION: ONE XRAY VIEW OF THE CHEST 2/28/2021 3:27 pm COMPARISON: 01/11/2021 HISTORY: ORDERING SYSTEM PROVIDED HISTORY: sob TECHNOLOGIST PROVIDED HISTORY: Reason for exam:->sob FINDINGS: Nodular opacities are again identified within both lungs, presumably reflecting metastatic disease in this patient with a known history of small cell lung cancer. No acute focal infiltrate is identified. There is mild blunting of the left costophrenic angle. Cardial pericardial silhouette is unremarkable. No pneumothorax. No free air. No acute bony abnormality. Minimal blunting of the left costophrenic angle, which may reflect a very small joint effusion. Otherwise no acute abnormality detected. Cta Pulmonary W Contrast    Result Date: 2/28/2021  EXAMINATION: CTA OF THE CHEST 2/28/2021 5:03 pm TECHNIQUE: CTA of the chest was performed after the administration of intravenous contrast.  Multiplanar reformatted images are provided for review. MIP images are provided for review. Dose modulation, iterative reconstruction, and/or weight based adjustment of the mA/kV was utilized to reduce the radiation dose to as low as reasonably achievable. COMPARISON: Chest CT, 12/07/2020 CT PA, 09/18/2020 HISTORY: ORDERING SYSTEM PROVIDED HISTORY: sob, lung cancer TECHNOLOGIST PROVIDED HISTORY: Reason for exam:->sob, lung cancer Decision Support Exception->Emergency Medical Condition (MA) Reason for Exam: sob, lung cancer Acuity: Acute Type of Exam: Initial Relevant Medical/Surgical History: lung cancer FINDINGS: Pulmonary Arteries: The pulmonary arteries are adequately opacified. No filling defects are identified within the pulmonary arteries to suggest pulmonary embolism. Mediastinum: Thyroid is stable.   No mediastinal or hilar lymphadenopathy is identified. Esophagus is unremarkable. Cardiac chambers unremarkable. No pericardial effusion. Thoracic aorta normal in caliber without evidence of dissection. Lungs/pleura: There is a lobulated mass in the right lower lobe, stable when compared to the previous exam, measuring 1.8 x 1.6 cm (series 3, image 74). Nodule within the right lung apex is decreased in size, measuring 0.9 x 0.8 cm (series 3, image 24). Multiple additional smaller nodules are detected within the lungs, and these appear similar when compared to the previous exam. There is a background of emphysema. No acute focal infiltrate is identified. No pneumothorax. No pleural effusion. Upper Abdomen: Unremarkable. Soft Tissues/Bones: No osteolytic or osteoblastic bone lesions are identified. No acute bony abnormalities are detected. No evidence of pulmonary embolism or aortic dissection. Overall, no acute abnormality identified. Multiple nodules within both lungs, with a mixed response to therapy. The majority of the nodules are similar in size. However, at least 1 nodule within the right lung apex is decreased in size.        Assessment and Plan:   Satnam Saldivar is a 65 y.o.  female  who presents with Acute on chronic respiratory failure with hypoxia and hypercapnia (HCC)    Acute on chronic hypoxemic and hypercapnic respiratory failure  Acute exacerbation of COPD  Possible pneumonia with sepsis  Currently she is on 10 L of nasal cannula--Improved from yesterday  CTA chest ruled out with decreased size of nodule  Legionella and strep antigen negative  Respiratory panel with COVID-19 is negative  Continue breathing treatments  Continue steroids  Continue theophylline  Continue Mucomyst  Elevated white count  Continue empiric ceftriaxone and doxycycline  Pulmonology recommendations appreciated  ID following, appreciated recommendations    Small cell cancer of right lung, stage IIIb  CT scan showed some response to treatment  Heme-onc recommendations appreciated-s/p 2 cycles of cisplatin/etoposide    Dysphagia  Severe Exudative esophagitis  GI recommendations appreciated-S/p EGD on 3/3/21 with exudative esophagitis likely pill induced with potassium or doxycycline.   To use liquid potassium for replacements  Changed oral doxy to IV  SLP recommendations appreciated, with dysphagia III phase diet recommendations    Hyponatremia-improved  Likely from poor oral intake, improved with fluids    Anemia of chronic disorders  Monitor CBC     Depression/anxiety  Continue BuSpar and Zoloft  Continue Vistaril    Hypertension  Continue Cardizem    Hyperlipidemia-continue Lipitor  Diet Dietary Nutrition Supplements: Standard High Calorie Oral Supplement  DIET GENERAL; Dysphagia Minced and Moist   DVT Prophylaxis [x] Lovenox, []  Heparin, [] SCDs, [] Ambulation   GI Prophylaxis [] PPI,  [] H2 Blocker,  [] Carafate,  [x] Diet/Tube Feeds   Code Status Full Code   Disposition Patient requires continued admission due to acute respiratory failure   MDM [] Low, [x] Moderate,[]  High     Electronically signed by Marcos Murillo MD on 3/4/2021 at 2:00 PM

## 2021-03-04 NOTE — PROGRESS NOTES
Pulmonary and Critical Care  Progress Note    Subjective: The patient is unchanged. Shortness of breath unchanged. Chest pain none  Addressing respiratory complaints Patient is negative for  hemoptysis and cyanosis  CONSTITUTIONAL:  negative for fevers and chills      Past Medical History:     has a past medical history of Arthritis, COPD (chronic obstructive pulmonary disease) ([facility] [address] Full dentures, H/O cardiovascular stress test, H/O cardiovascular stress test, H/O Doppler ultrasound, H/O Doppler ultrasound, History of 24 hour EKG monitoring, History of nuclear stress test, Hx of chest x-ray, Hx of Doppler echocardiogram, Hx of echocardiogram, Hx of echocardiogram, Hx of pelvic x-ray, Hx of x-ray of hip, Hyperlipidemia, Hypertension, Leg pain, Lung nodules, On home oxygen therapy, Small cell lung cancer ([facility] [address] and Wears glasses. has a past surgical history that includes cyst removal (1970's); Tubal ligation (1970's); laparoscopic appendectomy (N/A, 5/18/2019); Mediastinoscopy (N/A, 12/10/2020); back surgery; Colonoscopy; Hysterectomy; eye surgery (2010?); joint replacement (Right, 2014? ?); Foot surgery (Right, 1970's); and bronchoscopy (N/A, 1/5/2021). reports that she quit smoking about 3 months ago. Her smoking use included cigarettes. She started smoking about 47 years ago. She has a 70.50 pack-year smoking history. She has never used smokeless tobacco. She reports current alcohol use. She reports that she does not use drugs. Family history:  family history includes COPD in her sister; Cancer in her father and sister; Coronary Art Dis in her mother; Dementia in her mother; Diabetes in her sister; Heart Attack in her sister; Heart Attack (age of onset: 61) in her father and mother; High Cholesterol in her mother; Hypertension in her mother, sister, sister, and sister; Irritable Bowel Syndrome in her sister; Pacemaker in her sister; Stroke in her father, mother, and sister.     Allergies   Allergen Reactions    Formaldehyde     Gabapentin Other (See Comments)    Norflex Tablets [Orphenadrine]     Cranberry Rash     Social History:    Reviewed; no changes    Objective:   PHYSICAL EXAM:        VITALS:  /72   Pulse 100   Temp 98.7 °F (37.1 °C) (Oral)   Resp 15   Ht 5' (1.524 m)   Wt 125 lb 3.2 oz (56.8 kg)   SpO2 97%   BMI 24.45 kg/m²     24HR INTAKE/OUTPUT:      Intake/Output Summary (Last 24 hours) at 3/4/2021 1155  Last data filed at 3/3/2021 2238  Gross per 24 hour   Intake    Output 250 ml   Net -250 ml       CONSTITUTIONAL:  Awake. LUNGS:  decreased breath sounds. CARDIOVASCULAR:  normal S1 and S2 and negative JVD  ABD:Abdomen soft, non-tender. BS normal. No masses,  No organomegaly  NEURO:Alert. DATA:    CBC:  Recent Labs     03/03/21  0353   WBC 32.0*   RBC 3.03*   HGB 8.4*   HCT 28.3*      MCV 93.4   MCH 27.7   MCHC 29.7*   RDW 17.3*   SEGSPCT 90.4*      BMP:  Recent Labs     03/03/21  0353      K 5.4*   CL 90*   CO2 44*   BUN 25*   CREATININE 0.8   CALCIUM 8.9   GLUCOSE 110*      ABG:  No results for input(s): PH, PO2ART, FLF6AMJ, HCO3, BEART, O2SAT in the last 72 hours. Lab Results   Component Value Date    PROBNP 1,244 (H) 03/03/2021    PROBNP 310.0 (H) 02/28/2021    PROBNP 126.5 01/11/2021    THEOPH 2.6 (L) 03/03/2021     No results found for: [facility]    Radiology Review:  Pertinent images / reports were reviewed as a part of this visit.     Assessment:     Patient Active Problem List   Diagnosis    Hyperlipidemia    COPD (chronic obstructive pulmonary disease) ([facility] [address]    Leg pain    Hypertension    Shortness of breath    Tobacco abuse    Abdominal aortic aneurysm (AAA) without rupture (HCC)    Degeneration of lumbar or lumbosacral intervertebral disc    Osteopenia    Anxiety    Acute on chronic respiratory failure with hypoxia (HCC)    Lung mass    COPD exacerbation (HCC)    Acute exacerbation of chronic obstructive pulmonary disease (COPD) ([facility] [address]   

## 2021-03-04 NOTE — PROGRESS NOTES
Physical Therapy  [facility] ACUTE CARE PHYSICAL THERAPY EVALUATION  Satnam Saldivar, 1950, 3106/3106-A, 3/4/2021    History  [place]:  The primary encounter diagnosis was Shortness of breath. Diagnoses of Hypoxia, Small cell lung cancer ([address] Leukocytosis, unspecified type, and Hyponatremia were also pertinent to this visit. Patient  has a past medical history of Arthritis, COPD (chronic obstructive pulmonary disease) ([address] Full dentures, H/O cardiovascular stress test, H/O cardiovascular stress test, H/O Doppler ultrasound, H/O Doppler ultrasound, History of 24 hour EKG monitoring, History of nuclear stress test, Hx of chest x-ray, Hx of Doppler echocardiogram, Hx of echocardiogram, Hx of echocardiogram, Hx of pelvic x-ray, Hx of x-ray of hip, Hyperlipidemia, Hypertension, Leg pain, Lung nodules, On home oxygen therapy, Small cell lung cancer ([address] and Wears glasses. Patient  has a past surgical history that includes cyst removal (1970's); Tubal ligation (1970's); laparoscopic appendectomy (N/A, 5/18/2019); Mediastinoscopy (N/A, 12/10/2020); back surgery; Colonoscopy; Hysterectomy; eye surgery (2010?); joint replacement (Right, 2014? ?); Foot surgery (Right, 1970's); and bronchoscopy (N/A, 1/5/2021). Subjective:  Patient states:  \"I'm listening, just tired\" (kept eyes closed)   Pain:  Denies   Communication with other providers:  co-eval with Sue MENDOZA   Restrictions: general precautions, falls, vapotherm, chair alarm, portable tele, pulse ox, BP cuff     Home Setup/Prior level of function  Social/Functional History  Lives With: Family(granddaughter and her children live with her, daughter will be able to stay with her for a week.  Family can provide 24/7 supervision/assist)  Type of Home: House  Home Layout: Two level, Able to Live on Main level with bedroom/bathroom  Home Access: Level entry  Bathroom Shower/Tub: Tub/Shower unit, Shower chair with back  [employer]: Bedside commode with 24/7 supervision[facility]ist [facility] family [facility] once medically stable. At baseline she is indep with gross mobility and ADLs. She performed well this date but overall limited with activity tolerance and lines. She would benefit from continued therapy to address her current deficits, dec potential fall risk, and restore function. Complexity: Moderate  Prognosis: Good, no significant barriers to participation at this time.    Plan Times per week: 3+/week, 1 week  Discharge Recommendations: 24 hour supervision or assist, Home with Home health PT  Equipment: RW     Goals:  Short term goals  Time Frame for Short term goals: 1 week  Short term goal 1: Pt will perform bed mobility Ziggy  Short term goal 2: Pt will transfer between surfaces Ziggy  Short term goal 3: Pt will ambulate 50ft with RW SBA       Treatment plan:  Bed mobility, transfers, balance, gait, TA, TX     Recommendations for NURSING mobility: stand step pivot with RW to [facility]     Time:   Time in: 1319  Time out: 1339  Timed treatment minutes: 10  Total time: 20    Electronically signed by:    Chelsi Wang, [id number], 4:03 PM

## 2021-03-05 NOTE — PROGRESS NOTES
Hospitalist Progress Note      Name:  Tristian Helton /Age/Sex: 1950  (65 y.o. female)   MRN & CSN:  [id number] & [id number] Admission Date/Time: 2021  6:04 PM   Location:  [facility]/3106-A PCP: Brie Alvarado, [address] Day: 6    History of Present Illness:     Chief Complaint: Acute on chronic respiratory failure with hypoxia and hypercapnia ([address]  Tristian Helton is a 65 y.o.  female  who presents with shortness of breath     Patient seen and examined at bedside. She says not feeling well as having SOB when trying to eat. Denies any chest pain. Ten point ROS reviewed negative, unless as noted above    Objective:        Intake/Output Summary (Last 24 hours) at 3/5/2021 1849  Last data filed at 3/4/2021 2207  Gross per 24 hour   Intake 100 ml   Output    Net 100 ml      Vitals:   Vitals:    21 0905   BP: 133/77   Pulse: 111   Resp: 18   Temp: 97.8 °F (36.6 °C)   SpO2:      Physical Exam:   General Appearance: alert and oriented to person, place and time, in no acute distress  Cardiovascular: normal rate, regular rhythm, normal S1 and S2  Pulmonary/Chest: Decreased breath sounds bilaterally  Abdomen: soft, non-tender, non-distended, normal bowel sounds, no masses   Extremities: no cyanosis, clubbing or edema, pulse   Skin: warm and dry, no rash or erythema  Head: normocephalic and atraumatic  Eyes: pupils equal, round, and reactive to light  Neck: supple and non-tender without mass, no thyromegaly   Musculoskeletal: normal range of motion, no joint swelling, deformity or tenderness  Neurological: alert, oriented, normal speech, no focal findings or movement disorder noted    Medications:   Medications:    fluconazole  200 mg Oral Daily    nystatin  5 mL Oral 4x Daily    theophylline  200 mg Oral BID    pantoprazole  40 mg Oral QAM AC    cefepime  2,000 mg Intravenous Q12H    sucralfate  1 g Oral 4 times per day    doxycycline (VIBRAMYCIN) IV  100 mg Intravenous Q12H    ipratropium-albuterol  1 ampule Inhalation Q4H WA    aspirin  81 mg Oral Daily    busPIRone  10 mg Oral BID    calcium elemental  500 mg Oral Daily    vitamin D  1,000 Units Oral Daily    dilTIAZem  120 mg Oral Daily    sertraline  50 mg Oral Daily    atorvastatin  10 mg Oral Daily    sodium chloride flush  10 mL Intravenous 2 times per day    enoxaparin  40 mg Subcutaneous Daily    predniSONE  40 mg Oral Daily    cetirizine  10 mg Oral Daily    acetylcysteine  600 mg Inhalation BID      Infusions:   PRN Meds:     LORazepam, 1 mg, Q6H PRN      docusate sodium, 100 mg, Daily PRN      magnesium sulfate, 1,000 mg, PRN      albuterol, 2.5 mg, Q4H PRN      HYDROcodone-acetaminophen, 1 tablet, Q4H PRN      sodium chloride flush, 10 mL, PRN      promethazine, 12.5 mg, Q6H PRN    Or      ondansetron, 4 mg, Q6H PRN      polyethylene glycol, 17 g, Daily PRN      acetaminophen, 650 mg, Q6H PRN    Or      acetaminophen, 650 mg, Q6H PRN      benzonatate, 100 mg, TID PRN            Pertinent New Labs & Imaging Studies     CBC with Differential:    Lab Results   Component Value Date    WBC 32.0 03/03/2021    RBC 3.03 03/03/2021    HGB 8.4 03/03/2021    HCT 28.3 03/03/2021     03/03/2021    MCV 93.4 03/03/2021    MCH 27.7 03/03/2021    MCHC 29.7 03/03/2021    RDW 17.3 03/03/2021    NRBC 2 02/10/2021    SEGSPCT 90.4 03/03/2021    BANDSPCT 2 02/28/2021    LYMPHOPCT 1.9 03/03/2021    MONOPCT 2.3 03/03/2021    MYELOPCT 1 02/10/2021    BASOPCT 0.2 03/03/2021    MONOSABS 0.7 03/03/2021    LYMPHSABS 0.6 03/03/2021    EOSABS 0.0 03/03/2021    BASOSABS 0.1 03/03/2021    DIFFTYPE AUTOMATED DIFFERENTIAL 03/03/2021     CMP:    Lab Results   Component Value Date     03/03/2021    K 5.4 03/03/2021    CL 90 03/03/2021    CO2 44 03/03/2021    BUN 25 03/03/2021    CREATININE 0.8 03/03/2021    GFRAA >60 03/03/2021    AGRATIO 1.6 08/28/2020    LABGLOM >60 03/03/2021    GLUCOSE 110 03/03/2021    PROT 5.3 03/03/2021 PROT 6.5 03/06/2015    LABALBU 3.7 03/03/2021    CALCIUM 8.9 03/03/2021    BILITOT 0.2 03/03/2021    ALKPHOS 85 03/03/2021    AST 15 03/03/2021    ALT 10 03/03/2021     Xr Chest Portable    Result Date: 2/28/2021  EXAMINATION: ONE XRAY VIEW OF THE CHEST 2/28/2021 3:27 pm COMPARISON: 01/11/2021 HISTORY: ORDERING SYSTEM PROVIDED HISTORY: sob TECHNOLOGIST PROVIDED HISTORY: Reason for exam:->sob FINDINGS: Nodular opacities are again identified within both lungs, presumably reflecting metastatic disease in this patient with a known history of small cell lung cancer. No acute focal infiltrate is identified. There is mild blunting of the left costophrenic angle. Cardial pericardial silhouette is unremarkable. No pneumothorax. No free air. No acute bony abnormality. Minimal blunting of the left costophrenic angle, which may reflect a very small joint effusion. Otherwise no acute abnormality detected. Cta Pulmonary W Contrast    Result Date: 2/28/2021  EXAMINATION: CTA OF THE CHEST 2/28/2021 5:03 pm TECHNIQUE: CTA of the chest was performed after the administration of intravenous contrast.  Multiplanar reformatted images are provided for review. MIP images are provided for review. Dose modulation, iterative reconstruction, and/or weight based adjustment of the mA/kV was utilized to reduce the radiation dose to as low as reasonably achievable. COMPARISON: Chest CT, 12/07/2020 CT PA, 09/18/2020 HISTORY: ORDERING SYSTEM PROVIDED HISTORY: sob, lung cancer TECHNOLOGIST PROVIDED HISTORY: Reason for exam:->sob, lung cancer Decision Support Exception->Emergency Medical Condition (MA) Reason for Exam: sob, lung cancer Acuity: Acute Type of Exam: Initial Relevant Medical/Surgical History: lung cancer FINDINGS: Pulmonary Arteries: The pulmonary arteries are adequately opacified. No filling defects are identified within the pulmonary arteries to suggest pulmonary embolism. Mediastinum: Thyroid is stable.   No mediastinal or hilar lymphadenopathy is identified. Esophagus is unremarkable. Cardiac chambers unremarkable. No pericardial effusion. Thoracic aorta normal in caliber without evidence of dissection. Lungs/pleura: There is a lobulated mass in the right lower lobe, stable when compared to the previous exam, measuring 1.8 x 1.6 cm (series 3, image 74). Nodule within the right lung apex is decreased in size, measuring 0.9 x 0.8 cm (series 3, image 24). Multiple additional smaller nodules are detected within the lungs, and these appear similar when compared to the previous exam. There is a background of emphysema. No acute focal infiltrate is identified. No pneumothorax. No pleural effusion. Upper Abdomen: Unremarkable. Soft Tissues/Bones: No osteolytic or osteoblastic bone lesions are identified. No acute bony abnormalities are detected. No evidence of pulmonary embolism or aortic dissection. Overall, no acute abnormality identified. Multiple nodules within both lungs, with a mixed response to therapy. The majority of the nodules are similar in size. However, at least 1 nodule within the right lung apex is decreased in size.        Assessment and Plan:   Alaina Garcia is a 65 y.o.  female  who presents with Acute on chronic respiratory failure with hypoxia and hypercapnia (HCC)    Acute on chronic hypoxemic and hypercapnic respiratory failure  Acute exacerbation of COPD  Possible pneumonia with sepsis  Currently she is on 8 L O2 but fluctuating with the requirements  CTA chest ruled out with decreased size of nodule  Legionella and strep antigen negative  Respiratory panel with COVID-19 is negative  Continue breathing treatments  Continue steroids  Continue theophylline  Continue Mucomyst   Elevated white count  Continue empiric cefepime and doxycycline  Pulmonology recommendations appreciated  ID following, appreciated recommendations    Small cell cancer of right lung, stage IIIb  CT scan showed some response to treatment  Heme-onc recommendations appreciated-s/p 2 cycles of cisplatin/etoposide    Dysphagia  Severe Exudative esophagitis with candidal infection  GI recommendations appreciated-S/p EGD on 3/3/21 with exudative esophagitis likely pill induced with potassium or doxycycline.   Biopsy with benign ulceration with inflammatory changes with candida--added Diflucan and Nystatin swish and swallow  To use liquid potassium for replacements  Changed oral doxy to IV  SLP recommendations appreciated, with dysphagia III phase diet recommendations    Hyponatremia-improved  Likely from poor oral intake, improved with fluids    Anemia of chronic disorders  Monitor CBC     Depression/anxiety  Continue BuSpar and Zoloft  Continue Vistaril    Hypertension  Continue Cardizem    Hyperlipidemia-continue Lipitor  Diet Dietary Nutrition Supplements: Standard High Calorie Oral Supplement  DIET GENERAL; Dysphagia Minced and Moist   DVT Prophylaxis [x] Lovenox, []  Heparin, [] SCDs, [] Ambulation   GI Prophylaxis [x] PPI,  [] H2 Blocker,  [x] Carafate,  [x] Diet/Tube Feeds   Code Status Full Code   Disposition Patient requires continued admission due to acute respiratory failure   MDM [] Low, [x] Moderate,[]  High     Electronically signed by Nirmal Flores MD on 3/5/2021 at 6:49 PM

## 2021-03-05 NOTE — PROGRESS NOTES
Infectious Disease Progress Note  3/5/2021   Patient Name: Tristian Helton : 1950   Impression  · Sepsis Secondary to Acute on Chronic Hypoxic and Hypercapnic Respiratory Failure, Acute Exacerbation COPD:   § Afebrile  § Leukocytosis max 32.0 on 3/2  § 3/2-Respiratory culture pending  § 3/2-RDP, Legionella and Strep Pna negative  § -CTA Pulmonary W Contrast: No PE, Multiple nodules within both lungs with a mixed response to therapy. § Dr. Luz Chilel, pulmonology, onboard, rec Solu-Medrol 40 mg q6h, duonebs, weaning oxygen keep sats >90%     · Right Lung SCLC Stage III:   § Dr. Felipe Casas Next chemo of cisplatin/etoposide due 3/3, deferred until stabilized  § CT scan showed some response to treatment     ? Tobacco Abuse     ? HTN     ? Dysphagia with Weight Loss:  § Dr. Ethan Gonzalez 3/3-S/P per Dr. Moss Roberto: EGD: Imp of severe suspicious for medication esophagitis (likely potassium capsule or vibramycin)    § Plan: await biopsies, DC K capsule and vibramycin or change to IV or liquid prep  § 3/4-Barium Swallow:  No evidence of aspiration  ?    · Multi-morbidity: per PMHx:  Arthritis, COPD, HLD, HTN, home oxygen 4 L/nc , appey, hyster, right joint replacement, tobacco abuse     Plan:  · Continue IV doxycycline 100 mg q12h  · Continue IV cefepime 2 gm q12h  · Check Pct and CRP, Pct staying consistent at 0.291  ? Await respiratory culture 3/2  ? Await fungitel and aspergillus 3/3  ? D-dimer: 450 on 3/5  ? Reviewed barium swallow, grossly within normal limits wo evidence of aspiration  ? Await biopsies from Dr. Ajay Mejia from EGD 3/3  ? Patient is feeling better, abdomen soft, oxygen needs decreasing down to HF 8L/min. Ongoing Antimicrobial Therapy  Cefepime 3/3-  Doxycycline 3/1-? Completed Antimicrobial Therapy  Ceftriaxone 3/2-3? History:? Interval history noted.  Chief complaint: Sepsis secondry to acute on chronic hypoxic and hypercapnic respiratory failure, acute exacerbation of COPD. Denies n/v/d/f or untoward effects of antibiotics. Resting quietly. Physical Exam:  Vital Signs: /77   Pulse 111   Temp 97.8 °F (36.6 °C) (Oral)   Resp 18   Ht 5' (1.524 m)   Wt 125 lb 3.2 oz (56.8 kg)   SpO2 100%   BMI 24.45 kg/m²     Gen: alert and oriented X3, no distress  Skin: no stigmata of endocarditis  Wounds: C/D/I  HEMT: AT/NC Oropharynx pink, moist, and without lesions or exudates; dentition in good state of repair  Eyes: PERRLA, EOMI, conjunctiva pink, sclera anicteric. Neck: Supple. Trachea midline. No LAD. Chest: no distress and CTA. Good air movement. Oxygen per vapotherm. Heart: RRR to ST and no MRG. Abd: soft, non-distended, no tenderness, no hepatomegaly. Normoactive bowel sounds. Ext: no clubbing, cyanosis, or edema  Neuro: Mental status intact.  CN 2-12 intact and no focal sensory or motor deficits     Radiologic / Imaging / TESTING  2/28/21 XR Chest Portable:  Impression   Minimal blunting of the left costophrenic angle, which may reflect a very   small joint effusion.  Otherwise no acute abnormality detected      2/28/21 CTA Pulmonary W Contrast:  Impression   No evidence of pulmonary embolism or aortic dissection.  Overall, no acute   abnormality identified.       Multiple nodules within both lungs, with a mixed response to therapy.  The   majority of the nodules are similar in size.  However, at least 1 nodule   within the right lung apex is decreased in size.      3/3/21 XR Chest (2VW):  Impression   No acute cardiopulmonary disease.  The lung nodules noted on CT from 2 days   earlier are not well perceived on the conventional radiograph.      3/3/21 Fluoroscopy modified barium swallow with video:  Impression   Swallowing mechanism grossly within normal limits without evidence of   aspiration.       Please see separate speech pathology report for full discussion of findings   and recommendations.             Labs:    Recent Results (from the past 24 hour(s))   D-dimer, quantitative    Collection Time: 03/04/21  6:25 PM   Result Value Ref Range    D-Dimer, Quant 450 (H) <230 NG/mL(DDU)   C-Reactive Protein    Collection Time: 03/05/21  4:44 AM   Result Value Ref Range    CRP, High Sensitivity 5.2 mg/L   Procalcitonin    Collection Time: 03/05/21  4:44 AM   Result Value Ref Range    Procalcitonin 0.291    Theophylline    Collection Time: 03/05/21  4:44 AM   Result Value Ref Range    Theophylline Lvl 4.9 (L) 10 - 20 ug/mL    DOSE AMOUNT DOSE AMT. GIVEN - 200 mg     DOSE TIME DOSE TIME GIVEN - 900am      CULTURE results: Invalid input(s): BLOOD CULTURE,  URINE CULTURE, SURGICAL CULTURE    Diagnosis:  Patient Active Problem List   Diagnosis    Hyperlipidemia    COPD [facility]ronic obstructive [facility]) [facility] [address]    Leg pain    Hypertension    Shortness of breath    Tobacco abuse    Abdominal aortic aneurysm (AAA) without rupture (HCC)    Degeneration of lumbar or lumbosacral intervertebral disc    Osteopenia    Anxiety    Acute on chronic respiratory failure with hypoxia (HCC)    Lung mass    COPD exacerbation (HCC)    Acute exacerbation of chronic obstructive pulmonary disease (COPD) ([facility] [address]    Small cell lung cancer ([facility] [address]    Acute on chronic respiratory failure with hypoxia and hypercapnia (HCC)       Active Problems  Principal Problem:    Acute on chronic respiratory failure with hypoxia and hypercapnia (HCC)  Active Problems:    COPD (chronic obstructive pulmonary disease) (HCC)    Shortness of breath    Small cell lung cancer (HCC)  Resolved Problems:    * No resolved hospital problems. *    Electronically signed by: Electronically signed by HELIO Timmons CNP on 3/5/2021 at 1:13 PM

## 2021-03-05 NOTE — PROGRESS NOTES
Pulmonary and Critical Care  Progress Note    Subjective: The patient is sl better. Shortness of breath sl better. Chest pain none  Addressing respiratory complaints Patient is negative for  hemoptysis and cyanosis  CONSTITUTIONAL:  negative for fevers and chills      Past Medical History:     has a past medical history of Arthritis, COPD (chronic obstructive pulmonary disease) ([facility] [address] Full dentures, H/O cardiovascular stress test, H/O cardiovascular stress test, H/O Doppler ultrasound, H/O Doppler ultrasound, History of 24 hour EKG monitoring, History of nuclear stress test, Hx of chest x-ray, Hx of Doppler echocardiogram, Hx of echocardiogram, Hx of echocardiogram, Hx of pelvic x-ray, Hx of x-ray of hip, Hyperlipidemia, Hypertension, Leg pain, Lung nodules, On home oxygen therapy, [facility] ([facility] and Wears glasses. has a past surgical history that includes cyst removal (1970's); Tubal ligation (1970's); laparoscopic appendectomy (N/A, 5/18/2019); Mediastinoscopy (N/A, 12/10/2020); back surgery; Colonoscopy; Hysterectomy; eye surgery (2010?); joint replacement (Right, 2014? ?); Foot surgery (Right, 1970's); bronchoscopy (N/A, 1/5/2021); and Upper gastrointestinal endoscopy (N/A, 3/3/2021). reports that she quit smoking about 3 months ago. Her smoking use included cigarettes. She started smoking about 47 years ago. She has a 70.50 pack-year smoking history. She has never used smokeless tobacco. She reports current alcohol use. She reports that she does not use drugs. Family history:  family history includes COPD in her sister; Cancer in her father and sister; Coronary Art Dis in her mother; Dementia in her mother; Diabetes in her sister; Heart Attack in her sister;  Heart Attack (age of onset: 61) in her father and mother; High Cholesterol in her mother; Hypertension in her mother, sister, sister, and sister; Irritable Bowel Syndrome in her sister; Pacemaker in her sister; Stroke in her father, mother, and sister. Allergies   Allergen Reactions    Formaldehyde     Gabapentin Other (See Comments)    Norflex Tablets [Orphenadrine]     Cranberry Rash     Social History:    Reviewed; no changes    Objective:   PHYSICAL EXAM:        VITALS:  /77   Pulse 111   Temp 97.8 °F (36.6 °C) (Oral)   Resp 18   Ht 5' (1.524 m)   Wt 125 lb 3.2 oz (56.8 kg)   SpO2 100%   BMI 24.45 kg/m²     24HR INTAKE/OUTPUT:      Intake/Output Summary (Last 24 hours) at 3/5/2021 1216  Last data filed at 3/4/2021 2207  Gross per 24 hour   Intake 100 ml   Output    Net 100 ml       CONSTITUTIONAL:  Awake. LUNGS:  decreased breath sounds. CARDIOVASCULAR:  normal S1 and S2 and negative JVD  ABD:Abdomen soft, non-tender. BS normal. No masses,  No organomegaly  NEURO:Alert. DATA:    CBC:  Recent Labs     03/03/21  0353   WBC 32.0*   RBC 3.03*   HGB 8.4*   HCT 28.3*      MCV 93.4   MCH 27.7   MCHC 29.7*   RDW 17.3*   SEGSPCT 90.4*      BMP:  Recent Labs     03/03/21  0353      K 5.4*   CL 90*   CO2 44*   BUN 25*   CREATININE 0.8   CALCIUM 8.9   GLUCOSE 110*      ABG:  No results for input(s): PH, PO2ART, ESV9CCQ, HCO3, BEART, O2SAT in the last 72 hours. Lab Results   Component Value Date    PROBNP 1,244 (H) 03/03/2021    PROBNP 310.0 (H) 02/28/2021    PROBNP 126.5 01/11/2021    THEOPH 4.9 (L) 03/05/2021     No results found for: [facility]    Radiology Review:  Pertinent images / reports were reviewed as a part of this visit.     Assessment:     Patient Active Problem List   Diagnosis    Hyperlipidemia    COPD (chronic obstructive pulmonary disease) ([address]    Leg pain    Hypertension    Shortness of breath    Tobacco abuse    Abdominal aortic aneurysm (AAA) without rupture (HCC)    Degeneration of lumbar or lumbosacral intervertebral disc    Osteopenia    Anxiety    Acute on chronic respiratory failure with hypoxia (HCC)    Lung mass    COPD exacerbation (HCC)    Acute exacerbation of chronic obstructive pulmonary disease (COPD) (HCC)    Small cell lung cancer ([facility] [address] [facility]    Acute on chronic respiratory failure with hypoxia and hypercapnia ([facility] [address]       Plan:   1. Overall the patient has slightly improved. 2. [address] Activity.     Issa Owen MD  3/5/2021  12:16 PM

## 2021-03-05 NOTE — PROGRESS NOTES
Not much improvement yet in her swallowing  Biopsies pending  Abdomen soft, non-tender, non-distended    Hopefully improvement will come

## 2021-03-05 NOTE — PLAN OF CARE
Problem: Pain:  Goal: Pain level will decrease  Description: Pain level will decrease  3/5/2021 1035 by Kai Galvez  Outcome: Ongoing     Problem: Pain:  Goal: Control of acute pain  Description: Control of acute pain  3/5/2021 1035 by Kai Galvez  Outcome: Ongoing     Problem: Pain:  Goal: Control of chronic pain  Description: Control of chronic pain  3/5/2021 1035 by Kai Galvez  Outcome: Ongoing     Problem: Falls - Risk of:  Goal: Will remain free from falls  Description: Will remain free from falls  3/5/2021 1035 by Kai Galvez  Outcome: Ongoing     Problem: Falls - Risk of:  Goal: Absence of physical injury  Description: Absence of physical injury  3/5/2021 1035 by Kai Galvez  Outcome: Ongoing

## 2021-03-05 NOTE — CARE COORDINATION
Vapotherm has been d/c'd. Pt is currently on 8L high flow O2. PT/OT have recommended home with Tyrese  and 24/7 supervision. D/c plan is home with granddaughter and new [facility]. Referral was made. Notify CM if any new d/c needs arise.   TE

## 2021-03-06 NOTE — PROGRESS NOTES
pulmonary      SUBJECTIVE:  Feels sob     OBJECTIVE    VITALS:  BP (!) 110/57   Pulse 94   Temp 98.2 °F (36.8 °C) (Oral)   Resp 14   Ht 5' (1.524 m)   Wt 125 lb 6.4 oz (56.9 kg)   SpO2 99%   BMI 24.49 kg/m²   HEAD AND FACE EXAM:  No throat injection, no active exudate,no thrush  NECK EXAM;No JVD, no masses, symmetrical  CHEST EXAM; Expansion equal and symmetrical, no masses  LUNG EXAM; Good breath sounds bilaterally. There are expiratory wheezes both lungs, there are crackles at both lung bases  CARDIOVASCULAR EXAM: Positive S1 and S2, no S3 or S4, no clicks ,no murmurs  RIGHT AND LEFT LOWER EXTRIMITY EXAM: No edema, no swelling, no inflamation  CNS EXAM: Alert and oriented X3          LABS   Lab Results   Component Value Date    WBC 32.0 (HH) 03/03/2021    HGB 8.4 (L) 03/03/2021    HCT 28.3 (L) 03/03/2021    MCV 93.4 03/03/2021     03/03/2021     Lab Results   Component Value Date    CREATININE 0.8 03/03/2021    BUN 25 (H) 03/03/2021     03/03/2021    K 5.4 (H) 03/03/2021    CL 90 (L) 03/03/2021    CO2 44 (H) 03/03/2021     Lab Results   Component Value Date    INR 0.80 01/05/2021    PROTIME 9.6 (L) 01/05/2021          Lab Results   Component Value Date    PHOS 2.9 03/03/2021    PHOS 3.0 12/30/2020    PHOS 3.0 12/04/2020      No results for input(s): PH, PO2ART, KNC1CCD, HCO3, BEART, O2SAT in the last 72 hours. Wt Readings from Last 3 Encounters:   03/06/21 125 lb 6.4 oz (56.9 kg)   02/19/21 118 lb 3.2 oz (53.6 kg)   02/12/21 118 lb (53.5 kg)               ASSESMENT  Ac on ch resp failure  Ac copd  Pneumonia   small cell ca        PLAN  1. cpm  2. Cont o2  3.  Current pulse iox 98%    3/6/2021  Ramírez Bonds M.D.

## 2021-03-06 NOTE — PROGRESS NOTES
BILITOT 0.2 03/03/2021    ALKPHOS 85 03/03/2021    AST 15 03/03/2021    ALT 10 03/03/2021     Xr Chest Portable    Result Date: 2/28/2021  EXAMINATION: ONE XRAY VIEW OF THE CHEST 2/28/2021 3:27 pm COMPARISON: 01/11/2021 HISTORY: ORDERING SYSTEM PROVIDED HISTORY: sob TECHNOLOGIST PROVIDED HISTORY: Reason for exam:->sob FINDINGS: Nodular opacities are again identified within both lungs, presumably reflecting metastatic disease in this patient with a known history of small cell lung cancer. No acute focal infiltrate is identified. There is mild blunting of the left costophrenic angle. Cardial pericardial silhouette is unremarkable. No pneumothorax. No free air. No acute bony abnormality. Minimal blunting of the left costophrenic angle, which may reflect a very small joint effusion. Otherwise no acute abnormality detected. Cta Pulmonary W Contrast    Result Date: 2/28/2021  EXAMINATION: CTA OF THE CHEST 2/28/2021 5:03 pm TECHNIQUE: CTA of the chest was performed after the administration of intravenous contrast.  Multiplanar reformatted images are provided for review. MIP images are provided for review. Dose modulation, iterative reconstruction, and/or weight based adjustment of the mA/kV was utilized to reduce the radiation dose to as low as reasonably achievable. COMPARISON: Chest CT, 12/07/2020 CT PA, 09/18/2020 HISTORY: ORDERING SYSTEM PROVIDED HISTORY: sob, lung cancer TECHNOLOGIST PROVIDED HISTORY: Reason for exam:->sob, lung cancer Decision Support Exception->Emergency Medical Condition (MA) Reason for Exam: sob, lung cancer Acuity: Acute Type of Exam: Initial Relevant Medical/Surgical History: lung cancer FINDINGS: Pulmonary Arteries: The pulmonary arteries are adequately opacified. No filling defects are identified within the pulmonary arteries to suggest pulmonary embolism. Mediastinum: Thyroid is stable. No mediastinal or hilar lymphadenopathy is identified. Esophagus is unremarkable. Cardiac chambers unremarkable. No pericardial effusion. Thoracic aorta normal in caliber without evidence of dissection. Lungs/pleura: There is a lobulated mass in the right lower lobe, stable when compared to the previous exam, measuring 1.8 x 1.6 cm (series 3, image 74). Nodule within the right lung apex is decreased in size, measuring 0.9 x 0.8 cm (series 3, image 24). Multiple additional smaller nodules are detected within the lungs, and these appear similar when compared to the previous exam. There is a background of emphysema. No acute focal infiltrate is identified. No pneumothorax. No pleural effusion. Upper Abdomen: Unremarkable. Soft Tissues/Bones: No osteolytic or osteoblastic bone lesions are identified. No acute bony abnormalities are detected. No evidence of pulmonary embolism or aortic dissection. Overall, no acute abnormality identified. Multiple nodules within both lungs, with a mixed response to therapy. The majority of the nodules are similar in size. However, at least 1 nodule within the right lung apex is decreased in size.        Assessment and Plan:   Alaina Garcia is a 65 y.o.  female  who presents with Acute on chronic respiratory failure with hypoxia and hypercapnia (HCC)    Acute on chronic hypoxemic and hypercapnic respiratory failure  Acute exacerbation of COPD  Possible pneumonia with sepsis  Currently she is on 11 L O2 but fluctuating with the requirements  CTA chest ruled out with decreased size of nodule  Legionella and strep antigen negative  Respiratory panel with COVID-19 is negative  Leukocytosis improved  Continue breathing treatments  Continue steroids  Continue theophylline  Continue Mucomyst   Continue empiric cefepime and doxycycline  Pulmonology recommendations appreciated  ID following, appreciated recommendations    Small cell cancer of right lung, stage IIIb  CT scan showed some response to treatment  Heme-onc recommendations appreciated-s/p 2 cycles of cisplatin/etoposide    Dysphagia  Severe Exudative esophagitis with candidal infection  GI recommendations appreciated-S/p EGD on 3/3/21 with exudative esophagitis likely pill induced with potassium or doxycycline.   Biopsy with benign ulceration with inflammatory changes with candida--added Diflucan and Nystatin swish and swallow  To use liquid potassium for replacements  Changed oral doxy to IV  SLP recommendations appreciated, with dysphagia III phase diet recommendations    Hyponatremia-improved  Likely from poor oral intake, improved with fluids    Anemia of chronic disorders  Monitor CBC     Depression/anxiety  Continue BuSpar and Zoloft  Continue Vistaril    Hypertension  Continue Cardizem     Hyperlipidemia-continue Lipitor    Diet Dietary Nutrition Supplements: Standard High Calorie Oral Supplement  DIET GENERAL; Dysphagia Minced and Moist   DVT Prophylaxis [x] Lovenox, []  Heparin, [] SCDs, [] Ambulation   GI Prophylaxis [x] PPI,  [] H2 Blocker,  [x] Carafate,  [] Diet/Tube Feeds   Code Status Full Code   Disposition Patient requires continued admission due to acute respiratory failure   MDM [] Low, [x] Moderate,[]  High     Electronically signed by Sosa Armando MD on 3/6/2021 at 1:17 PM

## 2021-03-06 NOTE — PLAN OF CARE
Problem: Pain:  Goal: Pain level will decrease  Description: Pain level will decrease  3/5/2021 2311 by Joann Garcia  Outcome: Ongoing  3/5/2021 1035 by Zenia Herrera  Outcome: Ongoing  Goal: Control of acute pain  Description: Control of acute pain  3/5/2021 2311 by Joann Garcia  Outcome: Ongoing  3/5/2021 1035 by Zenia Herrera  Outcome: Ongoing  Goal: Control of chronic pain  Description: Control of chronic pain  3/5/2021 2311 by Joann Garcia  Outcome: Ongoing  3/5/2021 1035 by Zenia Herrera  Outcome: Ongoing     Problem: Falls - Risk of:  Goal: Will remain free from falls  Description: Will remain free from falls  3/5/2021 2311 by Joann Garcia  Outcome: Ongoing  3/5/2021 1035 by Zenia Herrera  Outcome: Ongoing  Goal: Absence of physical injury  Description: Absence of physical injury  3/5/2021 2311 by Joann Garcia  Outcome: Ongoing  3/5/2021 1035 by Zenia Herrera  Outcome: Ongoing

## 2021-03-07 NOTE — PROGRESS NOTES
Hospitalist Progress Note      Name:  Rekha Flores /Age/Sex: 1950  (65 y.o. female)   MRN & CSN:  [id number] & [id number] Admission Date/Time: 2021  6:04 PM   Location:  [facility] PCP: Kala Perrin, [address] Day: 8    History of Present Illness:     Chief Complaint: Acute on chronic respiratory failure with hypoxia and hypercapnia ([address]  Rekha Flores is a 65 y.o.  female  who presents with shortness of breath     Patient seen and examined at bedside. She says not feeling well as having SOB, when trying to eat. Denies any chest pain. Ten point ROS reviewed negative, unless as noted above    Objective:        Intake/Output Summary (Last 24 hours) at 3/7/2021 1231  Last data filed at 3/7/2021 1151  Gross per 24 hour   Intake 170 ml   Output 700 ml   Net -530 ml      Vitals:   Vitals:    21 0910   BP: 113/64   Pulse: 100   Resp: 16   Temp: 98.2 °F (36.8 °C)   SpO2:      Physical Exam:   General Appearance: alert and oriented to person, place and time, in no acute distress  Cardiovascular: normal rate, regular rhythm, normal S1 and S2  Pulmonary/Chest: Decreased breath sounds bilaterally  Abdomen: soft, non-tender, non-distended, normal bowel sounds, no masses   Extremities: no cyanosis, clubbing or edema, pulse   Skin: warm and dry, no rash or erythema  Head: normocephalic and atraumatic  Eyes: pupils equal, round, and reactive to light  Neck: supple and non-tender without mass, no thyromegaly   Musculoskeletal: normal range of motion, no joint swelling, deformity or tenderness  Neurological: alert, oriented, normal speech, no focal findings or movement disorder noted    Medications:   Medications:    fluconazole  200 mg Oral Daily    nystatin  5 mL Oral 4x Daily    theophylline  200 mg Oral BID    pantoprazole  40 mg Oral QAM AC    cefepime  2,000 mg Intravenous Q12H    sucralfate  1 g Oral 4 times per day    doxycycline (VIBRAMYCIN) IV  100 mg Intravenous Q12H    ipratropium-albuterol  1 ampule Inhalation Q4H WA    aspirin  81 mg Oral Daily    busPIRone  10 mg Oral BID    calcium elemental  500 mg Oral Daily    vitamin D  1,000 Units Oral Daily    dilTIAZem  120 mg Oral Daily    sertraline  50 mg Oral Daily    atorvastatin  10 mg Oral Daily    sodium chloride flush  10 mL Intravenous 2 times per day    enoxaparin  40 mg Subcutaneous Daily    predniSONE  40 mg Oral Daily    cetirizine  10 mg Oral Daily    acetylcysteine  600 mg Inhalation BID      Infusions:   PRN Meds: LORazepam, 1 mg, Q6H PRN  docusate sodium, 100 mg, Daily PRN  magnesium sulfate, 1,000 mg, PRN  albuterol, 2.5 mg, Q4H PRN  HYDROcodone-acetaminophen, 1 tablet, Q4H PRN  sodium chloride flush, 10 mL, PRN  promethazine, 12.5 mg, Q6H PRN    Or  ondansetron, 4 mg, Q6H PRN  polyethylene glycol, 17 g, Daily PRN  acetaminophen, 650 mg, Q6H PRN    Or  acetaminophen, 650 mg, Q6H PRN  benzonatate, 100 mg, TID PRN            Pertinent New Labs & Imaging Studies     CBC with Differential:    Lab Results   Component Value Date    WBC 19.6 03/06/2021    RBC 2.90 03/06/2021    HGB 8.2 03/06/2021    HCT 27.0 03/06/2021     03/06/2021    MCV 93.1 03/06/2021    MCH 28.3 03/06/2021    MCHC 30.4 03/06/2021    RDW 17.3 03/06/2021    NRBC 2 02/10/2021    SEGSPCT 90.4 03/03/2021    BANDSPCT 2 02/28/2021    LYMPHOPCT 1.9 03/03/2021    MONOPCT 2.3 03/03/2021    MYELOPCT 1 02/10/2021    BASOPCT 0.2 03/03/2021    MONOSABS 0.7 03/03/2021    LYMPHSABS 0.6 03/03/2021    EOSABS 0.0 03/03/2021    BASOSABS 0.1 03/03/2021    DIFFTYPE AUTOMATED DIFFERENTIAL 03/03/2021     CMP:    Lab Results   Component Value Date     03/03/2021    K 5.4 03/03/2021    CL 90 03/03/2021    CO2 44 03/03/2021    BUN 25 03/03/2021    CREATININE 0.8 03/03/2021    GFRAA >60 03/03/2021    AGRATIO 1.6 08/28/2020    LABGLOM >60 03/03/2021    GLUCOSE 110 03/03/2021    PROT 5.3 03/03/2021    PROT 6.5 03/06/2015    LABALBU 3.7 03/03/2021 CALCIUM 8.9 03/03/2021    BILITOT 0.2 03/03/2021    ALKPHOS 85 03/03/2021    AST 15 03/03/2021    ALT 10 03/03/2021     Xr Chest Portable    Result Date: 2/28/2021  EXAMINATION: ONE XRAY VIEW OF THE CHEST 2/28/2021 3:27 pm COMPARISON: 01/11/2021 HISTORY: ORDERING SYSTEM PROVIDED HISTORY: sob TECHNOLOGIST PROVIDED HISTORY: Reason for exam:->sob FINDINGS: Nodular opacities are again identified within both lungs, presumably reflecting metastatic disease in this patient with a known history of small cell lung cancer. No acute focal infiltrate is identified. There is mild blunting of the left costophrenic angle. Cardial pericardial silhouette is unremarkable. No pneumothorax. No free air. No acute bony abnormality. Minimal blunting of the left costophrenic angle, which may reflect a very small joint effusion. Otherwise no acute abnormality detected. Cta Pulmonary W Contrast    Result Date: 2/28/2021  EXAMINATION: CTA OF THE CHEST 2/28/2021 5:03 pm TECHNIQUE: CTA of the chest was performed after the administration of intravenous contrast.  Multiplanar reformatted images are provided for review. MIP images are provided for review. Dose modulation, iterative reconstruction, and/or weight based adjustment of the mA/kV was utilized to reduce the radiation dose to as low as reasonably achievable. COMPARISON: Chest CT, 12/07/2020 CT PA, 09/18/2020 HISTORY: ORDERING SYSTEM PROVIDED HISTORY: sob, lung cancer TECHNOLOGIST PROVIDED HISTORY: Reason for exam:->sob, lung cancer Decision Support Exception->Emergency Medical Condition (MA) Reason for Exam: sob, lung cancer Acuity: Acute Type of Exam: Initial Relevant Medical/Surgical History: lung cancer FINDINGS: Pulmonary Arteries: The pulmonary arteries are adequately opacified. No filling defects are identified within the pulmonary arteries to suggest pulmonary embolism. Mediastinum: Thyroid is stable. No mediastinal or hilar lymphadenopathy is identified. Esophagus is unremarkable. Cardiac chambers unremarkable. No pericardial effusion. Thoracic aorta normal in caliber without evidence of dissection. Lungs/pleura: There is a lobulated mass in the right lower lobe, stable when compared to the previous exam, measuring 1.8 x 1.6 cm (series 3, image 74). Nodule within the right lung apex is decreased in size, measuring 0.9 x 0.8 cm (series 3, image 24). Multiple additional smaller nodules are detected within the lungs, and these appear similar when compared to the previous exam. There is a background of emphysema. No acute focal infiltrate is identified. No pneumothorax. No pleural effusion. Upper Abdomen: Unremarkable. Soft Tissues/Bones: No osteolytic or osteoblastic bone lesions are identified. No acute bony abnormalities are detected. No evidence of pulmonary embolism or aortic dissection. Overall, no acute abnormality identified. Multiple nodules within both lungs, with a mixed response to therapy. The majority of the nodules are similar in size. However, at least 1 nodule within the right lung apex is decreased in size.        Assessment and Plan:   Jett Duenas is a 65 y.o.  female  who presents with Acute on chronic respiratory failure with hypoxia and hypercapnia (HCC)    Acute on chronic hypoxemic and hypercapnic respiratory failure  Acute exacerbation of COPD  Possible pneumonia with sepsis  Currently she is on 5 L O2 but fluctuating with the requirements  CTA chest ruled out with decreased size of nodule  Legionella and strep antigen negative  Respiratory panel with COVID-19 is negative  Leukocytosis improved  Continue breathing treatments  Continue steroids  Continue theophylline  Continue Mucomyst   Continue empiric cefepime and doxycycline  Pulmonology recommendations appreciated  ID following, appreciated recommendations    Small cell cancer of right lung, stage IIIb  CT scan showed some response to treatment  Heme-onc recommendations appreciated-s/p 2 cycles of cisplatin/etoposide    Dysphagia  Severe Exudative esophagitis with candidal infection  GI recommendations appreciated-S/p EGD on 3/3/21 with exudative esophagitis likely pill induced with potassium or doxycycline.   Biopsy with benign ulceration with inflammatory changes with candida--added Diflucan and Nystatin swish and swallow  To use liquid potassium for replacements  Changed oral doxy to IV  SLP recommendations appreciated, with dysphagia III phase diet recommendations    Hyponatremia-improved  Likely from poor oral intake, improved with fluids    Anemia of chronic disorders  Monitor CBC     Depression/anxiety  Continue BuSpar and Zoloft  Continue Vistaril    Hypertension  Continue Cardizem     Hyperlipidemia-continue Lipitor    Diet Dietary Nutrition Supplements: Standard High Calorie Oral Supplement  DIET GENERAL; Dysphagia Minced and Moist   DVT Prophylaxis [x] Lovenox, []  Heparin, [] SCDs, [] Ambulation   GI Prophylaxis [x] PPI,  [] H2 Blocker,  [x] Carafate,  [] Diet/Tube Feeds   Code Status Full Code   Disposition Patient requires continued admission due to acute respiratory failure   MDM [] Low, [x] Moderate,[]  High     Electronically signed by Kin Varghese MD on 3/7/2021 at 12:31 PM

## 2021-03-07 NOTE — PROGRESS NOTES
pulmonary      SUBJECTIVE: she remains sob     OBJECTIVE    VITALS:  /64   Pulse 100   Temp 98.2 °F (36.8 °C) (Oral)   Resp 16   Ht 5' (1.524 m)   Wt 125 lb 6.4 oz (56.9 kg)   SpO2 99%   BMI 24.49 kg/m²   HEAD AND FACE EXAM:  No throat injection, no active exudate,no thrush  NECK EXAM;No JVD, no masses, symmetrical  CHEST EXAM; Expansion equal and symmetrical, no masses  LUNG EXAM; Good breath sounds bilaterally. There are expiratory wheezes both lungs, there are crackles at both lung bases  CARDIOVASCULAR EXAM: Positive S1 and S2, no S3 or S4, no clicks ,no murmurs  RIGHT AND LEFT LOWER EXTRIMITY EXAM: No edema, no swelling, no inflamation  CNS EXAM: Alert and oriented X3          LABS   Lab Results   Component Value Date    WBC 19.6 (H) 03/06/2021    HGB 8.2 (L) 03/06/2021    HCT 27.0 (L) 03/06/2021    MCV 93.1 03/06/2021     03/06/2021     Lab Results   Component Value Date    CREATININE 0.8 03/03/2021    BUN 25 (H) 03/03/2021     03/03/2021    K 5.4 (H) 03/03/2021    CL 90 (L) 03/03/2021    CO2 44 (H) 03/03/2021     Lab Results   Component Value Date    INR 0.80 01/05/2021    PROTIME 9.6 (L) 01/05/2021          Lab Results   Component Value Date    PHOS 2.9 03/03/2021    PHOS 3.0 12/30/2020    PHOS 3.0 12/04/2020      No results for input(s): PH, PO2ART, GLU6WPI, HCO3, BEART, O2SAT in the last 72 hours. Wt Readings from Last 3 Encounters:   03/06/21 125 lb 6.4 oz (56.9 kg)   02/19/21 118 lb 3.2 oz (53.6 kg)   02/12/21 118 lb (53.5 kg)               ASSESMENT  Ac on ch resp failure  Ac copd  Pneumonia  Small cell lung ca        PLAN  1. Cont o2  2.  Bd rx  3. antibx  4. cxr in am    3/7/2021  Tobi Saucedo M.D.

## 2021-03-08 NOTE — PROGRESS NOTES
Pulmonary and Critical Care  Progress Note    Subjective: The patient is better. On 6L N/C. Shortness of breath sl better. Chest pain none  Addressing respiratory complaints Patient is negative for  hemoptysis and cyanosis  CONSTITUTIONAL:  negative for fevers and chills      Past Medical History:     has a past medical history of Arthritis, COPD (chronic obstructive pulmonary disease) ([facility] [address] Full dentures, H/O cardiovascular stress test, H/O cardiovascular stress test, H/O Doppler ultrasound, H/O Doppler ultrasound, History of 24 hour EKG monitoring, History of nuclear stress test, Hx of chest x-ray, Hx of Doppler echocardiogram, Hx of echocardiogram, Hx of echocardiogram, Hx of pelvic x-ray, Hx of x-ray of hip, Hyperlipidemia, Hypertension, Leg pain, Lung nodules, On home oxygen therapy, Small cell lung cancer ([facility] and Wears glasses. has a past surgical history that includes cyst removal (1970's); Tubal ligation (1970's); laparoscopic appendectomy (N/A, 5/18/2019); Mediastinoscopy (N/A, 12/10/2020); back surgery; Colonoscopy; Hysterectomy; eye surgery (2010?); joint replacement (Right, 2014? ?); Foot surgery (Right, 1970's); bronchoscopy (N/A, 1/5/2021); and Upper gastrointestinal endoscopy (N/A, 3/3/2021). reports that she quit smoking about 3 months ago. Her smoking use included cigarettes. She started smoking about 47 years ago. She has a 70.50 pack-year smoking history. She has never used smokeless tobacco. She reports current alcohol use. She reports that she does not use drugs. Family history:  family history includes COPD in her sister; Cancer in her father and sister; Coronary Art Dis in her mother; Dementia in her mother; Diabetes in her sister; Heart Attack in her sister;  Heart Attack (age of onset: 61) in her father and mother; High Cholesterol in her mother; Hypertension in her mother, sister, sister, and sister; Irritable Bowel Syndrome in her sister; Pacemaker in her sister; Stroke in her father, mother, and sister. Allergies   Allergen Reactions    Formaldehyde     Gabapentin Other (See Comments)    Norflex Tablets [Orphenadrine]     Cranberry Rash     Social History:    Reviewed; no changes    Objective:   PHYSICAL EXAM:        VITALS:  /70   Pulse 94   Temp 98.2 °F (36.8 °C) (Oral)   Resp 16   Ht 5' (1.524 m)   Wt 125 lb 12.8 oz (57.1 kg)   SpO2 96%   BMI 24.57 kg/m²     24HR INTAKE/OUTPUT:      Intake/Output Summary (Last 24 hours) at 3/8/2021 1326  Last data filed at 3/8/2021 0500  Gross per 24 hour   Intake 590 ml   Output 1300 ml   Net -710 ml       CONSTITUTIONAL:  Awake. LUNGS:  decreased breath sounds. CARDIOVASCULAR:  normal S1 and S2 and negative JVD  ABD:Abdomen soft, non-tender. BS normal. No masses,  No organomegaly  NEURO:Alert. DATA:    CBC:  Recent Labs     03/06/21  1143 03/08/21  0558   WBC 19.6* 19.2*   RBC 2.90* 2.73*   HGB 8.2* 7.7*   HCT 27.0* 24.9*    265   MCV 93.1 91.2   MCH 28.3 28.2   MCHC 30.4* 30.9*   RDW 17.3* 17.3*   SEGSPCT  --  70.0*      BMP:  Recent Labs     03/08/21  0558   *   K 4.5   CL 86*   CO2 41*   BUN 14   CREATININE 0.7   CALCIUM 8.6   GLUCOSE 76      ABG:  No results for input(s): PH, PO2ART, JMH0AJI, HCO3, BEART, O2SAT in the last 72 hours. Lab Results   Component Value Date    PROBNP 1,244 (H) 03/03/2021    PROBNP 310.0 (H) 02/28/2021    PROBNP 126.5 01/11/2021    THEOPH 4.9 (L) 03/06/2021     No results found for: [facility]    Radiology Review:  Pertinent images / reports were reviewed as a part of this visit.     Assessment:     Patient Active Problem List   Diagnosis    Hyperlipidemia    COPD (chronic obstructive pulmonary disease) ([address]    Leg pain    Hypertension    Shortness of breath    Tobacco abuse    Abdominal aortic aneurysm (AAA) without rupture (HCC)    Degeneration of lumbar or lumbosacral intervertebral disc    Osteopenia    Anxiety    Acute on chronic respiratory failure with hypoxia ([address]

## 2021-03-08 NOTE — CARE COORDINATION
Requested updated PT/OT notes via WB to help determine a safe d/c plan per Dr Floresita Louis request.  Gonzalez Maldonado

## 2021-03-08 NOTE — PROGRESS NOTES
Physical Therapy    Physical Therapy Treatment Note  Name: Satnam Saldivar MRN: [id number] :   1950   Date:  3/8/2021   Admission Date: 2021 Room:  [address] [place] [address]     Restrictions/Precautions:        general precautions, falls,     Communication with other providers:  RN approves tx session. Subjective:  Patient states:  Agreeable to bed level tx session;     Pain:   Location, Type, Intensity (0/10 to 10/10): Denies at this time    Objective:    Observation:  Pt in bed sleeping upon arrival. Pt appears very sleepy today. Treatment, including education/measures:  Exercises: Pt completes all exercises supine in bed with eyes closed. Ankle Pumps x 10  Heel Slides x 10  SLR x 10  Quad Set x 10  Abduction x 10  Adduction x 10  Glut Sets x 10  Pt requires increased time due to frequent rest breaks. Requires VC's for controlling movements     Transfers  Scooting :maxA up in bed. EOB sitting not attempted this date due to pt tiredness    Safety  Patient left safely in the bed, with call light/phone in reach with alarm applied. Assessment / Impression:       Patient's tolerance of treatment:  fair   Adverse Reaction: none  Significant change in status and impact:  none  Barriers to improvement:  Fatigue, strength     Plan for Next Session:    Will cont to work towards pt's goals per her tolerance    Time in:  1404  Time out:  1427  Timed treatment minutes:  23  Total treatment time:  23    Previously filed items:  Social/Functional History  Lives With: Family(granddaughter and her children live with her, daughter will be able to stay with her for a week.  Family can provide 24/7 supervision/assist)  Type of Home: House  Home Layout: Two level, Able to Live on Main level with bedroom/bathroom  Home Access: Level entry  Bathroom Shower/Tub: Tub/Shower unit, Shower chair with back  [employer]: Bedside commode  Bathroom Equipment: Grab bars in shower  Bathroom Accessibility: [address] Equipment: Cane(PRN)  ADL Assistance: Independent  Homemaking Assistance: Independent  Homemaking Responsibilities: Yes  Ambulation Assistance: Independent  Transfer Assistance: Independent  Active : Yes  Mode of Transportation: Car  Additional Comments: Pt's daughter reports no recent falls  Short term goals  Time Frame for Short term goals: 1 week  Short term goal 1: Pt will perform bed mobility Ziggy  Short term goal 2: Pt will transfer between surfaces Ziggy  Short term goal 3: Pt will ambulate 50ft with RW SBA       Electronically signed by:    Yoli Bartholomew PTA  3/8/2021, 2:31 PM

## 2021-03-08 NOTE — PROGRESS NOTES
Attempted at 0904 hrs c nurse Cheung reporting pt just returned from barium swallow and is having some O2 desats, and is better for re-attempt. Plan is to re-attempt later this AM as schedule allows. Occupational Therapy  . Occupational Therapy Treatment Note  Name: Tiffanie Baird MRN: [id number] :   1950   Date:  3/8/2021   Admission Date: 2021 Room:  [address] [place] [address]   Restrictions/Precautions:    General precautions; Fall Risk    Communication with other providers:  Per chart review and Nurse Cheung, patient is appropriate for therapeutic intervention but recommends only bed level Tx as patient has had continued O2 desats. Subjective:  Patient states:  Pt agreeable to OT Tx session. Pain:   Location, Type, Intensity (0/10 to 10/10):  5/10, back    Objective:    Observation:  Pt received in high fowlers, identifies it helps c breathing. Objective Measures:  Telemetry, O2 6 L (O2 7L EOB activity, nurse). O2 sats maintained >91-93% except for one episode of desat to 88% which recovered quickly c rest and cues for PLB technique, deeper breaths. Treatment, including education:  Therapeutic Activity Training:   Therapeutic activity training was instructed today. Cues were given for safety, sequence, UE/LE placement, awareness, and balance. Activities performed today included bed mobility training, sup-sit, and sitting balance / tolerance at SBA/Sup seated EOB to perform face washing and hand hygiene, pt also applied hand lotion, required cues for rest breaks PLB techinques, as well as BUE therapeutic exercises as detailed below. Tolerated x15 minutes  Supine<>Sit: SBA  Scooting: SBA    Therapeutic Exercise:  Cues were given for technique, safety, recruitment, and rationale. Cues were verbal and/or tactile.   Pt Sup + vc's for technique to perform BUE AROM exercises to include: internal/external rotation, chest presses, bicep curls, rowing, and supination/pronation x10 reps each c cues for self-pacing c rest breaks and PLT technique as noted above. All therapeutic intervention performed c emphasis on dynamic balance / sitting tolerance to inc strength, endurance and act tolerance for inc Indep c ADL tasks, func transfers / mobility. Safety  Patient safely in bed + alarm activated at end of session, with call light/phone in reach, and nursing aware. Assessment / Impression:        Patient's tolerance of treatment:  Well   Adverse Reaction: None  Significant change in status and impact:  Limited to edge of bed this date, managed well c use of rest breaks and PLB techniques  Barriers to improvement:  O2 needs, decreased endurance    Plan for Next Session:    Continue per OT POC per patient's tolerance.     Time in:  0950  Time out:  1020  Timed treatment minutes:  30  Total treatment time:  30    Electronically signed by:    Val Espinosa,   3/8/2021, 8:57 AM    Previously filed values:       Goals:  Pt goal: go home  Time Frame for STGs: discharge  Goal 1: Pt will perform UE ADLs Independent  Goal 2: Pt will perform LE ADLs Independent  Goal 3: Pt will perform toileting Independent  Goal 4: Pt will perform functional transfer w/ AD Ziggy  Goal 5: Pt will perform functional mobility w/ AD Ziggy  Goal 6: Pt will perform therex/theract in order to increase functional activity tolerance and dynamic standing balance

## 2021-03-08 NOTE — PROGRESS NOTES
Infectious Disease Progress Note  3/8/2021   Patient Name: Satnam Saldivar : 1950   Impression  · Sepsis Secondary to Acute on Chronic Hypoxic and Hypercapnic Respiratory Failure, Acute Exacerbation COPD:   § Afebrile  § Leukocytosis max 32.0 on 3/2  § 3/2-Respiratory culture pending  § 3/2-RDP, Legionella and Strep Pna negative  § -CTA Pulmonary W Contrast: No PE, Multiple nodules within both lungs with a mixed response to therapy. § Dr. Ira Hansen, pulmonology, onboard, rec Solu-Medrol 40 mg q6h, duonebs, weaning oxygen keep sats >90%     · Right Lung SCLC Stage III:   § Dr. Emili Baker Next chemo of cisplatin/etoposide due 3/3, deferred until stabilized  § CT scan showed some response to treatment     ? Tobacco Abuse     ? HTN     ? Dysphagia with Weight Loss:  § Dr. Farrel Severin 3/3-S/P per Dr. Syeda Ramos: EGD: Imp of severe suspicious for medication esophagitis (likely potassium capsule or vibramycin)    § Plan: await biopsies, DC K capsule and vibramycin or change to IV or liquid prep  § 3/4-Barium Swallow:  No evidence of aspiration  ?    · Multi-morbidity: per PMHx:  Arthritis, COPD, HLD, HTN, home oxygen 4 L/nc , appey, hyster, right joint replacement, tobacco abuse     Plan:  · Continue IV doxycycline 100 mg q12h (end date 3/10/21)  · Continue IV cefepime 2 gm q12h (end date 3/10/21)  · Check Pct and CRP  ? Await respiratory culture 3/2  ? Await fungitel and aspergillus 3/3    Ongoing Antimicrobial Therapy  Cefepime 3/3-  Doxycycline 3/1-? Completed Antimicrobial Therapy  Ceftriaxone 3/2-3? History:? Interval history noted. Chief complaint: Sepsis secondry to acute on chronic hypoxic and hypercapnic respiratory failure, acute exacerbation of COPD. Denies n/v/d/f or untoward effects of antibiotics. Resting quietly.     Physical Exam:  Vital Signs: /70   Pulse 94   Temp 98.2 °F (36.8 °C) (Oral)   Resp 16   Ht 5' (1.524 m)   Wt 125 lb 12.8 oz (57.1 kg) SpO2 96%   BMI 24.57 kg/m²     Gen: alert and oriented X3, no distress  Skin: no stigmata of endocarditis  Wounds: C/D/I  HEMT: AT/NC Oropharynx pink, moist, and without lesions or exudates; dentition in good state of repair  Eyes: PERRLA, EOMI, conjunctiva pink, sclera anicteric. Neck: Supple. Trachea midline. No LAD. Chest: no distress and CTA. Good air movement. Oxygen per vapotherm. Heart: RRR to ST and no MRG. Abd: soft, non-distended, no tenderness, no hepatomegaly. Normoactive bowel sounds. Ext: no clubbing, cyanosis, or edema  Neuro: Mental status intact.  CN 2-12 intact and no focal sensory or motor deficits     Radiologic / Imaging / TESTING  2/28/21 XR Chest Portable:  Impression   Minimal blunting of the left costophrenic angle, which may reflect a very   small joint effusion.  Otherwise no acute abnormality detected      2/28/21 CTA Pulmonary W Contrast:  Impression   No evidence of pulmonary embolism or aortic dissection.  Overall, no acute   abnormality identified.       Multiple nodules within both lungs, with a mixed response to therapy.  The   majority of the nodules are similar in size.  However, at least 1 nodule   within the right lung apex is decreased in size.      3/3/21 XR Chest (2VW):  Impression   No acute cardiopulmonary disease.  The lung nodules noted on CT from 2 days   earlier are not well perceived on the conventional radiograph.      3/3/21 Fluoroscopy modified barium swallow with video:  Impression   Swallowing mechanism grossly within normal limits without evidence of   aspiration.       Please see separate speech pathology report for full discussion of findings   and recommendations.             Labs:    Recent Results (from the past 24 hour(s))   Comprehensive Metabolic Panel    Collection Time: 03/08/21  5:58 AM   Result Value Ref Range    Sodium 130 (L) 135 - 145 MMOL/L    Potassium 4.5 3.5 - 5.1 MMOL/L    Chloride 86 (L) 99 - 110 mMol/L    CO2 41 (H) 21 - 32 MMOL/L BUN 14 6 - 23 MG/DL    CREATININE 0.7 0.6 - 1.1 MG/DL    Glucose 76 70 - 99 MG/DL    Calcium 8.6 8.3 - 10.6 MG/DL    Albumin 3.5 3.4 - 5.0 GM/DL    Total Protein 5.0 (L) 6.4 - 8.2 GM/DL    Total Bilirubin 0.2 0.0 - 1.0 MG/DL    ALT 9 (L) 10 - 40 U/L    AST 11 (L) 15 - 37 IU/L    Alkaline Phosphatase 56 40 - 128 IU/L    GFR Non-African American >60 >60 mL/min/1.73m2    GFR African American >60 >60 mL/min/1.73m2    Anion Gap 3 (L) 4 - 16   CBC Auto Differential    Collection Time: 03/08/21  5:58 AM   Result Value Ref Range    WBC 19.2 (H) 4.0 - 10.5 K/CU MM    RBC 2.73 (L) 4.2 - 5.4 M/CU MM    Hemoglobin 7.7 (L) 12.5 - 16.0 GM/DL    Hematocrit 24.9 (L) 37 - 47 %    MCV 91.2 78 - 100 FL    MCH 28.2 27 - 31 PG    MCHC 30.9 (L) 32.0 - 36.0 %    RDW 17.3 (H) 11.7 - 14.9 %    Platelets 963 291 - 200 K/CU MM    MPV 9.9 7.5 - 11.1 FL    Myelocyte Percent 4 (H) 0.0 %    Metamyelocytes Relative 3 (H) 0.0 %    Segs Relative 70.0 (H) 36 - 66 %    Eosinophils % 1.0 0 - 3 %    Lymphocytes % 15.0 (L) 24 - 44 %    Monocytes % 7.0 (H) 0 - 4 %    Myelocytes Absolute 0.77 K/CU MM    Metamyelocytes Absolute 0.58 K/CU MM    Segs Absolute 13.5 K/CU MM    Eosinophils Absolute 0.2 K/CU MM    Lymphocytes Absolute 2.9 K/CU MM    Monocytes Absolute 1.3 K/CU MM    Differential Type MANUAL DIFFERENTIAL     Anisocytosis 1+     Polychromasia 1+     Basophilic Stippling PRESENT     WBC Morphology OCCASIONAL     PLT Morphology SEVERAL LARGE PLATELETS      CULTURE results: Invalid input(s): BLOOD CULTURE,  URINE CULTURE, SURGICAL CULTURE    Diagnosis:  Patient Active Problem List   Diagnosis    Hyperlipidemia    COPD (chronic obstructive pulmonary disease) (HCC)    Leg pain    Hypertension    Shortness of breath    Tobacco abuse    Abdominal aortic aneurysm (AAA) without rupture (HCC)    Degeneration of lumbar or lumbosacral intervertebral disc    Osteopenia    Anxiety    Acute on chronic respiratory failure with hypoxia ([address]    Lung mass    COPD exacerbation (HCC)    Acute exacerbation of chronic obstructive pulmonary disease (COPD) (HCC)    Small cell lung cancer (HCC)    Acute on chronic respiratory failure with hypoxia and hypercapnia (HCC)       Active Problems  Principal Problem:    Acute on chronic respiratory failure with hypoxia and hypercapnia (HCC)  Active Problems:    COPD (chronic obstructive pulmonary disease) (HCC)    Shortness of breath    Small cell lung cancer (HCC)  Resolved Problems:    * No resolved hospital problems. *    Electronically signed by: Electronically signed by HELIO Tuttle CNP on 3/8/2021 at 10:33 AM

## 2021-03-08 NOTE — PROGRESS NOTES
Hospitalist Progress Note      Name:  Kylee Moran /Age/Sex: 1950  (65 y.o. female)   MRN & CSN:  [id number] & [id number] Admission Date/Time: 2021  6:04 PM   Location:  [facility] PCP: Elvie Sellers, [address] Day: 9    History of Present Illness:     Chief Complaint: Acute on chronic respiratory failure with hypoxia and hypercapnia ([address]  Kylee Moran is a 65 y.o.  female  who presents with shortness of breath     Patient seen and examined at bedside. She says still having difficulty with breatjing when exerting her self. Denies any chest pain. Ten point ROS reviewed negative, unless as noted above    Objective:        Intake/Output Summary (Last 24 hours) at 3/8/2021 1248  Last data filed at 3/8/2021 0500  Gross per 24 hour   Intake 590 ml   Output 1300 ml   Net -710 ml      Vitals:   Vitals:    21 1015   BP: 126/70   Pulse: 94   Resp: 16   Temp: 98.2 °F (36.8 °C)   SpO2: 96%     Physical Exam:   General Appearance: alert and oriented to person, place and time, in no acute distress  Cardiovascular: normal rate, regular rhythm, normal S1 and S2  Pulmonary/Chest: Decreased breath sounds bilaterally  Abdomen: soft, non-tender, non-distended, normal bowel sounds, no masses   Extremities: no cyanosis, clubbing or edema, pulse   Skin: warm and dry, no rash or erythema  Head: normocephalic and atraumatic  Eyes: pupils equal, round, and reactive to light  Neck: supple and non-tender without mass, no thyromegaly   Musculoskeletal: normal range of motion, no joint swelling, deformity or tenderness  Neurological: alert, oriented, normal speech, no focal findings or movement disorder noted    Medications:   Medications:    fluconazole  200 mg Oral Daily    nystatin  5 mL Oral 4x Daily    theophylline  200 mg Oral BID    pantoprazole  40 mg Oral QAM AC    cefepime  2,000 mg Intravenous Q12H    sucralfate  1 g Oral 4 times per day    doxycycline (VIBRAMYCIN) IV  100 mg Intravenous Q12H    ipratropium-albuterol  1 ampule Inhalation Q4H WA    aspirin  81 mg Oral Daily    busPIRone  10 mg Oral BID    calcium elemental  500 mg Oral Daily    vitamin D  1,000 Units Oral Daily    dilTIAZem  120 mg Oral Daily    sertraline  50 mg Oral Daily    atorvastatin  10 mg Oral Daily    sodium chloride flush  10 mL Intravenous 2 times per day    enoxaparin  40 mg Subcutaneous Daily    predniSONE  40 mg Oral Daily    cetirizine  10 mg Oral Daily    acetylcysteine  600 mg Inhalation BID      Infusions:   PRN Meds: LORazepam, 1 mg, Q6H PRN  LORazepam, 1 mg, Q6H PRN  docusate sodium, 100 mg, Daily PRN  magnesium sulfate, 1,000 mg, PRN  albuterol, 2.5 mg, Q4H PRN  HYDROcodone-acetaminophen, 1 tablet, Q4H PRN  sodium chloride flush, 10 mL, PRN  promethazine, 12.5 mg, Q6H PRN    Or  ondansetron, 4 mg, Q6H PRN  polyethylene glycol, 17 g, Daily PRN  acetaminophen, 650 mg, Q6H PRN    Or  acetaminophen, 650 mg, Q6H PRN  benzonatate, 100 mg, TID PRN            Pertinent New Labs & Imaging Studies     CBC with Differential:    Lab Results   Component Value Date    WBC 19.2 03/08/2021    RBC 2.73 03/08/2021    HGB 7.7 03/08/2021    HCT 24.9 03/08/2021     03/08/2021    MCV 91.2 03/08/2021    MCH 28.2 03/08/2021    MCHC 30.9 03/08/2021    RDW 17.3 03/08/2021    NRBC 2 02/10/2021    SEGSPCT 70.0 03/08/2021    BANDSPCT 2 02/28/2021    LYMPHOPCT 15.0 03/08/2021    MONOPCT 7.0 03/08/2021    MYELOPCT 4 03/08/2021    BASOPCT 0.2 03/03/2021    MONOSABS 1.3 03/08/2021    LYMPHSABS 2.9 03/08/2021    EOSABS 0.2 03/08/2021    BASOSABS 0.1 03/03/2021    DIFFTYPE MANUAL DIFFERENTIAL 03/08/2021     CMP:    Lab Results   Component Value Date     03/08/2021    K 4.5 03/08/2021    CL 86 03/08/2021    CO2 41 03/08/2021    BUN 14 03/08/2021    CREATININE 0.7 03/08/2021    GFRAA >60 03/08/2021    AGRATIO 1.6 08/28/2020    LABGLOM >60 03/08/2021    GLUCOSE 76 03/08/2021    PROT 5.0 03/08/2021    PROT 6.5 03/06/2015    LABALBU 3.5 03/08/2021    CALCIUM 8.6 03/08/2021    BILITOT 0.2 03/08/2021    ALKPHOS 56 03/08/2021    AST 11 03/08/2021    ALT 9 03/08/2021     Xr Chest Portable    Result Date: 2/28/2021  EXAMINATION: ONE XRAY VIEW OF THE CHEST 2/28/2021 3:27 pm COMPARISON: 01/11/2021 HISTORY: ORDERING SYSTEM PROVIDED HISTORY: sob TECHNOLOGIST PROVIDED HISTORY: Reason for exam:->sob FINDINGS: Nodular opacities are again identified within both lungs, presumably reflecting metastatic disease in this patient with a known history of small cell lung cancer. No acute focal infiltrate is identified. There is mild blunting of the left costophrenic angle. Cardial pericardial silhouette is unremarkable. No pneumothorax. No free air. No acute bony abnormality. Minimal blunting of the left costophrenic angle, which may reflect a very small joint effusion. Otherwise no acute abnormality detected. Cta Pulmonary W Contrast    Result Date: 2/28/2021  EXAMINATION: CTA OF THE CHEST 2/28/2021 5:03 pm TECHNIQUE: CTA of the chest was performed after the administration of intravenous contrast.  Multiplanar reformatted images are provided for review. MIP images are provided for review. Dose modulation, iterative reconstruction, and/or weight based adjustment of the mA/kV was utilized to reduce the radiation dose to as low as reasonably achievable. COMPARISON: Chest CT, 12/07/2020 CT PA, 09/18/2020 HISTORY: ORDERING SYSTEM PROVIDED HISTORY: sob, lung cancer TECHNOLOGIST PROVIDED HISTORY: Reason for exam:->sob, lung cancer Decision Support Exception->Emergency Medical Condition (MA) Reason for Exam: sob, lung cancer Acuity: Acute Type of Exam: Initial Relevant Medical/Surgical History: lung cancer FINDINGS: Pulmonary Arteries: The pulmonary arteries are adequately opacified. No filling defects are identified within the pulmonary arteries to suggest pulmonary embolism. Mediastinum: Thyroid is stable.   No mediastinal or hilar lymphadenopathy is identified. Esophagus is unremarkable. Cardiac chambers unremarkable. No pericardial effusion. Thoracic aorta normal in caliber without evidence of dissection. Lungs/pleura: There is a lobulated mass in the right lower lobe, stable when compared to the previous exam, measuring 1.8 x 1.6 cm (series 3, image 74). Nodule within the right lung apex is decreased in size, measuring 0.9 x 0.8 cm (series 3, image 24). Multiple additional smaller nodules are detected within the lungs, and these appear similar when compared to the previous exam. There is a background of emphysema. No acute focal infiltrate is identified. No pneumothorax. No pleural effusion. Upper Abdomen: Unremarkable. Soft Tissues/Bones: No osteolytic or osteoblastic bone lesions are identified. No acute bony abnormalities are detected. No evidence of pulmonary embolism or aortic dissection. Overall, no acute abnormality identified. Multiple nodules within both lungs, with a mixed response to therapy. The majority of the nodules are similar in size. However, at least 1 nodule within the right lung apex is decreased in size.        Assessment and Plan:   Favio Hathaway is a 65 y.o.  female  who presents with Acute on chronic respiratory failure with hypoxia and hypercapnia (HCC)    Acute on chronic hypoxemic and hypercapnic respiratory failure  Acute exacerbation of COPD  Possible pneumonia with sepsis  Currently she is on 5 L O2 but fluctuating with the requirements  CTA chest ruled out with decreased size of nodule  Legionella and strep antigen negative  Respiratory panel with COVID-19 is negative  Leukocytosis improved  Continue breathing treatments  Continue steroids  Continue theophylline  Continue Mucomyst   Continue empiric cefepime and doxycycline  Pulmonology recommendations appreciated  ID following, appreciated recommendations    Small cell cancer of right lung, stage IIIb  CT scan showed some response to treatment

## 2021-03-08 NOTE — PROGRESS NOTES
She says her swallowing has not improved  Abdomen soft, non-tender, non-distended  Will check barium esophagram

## 2021-03-08 NOTE — PROGRESS NOTES
Physician Progress Note      Carina Farr  CSN #:                  [id number]  :                       1950  ADMIT DATE:       2021 6:04 PM  [address] [place] DATE:  RESPONDING  PROVIDER #:        Milan Mendez MD          QUERY TEXT:    Dear Hospitalist    Pt admitted with Pneumonia. Pt noted to have Sepsis documented in the 3/4 Cx   note  . If possible, please document in the progress notes and discharge   summary if you are evaluating and /or treating any of the following: The medical record reflects the following:  Risk Factors: Pneumonia, Acute resp Failure  Clinical Indicators: Leukocytosis max 32.0 on 3/2  Treatment: IV doxycycline 100 mg q12h ,IV cefepime 2 gm q12h Check Pct and   CRP, Pct staying consistent at 0.291  Await respiratory culture 3/2   Await   fungitel and aspergillus 3/3. Infectious Disease Cx, Onc Cx, Gi Cx, Pulm Cx    Thank you Iris Fong RN, CDS ([phone])  Options provided:  -- Candida Sepsis, present on admission  -- Aliza Sepsis, not present on admission  -- No Candida Sepsis, pneumonia only  -- Defer to Infectious Disease Cx consultant documentation regarding Candida   Sepsis  -- Other - I will add my own diagnosis  -- Disagree - Not applicable / Not valid  -- Disagree - Clinically unable to determine / Unknown  -- Refer to Clinical Documentation Reviewer    PROVIDER RESPONSE TEXT:    Sepsis from Pneumonia. Candida caused esophagitis. Query created by: Opal Moctezuma on 3/8/2021 10:25 AM      QUERY TEXT:    Dear Hospitalist    Pt admitted with AEOPD . Pt noted to have pneumonia. If possible, please   document in the progress notes and discharge summary if you are evaluating   and/or treating any of the following: The medical record reflects the following:  Risk Factors: Sepsis, Pneumonia, AECOPD, Dysphasia of the pharyngoesophageal   phase. Lung mass CA, severe esophagitis  Clinical Indicators:  \"Pt reports having difficulty with food getting stuck \"in   a pocket in my throat\" for several months. She stated when the food gets   stuck she has to cough it up and expel it into a napkin. ,Prolonged   mastication was observed with trials of soft solids. Suspect mild pharyngeal   dysphagia characterized by timely swallow initiation and reduced laryngeal   elevation. Pt presented with a delayed cough with all PO trials given. She   was utilizing oral suctioning to remove bolus from oral cavity. \"\"Prolonged   mastication (pt refused to put dentures in at this dorian  Treatment: Speech eval, swallow studies, Barium swallow done, dysphagia 2   diet/thin liquids with strict aspiration precautions, Diflucan and nystatin,   Doxycycline IV    Thank you Shanelle Connelly RN, CDS ([phone])  Options provided:  -- Aspiration pneumonia  -- Gram negative pneumonia  -- Gram positive pneumonia  -- Bacterial pneumonia  -- Other - I will add my own diagnosis  -- Disagree - Not applicable / Not valid  -- Disagree - Clinically unable to determine / Unknown  -- Refer to Clinical Documentation Reviewer    PROVIDER RESPONSE TEXT:    This patient has aspiration pneumonia.     Query created by: Serenity Carias on 3/8/2021 10:39 AM      Electronically signed by:  Albert Mckeon MD 3/8/2021 12:48 PM

## 2021-03-08 NOTE — PROGRESS NOTES
HEMATOLOGY & ONCOLOGY progress note  [facility]      Patient was seen and examined today. Ongoing issues with dysphagia. PHYSICAL EXAM    Vitals: /67   Pulse 86   Temp 98.2 °F (36.8 °C) (Oral)   Resp 11   Ht 5' (1.524 m)   Wt 125 lb 12.8 oz (57.1 kg)   SpO2 100%   BMI 24.57 kg/m²   General appearance: alert, appears stated age and cooperative, frail  Skin: Skin color, texture, turgor normal. No rashes or lesions  Lungs: expiratory wheezes  Heart: regular rate and rhythm, S1, S2 normal  Abdomen: soft, non-tender; bowel sounds normal; no masses,  no organomegaly  Extremities: extremities normal, atraumatic, no cyanosis or edema  Neurologic: Mental status: Alert, oriented, thought content appropriate  SKIN: pale  LABORATORY RESULTS  CBC:   Recent Labs     03/06/21  1143 03/08/21  0558   WBC 19.6* 19.2*   HGB 8.2* 7.7*    265     BMP:    Recent Labs     03/08/21  0558   *   K 4.5   CL 86*   CO2 41*   BUN 14   CREATININE 0.7   GLUCOSE 76     Hepatic:   Recent Labs     03/08/21  0558   AST 11*   ALT 9*   BILITOT 0.2   ALKPHOS 56     INR: No results for input(s): INR in the last 72 hours. ASSESSMENT/RECOMMENDATION    Small cell lung cancer- some response after two cycles cisplatin/etoposide. After acute issues resolve we will continue with treatment as planned. Anemia- iron studies reviewed; Hgb 7.7 today. Will repeat CBC tomorrow. Consider PRBC if continues to decrease. Dysphagia- s/p egd with suspicion for medication esophagitis; biopsy positive for candida, on diflucan and nystatin. Plan for barium esophagram. Will follow results. We will continue to follow the patient. Thank you.

## 2021-03-09 NOTE — PROGRESS NOTES
Occupational Therapy      Occupational Therapy Treatment Note  Name: Srinivas Neumann MRN: [id number] :   1950   Date:  3/9/2021   Admission Date: 2021 Room:  [address] [place] [address]   Restrictions/Precautions:    General Precautions, Fall Risk  Communication with other providers:  Nursing handoff  Subjective:  Patient states:  \"I want to get a bath\"  Pain:   Location, Type, Intensity (0/10 to 10/10):  No  Objective:    Observation:  Pt received supine in bed, agreeable to therapy, pt on 5L of 02  Objective Measures:  02 saturation decreased ~88% w/ increased activity, increased ~94% w/ pursed lip breathing ~2 minutes  Treatment, including education:  Self Care Training:   Cues were given for safety, sequence, UE/LE placement, visual cues, and balance. Activities performed today included UB/LB bathing/dressing, grooming, toileting    Therapeutic Activity Training:   Therapeutic activity training was instructed today. Cues were given for safety, sequence, UE/LE placement, awareness, and balance. Activities performed today included bed mobility training, sup-sit, sit-stand, stand step, stand to sit    Supine to sit SBA, sitting EOB SBA, stand step transfer/toilet transfer from EOB to [facility] CGA. LB bathing threading depends in stand CGA, toileting modA assistance to wash craig area/buttocks in stand pt able to wash underwear in stand CGA, return to EOB CGA. Pt requested bath. Stand step transfer from EOB to reclining chair CGA. UB dressing doffing/donning gown SBA, UB bathing washing abdomen, trunk, BUE arms sitting upright SBA. LB bathing Trae assistance to wash feet, pt able to wash upper legs. LB dressing doffing/donning socks maxA.      Pt sitting upright in chair, chair alarm on, call light at side, nursing notified       Assessment / Impression:        Patient's tolerance of treatment:  Pt tolerated treatment fairly well   Adverse Reaction: decreased 02 saturation  Significant change in status and impact: none  Barriers to improvement:  Decreased 02 saturation  Plan for Next Session:    Pt will perform functional task in stand reaching in all 3 planes to increase dynamic standing balance    Time in:  1345  Time out:  1430  Timed treatment minutes:  45 minutes  Total treatment time:  45 minutes  Electronically signed by:    Rina ASHER/L [id number]  2:43 PM,3/9/2021     Previously filed values:         Goals:  Pt goal: go home  Time Frame for STGs: discharge  Goal 1: Pt will perform UE ADLs Independent  Goal 2: Pt will perform LE ADLs Independent  Goal 3: Pt will perform toileting Independent  Goal 4: Pt will perform functional transfer w/ AD Ziggy  Goal 5: Pt will perform functional mobility w/ AD Ziggy  Goal 6: Pt will perform therex/theract in order to increase functional activity tolerance and dynamic standing balance

## 2021-03-09 NOTE — PROGRESS NOTES
Pulmonary and Critical Care  Progress Note    Subjective: The patient is improving. On 4L N/C. Shortness of breath better. Chest pain none  Addressing respiratory complaints Patient is negative for  hemoptysis and cyanosis  CONSTITUTIONAL:  negative for fevers and chills. Past Medical History:     has a past medical history of Arthritis, COPD (chronic obstructive pulmonary disease) ([facility] Full dentures, H/O cardiovascular stress test, H/O cardiovascular stress test, H/O Doppler ultrasound, H/O Doppler ultrasound, History of 24 hour EKG monitoring, History of nuclear stress test, Hx of chest x-ray, Hx of Doppler echocardiogram, Hx of echocardiogram, Hx of echocardiogram, Hx of pelvic x-ray, Hx of x-ray of hip, Hyperlipidemia, Hypertension, Leg pain, Lung nodules, On home oxygen therapy, [facility] and Wears glasses. has a past surgical history that includes cyst removal (1970's); Tubal ligation (1970's); laparoscopic appendectomy (N/A, 5/18/2019); Mediastinoscopy (N/A, 12/10/2020); back surgery; Colonoscopy; Hysterectomy; eye surgery (2010?); joint replacement (Right, 2014? ?); Foot surgery (Right, 1970's); bronchoscopy (N/A, 1/5/2021); and Upper gastrointestinal endoscopy (N/A, 3/3/2021). reports that she quit smoking about 3 months ago. Her smoking use included cigarettes. She started smoking about 47 years ago. She has a 70.50 pack-year smoking history. She has never used smokeless tobacco. She reports current alcohol use. She reports that she does not use drugs. Family history:  family history includes COPD in her sister; Cancer in her father and sister; Coronary Art Dis in her mother; Dementia in her mother; Diabetes in her sister; Heart Attack in her sister;  Heart Attack (age of onset: 61) in her father and mother; High Cholesterol in her mother; Hypertension in her mother, sister, sister, and sister; Irritable Bowel Syndrome in her sister; Pacemaker in her sister; Stroke in her father, mother, and sister. Allergies   Allergen Reactions    Formaldehyde     Gabapentin Other (See Comments)    Norflex Tablets [Orphenadrine]     Cranberry Rash     Social History:    Reviewed; no changes    Objective:   PHYSICAL EXAM:        VITALS:  BP (!) 144/65   Pulse 100   Temp 99.4 °F (37.4 °C) (Oral)   Resp 19   Ht 5' (1.524 m)   Wt 125 lb 12.8 oz (57.1 kg)   SpO2 94%   BMI 24.57 kg/m²     24HR INTAKE/OUTPUT:      Intake/Output Summary (Last 24 hours) at 3/9/2021 1105  Last data filed at 3/8/2021 1929  Gross per 24 hour   Intake 400 ml   Output 400 ml   Net 0 ml       CONSTITUTIONAL:  Awake. LUNGS:  decreased breath sounds. CARDIOVASCULAR:  normal S1 and S2 and negative JVD  ABD:Abdomen soft, non-tender. BS normal. No masses,  No organomegaly  NEURO:Alert. DATA:    CBC:  Recent Labs     03/06/21  1143 03/08/21  0558   WBC 19.6* 19.2*   RBC 2.90* 2.73*   HGB 8.2* 7.7*   HCT 27.0* 24.9*    265   MCV 93.1 91.2   MCH 28.3 28.2   MCHC 30.4* 30.9*   RDW 17.3* 17.3*   SEGSPCT  --  70.0*      BMP:  Recent Labs     03/08/21  0558   *   K 4.5   CL 86*   CO2 41*   BUN 14   CREATININE 0.7   CALCIUM 8.6   GLUCOSE 76      ABG:  No results for input(s): PH, PO2ART, ASG4DDW, HCO3, BEART, O2SAT in the last 72 hours. Lab Results   Component Value Date    PROBNP 1,244 (H) 03/03/2021    PROBNP 310.0 (H) 02/28/2021    PROBNP 126.5 01/11/2021    THEOPH 4.9 (L) 03/06/2021     No results found for: [facility]    Radiology Review:  Pertinent images / reports were reviewed as a part of this visit.     Assessment:     Patient Active Problem List   Diagnosis    Hyperlipidemia    COPD [facility]chronic obstructive [facility] disease) [facility]    Leg pain    Hypertension    Shortness of breath    Tobacco abuse    Abdominal aortic aneurysm (AAA) without rupture (HCC)    Degeneration of lumbar or lumbosacral intervertebral disc    Osteopenia    Anxiety    Acute on chronic respiratory failure with hypoxia ([address]  Lung mass    COPD exacerbation (HCC)    Acute exacerbation of chronic obstructive pulmonary disease (COPD) (HCC)    Small cell lung cancer ([facility]    Acute on chronic respiratory failure with hypoxia and hypercapnia ([facility] [facility]       Plan:   1. Overall the patient has improved. 2. [address] Activity.   Derrick Amaya MD  3/9/2021  11:05 AM

## 2021-03-09 NOTE — PROGRESS NOTES
Infectious Disease Progress Note  3/9/2021   Patient Name: Chiquita Sandhu : 1950   Impression  · Sepsis Secondary to Acute on Chronic Hypoxic and Hypercapnic Respiratory Failure, Acute Exacerbation COPD:   § Afebrile  § Leukocytosis  § 3/2-Respiratory culture pending, not able to produce sputum  § 3/2-RDP, Legionella and Strep Pna negative  § -CTA Pulmonary W Contrast: No PE, Multiple nodules within both lungs with a mixed response to therapy. § Dr. Roe Mccoy, pulmonology, onboard, rec Solu-Medrol 40 mg q6h, duonebs, weaning oxygen keep sats >90%     · Right Lung SCLC Stage III:   § Dr. Red Asp Next chemo of cisplatin/etoposide due 3/3, deferred until stabilized  § CT scan showed some response to treatment     ? Tobacco Abuse     ? HTN     ? Dysphagia with Weight Loss:  § Dr. Vania Pennington 3/3-S/P per Dr. Gayle Riff: EGD: Imp of severe suspicious for medication esophagitis (likely potassium capsule or vibramycin)    § Plan: await biopsies, DC K capsule and vibramycin or change to IV or liquid prep  § 3/4-Barium Swallow:  No evidence of aspiration    · Multi-morbidity: per PMHx:  Arthritis, COPD, HLD, HTN, home oxygen 4 L/nc , appey, hyster, right joint replacement, tobacco abuse     Plan:  · Continue IV doxycycline 100 mg q12h (end date 3/12/21)  · Continue IV cefepime 2 gm q12h (end date 3/12/21)  · Continue fluconazole po 200 mg daily   · Check Pct and CRP, Pct trending upward, CRP consistently low  ? Await fungitel and aspergillus 3/3  ? She has with her oxygen needs decreasing,patient reports feeling improved, will continue to follow closely with bio-markers. Ongoing Antimicrobial Therapy  Cefepime 3/3-  Doxycycline 3/1-? Fluconazole 3/5-  Completed Antimicrobial Therapy  Ceftriaxone 3/2-3? History:? Interval history noted. Chief complaint: Sepsis secondry to acute on chronic hypoxic and hypercapnic respiratory failure, acute exacerbation of COPD.   Denies n/v/d/f or untoward effects of antibiotics. Resting quietly, states is feeling less dyspneic today. Physical Exam:  Vital Signs: BP (!) 144/65   Pulse 100   Temp 99.4 °F (37.4 °C) (Oral)   Resp 19   Ht 5' (1.524 m)   Wt 125 lb 12.8 oz (57.1 kg)   SpO2 94%   BMI 24.57 kg/m²     Gen: alert and oriented X3, no distress  Skin: no stigmata of endocarditis  Wounds: C/D/I  HEMT: AT/NC Oropharynx pink, moist, and without lesions or exudates; dentition in good state of repair  Eyes: PERRLA, EOMI, conjunctiva pink, sclera anicteric. Neck: Supple. Trachea midline. No LAD. Chest: no distress and CTA. Good air movement. Oxygen per vapotherm. Heart: RRR to ST and no MRG. Abd: soft, non-distended, no tenderness, no hepatomegaly. Normoactive bowel sounds. Ext: no clubbing, cyanosis, or edema  Neuro: Mental status intact.  CN 2-12 intact and no focal sensory or motor deficits     Radiologic / Imaging / TESTING  2/28/21 XR Chest Portable:  Impression   Minimal blunting of the left costophrenic angle, which may reflect a very   small joint effusion.  Otherwise no acute abnormality detected      2/28/21 CTA Pulmonary W Contrast:  Impression   No evidence of pulmonary embolism or aortic dissection.  Overall, no acute   abnormality identified.       Multiple nodules within both lungs, with a mixed response to therapy.  The   majority of the nodules are similar in size.  However, at least 1 nodule   within the right lung apex is decreased in size.      3/3/21 XR Chest (2VW):  Impression   No acute cardiopulmonary disease.  The lung nodules noted on CT from 2 days   earlier are not well perceived on the conventional radiograph.      3/3/21 Fluoroscopy modified barium swallow with video:  Impression   Swallowing mechanism grossly within normal limits without evidence of   aspiration.       Please see separate speech pathology report for full discussion of findings   and recommendations.             Labs:    Recent Results (from the past 24 hour(s))   C-Reactive Protein    Collection Time: 03/09/21  6:46 AM   Result Value Ref Range    CRP, High Sensitivity 6.2 mg/L   Procalcitonin    Collection Time: 03/09/21  6:46 AM   Result Value Ref Range    Procalcitonin 0.352      CULTURE results: Invalid input(s): BLOOD CULTURE,  URINE CULTURE, SURGICAL CULTURE    Diagnosis:  Patient Active Problem List   Diagnosis    Hyperlipidemia    [facility] disease) [facility]    Leg pain    Hypertension    Shortness of breath    Tobacco abuse    Abdominal aortic aneurysm (AAA) without rupture (HCC)    Degeneration of lumbar or lumbosacral intervertebral disc    Osteopenia    Anxiety    Acute on chronic respiratory failure with hypoxia (HCC)    Lung mass    COPD exacerbation (HCC)    Acute exacerbation of chronic obstructive pulmonary disease (COPD) ([facility]    [facility] lung [facility]    Acute on chronic respiratory failure with hypoxia and hypercapnia (HCC)       Active Problems  Principal Problem:    Acute on chronic respiratory failure with hypoxia and hypercapnia (HCC)  Active Problems:    COPD (chronic obstructive pulmonary disease) (HCC)    Shortness of breath    Small cell lung cancer (HCC)  Resolved Problems:    * No resolved hospital problems. *    Electronically signed by: Electronically signed by HELIO Cherry CNP on 3/9/2021 at 10:28 AM

## 2021-03-09 NOTE — PROGRESS NOTES
Sent a group email about patient receiving chemotherapy while inpatient. Patient currently in room 3106-A. Dr Mercedes Kessler has asked that patient is moved to [id number] [facility].

## 2021-03-09 NOTE — PROGRESS NOTES
Pt has home oxygen with [employer]. Berkley Ruffin said her oxygen concentrator will go up to 5 lpm.  If pt needs more than 5 lpm, new testing will need to be done. Pt will need oxygen for transportation home. Please call family and have them bring in her concentrator before he discharge.

## 2021-03-09 NOTE — PROGRESS NOTES
She still has problem with dysphagia. Esophagogram from March 8, 2021 showed  1. The esophagus is diffusely small in caliber which could be related to   suboptimal swallows.  Eosinophilic esophagitis could be a possibility. 2. The 13 mm barium tablet remained in the distal esophagus just above the GE   junction after 5 minutes.  A distal esophageal stricture/GE junction   stricture cannot be excluded.  Consider short-term radiographic follow-up to   ensure passage. 3. Otherwise limited exam due to patient condition and oxygen desaturation   throughout the exam.     She is concerned about missing her chemotherapy. I will try to arrange for chemotherapy while she is in the hospital.  She will need 3 days of chemo. She may be transferred to [facility] for potential chemo. Chest x-ray on March 8, 2021 showed stable examination without acute focal process. Temperature 99.4, pulse 100, respiratory 19, blood pressure 144/65. She is awake, alert and in no acute distress. Supple, no JVD, pupils are reactive. Positive pallor. S1-S2 regular. [address] air entry bilaterally. No wheezes. Soft, bowel sounds present, nontender, nondistended. No cyanosis. No neurofocal deficit grossly. A/P:  1. She has small cell cancer of the lung. She was due for the third cycle of chemo on March 20 21. I will try to arrange for chemo while she is in the hospital.  2.  Dysphagia could be related to stricture. GI has been on board. I recommend to eat small meals each time. 3.  Anemia is multifactorial.  Ferritin was 418, folate 13.1, B12 1577. I will continue to monitor CBC. May consider blood transfusions if hemoglobin drops further.   Thanks

## 2021-03-09 NOTE — PROGRESS NOTES
Hospitalist Progress Note      Name:  Satnam Saldivar /Age/Sex: 1950  (65 y.o. female)   MRN & CSN:  [id number] & [id number] Admission Date/Time: 2021  6:04 PM   Location:  310/3106A PCP: Alisha Jasso MD         Hospital Day: 10    Assessment and Plan:   Satnam Saldivar is a 65 y.o.  female  who presents with Acute on chronic respiratory failure with hypoxia and hypercapnia (HCC)       Sepsis 2/2 CAP,POA  Acute on chronic hypoxemic and hypercapnic respiratory failure  Acute exacerbation of COPD  Possible pneumonia with sepsis  Currently she is on 5 L O2 stable on this level today  CTA chest ruled out with decreased size of nodule  Leukocytosis improved  Continue breathing treatments  Continue steroids  Continue theophylline  Continue Mucomyst   Continue empiric cefepime and doxycycline till 3/10/2021  Pulmonology on board  ID following     Small cell cancer of right lung, stage IIIb  CT scan showed some response to treatment  Heme-onc recommendations appreciated-s/p 2 cycles of cisplatin/etoposide     Dysphagia  Severe Exudative esophagitis with candidal infection  GI recommendations appreciated-S/p EGD on 3/3/21 with exudative esophagitis likely pill induced with potassium or doxycycline.  Ordered FL esophagogram with decreased caliber of the esophagus--Likely consistent with eosinophilic esophagitis  Biopsy with benign ulceration with inflammatory changes with candida--added Diflucan and Nystatin swish and swallow  To use liquid potassium for replacements  Changed oral doxy to IV  SLP recommendations appreciated, with dysphagia III phase diet recommendations     Hyponatremia-improved  Likely from poor oral intake, improved with fluids     Anemia of chronic disorders  Monitor CBC      Depression/anxiety  Continue BuSpar and Zoloft  Continue Vistaril     Hypertension  Continue Cardizem     Hyperlipidemia-continue Lipitor    #dispisition: Plan is home tomorrow once concentrator or another oxygen tank is bale to be provided    Diet DIET GENERAL; Dysphagia Minced and Moist  Dietary Nutrition Supplements: Standard High Calorie Oral Supplement  Dietary Nutrition Supplements: Frozen Oral Supplement   DVT Prophylaxis [] Lovenox, []  Heparin, [] SCDs, [] Ambulation   GI Prophylaxis [] PPI,  [] H2 Blocker,  [] Carafate,  [] Diet/Tube Feeds   Code Status Full Code   Disposition Patient requires continued admission due to needed concerntrator         History of Present Illness:     Chief Complaint: Acute on chronic respiratory failure with hypoxia and hypercapnia ([address]     Favio Hathaway is a 65 y.o.  female  who presents with  SOB    Ten point ROS reviewed negative, unless as noted above    Objective: Intake/Output Summary (Last 24 hours) at 3/9/2021 1128  Last data filed at 3/8/2021 1929  Gross per 24 hour   Intake 400 ml   Output 400 ml   Net 0 ml      Vitals:   Vitals:    03/09/21 1105   BP:    Pulse:    Resp:    Temp:    SpO2: (!) 86%     Physical Exam:   GEN Awake female, sitting upright in bed in no apparent distress. Appears given age. EYES Pupils are equally round. No scleral erythema, discharge, or conjunctivitis. HENT Mucous membranes are moist. Oral pharynx without exudates, no evidence of thrush. NECK Supple, no apparent thyromegaly or masses. RESP Clear to auscultation, no wheezes, rales or rhonchi. Symmetric chest movement while nasal canula  CARDIO/VASC S1/S2 auscultated. Regular rate without appreciable murmurs, rubs, or gallops. No JVD or carotid bruits. Peripheral pulses equal bilaterally and palpable. No peripheral edema. GI Abdomen is soft without significant tenderness, masses, or guarding. Bowel sounds are normoactive. Rectal exam deferred.  No costovertebral angle tenderness. Normal appearing external genitalia. Nicole catheter is not present. HEME/LYMPH No palpable cervical lymphadenopathy and no hepatosplenomegaly. No petechiae or ecchymoses.   MSK No gross joint deformities. SKIN Normal coloration, warm, dry. NEURO Cranial nerves appear grossly intact, normal speech, no lateralizing weakness. PSYCH Awake, alert, oriented x 4. Affect appropriate.     Medications:   Medications:    fluconazole  200 mg Oral Daily    nystatin  5 mL Oral 4x Daily    theophylline  200 mg Oral BID    pantoprazole  40 mg Oral QAM AC    cefepime  2,000 mg Intravenous Q12H    sucralfate  1 g Oral 4 times per day    doxycycline (VIBRAMYCIN) IV  100 mg Intravenous Q12H    ipratropium-albuterol  1 ampule Inhalation Q4H WA    aspirin  81 mg Oral Daily    busPIRone  10 mg Oral BID    calcium elemental  500 mg Oral Daily    vitamin D  1,000 Units Oral Daily    dilTIAZem  120 mg Oral Daily    sertraline  50 mg Oral Daily    atorvastatin  10 mg Oral Daily    sodium chloride flush  10 mL Intravenous 2 times per day    enoxaparin  40 mg Subcutaneous Daily    predniSONE  40 mg Oral Daily    cetirizine  10 mg Oral Daily    acetylcysteine  600 mg Inhalation BID      Infusions:   PRN Meds: LORazepam, 1 mg, Q6H PRN  LORazepam, 1 mg, Q6H PRN  docusate sodium, 100 mg, Daily PRN  magnesium sulfate, 1,000 mg, PRN  albuterol, 2.5 mg, Q4H PRN  HYDROcodone-acetaminophen, 1 tablet, Q4H PRN  sodium chloride flush, 10 mL, PRN  promethazine, 12.5 mg, Q6H PRN    Or  ondansetron, 4 mg, Q6H PRN  polyethylene glycol, 17 g, Daily PRN  acetaminophen, 650 mg, Q6H PRN    Or  acetaminophen, 650 mg, Q6H PRN  benzonatate, 100 mg, TID PRN          Electronically signed by Valentín Marie MD on 3/9/2021 at 11:28 AM

## 2021-03-09 NOTE — PLAN OF CARE
Problem: Pain:  Description: Pain management should include both nonpharmacologic and pharmacologic interventions. Goal: Pain level will decrease  Description: Pain level will decrease  3/8/2021 1927 by Ganesh Atwood RN  Outcome: Ongoing  3/8/2021 1924 by Ganesh Atwood RN  Outcome: Ongoing  Goal: Control of acute pain  Description: Control of acute pain  3/8/2021 1927 by Ganesh Atwood RN  Outcome: Ongoing  3/8/2021 1924 by Ganesh Atwood RN  Outcome: Ongoing  Goal: Control of chronic pain  Description: Control of chronic pain  3/8/2021 1927 by Ganesh Atwood RN  Outcome: Ongoing  3/8/2021 1924 by Ganesh Atwood RN  Outcome: Ongoing     Problem: Falls - Risk of:  Goal: Will remain free from falls  Description: Will remain free from falls  3/8/2021 1927 by Ganesh Atwood RN  Outcome: Ongoing  3/8/2021 1924 by Ganesh Atwood RN  Outcome: Ongoing  Goal: Absence of physical injury  Description: Absence of physical injury  3/8/2021 1927 by Ganesh Atwood RN  Outcome: Ongoing  3/8/2021 1924 by Ganesh Atwood RN  Outcome: Ongoing     Problem: Anxiety:  Goal: Level of anxiety will decrease  Description: Level of anxiety will decrease. Emotional support provided. Medicated per MAR. Outcome: Ongoing  Note: Medicated per [facility] as appropriate.  Emotional support provided/

## 2021-03-09 NOTE — PROGRESS NOTES
Comprehensive Nutrition Assessment    Type and Reason for Visit:  Initial, Consult(diet ed, pt with h/o CA)    Nutrition Recommendations/Plan:   · Continue current diet as tolerated  · Smaller, more frequent protein containing meals and snacks  · Will modify supplement to add variety, between meals tid    Nutrition Assessment:  Fair intake on dysphagia minced and moist diet with oral supplements. Feeding self and consuming 25-75% of meals. Dysphagia could be related to stricture noted. Pt transferring room for inpatient chemo noted. Will continue current diet and supplement and following as moderate nutrition risk. Malnutrition Assessment:  Malnutrition Status: At risk for malnutrition    Context:  Chronic Illness       Estimated Daily Nutrient Needs:  Energy (kcal):  9729-0214 (30-32 kcal/kg); Weight Used for Energy Requirements:  Current     Protein (g):  57-68 (1-1.2 g/kg); Weight Used for Protein Requirements:  Current        Fluid (ml/day):  1806-9866; Method Used for Fluid Requirements:  1 ml/kcal      Wounds:  None       Current Nutrition Therapies:    Dietary Nutrition Supplements: Standard High Calorie Oral Supplement  DIET GENERAL; Dysphagia Minced and Moist    Anthropometric Measures:  · Height: 5' (152.4 cm)  · Current Body Weight: 125 lb 12.8 oz (57.1 kg)   · Admission Body Weight: 125 lb 3.2 oz (56.8 kg)    · Usual Body Weight: 120 lb 3.2 oz (54.5 kg)(3/6/20)     · Ideal Body Weight: 100 lbs; % Ideal Body Weight 125.2 %   · BMI: 24.6  · BMI Categories: Normal Weight (BMI 18.5-24. 9)       Nutrition Diagnosis:   · Predicted inadequate energy intake related to swallowing difficulty as evidenced by intake 26-50%, intake 51-75%, intake 0-25%, poor intake prior to admission, swallow study results    Nutrition Interventions:   Food and/or Nutrient Delivery:  Continue Current Diet, Modify Oral Nutrition Supplement  Nutrition Education/Counseling:  Education initiated   Coordination of Nutrition Care: Continue to monitor while inpatient    Goals:  Patient will continue to consume at least 75% at meals during stay       Nutrition Monitoring and Evaluation:   Behavioral-Environmental Outcomes:  None Identified   Food/Nutrient Intake Outcomes:  Food and Nutrient Intake, Supplement Intake  Physical Signs/Symptoms Outcomes:  Biochemical Data, Chewing or Swallowing, Nausea or Vomiting, Meal Time Behavior, Weight     Discharge Planning:    Continue Oral Nutrition Supplement     Electronically signed by Fredrick Zuniga RD, LD on 3/9/21 at 11:05 AM EST    Contact: [phone]

## 2021-03-09 NOTE — PLAN OF CARE
Problem: Pain:  Goal: Pain level will decrease  Description: Pain level will decrease  3/9/2021 1429 by Ruben Busch RN  Outcome: Ongoing  3/9/2021 1428 by Ruben Busch RN  Outcome: Ongoing  Goal: Control of acute pain  Description: Control of acute pain  3/9/2021 1429 by Ruben Busch RN  Outcome: Ongoing  3/9/2021 1428 by Ruben Busch RN  Outcome: Ongoing  Goal: Control of chronic pain  Description: Control of chronic pain  3/9/2021 1429 by Ruben Busch RN  Outcome: Ongoing  3/9/2021 1428 by Ruben Busch RN  Outcome: Ongoing     Problem: Anxiety:  Goal: Level of anxiety will decrease  Description: Level of anxiety will decrease. Emotional support provided.  Medicated per MAR.   3/9/2021 1429 by Ruben Busch RN  Outcome: Ongoing  3/9/2021 1428 by Ruben Busch RN  Outcome: Ongoing

## 2021-03-09 NOTE — PLAN OF CARE
Problem: Pain:  Goal: Pain level will decrease  Description: Pain level will decrease  Outcome: Ongoing  Goal: Control of acute pain  Description: Control of acute pain  Outcome: Ongoing  Goal: Control of chronic pain  Description: Control of chronic pain  Outcome: Ongoing     Problem: Falls - Risk of:  Goal: Will remain free from falls  Description: Will remain free from falls  Outcome: Ongoing  Goal: Absence of physical injury  Description: Absence of physical injury  Outcome: Ongoing     Problem: Anxiety:  Goal: Level of anxiety will decrease  Description: Level of anxiety will decrease. Emotional support provided. Medicated per MAR.    Outcome: Ongoing

## 2021-03-09 NOTE — CARE COORDINATION
CM met with pt for f/u visit for d/c planning. Pt c/o being very weak and wants to go to rehab. She informed CM that she receives Chemo every other week on [facility] and plans to continue her tx. CM explained to pt that [facility] is the only SNF that has a contract with the [facility]. [facility] is in network with her insurance. Referral made to [facility]. She will review clinicals and will notify CM if they will be able to accept pt or not.   NILAY

## 2021-03-09 NOTE — PROGRESS NOTES
Physician Progress Note      Laureano Osorio  CSN #:                  [id number]  :                       1950  ADMIT DATE:       2021 6:04 PM  [address] DATE:  RESPONDING  PROVIDER #:        Krish Carter MD          QUERY TEXT:    Dear Hospitalist    Pt admitted with Pneumonia. Pt noted to have Sepsis documented in the 3/3   Consult note from ID 3/4 progress note by Dr David Garcia. If possible, please   document in the progress notes and discharge summary if you are evaluating and   /or treating any of the following: The medical record reflects the following:  Risk Factors: Aspiration Pneumonia, Acute resp Failure, Malignant CA of the   right lung, AECOPD, Candida  Clinical Indicators: WBC 18.7 on admission, Segs 73, CRP 2.6,  Leukocytosis   max 32.0 on 3/2  Treatment: IV doxycycline 100 mg q12h ,IV cefepime 2 gm q12h Check Pct and   CRP, Pct staying consistent at 0.291  Await respiratory culture 3/2   Await   fungitel and aspergillus 3/3. Infectious Disease Cx, Onc Cx, Gi Cx, Pulm Cx    Thank you Salas Putnam RN, CDS ([phone])  Options provided:  -- Sepsis, present on admission  -- Sepsis, not present on admission  -- Other - I will add my own diagnosis  -- Disagree - Not applicable / Not valid  -- Disagree - Clinically unable to determine / Unknown  -- Refer to Clinical Documentation Reviewer    PROVIDER RESPONSE TEXT:    This patient has sepsis which was present on admission.     Query created by: Grace Sutton on 3/9/2021 6:38 AM      Electronically signed by:  Krish Carter MD 3/9/2021 11:27 AM

## 2021-03-10 NOTE — PROGRESS NOTES
Hospitalist Progress Note      Name:  Tiffanie Baird /Age/Sex: 1950  (65 y.o. female)   MRN & CSN:  [id number] & [id number] Admission Date/Time: 2021  6:04 PM   Location:  [address] [place] [address]-A PCP: Emir Hill MD         Hospital Day: 11    Assessment and Plan:   Tiffanie Baird is a 65 y.o.  female  who presents with Acute on chronic respiratory failure with hypoxia and hypercapnia (HCC)       Sepsis 2/2 CAP,POA  Acute on chronic hypoxemic and hypercapnic respiratory failure  Acute exacerbation of COPD  Possible pneumonia with sepsis  Currently she is on 5 L O2 stable on this level today  CTA chest ruled out with decreased size of nodule  Leukocytosis improved  Continue breathing treatments  Continue steroids  Continue theophylline  Continue Mucomyst   Continue empiric cefepime and doxycycline for DC per ID  Pulmonology on board  ID following     Small cell cancer of right lung, stage IIIb  Plan is for few inpatient chemo. CT scan showed some response to treatment  Heme-onc recommendations appreciated-s/p 2 cycles of cisplatin/etoposide     Dysphagia  Severe Exudative esophagitis with candidal infection  GI recommendations appreciated-S/p EGD on 3/3/21 with exudative esophagitis likely pill induced with potassium or doxycycline.  Ordered FL esophagogram with decreased caliber of the esophagus--Likely consistent with eosinophilic esophagitis  Biopsy with benign ulceration with inflammatory changes with candida--added Diflucan and Nystatin swish and swallow  To use liquid potassium for replacements  Changed oral doxy to IV  SLP recommendations appreciated, with dysphagia III phase diet recommendations     Hyponatremia-improved  Likely from poor oral intake, improved with fluids     Anemia of chronic disorders  Monitor CBC      Depression/anxiety  Continue BuSpar and Zoloft  Continue Vistaril     Hypertension  Continue Cardizem     Hyperlipidemia-continue Lipitor    #dispisition: Plan is home tomorrow once concentrator or another oxygen tank is bale to be provided    Diet DIET GENERAL; Dysphagia Minced and Moist  Dietary Nutrition Supplements: Standard High Calorie Oral Supplement  Dietary Nutrition Supplements: Frozen Oral Supplement   DVT Prophylaxis [] Lovenox, []  Heparin, [] SCDs, [] Ambulation   GI Prophylaxis [] PPI,  [] H2 Blocker,  [] Carafate,  [] Diet/Tube Feeds   Code Status Full Code   Disposition Patient requires continued admission due to needed concerntrator         History of Present Illness:     Chief Complaint: Acute on chronic respiratory failure with hypoxia and hypercapnia ([address]     Pablo Hussein is a 65 y.o.  female  who presents with  SOB    Ten point ROS reviewed negative, unless as noted above    Objective: Intake/Output Summary (Last 24 hours) at 3/10/2021 1415  Last data filed at 3/10/2021 0900  Gross per 24 hour   Intake 900 ml   Output 1000 ml   Net -100 ml      Vitals:   Vitals:    03/10/21 1209   BP:    Pulse: 98   Resp: 23   Temp:    SpO2: 94%     Physical Exam:   GEN Awake female, sitting upright in bed in no apparent distress. Appears given age. EYES Pupils are equally round. No scleral erythema, discharge, or conjunctivitis. HENT Mucous membranes are moist. Oral pharynx without exudates, no evidence of thrush. NECK Supple, no apparent thyromegaly or masses. RESP Clear to auscultation, no wheezes, rales or rhonchi. Symmetric chest movement while on nasal canula  CARDIO/VASC S1/S2 auscultated. Regular rate without appreciable murmurs, rubs, or gallops. No JVD or carotid bruits. Peripheral pulses equal bilaterally and palpable. No peripheral edema. GI Abdomen is soft without significant tenderness, masses, or guarding. Bowel sounds are normoactive. Rectal exam deferred.  No costovertebral angle tenderness. Normal appearing external genitalia. Nicole catheter is not present. HEME/LYMPH No palpable cervical lymphadenopathy and no hepatosplenomegaly.  No petechiae or ecchymoses. MSK No gross joint deformities. SKIN Normal coloration, warm, dry. NEURO Cranial nerves appear grossly intact, normal speech, no lateralizing weakness. PSYCH Awake, alert, oriented x 4. Affect appropriate.     Medications:   Medications:    CISplatin (PLATINOL) and mannitol chemo IVPB   Intravenous Once    etoposide (VEPESID) chemo IVPB  60 mg/m2 (Treatment Plan Recorded) Intravenous Once    docusate sodium  100 mg Oral BID    pre/post-hydration with electrolyte(s) IVPB builder   Intravenous Once    fosaprepitant (EMEND) IVPB  150 mg Intravenous Once    palonosetron  0.25 mg Intravenous Once    dexamethasone  12 mg Intravenous Once    pre/post-hydration with electrolyte(s) IVPB builder   Intravenous Once    diphenhydrAMINE  50 mg Intravenous Once    hydrocortisone sodium succinate PF  100 mg Intravenous Once    methylPREDNISolone  125 mg Intravenous Once    famotidine (PEPCID) injection  20 mg Intravenous Once    alteplase  2 mg Intracatheter Once    alteplase  2 mg Intracatheter Once    sterile water  2.2 mL Injection Once    fluconazole  200 mg Oral Daily    nystatin  5 mL Oral 4x Daily    theophylline  200 mg Oral BID    pantoprazole  40 mg Oral QAM AC    cefepime  2,000 mg Intravenous Q12H    sucralfate  1 g Oral 4 times per day    doxycycline (VIBRAMYCIN) IV  100 mg Intravenous Q12H    ipratropium-albuterol  1 ampule Inhalation Q4H WA    aspirin  81 mg Oral Daily    busPIRone  10 mg Oral BID    calcium elemental  500 mg Oral Daily    vitamin D  1,000 Units Oral Daily    dilTIAZem  120 mg Oral Daily    sertraline  50 mg Oral Daily    atorvastatin  10 mg Oral Daily    sodium chloride flush  10 mL Intravenous 2 times per day    enoxaparin  40 mg Subcutaneous Daily    predniSONE  40 mg Oral Daily    cetirizine  10 mg Oral Daily    acetylcysteine  600 mg Inhalation BID      Infusions:    sodium chloride      sodium chloride       PRN Meds: EPINEPHrine, 0.3 mg, PRN  sodium chloride flush, 5 mL, PRN  sodium chloride flush, 10 mL, PRN  heparin flush, 500 Units, PRN  LORazepam, 1 mg, Q4H PRN  LORazepam, 1 mg, Q6H PRN  docusate sodium, 100 mg, Daily PRN  magnesium sulfate, 1,000 mg, PRN  albuterol, 2.5 mg, Q4H PRN  HYDROcodone-acetaminophen, 1 tablet, Q4H PRN  sodium chloride flush, 10 mL, PRN  promethazine, 12.5 mg, Q6H PRN    Or  ondansetron, 4 mg, Q6H PRN  polyethylene glycol, 17 g, Daily PRN  acetaminophen, 650 mg, Q6H PRN    Or  acetaminophen, 650 mg, Q6H PRN  benzonatate, 100 mg, TID PRN          Electronically signed by Hunter Adame MD on 3/10/2021 at 2:15 PM

## 2021-03-10 NOTE — DISCHARGE INSTR - COC
Continuity of Care Form    Patient Name: Mami Mays   :  1950  MRN:  [id number]    Admit date:  2021  Discharge date:  ***    Code Status Order: Full Code   Advance Directives:   Advance Care Flowsheet Documentation     Date/Time Healthcare Directive Type of Healthcare Directive Copy in [address] Agent's Name Healthcare Agent's Phone Number    21 [phone]  No, patient does not have an advance directive for healthcare treatment -- -- -- -- --    21 1259  No, patient does not have an advance directive for healthcare treatment -- -- -- -- --          Admitting Physician:  [facility],   PCP: Ruby Urrutia MD    Discharging Nurse: [facility] Unit/Room#: 5169/7532-W  Discharging Unit Phone Number: ***    Emergency Contact:   Extended Emergency Contact Information  Primary Emergency Contact: [address] [place] [address] Kem [address] Phone: [phone]  Mobile Phone: [phone]  Relation: [address] needed? No  Secondary Emergency Contact: Gale Laboy, [address] [place] [address] Phone: [phone]  Mobile Phone: [phone]  Relation: Grandchild   needed?  No    Past Surgical History:  Past Surgical History:   Procedure Laterality Date    BACK SURGERY      \"about 12 yrs ago - surgery my mid to low back \" done in [address] N/A 2021    BRONCHOSCOPY ENDOBRONCHIAL ULTRASOUND performed by Jammie Salomon MD at [facility]      \"last one done in my age 61's    CYST REMOVAL  's    left arm    EYE SURGERY  ?    svetlana cataract ext     FOOT SURGERY Right     \"have screw in 2nd toe\"    HYSTERECTOMY      \"took everything \"    JOINT REPLACEMENT Right ??    hip    LAPAROSCOPIC APPENDECTOMY N/A 2019    APPENDECTOMY LAPAROSCOPIC performed by Junaid Madrigal MD at [facility] 12/10/2020    MEDIASTINOSCOPY performed by Jammie Salomon MD at [address] [facility] GASTROINTESTINAL ENDOSCOPY N/A 3/3/2021    EGD BIOPSY AND BRUSHING performed by Vlad Goode MD at [facility] ENDOSCOPY       Immunization History:   Immunization History   Administered Date(s) Administered    Influenza A ([id number]) Vaccine PF IM 12/10/2009    Influenza, High Dose (Fluzone 65 yrs and older) 09/19/2014, 10/23/2015, 09/18/2017, 09/21/2018    Influenza, Triv, inactivated, subunit, adjuvanted, IM (Fluad 65 yrs and older) 09/30/2019, 10/08/2020    Pneumococcal Conjugate 13-valent (Ukprhcb82) 03/24/2015    Pneumococcal Polysaccharide (Otlfrbouj24) 09/17/2007, 03/06/2020    Tdap (Boostrix, Adacel) 06/22/2009       Active Problems:  Patient Active Problem List   Diagnosis Code    Hyperlipidemia E78.5    COPD (chronic obstructive pulmonary disease) ([facility] J44.9    Leg pain M79.606    Hypertension I10    Shortness of breath R06.02    Tobacco abuse Z72.0    Abdominal aortic aneurysm (AAA) without rupture (HCC) I71.4    Degeneration of lumbar or lumbosacral intervertebral disc M51.37    Osteopenia M85.80    Anxiety F41.9    Acute on chronic respiratory failure with hypoxia (HCC) J96.21    Lung mass R91.8    COPD exacerbation ([facility] J44.1    Acute exacerbation of chronic obstructive pulmonary disease (COPD) ([facility] J44.1    [facility] ([facility] C34.90    Acute on chronic respiratory failure with hypoxia and hypercapnia (HCC) J96.21, J96.22       Isolation/Infection:   Isolation          No Isolation        Patient Infection Status     Infection Onset Added Last Indicated Last Indicated By Review Planned Expiration Resolved Resolved By    None active    Resolved    COVID-19 Rule Out 02/28/21 02/28/21 02/28/21 Respiratory Panel, Molecular, with COVID-19 (Restricted: peds pts or suitable admitted adults) (Ordered)   02/28/21 Rule-Out Test Resulted    COVID-19 Rule Out 02/28/21 02/28/21 02/28/21 COVID-19, Rapid (Ordered)   02/28/21 Rule-Out Test Resulted    COVID-19 Rule Out 01/11/21 01/11/21 01/11/21 COVID-19 (Ordered)   01/11/21 Rule-Out Test Resulted    COVID-19 Rule Out 12/28/20 12/28/20 12/28/20 COVID-19 (Ordered)   12/28/20 Rule-Out Test Resulted    COVID-19 Rule Out 12/01/20 12/01/20 12/01/20 COVID-19 (Ordered)   12/02/20 Rule-Out Test Resulted    COVID-19 Rule Out 11/30/20 11/30/20 11/30/20 Covid-19 Ambulatory (Ordered)   12/01/20 Rule-Out Test Resulted    COVID-19 Rule Out 11/02/20 11/02/20 11/02/20 Covid-19 Ambulatory (Ordered)   11/03/20 Rule-Out Test Resulted    COVID-19 Rule Out 09/18/20 09/18/20 09/19/20 COVID-19 (Ordered)   09/19/20 Rule-Out Test Resulted          Nurse Assessment:  Last Vital Signs: /68   Pulse 95   Temp 98.6 °F (37 °C) (Oral)   Resp 18   Ht 5' (1.524 m)   Wt 125 lb 12.8 oz (57.1 kg)   SpO2 98%   BMI 24.57 kg/m²     Last documented pain score (0-10 scale): Pain Level: 9  Last Weight:   Wt Readings from Last 1 Encounters:   03/08/21 125 lb 12.8 oz (57.1 kg)     Mental Status:  {IP PT MENTAL STATUS:55921}    IV Access:  { BHAVANA IV ACCESS:789414851}    Nursing Mobility/ADLs:  Walking   {CHP DME HGXF:809917933}  Transfer  {CHP DME DJDL:498501937}  Bathing  {CHP DME MGII:242785219}  Dressing  {CHP DME QOEY:944270137}  Toileting  {CHP DME CXSV:010031618}  Feeding  {CHP DME TFDX:663855206}  Med Admin  {CHP DME PSHP:477316157}  Med Delivery   { BHAVANA MED Delivery:682757612}    Wound Care Documentation and Therapy:        Elimination:  Continence:   · Bowel: {YES / WT:77703}  · Bladder: {YES / :22672}  Urinary Catheter: {Urinary Catheter:179285116}   Colostomy/Ileostomy/Ileal Conduit: {YES / DY:56837}       Date of Last BM: ***    Intake/Output Summary (Last 24 hours) at 3/10/2021 1005  Last data filed at 3/9/2021 1904  Gross per 24 hour   Intake 700 ml   Output 1000 ml   Net -300 ml     I/O last 3 completed shifts:   In: 700 [P.O.:500; IV Piggyback:200]  Out: 1000 [Urine:1000]    Safety Concerns:     [address] [facility] Safety Concerns:175056317}    Impairments/Disabilities: [address] [place] [address] Impairments/Disabilities:077957492}      Patient's personal belongings (please select all that are sent with patient):  {CHP DME Belongings:334789704}    RN SIGNATURE:  {Esignature:113327612}    CASE MANAGEMENT/SOCIAL WORK SECTION    Inpatient Status Date: ***    Readmission Risk Assessment Score:  Readmission Risk              Risk of Unplanned Readmission:        41           Discharging to Facility/ Agency   · Name:   · Address:  · Phone:  · Fax:    Dialysis Facility (if applicable)   · Name:  · Address:  · Dialysis Schedule:  · Phone:  · Fax:    / signature: {Esignature:306013120}        PHYSICIAN SECTION      Nutrition Therapy:  Current Nutrition Therapy:   [address] [place] [address] Diet List:121038989}    Routes of Feeding: {CHP DME Other Feedings:745030434}  Liquids: {Slp liquid thickness:62174}  Daily Fluid Restriction: {CHP DME Yes amt example:478004066}  Last Modified Barium Swallow with Video (Video Swallowing Test): {Done Not Done AZJT:986865449}    Treatments at the Time of Hospital Discharge:   Respiratory Treatments: ***  Oxygen Therapy:  {Therapy; copd oxygen:09259}  Ventilator:    { CC Vent IKGI:034077729}    Rehab Therapies: {THERAPEUTIC INTERVENTION:6647944810}  Weight Bearing Status/Restrictions: [address] [place] [address] Weight Bearin}  Other Medical Equipment (for information only, NOT a DME order):  {EQUIPMENT:888699310}  Other Treatments: ***    Prognosis: {Prognosis:7649097707}    Condition at Discharge: [address] [place] [address] Patient Condition:606569725}    Rehab Potential (if transferring to Rehab): {Prognosis:4018459761}    Recommended Labs or Other Treatments After Discharge: ***    Physician Certification: I certify the above information and transfer of Niurka Washington  is necessary for the continuing treatment of the diagnosis listed and that she requires {Admit to Appropriate Level of Care:93477} for {GREATER/LESS:124239374} 30 days.      Update Admission H&P: {CHP DME Changes in MVFYN:879455647}    PHYSICIAN SIGNATURE:  {Esignature:030627393}

## 2021-03-10 NOTE — PROGRESS NOTES
[address] [place] OF SPEECH/LANGUAGE PATHOLOGY  DAILY PROGRESS NOTE  Chiquita Sandhu  3/10/2021  [id number]  Shortness of breath [R06.02]  Hyponatremia [E87.1]  Small cell lung cancer ([address] [C34.90]  Hypoxia [R09.02]  Acute on chronic respiratory failure with hypoxia and hypercapnia (HCC) [J96.21, J96.22]  Leukocytosis, unspecified type [D72.829]  Allergies   Allergen Reactions    Formaldehyde     Gabapentin Other (See Comments)    Norflex Tablets [Orphenadrine]     Cranberry Rash         Pt was seen this date for dypshagia treatment. IMPRESSION AND RECOMMENDATIONS:   Chiquita Sandhu was seen for a bedside swallowing treatment and diet tolerance monitoring. Pt was alert and cooperative throughout the assessment. She reports continued esophageal dysphagia with globus sensation with solids. Esophagram completed 3/08/21 indicated \"The esophagus is diffusely smaller in caliber which could be related suboptimal swallows. Eosinophilic esophagitis could be a possibility. The 13 mm barium tablet remained in the distal esophagus just above the GE junction after 5 minutes. A distal esophageal stricture/GE junction stricture cannot be excluded. \".  Pt states she prefers the softer solid diet (minced & moist solids) at this time. For today's assessment pt was positioned upright in bed and accepted PO trials of puree, soft solids and thin liquids cup/straw sips. Oropharyngeal swallow appears grossly intact characterized by adequate mastication and oral clearance timely swallow initiation and adequate laryngeal elevation. Clear vocal quality and 0 overt s/s of aspiration were observed with all PO trials given. Recommend continue minced and moist diet/thin liquids with strict aspiration/reflux precautions. Pills should be given crushed in puree.   No further acute ST needs are identified at this time as oropharyngeal swallow is [facility] and pt is requesting to remain on minced & moist

## 2021-03-10 NOTE — CARE COORDINATION
Sent message to Radha with [facility] to check status of referral- awaiting response. Received denial for plcmt from [facility]. Met c pt to discuss next option. Pt states she felt [facility] was her best choice and after consideration does not want to try another SNF, wants to return home with support of [facility] and family. Pt states she lives with her dghtr and has good support of family and friends that all have picked up days to stay with her when her dghtr works. Pt states she already has wheelchair, walker, BSC, and shower chair. Pt would like [address] [place] [address] Rd, and her home [address] is through [address]. Sent PS to [facility] with [address] [place] [address] Rd to follow.

## 2021-03-10 NOTE — PROGRESS NOTES
Labs:    Recent Results (from the past 24 hour(s))   C-Reactive Protein    Collection Time: 03/10/21  6:34 AM   Result Value Ref Range    CRP, High Sensitivity 3.6 mg/L     CULTURE results: Invalid input(s): BLOOD CULTURE,  URINE CULTURE, SURGICAL CULTURE    Diagnosis:  Patient Active Problem List   Diagnosis    Hyperlipidemia    [facility]    Leg pain    Hypertension    Shortness of breath    Tobacco abuse    Abdominal aortic aneurysm (AAA) without rupture (HCC)    Degeneration of lumbar or lumbosacral intervertebral disc    Osteopenia    Anxiety    Acute on chronic respiratory failure with hypoxia (HCC)    Lung mass    COPD exacerbation (HCC)    Acute exacerbation of chronic obstructive pulmonary disease ([facility]D) [facility]    Small cell lung cancer (HCC)    Acute on chronic respiratory failure with hypoxia and hypercapnia (HCC)       Active Problems  Principal Problem:    Acute on chronic respiratory failure with hypoxia and hypercapnia (HCC)  Active Problems:    COPD (chronic obstructive pulmonary disease) (HCC)    Shortness of breath    Small cell lung cancer (HCC)  Resolved Problems:    * No resolved hospital problems. *    Electronically signed by: Electronically signed by Annie Forde.  HELIO Mayberry CNP on 3/10/2021 at 8:29 AM

## 2021-03-10 NOTE — PROGRESS NOTES
HEMATOLOGY & ONCOLOGY progress note  [facility]      Patient was seen and examined today. Not in any acute distress. She continues to have issues swallowing. Not moving bowels well. Breathing somewhat better. PHYSICAL EXAM    Vitals: /68   Pulse 95   Temp 98.6 °F (37 °C) (Oral)   Resp 18   Ht 5' (1.524 m)   Wt 125 lb 12.8 oz (57.1 kg)   SpO2 98%   BMI 24.57 kg/m²   General appearance: alert, appears stated age and cooperative. Skin: Skin color, texture, turgor normal. No rashes or lesions  HEENT: Head: Normocephalic, no lesions, without obvious abnormality. Lungs: faint expiratory wheezing  Heart: regular rate and rhythm, S1, S2 normal  Abdomen: soft, non-tender; bowel sounds normal; no masses  Extremities: extremities normal, atraumatic, no cyanosis or edema  Neurologic: Mental status: Alert, oriented, thought content appropriate    LABORATORY RESULTS  CBC:   Recent Labs     03/08/21  0558 03/09/21  0646   WBC 19.2* 19.6*   HGB 7.7* 8.0*    253     BMP:    Recent Labs     03/08/21  0558   *   K 4.5   CL 86*   CO2 41*   BUN 14   CREATININE 0.7   GLUCOSE 76     Hepatic:   Recent Labs     03/08/21  0558   AST 11*   ALT 9*   BILITOT 0.2   ALKPHOS 56     INR: No results for input(s): INR in the last 72 hours. ASSESSMENT/RECOMMENDATION    Small cell cancer of the lung: due for third cycle 3/10/2021. We would like to give chemotherapy today. Constipation- added colace bid    Anemia- will monitor CBC; no result available for today. Encouraged her to ambulate and get up to chair. We will continue to follow the patient. Thank you.

## 2021-03-10 NOTE — PROGRESS NOTES
Pulmonary and Critical Care  Progress Note    Subjective: The patient is better. On 2 L N/C. Shortness of breath better. Chest pain none  Addressing respiratory complaints Patient is negative for  hemoptysis and cyanosis  CONSTITUTIONAL:  negative for fevers and chills      Past Medical History:     has a past medical history of Arthritis, COPD (chronic obstructive pulmonary disease) ([facility] [address] Full dentures, H/O cardiovascular stress test, H/O cardiovascular stress test, H/O Doppler ultrasound, H/O Doppler ultrasound, History of 24 hour EKG monitoring, History of nuclear stress test, Hx of chest x-ray, Hx of Doppler echocardiogram, Hx of echocardiogram, Hx of echocardiogram, Hx of pelvic x-ray, Hx of x-ray of hip, Hyperlipidemia, Hypertension, Leg pain, Lung nodules, On home oxygen therapy, [facility] ([facility] and Wears glasses. has a past surgical history that includes cyst removal (1970's); Tubal ligation (1970's); laparoscopic appendectomy (N/A, 5/18/2019); Mediastinoscopy (N/A, 12/10/2020); back surgery; Colonoscopy; Hysterectomy; eye surgery (2010?); joint replacement (Right, 2014? ?); Foot surgery (Right, 1970's); bronchoscopy (N/A, 1/5/2021); and Upper gastrointestinal endoscopy (N/A, 3/3/2021). reports that she quit smoking about 3 months ago. Her smoking use included cigarettes. She started smoking about 47 years ago. She has a 70.50 pack-year smoking history. She has never used smokeless tobacco. She reports current alcohol use. She reports that she does not use drugs. Family history:  family history includes COPD in her sister; Cancer in her father and sister; Coronary Art Dis in her mother; Dementia in her mother; Diabetes in her sister; Heart Attack in her sister;  Heart Attack (age of onset: 61) in her father and mother; High Cholesterol in her mother; Hypertension in her mother, sister, sister, and sister; Irritable Bowel Syndrome in her sister; Pacemaker in her sister; Stroke in her father, mother, and sister. Allergies   Allergen Reactions    Formaldehyde     Gabapentin Other (See Comments)    Norflex Tablets [Orphenadrine]     Cranberry Rash     Social History:    Reviewed; no changes    Objective:   PHYSICAL EXAM:        VITALS:  BP (!) 141/67   Pulse 89   Temp 98.6 °F (37 °C) (Oral)   Resp 16   Ht 5' (1.524 m)   Wt 125 lb 12.8 oz (57.1 kg)   SpO2 99%   BMI 24.57 kg/m²     24HR INTAKE/OUTPUT:      Intake/Output Summary (Last 24 hours) at 3/10/2021 1100  Last data filed at 3/9/2021 1904  Gross per 24 hour   Intake 700 ml   Output 1000 ml   Net -300 ml       CONSTITUTIONAL:  Awake. LUNGS:  decreased breath sounds. CARDIOVASCULAR:  normal S1 and S2 and negative JVD  ABD:Abdomen soft, non-tender. BS normal. No masses,  No organomegaly  NEURO:Alert. DATA:    CBC:  Recent Labs     03/08/21  0558 03/09/21  0646   WBC 19.2* 19.6*   RBC 2.73* 2.84*   HGB 7.7* 8.0*   HCT 24.9* 26.1*    253   MCV 91.2 91.9   MCH 28.2 28.2   MCHC 30.9* 30.7*   RDW 17.3* 17.7*   SEGSPCT 70.0*  --       BMP:  Recent Labs     03/08/21  0558   *   K 4.5   CL 86*   CO2 41*   BUN 14   CREATININE 0.7   CALCIUM 8.6   GLUCOSE 76      ABG:  No results for input(s): PH, PO2ART, CEI5HER, HCO3, BEART, O2SAT in the last 72 hours. Lab Results   Component Value Date    PROBNP 1,244 (H) 03/03/2021    PROBNP 310.0 (H) 02/28/2021    PROBNP 126.5 01/11/2021    THEOPH 4.9 (L) 03/06/2021     No results found for: [facility]    Radiology Review:  Pertinent images / reports were reviewed as a part of this visit.     Assessment:     Patient Active Problem List   Diagnosis    Hyperlipidemia    COPD (chronic obstructive pulmonary disease) ([address]    Leg pain    Hypertension    Shortness of breath    Tobacco abuse    Abdominal aortic aneurysm (AAA) without rupture (HCC)    Degeneration of lumbar or lumbosacral intervertebral disc    Osteopenia    Anxiety    Acute on chronic respiratory failure with hypoxia ([address]    Lung mass    COPD exacerbation ([address]    Acute exacerbation of chronic obstructive pulmonary disease (COPD) (HCC)    Small cell lung cancer ([address]    Acute on chronic respiratory failure with hypoxia and hypercapnia ([address]       Plan:   1. Overall the patient has improved. 2. Wean FiO2.  3. Will benefit from rehab.   Jt Montano MD  3/10/2021  11:00 AM

## 2021-03-10 NOTE — PROGRESS NOTES
Physical Therapy  2 attempts made today to work with pt for PT. First trial pt was receiving breathing treatment and was not able to have staff in there for 1 hour after treatment. Re-attempted this afternoon and pt stated she just walked to the bathroom and got from the chair to the bed. Agreeable to be checked on later this week but did not want to get up again today.

## 2021-03-10 NOTE — PROGRESS NOTES
Occupational Therapy      Occupational Therapy Treatment Note  Name: Bruna Hart MRN: [id number] :   1950   Date:  3/10/2021   Admission Date: 2021 Room:  [address] [place] [address]   Restrictions/Precautions:    General Precautions, Fall Risk  Communication with other providers:  Nursing handoff  Subjective:  Patient states:  \"I need to go to the restroom\"  Pain:   Location, Type, Intensity (0/10 to 10/10):  No  Objective:    Observation:  Pt received supine in bed, agreeable to therapy  Objective Measures:  02 saturation decreased ~83% after functional mobility to/from bathroom w/ pursed lip breathing technique and 02 of 2L pt's 02 saturation increased ~94%  Treatment, including education:  Self Care Training:   Cues were given for safety, sequence, UE/LE placement, visual cues, and balance. Activities performed today included LB bathing/dressing, grooming, toileting, toilet transfer    Therapeutic Activity Training:   Therapeutic activity training was instructed today. Cues were given for safety, sequence, UE/LE placement, awareness, and balance. Activities performed today included bed mobility training, sup-sit, sit-stand, functional mobility, stand to sit, pursed lip breathing    Supine to sit Supervision, sitting EOB Supervision, sit to stand w/o RW SBA, functional mobility to/from bathroom w/o RW SBA, LB dressing threading depends in stand SBA toilet transfer w/ use of grab bars to/from standard commode SBA, LB bathing washing craig area/buttocks SBA. Return to chair SBA.     Pt sitting upright in chair, chair alarm on, call light at side, nursing notified      Assessment / Impression:        Patient's tolerance of treatment:  Pt tolerated treatment well   Adverse Reaction: none  Significant change in status and impact:  none  Barriers to improvement:  None    Plan for Next Session:    Pt will perform functional task in stand reaching in all 3 planes to increase dynamic standing balance    Time in: 2396  Time out:  0855  Timed treatment minutes:  38 minutes  Total treatment time:  38 minutes  Electronically signed by:    Thedora Fabry OTR/L [id number]  9:45 AM,3/10/2021     Previously filed values:    Goals:  Pt goal: go home  Time Frame for STGs: discharge  Goal 1: Pt will perform UE ADLs Independent  Goal 2: Pt will perform LE ADLs Independent  Goal 3: Pt will perform toileting Independent  Goal 4: Pt will perform functional transfer w/ AD Ziggy  Goal 5: Pt will perform functional mobility w/ AD Ziggy  Goal 6: Pt will perform therex/theract in order to increase functional activity tolerance and dynamic standing balance

## 2021-03-11 NOTE — CARE COORDINATION
Spoke with Dr Gamaliel Barksdale and he requested that I speak with pt about SNU again. Discussed with pt and gave her a PPO SNU list to review. She will review and CM will revisit tomorrow.

## 2021-03-11 NOTE — PROGRESS NOTES
Doctor Please copy and paste below in your progress note per DME requirment. Patient was seen in hospital for COPD, LUNG CA.  I am prescribing oxygen because the diagnosis and testing requires the patient to have oxygen in the home. Conditions will improve or be benefited by oxygen use. The patient is able to perform good mobility and therefore requires the use of a portable oxygen system for ambulation.

## 2021-03-11 NOTE — PROGRESS NOTES
Home oxygen evaluation started - on Oxygen @ 4L 92% @ rest to the bathroom and back  83% - @ 5L - 94% - @ rest - To the bathroom and back 88%. Resting @ 6 lpm oxygen SpO2 94%, To bathroom and back, SpO2 90%.

## 2021-03-11 NOTE — PROGRESS NOTES
3/11/2021 11:10 AM  Patient Room #: 1123/1094-Z  Patient Name: Jaclyn Smith    (Step 1 Done by RN if possible otherwise call Pulmonary Diagnostics)  1. Place patient on room air at rest for at least 30 minutes. If patient falls below 88% before 30 minutes then you can record the level and stop. Record room air saturation level _83_ %. If patient is at 88% or below, they will qualify for home oxygen and you can stop. If level does not fall below 88%, fill in level above. If indicated continue to Step 2. Signature:_Basilio Lopez RAMOS Date: .3/11/2021    (Step 2&3 Done by [facility])  5 lpm Walking 88%  2. Ambulate patient on room air until saturation falls below 89%. Record level of room air saturation with ambulation_83 %. Next, place patient back on _6 lpm oxygen and ambulate, record level _90_%. (Note:  this level must show improvement from room air level done with ambulation.)  If patients saturation on room air with ambulation is 88% or below AND patient shows improvement with oxygen during ambulation, they will qualify for home oxygen and you can stop. If patient does not drop below 89%, then patient should have an overnight oximetry trending on room air to see if level falls below 88%. Complete level in Step 3 below. 3. Room air overnight oximetry level 88 % for___  cumulative minutes. If patients room air oxygen level is < 89% for at least 5 cumulative minutes, patient will qualify for home oxygen and you can stop. (Attach Night Trending Report)    Complete order below: Diagnosis:_COPD, Lung CA__  Home oxygen at:  Length of Need: ?X Lifetime ?  3 Months     _6_lpm or __%   via  [x] nasal cannula  []mask  [] other:___         [x]continuous [x]  with activity  [x]  Nocturnal   [x] Portable Tanks [x]  Concentrator        Therapist Signature:_Radha Naranjo RRT_     Date:  _3/11/2021__  Physician Signature:  __Electronically Signed in EMR_    Date:___  Physician Printed Name: Danis Varghese MD__   NPI # _[id number]__    [x] Patient Qualifies      [] Patient Does NOT qualify

## 2021-03-11 NOTE — PROGRESS NOTES
Pulmonary and Critical Care  Progress Note    Subjective: The patient is better. Pt needs bigger capacity O2 concentrator. Shortness of breath better. Chest pain none  Addressing respiratory complaints Patient is negative for  hemoptysis and cyanosis  CONSTITUTIONAL:  negative for fevers and chills      Past Medical History:     has a past medical history of Arthritis, COPD (chronic obstructive pulmonary disease) ([facility] [address] Full dentures, H/O cardiovascular stress test, H/O cardiovascular stress test, H/O Doppler ultrasound, H/O Doppler ultrasound, History of 24 hour EKG monitoring, History of nuclear stress test, Hx of chest x-ray, Hx of Doppler echocardiogram, Hx of echocardiogram, Hx of echocardiogram, Hx of pelvic x-ray, Hx of x-ray of hip, Hyperlipidemia, Hypertension, Leg pain, Lung nodules, On home oxygen therapy, [facility] ([facility] and Wears glasses. has a past surgical history that includes cyst removal (1970's); Tubal ligation (1970's); laparoscopic appendectomy (N/A, 5/18/2019); Mediastinoscopy (N/A, 12/10/2020); back surgery; Colonoscopy; Hysterectomy; eye surgery (2010?); joint replacement (Right, 2014? ?); Foot surgery (Right, 1970's); bronchoscopy (N/A, 1/5/2021); and Upper gastrointestinal endoscopy (N/A, 3/3/2021). reports that she quit smoking about 3 months ago. Her smoking use included cigarettes. She started smoking about 47 years ago. She has a 70.50 pack-year smoking history. She has never used smokeless tobacco. She reports current alcohol use. She reports that she does not use drugs. Family history:  family history includes COPD in her sister; Cancer in her father and sister; Coronary Art Dis in her mother; Dementia in her mother; Diabetes in her sister; Heart Attack in her sister;  Heart Attack (age of onset: 61) in her father and mother; High Cholesterol in her mother; Hypertension in her mother, sister, sister, and sister; Irritable Bowel Syndrome in her sister; Pacemaker in her sister; Stroke in her father, mother, and sister. Allergies   Allergen Reactions    Formaldehyde     Gabapentin Other (See Comments)    Norflex Tablets [Orphenadrine]     Cranberry Rash     Social History:    Reviewed; no changes    Objective:   PHYSICAL EXAM:        VITALS:  /73   Pulse 98   Temp 97.6 °F (36.4 °C) (Oral)   Resp 18   Ht 5' (1.524 m)   Wt 127 lb 11.2 oz (57.9 kg)   SpO2 92%   BMI 24.94 kg/m²     24HR INTAKE/OUTPUT:      Intake/Output Summary (Last 24 hours) at 3/11/2021 1112  Last data filed at 3/11/2021 1004  Gross per 24 hour   Intake 120 ml   Output 300 ml   Net -180 ml       CONSTITUTIONAL:  Awake. LUNGS:  decreased breath sounds. CARDIOVASCULAR:  normal S1 and S2 and negative JVD  ABD:Abdomen soft, non-tender. BS normal. No masses,  No organomegaly  NEURO:Alert. DATA:    CBC:  Recent Labs     03/09/21  0646 03/11/21  0651   WBC 19.6* 21.4*   RBC 2.84* 2.83*   HGB 8.0* 7.9*   HCT 26.1* 26.4*    260   MCV 91.9 93.3   MCH 28.2 27.9   MCHC 30.7* 29.9*   RDW 17.7* 18.2*   NRBC  --  1   SEGSPCT  --  87.0*   BANDSPCT  --  3*      BMP:  No results for input(s): NA, K, CL, CO2, BUN, CREATININE, CALCIUM, GLUCOSE in the last 72 hours. ABG:  No results for input(s): PH, PO2ART, CCK7HYJ, HCO3, BEART, O2SAT in the last 72 hours. Lab Results   Component Value Date    PROBNP 1,244 (H) 03/03/2021    PROBNP 310.0 (H) 02/28/2021    PROBNP 126.5 01/11/2021    THEOPH 4.9 (L) 03/06/2021     No results found for: [facility]    Radiology Review:  Pertinent images / reports were reviewed as a part of this visit.     Assessment:     Patient Active Problem List   Diagnosis    Hyperlipidemia    COPD (chronic obstructive pulmonary disease) ([address]    Leg pain    Hypertension    Shortness of breath    Tobacco abuse    Abdominal aortic aneurysm (AAA) without rupture (HCC)    Degeneration of lumbar or lumbosacral intervertebral disc    Osteopenia    Anxiety    Acute on chronic respiratory failure with hypoxia (HCC)    Lung mass    COPD exacerbation (HCC)    Acute exacerbation of chronic obstructive pulmonary disease (COPD) (HCC)    Small cell lung cancer ([facility] [address]    Acute on chronic respiratory failure with hypoxia and hypercapnia ([facility] [address]       Plan:   1. Overall the patient has improved.   2. Wean FiO2.  3. O2 eval to determine O2 needs and fax to Swapna Ace MD  3/11/2021  11:12 AM

## 2021-03-11 NOTE — PROGRESS NOTES
Infectious Disease Progress Note  3/11/2021   Patient Name: Justino Meadows : 1950   Impression  · Sepsis Secondary to Acute on Chronic Hypoxic and Hypercapnic Respiratory Failure, Acute Exacerbation COPD:   § Afebrile  § Leukocytosis  § 3/2-Respiratory culture pending, not able to produce sputum  § 3/10-Respiratory culture pending  § 3/2-RDP, Legionella and Strep Pna negative  § -CTA Pulmonary W Contrast: No PE, Multiple nodules within both lungs with a mixed response to therapy. § Dr. Adwoa Camacho, pulmonology, onboard, rec Solu-Medrol 40 mg q6h, duonebs, weaning oxygen keep sats >90%     · Right Lung SCLC Stage III:   § Dr. Lei Speak Next chemo of cisplatin/etoposide due 3/3, deferred until stabilized  § CT scan showed some response to treatment  § Chemo to start 3/10     ? Tobacco Abuse     ? HTN     ? Dysphagia with Weight Loss:  § Dr. Jennifer Cagle 3/3-S/P per Dr. Regan Rodriguez: EGD: Imp of severe suspicious for medication esophagitis (likely potassium capsule or vibramycin)    § Plan: await biopsies, DC K capsule and vibramycin or change to IV or liquid prep  § 3/4-Barium Swallow:  No evidence of aspiration    · Multi-morbidity: per PMHx:  Arthritis, COPD, HLD, HTN, home oxygen 4 L/nc , appey, hyster, right joint replacement, tobacco abuse     Plan:  · Continue IV cefepime 2 gm q12h (end date 3/12/21)  · Continue minocycline 100 mg po bid x 7 days (end date 3/18/21)  · Check Pct and CRP, now trending down  ? She has improved with her oxygen needs decreasing  ? Will try to obtain respiratory culture 3/10  ? Started chemo 3/10  ? Awaiting precert for [facility] for DC      Ongoing Antimicrobial Therapy  Cefepime 3/3-  Minocycline 3/10-  Completed Antimicrobial Therapy  Ceftriaxone 3/2-3? Doxycycline 3/1-10? Fluconazole 3/5-10    History:? Interval history noted.  Chief complaint: Sepsis secondry to acute on chronic hypoxic and hypercapnic respiratory failure, acute exacerbation of COPD.  Denies n/v/d/f or untoward effects of antibiotics. Resting quietly, states she is feeling better today, denies dyspnea. Physical Exam:  Vital Signs: /73   Pulse 98   Temp 97.6 °F (36.4 °C) (Oral)   Resp 18   Ht 5' (1.524 m)   Wt 127 lb 11.2 oz (57.9 kg)   SpO2 92%   BMI 24.94 kg/m²     Gen: alert and oriented X3, no distress  Skin: no stigmata of endocarditis  Wounds: C/D/I  HEMT: AT/NC Oropharynx pink, moist, and without lesions or exudates; dentition in good state of repair  Eyes: PERRLA, EOMI, conjunctiva pink, sclera anicteric. Neck: Supple. Trachea midline. No LAD. Chest: no distress and CTA. Good air movement. Oxygen per vapotherm. Heart: RRR to ST and no MRG. Abd: soft, non-distended, no tenderness, no hepatomegaly. Normoactive bowel sounds. Ext: no clubbing, cyanosis, or edema  Neuro: Mental status intact.  CN 2-12 intact and no focal sensory or motor deficits     Radiologic / Imaging / TESTING  2/28/21 XR Chest Portable:  Impression   Minimal blunting of the left costophrenic angle, which may reflect a very   small joint effusion.  Otherwise no acute abnormality detected      2/28/21 CTA Pulmonary W Contrast:  Impression   No evidence of pulmonary embolism or aortic dissection.  Overall, no acute   abnormality identified.       Multiple nodules within both lungs, with a mixed response to therapy.  The   majority of the nodules are similar in size.  However, at least 1 nodule   within the right lung apex is decreased in size.      3/3/21 XR Chest (2VW):  Impression   No acute cardiopulmonary disease.  The lung nodules noted on CT from 2 days   earlier are not well perceived on the conventional radiograph.      3/3/21 Fluoroscopy modified barium swallow with video:  Impression   Swallowing mechanism grossly within normal limits without evidence of   aspiration.       Please see separate speech pathology report for full discussion of findings   and recommendations.       Labs:    No results found for this or any previous visit (from the past 24 hour(s)). CULTURE results: Invalid input(s): BLOOD CULTURE,  URINE CULTURE, SURGICAL CULTURE    Diagnosis:  Patient Active Problem List   Diagnosis    Hyperlipidemia    [facility]chronic obstructive pulmonary disease) ([facility] [address]    Leg pain    Hypertension    Shortness of breath    Tobacco abuse    Abdominal aortic aneurysm (AAA) without rupture (HCC)    Degeneration of lumbar or lumbosacral intervertebral disc    Osteopenia    Anxiety    Acute on chronic respiratory failure with hypoxia (HCC)    Lung mass    COPD exacerbation (HCC)    Acute exacerbation of chronic obstructive pulmonary disease (COPD) ([facility] [facility]    Small cell lung cancer ([facility] [address] [facility]    Acute on chronic respiratory failure with hypoxia and hypercapnia (HCC)       Active Problems  Principal Problem:    Acute on chronic respiratory failure with hypoxia and hypercapnia (HCC)  Active Problems:    COPD (chronic obstructive pulmonary disease) (HCC)    Shortness of breath    Small cell lung cancer ([facility] [address]  Resolved Problems:    * No resolved hospital problems. *    Electronically signed by: Electronically signed by HELIO Timmons CNP on 3/11/2021 at 7:40 AM

## 2021-03-11 NOTE — PROGRESS NOTES
During morning assessment, patient states that she is tired of having problems with peripheral IVs and would like a port placed. Jeane Graves Dr. June Rounds.

## 2021-03-11 NOTE — PROGRESS NOTES
Home O2 eval:  Pt qualified for higher flow. 6lpm.  Perfect serve sent to Dr. Paul Jordan. Pt has [place].   [place] called waiting on paperwork

## 2021-03-11 NOTE — PROGRESS NOTES
Hospitalist Progress Note      Name:  Mami Mays /Age/Sex: 1950  (65 y.o. female)   MRN & CSN:  [id number] & [id number] Admission Date/Time: 2021  6:04 PM   Location:  [address] [place] [address] PCP: Ruby Urrutia MD         Hospital Day: 12    Assessment and Plan:   Mami Mays is a 65 y.o.  female  who presents with Acute on chronic respiratory failure with hypoxia and hypercapnia (HCC)       Sepsis 2/2 CAP,POA  Acute on chronic hypoxemic and hypercapnic respiratory failure  Acute exacerbation of COPD  Possible pneumonia with sepsis  Currently she is on 5 L O2 stable on this level today  CTA chest ruled out with decreased size of nodule  Leukocytosis improved  Continue breathing treatments  Continue steroids  Continue theophylline  Continue Mucomyst   Continue empiric cefepime till 3/12/2021   Started minocycline and 3/18/2021  Pulmonology on board  ID following     Small cell cancer of right lung, stage IIIb  Status post chemo yesterday, well-tolerated  CT scan showed some response to treatment  Heme-onc recommendations appreciated-s/p 2 cycles of cisplatin/etoposide     Dysphagia  Severe Exudative esophagitis with candidal infection  GI recommendations appreciated-S/p EGD on 3/3/21 with exudative esophagitis likely pill induced with potassium or doxycycline.  Ordered FL esophagogram with decreased caliber of the esophagus--Likely consistent with eosinophilic esophagitis  Biopsy with benign ulceration with inflammatory changes with candida--added Diflucan and Nystatin swish and swallow  To use liquid potassium for replacements  Changed oral doxy to IV  SLP recommendations appreciated, with dysphagia III phase diet recommendations     Hyponatremia-improved  Likely from poor oral intake, improved with fluids     Anemia of chronic disorders  Monitor CBC      Depression/anxiety  Continue BuSpar and Zoloft  Continue Vistaril     Hypertension  Continue Cardizem     Hyperlipidemia-continue Lipitor cervical lymphadenopathy and no hepatosplenomegaly. No petechiae or ecchymoses. MSK No gross joint deformities. SKIN Normal coloration, warm, dry. NEURO Cranial nerves appear grossly intact, normal speech, no lateralizing weakness. PSYCH Awake, alert, oriented x 4. Affect appropriate.     Medications:   Medications:    etoposide (VEPESID) chemo IVPB  60 mg/m2 (Treatment Plan Recorded) Intravenous Once    dexamethasone  8 mg Intravenous Once    diphenhydrAMINE  50 mg Intravenous Once    hydrocortisone sodium succinate PF  100 mg Intravenous Once    methylPREDNISolone  125 mg Intravenous Once    famotidine (PEPCID) injection  20 mg Intravenous Once    alteplase  2 mg Intracatheter Once    alteplase  2 mg Intracatheter Once    sterile water  2.2 mL Injection Once    docusate sodium  100 mg Oral BID    diphenhydrAMINE  50 mg Intravenous Once    hydrocortisone sodium succinate PF  100 mg Intravenous Once    methylPREDNISolone  125 mg Intravenous Once    famotidine (PEPCID) injection  20 mg Intravenous Once    alteplase  2 mg Intracatheter Once    alteplase  2 mg Intracatheter Once    sterile water  2.2 mL Injection Once    minocycline  100 mg Oral BID    nystatin  5 mL Oral 4x Daily    theophylline  200 mg Oral BID    pantoprazole  40 mg Oral QAM AC    cefepime  2,000 mg Intravenous Q12H    sucralfate  1 g Oral 4 times per day    ipratropium-albuterol  1 ampule Inhalation Q4H WA    aspirin  81 mg Oral Daily    busPIRone  10 mg Oral BID    calcium elemental  500 mg Oral Daily    vitamin D  1,000 Units Oral Daily    dilTIAZem  120 mg Oral Daily    sertraline  50 mg Oral Daily    atorvastatin  10 mg Oral Daily    sodium chloride flush  10 mL Intravenous 2 times per day    enoxaparin  40 mg Subcutaneous Daily    predniSONE  40 mg Oral Daily    cetirizine  10 mg Oral Daily    acetylcysteine  600 mg Inhalation BID      Infusions:    sodium chloride      sodium chloride      sodium chloride 20 mL/hr (03/10/21 7565)    sodium chloride       PRN Meds: EPINEPHrine, 0.3 mg, PRN  sodium chloride flush, 5 mL, PRN  sodium chloride flush, 10 mL, PRN  heparin flush, 500 Units, PRN  EPINEPHrine, 0.3 mg, PRN  LORazepam, 1 mg, Q4H PRN  LORazepam, 1 mg, Q6H PRN  docusate sodium, 100 mg, Daily PRN  magnesium sulfate, 1,000 mg, PRN  albuterol, 2.5 mg, Q4H PRN  HYDROcodone-acetaminophen, 1 tablet, Q4H PRN  sodium chloride flush, 10 mL, PRN  promethazine, 12.5 mg, Q6H PRN    Or  ondansetron, 4 mg, Q6H PRN  polyethylene glycol, 17 g, Daily PRN  acetaminophen, 650 mg, Q6H PRN    Or  acetaminophen, 650 mg, Q6H PRN  benzonatate, 100 mg, TID PRN          Electronically signed by Breana Negrete MD on 3/11/2021 at 12:51 PM

## 2021-03-11 NOTE — PLAN OF CARE
Problem: Pain:  Goal: Pain level will decrease  Description: Pain level will decrease  3/11/2021 1709 by Mariaelena Norman RN  Outcome: Ongoing  3/11/2021 0630 by Moy Varghese RN  Outcome: Ongoing  Goal: Control of acute pain  Description: Control of acute pain  3/11/2021 1709 by Mariaelena Norman RN  Outcome: Ongoing  3/11/2021 0630 by Moy Varghese RN  Outcome: Ongoing  Goal: Control of chronic pain  Description: Control of chronic pain  3/11/2021 1709 by Mariaelena Norman RN  Outcome: Ongoing  3/11/2021 0630 by Moy Varghsee RN  Outcome: Ongoing     Problem: Falls - Risk of:  Goal: Will remain free from falls  Description: Will remain free from falls  3/11/2021 1709 by Mariaelena Norman RN  Outcome: Ongoing  3/11/2021 0630 by Moy Varghese RN  Outcome: Ongoing  Goal: Absence of physical injury  Description: Absence of physical injury  3/11/2021 1709 by Mariaelena Norman RN  Outcome: Ongoing  3/11/2021 0630 by Moy Varghese RN  Outcome: Ongoing     Problem: Anxiety:  Goal: Level of anxiety will decrease  Description: Level of anxiety will decrease. Emotional support provided.  Medicated per MAR.   3/11/2021 1709 by Mariaelena Norman RN  Outcome: Ongoing  3/11/2021 0630 by Moy Varghese RN  Outcome: Ongoing     Problem: Skin Integrity:  Goal: Will show no infection signs and symptoms  Description: Will show no infection signs and symptoms  Outcome: Ongoing  Goal: Absence of new skin breakdown  Description: Absence of new skin breakdown  Outcome: Ongoing

## 2021-03-12 NOTE — PROGRESS NOTES
No events overnight. Clinically stable. Dysphagia improved this morning. She will complete the third day of the third cycle of the chemo today. Hopefully she will be able to go home tomorrow. Overall she has been tolerating chemo well. No nausea, vomiting or diarrhea. No fever or chills. Temperature 98.3, pulse 85, respiratory 18, blood pressure 122/61, saturation 93%. She is awake, alert and in no acute distress. Supple, no JVD, pupils are reactive. Anicteric sclera. S1-S2 regular. Clear to auscultation anteriorly. Soft, bowel sounds present. Nontender nondistended. No edema or cyanosis. Cranial nerves II through XII grossly intact. Assessment and plan:  1. She has small cell carcinoma of the lung. She will complete the third cycle of the chemo today. Hopefully she will be able to go home tomorrow. I will follow-up as an outpatient. 2.  Anemia is induced by the chemo. I will give 1 unit of blood today. 3.  Due to poor peripheral access, I consult Dr Maricarmen Vigil for Mediport placement. I advised her to drink enough water and ambulate.   Thanks

## 2021-03-12 NOTE — CARE COORDINATION
Reviewed chart and spoke with pt , plan is home on 3/13 or 3/14, she will call family to have them bring her O2 in. She states family will provide transport home. Nursing will need to call/fax info to [facility] at discharge. Sticky note has directions for discharge nurse. CM available if any new need arise.

## 2021-03-12 NOTE — PROGRESS NOTES
Patient was seen in hospital for COPD, LUNG CA.  I am prescribing oxygen because the diagnosis and testing requires the patient to have oxygen in the home. Conditions will improve or be benefited by oxygen use. The patient is able to perform good mobility and therefore requires the use of a portable oxygen system for ambulation.

## 2021-03-12 NOTE — FLOWSHEET NOTE
Nurse to room, patient states \"I Feel like I'm going to have an anxiety attack. \"  Ativan given as ordered, emotional support provided.

## 2021-03-12 NOTE — PROGRESS NOTES
Pulmonary and Critical Care  Progress Note    Subjective: The patient is improving. Papers signed for O2. Shortness of breath better. Chest pain none  Addressing respiratory complaints Patient is negative for  hemoptysis and cyanosis  CONSTITUTIONAL:  negative for fevers and chills. Past Medical History:     has a past medical history of Arthritis, COPD (chronic obstructive pulmonary disease) ([facility] [address] Full dentures, H/O cardiovascular stress test, H/O cardiovascular stress test, H/O Doppler ultrasound, H/O Doppler ultrasound, History of 24 hour EKG monitoring, History of nuclear stress test, Hx of chest x-ray, Hx of Doppler echocardiogram, Hx of echocardiogram, Hx of echocardiogram, Hx of pelvic x-ray, Hx of x-ray of hip, Hyperlipidemia, Hypertension, Leg pain, Lung nodules, On home oxygen therapy, [facility] and Wears glasses. has a past surgical history that includes cyst removal (1970's); Tubal ligation (1970's); laparoscopic appendectomy (N/A, 5/18/2019); Mediastinoscopy (N/A, 12/10/2020); back surgery; Colonoscopy; Hysterectomy; eye surgery (2010?); joint replacement (Right, 2014? ?); Foot surgery (Right, 1970's); bronchoscopy (N/A, 1/5/2021); and Upper gastrointestinal endoscopy (N/A, 3/3/2021). reports that she quit smoking about 3 months ago. Her smoking use included cigarettes. She started smoking about 47 years ago. She has a 70.50 pack-year smoking history. She has never used smokeless tobacco. She reports current alcohol use. She reports that she does not use drugs. Family history:  family history includes COPD in her sister; Cancer in her father and sister; Coronary Art Dis in her mother; Dementia in her mother; Diabetes in her sister; Heart Attack in her sister;  Heart Attack (age of onset: 61) in her father and mother; High Cholesterol in her mother; Hypertension in her mother, sister, sister, and sister; Irritable Bowel Syndrome in her sister; Pacemaker in her sister; Stroke in her father, mother, and sister. Allergies   Allergen Reactions    Formaldehyde     Gabapentin Other (See Comments)    Norflex Tablets [Orphenadrine]     Cranberry Rash     Social History:    Reviewed; no changes    Objective:   PHYSICAL EXAM:        VITALS:  BP (!) 121/55   Pulse 92   Temp 98.6 °F (37 °C) (Oral)   Resp 18   Ht 5' (1.524 m)   Wt 127 lb 11.2 oz (57.9 kg)   SpO2 93%   BMI 24.94 kg/m²     24HR INTAKE/OUTPUT:    No intake or output data in the 24 hours ending 03/12/21 1210    CONSTITUTIONAL:  Awake. LUNGS:  decreased breath sounds. CARDIOVASCULAR:  normal S1 and S2 and negative JVD  ABD:Abdomen soft, non-tender. BS normal. No masses,  No organomegaly  NEURO:Alert. DATA:    CBC:  Recent Labs     03/11/21  0651 03/12/21  0648   WBC 21.4* 20.5*   RBC 2.83* 2.62*   HGB 7.9* 7.4*   HCT 26.4* 25.2*    265   MCV 93.3 96.2   MCH 27.9 28.2   MCHC 29.9* 29.4*   RDW 18.2* 18.5*   NRBC 1  --    SEGSPCT 87.0* 90.0*   BANDSPCT 3* 2*      BMP:  No results for input(s): NA, K, CL, CO2, BUN, CREATININE, CALCIUM, GLUCOSE in the last 72 hours. ABG:  No results for input(s): PH, PO2ART, FNC1EAY, HCO3, BEART, O2SAT in the last 72 hours. Lab Results   Component Value Date    PROBNP 1,244 (H) 03/03/2021    PROBNP 310.0 (H) 02/28/2021    PROBNP 126.5 01/11/2021    THEOPH 4.9 (L) 03/06/2021     No results found for: [facility]    Radiology Review:  Pertinent images / reports were reviewed as a part of this visit.     Assessment:     Patient Active Problem List   Diagnosis    Hyperlipidemia    COPD (chronic obstructive pulmonary disease) ([facility] [address]    Leg pain    Hypertension    Shortness of breath    Tobacco abuse    Abdominal aortic aneurysm (AAA) without rupture (HCC)    Degeneration of lumbar or lumbosacral intervertebral disc    Osteopenia    Anxiety    Acute on chronic respiratory failure with hypoxia (HCC)    Lung mass    COPD exacerbation (HCC)    Acute exacerbation of chronic obstructive pulmonary disease (COPD) (HCC)    Small cell lung cancer ([facility] [address]    Acute on chronic respiratory failure with hypoxia and hypercapnia ([facility] [address]       Plan:   1. Overall the patient has improved. 2. [address] Activity.   Ronald Varghese MD  3/12/2021  12:10 PM

## 2021-03-12 NOTE — CONSULTS
Department of Cardiovascular & Thoracic Surgery   Consult Note    Reason for Consult:  mediport placement    Requesting Physician: Dr. Gemma Narvaez    Date of Consult: 3/12/21      History Obtained From:  patient     HISTORY OF PRESENT ILLNESS:    The patient is a 65 y.o. female who presents with pneumonia. She has small cell CA of the R lung and is currently undergoing chemotherapy. She has difficult venous access and her oncologist is requesting a mediport. Past Medical History:        Diagnosis Date    Arthritis     COPD (chronic obstructive pulmonary disease) ([address]     follow with Dr Callahan Full dentures     H/O cardiovascular stress test 11/18/2009    thallium, normal no ischemia EF70%     H/O cardiovascular stress test 10/11/2013    thallium,EF70%, normal, no ischemia    H/O Doppler ultrasound 10/13/2010    carotid, right normal, left normal    H/O Doppler ultrasound 10/07/2014    Carotid mild bilateral internal carotid artery disease less than 50% in severity. Normal vertebral artery flows with good velocities. Incidental finding of possible thyroid nodule in the left lobe.  History of 24 hour EKG monitoring 03/11/2011    NSR no ectopy    History of nuclear stress test 10/21/2016    lexiscan-normal,no ischemia,EF70%,enlarged right ventricle    Hx of chest x-ray 01/02/2015    WNL    Hx of Doppler echocardiogram 12/01/2020    EF 50-55% mild LV hypertrophy. Grade 2 diastolic dysfunction. Mild AS.     Hx of echocardiogram 10/11/2013    10/13 Abn lt ventricular diastolic function, mile MR, EF 57%,10/10 EF55%, mild mitral annular calcification    Hx of echocardiogram 05/02/2019    EF 41-71%, Grade I diastolic dysfunction, Mild aortic stenosis, Calcific thickening of posterior leaflet of mitral valve, Mild Pulm HTN    Hx of pelvic x-ray 01/02/2015    Severe RT his osterarothrosis    Hx of x-ray of hip 01/02/2015    Severe Rt hip osteosrthrosis    Hyperlipidemia     Hypertension     follows with dr Wilberto Cobb Leg pain     Lung nodules     scheduled for bronch 1/5/2020    On home oxygen therapy     \"on 4 liters 24 hours per day\"    Small cell lung cancer ([address] 1/11/2021    Wears glasses     to read     Past Surgical History:        Procedure Laterality Date    BACK SURGERY      \"about 12 yrs ago - surgery my mid to low back \" done in [address] N/A 1/5/2021    BRONCHOSCOPY ENDOBRONCHIAL ULTRASOUND performed by Eusebio Loyola MD at [facility]      \"last one done in my age 61's   [facility]  1970's    left arm    EYE SURGERY  2010?    svetlana cataract ext     FOOT SURGERY Right 1970's    \"have screw in 2nd toe\"    HYSTERECTOMY      1999\"took everything \"    JOINT REPLACEMENT Right 2014??    hip    LAPAROSCOPIC APPENDECTOMY N/A 5/18/2019    APPENDECTOMY LAPAROSCOPIC performed by Arlette Saldana MD at [address] [place] [address] 12/10/2020    MEDIASTINOSCOPY performed by Eusebio Loyola MD at [address] [place] [address]  1970's   [address] [place] [address] N/A 3/3/2021    EGD BIOPSY AND BRUSHING performed by Arely Tobar MD at [facility] ENDOSCOPY     Current Medications:   Current Facility-Administered Medications: etoposide (VEPESID) 90 mg in sodium chloride 0.9 % 250 mL chemo IVPB, 60 mg/m2 (Treatment Plan Recorded), Intravenous, Once  0.9 % sodium chloride infusion, 20 mL/hr, Intravenous, Once  dexamethasone (DECADRON) injection 8 mg, 8 mg, Intravenous, Once  0.9 % sodium chloride infusion, , Intravenous, PRN  0.9 % sodium chloride infusion, 20 mL/hr, Intravenous, Once  0.9 % sodium chloride infusion, , Intravenous, Continuous  diphenhydrAMINE (BENADRYL) injection 50 mg, 50 mg, Intravenous, Once  hydrocortisone sodium succinate PF (SOLU-CORTEF) injection 100 mg, 100 mg, Intravenous, Once  methylPREDNISolone sodium (SOLU-MEDROL) injection 125 mg, 125 mg, Intravenous, Once  famotidine (PEPCID) injection 20 mg, 20 mg, Intravenous, Once  EPINEPHrine PF 1 MG/ML injection 0.3 mg, 0.3 mg, Intramuscular, PRN  sodium chloride flush 0.9 % injection 5 mL, 5 mL, Intravenous, PRN  sodium chloride flush 0.9 % injection 10 mL, 10 mL, Intravenous, PRN  heparin flush 100 UNIT/ML injection 500 Units, 500 Units, Intracatheter, PRN  alteplase (CATHFLO) injection 2 mg, 2 mg, Intracatheter, Once  alteplase (CATHFLO) injection 2 mg, 2 mg, Intracatheter, Once  sterile water injection 2.2 mL, 2.2 mL, Injection, Once  docusate sodium (COLACE) capsule 100 mg, 100 mg, Oral, BID  0.9 % sodium chloride infusion, , Intravenous, Continuous  diphenhydrAMINE (BENADRYL) injection 50 mg, 50 mg, Intravenous, Once  hydrocortisone sodium succinate PF (SOLU-CORTEF) injection 100 mg, 100 mg, Intravenous, Once  methylPREDNISolone sodium (SOLU-MEDROL) injection 125 mg, 125 mg, Intravenous, Once  famotidine (PEPCID) injection 20 mg, 20 mg, Intravenous, Once  EPINEPHrine PF 1 MG/ML injection 0.3 mg, 0.3 mg, Intramuscular, PRN  alteplase (CATHFLO) injection 2 mg, 2 mg, Intracatheter, Once  alteplase (CATHFLO) injection 2 mg, 2 mg, Intracatheter, Once  sterile water injection 2.2 mL, 2.2 mL, Injection, Once  minocycline (MINOCIN;DYNACIN) capsule 100 mg, 100 mg, Oral, BID  LORazepam (ATIVAN) injection 1 mg, 1 mg, Intravenous, Q4H PRN  LORazepam (ATIVAN) tablet 1 mg, 1 mg, Oral, Q6H PRN  nystatin (MYCOSTATIN) 521817 UNIT/ML suspension 500,000 Units, 5 mL, Oral, 4x Daily  theophylline (BECKY-24) extended release capsule 200 mg, 200 mg, Oral, BID  pantoprazole (PROTONIX) tablet 40 mg, 40 mg, Oral, QAM AC  cefepime (MAXIPIME) 2000 mg IVPB minibag, 2,000 mg, Intravenous, Q12H  sucralfate (CARAFATE) tablet 1 g, 1 g, Oral, 4 times per day  docusate sodium (COLACE) capsule 100 mg, 100 mg, Oral, Daily PRN  magnesium sulfate 1000 mg in dextrose 5% 100 mL IVPB, 1,000 mg, Intravenous, PRN  ipratropium-albuterol (DUONEB) nebulizer solution 1 ampule, 1 ampule, Inhalation, Q4H WA  albuterol (PROVENTIL) nebulizer solution 2.5 mg, 2.5 mg, Nebulization, Q4H PRN  aspirin EC tablet 81 mg, 81 mg, Oral, Daily  busPIRone (BUSPAR) tablet 10 mg, 10 mg, Oral, BID  calcium elemental (OSCAL) tablet 500 mg, 500 mg, Oral, Daily  vitamin D CAPS 1,000 Units, 1,000 Units, Oral, Daily  dilTIAZem (CARDIZEM CD) extended release capsule 120 mg, 120 mg, Oral, Daily  HYDROcodone-acetaminophen (NORCO)  MG per tablet 1 tablet, 1 tablet, Oral, Q4H PRN  sertraline (ZOLOFT) tablet 50 mg, 50 mg, Oral, Daily  atorvastatin (LIPITOR) tablet 10 mg, 10 mg, Oral, Daily  sodium chloride flush 0.9 % injection 10 mL, 10 mL, Intravenous, 2 times per day  sodium chloride flush 0.9 % injection 10 mL, 10 mL, Intravenous, PRN  promethazine (PHENERGAN) tablet 12.5 mg, 12.5 mg, Oral, Q6H PRN **OR** ondansetron (ZOFRAN) injection 4 mg, 4 mg, Intravenous, Q6H PRN  polyethylene glycol (GLYCOLAX) packet 17 g, 17 g, Oral, Daily PRN  enoxaparin (LOVENOX) injection 40 mg, 40 mg, Subcutaneous, Daily  acetaminophen (TYLENOL) tablet 650 mg, 650 mg, Oral, Q6H PRN **OR** acetaminophen (TYLENOL) suppository 650 mg, 650 mg, Rectal, Q6H PRN  benzonatate (TESSALON) capsule 100 mg, 100 mg, Oral, TID PRN  [COMPLETED] methylPREDNISolone sodium (SOLU-MEDROL) injection 40 mg, 40 mg, Intravenous, Q6H **FOLLOWED BY** predniSONE (DELTASONE) tablet 40 mg, 40 mg, Oral, Daily  cetirizine (ZYRTEC) tablet 10 mg, 10 mg, Oral, Daily  acetylcysteine (MUCOMYST) 20 % solution 600 mg, 600 mg, Inhalation, BID  Allergies: Allergies   Allergen Reactions    Formaldehyde     Gabapentin Other (See Comments)    Norflex Tablets [Orphenadrine]     Cranberry Rash       Social History:   Social History     Socioeconomic History    Marital status:       Spouse name: Not on file    Number of children: Not on file    Years of education: Not on file    Highest education level: Not on file   Occupational History    Not on file   Social Needs    Financial resource strain: Not on file    Food insecurity pain   Respiratory: - cough, - shortness of breath, - wheezing   GI: - nausea, - vomiting, - abdominal pain, - constipation, - diarrhea   : - dysuria   MSK: - joint pain, - muscle pain  Integument: - rash, - skin color change   Heme: - easy bruising or bleeding  Neurologic: - headache, - weakness, - dizziness, - paresthesias       EXAM:  Constitutional: Blood pressure (!) 121/55, pulse 92, temperature 98.6 °F (37 °C), temperature source Oral, resp. rate 18, height 5' (1.524 m), weight 127 lb 11.2 oz (57.9 kg), SpO2 93 %, not currently breastfeeding. No apparent distress, appears stated age and cooperative. Neurologic: follows commands, no focal weakness noted   Lungs: Good respiratory effort. Clear to auscultation,   CV: Regular rate/ rhythm , no peripheral edema, feet warm and well perfused  GI: Soft, non-tender in all four quadrants, non-distended, + bowel sounds, liver and spleen no palpable masses  : bladder nondistended   MSK: no obvious deformity   Skin: warm, pink and dry       DATA:  CXR reviewed     IMPRESSION  Patient Active Problem List   Diagnosis    Hyperlipidemia    COPD (chronic obstructive pulmonary disease) (HCC)    Leg pain    Hypertension    Shortness of breath    Tobacco abuse    Abdominal aortic aneurysm (AAA) without rupture (HCC)    Degeneration of lumbar or lumbosacral intervertebral disc    Osteopenia    Anxiety    Acute on chronic respiratory failure with hypoxia (HCC)    Lung mass    COPD exacerbation (HCC)    Acute exacerbation of chronic obstructive pulmonary disease (COPD) (HCC)    Small cell lung cancer (HCC)    Acute on chronic respiratory failure with hypoxia and hypercapnia (HCC)       Small cell lung CA  Difficult vascular access    RECOMMENDATIONS:  Will schedule OP mediport placement with Dr. Joann Weinberg in 1 week.        Efraín Francisco PA-C

## 2021-03-12 NOTE — PROGRESS NOTES
Called and Faxed home o2 [facility]. They will take patient a higher flow concentrator tonight.   She has tanks to go home with

## 2021-03-12 NOTE — PROGRESS NOTES
#dispsition: Plan is for DC possibly tomorrow    Diet DIET GENERAL; Dysphagia Minced and Moist  Dietary Nutrition Supplements: Standard High Calorie Oral Supplement  Dietary Nutrition Supplements: Frozen Oral Supplement   DVT Prophylaxis [] Lovenox, []  Heparin, [] SCDs, [] Ambulation   GI Prophylaxis [] PPI,  [] H2 Blocker,  [] Carafate,  [] Diet/Tube Feeds   Code Status Full Code   Disposition Patient requires continued admission due to needed concerntrator         History of Present Illness:     Chief Complaint: Acute on chronic respiratory failure with hypoxia and hypercapnia ([address]     Ulisses Albarran is a 65 y.o.  female  who presents with  SOB    Ten point ROS reviewed negative, unless as noted above    Objective:     No intake or output data in the 24 hours ending 03/12/21 1203   Vitals:   Vitals:    03/12/21 1129   BP:    Pulse:    Resp: 18   Temp:    SpO2: 93%     Physical Exam:   GEN Awake female, sitting upright in bed in no apparent distress. Appears given age. EYES Pupils are equally round. No scleral erythema, discharge, or conjunctivitis. HENT Mucous membranes are moist. Oral pharynx without exudates, no evidence of thrush. NECK Supple, no apparent thyromegaly or masses. RESP Clear to auscultation, no wheezes, rales or rhonchi. Symmetric chest movement while on nasal canula  CARDIO/VASC S1/S2 auscultated. Regular rate without appreciable murmurs, rubs, or gallops. No JVD or carotid bruits. Peripheral pulses equal bilaterally and palpable. No peripheral edema. GI Abdomen is soft without significant tenderness, masses, or guarding. Bowel sounds are normoactive. Rectal exam deferred.  No costovertebral angle tenderness. Normal appearing external genitalia. Nicole catheter is not present. HEME/LYMPH No palpable cervical lymphadenopathy and no hepatosplenomegaly. No petechiae or ecchymoses. MSK No gross joint deformities. SKIN Normal coloration, warm, dry.   NEURO Cranial nerves appear grossly mL, PRN  sodium chloride flush, 10 mL, PRN  heparin flush, 500 Units, PRN  EPINEPHrine, 0.3 mg, PRN  LORazepam, 1 mg, Q4H PRN  LORazepam, 1 mg, Q6H PRN  docusate sodium, 100 mg, Daily PRN  magnesium sulfate, 1,000 mg, PRN  albuterol, 2.5 mg, Q4H PRN  HYDROcodone-acetaminophen, 1 tablet, Q4H PRN  sodium chloride flush, 10 mL, PRN  promethazine, 12.5 mg, Q6H PRN    Or  ondansetron, 4 mg, Q6H PRN  polyethylene glycol, 17 g, Daily PRN  acetaminophen, 650 mg, Q6H PRN    Or  acetaminophen, 650 mg, Q6H PRN  benzonatate, 100 mg, TID PRN          Electronically signed by Valentín Marie MD on 3/12/2021 at 12:03 PM

## 2021-03-12 NOTE — PROGRESS NOTES
Infectious Disease Progress Note  3/12/2021   Patient Name: Marj Woodall : 1950   Impression  · Sepsis Secondary to Acute on Chronic Hypoxic and Hypercapnic Respiratory Failure, Acute Exacerbation COPD:   § Afebrile  § Leukocytosis  § 3/2-Respiratory culture pending, not able to produce sputum  § -CTA Pulmonary W Contrast: No PE, Multiple nodules within both lungs with a mixed response to therapy. § Dr. Willem Eduardo, pulmonology, onboard, rec Solu-Medrol 40 mg q6h, duonebs, weaning oxygen keep sats >90%     · Right Lung SCLC Stage III:   § Dr. Alexsandra Galvan Next chemo of cisplatin/etoposide due 3/3, deferred until stabilized  § CT scan showed some response to treatment  § Chemo to start 3/10     ? Tobacco Abuse     ? HTN     ? Dysphagia with Weight Loss:  § Dr. Latosha Leone 3/3-S/P per Dr. Meng Lopez: EGD: Imp of severe suspicious for medication esophagitis (likely potassium capsule or vibramycin)    § Plan: await biopsies, DC K capsule and vibramycin or change to IV or liquid prep  § 3/4-Barium Swallow:  No evidence of aspiration    · Multi-morbidity: per PMHx:  Arthritis, COPD, HLD, HTN, home oxygen 4 L/nc , appey, hyster, right joint replacement, tobacco abuse     Plan:  · Continue IV cefepime 2 gm q12h (end date 3/12/21)  · Continue minocycline 100 mg po bid x 7 days (end date 3/18/21)  · Check Pct and CRP, trending down  ? WBC remains elevated, may be due to inflammatory process with her cancer  ? Will try to obtain respiratory culture 3/10, has not produced sputum  ? Started chemo 3/10, tolerating well  ? Follow up with PCP/oncology after DC  ? OK from ID standpoint to DC when ready      Ongoing Antimicrobial Therapy  Cefepime 3/3-  Minocycline 3/10-  Completed Antimicrobial Therapy  Ceftriaxone 3/2-3? Doxycycline 3/1-10? Fluconazole 3/5-10    History:? Interval history noted.  Chief complaint: Sepsis secondry to acute on chronic hypoxic and hypercapnic respiratory failure, recommendations.             Labs:    Recent Results (from the past 24 hour(s))   Procalcitonin    Collection Time: 03/12/21  6:48 AM   Result Value Ref Range    Procalcitonin 0.299    CBC auto differential    Collection Time: 03/12/21  6:48 AM   Result Value Ref Range    WBC 20.5 (H) 4.0 - 10.5 K/CU MM    RBC 2.62 (L) 4.2 - 5.4 M/CU MM    Hemoglobin 7.4 (L) 12.5 - 16.0 GM/DL    Hematocrit 25.2 (L) 37 - 47 %    MCV 96.2 78 - 100 FL    MCH 28.2 27 - 31 PG    MCHC 29.4 (L) 32.0 - 36.0 %    RDW 18.5 (H) 11.7 - 14.9 %    Platelets 383 781 - 729 K/CU MM    MPV 9.7 7.5 - 11.1 FL    Metamyelocytes Relative 2 (H) 0.0 %    Bands Relative 2 (L) 5 - 11 %    Segs Relative 90.0 (H) 36 - 66 %    Lymphocytes % 3.0 (L) 24 - 44 %    Monocytes % 3.0 0 - 4 %    Metamyelocytes Absolute 0.41 K/CU MM    Bands Absolute 0.41 K/CU MM    Segs Absolute 18.5 K/CU MM    Lymphocytes Absolute 0.6 K/CU MM    Monocytes Absolute 0.6 K/CU MM    Differential Type MANUAL DIFFERENTIAL     Anisocytosis 1+     Polychromasia 1+     PLT Morphology FEW      CULTURE results: Invalid input(s): BLOOD CULTURE,  URINE CULTURE, SURGICAL CULTURE    Diagnosis:  Patient Active Problem List   Diagnosis    Hyperlipidemia    COPD (chronic obstructive pulmonary disease) (HCC)    Leg pain    Hypertension    Shortness of breath    Tobacco abuse    Abdominal aortic aneurysm (AAA) without rupture (HCC)    Degeneration of lumbar or lumbosacral intervertebral disc    Osteopenia    Anxiety    Acute on chronic respiratory failure with hypoxia (HCC)    Lung mass    COPD exacerbation (HCC)    Acute exacerbation of chronic obstructive pulmonary disease (COPD) (HCC)    Small cell lung cancer (HCC)    Acute on chronic respiratory failure with hypoxia and hypercapnia (HCC)       Active Problems  Principal Problem:    Acute on chronic respiratory failure with hypoxia and hypercapnia (HCC)  Active Problems:    COPD (chronic obstructive pulmonary disease) (HCC)

## 2021-03-12 NOTE — PROGRESS NOTES
This nurse spoke to Dr. Vamsi Flores about patient HGB and chemo. Dr. Vamsi Flores stated to go ahead and give chemo when blood is done.

## 2021-03-13 NOTE — CARE COORDINATION
[facility] supporting [facility] to [facility] to initiate the referral.

## 2021-03-13 NOTE — DISCHARGE SUMMARY
Discharge Summary    Name:  Srinivas Neumann /Age/Sex: 1950  (65 y.o. female)   MRN & CSN:  [id number] & [id number] Admission Date/Time: 2021  6:04 PM   Attending:  Kenneth Jacobs MD Discharging Physician: Jarrett Noyola MD     Hospital Course:   Srinivas Neumann is a 65 y.o.  female  who presents with Acute on chronic respiratory failure with hypoxia and hypercapnia (HCC)       Sepsis 2/2 CAP,POA  Acute on chronic hypoxemic and hypercapnic respiratory failure  Acute exacerbation of COPD  Possible pneumonia with sepsis  Currently she is on 4 L O2 stable on this level today  CTA chest ruled out with decreased size of nodule  Leukocytosis improved  Continue breathing treatments  Continue steroids  Continue theophylline  Continue Mucomyst   Continue empiric cefepime till 3/12/2021   Started minocycline and 3/18/2021  Pulmonology on board  ID following     Small cell cancer of right lung, stage IIIb  S/p 3 rounds of chemo  CT scan showed some response to treatment  Heme-onc recommendations appreciated-s/p 2 cycles of cisplatin/etoposide     Dysphagia  Severe Exudative esophagitis with candidal infection  GI recommendations appreciated-S/p EGD on 3/3/21 with exudative esophagitis likely pill induced with potassium or doxycycline. Ordered FL esophagogram with decreased caliber of the esophagus--Likely consistent with eosinophilic esophagitis  Biopsy with benign ulceration with inflammatory changes with candida--added Diflucan and Nystatin swish and swallow  To use liquid potassium for replacements  Changed oral doxy to IV  SLP recommendations appreciated, with dysphagia III phase diet recommendations     Hyponatremia-improved  Likely from poor oral intake, improved with fluids     Anemia of chronic disorders  Monitor CBC      Depression/anxiety  Continue BuSpar and Zoloft  Continue Vistaril     Hypertension  Continue Cardizem     Hyperlipidemia-continue Lipitor     #disposition: Plan is for DC possibly tomorrow      The patient expressed appropriate understanding of and agreement with the discharge recommendations, medications, and plan. Consults this admission:  IP CONSULT TO HOSPITALIST  IP CONSULT TO PULMONOLOGY  IP CONSULT TO HEM/ONC  IP CONSULT TO GI  IP CONSULT TO DIETITIAN  IP CONSULT TO INFECTIOUS DISEASES  IP CONSULT TO VASCULAR SURGERY    Discharge Instruction:   Follow up appointments: heme oncology, pulmonology  Primary care physician:  within 2 weeks    Diet:  cardiac diet   Activity: activity as tolerated  Disposition: Discharged to:   []Home, [x]HHC, []SNF, []Acute Rehab, []Hospice   Condition on discharge: Stable    Discharge Medications:      Fabian Menard   [place] Medication Instructions RZP:[id number]    Printed on:03/13/21 1211   Medication Information                      albuterol sulfate HFA (PROAIR HFA) 108 (90 Base) MCG/ACT inhaler  Inhale 2 puffs into the lungs every 6 hours as needed for Wheezing             aspirin 81 MG EC tablet  Take 81 mg by mouth daily             busPIRone (BUSPAR) 10 MG tablet  Take 1 tablet by mouth 2 times daily             calcium carbonate 600 MG TABS tablet  Take 1 tablet by mouth daily             Cholecalciferol (VITAMIN D3) 25 MCG (1000 UT) TABS  Take by mouth daily              dexamethasone (DECADRON) 4 MG tablet  Take 1 tablet x 4 days after each chemotherapy             dilTIAZem (CARDIZEM CD) 120 MG extended release capsule  Take 1 capsule by mouth daily             Fexofenadine HCl (MUCINEX ALLERGY PO)  Take by mouth 2 times daily              fluticasone (FLONASE) 50 MCG/ACT nasal spray  1 spray by Each Nostril route daily             HYDROcodone-acetaminophen (NORCO)  MG per tablet  Take 1 tablet by mouth every 4 hours as needed for Pain for up to 30 days.  Indications: 180             ipratropium-albuterol (DUONEB) 0.5-2.5 (3) MG/3ML SOLN nebulizer solution  Inhale 3 mLs into the lungs 4 times daily             Magic Mouthwash (MIRACLE MOUTHWASH)  Swish and swallow 5 mLs 4 times daily as needed for Irritation 1:1:1 diphenhydramine, nystatin, lidocaine             Nutritional Supplement LIQD  2 cans by mouth daily. Boost strawberry if available. OLANZapine (ZYPREXA) 2.5 MG tablet  Take the night prior to each chemotherapy, then for three additional nights with each chemotherapy             ondansetron (ZOFRAN) 8 MG tablet  Take 1 tablet by mouth every 8 hours as needed for Nausea or Vomiting             potassium chloride (KLOR-CON M) 10 MEQ extended release tablet  Take 1 tablet by mouth 2 times daily             sertraline (ZOLOFT) 50 MG tablet  Take 1 tablet by mouth daily             simvastatin (ZOCOR) 20 MG tablet  Take 1 tablet by mouth nightly             theophylline (UNIPHYL) 400 MG extended release tablet  Take 0.5 tablets by mouth daily                 Objective Findings at Discharge:   BP (!) 140/63   Pulse 102   Temp 98.5 °F (36.9 °C) (Oral)   Resp 18   Ht 5' (1.524 m)   Wt 127 lb 11.2 oz (57.9 kg)   SpO2 98%   BMI 24.94 kg/m²            PHYSICAL EXAM   GEN Awake female, sitting upright in bed in no apparent distress. Appears given age. EYES Pupils are equally round. No scleral erythema, discharge, or conjunctivitis. HENT Mucous membranes are moist. Oral pharynx without exudates, no evidence of thrush. NECK Supple, no apparent thyromegaly or masses. RESP Clear to auscultation, no wheezes, rales or rhonchi. Symmetric chest movement while on room air. CARDIO/VASC S1/S2 auscultated. Regular rate without appreciable murmurs, rubs, or gallops. No JVD or carotid bruits. Peripheral pulses equal bilaterally and palpable. No peripheral edema. GI Abdomen is soft without significant tenderness, masses, or guarding. Bowel sounds are normoactive. Rectal exam deferred.  No costovertebral angle tenderness. Normal appearing external genitalia. Nicole catheter is not present.   HEME/LYMPH No palpable cervical lymphadenopathy and no hepatosplenomegaly. No petechiae or ecchymoses. MSK No gross joint deformities. SKIN Normal coloration, warm, dry. NEURO Cranial nerves appear grossly intact, normal speech, no lateralizing weakness. PSYCH Awake, alert, oriented x 4. Affect appropriate.     BMP/CBC  Recent Labs     03/11/21  0651 03/12/21  0648 03/13/21  0534   WBC 21.4* 20.5* 15.7*   HCT 26.4* 25.2* 29.0*    265 263       IMAGING:      Discharge Time of 36 minutes    Electronically signed by Valentín Marie MD on 3/13/2021 at 12:11 PM

## 2021-03-13 NOTE — CARE COORDINATION
NURSING: The patient will be discharged today and her family will provide transportation home. Please contact the patient's family to remind them to being her oxygen in with them. At discharge please fax the patient's discharge instructions and physician's discharge summary (if available) to [facility] at [phone]. Please also call [facility] at [phone] to notify them of the patient's discharge.

## 2021-03-13 NOTE — PROGRESS NOTES
Patient experiencing mid sternal chest pressure. It is not radiating per patient. Patient is alert and oriented. Tele monitor is now being applied. EKG has been done and Shawna OJEDA notified. Awaiting response.

## 2021-03-13 NOTE — PROGRESS NOTES
Pulmonary and Critical Care  Progress Note    Subjective: The patient is better. Shortness of breath better. Chest pain none  Addressing respiratory complaints Patient is negative for  hemoptysis and cyanosis  CONSTITUTIONAL:  negative for fevers and chills      Past Medical History:     has a past medical history of Arthritis, COPD (chronic obstructive pulmonary disease) ([facility] [address] Full dentures, H/O cardiovascular stress test, H/O cardiovascular stress test, H/O Doppler ultrasound, H/O Doppler ultrasound, History of 24 hour EKG monitoring, History of nuclear stress test, Hx of chest x-ray, Hx of Doppler echocardiogram, Hx of echocardiogram, Hx of echocardiogram, Hx of pelvic x-ray, Hx of x-ray of hip, Hyperlipidemia, Hypertension, Leg pain, Lung nodules, On home oxygen therapy, Small cell lung cancer ([facility] [address] and Wears glasses. has a past surgical history that includes cyst removal (1970's); Tubal ligation (1970's); laparoscopic appendectomy (N/A, 5/18/2019); Mediastinoscopy (N/A, 12/10/2020); back surgery; Colonoscopy; Hysterectomy; eye surgery (2010?); joint replacement (Right, 2014? ?); Foot surgery (Right, 1970's); bronchoscopy (N/A, 1/5/2021); and Upper gastrointestinal endoscopy (N/A, 3/3/2021). reports that she quit smoking about 3 months ago. Her smoking use included cigarettes. She started smoking about 47 years ago. She has a 70.50 pack-year smoking history. She has never used smokeless tobacco. She reports current alcohol use. She reports that she does not use drugs. Family history:  family history includes COPD in her sister; Cancer in her father and sister; Coronary Art Dis in her mother; Dementia in her mother; Diabetes in her sister; Heart Attack in her sister;  Heart Attack (age of onset: 61) in her father and mother; High Cholesterol in her mother; Hypertension in her mother, sister, sister, and sister; Irritable Bowel Syndrome in her sister; Pacemaker in her sister; Stroke in her father, mother, and sister. Allergies   Allergen Reactions    Formaldehyde     Gabapentin Other (See Comments)    Norflex Tablets [Orphenadrine]     Cranberry Rash     Social History:    Reviewed; no changes    Objective:   PHYSICAL EXAM:        VITALS:  BP (!) 140/63   Pulse 102   Temp 98.5 °F (36.9 °C) (Oral)   Resp 18   Ht 5' (1.524 m)   Wt 127 lb 11.2 oz (57.9 kg)   SpO2 98%   BMI 24.94 kg/m²     24HR INTAKE/OUTPUT:      Intake/Output Summary (Last 24 hours) at 3/13/2021 1308  Last data filed at 3/12/2021 2045  Gross per 24 hour   Intake 640 ml   Output    Net 640 ml       CONSTITUTIONAL:  Awake. LUNGS:  decreased breath sounds. CARDIOVASCULAR:  normal S1 and S2 and negative JVD  ABD:Abdomen soft, non-tender. BS normal. No masses,  No organomegaly  NEURO:Alert. DATA:    CBC:  Recent Labs     03/11/21  0651 03/12/21  0648 03/13/21  0534   WBC 21.4* 20.5* 15.7*   RBC 2.83* 2.62* 3.08*   HGB 7.9* 7.4* 8.9*   HCT 26.4* 25.2* 29.0*    265 263   MCV 93.3 96.2 94.2   MCH 27.9 28.2 28.9   MCHC 29.9* 29.4* 30.7*   RDW 18.2* 18.5* 17.6*   NRBC 1  --   --    SEGSPCT 87.0* 90.0* 93.0*   BANDSPCT 3* 2*  --       BMP:  No results for input(s): NA, K, CL, CO2, BUN, CREATININE, CALCIUM, GLUCOSE in the last 72 hours. ABG:  No results for input(s): PH, PO2ART, EVT9GCC, HCO3, BEART, O2SAT in the last 72 hours. Lab Results   Component Value Date    PROBNP 1,244 (H) 03/03/2021    PROBNP 310.0 (H) 02/28/2021    PROBNP 126.5 01/11/2021    THEOPH 4.9 (L) 03/06/2021     No results found for: [facility]    Radiology Review:  Pertinent images / reports were reviewed as a part of this visit.     Assessment:     Patient Active Problem List   Diagnosis    Hyperlipidemia    COPD (chronic obstructive pulmonary disease) ([facility] [address]    Leg pain    Hypertension    Shortness of breath    Tobacco abuse    Abdominal aortic aneurysm (AAA) without rupture (HCC)    Degeneration of lumbar or lumbosacral intervertebral disc    Osteopenia    Anxiety    Acute on chronic respiratory failure with hypoxia (HCC)    Lung mass    COPD exacerbation (HCC)    Acute exacerbation of chronic obstructive pulmonary disease (COPD) (HCC)    Small cell lung cancer ([facility] [address] [address]    Acute on chronic respiratory failure with hypoxia and hypercapnia ([facility] [address]       Plan:   1. Overall the patient has improved. 2. Wean FiO2.   Otilia Mauricio MD  3/13/2021  1:08 PM

## 2021-03-15 NOTE — CARE COORDINATION
[facility] Transitions Initial Follow Up Call    Call within 2 business days of discharge: Yes    Patient: Alaina Garcia Patient : 1950   MRN: [id number]  Reason for Admission:   SOB/ COPD  Discharge Date: 3/13/21 RARS: Readmission Risk Score: 48      Last Discharge [address] [address]       Complaint Diagnosis Description Type Department Provider    21 Shortness of Breath Anxiety . .. ED to Hosp-Admission (Discharged) (Aileen Old) Nasim Davenport MD; [facility]. .. Spoke with:   Patient    Patient contacted regarding Philippe Shanon. Discussed COVID-19 related testing which was available at this time. Test results were negative. Patient informed of results, if available? No    Care Transition Nurse contacted the patient by telephone to perform post discharge assessment. Call within 2 business days of discharge: Yes. Verified name and  with patient as identifiers. Provided introduction to self, and explanation of the CTN/ACM role, and reason for call due to risk factors for infection and/or exposure to COVID-19. Symptoms reviewed with patient who verbalized the following symptoms: shortness of breath, fatigue, occasional upset stomach. Denies fever, chills, body aches increased SOB, cough, CP or wheezing. Reviewed COPD zone management tool. Instructed on s/s to report to MD.    Patient reports that she fell yesterday upon coming into her home. Reports that she landed on her right side. Patient denies increased pain, swelling or change in mobility. Reports some bruising to site. CTN to update Provider. Patient reports that she has not been contacted by [facility] to this point. Confirmed contact information for [address] [place] [address] Rd. Reminded of the  availability of a [facility] nurse on call for condition changes. Informed of CTN plan to confirm services. Due to new onset of symptoms encounter was routed to provider for escalation. Discussed follow-up appointments.  If no appointment was previously scheduled, appointment scheduling offered: [facility] follow up appointment(s):   Future Appointments   Date Time Provider Jourdan Funez   3/16/2021  1:30 PM Marcelino Pickard DO [address] [facility] [place]   3/19/2021 10:10 AM SCHEDULE, [address] [place] Naty Ulrich [facility]   3/24/2021  8:15 AM SCHEDULE, [facility] [place]   3/25/2021 10:00 AM SCHEDULE, [facility] [place]   3/26/2021 10:00 AM SCHEDULE, [facility] [place]   4/1/2021  1:30 PM MD SHERWIN Brand   10/12/2021  4:00 PM Anali Salinas LPN [address] [facility]   05/32/4437 11:15 AM Ophelia Young MD [facility]     Non-[facility] follow up appointment(s):     Non-face-to-face services provided:  Education of patient/family/caregiver/guardian to support self-management-1     Advance Care Planning:   Does patient have an Advance Directive:  No. Deferred per patient request..     Patient has following risk factors of: COPD/ Small Cell CA CTN reviewed discharge instructions, medical action plan and red flags such as increased shortness of breath, increasing fever and signs of decompensation with patient who verbalized understanding. Discussed exposure protocols and quarantine with CDC Guidelines What to do if you are sick with coronavirus disease 2019.  Patient was given an opportunity for questions and concerns. The patient agrees to contact the [facility] exposure line [phone], local health department  and PCP office for questions related to their healthcare. CTN provided contact information for future needs. Reviewed and educated patient on any new and changed medications related to discharge diagnosis. Medication reconciliation completed. Patient denies any barriers to obtaining her medications. Was patient discharged with a pulse oximeter?  No  Discussed and confirmed pulse oximeter discharge instructions and when [place]   4/1/2021  1:30 PM Waleska Leyva MD [facility]   10/12/2021  4:00 PM Trisha Rueda LPN [facility]   51/34/7526 11:15 AM Cristin Churchill MD [facility]       Jaymie Mcwilliams RN

## 2021-03-16 NOTE — PROGRESS NOTES
Post-Discharge Transitional Care Management Services or Hospital Follow Up      Mami Mays   YOB: 1950    Date of Office Visit:  3/16/2021  Date of Hospital Admission: 2/28/21  Date of Hospital Discharge: 3/13/21  Readmission Risk Score(high >=14%.  Medium >=10%):Readmission Risk Score: 50      Care management risk score Rising risk (score 2-5) and Complex Care (Scores >=6): 5     Non face to face  following discharge, date last encounter closed (first attempt may have been earlier): 3/15/2021  2:02 PM 3/15/2021  2:02 PM    Call initiated 2 business days of discharge: Yes     Patient Active Problem List   Diagnosis    Hyperlipidemia    COPD (chronic obstructive pulmonary disease) ([address]    Leg pain    Hypertension    Shortness of breath    Tobacco abuse    Abdominal aortic aneurysm (AAA) without rupture ([address]    Degeneration of lumbar or lumbosacral intervertebral disc    Osteopenia    Anxiety    Acute on chronic respiratory failure with hypoxia (HCC)    Lung mass    COPD exacerbation ([address]    Acute exacerbation of chronic obstructive pulmonary disease (COPD) ([address]    Small cell lung cancer ([address]    Acute on chronic respiratory failure with hypoxia and hypercapnia (HCC)       Allergies   Allergen Reactions    Formaldehyde     Gabapentin Other (See Comments)    Norflex Tablets [Orphenadrine]     Cranberry Rash       Medications listed as ordered at the time of discharge from hospital   [place], [place]   Home Medication Instructions MILAGROS:    Printed on:03/16/21 8583   Medication Information                      albuterol sulfate HFA (PROAIR HFA) 108 (90 Base) MCG/ACT inhaler  Inhale 2 puffs into the lungs every 6 hours as needed for Wheezing             aspirin 81 MG EC tablet  Take 81 mg by mouth daily             busPIRone (BUSPAR) 10 MG tablet  Take 1 tablet by mouth 2 times daily             calcium carbonate 600 MG TABS tablet  Take 1 tablet by mouth daily (KLOR-CON M) 10 MEQ extended release tablet  Take 1 tablet by mouth 2 times daily             sertraline (ZOLOFT) 50 MG tablet  Take 1 tablet by mouth daily             simvastatin (ZOCOR) 20 MG tablet  Take 1 tablet by mouth nightly             sucralfate (CARAFATE) 1 GM tablet  Take 1 tablet by mouth 4 times daily             theophylline (UNIPHYL) 400 MG extended release tablet  Take 0.5 tablets by mouth daily                   Medications marked \"taking\" at this time  Outpatient Medications Marked as Taking for the 3/16/21 encounter (Office Visit) with Catalino Cedillo, DO   Medication Sig Dispense Refill    LORazepam (ATIVAN) 1 MG tablet Take 1 tablet by mouth every 8 hours as needed for Anxiety for up to 7 days. 21 tablet 0    melatonin (RA MELATONIN) 3 MG TABS tablet Take 1 tablet by mouth nightly as needed (for sleep) 30 tablet 1    ipratropium-albuterol (DUONEB) 0.5-2.5 (3) MG/3ML SOLN nebulizer solution Inhale 3 mLs into the lungs 4 times daily 360 mL 5    simvastatin (ZOCOR) 20 MG tablet Take 1 tablet by mouth nightly 90 tablet 1    nystatin (MYCOSTATIN) 779253 UNIT/ML suspension Take 5 mLs by mouth 4 times daily 1 Bottle 2    minocycline (MINOCIN;DYNACIN) 100 MG capsule Take 1 capsule by mouth 2 times daily for 5 days 10 capsule 0    pantoprazole (PROTONIX) 40 MG tablet Take 1 tablet by mouth every morning (before breakfast) 30 tablet 3    sucralfate (CARAFATE) 1 GM tablet Take 1 tablet by mouth 4 times daily 120 tablet 3    guaiFENesin (MUCINEX CHEST CONGESTION CHILD) 100 MG/5ML liquid Take 10 mLs by mouth 3 times daily as needed for Cough 1 Bottle 3    sertraline (ZOLOFT) 50 MG tablet Take 1 tablet by mouth daily 30 tablet 0    Nutritional Supplement LIQD 2 cans by mouth daily. Boost strawberry if available.  60 Can 3    busPIRone (BUSPAR) 10 MG tablet Take 1 tablet by mouth 2 times daily 60 tablet 0    HYDROcodone-acetaminophen (NORCO)  MG per tablet Take 1 tablet by mouth every 4 hours as needed for Pain for up to 30 days. Indications: 180 180 tablet 0    theophylline (UNIPHYL) 400 MG extended release tablet Take 0.5 tablets by mouth daily 15 tablet 3    Magic Mouthwash (MIRACLE MOUTHWASH) Swish and swallow 5 mLs 4 times daily as needed for Irritation 1:1:1 diphenhydramine, nystatin, lidocaine 240 mL 1    ondansetron (ZOFRAN) 8 MG tablet Take 1 tablet by mouth every 8 hours as needed for Nausea or Vomiting 30 tablet 2    OLANZapine (ZYPREXA) 2.5 MG tablet Take the night prior to each chemotherapy, then for three additional nights with each chemotherapy 16 tablet 0    fluticasone (FLONASE) 50 MCG/ACT nasal spray 1 spray by Each Nostril route daily      dilTIAZem (CARDIZEM CD) 120 MG extended release capsule Take 1 capsule by mouth daily 30 capsule 3    albuterol sulfate HFA (PROAIR HFA) 108 (90 Base) MCG/ACT inhaler Inhale 2 puffs into the lungs every 6 hours as needed for Wheezing 1 Inhaler 6    Cholecalciferol (VITAMIN D3) 25 MCG (1000 UT) TABS Take by mouth daily       calcium carbonate 600 MG TABS tablet Take 1 tablet by mouth daily      aspirin 81 MG EC tablet Take 81 mg by mouth daily      Fexofenadine HCl (MUCINEX ALLERGY PO) Take by mouth 2 times daily           Medications patient taking as of now reconciled against medications ordered at time of hospital discharge: Yes    Chief Complaint   Patient presents with    Follow-Up from Hospital     Acute exacerbation of COPD    Panic Attack     Atleast 2-3 months    Fall     Had fall the day of d/c. Did not go to ER. Did hit head. Some neck tightness. HPI    Inpatient course: Discharge summary reviewed- see chart. Interval history/Current status: Acute on chronic respiratory failure. Improved    Patient COPD improved, on minocycline course. Tolerating antibiotic. Currently on 4L O2     Next chemo on the 24th. Recieves every 3rd week unsure for how many cycles.  Following with Pulmonology Dr. Dafne Jordan, soft diet and medications. Ativan has been helping patient out with panic attacks. Weight stable    Plan for mediport placement for chemo treatment on Friday. Difficulty sleeping at night around 2-3 hours a night. Has not tried melatonin, working on sleep hygeine          Review of Systems   Constitutional: Negative for chills and fatigue. HENT: Negative for congestion and sore throat. Respiratory: Negative for shortness of breath and wheezing. Cardiovascular: Negative for chest pain and palpitations. Gastrointestinal: Negative for abdominal pain and nausea. Genitourinary: Negative for frequency and urgency. Neurological: Negative for light-headedness. Vitals:    03/16/21 1322   BP: 121/66   Pulse: 112   Resp: 16   Temp: 97.5 °F (36.4 °C)   SpO2: 98%   Weight: 127 lb (57.6 kg)   Height: 5' (1.524 m)     Body mass index is 24.8 kg/m². Wt Readings from Last 3 Encounters:   03/16/21 127 lb (57.6 kg)   03/11/21 127 lb 11.2 oz (57.9 kg)   02/19/21 118 lb 3.2 oz (53.6 kg)     BP Readings from Last 3 Encounters:   03/16/21 121/66   03/13/21 (!) 150/67   03/03/21 132/76       Physical Exam  Constitutional:       Appearance: Normal appearance. HENT:      Head: Normocephalic and atraumatic. Eyes:      Extraocular Movements: Extraocular movements intact. Pupils: Pupils are equal, round, and reactive to light. Cardiovascular:      Rate and Rhythm: Normal rate and regular rhythm. Pulses: Normal pulses. Heart sounds: No murmur. No friction rub. No gallop. Skin:     General: Skin is warm and dry. Neurological:      General: No focal deficit present. Mental Status: She is alert. Psychiatric:         Mood and Affect: Mood normal.         Behavior: Behavior normal.             Assessment/Plan:  1.  Chronic bronchitis, unspecified chronic bronchitis type ([address]  -stable on current regimen  - IN DISCHARGE MEDS RECONCILED W/ CURRENT OUTPATIENT MED LIST  - ipratropium-albuterol (DUONEB) 0.5-2.5 (3) MG/3ML SOLN nebulizer solution; Inhale 3 mLs into the lungs 4 times daily  Dispense: 360 mL; Refill: 5    2. Anxiety  -stable, ativan PRN. Anxiety related to current medical condition  - LORazepam (ATIVAN) 1 MG tablet; Take 1 tablet by mouth every 8 hours as needed for Anxiety for up to 7 days. Dispense: 21 tablet; Refill: 0    3. Lung mass  -following with Oncology Dr. Michael Cooper plan for mediport placement in 1 day. 4. Essential hypertension  -stable on current regimen    5. Mixed hyperlipidemia  -stable  - simvastatin (ZOCOR) 20 MG tablet; Take 1 tablet by mouth nightly  Dispense: 90 tablet; Refill: 1    6. Sleep disorder  -counseled on sleep hygeine. Start melatonin, caution ambien use given ativan, norco use  - melatonin (RA MELATONIN) 3 MG TABS tablet; Take 1 tablet by mouth nightly as needed (for sleep)  Dispense: 30 tablet;  Refill: 1        Medical Decision Making: straightforward

## 2021-03-16 NOTE — PROGRESS NOTES
Presenting for hospital follow up, [facility]. D/C 03/13/21. Sepsis 2/2 CAP,POA  Acute on chronic hypoxemic and hypercapnic respiratory failure  Acute exacerbation of COPD  Has Lung cancer. Still on chemo.

## 2021-03-17 NOTE — TELEPHONE ENCOUNTER
Would like Home Care for:     Skilled Nursing    Occupational Therapy    Physical Therapy    Speech Therapy    Respiratory Therapy

## 2021-03-22 NOTE — TELEPHONE ENCOUNTER
Spoke with pt's daughter, Kimo Gan to cancel/reschedule her tx's this week to next wk as she is not due until then. tx dates 3/31 @ 8:15; 4/1 @ 9:30 & 4/2 @ 9:30; daughter voiced understanding. NN to return call to answer question about a patch.

## 2021-03-23 PROBLEM — J96.01 ACUTE HYPOXEMIC RESPIRATORY FAILURE (HCC): Status: ACTIVE | Noted: 2021-01-01

## 2021-03-23 NOTE — ED NOTES
Patient very restless. Dr. Alejandro Marcial at bedside.  Gives propofol bolus of 20mg      Pola Ramires RN  03/23/21 0388

## 2021-03-23 NOTE — PROGRESS NOTES
Pt intubated per Dr. Alejandro Marcial with this RT assisting. 7.5ETT secured at [email or web address] via commercial device. Colorimetric CO2 detection,BBS and tubing condensation used to verify tube placement. Pt placed on vent at ordered settings     Ac 14 500 100% +5.      SPO2 100%

## 2021-03-23 NOTE — ANESTHESIA PRE PROCEDURE
Department of Anesthesiology  Preprocedure Note       Name:  Chiquita Sandhu   Age:  65 y.o.  :  1950                                          MRN:  [id number]         Date:  3/23/2021      Surgeon: Dung Clark):  Saba Villagran MD    Procedure: Procedure(s): MEDIPORT INSERTION    Medications prior to admission:   Prior to Admission medications    Medication Sig Start Date End Date Taking? Authorizing Provider   LORazepam (ATIVAN) 1 MG tablet Take 1 tablet by mouth every 8 hours as needed for Anxiety for up to 7 days. 3/16/21 3/23/21  Dominic Cedillo,    ipratropium-albuterol (DUONEB) 0.5-2.5 (3) MG/3ML SOLN nebulizer solution Inhale 3 mLs into the lungs 4 times daily 3/16/21   Dominic Cedillo, DO   simvastatin (ZOCOR) 20 MG tablet Take 1 tablet by mouth nightly 3/16/21 9/12/21  Dominic Cedillo DO   nystatin (MYCOSTATIN) 044907 UNIT/ML suspension Take 5 mLs by mouth 4 times daily 3/13/21 4/12/21  Jaren Saldaña MD   pantoprazole (PROTONIX) 40 MG tablet Take 1 tablet by mouth every morning (before breakfast) 3/14/21   Jaren Saldaña MD   sucralfate (CARAFATE) 1 GM tablet Take 1 tablet by mouth 4 times daily 3/13/21 4/12/21  Jaren Saldaña MD   guaiFENesin [facility] CHEST CONGESTION CHILD) 100 MG/5ML liquid Take 10 mLs by mouth 3 times daily as needed for Cough 3/13/21 4/12/21  Jaren Saldaña MD   sertraline (ZOLOFT) 50 MG tablet Take 1 tablet by mouth daily 21   Baron Cifuentes MD   Nutritional Supplement LIQD 2 cans by mouth daily. Boost strawberry if available. 21   HELIO Trinidad - CNP   HYDROcodone-acetaminophen (NORCO)  MG per tablet Take 1 tablet by mouth every 4 hours as needed for Pain for up to 30 days.  Indications: 180 2/15/21 3/17/21  Baron Cifuentes MD   theophylline (UNIPHYL) 400 MG extended release tablet Take 0.5 tablets by mouth daily 21   Issa Owen MD   Magic Mouthwash (MIRACLE MOUTHWASH) Swish and swallow 5 mLs 4 times daily as needed for Irritation 1:1:1 diphenhydramine, nystatin, lidocaine 1/27/21   Jerrell Núñez APRN - CNP   ondansetron (ZOFRAN) 8 MG tablet Take 1 tablet by mouth every 8 hours as needed for Nausea or Vomiting 1/19/21   Georgette Mckeon MD   OLANZapine (ZYPREXA) 2.5 MG tablet Take the night prior to each chemotherapy, then for three additional nights with each chemotherapy 1/19/21   Georgette Mckeon MD   dexamethasone (DECADRON) 4 MG tablet Take 1 tablet x 4 days after each chemotherapy 1/19/21   Georgette Mckeon MD   dilTIAZem (CARDIZEM CD) 120 MG extended release capsule Take 1 capsule by mouth daily 12/12/20   Aftab Barrow MD   albuterol sulfate HFA (PROAIR HFA) 108 (90 Base) MCG/ACT inhaler Inhale 2 puffs into the lungs every 6 hours as needed for Wheezing 6/9/20   Huong Rhoades MD   Cholecalciferol (VITAMIN D3) 25 MCG (1000 UT) TABS Take by mouth daily     Historical Provider, MD   calcium carbonate 600 MG TABS tablet Take 1 tablet by mouth daily    Historical Provider, MD   aspirin 81 MG EC tablet Take 81 mg by mouth daily    Historical Provider, MD       Current medications:    Current Outpatient Medications   Medication Sig Dispense Refill    LORazepam (ATIVAN) 1 MG tablet Take 1 tablet by mouth every 8 hours as needed for Anxiety for up to 7 days.  21 tablet 0    ipratropium-albuterol (DUONEB) 0.5-2.5 (3) MG/3ML SOLN nebulizer solution Inhale 3 mLs into the lungs 4 times daily 360 mL 5    simvastatin (ZOCOR) 20 MG tablet Take 1 tablet by mouth nightly 90 tablet 1    nystatin (MYCOSTATIN) 035847 UNIT/ML suspension Take 5 mLs by mouth 4 times daily 1 Bottle 2    pantoprazole (PROTONIX) 40 MG tablet Take 1 tablet by mouth every morning (before breakfast) 30 tablet 3    sucralfate (CARAFATE) 1 GM tablet Take 1 tablet by mouth 4 times daily 120 tablet 3    guaiFENesin (MUCINEX CHEST CONGESTION CHILD) 100 MG/5ML liquid Take 10 mLs by mouth 3 times daily as needed for Cough 1 Bottle 3    sertraline (ZOLOFT) 50 MG tablet Take 1 tablet by mouth daily 30 tablet 0    Nutritional Supplement LIQD 2 cans by mouth daily. Boost strawberry if available. 60 Can 3    HYDROcodone-acetaminophen (NORCO)  MG per tablet Take 1 tablet by mouth every 4 hours as needed for Pain for up to 30 days. Indications: 180 180 tablet 0    theophylline (UNIPHYL) 400 MG extended release tablet Take 0.5 tablets by mouth daily 15 tablet 3    Magic Mouthwash (MIRACLE MOUTHWASH) Swish and swallow 5 mLs 4 times daily as needed for Irritation 1:1:1 diphenhydramine, nystatin, lidocaine 240 mL 1    ondansetron (ZOFRAN) 8 MG tablet Take 1 tablet by mouth every 8 hours as needed for Nausea or Vomiting 30 tablet 2    OLANZapine (ZYPREXA) 2.5 MG tablet Take the night prior to each chemotherapy, then for three additional nights with each chemotherapy 16 tablet 0    dexamethasone (DECADRON) 4 MG tablet Take 1 tablet x 4 days after each chemotherapy 16 tablet 0    dilTIAZem (CARDIZEM CD) 120 MG extended release capsule Take 1 capsule by mouth daily 30 capsule 3    albuterol sulfate HFA (PROAIR HFA) 108 (90 Base) MCG/ACT inhaler Inhale 2 puffs into the lungs every 6 hours as needed for Wheezing 1 Inhaler 6    Cholecalciferol (VITAMIN D3) 25 MCG (1000 UT) TABS Take by mouth daily       calcium carbonate 600 MG TABS tablet Take 1 tablet by mouth daily      aspirin 81 MG EC tablet Take 81 mg by mouth daily       No current facility-administered medications for this visit. Allergies:     Allergies   Allergen Reactions    Formaldehyde     Gabapentin Other (See Comments)    Norflex Tablets [Orphenadrine]     Cranberry Rash       Problem List:    Patient Active Problem List   Diagnosis Code    Hyperlipidemia E78.5    COPD (chronic obstructive pulmonary disease) ([facility] J44.9    Leg pain M79.606    Hypertension I10    Shortness of breath R06.02    Tobacco abuse Z72.0    Abdominal aortic aneurysm (AAA) without rupture (HCC) I71.4    Degeneration of lumbar or lumbosacral intervertebral disc M51.37    Osteopenia M85.80    Anxiety F41.9    Acute on chronic respiratory failure with hypoxia ([facility]) J96.21    Lung mass R91.8    COPD exacerbation ([facility]) J44.1    Acute exacerbation of chronic obstructive pulmonary disease (COPD) (HCC) J44.1    Small cell lung cancer ([facility]) C34.90    Acute on chronic respiratory failure with hypoxia and hypercapnia ([facility]) J96.21, J96.22       Past Medical History:        Diagnosis Date    Arthritis     COPD (chronic obstructive pulmonary disease) [facility]     follow with Dr Ez Ryder Full dentures     H/O cardiovascular stress test 11/18/2009    thallium, normal no ischemia EF70%     H/O cardiovascular stress test 10/11/2013    thallium,EF70%, normal, no ischemia    H/O Doppler ultrasound 10/13/2010    carotid, right normal, left normal    H/O Doppler ultrasound 10/07/2014    Carotid mild bilateral internal carotid artery disease less than 50% in severity. Normal vertebral artery flows with good velocities. Incidental finding of possible thyroid nodule in the left lobe.  History of 24 hour EKG monitoring 03/11/2011    NSR no ectopy    History of nuclear stress test 10/21/2016    lexiscan-normal,no ischemia,EF70%,enlarged right ventricle    Hx of chest x-ray 01/02/2015    WNL    Hx of Doppler echocardiogram 12/01/2020    EF 50-55% mild LV hypertrophy. Grade 2 diastolic dysfunction. Mild AS.     Hx of echocardiogram 10/11/2013    10/13 Abn lt ventricular diastolic function, mile MR, EF 57%,10/10 EF55%, mild mitral annular calcification    Hx of echocardiogram 05/02/2019    EF 08-78%, Grade I diastolic dysfunction, Mild aortic stenosis, Calcific thickening of posterior leaflet of mitral valve, Mild Pulm HTN    Hx of pelvic x-ray 01/02/2015    Severe RT his osterarothrosis    Hx of x-ray of hip 01/02/2015    Severe Rt hip osteosrthrosis    Hyperlipidemia     Hypertension     follows with dr Leilani Landeros Leg pain  Lung nodules     scheduled for bronch 2020    On home oxygen therapy     \"on 4 liters 24 hours per day\"    Small cell lung cancer ([address] 2021    Wears glasses     to read       Past Surgical History:        Procedure Laterality Date    BACK SURGERY      \"about 12 yrs ago - surgery my mid to low back \" done in [address] N/A 2021    BRONCHOSCOPY ENDOBRONCHIAL ULTRASOUND [facility]      \"last one done in my age 61's    CYST REMOVAL  's    left arm    EYE SURGERY  ?    svetlana cataract ext     FOOT SURGERY Right     \"have screw in 2nd toe\"    HYSTERECTOMY      \"took everything \"    JOINT REPLACEMENT Right ??    hip    LAPAROSCOPIC APPENDECTOMY N/A 2019    APPENDECTOMY LAPAROSCOPIC performed by Mario Hutchison MD at [facility] 12/10/2020    MEDIASTINOSCOPY performed by Jose Recinos MD at [facility]  1970's   [address] N/A 3/3/2021    EGD BIOPSY AND BRUSHING performed by Jass Cadet MD at [facility] ENDOSCOPY       Social History:    Social History     Tobacco Use    Smoking status: Former Smoker     Packs/day: 1.50     Years: 47.00     Pack years: 70.50     Types: Cigarettes     Start date: 1973     Quit date: 2020     Years since quittin.3    Smokeless tobacco: Never Used   Substance Use Topics    Alcohol use: Yes     Alcohol/week: 0.0 standard drinks     Comment: rare\"twice per year\"                                Counseling given: Not Answered      Vital Signs (Current): There were no vitals filed for this visit.                                            BP Readings from Last 3 Encounters:   21 121/66   21 (!) 150/67   21 132/76       NPO Status:                                                                                 BMI:   Wt Readings from Last 3 Encounters:   21 127 lb (57.6 kg)   21 127 lb 11.2 oz (57.9 kg)   02/19/21 118 lb 3.2 oz (53.6 kg)     There is no height or weight on file to calculate BMI.    CBC:   Lab Results   Component Value Date    WBC 15.7 03/13/2021    RBC 3.08 03/13/2021    HGB 8.9 03/13/2021    HCT 29.0 03/13/2021    MCV 94.2 03/13/2021    RDW 17.6 03/13/2021     03/13/2021       CMP:   Lab Results   Component Value Date     03/08/2021    K 4.5 03/08/2021    CL 86 03/08/2021    CO2 41 03/08/2021    BUN 14 03/08/2021    CREATININE 0.7 03/08/2021    GFRAA >60 03/08/2021    AGRATIO 1.6 08/28/2020    LABGLOM >60 03/08/2021    GLUCOSE 76 03/08/2021    PROT 5.0 03/08/2021    PROT 6.5 03/06/2015    CALCIUM 8.6 03/08/2021    BILITOT 0.2 03/08/2021    ALKPHOS 56 03/08/2021    AST 11 03/08/2021    ALT 9 03/08/2021       POC Tests: No results for input(s): POCGLU, POCNA, POCK, POCCL, POCBUN, POCHEMO, POCHCT in the last 72 hours. Coags:   Lab Results   Component Value Date    PROTIME 9.6 01/05/2021    INR 0.80 01/05/2021    APTT 23.3 01/05/2021       HCG (If Applicable): No results found for: PREGTESTUR, PREGSERUM, HCG, HCGQUANT     ABGs:   Lab Results   Component Value Date    PO2ART 73 12/03/2020    YGN2TVZ 66.0 12/03/2020    BLS1RLP 40.9 12/03/2020        Type & Screen (If Applicable):  No results found for: LABABO, LABRH    Drug/Infectious Status (If Applicable):  No results found for: HIV, HEPCAB    COVID-19 Screening (If Applicable):   Lab Results   Component Value Date    COVID19 NOT DETECTED 03/19/2021         Anesthesia Evaluation  Patient summary reviewed  Airway: Mallampati: II        Dental:    (+) lower dentures and upper dentures      Pulmonary:normal exam    (+) COPD: severe,  shortness of breath:            Patient did not smoke on day of surgery.                 ROS comment: Chest CT 2/28/2021:  Impression  No evidence of pulmonary embolism or aortic dissection.  Overall, no acute  abnormality identified.     Multiple nodules within both lungs, with a mixed response to therapy.  The  majority of the nodules are similar in size.  However, at least 1 nodule  within the right lung apex is decreased in size. CXR 2021:  Impression  Minimal blunting of the left costophrenic angle, which may reflect a very  small joint effusion.  Otherwise no acute abnormality detected. Small cell cancer of right lung, stage IIIb    Former Smoker - 70.5 pack years  Quit Smokin20    home oxygen therapy \"on 4 liters 24 hours per day\"    Cardiovascular:    (+) hypertension: mild, valvular problems/murmurs: AS, hyperlipidemia               ROS comment: Echo 2020:  Summary   Technically difficult study patient sitting up during exam.   Left ventricular systolic function is normal.   Ejection fraction is visually estimated at 50-55%. Mild left ventricular hypertrophy. Grade II diastolic dysfunction. Calcified aortic valve with restricted motion of the non coronary cusp; mild   aortic stenosis. No evidence of any pericardial effusion. Neuro/Psych:   Negative Neuro/Psych ROS  (+) depression/anxiety             GI/Hepatic/Renal:            ROS comment: dysphagia. Endo/Other:    (+) blood dyscrasia: anemia, arthritis: OA., malignancy/cancer. Pt had no PAT visit       Abdominal:           Vascular: negative vascular ROS.  + PVD, aortic or cerebral, . Anesthesia Plan      MAC     ASA 4     (Chart review only   covid - 3/21/2021)  Induction: intravenous. MIPS: Postoperative opioids intended and Prophylactic antiemetics administered. Plan discussed with CRNA. Attending anesthesiologist reviewed and agrees with Pre Eval content            HELIO Acevedo - KATHRYN   3/23/2021         Pre Anesthesia Assessment complete.  Chart reviewed on 3/23/2021

## 2021-03-23 NOTE — CARE COORDINATION
Ric[facility] Transitions Follow Up Call    3/23/2021    Patient: Allan Marcelo  Patient : 1950   MRN: [id number]  Reason for Admission:   Mediport insertion/ Lung CA  Discharge Date: 3/23/21 RARS: Readmission Risk Score: 50        Care Transitions Subsequent and Final Call    Subsequent and Final Calls  Are you currently active with any services?: Home Health  Care Transitions Interventions   Home Care Waiver: Declined        DME Assistance: Completed     Senior Services: Declined    Other Interventions: Follow Up: Attempted patient contact. No answer to phone. Left VM with CTN contact information and request for return call. Patient noted to be seen in SDS for mediport insertion transferred to ER d/t decreased SAT's.   Future Appointments   Date Time Provider Department Center   3/29/2021  3:30 PM Fercho Watkins MD [facility]   3/31/2021  8:15 AM [facility]EDULE[facility] [address] [facility] [place]   3/31/2021  9:30 AM Teofilo Bonds MD [address] [facility] CC [facility]   3/31/2021 10:30 AM Alvin Handy MD [address] [facility] ID [facility]   2021  9:30 AM HUGO FordeZ MED ONC TREATMENT [facility] ONC [place]   2021  9:30 AM SCHEDULE, [facility] MED ONC TREATMENT [facility] MED ONC [place]   2021  1:30 PM Joann Mathews DO [address] [facility] FPS [facility]   10/12/2021  4:00 PM Keiry Silver LPN [address] [facility] Serg [facility]    11:15 AM Ginger Stevenson MD [facility]       Ad Mueller RN

## 2021-03-23 NOTE — ED TRIAGE NOTES
Patient presents to ED from Same Day Surgery. [facility] reports that patient found to have oxygen saturation in the 50's. They repositioned patient and were able to get it into the 70's.

## 2021-03-23 NOTE — PROGRESS NOTES
Patient was transported from ER to ICU by Gayla Jackson RT. Report was handed off to this RT. She is now on ventilator on the  Following settings; ACVC+ RR 14, Vte 500, Peep 5, 50%.  7.5C ETT, 22 @lips

## 2021-03-23 NOTE — ED PROVIDER NOTES
Triage Chief Complaint:   Shortness of Breath (low O2 saturation in SDS pre-op.  )    [place]:  Jennifer Garcia is a 65 y.o. female that presents with increased shortness of breath the last 3 to 4 days and increased anxiety. She was supposed to get a port placed as an outpatient procedure today but when she arrived she was noted to be significantly short of breath and intermittently hypoxic. She is normally on 4.5 L of oxygen via nasal cannula but has been up to 6 L since last night due to shortness of breath. Patient has a history of small cell lung cancer is on chemotherapy. No recent fevers. Difficult to obtain further history from patient due to her significant shortness of breath and hypoxia. ROS:   Review of Systems   Respiratory: Positive for shortness of breath. Psychiatric/Behavioral: The patient is nervous/anxious. Past Medical History:   Diagnosis Date    Arthritis     COPD (chronic obstructive pulmonary disease) ([facility] [address]     follow with Dr Yanci Mckeon Full dentures     H/O cardiovascular stress test 11/18/2009    thallium, normal no ischemia EF70%     H/O cardiovascular stress test 10/11/2013    thallium,EF70%, normal, no ischemia    H/O Doppler ultrasound 10/13/2010    carotid, right normal, left normal    H/O Doppler ultrasound 10/07/2014    Carotid mild bilateral internal carotid artery disease less than 50% in severity. Normal vertebral artery flows with good velocities. Incidental finding of possible thyroid nodule in the left lobe.  History of 24 hour EKG monitoring 03/11/2011    NSR no ectopy    History of nuclear stress test 10/21/2016    lexiscan-normal,no ischemia,EF70%,enlarged right ventricle    Hx of chest x-ray 01/02/2015    WNL    Hx of Doppler echocardiogram 12/01/2020    EF 50-55% mild LV hypertrophy. Grade 2 diastolic dysfunction. Mild AS.     Hx of echocardiogram 10/11/2013    10/13 Abn lt ventricular diastolic function, mile MR, EF 57%,10/10 EF55%, mild mitral annular calcification    Hx of echocardiogram 05/02/2019    EF 20-82%, Grade I diastolic dysfunction, Mild aortic stenosis, Calcific thickening of posterior leaflet of mitral valve, Mild Pulm HTN    Hx of pelvic x-ray 01/02/2015    Severe RT his osterarothrosis    Hx of x-ray of hip 01/02/2015    Severe Rt hip osteosrthrosis    Hyperlipidemia     Hypertension     follows with dr Marj Luke Leg pain     Lung nodules     scheduled for bronch 1/5/2020    On home oxygen therapy     \"on 4 liters 24 hours per day\"    Small cell lung cancer ([address] 1/11/2021    Wears glasses     to read     Past Surgical History:   Procedure Laterality Date    BACK SURGERY      \"about 12 yrs ago - surgery my mid to low back \" done in [address] N/A 1/5/2021    BRONCHOSCOPY ENDOBRONCHIAL ULTRASOUND performed by Joseph Gonzalez MD at [address]      \"last one done in my age 55's    CYST REMOVAL  1970's    left arm    EYE SURGERY  2010?    svetlana cataract ext     FOOT SURGERY Right 1970's    \"have screw in 2nd toe\"    HYSTERECTOMY      1999\"took everything \"    JOINT REPLACEMENT Right 2014??    hip    LAPAROSCOPIC APPENDECTOMY N/A 5/18/2019    APPENDECTOMY LAPAROSCOPIC performed by Laura Venegas MD at [address] [facility] 12/10/2020    MEDIASTINOSCOPY performed by Joseph Gonzalez MD at [facility]  1970's    UPPER GASTROINTESTINAL ENDOSCOPY N/A 3/3/2021    EGD BIOPSY AND BRUSHING performed by Brigette Paul MD at [place] [address] History   Problem Relation Age of Onset    Stroke Mother     Dementia Mother     Heart Attack Mother 61    Coronary Art Dis Mother     Hypertension Mother     High Cholesterol Mother     Cancer Father     Stroke Father     Heart Attack Father 61    Diabetes Sister     Heart Attack Sister     Hypertension Sister     Cancer Sister     COPD Sister     Stroke Sister     Hypertension Sister     Hypertension Sister     Irritable Bowel Syndrome Sister     Pacemaker Sister      Social History     Socioeconomic History    Marital status:      Spouse name: Not on file    Number of children: Not on file    Years of education: Not on file    Highest education level: Not on file   Occupational History    Not on file   Social Needs    Financial resource strain: Not on file    Food insecurity     Worry: Not on file     Inability: Not on file    Transportation needs     Medical: Not on file     Non-medical: Not on file   Tobacco Use    Smoking status: Former Smoker     Packs/day: 1.50     Years: 47.00     Pack years: 70.50     Types: Cigarettes     Start date: 1973     Quit date: 2020     Years since quittin.3    Smokeless tobacco: Never Used   Substance and Sexual Activity    Alcohol use:  Yes     Alcohol/week: 0.0 standard drinks     Comment: rare\"twice per year\"    Drug use: No    Sexual activity: Not on file     Comment:    Lifestyle    Physical activity     Days per week: Not on file     Minutes per session: Not on file    Stress: Not on file   Relationships    Social connections     Talks on phone: Not on file     Gets together: Not on file     Attends Jainism service: Not on file     Active member of club or organization: Not on file     Attends meetings of clubs or organizations: Not on file     Relationship status: Not on file    Intimate partner violence     Fear of current or ex partner: Not on file     Emotionally abused: Not on file     Physically abused: Not on file     Forced sexual activity: Not on file   Other Topics Concern    Not on file   Social History Narrative    Not on file     Current Facility-Administered Medications   Medication Dose Route Frequency Provider Last Rate Last Admin    propofol injection  5-50 mcg/kg/min Intravenous Titrated Dion Agrawal MD 13.8 mL/hr at 21 1529 40 mcg/kg/min at 21 1529    fentaNYL (SUBLIMAZE) 1,000 mcg in sodium chloride 0.9% 100 mL infusion  12.5-200 mcg/hr Intravenous Continuous Manjula Benavides MD 11 mL/hr at 03/23/21 1524 110 mcg/hr at 03/23/21 1524     Current Outpatient Medications   Medication Sig Dispense Refill    busPIRone (BUSPAR) 10 MG tablet Take 10 mg by mouth 2 times daily      LORazepam (ATIVAN) 1 MG tablet Take 1 tablet by mouth every 8 hours as needed for Anxiety for up to 7 days. 21 tablet 0    ipratropium-albuterol (DUONEB) 0.5-2.5 (3) MG/3ML SOLN nebulizer solution Inhale 3 mLs into the lungs 4 times daily 360 mL 5    simvastatin (ZOCOR) 20 MG tablet Take 1 tablet by mouth nightly 90 tablet 1    pantoprazole (PROTONIX) 40 MG tablet Take 1 tablet by mouth every morning (before breakfast) 30 tablet 3    sucralfate (CARAFATE) 1 GM tablet Take 1 tablet by mouth 4 times daily 120 tablet 3    sertraline (ZOLOFT) 50 MG tablet Take 1 tablet by mouth daily 30 tablet 0    HYDROcodone-acetaminophen (NORCO)  MG per tablet Take 1 tablet by mouth every 4 hours as needed for Pain for up to 30 days. Indications: 180 180 tablet 0    theophylline (UNIPHYL) 400 MG extended release tablet Take 0.5 tablets by mouth daily 15 tablet 3    dilTIAZem (CARDIZEM CD) 120 MG extended release capsule Take 1 capsule by mouth daily 30 capsule 3    albuterol sulfate HFA (PROAIR HFA) 108 (90 Base) MCG/ACT inhaler Inhale 2 puffs into the lungs every 6 hours as needed for Wheezing 1 Inhaler 6    Cholecalciferol (VITAMIN D3) 25 MCG (1000 UT) TABS Take by mouth daily       calcium carbonate 600 MG TABS tablet Take 1 tablet by mouth daily      aspirin 81 MG EC tablet Take 81 mg by mouth daily      nystatin (MYCOSTATIN) 699578 UNIT/ML suspension Take 5 mLs by mouth 4 times daily 1 Bottle 2    guaiFENesin (MUCINEX CHEST CONGESTION CHILD) 100 MG/5ML liquid Take 10 mLs by mouth 3 times daily as needed for Cough 1 Bottle 3    Nutritional Supplement LIQD 2 cans by mouth daily. Boost strawberry if available.  [address] Mouthwash (MIRACLE MOUTHWASH) Swish and swallow 5 mLs 4 times daily as needed for Irritation 1:1:1 diphenhydramine, nystatin, lidocaine 240 mL 1    ondansetron (ZOFRAN) 8 MG tablet Take 1 tablet by mouth every 8 hours as needed for Nausea or Vomiting 30 tablet 2    OLANZapine (ZYPREXA) 2.5 MG tablet Take the night prior to each chemotherapy, then for three additional nights with each chemotherapy 16 tablet 0    dexamethasone (DECADRON) 4 MG tablet Take 1 tablet x 4 days after each chemotherapy 16 tablet 0     Allergies   Allergen Reactions    Formaldehyde     Gabapentin Other (See Comments)    Norflex Tablets [Orphenadrine]     Cranberry Rash       Nursing Notes Reviewed     Physical Exam:   ED Triage Vitals   Enc Vitals Group      BP       Pulse       Resp       Temp       Temp src       SpO2       Weight       Height       Head Circumference       Peak Flow       Pain Score       Pain Loc       Pain Edu? Excl. in [address]? BP (!) 157/81   Pulse 109   Temp 97.3 °F (36.3 °C) (Axillary)   Resp 14   Ht 5' (1.524 m)   Wt 127 lb (57.6 kg)   SpO2 100%   BMI 24.80 kg/m²   My pulse ox interpretation is  abnormal  Physical Exam  Vitals signs and nursing note reviewed. Constitutional:       General: She is in acute distress. Appearance: Normal appearance. She is not toxic-appearing or diaphoretic. Comments: Will occasionally nod her head and attempt to answer questions about his difficult to understand her. HENT:      Head: Normocephalic and atraumatic. Eyes:      General:         Right eye: No discharge. Left eye: No discharge. Conjunctiva/sclera: Conjunctivae normal.   Cardiovascular:      Rate and Rhythm: Regular rhythm. Tachycardia present. Pulses: Normal pulses. Radial pulses are 2+ on the right side and 2+ on the left side. Pulmonary:      Effort: Tachypnea, accessory muscle usage and respiratory distress present.       Breath sounds: Decreased air movement present. Rhonchi present. No rales. Abdominal:      General: There is no distension. Tenderness: There is no abdominal tenderness. There is no guarding or rebound. Musculoskeletal: Normal range of motion. General: No swelling or tenderness. Skin:     General: Skin is warm and dry. Neurological:      General: No focal deficit present. Cranial Nerves: No cranial nerve deficit.    Psychiatric:         Mood and Affect: Mood normal.         Behavior: Behavior normal.         I have reviewed and interpreted all of the currently available lab results from this visit (if applicable):  Results for orders placed or performed during the hospital encounter of 03/23/21   COVID-19, Rapid    Specimen: Nasopharyngeal   Result Value Ref Range    Source UNKNOWN     SARS-CoV-2, NAAT NOT DETECTED    CBC Auto Differential   Result Value Ref Range    WBC 4.6 4.0 - 10.5 K/CU MM    RBC 3.10 (L) 4.2 - 5.4 M/CU MM    Hemoglobin 9.2 (L) 12.5 - 16.0 GM/DL    Hematocrit 31.1 (L) 37 - 47 %    .3 (H) 78 - 100 FL    MCH 29.7 27 - 31 PG    MCHC 29.6 (L) 32.0 - 36.0 %    RDW 15.9 (H) 11.7 - 14.9 %    Platelets 917 275 - 933 K/CU MM    MPV 10.9 7.5 - 11.1 FL    Differential Type AUTOMATED DIFFERENTIAL     Segs Relative 84.5 (H) 36 - 66 %    Lymphocytes % 9.6 (L) 24 - 44 %    Monocytes % 4.6 (H) 0 - 4 %    Eosinophils % 0.9 0 - 3 %    Basophils % 0.2 0 - 1 %    Segs Absolute 3.9 K/CU MM    Lymphocytes Absolute 0.4 K/CU MM    Monocytes Absolute 0.2 K/CU MM    Eosinophils Absolute 0.0 K/CU MM    Basophils Absolute 0.0 K/CU MM    Nucleated RBC % 0.0 %    Total Nucleated RBC 0.0 K/CU MM    Total Immature Neutrophil 0.01 K/CU MM    Immature Neutrophil % 0.2 0 - 0.43 %   Comprehensive Metabolic Panel w/ Reflex to MG   Result Value Ref Range    Sodium 137 135 - 145 MMOL/L    Potassium 4.5 3.5 - 5.1 MMOL/L    Chloride 94 (L) 99 - 110 mMol/L    CO2 42 (H) 21 - 32 MMOL/L    BUN 15 6 - 23 MG/DL    CREATININE 0.5 (L) 0.6 - 1.1 MG/DL    Glucose 113 (H) 70 - 99 MG/DL    Calcium 8.9 8.3 - 10.6 MG/DL    Albumin 3.5 3.4 - 5.0 GM/DL    Total Protein 5.8 (L) 6.4 - 8.2 GM/DL    Total Bilirubin 0.2 0.0 - 1.0 MG/DL    ALT 14 10 - 40 U/L    AST 16 15 - 37 IU/L    Alkaline Phosphatase 84 40 - 129 IU/L    GFR Non-African American >60 >60 mL/min/1.73m2    GFR African American >60 >60 mL/min/1.73m2    Anion Gap 1 (L) 4 - 16   Troponin   Result Value Ref Range    Troponin T <0.010 <0.01 NG/ML   Brain Natriuretic Peptide   Result Value Ref Range    Pro-.8 (H) <300 PG/ML   Blood Gas, Venous   Result Value Ref Range    pH, Tad 7.47 (H) 7.32 - 7.42    pCO2, Tad 58 (H) 38 - 52 mmHG    pO2, Tad 247 (H) 28 - 48 mmHG    Base Exc, Mixed 15.6 (H) 0 - 2.3    HCO3, Venous 42.2 (H) 19 - 25 MMOL/L    O2 Sat, Tad 85.2 (H) 50 - 70 %    Comment VBG    Lactic Acid, Plasma   Result Value Ref Range    Lactate 0.4 0.4 - 2.0 mMOL/L   Urinalysis   Result Value Ref Range    Color, UA YELLOW YELLOW    Clarity, UA CLEAR CLEAR    Glucose, Urine NEGATIVE NEGATIVE MG/DL    Bilirubin Urine NEGATIVE NEGATIVE MG/DL    Ketones, Urine NEGATIVE NEGATIVE MG/DL    Specific Gravity, UA 1.058 (H) 1.001 - 1.035    Blood, Urine NEGATIVE NEGATIVE    pH, Urine 5.0 5.0 - 8.0    Protein,  (A) NEGATIVE MG/DL    Urobilinogen, Urine NEGATIVE 0.2 - 1.0 MG/DL    Nitrite Urine, Quantitative NEGATIVE NEGATIVE    Leukocyte Esterase, Urine NEGATIVE NEGATIVE    RBC, UA 5 0 - 6 /HPF    WBC, UA 1 0 - 5 /HPF    Bacteria, UA NEGATIVE NEGATIVE /HPF    Squam Epithel, UA 1 /HPF    Mucus, UA RARE (A) NEGATIVE HPF    Trichomonas, UA NONE SEEN NONE SEEN /HPF   TSH without Reflex   Result Value Ref Range    TSH, High Sensitivity 2.290 0.270 - 4.20 uIu/ml   T4, free   Result Value Ref Range    T4 Free 1.17 0.9 - 1.8 NG/DL   EKG 12 Lead   Result Value Ref Range    Ventricular Rate 103 BPM    Atrial Rate 103 BPM    P-R Interval 130 ms    QRS Duration 72 ms    Q-T Interval 312 ms    QTc Calculation (Bazett) 408 ms    P Axis 63 degrees    R Axis -5 degrees    T Axis 61 degrees    Diagnosis       Sinus tachycardia  Otherwise normal ECG  When compared with ECG of 13-MAR-2021 02:14,  Questionable change in QRS axis        Radiographs (if obtained):  [] The following radiograph was interpreted by myself in the absence of a radiologist:  [x]Radiologist's Report Reviewed:  CTA PULMONARY W CONTRAST   Final Result   No evidence of pulmonary embolism. Redemonstration of the bilateral pulmonary nodules which have been stable or   slightly decreased in size. 2 new pleural based small nodular densities in the right lower lobe, which   may represent focal atelectasis; however, true nodules cannot be excluded. Attention to these nodular densities on the follow-up CT scans is suggested7. XR CHEST PORTABLE   Final Result   Endotracheal tube in adequate position. Nasogastric tube also in adequate   position      No evidence of acute cardiopulmonary process, otherwise stable appearance of   the chest               EKG (if obtained): (All EKG's are interpreted by myself in the absence of a cardiologist)  Sinus tachycardia with a rate of 103. AR interval 130, QRS 72, QTc 408. No ST elevations or depressions. Normal T waves. Impression: Sinus tachycardia, otherwise normal EKG. I compared to previous EKG from 3/13/2021, the previously noted nonspecific ST segments are resolved, otherwise no significant changes. MDM:  Differential diagnoses considered include but are not limited to hypercapnia, hypoxic respiratory failure, pneumonia, bronchitis, pneumothorax, medication overdose, side effect of medication, sepsis, delirium. Upon arrival patient has significant respiratory distress and is also fairly obtunded. Patient began having episodes of hypoxia requiring nonrebreather. She has been significant tachypnea and increased work of breathing and I believe she is tiring out.   Decision made to intubate dictations, but occasional words are mis-transcribed. Procedure Note - Intubation: The benefits, risks, and alternatives of intubation were discussed with the patient and consent was implied due to emergent situation for the procedure. Pre-oxygenation was administered and the appropriate equipment and staff were made available at the bedside. Monique Mojica was sedated/paralyzed; please see the chart for the drugs and dosages administered. A Marti 3 Cmac laryngoscope blade was used and a 7.5mm endotracheal tube was viewed to pass through the cords on the first attempt. The tube was secured at 22 cm to the lip. Tracheal position was confirmed using a colorimetric end-tidal CO2 detector, tube condensation and chest auscultation/visualization. Respiratory therapy is at the bedside and is assisting with ventilatory management.           Asaf Claudio MD  03/23/21 6020

## 2021-03-23 NOTE — PROGRESS NOTES
Medication History  [facility]    Patient Name: Aylin Osman 1950     Medication history has been completed by: Shoaib Mcguire CPhT    Source(s) of information: patient's family and insurance claims     Primary Care Physician: Jennifer Reed MD     Pharmacy: [facility]    Allergies as of 03/23/2021 - Review Complete 03/23/2021   Allergen Reaction Noted    Formaldehyde  10/17/2012    Gabapentin Other (See Comments) 10/17/2012    Norflex tablets [orphenadrine]  06/09/2019    Cranberry Rash 10/17/2012        Prior to Admission medications    Medication Sig Start Date End Date Taking? Authorizing Provider   busPIRone (BUSPAR) 10 MG tablet Take 10 mg by mouth 2 times daily   Yes Historical Provider, MD   LORazepam (ATIVAN) 1 MG tablet Take 1 tablet by mouth every 8 hours as needed for Anxiety for up to 7 days. 3/16/21 3/23/21 Yes Dominic Cedillo,    ipratropium-albuterol (DUONEB) 0.5-2.5 (3) MG/3ML SOLN nebulizer solution Inhale 3 mLs into the lungs 4 times daily 3/16/21  Yes Dominic Cedillo DO   simvastatin (ZOCOR) 20 MG tablet Take 1 tablet by mouth nightly 3/16/21 9/12/21 Yes Dominic Cedillo DO   pantoprazole (PROTONIX) 40 MG tablet Take 1 tablet by mouth every morning (before breakfast) 3/14/21  Yes Caroline De La Garza MD   sucralfate (CARAFATE) 1 GM tablet Take 1 tablet by mouth 4 times daily 3/13/21 4/12/21 Yes Caroline De La Garza MD   sertraline (ZOLOFT) 50 MG tablet Take 1 tablet by mouth daily 2/26/21  Yes Jennifer Reed MD   HYDROcodone-acetaminophen (NORCO)  MG per tablet Take 1 tablet by mouth every 4 hours as needed for Pain for up to 30 days.  Indications: 180 2/15/21 3/23/21 Yes Jennifer Reed MD   theophylline [facility]) 400 MG extended release tablet Take 0.5 tablets by mouth daily 2/2/21  Yes Aimee Escalante MD   dilTIAZem (CARDIZEM CD) 120 MG extended release capsule Take 1 capsule by mouth daily 12/12/20  Yes Darby Nation MD   albuterol sulfate HFA (PROAIR HFA) 108 (90 Base) MCG/ACT inhaler Inhale 2 puffs into the lungs every 6 hours as needed for Wheezing 6/9/20  Yes Cholo Steward MD   Cholecalciferol (VITAMIN D3) 25 MCG (1000 UT) TABS Take by mouth daily    Yes Historical Provider, MD   calcium carbonate 600 MG TABS tablet Take 1 tablet by mouth daily   Yes Historical Provider, MD   aspirin 81 MG EC tablet Take 81 mg by mouth daily   Yes Historical Provider, MD   nystatin (MYCOSTATIN) 372113 UNIT/ML suspension Take 5 mLs by mouth 4 times daily 3/13/21 4/12/21  Jason Tate MD   guaiFENesin [facility] CHEST CONGESTION CHILD) 100 MG/5ML liquid Take 10 mLs by mouth 3 times daily as needed for Cough 3/13/21 4/12/21  Jason Tate MD   Nutritional Supplement LIQD 2 cans by mouth daily. Boost strawberry if available. 2/19/21   HELIO Blum CNP   Magic Mouthwash (MIRACLE MOUTHWASH) Swish and swallow 5 mLs 4 times daily as needed for Irritation 1:1:1 diphenhydramine, nystatin, lidocaine 1/27/21   HELIO Blum CNP   ondansetron (ZOFRAN) 8 MG tablet Take 1 tablet by mouth every 8 hours as needed for Nausea or Vomiting 1/19/21   Lisandro Balderrama MD   OLANZapine (ZYPREXA) 2.5 MG tablet Take the night prior to each chemotherapy, then for three additional nights with each chemotherapy 1/19/21   Lisandro Balderrama MD   dexamethasone (DECADRON) 4 MG tablet Take 1 tablet x 4 days after each chemotherapy 1/19/21   Lisandro Balderrama MD     Medications added or changed (ex. new medication, dosage change, interval change, formulation change):  Buspirone (added)    Comments:  Medication list reviewed with patient's family and insurance claims verified. Family states patient took lorazepam and norco around midnight.     To my knowledge the above medication history is accurate as of 3/23/2021 11:44 AM.   Orlando Virgen CPhT   3/23/2021 11:44 AM

## 2021-03-23 NOTE — H&P
History and Physical      Name:  Sonia Coelho /Age/Sex: 1950  (65 y.o. female)   MRN & CSN:  [id number] & [id number] Admission Date/Time: 3/23/2021 10:39 AM   Location:  [id number]- PCP: Alex Banks MD       Hospital Day: 1    Assessment and Plan:   66 y/o F with PMH of Chronic hypoxemic and hypercapnic respiratory failure, stage IIIb Small Cell Lung Cancer of right lung, dysphagia with exudative esophagitis, chronic anemia, depression/anxiety, HTN, HLD that presented with acute on chronic hypoxemic respiratory failure with hypercapnia.     Acute on Chronic Hypoxemic Respiratory Failure with Hypercapnia  Hx of COPD with likely Exacerbation  - Case d/w ED and friend at bedside; increasing oxygen requirements over last few days; recently completed full course of abx for possible PNA; sent to ED from outpatient surgery due to respiratory distress and was intubated in ED  - Chronically on 4L NC  - CXR reviewed by myself with no acute pathology  - Repeat CXR in AM  - ABG in AM  - Consulted pulmonology for assistance with vent management, appreciate recs  - Scheduled breathing treatments  - BID IV Steroids  - Respiratory PCR ordered  - WBC wnl, afebrile, did just complete abx course for pneumonia but is imunosupressed with recent chemo; CT did show 2 new RLL pleural densities; will begin abx for now and order procal/CRP; if not elevated will d/c abx  - Blood cultures were ordered in ED    Sinus Tachycardia  - Secondary to hypoxemia  - Resolved  - EKG reviewed by myself with sinus tachycardia    Hx of Stage III SCLC  - Was to have Mediport placed today  - follows with Dr. Ash Townsend, consulted    Hx of Dysphagia with Exudative Esophagitis  - Dysphagia diet when able to extubate  - Carafate and PPI    HTN  - Home Cardizem restarted    Depression/Anxiety  - Sedated; holding home meds for now    Diet No diet orders on file   DVT Prophylaxis [] Lovenox, []  Heparin, [] SCDs, [] Ambulation   GI posterior leaflet of mitral valve, Mild Pulm HTN    Hx of pelvic x-ray 01/02/2015    Severe RT his osterarothrosis    Hx of x-ray of hip 01/02/2015    Severe Rt hip osteosrthrosis    Hyperlipidemia     Hypertension     follows with dr Ana Cleary Leg pain     Lung nodules     scheduled for bronch 1/5/2020    On home oxygen therapy     \"on 4 liters 24 hours per day\"    Small cell lung cancer ([address] 1/11/2021    Wears glasses     to read     PSHX:  has a past surgical history that includes cyst removal (1970's); Tubal ligation (1970's); laparoscopic appendectomy (N/A, 5/18/2019); Mediastinoscopy (N/A, 12/10/2020); back surgery; Colonoscopy; Hysterectomy; eye surgery (2010?); joint replacement (Right, 2014? ?); Foot surgery (Right, 1970's); bronchoscopy (N/A, 1/5/2021); and Upper gastrointestinal endoscopy (N/A, 3/3/2021). Allergies: Allergies   Allergen Reactions    Formaldehyde     Gabapentin Other (See Comments)    Norflex Tablets [Orphenadrine]     Cranberry Rash       FAM HX: family history includes COPD in her sister; Cancer in her father and sister; Coronary Art Dis in her mother; Dementia in her mother; Diabetes in her sister; Heart Attack in her sister; Heart Attack (age of onset: 61) in her father and mother; High Cholesterol in her mother; Hypertension in her mother, sister, sister, and sister; Irritable Bowel Syndrome in her sister; Pacemaker in her sister; Stroke in her father, mother, and sister. Soc HX:   Social History     Socioeconomic History    Marital status:       Spouse name: None    Number of children: None    Years of education: None    Highest education level: None   Occupational History    None   Social Needs    Financial resource strain: None    Food insecurity     Worry: None     Inability: None    Transportation needs     Medical: None     Non-medical: None   Tobacco Use    Smoking status: Former Smoker     Packs/day: 1.50     Years: 47.00     Pack years: 70. 50     Types: Cigarettes     Start date: 1973     Quit date: 2020     Years since quittin.3    Smokeless tobacco: Never Used   Substance and Sexual Activity    Alcohol use:  Yes     Alcohol/week: 0.0 standard drinks     Comment: rare\"twice per year\"    Drug use: No    Sexual activity: None     Comment:    Lifestyle    Physical activity     Days per week: None     Minutes per session: None    Stress: None   Relationships    Social connections     Talks on phone: None     Gets together: None     Attends Mosque service: None     Active member of club or organization: None     Attends meetings of clubs or organizations: None     Relationship status: None    Intimate partner violence     Fear of current or ex partner: None     Emotionally abused: None     Physically abused: None     Forced sexual activity: None   Other Topics Concern    None   Social History Narrative    None       Medications:   Medications:    Infusions:    propofol 35 mcg/kg/min (21 1252)    fentaNYL 100 mcg/hr (21 1359)     PRN Meds:        Electronically signed by Lisa Garrett MD on 3/23/2021 at 2:45 PM

## 2021-03-23 NOTE — FLOWSHEET NOTE
Pt arrived to .  Skin assessment done with Kenisha Draper  There is small open spot on coccyx and scattered bruising pt scabbed over scrap on right knee  Scabbed sores on left thumb  Some redness under both breast     Pt is now resting showing no signs of distess

## 2021-03-23 NOTE — ED NOTES
Mirtha Diaz friend [phone], at bedside leaving for the time Keeping family updated.       Leena Enamorado RN  03/23/21 2120

## 2021-03-24 NOTE — CONSULTS
Comprehensive Nutrition Assessment    Type and Reason for Visit:  Consult    Nutrition Recommendations/Plan:   EN Order: Vital AF @ 45 ml/hr which will provide ~1842 kcal (including kcal from propofol) and 81 g protein  Advance to goal rate as tolerated  Will monitor GI tolerance  Will monitor sedation rate and adjust goal rate as needed  Will monitor nutrition status, poc     Nutrition Assessment:  pt admitted for acute on chronic respiratory failure w/ hypoxia, PMH: COPD, HTN, small cell lung ca, pt currently sedated on vent, +OGT, MAP >65, dietitian consult for tube feed order and manage, pt at high nutrition risk    Malnutrition Assessment:  Malnutrition Status:  Insufficient data    Context:  Acute Illness       Estimated Daily Nutrient Needs:  Energy (kcal):  2921-8636(25-30 kcal/kg); Weight Used for Energy Requirements:  Current     Protein (g):  (1.5-2.0 g/kg); Weight Used for Protein Requirements:  Current        Fluid (ml/day):  (fluid management per physician); Nutrition Related Findings:  Hemoglobin: 8.5/30.5      Wounds:  (sacrum medial wound noted)       Current Nutrition Therapies:    Diet NPO Effective Now    Anthropometric Measures:  · Height: 5' (152.4 cm)  · Current Body Weight: 126 lb 15.8 oz (57.6 kg)   · Ideal Body Weight: 100 lbs; % Ideal Body Weight 127 %   · BMI: 24.8  · BMI Categories: Normal Weight (BMI 18.5-24. 9)       Nutrition Diagnosis:   · Inadequate oral intake related to acute injury/trauma as evidenced by NPO or clear liquid status due to medical condition    Nutrition Interventions:   Food and/or Nutrient Delivery:  Start Tube Feeding  Nutrition Education/Counseling:  No recommendation at this time   Coordination of Nutrition Care:  Continue to monitor while inpatient    Goals:  pt will tolerate EN initiation within the next 24 hr       Nutrition Monitoring and Evaluation:   Behavioral-Environmental Outcomes:  None Identified   Food/Nutrient Intake Outcomes:  Enteral Nutrition Intake/Tolerance  Physical Signs/Symptoms Outcomes:  Biochemical Data, Weight, GI Status, Hemodynamic Status     Discharge Planning:     Too soon to determine     Electronically signed by Colton Rios, MS, RD, LD on 3/24/21 at 1:53 PM EDT    Contact: [phone]

## 2021-03-24 NOTE — CONSULTS
Hematology/Oncology  Consult Note      Reason for Consult: Small cell cancer of the lung. Requesting Physician: Hospitalist.    CHIEF COMPLAINT: Shortness of breath in the last 3 to 4 days. Alexia Watts History Obtained From:  electronic medical record, reason patient could not give history:  on ventilator    HISTORY OF PRESENT ILLNESS:      The patient is a 65 y.o. female with significant past medical history of small cell carcinoma on chemotherapy who came in for Mediport placement. She was found to be hypoxic. She was subsequently intubated. CTA on March 23, 2021 showed  No evidence of pulmonary embolism.       Redemonstration of the bilateral pulmonary nodules which have been stable or   slightly decreased in size.       2 new pleural based small nodular densities in the right lower lobe, which   may represent focal atelectasis; however, true nodules cannot be excluded. Attention to these nodular densities on the follow-up CT scans is suggested7. She remains on the vent and sedated. Last chemotherapy was on March 10, 2021. Past Medical History:        Diagnosis Date    Arthritis     COPD (chronic obstructive pulmonary disease) ([facility] [address]     follow with Dr Andrew Drummond Full dentures     H/O cardiovascular stress test 11/18/2009    thallium, normal no ischemia EF70%     H/O cardiovascular stress test 10/11/2013    thallium,EF70%, normal, no ischemia    H/O Doppler ultrasound 10/13/2010    carotid, right normal, left normal    H/O Doppler ultrasound 10/07/2014    Carotid mild bilateral internal carotid artery disease less than 50% in severity. Normal vertebral artery flows with good velocities. Incidental finding of possible thyroid nodule in the left lobe.      History of 24 hour EKG monitoring 03/11/2011    NSR no ectopy    History of nuclear stress test 10/21/2016    lexiscan-normal,no ischemia,EF70%,enlarged right ventricle    Hx of chest x-ray 01/02/2015    WNL    Hx of Doppler echocardiogram (LOVENOX) injection 30 mg, 30 mg, Subcutaneous, Daily  promethazine (PHENERGAN) tablet 12.5 mg, 12.5 mg, Oral, Q6H PRN **OR** ondansetron (ZOFRAN) injection 4 mg, 4 mg, Intravenous, Q6H PRN  polyethylene glycol (GLYCOLAX) packet 17 g, 17 g, Oral, Daily PRN  acetaminophen (TYLENOL) tablet 650 mg, 650 mg, Oral, Q6H PRN **OR** acetaminophen (TYLENOL) suppository 650 mg, 650 mg, Rectal, Q6H PRN  methylPREDNISolone sodium (SOLU-MEDROL) injection 40 mg, 40 mg, Intravenous, BID  aspirin EC tablet 81 mg, 81 mg, Oral, Daily  calcium elemental (OSCAL) tablet 500 mg, 1 tablet, Oral, Daily with breakfast  dilTIAZem (CARDIZEM CD) extended release capsule 120 mg, 120 mg, Oral, Daily  pantoprazole (PROTONIX) injection 40 mg, 40 mg, Intravenous, QAM AC  sucralfate (CARAFATE) tablet 1 g, 1 g, Oral, 4x Daily AC & HS  cefepime (MAXIPIME) 2000 mg IVPB minibag, 2,000 mg, Intravenous, Q12H  azithromycin (ZITHROMAX) 500 mg in dextrose 5 % 250 mL IVPB, 500 mg, Intravenous, Daily  propofol injection, 10 mcg/kg/min, Intravenous, Continuous  chlorhexidine (PERIDEX) 0.12 % solution 15 mL, 15 mL, Mouth/Throat, BID  famotidine (PEPCID) injection 20 mg, 20 mg, Intravenous, BID  albuterol sulfate  (90 Base) MCG/ACT inhaler 4 puff, 4 puff, Inhalation, Q4H PRN **AND** ipratropium (ATROVENT HFA) 17 MCG/ACT inhaler 4 puff, 4 puff, Inhalation, Q4H PRN **AND** MDI Treatment, , , Q4H PRN  ipratropium-albuterol (DUONEB) nebulizer solution 1 ampule, 1 ampule, Inhalation, Q4H WA  fentaNYL (SUBLIMAZE) 1,000 mcg in sodium chloride 0.9% 100 mL infusion, 12.5-200 mcg/hr, Intravenous, Continuous    Allergies:  Formaldehyde, Gabapentin, Norflex tablets [orphenadrine], and Cranberry    Social History:    TOBACCO:   reports that she quit smoking about 4 months ago. Her smoking use included cigarettes. She started smoking about 47 years ago. She has a 70.50 pack-year smoking history. She has never used smokeless tobacco.  ETOH:   reports current alcohol use.

## 2021-03-24 NOTE — CONSULTS
Consult completed. Procedure/rationale explained to pt's daughter/POA @ bedside including benefits vs potential risks/complications; questions answered & consent obtained. #5Fr Triple Lumen PowerPICC HF initiated to RUE Basilic Vein using sterile, UltraSound-guided technique without difficulty/complications. Positioning verified via TPS & 3CG with appropriate P-wave changes noted. Sterile dressing with SkinPrep, StatLock Securing Device, BioPatch, SwabCaps, and Limb Precautions band applied. Pt tolerated well without s/s of anxiety/distress/pain or requiring additional sedation. No other c/o or needs noted or reported & Yoselyn Irvin Primary RN notified.

## 2021-03-24 NOTE — PROGRESS NOTES
Dr. Holcomb Figures contacted for central line access. MD to place order for PICC line placement. Daughter raghavendra at bedside and agrees to treatment plan of placing PICC line.

## 2021-03-24 NOTE — PROGRESS NOTES
[address] [facility]  consulted by Dr. Neyda Christensen for monitoring and adjustment. Indication for treatment: Pneumonia   Goal trough: 15 mcg/mL    Pertinent Laboratory Values:   Temp Readings from Last 3 Encounters:   03/24/21 97.7 °F (36.5 °C) (Bladder)   03/23/21 97.8 °F (36.6 °C) (Temporal)   03/16/21 97.5 °F (36.4 °C)     Recent Labs     03/23/21  1000 03/23/21  1015 03/24/21  0510   WBC 4.6  --  4.3   LACTATE  --  0.4  --      Recent Labs     03/23/21  1000 03/24/21  0510   BUN 15 19   CREATININE 0.5* 0.6     Estimated Creatinine Clearance: 67 mL/min (based on SCr of 0.6 mg/dL). Intake/Output Summary (Last 24 hours) at 3/24/2021 1441  Last data filed at 3/24/2021 0606  Gross per 24 hour   Intake 648.71 ml   Output 1080 ml   Net -431.29 ml       Pertinent Cultures:  Date    Source    Results  3/24   MRSA nasal screen  Ordered    Vancomycin level:   TROUGH:  No results for input(s): VANCOTROUGH in the last 72 hours. RANDOM:  No results for input(s): VANCORANDOM in the last 72 hours. Assessment:  · WBC and temperature: WNL  · SCr, BUN, and urine output: at baseline    · Day(s) of therapy:1   · Vancomycin concentration: to be collected    Plan:  · Vancomycin 1250 mg x 1 followed by 750 mg q12h   · Trough tomorrow, plan to use bayesian dosing software   · Pharmacy will continue to monitor patient and adjust therapy as indicated    Hunter 3 3/25 @8657    Thank you for the consult.   Den Landa, PharmD,BCPS    3/24/2021 2:41 PM

## 2021-03-24 NOTE — PROGRESS NOTES
Physician Progress Note      Amelia Lind  CSN #:                  [id number]  :                       1950  ADMIT DATE:       3/23/2021 10:39 AM  [address] [place] DATE:  RESPONDING  PROVIDER #:        Niharika Nix MD          QUERY TEXT:    Dear Hospitalist    Pt admitted with Acute on chronic respiratory failure/ AECOPD . Pt noted to   have Pneumonia. If possible, please document in the progress notes and   discharge summary if you are evaluating and/or treating any of the following:    Note: CAP and HCAP indicate where the pneumonia was acquired, not a specific   type. The medical record reflects the following:  Risk Factors: AECOPD, Pneumonia, Esophagitis , history of small cell lung   cancer is on chemotherapy, Acute on chronic resp failure  Clinical Indicators: Dysphagia, \"Was recently discharged and completed a   complete course of antibiotics for possible underlying pneumonia\"CTA completed   showed no evidence of PE, redemonstration of bilateral pulmonary nodules   without change. 2 new pleural based small nodular densities were noted in the   RLL. Treatment: Nystatin (2), swish and swallow , dysphagia diet, Theophylline,   Mucinex, albuterol, Intubation , Onc Cx, Pulmonologist Cx, Chest xray, CTA   chest Zithromax, Maxipime, Solumedrol    Thank you Acosta Guerrero RN, CDS ([phone])  Options provided:  -- Gram negative pneumonia  -- Gram positive pneumonia  -- MRSA pneumonia  -- MSSA pneumonia  -- Bacterial pneumonia  -- Viral pneumonia  -- Aspiration pneumonia  -- Other - I will add my own diagnosis  -- Disagree - Not applicable / Not valid  -- Disagree - Clinically unable to determine / Unknown  -- Refer to Clinical Documentation Reviewer    PROVIDER RESPONSE TEXT:    This patient has bacterial pneumonia. Query created by: Stacy Roman on 3/24/2021 11:22 AM      QUERY TEXT:    Dear Hospitalist    Pt admitted with Acute on chronic respiratory failure/ AECOPD . Pt noted to have Pneumonia, temperature of 101.1 If possible, please document in the   progress notes and discharge summary if you are evaluating and/or treating any   of the following: If possible, please document in the progress notes and discharge summary if   you are evaluating and /or treating any of the following: The medical record reflects the following:  Risk Factors: AECOPD, Pneumonia, Esophagitis , history of small cell lung   cancer is on chemotherapy, Acute on chronic resp failure  Clinical Indicators:  Febrile 100.9 on admission now 101.1, Tachycardia 124,   Segs 84.5, CRP 45.7, Procal 0.299-0.371  \"Was recently discharged and completed a complete course of antibiotics for   possible underlying pneumonia , \"CTA completed showed no evidence of PE,   redemonstration of bilateral pulmonary nodules without change. 2 new pleural   based small nodular densities were noted in the RLL. Treatment: Nystatin (2), swish and swallow , dysphagia diet, Theophylline,   Mucinex, albuterol, Intubation , Onc Cx, Pulmonologist Cx, Chest xray, CTA   chest Zithromax, Maxipime, Solumedrol    Thank you Sherly Jasso RN, CDS ([phone])  Options provided:  -- Sepsis, present on admission  -- Sepsis, present on admission, now resolved  -- Sepsis, not present on admission  -- No Sepsis,  pneumonia only  -- Sepsis was ruled out  -- Other - I will add my own diagnosis  -- Disagree - Not applicable / Not valid  -- Disagree - Clinically unable to determine / Unknown  -- Refer to Clinical Documentation Reviewer    PROVIDER RESPONSE TEXT:    This patient has sepsis which was present on admission.     Query created by: Ambrosio Ceja on 3/24/2021 11:30 AM      Electronically signed by:  Seymour Nyhan MD 3/24/2021 2:31 PM

## 2021-03-24 NOTE — CARE COORDINATION
Patient is intubated/ventilated. No family present. Will revisit when patient is able to participate.  Alexa Tan RN

## 2021-03-24 NOTE — CONSULTS
[address] [place] [address], [address] [facility]                                  CONSULTATION    PATIENT NAME: Nevaeh Urias                   :        1950  MED REC NO:   [id number]                          ROOM:       2111  ACCOUNT NO:   [de-identified]                           ADMIT DATE: 2021  PROVIDER:     Davian Stoddard MD    CONSULT DATE:  2021    HISTORY OF PRESENT ILLNESS:  The patient is a 70-year-old lady with  multiple medical problems including COPD, chronic respiratory failure,  on home oxygen, small cell lung cancer, diagnosed early this year, on  chemotherapy. She came to the hospital for  [place] placement. The  patient was found to be hypoxic and was short of breath. She was sent  to the emergency room. In the emergency room, she was intubated and  placed on the ventilator and was subsequently admitted to the intensive  care unit. No other history is available. The patient was recently  discharged about 10 days ago from the hospital.    PAST MEDICAL HISTORY:  Significant for COPD, chronic respiratory  failure, on home oxygen, small cell lung cancer, diagnosed early this  year, on chemotherapy, hyperlipidemia, and hypertension. PAST SURGICAL HISTORY:  Remarkable for tubal ligation, laparoscopic  appendectomy, mediastinoscopy, hysterectomy, bronchoscopy, and upper  endoscopy. FAMILY HISTORY:  Reveals that her mother had stroke, dementia, coronary  artery disease, hypertension, and hypercholesterolemia. Father had  cancer, stroke, and coronary artery disease. SOCIAL HISTORY:  Reveals that she quit smoking last year, but used to  smoke one and a half packs per day for 47 years prior to that. She  drinks alcohol off and on. No history of drug abuse. MEDICATIONS:  Reviewed. ALLERGIES:  She is allergic to FORMALDEHYDE, GABAPENTIN, NORFLEX, and  CRANBERRY.     REVIEW OF SYSTEMS:  Unobtainable at this time. PHYSICAL EXAMINATION:  GENERAL:  The patient is sedated on the vent, in no acute respiratory  distress. VITAL SIGNS:  Her blood pressure is 110/69 mmHg, pulse of 86 per minute,  and respiratory rate of 14 per minute. Her temperature is 99.5 degrees  Fahrenheit, her T-max was 101.1 degrees Fahrenheit. HEENT:  Reveals presence of orotracheal tube. NECK:  There is no JVD. No lymphadenopathy. The neck is supple. LUNG:  Revealed bilateral rhonchi. HEART:  Showed normal S1 and S2. There was no S3 or S4 noted. ABDOMEN:  Benign. There is no evidence of any organomegaly. The bowel  sounds are present. NEUROLOGIC:  Reveals that she is sedated on the vent. LABORATORY DATA:  Her CBC showed a white count of 4.6, hemoglobin 9.2,  hematocrit of 31.1. Her ABGs this morning showed a pH of 7.52, pCO2 of  49, pO2 of 161 with 97% saturation on 50% FiO2. Her procalcitonin level  was 0.371. Her electrolytes showed a sodium of 137, potassium 5.0,  chloride 93, carbon dioxide 33, BUN 19, creatinine 0.6. Her COVID-19  test was negative. Her CTA of the chest showed no evidence of PE,  re-demonstration of bilateral pulmonary nodules. Chest x-ray showed no  acute infiltrate. ET tube 3.5 cm above the luca. Her proBNP was 448. IMPRESSION:  1. Acute-on-chronic respiratory failure, secondary to acute  exacerbation of COPD. 2.  Acute exacerbation of COPD. 3.  Possible hospital-acquired pneumonia. 4.  Small cell lung cancer, on chemotherapy. PLAN:  1. Continue present antibiotic therapy. 2.  Sputum for culture and sensitivity. 3.  DuoNeb q.4 h. while awake. 4.  We will increase Solu-Medrol to 40 mg q.6 h.  5.  Check magnesium and phosphorus. 6.  If the patient improves, we will start the weaning process tomorrow  after seeing the patient. 7.  As per orders.         Angel Daigle MD    D: 03/24/2021 10:31:23       T: 03/24/2021 14:10:14     /[email or web address]  Job#: [id number]     Doc#: [id number]    CC:

## 2021-03-24 NOTE — PROGRESS NOTES
Hospitalist Progress Note      Name:  Gonzalo Bullock /Age/Sex: 1950  (65 y.o. female)   MRN & CSN:  [id number] & [id number] Admission Date/Time: 3/23/2021 10:39 AM   Location:  -A PCP: Chao Kolb MD         Hospital Day: 2    Assessment and Plan:   64 y/o F with PMH of Chronic hypoxemic and hypercapnic respiratory failure, stage IIIb Small Cell Lung Cancer of right lung, dysphagia with exudative esophagitis, chronic anemia, depression/anxiety, HTN, HLD that presented with acute on chronic hypoxemic respiratory failure with hypercapnia.     Acute on Chronic Hypoxemic Respiratory Failure with Hypercapnia  Hx of COPD with likely Exacerbation  - Chronically on 4L NC  - Repeat CXR this AM pending  - Pulmonology following  - Scheduled breathing treatments  - Steroids  - Respiratory PCR ordered, pending  - Fevers and elevated procal overnight; ID consulted for assistance regarding antibiotics; appreciate recs  - Blood cultures pending  - Respiratory culture pending     Sinus Tachycardia  - Secondary to hypoxemia  - Resolved  - EKG reviewed by myself with sinus tachycardia     Hx of Stage III SCLC  - Was to have Mediport placed today  - follows with Dr. Jesusita Del Rosario     Hx of Dysphagia with Exudative Esophagitis  - Dysphagia diet when able to extubate  - Carafate and PPI     HTN  - Home Cardizem restarted     Depression/Anxiety  - Sedated; holding home meds for now    Diet Diet NPO Effective Now   DVT Prophylaxis [] Lovenox, []  Heparin, [] SCDs, [] Ambulation   GI Prophylaxis [] PPI,  [] H2 Blocker,  [] Carafate,  [] Diet/Tube Feeds   Code Status Full Code   Disposition Patient requires continued admission due to    MDM [] Low, [] Moderate,[]  High  Patient's risk as above due to      History of Present Illness:     Remains intubated and sedated; did have fevers up to 101 last night     Objective:        Intake/Output Summary (Last 24 hours) at 3/24/2021 1133  Last data filed at 3/24/2021 7211 Gross per 24 hour   Intake 648.71 ml   Output 1080 ml   Net -431.29 ml      Vitals:   Vitals:    03/24/21 0820   BP:    Pulse:    Resp: 14   Temp:    SpO2: 100%     Physical Exam:     GEN    Intubated and sedated; will occasionally move all limbs spontaneously  EYES   Pupils are constricted, reactive  HENT  Mucous membranes are moist  NECK  Supple, no apparent thyromegaly or masses. RESP  Mechanical breath sounds bilaterally; faint expiratory wheezing appreciated over bilateral bases  CARDIO/VASC           S1/S2 auscultated. Regular rate without appreciable murmurs, rubs, or gallops. GI        Does not grimace to abdominal palpation         Nicole in place  HEME/LYMPH            No petechiae or ecchymoses. MSK    No gross joint deformities. SKIN    Normal coloration, warm, dry.   NEURO           Intubated and sedated  PSYCH            Intubated and sedated    Medications:   Medications:    methylPREDNISolone  40 mg Intravenous Q6H    sodium chloride flush  10 mL Intravenous 2 times per day    enoxaparin  30 mg Subcutaneous Daily    aspirin  81 mg Oral Daily    calcium elemental  1 tablet Oral Daily with breakfast    dilTIAZem  120 mg Oral Daily    pantoprazole  40 mg Intravenous QAM AC    sucralfate  1 g Oral 4x Daily AC & HS    cefepime  2,000 mg Intravenous Q12H    azithromycin  500 mg Intravenous Daily    chlorhexidine  15 mL Mouth/Throat BID    famotidine (PEPCID) injection  20 mg Intravenous BID    ipratropium-albuterol  1 ampule Inhalation Q4H WA      Infusions:    propofol 60 mcg/kg/min (03/24/21 0912)    fentaNYL 75 mcg/hr (03/24/21 1049)     PRN Meds: sodium chloride flush, 10 mL, PRN  promethazine, 12.5 mg, Q6H PRN    Or  ondansetron, 4 mg, Q6H PRN  polyethylene glycol, 17 g, Daily PRN  acetaminophen, 650 mg, Q6H PRN    Or  acetaminophen, 650 mg, Q6H PRN  albuterol sulfate HFA, 4 puff, Q4H PRN    And  ipratropium, 4 puff, Q4H PRN          Electronically signed by Eva Williamson MD on

## 2021-03-24 NOTE — CONSULTS
Infectious Disease Consult Note  3/24/2021   Patient Name: Sanjiv Lilly : 1950   Impression   Sepsis Secondary to Acute on Chronic Hypoxic and Hypercapnic Respiratory Failure Exacerbation of COPD:  · Fever max 101.1  · No leukocytosis  · Blood cultures 3/23-pending  · Fungal blood cultures 3/24-pending  · 3/24-Respiratory culture pending  · 3/24-Fungitel pending  · 3/24-Aspergillus pending  · 3/23-RDP pending  · 3/23-COVID-19 Rapid Negative  · 3/23-Urine culture pending  · Pct 0.282, 0.371  · CRP 32.3, 45.7  · 3/23-CXR: No evidence of acute cardiopulmonary process  · 3/23-CTA Pulmonary W Contrast: No evidence of PE.  redemon of bilateral pulmonary nodules stable slightly decreased in size. 2 new pleural bases small nodular densities in the RLL, which may represent focal atelectasis, however, true nodules cannot be excluded. ·  Dr. Cheryl Chu onboard      Right Lung SCLC Stage III:  · Dr. Corona Flowers onboard  · Chemo regimen of cisplatin/etoposide, deferred at this time    · Tobacco Abuse    · HTN    · Dysphagia with Weight Loss  ·   · Multi-morbidity: per PMHx:  Arthritis, COPD, HLD, HTN, home oxygen 4 L/nc , appey, hyster, right joint replacement, tobacco abuse     Plan:   Continue Zithromax    DC cefepime  · Start meropenem 1 gm IV q8h  · Start IV vancomycin pharmacy to dose   Trend CRP and Pct, ordered  · Await blood cultures  · Await sputum cultures   Check MRSA/MSSA screen 3/24  · Check fungal blood cultures 3/24    Thank you for allowing me to consult in the care of this patient.  ------------------------  REASON FOR CONSULT: Infective syndrome     Requested by: Dr. Ovidio Del Rosario is a 65 y.o.  female who was admitted 3/23/2021 for further evaluation and management of severe respiratory distress. HPI is obtained from the EMR as patient is intubated/ventilated.   The patient was scheduled to have a mediport placement the day of admission but due to the severity of her dyspnea, she was unable to have the procedure. She presented with severe respiratory distress and was immediately sent to the ED for evaluation. Her friend, who accompanied her to the hospital, spoke with the ED physician, said over the past 4 days the patient has been increasing her oxygen needs at home. She is on her baseline oxygen NC at 4L/min. She underwent a CTA of the chest, which showed no evidence of PE, with redemonstration of bilateral pulmonary nodules with no change, 2 new pleural based small nodular densities were seen in the RLL. She was started on IV empiric ABX therapy of cefepime and azithromycin. She was admitted to the ICU. She was recently admitted on 2/28/21 and DCd on 3/13/21 after being treated for acute on chronic hypoxemic and hypercapnic respiratory failure from acute exac of COPD with possible pneumonia and sepsis, she was treated with empiric cefepime thru 3/12, on doxycycline from 3/1 to 3/10, started on minocycline from 3/10-3/18. ?  Infectious diseases service was consulted to evaluate the pt, and recommend further investigative and therapeutic measures.     Review of Systems   Unable to perform ROS: Intubated       Patient Active Problem List    Diagnosis Date Noted    Acute hypoxemic respiratory failure ([address] 03/23/2021    Acute on chronic respiratory failure with hypoxia and hypercapnia (HCC) 02/28/2021    Small cell lung cancer ([address] 01/11/2021    Acute exacerbation of chronic obstructive pulmonary disease (COPD) ([address] 12/02/2020    COPD exacerbation ([address] 12/01/2020    Lung mass 10/08/2020    Acute on chronic respiratory failure with hypoxia ([address] 09/18/2020    Osteopenia 06/09/2019    Anxiety 06/09/2019    Tobacco abuse 05/18/2019    Abdominal aortic aneurysm (AAA) without rupture ([address] 05/18/2019    Shortness of breath 10/18/2016    Hypertension     Degeneration of lumbar or lumbosacral intervertebral disc 07/08/2014    Hyperlipidemia     COPD (chronic obstructive pulmonary disease) (HCC)     Leg pain      Past Medical History:   Diagnosis Date    Arthritis     COPD (chronic obstructive pulmonary disease) ([facility]     follow with Dr Ginger Iyer Full dentures     H/O cardiovascular stress test 11/18/2009    thallium, normal no ischemia EF70%     H/O cardiovascular stress test 10/11/2013    thallium,EF70%, normal, no ischemia    H/O Doppler ultrasound 10/13/2010    carotid, right normal, left normal    H/O Doppler ultrasound 10/07/2014    Carotid mild bilateral internal carotid artery disease less than 50% in severity. Normal vertebral artery flows with good velocities. Incidental finding of possible thyroid nodule in the left lobe.  History of 24 hour EKG monitoring 03/11/2011    NSR no ectopy    History of nuclear stress test 10/21/2016    lexiscan-normal,no ischemia,EF70%,enlarged right ventricle    Hx of chest x-ray 01/02/2015    WNL    Hx of Doppler echocardiogram 12/01/2020    EF 50-55% mild LV hypertrophy. Grade 2 diastolic dysfunction. Mild AS.     Hx of echocardiogram 10/11/2013    10/13 Abn lt ventricular diastolic function, mile MR, EF 57%,10/10 EF55%, mild mitral annular calcification    Hx of echocardiogram 05/02/2019    EF 14-65%, Grade I diastolic dysfunction, Mild aortic stenosis, Calcific thickening of posterior leaflet of mitral valve, Mild Pulm HTN    Hx of pelvic x-ray 01/02/2015    Severe RT his osterarothrosis    Hx of x-ray of hip 01/02/2015    Severe Rt hip osteosrthrosis    Hyperlipidemia     Hypertension     follows with dr Herbert Olivera Leg pain     Lung nodules     scheduled for bronch 1/5/2020    On home oxygen therapy     \"on 4 liters 24 hours per day\"    Small cell lung cancer ([facility] 1/11/2021    Wears glasses     to read      Past Surgical History:   Procedure Laterality Date    BACK SURGERY      \"about 12 yrs ago - surgery my mid to low back \" done in [address] N/A 1/5/2021 and available medical records on this date and have made the above observations, conclusions and recommendations. Electronically signed by: Electronically signed by Jana Mcconnell.  HELIO Mayberry CNP on 3/24/2021 at 9:11 AM

## 2021-03-25 NOTE — PROGRESS NOTES
[address] [facility]  consulted by Dr. Rufino Wilks for monitoring and adjustment. Indication for treatment: Pneumonia   Goal trough: 15 mcg/mL    Pertinent Laboratory Values:   Temp Readings from Last 3 Encounters:   03/25/21 98.6 °F (37 °C) (Core)   03/23/21 97.8 °F (36.6 °C) (Temporal)   03/16/21 97.5 °F (36.4 °C)     Recent Labs     03/23/21  1000 03/23/21  1015 03/24/21  0510 03/25/21  0339   WBC 4.6  --  4.3 2.6*   LACTATE  --  0.4  --   --      Recent Labs     03/23/21  1000 03/24/21  0510 03/25/21  0339   BUN 15 19 21   CREATININE 0.5* 0.6 0.7     Estimated Creatinine Clearance: 57 mL/min (based on SCr of 0.7 mg/dL). Intake/Output Summary (Last 24 hours) at 3/25/2021 1348  Last data filed at 3/25/2021 9799  Gross per 24 hour   Intake 1960.66 ml   Output 1530 ml   Net 430.66 ml       Pertinent Cultures:  Date    Source    Results  3/24   MRSA nasal screen  Ordered    Vancomycin level:   TROUGH:  No results for input(s): VANCOTROUGH in the last 72 hours. RANDOM:  No results for input(s): VANCORANDOM in the last 72 hours. Assessment:  · WBC and temperature: WBC low, afebrile   · SCr, BUN, and urine output: at baseline    · Day(s) of therapy: 2   · Vancomycin concentration: to be collected    Plan:  · Vancomycin 1250 mg x 1 followed by 750 mg q12h   · Trough to be collected this afternoon was not drawn   · Will attempt with next dose   · Pharmacy will continue to monitor patient and adjust therapy as indicated    Hunter 3 3/26 @5091    Thank you for the consult.   Jacques LiebermanD,BCPS    3/25/2021 1:48 PM

## 2021-03-25 NOTE — PROGRESS NOTES
Pulmonary and Critical Care  Progress Note    Subjective: The patient has improved. Awake. Sat 98%. ABGs pending. Shortness of breath none  Chest pain none  Addressing respiratory complaints Patient is negative for  hemoptysis and cyanosis  CONSTITUTIONAL:  negative for fevers and chills      Past Medical History:     has a past medical history of Arthritis, COPD (chronic obstructive pulmonary disease) ([facility] Full dentures, H/O cardiovascular stress test, H/O cardiovascular stress test, H/O Doppler ultrasound, H/O Doppler ultrasound, History of 24 hour EKG monitoring, History of nuclear stress test, Hx of chest x-ray, Hx of Doppler echocardiogram, Hx of echocardiogram, Hx of echocardiogram, Hx of pelvic x-ray, Hx of x-ray of hip, Hyperlipidemia, Hypertension, Leg pain, Lung nodules, On home oxygen therapy, [facility] cell lung cancer ([facility] and Wears glasses. has a past surgical history that includes cyst removal (1970's); Tubal ligation (1970's); laparoscopic appendectomy (N/A, 5/18/2019); Mediastinoscopy (N/A, 12/10/2020); back surgery; Colonoscopy; Hysterectomy; eye surgery (2010?); joint replacement (Right, 2014? ?); Foot surgery (Right, 1970's); bronchoscopy (N/A, 1/5/2021); and Upper gastrointestinal endoscopy (N/A, 3/3/2021). reports that she quit smoking about 4 months ago. Her smoking use included cigarettes. She started smoking about 47 years ago. She has a 70.50 pack-year smoking history. She has never used smokeless tobacco. She reports current alcohol use. She reports that she does not use drugs. Family history:  family history includes COPD in her sister; Cancer in her father and sister; Coronary Art Dis in her mother; Dementia in her mother; Diabetes in her sister; Heart Attack in her sister;  Heart Attack (age of onset: 61) in her father and mother; High Cholesterol in her mother; Hypertension in her mother, sister, sister, and sister; Irritable Bowel Syndrome in her sister; Pacemaker in her sister; Stroke in her father, mother, and sister. Allergies   Allergen Reactions    Formaldehyde     Gabapentin Other (See Comments)    Norflex Tablets [Orphenadrine]     Cranberry Rash     Social History:    Reviewed; no changes    Objective:   PHYSICAL EXAM:        VITALS:  /67   Pulse 85   Temp 98.6 °F (37 °C) (Core)   Resp 14   Ht 5' (1.524 m)   Wt 123 lb 10.9 oz (56.1 kg)   SpO2 96%   BMI 24.15 kg/m²     24HR INTAKE/OUTPUT:      Intake/Output Summary (Last 24 hours) at 3/25/2021 1054  Last data filed at 3/25/2021 0800  Gross per 24 hour   Intake 1960.66 ml   Output 2155 ml   Net -194.34 ml       CONSTITUTIONAL:  awake  LUNGS:  decreased breath sounds  CARDIOVASCULAR:  normal S1 and S2 and negative JVD  ABD:Abdomen soft, non-tender. BS normal. No masses,  No organomegaly  NEURO:Awake. DATA:    CBC:  Recent Labs     03/23/21  1000 03/24/21  0510 03/25/21  0339   WBC 4.6 4.3 2.6*   RBC 3.10* 2.86* 2.44*   HGB 9.2* 8.5* 7.2*   HCT 31.1* 30.5* 22.3*    103* 112*   .3* 106.6* 91.4   MCH 29.7 29.7 29.5   MCHC 29.6* 27.9* 32.3   RDW 15.9* 15.8* 15.9*   SEGSPCT 84.5* 79.0* 89.1*   BANDSPCT  --  9  --       BMP:  Recent Labs     03/23/21  1000 03/24/21  0510 03/25/21  0339    137 135   K 4.5 5.0 3.5   CL 94* 93* 92*   CO2 42* 33* 36*   BUN 15 19 21   CREATININE 0.5* 0.6 0.7   CALCIUM 8.9 8.7 8.0*   GLUCOSE 113* 109* 188*      ABG:  Recent Labs     03/23/21  1745 03/24/21  0800 03/25/21  0800   PH 7.47* 7.52* 7.47*   PO2ART 72* 161* 38*   WXS4PGZ 65.0* 49.0* 54.0*   O2SAT 94.6* 97.1* 72.8*     Lab Results   Component Value Date    PROBNP 479.3 (H) 03/25/2021    PROBNP 448.8 (H) 03/23/2021    PROBNP 1,244 (H) 03/03/2021    THEOPH 4.9 (L) 03/06/2021     No results found for: CULTRESP    Radiology Review:  Pertinent images / reports were reviewed as a part of this visit.     Assessment:     Patient Active Problem List   Diagnosis    Hyperlipidemia    COPD [facility]chronic obstructive pulmonary disease) ([facility]    Leg pain    Hypertension    Shortness of breath    Tobacco abuse    Abdominal aortic aneurysm (AAA) without rupture (HCC)    Degeneration of lumbar or lumbosacral intervertebral disc    Osteopenia    Anxiety    Acute on chronic respiratory failure with hypoxia (HCC)    Lung mass    COPD exacerbation (HCC)    Acute exacerbation of chronic obstructive pulmonary disease (COPD) (HCC)    Small cell lung cancer (HCC)    Acute on chronic respiratory failure with hypoxia and hypercapnia (HCC)    Acute hypoxemic respiratory failure (HCC)       Plan:   1. Overall the patient has improved  2. Check ABGs. If satisfactory start weaning. 3. Discussed with RENETTA.     Brianna Mathis MD  3/25/2021  10:54 AM

## 2021-03-25 NOTE — PROGRESS NOTES
Sister called and provided correct security code. Aware PICC line placed today. Updated, all questions answered. No concerns at this time. Will continue to monitor pt.

## 2021-03-25 NOTE — PROGRESS NOTES
Hospitalist Progress Note      Name:  Vandana Gutierrez /Age/Sex: 1950  (65 y.o. female)   MRN & CSN:  [id number] & [id number] Admission Date/Time: 3/23/2021 10:39 AM   Location:  -A PCP: Maia Suarez MD         Hospital Day: 3    Assessment and Plan:   64 y/o F with PMH of Chronic hypoxemic and hypercapnic respiratory failure, stage IIIb Small Cell Lung Cancer of right lung, dysphagia with exudative esophagitis, chronic anemia, depression/anxiety, HTN, HLD that presented with acute on chronic hypoxemic respiratory failure with hypercapnia.     Acute on Chronic Hypoxemic Respiratory Failure with Hypercapnia  Hx of COPD with likely Exacerbation  - Chronically on 4L NC  - Pulmonology following  - Scheduled nebs  - Steroids  - Blood cultures pending, fungal cultures pending  - Respiratory culture pending  - ID and Pulmonology following; Continue Azithromycin, Meropenem, and Vancomycin     Sinus Tachycardia  - Resolved     Hx of Stage III SCLC  - Was to have Mediport placed on date of admission  - follows with Dr. Linda Armendariz    Acute on Chronic Anemia  - Hgb 7.2; will continue to monitor; transfuse to keep Hgb >7     Hx of Dysphagia with Exudative Esophagitis  - Dysphagia diet when able to extubate  - Carafate and PPI     HTN  - Home Cardizem restarted     Depression/Anxiety  - Sedated; holding home meds for now    Diet DIET TUBE FEED CONTINUOUS/CYCLIC NPO; Semi-elemental (Vital AF 1.2); Orogastric; Continuous; 10; 45; 24   DVT Prophylaxis [] Lovenox, []  Heparin, [] SCDs, [] Ambulation   GI Prophylaxis [] PPI,  [] H2 Blocker,  [] Carafate,  [] Diet/Tube Feeds   Code Status Full Code   Disposition Patient requires continued admission due to    MDM [] Low, [] Moderate,[]  High  Patient's risk as above due to      History of Present Illness:     Afebrile overnight; remains on ventilator; decreasing oxygen requirements on vent    Objective:        Intake/Output Summary (Last 24 hours) at 3/25/2021 [address] [place] [address] filed at 3/25/2021 0800  Gross per 24 hour   Intake 1960.66 ml   Output 2155 ml   Net -194.34 ml      Vitals:   Vitals:    03/25/21 1000   BP: 115/67   Pulse: 85   Resp: 14   Temp: 98.6 °F (37 °C)   SpO2:      Physical Exam:     GEN    Intubated and sedated  EYES   Pupils are constricted, reactive  HENT  Mucous membranes are moist  NECK  Supple, no apparent thyromegaly or masses. RESP  Mechanical breath sounds bilaterally; bilateral expiratory wheezing this morning  CARDIO/VASC           S1/S2 auscultated. Regular rate without appreciable murmurs, rubs, or gallops. GI        Does not grimace to abdominal palpation         Nicole in place  HEME/LYMPH            No petechiae or ecchymoses. MSK    No gross joint deformities. SKIN    Normal coloration, warm, dry.   NEURO           Intubated and sedated  PSYCH            Intubated and sedated    Medications:   Medications:    methylPREDNISolone  40 mg Intravenous Q6H    meropenem  1,000 mg Intravenous Q8H    vancomycin  750 mg Intravenous Q12H    lidocaine PF  5 mL Intradermal Once    sodium chloride flush  10 mL Intravenous 2 times per day    sodium chloride flush  10 mL Intravenous 2 times per day    enoxaparin  30 mg Subcutaneous Daily    aspirin  81 mg Oral Daily    calcium elemental  1 tablet Oral Daily with breakfast    dilTIAZem  120 mg Oral Daily    pantoprazole  40 mg Intravenous QAM AC    sucralfate  1 g Oral 4x Daily AC & HS    azithromycin  500 mg Intravenous Daily    chlorhexidine  15 mL Mouth/Throat BID    famotidine (PEPCID) injection  20 mg Intravenous BID    ipratropium-albuterol  1 ampule Inhalation Q4H WA      Infusions:    propofol 70 mcg/kg/min (03/25/21 0738)    fentaNYL 75 mcg/hr (03/24/21 8043)     PRN Meds: sodium chloride flush, 10 mL, PRN  sodium chloride flush, 10 mL, PRN  promethazine, 12.5 mg, Q6H PRN    Or  ondansetron, 4 mg, Q6H PRN  polyethylene glycol, 17 g, Daily PRN  acetaminophen, 650 mg, Q6H PRN Or  acetaminophen, 650 mg, Q6H PRN  albuterol sulfate HFA, 4 puff, Q4H PRN    And  ipratropium, 4 puff, Q4H PRN          Electronically signed by Domingo Cook MD on 3/25/2021 at 10:40 AM

## 2021-03-25 NOTE — PROGRESS NOTES
Patient suctioned, cuff deflated, and extubated at 1713 per order from Dr Florinda Sharp to 4L nasal cannula. Pt will be placed on BiPAP to rest for the night once she is done speaking with her daughter. No distress noted, no stridor, and bilateral breath sounds heard. Will continue to monitor closely and wean from BiPAP as tolerated.

## 2021-03-25 NOTE — PROGRESS NOTES
Procalcitonin 0.253    C-Reactive Protein    Collection Time: 03/25/21  3:39 AM   Result Value Ref Range    CRP, High Sensitivity 44.0 mg/L     CULTURE results: Invalid input(s): BLOOD CULTURE,  URINE CULTURE, SURGICAL CULTURE    Diagnosis:  Patient Active Problem List   Diagnosis    Hyperlipidemia    COPD [facility]ch[facility] obstructive [facility] disease) ([facility]    Leg pain    Hypertension    Shortness of breath    Tobacco abuse    Abdominal aortic aneurysm (AAA) without rupture (HCC)    Degeneration of lumbar or lumbosacral intervertebral disc    Osteopenia    Anxiety    Acute on chronic respiratory failure with hypoxia (HCC)    Lung mass    COPD exacerbation (HCC)    Acute exacerbation of chronic obstructive pulmonary disease (COPD) ([facility]    Small cell lung cancer (HCC)    Acute on chronic respiratory failure with hypoxia and hypercapnia (HCC)    Acute hypoxemic respiratory failure (HCC)       Active Problems  Active Problems:    Acute hypoxemic respiratory failure (HCC)  Resolved Problems:    * No resolved hospital problems. *    Electronically signed by: Electronically signed by Dona Serrato.  HELIO Mayberry CNP on 3/25/2021 at 9:17 AM

## 2021-03-26 NOTE — PROGRESS NOTES
Messaged HAM Lan NP via perfect serve:    \"pt smokes 0.5 PPD. can we get nicotine patch ordered? since pt extubated today, d/c the tube feed and place a dysphagia bite size diet order. Thanks\"    Orders placed. Will continue to monitor pt.

## 2021-03-26 NOTE — PROGRESS NOTES
She was extubated. Still feels weak. She was placed on BiPAP. Likely I will delay the chemo until she feels stronger. Temperature 98.6, pulse 90, respirate 21, blood pressure 127/77. Weight 113 pounds. She is awake and alert. Supple, no JVD, pupils are reactive. S1-S2 regular. [address] air entry bilaterally. No wheezes. Decreased breath sound. Soft, bowel sounds present. Nontender nondistended. No cyanosis. Cranial nerves II through XII grossly intact. Assessment and plan    She has small cell carcinoma of the lung. Last chemo was on March 10, 2021. I will hold the chemo and to see if gets stronger. Pancytopenia likely related to chemotherapy. I will check CBC today. I may consider blood transfusion depending on CBC result from today. Dr. Beto Schmid will be covering over the weekend.   Thanks

## 2021-03-26 NOTE — PROGRESS NOTES
Pulmonary and Critical Care  Progress Note    Subjective: The patient has improved. Off vent. Shortness of breath none. Chest pain none. Addressing respiratory complaints Patient is negative for  hemoptysis and cyanosis. CONSTITUTIONAL:  negative for fevers and chills. Past Medical History:     has a past medical history of Arthritis, COPD (chronic obstructive pulmonary disease) ([facility] [address] Full dentures, H/O cardiovascular stress test, H/O cardiovascular stress test, H/O Doppler ultrasound, H/O Doppler ultrasound, History of 24 hour EKG monitoring, History of nuclear stress test, Hx of chest x-ray, Hx of Doppler echocardiogram, Hx of echocardiogram, Hx of echocardiogram, Hx of pelvic x-ray, Hx of x-ray of hip, Hyperlipidemia, Hypertension, Leg pain, Lung nodules, On home oxygen therapy, Small cell lung cancer ([facility] and Wears glasses. has a past surgical history that includes cyst removal (1970's); Tubal ligation (1970's); laparoscopic appendectomy (N/A, 5/18/2019); Mediastinoscopy (N/A, 12/10/2020); back surgery; Colonoscopy; Hysterectomy; eye surgery (2010?); joint replacement (Right, 2014? ?); Foot surgery (Right, 1970's); bronchoscopy (N/A, 1/5/2021); and Upper gastrointestinal endoscopy (N/A, 3/3/2021). reports that she quit smoking about 4 months ago. Her smoking use included cigarettes. She started smoking about 47 years ago. She has a 70.50 pack-year smoking history. She has never used smokeless tobacco. She reports current alcohol use. She reports that she does not use drugs. Family history:  family history includes COPD in her sister; Cancer in her father and sister; Coronary Art Dis in her mother; Dementia in her mother; Diabetes in her sister; Heart Attack in her sister;  Heart Attack (age of onset: 61) in her father and mother; High Cholesterol in her mother; Hypertension in her mother, sister, sister, and sister; Irritable Bowel Syndrome in her sister; Pacemaker in her sister; Stroke in her

## 2021-03-26 NOTE — PROGRESS NOTES
Precautions, Fall Risk  Vision/Hearing  Vision: Within Functional Limits  Hearing: Within functional limits     Subjective  General  Chart Reviewed: Yes  Patient assessed for rehabilitation services?: Yes  Family / Caregiver Present: No  Follows Commands: Within Functional Limits  Pain Screening  Patient Currently in Pain: Denies  Pain Assessment  Pain Assessment: 0-10  Vital Signs  Patient Currently in Pain: Denies       Orientation  Orientation  Overall Orientation Status: Within Normal Limits  Social/Functional History  Social/Functional History  Lives With: Family(granddaughter and daughter)  Type of Home: House  Home Layout: One level  Home Access: Stairs to enter with rails(plans on havig a ramp installed as soon as possible)  Entrance Stairs - Number of Steps: 1  Bathroom Shower/Tub: Tub/Shower unit(sponge bathes currently)  Bathroom Toilet: Bedside commode  Home Equipment: Oxygen, Cane, Rolling walker, Wheelchair-manual(4L of O2 at baseline)  ADL Assistance: Independent(toilets exlusively on BSC, dresses self c SBA, and sponge bathes mostly in seated)  Homemaking Assistance: Needs assistance  Homemaking Responsibilities: No  Ambulation Assistance: Independent(walks short distances in the house, uses a w/c outside of home)  Transfer Assistance: Independent  Cognition   Cognition  Overall Cognitive Status: Exceptions  Arousal/Alertness: Appropriate responses to stimuli  Following Commands:  Follows all commands without difficulty  Attention Span: Appears intact  Safety Judgement: Decreased awareness of need for safety;Decreased awareness of need for assistance  Problem Solving: Decreased awareness of errors  Insights: Decreased awareness of deficits  Initiation: Requires cues for some  Sequencing: Requires cues for some    Objective             Strength RLE  Comment: knee extension: at least 3+/5 observed functionally  Strength LLE  Comment: knee extension: at least 3+/5 observed functionally     Sensation Overall Sensation Status: WFL  Bed mobility  Supine to Sit: Minimal assistance(with verbal and tactile cues for BUE and BLE placement throughout transfer)  Transfers  Sit to Stand: Minimal Assistance(with verbal cues to push through bed and avoid pulling on walker)  Stand to sit: Minimal Assistance(with verbal cues to feel chair against back of legs, reach back, and sit slowly)  Ambulation  Ambulation?: Yes  Ambulation 1  Surface: level tile  Device: Rolling Walker  Assistance: Minimal assistance  Quality of Gait: decreased gait speed, decreased step length bilaterally, shuffling gait, intermittent retropulsion, unsteady gait  Distance: 12 feet + 12 feet  Comments: with verbal and tactile cues for BLE placement, walker placement, and sequence throughout ambulation; with verbal and tactile cues to maintain upright posture in order to avoid COM shifting outside of AVIS; with verbal cues to practice pursed lip breathing throughout ambulation     Balance  Posture: Poor(forward head, rounded shoulders, increased thoracic kyphosis)  Sitting - Static: Good  Sitting - Dynamic: Good  Standing - Static: Poor;+  Standing - Dynamic: Poor;+      Gait Training:  Cues were given for safety, sequence, device management, balance, posture, awareness, path. Therapeutic Activity Training:   Therapeutic activity training was instructed today. Cues were given for safety, sequence, UE/LE placement, awareness, and balance. Activities performed today included bed mobility training, sup-sit, sit-stand.         Plan   Plan  Times per week: 3+  Current Treatment Recommendations: Strengthening, ROM, Balance Training, Functional Mobility Training, Transfer Training, ADL/Self-care Training, Gait Training, Patient/Caregiver Education & Training, IADL Training, Stair training, Equipment Evaluation, Education, & procurement, Neuromuscular Re-education, Pain Management, Home Exercise Program, Positioning, Endurance Training, Cognitive/Perceptual Training, Safety Education & Training  Safety Devices  Type of devices: All fall risk precautions in place, Left in chair, Call light within reach, Chair alarm in place, Nurse notified, Gait belt, Patient at risk for falls        AM-PAC Score  AM-PAC Inpatient Mobility Raw Score : 15 (03/26/21 1455)  AM-PAC Inpatient T-Scale Score : 39.45 (03/26/21 1455)  Mobility Inpatient CMS 0-100% Score: 57.7 (03/26/21 1455)  Mobility Inpatient CMS G-Code Modifier : CK (03/26/21 1455)          Goals  Long term goals  Time Frame for Long term goals :  In one week:  Long term goal 1: Pt will complete all bed mobility with SBAx1  Long term goal 2: Pt will complete sit <> stand transfers with SBAx1  Long term goal 3: Pt will ambulate 150 feet with SBAx1 with LRAD  Long term goal 4: Pt will independently complete 3 sets of 10 reps of BLE AROM exercises in available and allowed ROM       Time In: 1305  Time Out: 1340  Total Treatment Time: 35  Timed Code Treatment Minutes:25        Analy Simmons PT, DPT  License #: [id number]

## 2021-03-26 NOTE — PROGRESS NOTES
Hospitalist Progress Note      Name:  Michel Harrison /Age/Sex: 1950  (65 y.o. female)   MRN & CSN:  [id number] & [id number] Admission Date/Time: 3/23/2021 10:39 AM   Location:  -A PCP: Ricky Karimi MD         Hospital Day: 4    Assessment and Plan:   66 y/o F with PMH of Chronic hypoxemic and hypercapnic respiratory failure, stage IIIb Small Cell Lung Cancer of right lung, dysphagia with exudative esophagitis, chronic anemia, depression/anxiety, HTN, HLD that presented with acute on chronic hypoxemic respiratory failure with hypercapnia.     Acute on Chronic Hypoxemic Respiratory Failure with Hypercapnia  Hx of COPD with likely Exacerbation  - Chronically on 4L NC, was on NC this AM doing well; successfully extubated 3/25  - Pulmonology following  - Scheduled nebs  - Steroids  - Blood cultures pending, fungal cultures pending  - Respiratory culture pending  - ID and Pulmonology following; Continue Azithromycin, Meropenem, and Vancomycin  - Transfer to step down unit today when bed available     Sinus Tachycardia  - Resolved     Hx of Stage III SCLC  - Was to have Mediport placed on date of admission  - follows with Dr. Sherri Montiel    Acute on Chronic Anemia  - Hgb 9.0; will continue to monitor; transfuse to keep Hgb >7     Hx of Dysphagia with Exudative Esophagitis  - Dysphagia diet   - Carafate and PPI     HTN  - Home Cardizem restarted     Depression/Anxiety  - Will resume home meds today    Diet DIET GENERAL; Dysphagia Soft and Bite-Sized   DVT Prophylaxis [] Lovenox, []  Heparin, [] SCDs, [] Ambulation   GI Prophylaxis [] PPI,  [] H2 Blocker,  [] Carafate,  [] Diet/Tube Feeds   Code Status Full Code   Disposition Patient requires continued admission due to    MDM [] Low, [] Moderate,[]  High  Patient's risk as above due to      History of Present Illness:     Successfully extubated yesterday; denies any chest pain but some mild SOB this AM; doing well on nasal cannula    Objective: Intake/Output Summary (Last 24 hours) at 3/26/2021 1213  Last data filed at 3/26/2021 1111  Gross per 24 hour   Intake 260 ml   Output 2740 ml   Net -2480 ml      Vitals:   Vitals:    03/26/21 1100   BP: 138/69   Pulse: 101   Resp: 18   Temp: 98.4 °F (36.9 °C)   SpO2: 96%     Physical Exam:     GEN   Doing well, breathing well on NC;   HENT  Mucous membranes are moist  NECK  Supple, no apparent thyromegaly or masses. RESP  Wheezing improved but still present, on NC  CARDIO/VASC           S1/S2 auscultated. Regular rate without appreciable murmurs, rubs, or gallops. GI        No abdominal pain to palpation         Nicole in place  HEME/LYMPH            No petechiae or ecchymoses. MSK    No gross joint deformities. SKIN    Normal coloration, warm, dry.   NEURO           Alert and Oriented, doing well this morning      Medications:   Medications:    nicotine  1 patch Transdermal Daily    simvastatin  20 mg Oral Nightly    methylPREDNISolone  40 mg Intravenous Q6H    meropenem  1,000 mg Intravenous Q8H    lidocaine PF  5 mL Intradermal Once    sodium chloride flush  10 mL Intravenous 2 times per day    sodium chloride flush  10 mL Intravenous 2 times per day    enoxaparin  30 mg Subcutaneous Daily    aspirin  81 mg Oral Daily    calcium elemental  1 tablet Oral Daily with breakfast    dilTIAZem  120 mg Oral Daily    pantoprazole  40 mg Intravenous QAM AC    sucralfate  1 g Oral 4x Daily AC & HS    azithromycin  500 mg Intravenous Daily    famotidine (PEPCID) injection  20 mg Intravenous BID    ipratropium-albuterol  1 ampule Inhalation Q4H WA      Infusions:    dexmedetomidine (PRECEDEX) IV infusion Stopped (03/26/21 1003)     PRN Meds: LORazepam, 1 mg, Q8H PRN  HYDROcodone-acetaminophen, 1 tablet, Q4H PRN  guaiFENesin, 200 mg, TID PRN  sodium chloride flush, 10 mL, PRN  sodium chloride flush, 10 mL, PRN  promethazine, 12.5 mg, Q6H PRN    Or  ondansetron, 4 mg, Q6H PRN  polyethylene glycol, 17 g, Daily PRN  acetaminophen, 650 mg, Q6H PRN    Or  acetaminophen, 650 mg, Q6H PRN          Electronically signed by Omar Ruffin MD on 3/26/2021 at 12:13 PM

## 2021-03-26 NOTE — PROGRESS NOTES
Occupational Therapy  [facility] OCCUPATIONAL THERAPY EVALUATION    History  [place]:  The encounter diagnosis was Acute on chronic respiratory failure with hypoxia and hypercapnia ([address] Restrictions:  Restrictions/Precautions  Restrictions/Precautions: General Precautions, Fall Risk                        Communication with other providers: PT    Subjective:  Patient states:  \"it takes 2 people to get be to the portable commode\"  Pain:  No c/o  Patient goal:  Regain PLOF    Occupational profile (relevant social history and personal factors):    Social/Functional History  Lives With: Family(granddaughter and daughter)  Type of Home: House  Home Layout: One level  Home Access: Stairs to enter with rails(plans on havig a ramp installed as soon as possible)  Entrance Stairs - Number of Steps: 1  Bathroom Shower/Tub: Tub/Shower unit(sponge bathes currently)  Bathroom Toilet: Bedside commode  Home Equipment: Oxygen, Cane, Rolling walker, Wheelchair-manual(4L of O2 at baseline)  ADL Assistance: Independent(toilets exlusively on BSC, dresses self c SBA, and sponge bathes mostly in seated)  Homemaking Assistance: Needs assistance  Homemaking Responsibilities: No  Ambulation Assistance: Independent(walks short distances in the house, uses a w/c outside of home)  Transfer Assistance: Independent    Examination of body systems (includes body structures/functions, activity/participation limitations):  · Orientation: WFL   · Cognition:  WFL   · Observation:  Received pt in bed. Alert and cooperative. Sister is visiting.    · Cardiopulm: 5L of O2  · Vision:  WFL   · Hearing:  WFL   · ROM:  WFL BUE  · Strength: generalized shldr weakness   · Sensation: not tested    ADLs  Feeding: setup    Grooming: setup in seated    Dressing: UB CGA in seated LB MaxA    Bathing: UB CGA in seated LB MaxA    Toileting: likely MaxA    *Some ADL determined per observation of actual ADL performance, functional mobility, balance, activity tolerance, and cognition. AM-PAC 6 click short form for inpatient daily activity:  Raw Score: 15  24/24 = unimpaired  23/24 = 1-20% impaired   20/24-22/24 = 21-40% impaired  15/24-19/24 = 41-59% impaired   10/24-14/24 = 60%-79% impaired  7/24-9/24 = 80%-99% impaired  6/24 = 100% impaired    Functional Mobility  Bed mobility:   Supine to sit: SBA, HOB elevated. Sleeps in recliner at home. Sitting balance: good    Transfers:   Sit to stand: Noel, heavy posterior lean when initially rising and transitioning hands to walker  Stand to sit: Noel to recliner, cue for UE reach back    Standing balance: Noel, heavy posterior lean intermittently, cannot stand with less than 2 UE supported walker    Ambulation:  Noel c RW 25', maintained 95% blood ox on 6L of portable O2    Activity tolerance  Fair+. Below her baseline prior to admission. sats maintained on 5-6L of O2    Assessment:  Assessment  Performance deficits / Impairments: Decreased functional mobility , Decreased ADL status, Decreased strength, Decreased endurance, Decreased balance, Decreased posture  Treatment Diagnosis: acute on chronic resp failure d/t small cell carcinoma of the lung  Prognosis: Fair  Decision Making: Medium Complexity  REQUIRES OT FOLLOW UP: Yes  Discharge Recommendations: (if pt elects for home, she needs to use a RW and have hands-on assistane for all standing, walkering, and ADL)          Goals:  By d/c or goals met:     Pt will perform all bed mobility with SBA in prep for EOB/OOB activity. Pt will perform all functional transfers with SBA and appropriate use of LRD to bed, toilet, chair in prep for increased functional independence. Pt will perform UB ADLs with SBA to increase functional independence. Pt will perform LB ADLs with Noel to increase functional independence. Pt will perform all aspects of toileting with Noel to increase functional independence.    Pt will participate in therex/therax c emphasis on strength, activity tolerance, safety, KADEEM tasks. Plan:  Plan  Times per week: 3x      Recommendation for activity with nursing staff:  up to chair for meals  bedside commode   Use RW    Treatment today:    Self Care Training:   Self care training was performed today. Cues were given for safety, sequence, UE/LE placement, visual cues, and balance. Activities performed today included dressing, toileting, hand hygiene, and grooming. Education: Role of OT, OT POC, d/c needs, home safety    Safety: Left in chair with all needs in reach. chair alarm applied. Gait belt used for transfer and mobility. Time in:  1310  Time out:  1338  Timed treatment minutes:  12  Total treatment time:  28    Electronically signed by:    410Thomas Nguyen, OTR/L, [place]   [id number]   3:47 PM, 3/26/2021

## 2021-03-26 NOTE — PROGRESS NOTES
Daughter, Roberto Nelson, called and provided correct security code. Updated, all questions answered. States pt smokes 0.5 PPD (cut back from 2 PPD), and last time she was in the hospital r/t resp distress, she immediately went back to smoking at home. Daughter concerned that she will continue this pattern and no family conversations have waived the pt from smoking. Asked if we could get nicotine patch ordered for pt and provide more education about smoking. Will page doc for nicotine patch and reducate ot Will continue to monitor pt.

## 2021-03-26 NOTE — PROGRESS NOTES
Comprehensive Nutrition Assessment    Type and Reason for Visit:  Reassess    Nutrition Recommendations/Plan:   Continue current diet as ordered  Add Standard ONS BID  Encourage po intake as able  Please document all po intake  Will monitor po intake, nutrition status, poc    Nutrition Assessment:  pt extubated 3/25, pt currently on 5 L NC, visited pt at bedside, pt reports UBW ~118#, reports recent wt loss, pt reports that her teeth have been found, noted pt on dysphagia minced and moist diet consuming 1-25% of 1 meal documented today, reports that she drinks Boost ONS BID at home, agreeable to receive standard ONS BID, pt at high nutrition risk    Malnutrition Assessment:  Malnutrition Status: Moderate malnutrition    Context:  Acute Illness     Findings of the 6 clinical characteristics of malnutrition:  Energy Intake:  Mild decrease in energy intake (Comment)  Weight Loss:  7 - Greater than 5% over 1 month     Body Fat Loss:  1 - Mild body fat loss Orbital, Buccal region   Muscle Mass Loss:  1 - Mild muscle mass loss Temples (temporalis), Hand (interosseous)  Fluid Accumulation:  1 - Mild(2+ pitting edema noted) Extremities   Strength:  Not Performed    Estimated Daily Nutrient Needs:  Energy (kcal):  8853-8250(17-81 kcal/kg); Weight Used for Energy Requirements:  Current     Protein (g):  62-77(1.2-1.5 g/kg);  Weight Used for Protein Requirements:  Current        Fluid (ml/day):  1500; Method Used for Fluid Requirements:  1 ml/kcal      Nutrition Related Findings:  2+ pitting edema noted, H/H:9.0/29.2      Wounds:  (sacrum wound noted)       Current Nutrition Therapies:    DIET GENERAL; Dysphagia Minced and Moist    Anthropometric Measures:  · Height: 5' (152.4 cm)  · Current Body Weight: 113 lb 12.1 oz (51.6 kg)    · Usual Body Weight: 127 lb 10.3 oz (57.9 kg)((2/28/21) 2/10/21-53.9 kg per chart review)     · Ideal Body Weight: 100 lbs; % Ideal Body Weight 113.8 %   · BMI: 22.2  · BMI Categories: Normal Weight (BMI 22.0 to 24.9) age over 72       Nutrition Diagnosis:   · Inadequate oral intake related to acute injury/trauma as evidenced by weight loss    Nutrition Interventions:   Food and/or Nutrient Delivery:  Continue Current Diet, Start Oral Nutrition Supplement  Nutrition Education/Counseling:  No recommendation at this time   Coordination of Nutrition Care:  Continue to monitor while inpatient    Goals:  pt will consume greater than 50% of meals and supplements       Nutrition Monitoring and Evaluation:   Behavioral-Environmental Outcomes:  None Identified   Food/Nutrient Intake Outcomes:  Supplement Intake, Food and Nutrient Intake  Physical Signs/Symptoms Outcomes:  Biochemical Data, Weight, GI Status, Hemodynamic Status, Fluid Status or Edema     Discharge Planning:     Too soon to determine     Electronically signed by Martinez Hurst, MS, RD, LD on 3/26/21 at 1:39 PM EDT    Contact: [phone]

## 2021-03-26 NOTE — PROGRESS NOTES
Infectious Disease Progress Note  3/26/2021   Patient Name: Jannette Barr : 1950   Impression  · Sepsis Secondary to Acute on Chronic Hypoxic and Hypercapnic Respiratory Failure Exacerbation of COPD:  § Afebrile, leukopenia  § Blood cultures 3/23-0/2-NGTD  § Fungal blood cultures 3/24-pending  § 3/24-Respiratory culture pending  § 3/24-Fungitel pending  § 3/24-Aspergillus pending  § 3/23-RDP pending  § 3/23-CTA Pulmonary W Contrast: No evidence of PE.  redemon of bilateral pulmonary nodules stable slightly decreased in size. 2 new pleural bases small nodular densities in the RLL, which may represent focal atelectasis, however, true nodules cannot be excluded. §  Dr. Rudy Duff Intubated/mechanically ventilated 3/23, extubated 3/25     · Right Lung SCLC Stage III/ Pancytopenia:  § Dr. Vahid Mckeon onboard  § Chemo regimen of cisplatin/etoposide, deferred at this time, last chemo 3/10/21     ? Tobacco Abuse     ? HTN     ? Dysphagia with Weight Loss  ?    · Multi-morbidity: per PMHx:  Arthritis, COPD, HLD, HTN, home oxygen 4 L/nc , appey, hyster, right joint replacement, tobacco abuse     Plan:  · Continue Zithromax 500 mg daily IV  ? Continue meropenem 1 gm IV q8h  ? DC IV vancomycin, MRSA screen negative  · Trend CRP and Pct, ordered  ? Await sputum cultures 3/24  ? Check fungal blood cultures 3/24  ? Extubated 3/25, oxygen per NC. Ongoing Antimicrobial Therapy  Meropenem 3/24-  Azithromycin 3/23-? Completed Antimicrobial Therapy  Cefepime 3/23-  Vancomycin 3/24-  ? History:? Interval history noted. Chief complaint: Sepsis secondary to acute on chronic hypoxic and hypercapnic respiratory failure exacerbation of COPD. Denies any negative side effects from ABX therapy such as N/V/D/C. Resting quietly. Review of Systems   All other systems reviewed and are negative.       Physical Exam:  Vital Signs: BP (!) 114/90   Pulse 94   Temp 98.2 °F (36.8 °C) (Bladder)   Resp 25   Ht 5' (1.524 m)   Wt 113 lb 12.1 oz (51.6 kg)   SpO2 98%   BMI 22.22 kg/m²     Gen: A&O x3, no distress  Skin: no stigmata of endocarditis  Wounds: C/D/I  HEMT: AT/NC Oropharynx pink, moist, and without lesions or exudates; edentulous  Eyes: PERRLA, EOMI, conjunctiva pink, sclera anicteric. Neck: Supple. Trachea midline. No LAD. Chest: no distress and CTA. Diminished throughout anteriorly with expiratory wheezes throughout. Oxygen per NC. Heart: RRR and no MRG. Abd: soft, non-distended, no tenderness, no hepatomegaly. Normoactive bowel sounds. PICC: Right basilic vein intact without erythema or edema at site. Ext: no clubbing, cyanosis, or edema   Catheter Site: without erythema or tenderness draining clear yellow urine. Neuro: Mental status intact. CN 2-12 intact and no focal sensory or motor deficits    Radiologic / Imaging / TESTING  3/8/21 XR Chest Portable:  Impression   Endotracheal tube in adequate position.  Nasogastric tube also in adequate   position       No evidence of acute cardiopulmonary process, otherwise stable appearance of   the chest      3/23/21 CTA Pulmonary W Contrast:  Impression   No evidence of pulmonary embolism.       Redemonstration of the bilateral pulmonary nodules which have been stable or   slightly decreased in size.       2 new pleural based small nodular densities in the right lower lobe, which   may represent focal atelectasis; however, true nodules cannot be excluded. Attention to these nodular densities on the follow-up CT scans is suggested7.     3/24/21 XR Chest Portable:  Impression   1. No acute process. 2. Known pulmonary nodules are better demonstrated by CT.     3/25/21 XR Chest Portable:  Impression   1. Right PICC line tip terminates in the region of the SVC. 2. Pulmonary vascular congestion, stable.    3. Pulmonary nodules in the right lower lobe are better seen on the previous   CT.              Labs:    Recent Results (from the past 24 hour(s)) Neutrophil 0.01 K/CU MM    Immature Neutrophil % 0.3 0 - 0.43 %     CULTURE results: Invalid input(s): BLOOD CULTURE,  URINE CULTURE, SURGICAL CULTURE    Diagnosis:  Patient Active Problem List   Diagnosis    Hyperlipidemia    [facility]    Leg pain    Hypertension    Shortness of breath    Tobacco abuse    Abdominal aortic aneurysm (AAA) without rupture (HCC)    Degeneration of lumbar or lumbosacral intervertebral disc    Osteopenia    Anxiety    Acute on chronic respiratory failure with hypoxia (HCC)    Lung mass    COPD exacerbation (HCC)    Acute exacerbation of chronic obstructive pulmonary disease (COPD) ([facility]    [facility]    Acute on chronic respiratory failure with hypoxia and hypercapnia (HCC)    Acute hypoxemic respiratory failure (HCC)       Active Problems  Active Problems:    Acute hypoxemic respiratory failure (HCC)  Resolved Problems:    * No resolved hospital problems. *    Electronically signed by: Electronically signed by Carol Isidro.  HELIO Mayberry CNP on 3/26/2021 at 10:07 AM

## 2021-03-26 NOTE — PROGRESS NOTES
Swallowing Strategies  Compensatory Swallowing Strategies: Upright as possible for all oral intake    Treatment/Goals  Short-term Goals  Timeframe for Short-term Goals: length of admission  Goal 1: Pt will tolerate minced/moist diet and thin liquids wth adequate oral manipulation and no s/s aspiration. Goal 2: Pt/caregivers will indicate understanding of all recommendations. General  Chart Reviewed: Yes  Behavior/Cognition: Alert; Cooperative(anxious)  Respiratory Status: O2 via nasual cannula  O2 Device: Nasal cannula  Communication Observation: Functional  Follows Directions: Simple  Dentition: Edentulous  Patient Positioning: Upright in bed  Prior Dysphagia History: history of esophageal dysphagia/esophagitis  Consistencies Administered: Reg solid; Dysphagia Pureed (Dysphagia I); Thin - cup; Thin - straw; Ice Chips; Thin - teaspoon           Vision/Hearing  Vision  Vision: Within Functional Limits  Hearing  Hearing: Within functional limits    Oral Motor Deficits  Oral/Motor  Oral Motor: Within functional limits    Oral Phase Dysfunction  Oral Phase  Oral Phase: Exceptions     Indicators of Pharyngeal Phase Dysfunction   Pharyngeal Phase  Pharyngeal Phase: WFL    Prognosis  Prognosis  Prognosis for safe diet advancement: guarded  Individuals consulted  Consulted and agree with results and recommendations: Patient; Family member    Education  Patient Education: recommendations, plan  Patient Education Response: Verbalizes understanding  Safety Devices in place: Yes  Type of devices:  All fall risk precautions in place       Therapy Time  SLP Individual Minutes  Time In: 1150  Time Out: 8392  Minutes: [address] [facility], [address] [address]  3/26/2021 2:05 PM

## 2021-03-26 NOTE — PROGRESS NOTES
Pt asked for BIPAP to be removed. Removed BIPAP and placed pt on 4L, then quickly turned to 5L after pt oxygen went down to 82-84%. Pt took deep breaths to increase spO2 >90%. Pt states that she is mad at RT for not taking the mask off. Educated pt that the doctors want her wearing it as much as she can tolerate, which mean wearing the BIPAP and getting small breaks in between with the NC. Pt states she doesn't have to wear it all the time at home and doesn't understand why she needs it here. Thoroughly educated pt on why it is important, its use, how it will help her, and answered all questions. Pt in agreement to take a short break with NC then go back on the BIPAP. Will continue to monitor.

## 2021-03-26 NOTE — PROGRESS NOTES
Pt assisted to bedside commode. Weak, shaky, SOB. After getting back in bed, pt has labored breathing, requiring her oxygen to temporarily be increased to 7L until spO2 >90%. Pt still not wanting the BIPAP on at this time. States she is still eating some of her food and is not ready for it yet. Will continue to monitor.

## 2021-03-26 NOTE — PROGRESS NOTES
Augustina Lan NP via perfect serve:    \"pt takes Norco  Q 4 hr PRN at home. only has Tylenol and morphine here, pt is requesting her Norco. Thanks\"    Awaiting orders. Will continue to monitor pt.

## 2021-03-27 NOTE — PROGRESS NOTES
Infectious Disease Progress Note  3/27/2021   Patient Name: Courtney Montemayor : 1950   Impression  · Sepsis Secondary to Acute on Chronic Hypoxic and Hypercapnic Respiratory Failure Exacerbation of COPD:  § Afebrile, leukopenia, CRP on dwt  § Blood cultures 3/23-0/2-NGTD  § Fungal blood cultures 3/24-pending  § 3/24-Respiratory culture pending  § 3/24-Fungitel pending  § 3/24-Aspergillus pending  § 3/23-RDP pending  § 3/23-CTA Pulmonary W Contrast: No evidence of PE.  redemon of bilateral pulmonary nodules stable slightly decreased in size. 2 new pleural bases small nodular densities in the RLL, which may represent focal atelectasis, however, true nodules cannot be excluded. §  Dr. Kevin Cheney Intubated/mechanically ventilated 3/23, extubated 3/25     · Right Lung SCLC Stage III/ Pancytopenia:  § Dr. Allison Barnett onboard  § Chemo regimen of cisplatin/etoposide, deferred at this time, last chemo 3/10/21     ? Tobacco Abuse     ? HTN     ? Dysphagia with Weight Loss  ?    · Multi-morbidity: per PMHx:  Arthritis, COPD, HLD, HTN, home oxygen 4 L/nc , appey, hyster, right joint replacement, tobacco abuse     Plan:  · Continue Zithromax 500 mg daily IV  ? Continue meropenem 1 gm IV q8h  ? DC IV vancomycin, MRSA screen negative  · Trend CRP and Pct, ordered  ? Await sputum cultures 3/24  ? Check fungal blood cultures 3/24  ? Extubated 3/25, oxygen per NC. ? F/u Aspergillus Ag and Fungitell    Ongoing Antimicrobial Therapy  Meropenem 3/24-  Azithromycin 3/23-? Completed Antimicrobial Therapy  Cefepime 3/23-  Vancomycin 3/24-  ? History:? Interval history noted. Chief complaint: Sepsis secondary to acute on chronic hypoxic and hypercapnic respiratory failure exacerbation of COPD. Denies any negative side effects from ABX therapy such as N/V/D/C. Resting quietly. Review of Systems   All other systems reviewed and are negative.       Physical Exam:  Vital Signs: /67   Pulse 92   Temp 97.9 °F (36.6 °C)   Resp 18   Ht 5' (1.524 m)   Wt 125 lb 3.5 oz (56.8 kg)   SpO2 100%   BMI 24.46 kg/m²     Gen: A&O x3, no distress  Skin: no stigmata of endocarditis  Wounds: C/D/I  HEMT: AT/NC Oropharynx pink, moist, and without lesions or exudates; edentulous  Eyes: PERRLA, EOMI, conjunctiva pink, sclera anicteric. Neck: Supple. Trachea midline. No LAD. Chest: no distress and CTA. Diminished throughout anteriorly with expiratory wheezes throughout. Oxygen per NC. Heart: RRR and no MRG. Abd: soft, non-distended, no tenderness, no hepatomegaly. Normoactive bowel sounds. PICC: Right basilic vein intact without erythema or edema at site. Ext: no clubbing, cyanosis, or edema   Catheter Site: without erythema or tenderness draining clear yellow urine. Neuro: Mental status intact. CN 2-12 intact and no focal sensory or motor deficits    Radiologic / Imaging / TESTING  3/8/21 XR Chest Portable:  Impression   Endotracheal tube in adequate position.  Nasogastric tube also in adequate   position       No evidence of acute cardiopulmonary process, otherwise stable appearance of   the chest      3/23/21 CTA Pulmonary W Contrast:  Impression   No evidence of pulmonary embolism.       Redemonstration of the bilateral pulmonary nodules which have been stable or   slightly decreased in size.       2 new pleural based small nodular densities in the right lower lobe, which   may represent focal atelectasis; however, true nodules cannot be excluded. Attention to these nodular densities on the follow-up CT scans is suggested7.     3/24/21 XR Chest Portable:  Impression   1. No acute process. 2. Known pulmonary nodules are better demonstrated by CT.     3/25/21 XR Chest Portable:  Impression   1. Right PICC line tip terminates in the region of the SVC. 2. Pulmonary vascular congestion, stable.    3. Pulmonary nodules in the right lower lobe are better seen on the previous   CT.              Labs:    Recent Results (from the past 24 hour(s))   Procalcitonin    Collection Time: 03/27/21  4:30 AM   Result Value Ref Range    Procalcitonin 2.854    Basic Metabolic Panel w/ Reflex to MG    Collection Time: 03/27/21  4:30 AM   Result Value Ref Range    Sodium 139 135 - 145 MMOL/L    Potassium 4.0 3.5 - 5.1 MMOL/L    Chloride 98 (L) 99 - 110 mMol/L    CO2 39 (H) 21 - 32 MMOL/L    Anion Gap 2 (L) 4 - 16    BUN 24 (H) 6 - 23 MG/DL    CREATININE 0.7 0.6 - 1.1 MG/DL    Glucose 116 (H) 70 - 99 MG/DL    Calcium 8.1 (L) 8.3 - 10.6 MG/DL    GFR Non-African American >60 >60 mL/min/1.73m2    GFR African American >60 >60 mL/min/1.73m2   CBC auto differential    Collection Time: 03/27/21  4:30 AM   Result Value Ref Range    WBC 2.9 (L) 4.0 - 10.5 K/CU MM    RBC 2.91 (L) 4.2 - 5.4 M/CU MM    Hemoglobin 8.3 (L) 12.5 - 16.0 GM/DL    Hematocrit 27.8 (L) 37 - 47 %    MCV 95.5 78 - 100 FL    MCH 28.5 27 - 31 PG    MCHC 29.9 (L) 32.0 - 36.0 %    RDW 16.2 (H) 11.7 - 14.9 %    Platelets 346 672 - 233 K/CU MM    MPV 10.1 7.5 - 11.1 FL    Differential Type AUTOMATED DIFFERENTIAL     Segs Relative 80.3 (H) 36 - 66 %    Lymphocytes % 6.7 (L) 24 - 44 %    Monocytes % 12.6 (H) 0 - 4 %    Eosinophils % 0.0 0 - 3 %    Basophils % 0.0 0 - 1 %    Segs Absolute 2.3 K/CU MM    Lymphocytes Absolute 0.2 K/CU MM    Monocytes Absolute 0.4 K/CU MM    Eosinophils Absolute 0.0 K/CU MM    Basophils Absolute 0.0 K/CU MM    Nucleated RBC % 0.0 %    Total Nucleated RBC 0.0 K/CU MM    Total Immature Neutrophil 0.01 K/CU MM    Immature Neutrophil % 0.4 0 - 0.43 %   Phosphorus    Collection Time: 03/27/21  4:30 AM   Result Value Ref Range    Phosphorus 1.8 (L) 2.5 - 4.9 MG/DL   C-Reactive Protein    Collection Time: 03/27/21  4:30 AM   Result Value Ref Range    CRP, High Sensitivity 10.1 mg/L     CULTURE results: Invalid input(s): BLOOD CULTURE,  URINE CULTURE, SURGICAL CULTURE    Diagnosis:  Patient Active Problem List   Diagnosis    Hyperlipidemia    COPD (chronic obstructive pulmonary disease) (HCC)    Leg pain    Hypertension    Shortness of breath    Tobacco abuse    Abdominal aortic aneurysm (AAA) without rupture (HCC)    Degeneration of lumbar or lumbosacral intervertebral disc    Osteopenia    Anxiety    Acute on chronic respiratory failure with hypoxia (HCC)    Lung mass    COPD exacerbation (HCC)    Acute exacerbation of chronic obstructive pulmonary disease (COPD) (HCC)    Small cell lung cancer (HCC)    Acute on chronic respiratory failure with hypoxia and hypercapnia (HCC)    Acute hypoxemic respiratory failure (HCC)       Active Problems  Active Problems:    Acute hypoxemic respiratory failure (HCC)  Resolved Problems:    * No resolved hospital problems.  *    Electronically signed by: Electronically signed by Isabela Becerra MD on 3/27/2021 at 10:31 AM

## 2021-03-28 NOTE — PROGRESS NOTES
Pulmonary and Critical Care  Progress Note      VITALS:  BP (!) 147/75   Pulse 99   Temp 97.6 °F (36.4 °C) (Oral)   Resp 19   Ht 5' (1.524 m)   Wt 125 lb 3.5 oz (56.8 kg)   SpO2 98%   BMI 24.46 kg/m²     Subjective:   CHIEF COMPLAINT :SOB     HPI:                The patient is lying in the bed. She is not cassie cute resp distress    Objective:   PHYSICAL EXAM:    LUNGS:Occasional basal crackles  Abd-soft, BS+, NT  Ext- no pedal edema  CVS-s1s2, no murmurs      DATA:    CBC:  Recent Labs     03/26/21  0745 03/27/21  0430 03/28/21  0610   WBC 3.6* 2.9* 2.4*   RBC 3.10* 2.91* 3.00*   HGB 9.0* 8.3* 8.8*   HCT 29.2* 27.8* 28.9*    198 262   MCV 94.2 95.5 96.3   MCH 29.0 28.5 29.3   MCHC 30.8* 29.9* 30.4*   RDW 16.4* 16.2* 15.9*   SEGSPCT 90.0* 80.3* 77.0*      BMP:  Recent Labs     03/26/21  0745 03/27/21  0430 03/28/21  0610    139 143   K 3.9 4.0 4.4   CL 99 98* 100   CO2 39* 39* 38*   BUN 20 24* 19   CREATININE 0.7 0.7 0.5*   CALCIUM 8.4 8.1* 8.7   GLUCOSE 108* 116* 110*      ABG:  Recent Labs     03/25/21  1200 03/25/21  1645   PH 7.56* 7.48*   PO2ART 73* 71*   JMY1QWV 44.0 55.0*   O2SAT 95.3* 93.7*     BNP  No results found for: BNP   D-Dimer:  Lab Results   Component Value Date    DDIMER 450 (H) 03/04/2021      1.  Radiology: None      Assessment/Plan     Patient Active Problem List    Diagnosis Date Noted    Pneumonia of both lungs due to infectious organism     [facility] 03/23/2021    Acute on chronic respiratory failure with hypoxia and hypercapnia (HCC) 02/28/2021    Small cell lung cancer ([facility] 01/11/2021    Acute exacerbation of chronic obstructive pulmonary disease (COPD) ([facility] 12/02/2020    COPD exacerbation ([facility] 12/01/2020    Lung mass 10/08/2020    Acute on chronic respiratory failure with hypoxia ([facility] 09/18/2020    Osteopenia 06/09/2019    Anxiety 06/09/2019    Tobacco abuse 05/18/2019    Abdominal aortic aneurysm (AAA) without rupture ([facility]

## 2021-03-28 NOTE — PROGRESS NOTES
Hospitalist Progress Note      Name:  Kristi Pineda /Age/Sex: 1950  (65 y.o. female)   MRN & CSN:  [id number] & [id number] Admission Date/Time: 3/23/2021 10:39 AM   Location:  -A PCP: Trev York MD         Hospital Day: 6    Assessment and Plan:   64 y/o F with PMH of Chronic hypoxemic and hypercapnic respiratory failure, stage IIIb Small Cell Lung Cancer of right lung, dysphagia with exudative esophagitis, chronic anemia, depression/anxiety, HTN, HLD that presented with acute on chronic hypoxemic respiratory failure with hypercapnia.     Acute on Chronic Hypoxemic Respiratory Failure with Hypercapnia  Hx of COPD with likely Exacerbation  - Chronically on 4L NC, was on NC this AM doing well; successfully extubated 3/25  - Pulmonology following  - Scheduled nebs  - Steroids  - Blood cultures pending, fungal cultures pending  - Respiratory culture pending  - ID and Pulmonology following; Continue Azithromycin and Meropenem  - Procal/CRP downtrending  - Stepdown bed in ICU     Sinus Tachycardia  - Resolved     Hx of Stage III SCLC  - Was to have Mediport placed on date of admission  - follows with Dr. Meera Gil; following    Acute on Chronic Anemia  - Hgb stable; will continue to monitor; transfuse to keep Hgb >7     Hx of Dysphagia with Exudative Esophagitis  - Dysphagia diet   - Carafate and PPI    Hypophosphatemia  - Repalce  - Trend     HTN  - Home Cardizem restarted     Depression/Anxiety  - Home meds resumed    Diet DIET GENERAL; Dysphagia Minced and Moist  Dietary Nutrition Supplements: Standard High Calorie Oral Supplement   DVT Prophylaxis [] Lovenox, []  Heparin, [] SCDs, [] Ambulation   GI Prophylaxis [] PPI,  [] H2 Blocker,  [] Carafate,  [] Diet/Tube Feeds   Code Status Full Code   Disposition Patient requires continued admission due to    MDM [] Low, [] Moderate,[]  High  Patient's risk as above due to      History of Present Illness:     Still wheezing but overall improved; remains on NC; tolerating dysphagia diet; no nausea    Objective: Intake/Output Summary (Last 24 hours) at 3/28/2021 1203  Last data filed at 3/28/2021 0947  Gross per 24 hour   Intake 1299 ml   Output 1025 ml   Net 274 ml      Vitals:   Vitals:    03/28/21 1002   BP:    Pulse: 99   Resp: 19   Temp:    SpO2: 98%     Physical Exam:     GEN   Doing well, breathing well on NC;   HENT  Mucous membranes are moist  NECK  Supple, no apparent thyromegaly or masses. RESP  Wheezing improved but still present, occasional rhochi, on NC this morning  CARDIO/VASC           S1/S2 auscultated. Regular rate without appreciable murmurs, rubs, or gallops. GI        No abdominal pain to palpation         Nicole in place  HEME/LYMPH            No petechiae or ecchymoses. MSK    No gross joint deformities. SKIN    Normal coloration, warm, dry.   NEURO           Alert and Oriented, doing well this morning      Medications:   Medications:    busPIRone  10 mg Oral BID    sertraline  50 mg Oral Daily    nicotine  1 patch Transdermal Daily    simvastatin  20 mg Oral Nightly    methylPREDNISolone  40 mg Intravenous Q6H    meropenem  1,000 mg Intravenous Q8H    lidocaine PF  5 mL Intradermal Once    sodium chloride flush  10 mL Intravenous 2 times per day    sodium chloride flush  10 mL Intravenous 2 times per day    enoxaparin  30 mg Subcutaneous Daily    aspirin  81 mg Oral Daily    calcium elemental  1 tablet Oral Daily with breakfast    dilTIAZem  120 mg Oral Daily    pantoprazole  40 mg Intravenous QAM AC    sucralfate  1 g Oral 4x Daily AC & HS    azithromycin  500 mg Intravenous Daily    famotidine (PEPCID) injection  20 mg Intravenous BID    ipratropium-albuterol  1 ampule Inhalation Q4H WA      Infusions:    dexmedetomidine (PRECEDEX) IV infusion Stopped (03/26/21 1003)     PRN Meds: LORazepam, 1 mg, Q8H PRN  HYDROcodone-acetaminophen, 1 tablet, Q4H PRN  guaiFENesin, 200 mg, TID PRN  sodium chloride flush, 10 mL, PRN  sodium chloride flush, 10 mL, PRN  promethazine, 12.5 mg, Q6H PRN    Or  ondansetron, 4 mg, Q6H PRN  polyethylene glycol, 17 g, Daily PRN  acetaminophen, 650 mg, Q6H PRN    Or  acetaminophen, 650 mg, Q6H PRN          Electronically signed by Bryce Perry MD on 3/28/2021 at 12:03 PM

## 2021-03-29 NOTE — PROGRESS NOTES
She feels fine this morning. I advised her to participate with physical therapy. She still feels weak. She is agreeable to postpone chemo likely to next week. Temperature 97.8, pulse 97, respiratory 18, blood pressure 156/79, saturation 100%. She is in no acute distress. Awake, alert and oriented x3. Supple, no JVD, pupils are reactive. Anicteric sclera. S1-S2 regular. Diminished breath sound bilaterally. Soft, bowel sounds present, nontender nondistended. No cyanosis. Assessment and plan:  She has small cell cancer of the lung. She is agreeable to postpone chemo to next week and to see get stronger. Labs were reviewed. I again discussed with her about smoking cessation. If she is discharged home I recommend to follow-up with me at the cancer center.   Thanks

## 2021-03-29 NOTE — PROCEDURES
INSTRUMENTAL SWALLOW REPORT  MODIFIED BARIUM SWALLOW    NAME: Olman Moran   : 1950  MRN: [id number]       Date of Eval: 3/29/2021     Ordering Physician: Dr. Adriel Cedeño  Radiologist: available upon request     Referring Diagnosis(es): Referring Diagnosis: mild oropharyngeal dysphagia R13.12    Past Medical History:  has a past medical history of Arthritis, COPD (chronic obstructive pulmonary disease) ([facility] [address] Full dentures, H/O cardiovascular stress test, H/O cardiovascular stress test, H/O Doppler ultrasound, H/O Doppler ultrasound, History of 24 hour EKG monitoring, History of nuclear stress test, Hx of chest x-ray, Hx of Doppler echocardiogram, Hx of echocardiogram, Hx of echocardiogram, Hx of pelvic x-ray, Hx of x-ray of hip, Hyperlipidemia, Hypertension, Leg pain, Lung nodules, On home oxygen therapy, Small cell lung cancer ([facility] [address] and Wears glasses. Past Surgical History:  has a past surgical history that includes cyst removal (); Tubal ligation (); laparoscopic appendectomy (N/A, 2019); Mediastinoscopy (N/A, 12/10/2020); back surgery; Colonoscopy; Hysterectomy; eye surgery (?); joint replacement (Right, ? ?); Foot surgery (Right, ); bronchoscopy (N/A, 2021); and Upper gastrointestinal endoscopy (N/A, 3/3/2021). Current Diet Solid Consistency: Dysphagia Minced and Moist (Dysphagia II)  Current Diet Liquid Consistency: Thin       Type of Study: Repeat MBS  Results of Prior Study: mild oropharyngeal dysphagia; no aspiration    Recent CXR/CT of Chest: see chart    Patient Complaints/Reason for Referral:  Olman Moran was referred for a MBS to assess the efficiency of his/her swallow function, assess for aspiration, and to make recommendations regarding safe dietary consistencies, effective compensatory strategies, and safe eating environment.   Patient complaints: occasional coughing/spitting during meals    Onset of problem:   Prior to admission Behavior/Cognition/Vision/Hearing:  Behavior/Cognition: Alert; Cooperative  Vision: Within Functional Limits  Hearing: Within functional limits    Impressions:  Treatment Dx and ICD 10: Mild oropharyngeal dysphagia R13.12   Patient Position: Lateral and Patient Degrees: 90  Consistencies Administered: Reg solid; Dysphagia Pureed (Dysphagia I); Thin teaspoon; Thin cup; Thin straw    Priscilla Rubio was seen for an inpatient modified barium swallow study. She was admitted to [facility] with respiratory failure, COPD exacerbation. She was intubated on mechanical ventilation 3/23-3/25. She was recently seen by SLP for dysphagia during previous admission; pt participated in a modified barium swallow study 3/3/2021 revealing mild oropharyngeal dysphagia without aspiration. Pt also underwent EGD 3/3 revealing severe exudative esophagitis. Medical hx includes SCLC, COPD, HTN, HLD, anemia. Pt seen for study seated upright in chair, filmed in lateral view. She was presented with PO trials of thin liquids via tsp/cup/straw, puree, and regular solids. Oral stage mildly impaired with prolonged/reduced mastication of solids(2/2 missing dentition), piecemeal deglutition, premature posterior loss with liquids. Pharyngeal stage is mildly impaired with delayed swallow initiation, minimally reduced laryngeal elevation, intact anterior hyoid excursion, decreased tongue base retraction, adequate laryngeal vestibule closure, intact palatal elevation and posterior pharyngeal stripping wave. UES opening adequate. Trace-minimal residue from thin liquids noted in valleculae and pyriform sinuses inconsistently. Trace/transient shallow laryngeal penetration noted with thin liquids to underside of epiglottis/midway to vocal folds. All material clears from laryngeal vestibule spontaneously with completion of swallow. No laryngeal penetration noted with other presented consistencies. No aspiration identified across all trials.  Noted pt coughed throughout the study, and this did not correspond to any airway events. Recommend continued minced/moist diet with thin liquids. Esophageal dysphagia strategies recommended - briefly reviewed with pt following the study. Immediate results/recommendations discussed following the study. SLP will follow for education 1-2x. Dysphagia Outcome Severity Scale: Level 5: Mild dysphagia- Distant supervision. May need one diet consistency restricted  Penetration-Aspiration Scale (PAS): 2 - Material enters the airway, remains above the vocal folds, and is ejected from the airway    Recommended Diet:  Solid consistency: Dysphagia Minced and Moist (Dysphagia II)  Liquid consistency: Thin  Liquid administration via: Cup;Straw    Medication administration: PO    Safe Swallow Protocol:  Supervision: Distant  Compensatory Swallowing Strategies: Upright as possible for all oral intake;Eat/Feed slowly; Small bites/sips; Alternate solids and liquids; Remain upright for 30-45 minutes after meals              Recommendations/Treatment  Requires SLP Intervention: Yes        D/C Recommendations: To be determined         Recommended Exercises:    Therapeutic Interventions: Diet tolerance monitoring;Patient/Family education         Education: Images and recommendations were reviewed with Sanjiv Lilly following this exam.   Patient Education: recommendations, plan  Patient Education Response: Verbalizes understanding    Prognosis  Prognosis for safe diet advancement: guarded  Barriers/Prognosis Comment: most appropriate diet for long term given esophageal dysphagia  Duration/Frequency of Treatment  Duration/Frequency of Treatment: monitor 1-2x/LOS  Safety Devices  Safety Devices in place: Yes  Type of devices: All fall risk precautions in place      Goals:       Short Term:  Goal 1: Pt will tolerate minced/moist diet and thin liquids wth adequate oral manipulation and no s/s aspiration.   Goal 2: Pt/caregivers will indicate understanding of all recommendations.                        Oral Preparation / Oral Phase  Oral Phase: Impaired        Pharyngeal Phase  Pharyngeal Phase: Impaired      Esophageal Phase  Esophageal Screen: Impaired        Pain   Patient Currently in Pain: No  Pain Level: 7  Pain Type: Chronic pain  Pain Location: Back      Therapy Time:   Individual Concurrent Group Co-treatment   Time In 1300         Time Out 1330         Minutes Jaime Shelton MA CCC-SLP, 3/29/2021, 2:56 PM

## 2021-03-29 NOTE — TELEPHONE ENCOUNTER
Called pt's daughter, Veverly Cheadle per NN to inform her pt's tx is being delayed x 1 wk; OV ON 4/7 @ 9:00 with TX @ 8:30 on this day, 4/8 @ 10:30 & 4/9 @ 10:30; daughter voiced understanding.

## 2021-03-29 NOTE — PROGRESS NOTES
Occupational Therapy  Visited pt this morning around ~0850 but pt politely declined citing fatigue after having just returned to bed for toileting.    [address], OTR/L, [place]   [id number]   1:02 PM, 3/29/2021

## 2021-03-29 NOTE — CARE COORDINATION
Met with patient at bedside. She is awake and able to participate. Discussed PT/OT rec of SNF. She is adamant that she will return home; has family support. She is agreeable to [facility] if insurance \"OK\". She has no preference in provider [facility].  Tevin Del Rio RN

## 2021-03-29 NOTE — PROGRESS NOTES
Infectious Disease Progress Note  3/29/2021   Patient Name: Catherine Jay : 1950   Impression  · Sepsis Secondary to Acute on Chronic Hypoxic and Hypercapnic Respiratory Failure Exacerbation of COPD:  § Afebrile, leukopenia, CRP on dwt  § Blood cultures 3/23-0/2-NGTD  § Fungal blood cultures 3/24-pending  § 3/24-Respiratory culture pending  § 3/24-Fungitel pending  § 3/24-Aspergillus pending  § 3/23-RDP pending  § 3/23-CTA Pulmonary W Contrast: No evidence of PE.  redemon of bilateral pulmonary nodules stable slightly decreased in size. 2 new pleural bases small nodular densities in the RLL, which may represent focal atelectasis, however, true nodules cannot be excluded. §  Dr. Velázquez Patient Intubated/mechanically ventilated 3/23, extubated 3/25     · Right Lung SCLC Stage III/ Pancytopenia:  § Dr. Kelly Willis onboard  § Chemo regimen of cisplatin/etoposide, deferred at this time, last chemo 3/10/21     ? Tobacco Abuse     ? HTN     ? Dysphagia with Weight Loss  ?    · Multi-morbidity: per PMHx:  Arthritis, COPD, HLD, HTN, home oxygen 4 L/nc , appey, hyster, right joint replacement, tobacco abuse     Plan:  · Continue Zithromax 500 mg daily IV x 1 week total (end date 3/30/21)  ? Continue meropenem 1 gm IV q8h x 1 week total (end date 3/31/21)  · Trend CRP and Pct, trending down  ? Await sputum cultures 3/24  ? Check fungal blood cultures 3/24  ? Oxygen per NC. ? F/u Aspergillus Ag and Fungitell  ? States feels weak but wants to start chemo next week and DW Dr. Fredis Kirk this am    Ongoing Antimicrobial Therapy  Meropenem 3/24-  Azithromycin 3/23-? Completed Antimicrobial Therapy  Cefepime 3/23-  Vancomycin 3/24-  ? History:? Interval history noted. Chief complaint: Sepsis secondary to acute on chronic hypoxic and hypercapnic respiratory failure exacerbation of COPD. Denies any negative side effects from ABX therapy such as N/V/D/C.   Resting quietly, states feels weak but slightly improved, wants to keep her chemo spot for next week. Review of Systems   All other systems reviewed and are negative. Physical Exam:  Vital Signs: BP (!) 156/79   Pulse 97   Temp 97.8 °F (36.6 °C) (Oral)   Resp 18   Ht 5' (1.524 m)   Wt 126 lb 15.8 oz (57.6 kg)   SpO2 100%   BMI 24.80 kg/m²     Gen: A&O x3, no distress  Skin: no stigmata of endocarditis  Wounds: C/D/I  HEMT: AT/NC Oropharynx pink, moist, and without lesions or exudates; edentulous  Eyes: PERRLA, EOMI, conjunctiva pink, sclera anicteric. Neck: Supple. Trachea midline. No LAD. Chest: no distress and CTA. Diminished throughout anteriorly with expiratory wheezes throughout. Oxygen per NC. Heart: RRR and no MRG. Abd: soft, non-distended, no tenderness, no hepatomegaly. Normoactive bowel sounds. PICC: Right basilic vein intact without erythema or edema at site. Ext: no clubbing, cyanosis, or edema   Catheter Site: without erythema or tenderness draining clear yellow urine. Neuro: Mental status intact. CN 2-12 intact and no focal sensory or motor deficits    Radiologic / Imaging / TESTING  3/8/21 XR Chest Portable:  Impression   Endotracheal tube in adequate position.  Nasogastric tube also in adequate   position       No evidence of acute cardiopulmonary process, otherwise stable appearance of   the chest      3/23/21 CTA Pulmonary W Contrast:  Impression   No evidence of pulmonary embolism.       Redemonstration of the bilateral pulmonary nodules which have been stable or   slightly decreased in size.       2 new pleural based small nodular densities in the right lower lobe, which   may represent focal atelectasis; however, true nodules cannot be excluded. Attention to these nodular densities on the follow-up CT scans is suggested7.     3/24/21 XR Chest Portable:  Impression   1. No acute process. 2. Known pulmonary nodules are better demonstrated by CT.     3/25/21 XR Chest Portable:  Impression   1.  Right PICC line Sensitivity 4.8 mg/L     CULTURE results: Invalid input(s): BLOOD CULTURE,  URINE CULTURE, SURGICAL CULTURE    Diagnosis:  Patient Active Problem List   Diagnosis    Hyperlipidemia    COPD [facility]chronic obstructive pulmonary disease) ([facility] [address]    Leg pain    Hypertension    Shortness of breath    Tobacco abuse    Abdominal aortic aneurysm (AAA) without rupture (HCC)    Degeneration of lumbar or lumbosacral intervertebral disc    Osteopenia    Anxiety    Acute on chronic respiratory failure with hypoxia (HCC)    Lung mass    COPD exacerbation (HCC)    Acute exacerbation of chronic obstructive pulmonary disease (COPD) ([facility] [address]    Small cell lung cancer ([facility] [address]    Acute on chronic respiratory failure with hypoxia and hypercapnia (HCC)    Acute hypoxemic respiratory failure (HCC)    Pneumonia of both lungs due to infectious organism       Active Problems  Active Problems:    Acute hypoxemic respiratory failure (HCC)    Pneumonia of both lungs due to infectious organism  Resolved Problems:    * No resolved hospital problems. *    Electronically signed by: Electronically signed by HELIO De Luna CNP on 3/29/2021 at 9:19 AM

## 2021-03-29 NOTE — PROGRESS NOTES
Hospitalist Progress Note      Name:  Gisele Salas /Age/Sex: 1950  (65 y.o. female)   MRN & CSN:  [id number] & [id number] Admission Date/Time: 3/23/2021 10:39 AM   Location:  -A PCP: Rachael Bailey MD         Hospital Day: 7    Assessment and Plan:   66 y/o F with PMH of Chronic hypoxemic and hypercapnic respiratory failure, stage IIIb Small Cell Lung Cancer of right lung, dysphagia with exudative esophagitis, chronic anemia, depression/anxiety, HTN, HLD that presented with acute on chronic hypoxemic respiratory failure with hypercapnia.   Patient successfully extubated 3/25.     Acute on Chronic Hypoxemic Respiratory Failure with Hypercapnia  Hx of COPD with likely Exacerbation  - Chronically on 4L NC,on NC this AM doing well; successfully extubated 3/25  - Pulmonology following  - Scheduled nebs  - Steroids, weaning today  - Blood cultures pending, fungal cultures pending  - Respiratory culture pending  - ID and Pulmonology following; Continue Azithromycin and Meropenem  - Procal/CRP downtrending  - Home bipap eval  - Stepdown bed in ICU     Sinus Tachycardia  - Resolved     Hx of Stage III SCLC  - Was to have Mediport placed on date of admission  - follows with Dr. Shad Mccartney; following; plan outpatient f/u    Acute on Chronic Anemia  - Hgb stable, 9.7 this morning     Hx of Dysphagia with Exudative Esophagitis  - Dysphagia diet   - Carafate and PPI    Hypophosphatemia  - Repalce PRN  - Trend     HTN  - Home Cardizem restarted     Depression/Anxiety  - Home meds resumed    Diet DIET GENERAL; Dysphagia Minced and Moist  Dietary Nutrition Supplements: Standard High Calorie Oral Supplement   DVT Prophylaxis [] Lovenox, []  Heparin, [] SCDs, [] Ambulation   GI Prophylaxis [] PPI,  [] H2 Blocker,  [] Carafate,  [] Diet/Tube Feeds   Code Status Full Code   Disposition Patient requires continued admission due to    MDM [] Low, [] Moderate,[]  High  Patient's risk as above due to      History of Present Illness:     Breathing slowly improving; no nausea or vomiting, fevers or chills;    Objective: Intake/Output Summary (Last 24 hours) at 3/29/2021 1154  Last data filed at 3/29/2021 0553  Gross per 24 hour   Intake 1080 ml   Output    Net 1080 ml      Vitals:   Vitals:    03/29/21 0750   BP: (!) 156/79   Pulse: 97   Resp: 18   Temp: 97.8 °F (36.6 °C)   SpO2: 100%     Physical Exam:     GEN   Doing well, breathing well on NC;   HENT  Mucous membranes are moist  NECK  Supple, no apparent thyromegaly or masses. RESP  Wheezing improved but still present, occasional rhochi, on NC this morning  CARDIO/VASC           S1/S2 auscultated. Regular rate without appreciable murmurs, rubs, or gallops. GI        No abdominal pain to palpation         Nicole in place  HEME/LYMPH            No petechiae or ecchymoses. MSK    No gross joint deformities. SKIN    Normal coloration, warm, dry.   NEURO           Alert and Oriented, doing well this morning      Medications:   Medications:    methylPREDNISolone  40 mg Intravenous Q8H    theophylline  100 mg Oral BID    albuterol sulfate HFA  2 puff Inhalation 4x daily    ipratropium  2 puff Inhalation 4x daily    docusate sodium  100 mg Oral Daily    LORazepam  0.5 mg Intravenous Once    busPIRone  10 mg Oral BID    sertraline  50 mg Oral Daily    nicotine  1 patch Transdermal Daily    simvastatin  20 mg Oral Nightly    meropenem  1,000 mg Intravenous Q8H    lidocaine PF  5 mL Intradermal Once    sodium chloride flush  10 mL Intravenous 2 times per day    sodium chloride flush  10 mL Intravenous 2 times per day    enoxaparin  30 mg Subcutaneous Daily    aspirin  81 mg Oral Daily    calcium elemental  1 tablet Oral Daily with breakfast    dilTIAZem  120 mg Oral Daily    pantoprazole  40 mg Intravenous QAM AC    sucralfate  1 g Oral 4x Daily AC & HS    azithromycin  500 mg Intravenous Daily    famotidine (PEPCID) injection  20 mg Intravenous BID Infusions:    dexmedetomidine (PRECEDEX) IV infusion Stopped (03/26/21 1003)     PRN Meds: potassium chloride, 40 mEq, PRN    Or  potassium alternative oral replacement, 40 mEq, PRN    Or  potassium chloride, 10 mEq, PRN  ipratropium-albuterol, 1 ampule, Q4H PRN  LORazepam, 1 mg, Q8H PRN  HYDROcodone-acetaminophen, 1 tablet, Q4H PRN  guaiFENesin, 200 mg, TID PRN  sodium chloride flush, 10 mL, PRN  sodium chloride flush, 10 mL, PRN  promethazine, 12.5 mg, Q6H PRN    Or  ondansetron, 4 mg, Q6H PRN  polyethylene glycol, 17 g, Daily PRN  acetaminophen, 650 mg, Q6H PRN    Or  acetaminophen, 650 mg, Q6H PRN          Electronically signed by Omar Ruffin MD on 3/29/2021 at 11:54 AM

## 2021-03-29 NOTE — PROGRESS NOTES
Pulmonary and Critical Care  Progress Note    Subjective: The patient is better. Shortness of breath none. Chest pain none. Addressing respiratory complaints Patient is negative for  hemoptysis and cyanosis. CONSTITUTIONAL:  negative for fevers and chills. Past Medical History:     has a past medical history of Arthritis, COPD (chronic obstructive pulmonary disease) ([facility] [address] Full dentures, H/O cardiovascular stress test, H/O cardiovascular stress test, H/O Doppler ultrasound, H/O Doppler ultrasound, History of 24 hour EKG monitoring, History of nuclear stress test, Hx of chest x-ray, Hx of Doppler echocardiogram, Hx of echocardiogram, Hx of echocardiogram, Hx of pelvic x-ray, Hx of x-ray of hip, Hyperlipidemia, Hypertension, Leg pain, Lung nodules, On home oxygen therapy, [facility] and Wears glasses. has a past surgical history that includes cyst removal (1970's); Tubal ligation (1970's); laparoscopic appendectomy (N/A, 5/18/2019); Mediastinoscopy (N/A, 12/10/2020); back surgery; Colonoscopy; Hysterectomy; eye surgery (2010?); joint replacement (Right, 2014? ?); Foot surgery (Right, 1970's); bronchoscopy (N/A, 1/5/2021); and Upper gastrointestinal endoscopy (N/A, 3/3/2021). reports that she quit smoking about 4 months ago. Her smoking use included cigarettes. She started smoking about 47 years ago. She has a 70.50 pack-year smoking history. She has never used smokeless tobacco. She reports current alcohol use. She reports that she does not use drugs. Family history:  family history includes COPD in her sister; Cancer in her father and sister; Coronary Art Dis in her mother; Dementia in her mother; Diabetes in her sister; Heart Attack in her sister;  Heart Attack (age of onset: 61) in her father and mother; High Cholesterol in her mother; Hypertension in her mother, sister, sister, and sister; Irritable Bowel Syndrome in her sister; Pacemaker in her sister; Stroke in her father, mother, and sister. Allergies   Allergen Reactions    Formaldehyde     Gabapentin Other (See Comments)    Norflex Tablets [Orphenadrine]     Cranberry Rash     Social History:    Reviewed; no changes    Objective:   PHYSICAL EXAM:        VITALS:  BP (!) 156/79   Pulse 97   Temp 97.8 °F (36.6 °C) (Oral)   Resp 18   Ht 5' (1.524 m)   Wt 126 lb 15.8 oz (57.6 kg)   SpO2 100%   BMI 24.80 kg/m²     24HR INTAKE/OUTPUT:      Intake/Output Summary (Last 24 hours) at 3/29/2021 1030  Last data filed at 3/29/2021 0553  Gross per 24 hour   Intake 1180 ml   Output    Net 1180 ml       CONSTITUTIONAL:  Awake. LUNGS:  decreased breath sounds. CARDIOVASCULAR:  normal S1 and S2 and negative JVD  ABD:Abdomen soft, non-tender. BS normal. No masses,  No organomegaly  NEURO:Awake. DATA:    CBC:  Recent Labs     03/27/21 0430 03/28/21  0610 03/29/21  0450   WBC 2.9* 2.4* 3.3*   RBC 2.91* 3.00* 3.41*   HGB 8.3* 8.8* 9.7*   HCT 27.8* 28.9* 32.5*    262 385   MCV 95.5 96.3 95.3   MCH 28.5 29.3 28.4   MCHC 29.9* 30.4* 29.8*   RDW 16.2* 15.9* 15.5*   SEGSPCT 80.3* 77.0* 72.2*      BMP:  Recent Labs     03/27/21  0430 03/28/21  0610 03/29/21  0450    143 140   K 4.0 4.4 4.7   CL 98* 100 97*   CO2 39* 38* 38*   BUN 24* 19 23   CREATININE 0.7 0.5* 0.5*   CALCIUM 8.1* 8.7 9.0   GLUCOSE 116* 110* 100*      ABG:  No results for input(s): PH, PO2ART, FWU2CAK, HCO3, BEART, O2SAT in the last 72 hours. Lab Results   Component Value Date    PROBNP 479.3 (H) 03/25/2021    PROBNP 448.8 (H) 03/23/2021    PROBNP 1,244 (H) 03/03/2021    THEOPH 4.9 (L) 03/06/2021     No results found for: [facility]    Radiology Review:  Pertinent images / reports were reviewed as a part of this visit.     Assessment:     Patient Active Problem List   Diagnosis    Hyperlipidemia    COPD [facility]chronic obstructive pulmonary disease) ([facility] [address]    Leg pain    Hypertension    Shortness of breath    Tobacco abuse    Abdominal aortic aneurysm (AAA) without rupture (HCC)    Degeneration of lumbar or lumbosacral intervertebral disc    Osteopenia    Anxiety    Acute on chronic respiratory failure with hypoxia (HCC)    Lung mass    COPD exacerbation (HCC)    Acute exacerbation of chronic obstructive pulmonary disease (COPD) (HCC)    Small cell lung cancer (HCC)    Acute on chronic respiratory failure with hypoxia and hypercapnia (HCC)    Acute hypoxemic respiratory failure (HCC)    Pneumonia of both lungs due to infectious organism       Plan:   1. Overall the patient has improved. 2. Inc. Activity. 3. Wean FiO2.  4. Supp. Phos.   5. Home Bipap eval.  Brianna Mathis MD  3/29/2021  10:30 AM

## 2021-03-29 NOTE — PROGRESS NOTES
Patient Room #: 2111/2111-A  Patient Name: [address] NIV Evaluation / Orders  1. Notify Pulmonary Diagnostics of order to evaluate for home NIV. 2. Perform the following tests at any point before discharge. [x] Perform an Overnight Oximetry while the patient is on at least                  2 lpm of oxygen. The saturation level must be <  88% for > 5                   cumulative minutes to qualify. [x] Arterial puncture (ABG) for blood gas analysis done while awake. ·    Qualifying result is a PaCO2 >   52 mmHg    3. [x]  I have documented in a progress note that ANDRES is not the primary cause of hypercapnia in this patient. CPAP will not effectively treat this patient hypercapnia. BIPA Therapy will benefit and more effectively treat the hypercapnia. Results Documentation    (Attach a copy of Reports)  1. ABG Results       Date _3/31/2021_  PaCO2 _65_ mmHg on __4__ lpm oxygen    2. Oximetry Level _<88_% for 7_ minutes on _4_ lpm oxygen    3. [x] Patient Qualifies      [] Patient Does NOT qualify      Complete order below:  Diagnosis _COPD, Lung CA, Acute on chronic respiratory failure with hypoxia and hypercapnia__           Length of Need: [x] Lifetime  Home NIV () at:    Inspiratory Setting __15_ cm H2O         Expiratory Setting _5__ cm H2O    Therapist Signature: Theresa Hay RRT___Date: _3/31/2021_  Physician Signature:   Electronically Signed in EMR_______  Date: _____________  Physician Printed Name: __Ordering User: Leslie Mckeon MD  NPI:  [id number]

## 2021-03-30 NOTE — PROGRESS NOTES
[id number] [place] OF SPEECH/LANGUAGE PATHOLOGY  DAILY PROGRESS NOTE  Aylin Osman  3/30/2021  [id number]  Acute on chronic respiratory failure with hypoxia and hypercapnia (HCC) [J96.21, J96.22]  Acute hypoxemic respiratory failure (HCC) [J96.01]  Allergies   Allergen Reactions    Formaldehyde     Gabapentin Other (See Comments)    Norflex Tablets [Orphenadrine]     Cranberry Rash         Pt was seen this date for dysphagia treatment. IMPRESSION AND RECOMMENDATIONS: Aylin Osman was seen for dysphagia follow-up. She was seated upright at edge of bed, alert, cooperative. Family present for majority of session. Reviewed results of MBSS completed yesterday and recommendations. Reviewed compensatory strategies. Pt demonstrated use of strategies with breakfast tray of puree and thin liquids given no cues. Oral clearance intact. No s/s aspiration. Pt and her family member verbalize understanding of all recommendations. Both deny further concerns at this time. Recommend continued current diet. Pt should be seated upright, take small bites/drinks, alternate liquids with solids, and remain fully upright 30-45 minutes following meals. Recommend pt take pills one at a time with a drink or in pureed/pudding consistency as needed. This was all discussed with pt/family in detail. No further acute SLP needs identified at this time. GOALS (current status in bold):  Short-term Goals  Timeframe for Short-term Goals: length of admission  Goal 1: Pt will tolerate minced/moist diet and thin liquids wth adequate oral manipulation and no s/s aspiration. Met  Goal 2: Pt/caregivers will indicate understanding of all recommendations.  Met          EDUCATION: as above    PAIN RATING (0-10 Scale): 0/denies  Time in/Time out: SLP Individual Minutes  Time In: 0915  Time Out: 0930  Minutes: 15    Visit number: [address] [facility] [address]-SL[facility]  3/30/2021  9:32 AM

## 2021-03-30 NOTE — PROGRESS NOTES
Infectious Disease Progress Note  3/30/2021   Patient Name: Noman Nunez : 1950   Impression  · Sepsis Secondary to Acute on Chronic Hypoxic and Hypercapnic Respiratory Failure Exacerbation of COPD:  § Afebrile, leukopenia, CRP on dwt  § Blood cultures 3/23-0/2-NGTD  § Fungal blood cultures 3/24-pending  § 3/24-Respiratory culture pending  § 3/23-CTA Pulmonary W Contrast: No evidence of PE.  redemon of bilateral pulmonary nodules stable slightly decreased in size. 2 new pleural bases small nodular densities in the RLL, which may represent focal atelectasis, however, true nodules cannot be excluded. §  Dr. Haritha Zavala Intubated/mechanically ventilated 3/23, extubated 3/25     · Right Lung SCLC Stage III/ Pancytopenia:  § Dr. Jeff Hicks onboard  § Chemo regimen of cisplatin/etoposide, deferred at this time, last chemo 3/10/21     ? Tobacco Abuse     ? HTN     ? Dysphagia with Weight Loss  ?    · Multi-morbidity: per PMHx:  Arthritis, COPD, HLD, HTN, home oxygen 4 L/nc , appey, hyster, right joint replacement, tobacco abuse     Plan:  ? DC Zithromax and meropenem  ? Start Levaquin 750 mg po daily x 30 days (end date 21)  ? Start probiotics, lactobacillus 1 po daily x 30 days (end date 21)  ? Patient is leukopenic, at risk with chemo for neutropenia and CDif, will treat with prophylaxis ABX therapy as well as probiotics  · Trend CRP and Pct, trending down  ? Await sputum cultures 3/24  ? Check fungal blood cultures 3/24  ? Oxygen per NC. ? F/u Aspergillus Ag and Fungitell  ?  Plan to start chemo next week  For DC:  · Follow up with ID clinic in 1 week please  Weekly labs drawn on Monday during the course of treatment  CBC with differential, CMP, ESR, CRP  Fax results to Attn: [facility] Infectious Diseases Staff  · # [phone]  · OK from ID standpoint to DC when ready      Ongoing Antimicrobial Therapy  Levaquin 3/30-  Completed Antimicrobial Therapy  Cefepime 3/23- Vancomycin 3/24-26  Meropenem 3/24-30  Azithromycin 3/23-30?  ? History:? Interval history noted. Chief complaint: Sepsis secondary to acute on chronic hypoxic and hypercapnic respiratory failure exacerbation of COPD. Denies any negative side effects from ABX therapy such as N/V/D/C. Resting quietly, states feels weak but slightly improved, wants to keep her chemo spot for next week. Review of Systems   All other systems reviewed and are negative. Physical Exam:  Vital Signs: BP (!) 154/70   Pulse 112   Temp 97.6 °F (36.4 °C) (Oral)   Resp 20   Ht 5' (1.524 m)   Wt 131 lb 6.3 oz (59.6 kg)   SpO2 95%   BMI 25.66 kg/m²     Gen: A&O x3, no distress  Skin: no stigmata of endocarditis  Wounds: C/D/I  HEMT: AT/NC Oropharynx pink, moist, and without lesions or exudates; edentulous  Eyes: PERRLA, EOMI, conjunctiva pink, sclera anicteric. Neck: Supple. Trachea midline. No LAD. Chest: no distress and CTA. Diminished throughout anteriorly with expiratory wheezes throughout. Oxygen per NC. Heart: RRR and no MRG. Abd: soft, non-distended, no tenderness, no hepatomegaly. Normoactive bowel sounds. PICC: Right basilic vein intact without erythema or edema at site. Ext: no clubbing, cyanosis, or edema   Catheter Site: without erythema or tenderness draining clear yellow urine. Neuro: Mental status intact.  CN 2-12 intact and no focal sensory or motor deficits    Radiologic / Imaging / TESTING  3/8/21 XR Chest Portable:  Impression   Endotracheal tube in adequate position.  Nasogastric tube also in adequate   position       No evidence of acute cardiopulmonary process, otherwise stable appearance of   the chest      3/23/21 CTA Pulmonary W Contrast:  Impression   No evidence of pulmonary embolism.       Redemonstration of the bilateral pulmonary nodules which have been stable or   slightly decreased in size.       2 new pleural based small nodular densities in the right lower lobe, which   may represent focal atelectasis; however, true nodules cannot be excluded. Attention to these nodular densities on the follow-up CT scans is suggested7.     3/24/21 XR Chest Portable:  Impression   1. No acute process. 2. Known pulmonary nodules are better demonstrated by CT.     3/25/21 XR Chest Portable:  Impression   1. Right PICC line tip terminates in the region of the SVC. 2. Pulmonary vascular congestion, stable.    3. Pulmonary nodules in the right lower lobe are better seen on the previous   CT.              Labs:    Recent Results (from the past 24 hour(s))   Basic Metabolic Panel w/ Reflex to MG    Collection Time: 03/30/21  4:30 AM   Result Value Ref Range    Sodium 139 135 - 145 MMOL/L    Potassium 4.5 3.5 - 5.1 MMOL/L    Chloride 95 (L) 99 - 110 mMol/L    CO2 43 (H) 21 - 32 MMOL/L    Anion Gap 1 (L) 4 - 16    BUN 19 6 - 23 MG/DL    CREATININE 0.5 (L) 0.6 - 1.1 MG/DL    Glucose 93 70 - 99 MG/DL    Calcium 8.4 8.3 - 10.6 MG/DL    GFR Non-African American >60 >60 mL/min/1.73m2    GFR African American >60 >60 mL/min/1.73m2   CBC auto differential    Collection Time: 03/30/21  4:30 AM   Result Value Ref Range    WBC 2.9 (L) 4.0 - 10.5 K/CU MM    RBC 3.17 (L) 4.2 - 5.4 M/CU MM    Hemoglobin 9.1 (L) 12.5 - 16.0 GM/DL    Hematocrit 29.9 (L) 37 - 47 %    MCV 94.3 78 - 100 FL    MCH 28.7 27 - 31 PG    MCHC 30.4 (L) 32.0 - 36.0 %    RDW 15.0 (H) 11.7 - 14.9 %    Platelets 088 556 - 645 K/CU MM    MPV 10.0 7.5 - 11.1 FL    Metamyelocytes Relative 2 (H) 0.0 %    Bands Relative 2 (L) 5 - 11 %    Segs Relative 58.0 36 - 66 %    Lymphocytes % 15.0 (L) 24 - 44 %    Monocytes % 23.0 (H) 0 - 4 %    nRBC 1     Metamyelocytes Absolute 0.06 K/CU MM    Bands Absolute 0.06 K/CU MM    Segs Absolute 1.7 K/CU MM    Lymphocytes Absolute 0.4 K/CU MM    Monocytes Absolute 0.7 K/CU MM    Differential Type MANUAL DIFFERENTIAL     Anisocytosis 1+     Polychromasia 1+     PLT Morphology FEW    Phosphorus    Collection Time: 03/30/21  4:30 AM Result Value Ref Range    Phosphorus 1.8 (L) 2.5 - 4.9 MG/DL   C-Reactive Protein    Collection Time: 03/30/21  4:30 AM   Result Value Ref Range    CRP, High Sensitivity 3.3 mg/L   Procalcitonin    Collection Time: 03/30/21  4:30 AM   Result Value Ref Range    Procalcitonin 0.124      CULTURE results: Invalid input(s): BLOOD CULTURE,  URINE CULTURE, SURGICAL CULTURE    Diagnosis:  Patient Active Problem List   Diagnosis    Hyperlipidemia    [facility]chronic obstructive pulmonary disease) ([facility] [address]    Leg pain    Hypertension    Shortness of breath    Tobacco abuse    Abdominal aortic aneurysm (AAA) without rupture (HCC)    Degeneration of lumbar or lumbosacral intervertebral disc    Osteopenia    Anxiety    Acute on chronic respiratory failure with hypoxia (HCC)    Lung mass    COPD exacerbation (HCC)    Acute exacerbation of chronic obstructive pulmonary disease (COPD) (HCC)    Small cell lung cancer (HCC)    Acute on chronic respiratory failure with hypoxia and hypercapnia (HCC)    Acute hypoxemic respiratory failure (HCC)    Pneumonia of both lungs due to infectious organism       Active Problems  Active Problems:    Acute hypoxemic respiratory failure (HCC)    Pneumonia of both lungs due to infectious organism  Resolved Problems:    * No resolved hospital problems. *    Electronically signed by: Electronically signed by HELIO Bergman CNP on 3/30/2021 at 11:38 AM

## 2021-03-30 NOTE — PROGRESS NOTES
Pulmonary and Critical Care  Progress Note    Subjective: The patient has improved. Shortness of breath none. Chest pain none. Addressing respiratory complaints Patient is negative for  hemoptysis and cyanosis. CONSTITUTIONAL:  negative for fevers and chills. Past Medical History:     has a past medical history of Arthritis, COPD (chronic obstructive pulmonary disease) ([facility] [address] Full dentures, H/O cardiovascular stress test, H/O cardiovascular stress test, H/O Doppler ultrasound, H/O Doppler ultrasound, History of 24 hour EKG monitoring, History of nuclear stress test, Hx of chest x-ray, Hx of Doppler echocardiogram, Hx of echocardiogram, Hx of echocardiogram, Hx of pelvic x-ray, Hx of x-ray of hip, Hyperlipidemia, Hypertension, Leg pain, Lung nodules, On home oxygen therapy, [facility] and Wears glasses. has a past surgical history that includes cyst removal (1970's); Tubal ligation (1970's); laparoscopic appendectomy (N/A, 5/18/2019); Mediastinoscopy (N/A, 12/10/2020); back surgery; Colonoscopy; Hysterectomy; eye surgery (2010?); joint replacement (Right, 2014? ?); Foot surgery (Right, 1970's); bronchoscopy (N/A, 1/5/2021); and Upper gastrointestinal endoscopy (N/A, 3/3/2021). reports that she quit smoking about 4 months ago. Her smoking use included cigarettes. She started smoking about 47 years ago. She has a 70.50 pack-year smoking history. She has never used smokeless tobacco. She reports current alcohol use. She reports that she does not use drugs. Family history:  family history includes COPD in her sister; Cancer in her father and sister; Coronary Art Dis in her mother; Dementia in her mother; Diabetes in her sister; Heart Attack in her sister;  Heart Attack (age of onset: 61) in her father and mother; High Cholesterol in her mother; Hypertension in her mother, sister, sister, and sister; Irritable Bowel Syndrome in her sister; Pacemaker in her sister; Stroke in her father, mother, and sister. Allergies   Allergen Reactions    Formaldehyde     Gabapentin Other (See Comments)    Norflex Tablets [Orphenadrine]     Cranberry Rash     Social History:    Reviewed; no changes    Objective:   PHYSICAL EXAM:        VITALS:  BP (!) 154/70   Pulse 112   Temp 97.6 °F (36.4 °C) (Oral)   Resp 20   Ht 5' (1.524 m)   Wt 131 lb 6.3 oz (59.6 kg)   SpO2 95%   BMI 25.66 kg/m²     24HR INTAKE/OUTPUT:      Intake/Output Summary (Last 24 hours) at 3/30/2021 1027  Last data filed at 3/30/2021 1000  Gross per 24 hour   Intake 370 ml   Output 400 ml   Net -30 ml       CONSTITUTIONAL:  Awake. LUNGS:  decreased breath sounds. CARDIOVASCULAR:  normal S1 and S2 and negative JVD  ABD:Abdomen soft, non-tender. BS normal. No masses,  No organomegaly  NEURO:Awake. DATA:    CBC:  Recent Labs     03/28/21  0610 03/29/21 0450 03/30/21  0430   WBC 2.4* 3.3* 2.9*   RBC 3.00* 3.41* 3.17*   HGB 8.8* 9.7* 9.1*   HCT 28.9* 32.5* 29.9*    385 364   MCV 96.3 95.3 94.3   MCH 29.3 28.4 28.7   MCHC 30.4* 29.8* 30.4*   RDW 15.9* 15.5* 15.0*   NRBC  --   --  1   SEGSPCT 77.0* 72.2* 58.0   BANDSPCT  --   --  2*      BMP:  Recent Labs     03/28/21  0610 03/29/21  0450 03/30/21  0430    140 139   K 4.4 4.7 4.5    97* 95*   CO2 38* 38* 43*   BUN 19 23 19   CREATININE 0.5* 0.5* 0.5*   CALCIUM 8.7 9.0 8.4   GLUCOSE 110* 100* 93      ABG:  No results for input(s): PH, PO2ART, [id number], HCO3, BEART, O2SAT in the last 72 hours. Lab Results   Component Value Date    PROBNP 479.3 (H) 03/25/2021    PROBNP 448.8 (H) 03/23/2021    PROBNP 1,244 (H) 03/03/2021    THEOPH 4.9 (L) 03/06/2021     No results found for: [address] [facility]    Radiology Review:  Pertinent images / reports were reviewed as a part of this visit.     Assessment:     Patient Active Problem List   Diagnosis    Hyperlipidemia    COPD (chronic obstructive pulmonary disease) ([facility] [address]    Leg pain    Hypertension    Shortness of breath    Tobacco abuse  Abdominal aortic aneurysm (AAA) without rupture (HCC)    Degeneration of lumbar or lumbosacral intervertebral disc    Osteopenia    Anxiety    Acute on chronic respiratory failure with hypoxia (HCC)    Lung mass    COPD exacerbation (HCC)    Acute exacerbation of chronic obstructive pulmonary disease (COPD) (HCC)    Small cell lung cancer (HCC)    Acute on chronic respiratory failure with hypoxia and hypercapnia (HCC)    Acute hypoxemic respiratory failure (HCC)    Pneumonia of both lungs due to infectious organism       Plan:   1. Overall the patient has improved. 2. Inc. Activity. 3. Wean FiO2.   4. Home Bipap eval. on 4L as pt on 4L N/C at home at rest.  Jules Fernando MD  3/30/2021  10:27 AM

## 2021-03-30 NOTE — PROGRESS NOTES
She has changed the dressing for the PICC line. She denied any acute pain. No nausea, vomiting or diarrhea. Denies any fever or chills. She is resting comfortably. She is using oxygen via nasal cannula. Temperature 97.5, pulse 88, respiratory 20, blood pressure 130/78, saturation 97%. She is awake, alert and in no acute distress. Supple, no JVD, pupils reactive. Anicteric sclera. S1-S2 regular. Distant breath sounds anteriorly, no wheezing. Soft, bowel sounds present, nontender nondistended. No cyanosis. Cranial nerves II through XII grossly intact. WBC was 2.9, hemoglobin 9.1, platelet tender 64. Assessment and plan  She has small cell carcinoma of the lung. She is agreeable to defer the chemotherapy to next week as an outpatient. To continue management for the COPD. I strongly advised her to continue with physical therapy and Occupational Therapy. If she is discharged home I recommend to follow-up at the cancer center.   Thanks

## 2021-03-30 NOTE — PROGRESS NOTES
Patient Name:  Sophy White  Patient :  1950  Patient MRN:  [id number]     Primary Oncologist: Aniket Calhoun MD  Referring Provider: Maurziio Wolfe MD     Date of Service: 2021      Chief Complaint:    Chief Complaint   Patient presents with    Follow-up      She came in for follow up visit. 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 She has underlying COPD and has been followed by pulmonologist  for the history of lung mass. She has been prescribed nebulizer home oxygen and inhaler. CT chest on 2010 showed  1. Stable size and appearance of noncalcified right lower lobe   nodule compared to multiple prior studies dating back to   2008. This lesion measures approximately 8 mm in greatest   dimension on the current study and is most consistent with a   benign etiology, stable for greater than two years. Otherwise, no   CT evidence of acute thoracic abnormality is noted. 2. Multiple hypodense masses within the thyroid gland are not   significantly changed compared to prior studies dating back to   2008. CT abdomen pelvis in May 2019 revealed:  1.  Prior right hip arthroplasty results in streak artifact through the right   aspect of the pelvis which severely limits the exam.  There are right   hemipelvic findings suggesting acute appendicitis though the appendiceal   origin is not identified due to the artifact.   No evidence of bowel   obstruction, perforation, or abscess. 2. Nonspecific jejunal wall thickening which may relate to nondistention   versus enteritis. 3. Bilateral nonobstructing nephrolithiasis. 4. Abdominal aortic atherosclerosis with 2.6 cm distal infrarenal abdominal   aortic aneurysm, see recommendations.       RECOMMENDATIONS:   Managing Abdominal Aortic Aneurysms       2.6-2.9 cm: Every 5 years*       3.0-3.4 cm: Every 3 years.       3.5-3.9 cm: Every 1 year.       4.0-4.4 cm: Every 1 year. Recommend vascular consultation.       4.5-5.4 cm: Every 6 months. Recommend vascular consultation.       Greater than or equal to 5.5 cm: Referral to vascular surgeon. CTA chest on September 18, 2020 revealed:      There is a mass within the right lung apex measuring 16 mm in diameter as   well as a 16 x 16 mm spiculated mass in the right lower lobe.  A smaller 9.6   mm nodule is present within the right lower lobe. PET scan on October 22, 2020 showed  1. Dominant 1.6 cm nodules in the medial right upper lobe and central right   lower lobe are hypermetabolic and suspicious for malignancy, possibly   synchronous lung cancers. 2. A 3 x 2 cm precarinal merrick mass is intensely hypermetabolic and   consistent with metastatic disease. 3. A smaller 1 cm nodule in the lateral right lower lobe has not   significantly changed since 2009 and demonstrates only minimal uptake,   consistent with benign etiology. 4. Patchy ground-glass opacity throughout the left lung demonstrates mild   uptake and is favored to be infectious or inflammatory. 5. No distant metastatic disease. CT chest on December 7, 2020 showed  1. Persistent mediastinal adenopathy and right upper and lower lobe pulmonary   nodule is suspicious for malignancy.  Recommend tissue sampling. 2. Ground-glass opacity seen within the left lung on the prior PET-CT are no   longer visualized.      She had mediastinoscopy on December 10, 2020.  Pathology report showed  Final Pathologic Diagnosis:   A. Submitted as \"cervical lymph node\", biopsy:   -     Thyroid tissue, benign  B. Lymph node, 4R, biopsy:   -     Benign lymph node with sinus histiocystosis. C. Lymph node, 4R #2, biopsy:   -     Benign lymph node with sinus histiocystosis. She had EBUS on January 5, 2021. Pathology report showed  Final Cytologic Diagnosis:   A. Mediastinal mass, fine needle aspirate with cell block:   -     SMALL CELL CARCINOMA.     B. Bronchoalveolar lavage with cell block: -  Few atypical cells seen. MRI of the brain in January 2021 was negative for metastatic disease. She was seen by [facility] oncologist recommend sequential chemo therapy followed by potential radiation therapy. The field of radiation therapy is considered to be huge for her    She started cisplatin etoposide chemo on January 20, 2021. On April 7, 2021 she came in for follow-up after discharge from the hospital.  She was readmitted for the COPD exacerbation. CT chest on February 28, 2021 showed  No evidence of pulmonary embolism or aortic dissection.  Overall, no acute   abnormality identified.       Multiple nodules within both lungs, with a mixed response to therapy.  The   majority of the nodules are similar in size.  However, at least 1 nodule   within the right lung apex is decreased in size     CTA on March 23, 2021 showed  No evidence of pulmonary embolism.       Redemonstration of the bilateral pulmonary nodules which have been stable or   slightly decreased in size.       2 new pleural based small nodular densities in the right lower lobe, which   may represent focal atelectasis; however, true nodules cannot be excluded. Attention to these nodular densities on the follow-up CT scans is suggested7     She will resume the fourth cycle of chemo on April 7, 2021. I will set up PET CT scan to see the response prior to next office visit.   I recommend to follow-up with pulmonologist for the COPD. She has occasional nausea and stomach ache. I recommend to take Protonix and Zofran as per instruction. She denied any fever or chills. She denied any diarrhea. No dysuria or hematuria. No dysphagia. No headaches or dizzy spell. Past Medical History:   Arthritis, COPD, dyslipidemia, hypertension, right lower lung nodule, depression and anxiety. Past Surgery History:    Back surgery, endoscopic bronchial ultrasound study on January 5, 2021, colonoscopy in her 62s, cyst removal from the left arm, bilateral cataract surgery in 2010, right foot surgery in the 70s, complete hysterectomy in 1999, right hip replacement in 2014, laparoscopic appendectomy in May 2019, mediastinoscopy on December 10, 2020, tubal ligation in the 70s. Social History:  She smokes about 1 pack/day for more than 50 years. She denies any alcohol or illicit drug use. She has 2 children. Family History:  Father had small cell cancer of the lung. He was a smoker. Review of Systems: The remainder of the review of system is unremarkable. Vital Signs: BP (!) 149/67 (Position: Sitting, Cuff Size: Medium Adult)   Pulse 94   Temp 97.6 °F (36.4 °C) (Temporal)   Resp 20   Ht 5' (1.524 m)   Wt 116 lb (52.6 kg)   SpO2 94%   BMI 22.65 kg/m²      Physical Exam:  CONSTITUTIONAL: awake, alert, cooperative, no apparent distress   EYES:sclera clear and conjunctiva normal  ENT: Normocephalic, without obvious abnormality, atraumatic  NECK: supple, symmetrical.  No oral thrush. HEMATOLOGIC/LYMPHATIC: no cervical, supraclavicular  lymphadenopathy   LUNGS: Diminished breath sound bilaterally. Mild wheezing bilaterally.   CARDIOVASCULAR: regular rate and rhythm, normal S1 and S2, no murmur noted  ABDOMEN: normal bowel sounds x 4, soft, non-distended, non-tender, no masses palpated, no hepatosplenomegaly   MUSCULOSKELETAL: full range of motion noted, tone is normal  NEUROLOGIC: awake, alert, oriented to name, place and time. Motor skills grossly intact. Cranial nerves II through XII grossly intact. SKIN: No jaundice. appears intact. Dry skin   EXTREMITIES: no LE edema. No cyanosis.       Labs:  Hematology:  Lab Results   Component Value Date    WBC 18.1 (H) 04/05/2021    RBC 3.11 (L) 04/05/2021    HGB 9.3 (L) 04/05/2021    HCT 30.4 (L) 04/05/2021    MCV 97.7 04/05/2021    MCH 29.9 04/05/2021    MCHC 30.6 (L) 04/05/2021    RDW 15.9 (H) 04/05/2021     04/05/2021    MPV 10.3 04/05/2021    BANDSPCT 3 (L) 04/05/2021    SEGSPCT 78.0 (H) 04/05/2021    EOSRELPCT 0.0 03/29/2021    BASOPCT 0.0 03/29/2021    LYMPHOPCT 13.0 (L) 04/05/2021    MONOPCT 4.0 04/05/2021    BANDABS 0.54 04/05/2021    SEGSABS 14.1 04/05/2021    EOSABS 0.0 03/29/2021    BASOSABS 0.0 03/29/2021    LYMPHSABS 2.4 04/05/2021    MONOSABS 0.7 04/05/2021    DIFFTYPE MANUAL DIFFERENTIAL 04/05/2021    ANISOCYTOSIS 1+ 04/05/2021    POLYCHROM 1+ 04/05/2021    WBCMORP OCCASIONAL 03/08/2021    PLTM OCCASIONAL 03/31/2021     Chemistry:  Lab Results   Component Value Date     04/05/2021    K 4.9 04/05/2021    CL 91 (L) 04/05/2021    CO2 44 (H) 04/05/2021    BUN 15 04/05/2021    CREATININE 0.6 04/05/2021    GLUCOSE 98 04/05/2021    CALCIUM 9.5 04/05/2021    PROT 5.4 (L) 04/05/2021    LABALBU 3.7 04/05/2021    BILITOT 0.2 04/05/2021    ALKPHOS 73 04/05/2021    AST 13 (L) 04/05/2021    ALT 15 04/05/2021    LABGLOM >60 04/05/2021    GFRAA >60 04/05/2021    AGRATIO 1.6 08/28/2020    GLOB 2.7 08/28/2020    PHOS 4.4 04/02/2021    MG 2.2 04/02/2021    POCCA 1.14 02/10/2021    POCGLU 121 (H) 02/10/2021     Lab Results   Component Value Date     09/19/2020     Lab Results   Component Value Date    TSHHS 2.290 03/23/2021    T4FREE 1.17 03/23/2021     Immunology:  Lab Results   Component Value Date    PROT 5.4 (L) 04/05/2021     Coagulation Panel:  Lab Results   Component Value Date    PROTIME 9.6 (L) 01/05/2021    INR 0.80 01/05/2021    APTT 23.3 (L) 01/05/2021    DDIMER 450 (H) 03/04/2021     Tumor Markers:  Lab Results   Component Value Date    CEA 3.8 12/04/2020      Observations:  No data recorded       Assessment & Plan:    1. Clinically C could have limited stage of small cell cancer of the right lung. I referred to radiation oncologist for concomitant chemo and radiation. We discussed the risk and benefits of chemotherapy. MRI of the brain in January 2021 was negative for metastatic disease. Radiation oncology has seen and evaluated without recommendation for radiation. She started chemo on January 20, 2021. Recent CT showed response to therapy. She will complete the fourth cycle of chemo on April 9, 2021. I schedule follow-up PET CT scan in 2 weeks. Depending on the PET scan scan report we will discuss about potential ration therapy and prophylactic radiation therapy to the brain. 2.  She has likely reactive leukocytosis. She has mild chronic anemia. Ferritin in September 2020 was 234.    3.  She has depression and anxiety. She is on antidepression. I will prescribe her buspirone for the anxiety. 4.  She has COPD. She has multiple admission due to the COPD exacerbation. I recommend to follow-up with pulmonologist.    5.  I recommend to take Zofran for nausea and PPI for the stomach ache. We discussed about lifestyle and healthy diet. I recommend smoking cessation. I recommend she stay active and maintain her body weight. Return to clinic in 3 weeks or sooner. All of the question has been answered for today.

## 2021-03-30 NOTE — PROGRESS NOTES
Occupational Therapy  Occupational Therapy Treatment Note    Date:  3/30/2021   Room:  [address] [place] [address] Willow Snow Mcclendon :   1950   MRN: [id number] Admission Date: 3/23/2021     Diagnosis:  The encounter diagnosis was Acute on chronic respiratory failure with hypoxia and hypercapnia ([facility] [address] Restrictions/Precautions:    Restrictions/Precautions  Restrictions/Precautions: General Precautions, Fall Risk                 Communication with other providers:  RN, PT    Subjective:  Patient states:  Louvenia Rosales do you want me to do? \"  Pain:   Location, Type, Intensity (0/10 to 10/10):  No c/o    Objective:    Orientation: WFL   Observation: Pt received in bed. Alert and cooperative  Objective Measures:  5-6L of O2    Treatment, including education:  Therapeutic Activity Training:   Therapeutic activity training was instructed today. Cues were given for safety, sequence, UE/LE placement, visual cues, and balance. Supine to sit: SBA    Sit to stand: CGA from EOB and chair    Stand to sit: CGA to chair and EOB    Gait: Noel no AD 5'. CGA RW another 20'. Quite SOB by end of walker but sats remained 90+%    Toileting:had toileted just prior to OT's arrival     Sit to supine: SBA    Assessment / Impression:      Patient's tolerance of treatment:  Fair. Significant change in status and impact:  Improved balance  Barriers to improvement:  Lung CA  Recommendations: home, family support, use rolling walker for short distances but otherwise use w/c    Plan for Next Session:    Cont POC addressing goals: The POC will be reduced to 1x/wk . Most importantly pt needs to continue walking a few times (short distances only such as to toilet) and spend time sitting in chair (e.g. for meals) to remain conditioned for her goals once d/c home. Her goals are to be able to walk short distances in the house, toilet self . Goals:  By d/c or goals met:  Pt will perform all bed mobility with SBA in prep for EOB/OOB activity.    Pt will perform all functional transfers with SBA and appropriate use of LRD to bed, toilet, chair in prep for increased functional independence. Pt will perform UB ADLs with SBA to increase functional independence. Pt will perform LB ADLs with Noel to increase functional independence. Pt will perform all aspects of toileting with Noel to increase functional independence. Pt will participate in therex/therax c emphasis on strength, activity tolerance,  safety, KADEEM tasks.     Safety: Left in bed with all needs in reach. Gait belt used for transfer and mobility. Time in:  1410  Time out:  1438  Timed treatment minutes:  28  Total treatment time:  28    Electronically signed by:     410Thomas Nguyen, LAKESHIA/L, [place]   [id number]   2:38 PM, 3/30/2021      Previously filed values:

## 2021-03-30 NOTE — PROGRESS NOTES
Notified hospitalict LEON Lan  Pt had episode of -160 also c/o nausea, gave her Zofran & after Zofran was given her HR went to , no c/o pain

## 2021-03-30 NOTE — PROGRESS NOTES
Patient was on home oxygen of 4.5 liter nc during BiPAP evaluation. She desaturated below 88 percent for twenty seconds.     ABG Results - PH 7.36, PCO2 80 PO2 84 O2Hb 94.7, CoHb 1.3 HCO3 45.2 on 4.5L N/C

## 2021-03-30 NOTE — PROGRESS NOTES
Hospitalist Progress Note      Name:  Snow Francis /Age/Sex: 1950  (65 y.o. female)   MRN & CSN:  [id number] & [id number] Admission Date/Time: 3/23/2021 10:39 AM   [facility]: Karmen Wilks, [address] Day: 8    ASSESSMENT/PLAN:  Snow Francis is a 65 y.o.  female who was admitted to the hospital for respiratory failure. She was intubated on  and extubated 2021. #.  Acute on chronic hypoxic and hypercarbic respiratory failurechronically on 4 L of oxygen. Intubated on 3/23. Extubated on 3/25. Arterial PCO2 65. Infectious work-up including blood cultures and MRSA nasal swab so far negative. Treated with azithromycin x7 days. Procalcitonin low at 0.124. Home BiPAP eval  Continue oxygen supplementation  Continue meropenem until 3/31 for 7 days total    #. COPD exacerbationchronically on 4 L of oxygen at home. Completed azithromycin x7 days. Continue Solu-Medrol  DuoNebs as needed    #. Stage IIIb small cell lung cancerevaluated by hematology oncology. Patient to follow-up with oncology outpatient basis. #.  Tobacco abuse disorderactive, smokes 1 pack to 1 pack and half per day  Nicotine patch    #. HypertensionBP controlled  Continue Cardizem    #. Exudative esophagitis  PPI Carafate    #. Dysphagia  [facility] diet    MEDICAL DECISION MAKING:  Labs reviewed  Imaging reviewed  Level of risk high  Diet DIET GENERAL; Dysphagia Minced and Moist  Dietary Nutrition Supplements: Standard High Calorie Oral Supplement   DVT Prophylaxis [x] Lovenox, []  Heparin, [] SCDs, [] Ambulation   GI Prophylaxis [] PPI,  [] H2 Blocker,  [] Carafate,  [] Diet/Tube Feeds   Code Status Full Code   Disposition  Home/SNF   MDM [] Low, [] Moderate,[x]  High     Chief complaint/Interval History/ROS     Chief Complaint: Respiratory failure    INTERVAL HISTORY:    Overall appears stable. Answers questions appropriate.      ROS:  No chest pain. No abdominal pain. Objective: Intake/Output Summary (Last 24 hours) at 3/30/2021 1101  Last data filed at 3/30/2021 1000  Gross per 24 hour   Intake 370 ml   Output 400 ml   Net -30 ml      Vitals:   Vitals:    03/30/21 0957   BP: (!) 154/70   Pulse: 112   Resp: 20   Temp: 97.6 °F (36.4 °C)   SpO2: 95%     Physical Exam:   GEN: Awake female, sitting upright in bed. Answers questions appropriate. EYES: Sclera anicteric. No discharge. HENT: Membranes moist.  No nasal discharge. NECK: Supple,  RESP: Clear to auscultation bilaterally. CV: RRR. No pitting occipital edema. GI: Abdomen soft. Nontender. Nondistended. : No Nicole in place. MSK: No bony fractures.   SKIN: warm, dry, no rashes,  NEURO: Cranial nerves appear grossly intact, normal speech  PSYCH: Awake, alert, oriented     Medications:   Medications:    methylPREDNISolone  40 mg Intravenous Q8H    theophylline  100 mg Oral BID    azithromycin  500 mg Intravenous Daily    albuterol sulfate HFA  2 puff Inhalation 4x daily    ipratropium  2 puff Inhalation 4x daily    docusate sodium  100 mg Oral Daily    LORazepam  0.5 mg Intravenous Once    busPIRone  10 mg Oral BID    sertraline  50 mg Oral Daily    nicotine  1 patch Transdermal Daily    simvastatin  20 mg Oral Nightly    meropenem  1,000 mg Intravenous Q8H    lidocaine PF  5 mL Intradermal Once    sodium chloride flush  10 mL Intravenous 2 times per day    sodium chloride flush  10 mL Intravenous 2 times per day    enoxaparin  30 mg Subcutaneous Daily    aspirin  81 mg Oral Daily    calcium elemental  1 tablet Oral Daily with breakfast    dilTIAZem  120 mg Oral Daily    pantoprazole  40 mg Intravenous QAM AC    sucralfate  1 g Oral 4x Daily AC & HS    famotidine (PEPCID) injection  20 mg Intravenous BID      Infusions:    dexmedetomidine (PRECEDEX) IV infusion Stopped (03/26/21 1003)     PRN Meds: potassium chloride, 40 mEq, PRN    Or  potassium alternative oral replacement, 40 mEq, PRN    Or  potassium chloride, 10 mEq, PRN  ipratropium-albuterol, 1 ampule, Q4H PRN  LORazepam, 1 mg, Q8H PRN  HYDROcodone-acetaminophen, 1 tablet, Q4H PRN  guaiFENesin, 200 mg, TID PRN  sodium chloride flush, 10 mL, PRN  sodium chloride flush, 10 mL, PRN  promethazine, 12.5 mg, Q6H PRN    Or  ondansetron, 4 mg, Q6H PRN  polyethylene glycol, 17 g, Daily PRN  acetaminophen, 650 mg, Q6H PRN    Or  acetaminophen, 650 mg, Q6H PRN      Electronically signed by Milla Villalobos MD on 3/30/2021 at 11:01 AM

## 2021-03-30 NOTE — PROGRESS NOTES
Comprehensive Nutrition Assessment    Type and Reason for Visit:  Reassess    Nutrition Recommendations/Plan:   Continue current diet as ordered  Encourage po intake as able  Please document all meals  Will monitor nutrition status, weight trends, poc    Nutrition Assessment:  pt unavailable at time of visit, pt currently oh dysphagia minced and moist diet with standard ONS, % of meals documented in the past 72 hr, per RN pt consuming greater than 50% of meals, pt remains at high nutrition risk    Malnutrition Assessment:  Malnutrition Status: Moderate malnutrition    Context:  Acute Illness       Estimated Daily Nutrient Needs:  Energy (kcal):  1532-5167(44-96 kcal/kg); Weight Used for Energy Requirements:  Current     Protein (g):  62-77(1.2-1.5 g/kg); Weight Used for Protein Requirements:  Current        Fluid (ml/day):  1500; Method Used for Fluid Requirements:  1 ml/kcal      Nutrition Related Findings:  phos 1.8, hemoglobin 9.1      Wounds:  (sacrum wound noted)       Current Nutrition Therapies:    DIET GENERAL; Dysphagia Minced and Moist  Dietary Nutrition Supplements: Standard High Calorie Oral Supplement    Anthropometric Measures:  · Height: 5' (152.4 cm)  · Current Body Weight: 131 lb 6.3 oz (59.6 kg)   · Admission Body Weight: 123 lb 10.9 oz (56.1 kg)(first measured weight)    · Usual Body Weight: 127 lb 10.3 oz (57.9 kg)((2/28/21) 2/10/21-53.9 kg per chart review)     · Ideal Body Weight: 100 lbs; % Ideal Body Weight 131.4 %   · BMI: 25.7  · BMI Categories: Overweight (BMI 25.0-29. 9)       Nutrition Diagnosis:   · Inadequate oral intake related to acute injury/trauma as evidenced by weight loss    Nutrition Interventions:   Food and/or Nutrient Delivery:  Continue Current Diet, Continue Oral Nutrition Supplement  Nutrition Education/Counseling:  No recommendation at this time   Coordination of Nutrition Care:  Continue to monitor while inpatient    Goals:  pt will consume greater than 50% of meals and supplements       Nutrition Monitoring and Evaluation:   Behavioral-Environmental Outcomes:  None Identified   Food/Nutrient Intake Outcomes:  Supplement Intake, Food and Nutrient Intake  Physical Signs/Symptoms Outcomes:  Biochemical Data, Weight, Hemodynamic Status, Fluid Status or Edema     Discharge Planning:     Too soon to determine     Electronically signed by Natan Fernandez MS, RD, LD on 3/30/21 at 3:23 PM EDT    Contact: [phone]

## 2021-03-31 NOTE — PROGRESS NOTES
Pulmonary and Critical Care  Progress Note    Subjective: The patient has improved. Has qualified for home Bipap. Shortness of breath none. Chest pain none. Addressing respiratory complaints Patient is negative for  hemoptysis and cyanosis. CONSTITUTIONAL:  negative for fevers and chills. Past Medical History:     has a past medical history of Arthritis, COPD (chronic obstructive pulmonary disease) ([facility] [address] Full dentures, H/O cardiovascular stress test, H/O cardiovascular stress test, H/O Doppler ultrasound, H/O Doppler ultrasound, History of 24 hour EKG monitoring, History of nuclear stress test, Hx of chest x-ray, Hx of Doppler echocardiogram, Hx of echocardiogram, Hx of echocardiogram, Hx of pelvic x-ray, Hx of x-ray of hip, Hyperlipidemia, Hypertension, Leg pain, Lung nodules, On home oxygen therapy, [facility] cell lung [facility] ([facility] and Wears glasses. has a past surgical history that includes cyst removal (1970's); Tubal ligation (1970's); laparoscopic appendectomy (N/A, 5/18/2019); Mediastinoscopy (N/A, 12/10/2020); back surgery; Colonoscopy; Hysterectomy; eye surgery (2010?); joint replacement (Right, 2014? ?); Foot surgery (Right, 1970's); bronchoscopy (N/A, 1/5/2021); and Upper gastrointestinal endoscopy (N/A, 3/3/2021). reports that she quit smoking about 4 months ago. Her smoking use included cigarettes. She started smoking about 47 years ago. She has a 70.50 pack-year smoking history. She has never used smokeless tobacco. She reports current alcohol use. She reports that she does not use drugs. Family history:  family history includes COPD in her sister; Cancer in her father and sister; Coronary Art Dis in her mother; Dementia in her mother; Diabetes in her sister; Heart Attack in her sister;  Heart Attack (age of onset: 61) in her father and mother; High Cholesterol in her mother; Hypertension in her mother, sister, sister, and sister; Irritable Bowel Syndrome in her sister; Pacemaker in her sister; Stroke in her father, mother, and sister. Allergies   Allergen Reactions    Formaldehyde     Gabapentin Other (See Comments)    Norflex Tablets [Orphenadrine]     Cranberry Rash     Social History:    Reviewed; no changes    Objective:   PHYSICAL EXAM:        VITALS:  /64   Pulse 153   Temp 98.6 °F (37 °C) (Oral)   Resp 18   Ht 5' (1.524 m)   Wt 131 lb 6.3 oz (59.6 kg)   SpO2 95%   BMI 25.66 kg/m²     24HR INTAKE/OUTPUT:      Intake/Output Summary (Last 24 hours) at 3/31/2021 1034  Last data filed at 3/30/2021 2100  Gross per 24 hour   Intake 200 ml   Output    Net 200 ml       CONSTITUTIONAL:  Awake. LUNGS:  decreased breath sounds. CARDIOVASCULAR:  normal S1 and S2 and negative JVD  ABD:Abdomen soft, non-tender. BS normal. No masses,  No organomegaly  NEURO:Awake. DATA:    CBC:  Recent Labs     03/29/21 0450 03/30/21 0430 03/31/21  0630   WBC 3.3* 2.9* 4.1   RBC 3.41* 3.17* 3.17*   HGB 9.7* 9.1* 9.1*   HCT 32.5* 29.9* 30.3*    364 393   MCV 95.3 94.3 95.6   MCH 28.4 28.7 28.7   MCHC 29.8* 30.4* 30.0*   RDW 15.5* 15.0* 15.0*   NRBC  --  1 3   SEGSPCT 72.2* 58.0 77.0*   BANDSPCT  --  2* 6      BMP:  Recent Labs     03/29/21  0450 03/30/21  0430 03/31/21  0630    139 140   K 4.7 4.5 4.4   CL 97* 95* 97*   CO2 38* 43* 41*   BUN 23 19 22   CREATININE 0.5* 0.5* 0.5*   CALCIUM 9.0 8.4 8.0*   GLUCOSE 100* 93 105*      ABG:  Recent Labs     03/31/21  0845   PH 7.45   PO2ART 67*   GEP8TMU 65.0*   O2SAT 94.3*     Lab Results   Component Value Date    PROBNP 479.3 (H) 03/25/2021    PROBNP 448.8 (H) 03/23/2021    PROBNP 1,244 (H) 03/03/2021    THEOPH 4.9 (L) 03/06/2021     No results found for: [facility]    Radiology Review:  Pertinent images / reports were reviewed as a part of this visit.     Assessment:     Patient Active Problem List   Diagnosis    Hyperlipidemia    COPD (chronic obstructive pulmonary disease) (HCC)    Leg pain    Hypertension    Shortness of breath   

## 2021-03-31 NOTE — PROGRESS NOTES
Night trending for home Bipap eval report: Time with spo2<88:   07:44 minutes. Pt qualified for home Bipap. Copy faxed to pulmonary diagnostics.

## 2021-03-31 NOTE — PROGRESS NOTES
She is resting comfortably. She complains of sore throat likely due to intubation. I do not see any oral thrush. I will order Magic mouthwash. I advised her to continue with physical therapy. If she feels strong enough, she may resume chemotherapy as an outpatient. She will complete course of meropenem today. She has been on Carafate and Protonix for stomach upset. Temperature 97.9, pulse 101, respiratory 18, blood pressure 137/70, saturation 91% via nasal cannula. She is awake, alert and oriented x3. She is in no acute distress. Supple, no JVD, pupils are reactive. Anicteric sclera. No oral thrush. S1-S2 regular. Decreased breath sound bilaterally. Bowel sounds present. Nontender nondistended. No cyanosis or edema. WBCs 4.1, hemoglobin 9.1, platelet 082. Creatinine was 0.5. Assessment and plan:  She has small cell carcinoma of the lung. The plan is to receive chemo as an outpatient. She has underlying COPD. To continue with respiratory therapy. She will complete course of meropenem today. I advised her to continue with physical therapy. I will add Magic mouthwash.   Thanks

## 2021-03-31 NOTE — PROGRESS NOTES
Patient Room #: 9260/0021-N  Patient Name: Prudence Posadas NIV Evaluation / Orders  1. Notify Pulmonary Diagnostics of order to evaluate for home NIV. 2. Perform the following tests at any point before discharge. [x] Perform an Overnight Oximetry while the patient is on at least                  2 lpm of oxygen. The saturation level must be <  88% for > 5                   cumulative minutes to qualify. [x] Arterial puncture (ABG) for blood gas analysis done while awake. ·    Qualifying result is a PaCO2 >   52 mmHg  3. [x]  I have documented in a progress note that ANDRES is not the primary cause of hypercapnia in this patient. CPAP will not effectively treat this patient hypercapnia. BIPA Therapy will benefit and more effectively treat the hypercapnia. Results Documentation    (Attach a copy of Reports)  1. ABG Results       Date _3/31/2021_  PaCO2 _65_ mmHg on __4_ lpm oxygen    2. Oximetry Level _<88_% for __7  minutes on __4  lpm oxygen    3. [x] Patient Qualifies      [] Patient Does NOT qualify      Complete order below:  Diagnosis __COPD, Acute on chronic respiratory failure with hypoxia and hypercapnia (HCC)___           Length of Need: [x] Lifetime  Home NIV () at:    Inspiratory Setting _15__ cm H2O    Expiratory Setting __5_ cm H2O    Therapist Signature: Ravi Samuels RRT__Date: _3/31/2021_  Physician Signature:   Electronically Signed in EMR_______  Date: _____    Physician Printed Name: Kenisha Calvo MD__ NPI #: _[id number]__

## 2021-03-31 NOTE — PROGRESS NOTES
OCCASIONAL    Phosphorus    Collection Time: 03/31/21  6:30 AM   Result Value Ref Range    Phosphorus 1.8 (L) 2.5 - 4.9 MG/DL   C-Reactive Protein    Collection Time: 03/31/21  6:30 AM   Result Value Ref Range    CRP, High Sensitivity 2.2 mg/L   Blood gas, arterial    Collection Time: 03/31/21  8:45 AM   Result Value Ref Range    pH, Bld 7.45 7.34 - 7.45    pCO2, Arterial 65.0 (H) 32 - 45 MMHG    pO2, Arterial 67 (L) 75 - 100 MMHG    Base Exc, Mixed 17.6 (H) 0 - 2.3    HCO3, Arterial 45.2 (H) 18 - 23 MMOL/L    CO2 Content 47.2 (H) 19 - 24 MMOL/L    O2 Sat 94.3 (L) 96 - 97 %    Carbon Monoxide, Blood 1.0 0 - 5 %    Methemoglobin, Arterial 0.6 <1.5 %    Comment 4L NC    EKG 12 Lead    Collection Time: 03/31/21  9:32 AM   Result Value Ref Range    Ventricular Rate 172 BPM    Atrial Rate 187 BPM    QRS Duration 74 ms    Q-T Interval 260 ms    QTc Calculation (Bazett) 439 ms    R Axis 32 degrees    T Axis 25 degrees    Diagnosis       Atrial fibrillation with rapid ventricular response  Nonspecific ST abnormality  Abnormal ECG  When compared with ECG of 23-MAR-2021 10:04,  Atrial fibrillation has replaced Sinus rhythm  Vent.  rate has increased BY  69 BPM       CULTURE results: Invalid input(s): BLOOD CULTURE,  URINE CULTURE, SURGICAL CULTURE    Diagnosis:  Patient Active Problem List   Diagnosis    Hyperlipidemia    COPD (chronic obstructive pulmonary disease) ([address]    Leg pain    Hypertension    Shortness of breath    Tobacco abuse    Abdominal aortic aneurysm (AAA) without rupture (HCC)    Degeneration of lumbar or lumbosacral intervertebral disc    Osteopenia    Anxiety    Acute on chronic respiratory failure with hypoxia (HCC)    Lung mass    COPD exacerbation (HCC)    Acute exacerbation of chronic obstructive pulmonary disease (COPD) ([address]    Small cell lung cancer ([address]    Acute on chronic respiratory failure with hypoxia and hypercapnia ([address]    Acute hypoxemic respiratory failure ([address]    Pneumonia of both lungs due to infectious organism       Active Problems  Active Problems:    Acute hypoxemic respiratory failure (HCC)    Pneumonia of both lungs due to infectious organism  Resolved Problems:    * No resolved hospital problems. *    Electronically signed by: Electronically signed by HELIO Oleary CNP on 3/31/2021 at 11:58 AM

## 2021-03-31 NOTE — PROGRESS NOTES
Patient Room #: 9824/5894-A  Patient Name: Prudence Posadas NIV Evaluation / Orders  1. Notify Pulmonary Diagnostics of order to evaluate for home NIV. 2. Perform the following tests at any point before discharge. [x] Perform an Overnight Oximetry while the patient is on at least                  2 lpm of oxygen. The saturation level must be <  88% for > 5                   cumulative minutes to qualify. [x] Arterial puncture (ABG) for blood gas analysis done while awake. ·    Qualifying result is a PaCO2 >   52 mmHg  3. [x]  I have documented in a progress note that ANDRES is not the primary cause of hypercapnia in this patient. CPAP will not effectively treat this patient hypercapnia. BIPA Therapy will benefit and more effectively treat the hypercapnia. Results Documentation    (Attach a copy of Reports)  1. ABG Results       Date _3/31/2021_  PaCO2 _65_ mmHg on __4_ lpm oxygen    2. Oximetry Level _<88_% for __7  minutes on __4  lpm oxygen    3. [x] Patient Qualifies      [] Patient Does NOT qualify      Complete order below:  Diagnosis __COPD, Acute on chronic respiratory failure with hypoxia and hypercapnia (HCC)___           Length of Need: [x] Lifetime  Home NIV () at:    Inspiratory Setting _15__ cm H2O    Expiratory Setting __5_ cm H2O    Therapist Signature: Ginger Landaverde RRT__Date: _3/31/2021_  Physician Signature:   Electronically Signed in EMR_______  Date: _____    Physician Printed Name: Cody Beauchamp MD__ NPI #: _[id number]__

## 2021-03-31 NOTE — PROGRESS NOTES
Hospitalist Progress Note      Name:  Priscilla Rubio /Age/Sex: 1950  (65 y.o. female)   MRN & CSN:  [id number] & [id number] Admission Date/Time: 3/23/2021 10:39 AM   [place]  [facility] [place] [place] PCP: Alveria Hamman, [address] Day: 9    ASSESSMENT/PLAN:  Priscilla Rubio is a 65 y.o.  female who was admitted to the hospital for respiratory failure. She was intubated on  and extubated 2021. Home BiPAP done and patient qualified for home BiPAP. Patient went into A. fib with RVR on the morning of 3/31. Otherwise, the patient was stable for discharge. Patient remained in the hospital due to A. fib with RVR    #.  A. fib with RVRnew onset. Heart rate in the 170s. Became hypotensive. S/p 500 cc bolus. BP subsequently improved. Cardiology consulted. Weightbase Lovenox  Digoxin 250 mcg every 4 hours x4 doses  Continue diltiazem 120 mg daily  Transfer to [facility]. Acute on chronic hypoxic and hypercarbic respiratory failurechronically on 4 L of oxygen. Intubated on 3/23. Extubated on 3/25. Arterial PCO2 65. Infectious work-up including blood cultures and MRSA nasal swab so far negative. Treated with azithromycin x7 days. Procalcitonin low at 0.124. Meropenem DC'd on 3/30  Continue oxygen supplementation  Levaquin 750 mg x 30 days, end date 2021  Probiotics, lactobacillus until     #. COPD exacerbationchronically on 4 L of oxygen at home. Completed azithromycin x7 days. Continue Solu-Medrol  DuoNebs as needed    #. Stage IIIb small cell lung cancerevaluated by hematology oncology. Patient to follow-up with oncology outpatient basis. #.  Tobacco abuse disorderactive, smokes 1 pack to 1 pack and half per day  Nicotine patch    #. HypertensionBP controlled  Continue Cardizem    #. Exudative esophagitis  PPI Carafate    #. Dysphagia  Dysphagia diet    #.   Moderate protein calorie malnutrition    MEDICAL DECISION MAKING: Labs reviewed  Imaging reviewed  Level of risk high  Diet DIET GENERAL; Dysphagia Minced and Moist  Dietary Nutrition Supplements: Standard High Calorie Oral Supplement   DVT Prophylaxis [x] Lovenox, []  Heparin, [] SCDs, [] Ambulation   GI Prophylaxis [] PPI,  [] H2 Blocker,  [] Carafate,  [] Diet/Tube Feeds   Code Status Full Code   Disposition  Home/SNF   MDM [] Low, [] Moderate,[x]  High     Chief complaint/Interval History/ROS     Chief Complaint: Respiratory failure    INTERVAL HISTORY:    3/31: Patient went into A. fib with RVR on the morning of 3/31. Otherwise she has been stable for discharge. 3/30: Overall appears stable. Answers questions appropriate. ROS:  No chest pain. No abdominal pain. Objective: Intake/Output Summary (Last 24 hours) at 3/31/2021 1454  Last data filed at 3/30/2021 2100  Gross per 24 hour   Intake 200 ml   Output    Net 200 ml      Vitals:   Vitals:    03/31/21 1445   BP: 123/64   Pulse: 126   Resp: 20   Temp: 98.5 °F (36.9 °C)   SpO2: 95%     Physical Exam:   GEN: Awake female, nontoxic appearing. Answers questions appropriate. EYES: Sclera anicteric. No discharge. HENT: Membranes moist.  No nasal discharge. NECK: Supple,  RESP: Clear to auscultation bilaterally. CV: RRR. No pitting occipital edema. GI: Abdomen soft. Nontender. Nondistended. : No Nicole in place. MSK: No bony fractures.   SKIN: warm, dry, no rashes,  NEURO: Cranial nerves appear grossly intact, normal speech  PSYCH: Awake, alert, oriented     Medications:   Medications:    digoxin  250 mcg Intravenous Q4H    enoxaparin  1 mg/kg Subcutaneous BID    pantoprazole  40 mg Oral QAM AC    levoFLOXacin  750 mg Oral Daily    lactobacillus  1 capsule Oral Daily with breakfast    methylPREDNISolone  40 mg Intravenous Q8H    theophylline  100 mg Oral BID    albuterol sulfate HFA  2 puff Inhalation 4x daily    ipratropium  2 puff Inhalation 4x daily    docusate sodium  100 mg Oral Daily  busPIRone  10 mg Oral BID    sertraline  50 mg Oral Daily    nicotine  1 patch Transdermal Daily    simvastatin  20 mg Oral Nightly    sodium chloride flush  10 mL Intravenous 2 times per day    sodium chloride flush  10 mL Intravenous 2 times per day    aspirin  81 mg Oral Daily    calcium elemental  1 tablet Oral Daily with breakfast    dilTIAZem  120 mg Oral Daily    sucralfate  1 g Oral 4x Daily AC & HS      Infusions:    dexmedetomidine (PRECEDEX) IV infusion Stopped (03/26/21 1003)     PRN Meds: magic (miracle) mouthwash, 5 mL, 4x Daily PRN  potassium chloride, 40 mEq, PRN    Or  potassium alternative oral replacement, 40 mEq, PRN    Or  potassium chloride, 10 mEq, PRN  ipratropium-albuterol, 1 ampule, Q4H PRN  LORazepam, 1 mg, Q8H PRN  HYDROcodone-acetaminophen, 1 tablet, Q4H PRN  guaiFENesin, 200 mg, TID PRN  sodium chloride flush, 10 mL, PRN  sodium chloride flush, 10 mL, PRN  promethazine, 12.5 mg, Q6H PRN    Or  ondansetron, 4 mg, Q6H PRN  polyethylene glycol, 17 g, Daily PRN  acetaminophen, 650 mg, Q6H PRN    Or  acetaminophen, 650 mg, Q6H PRN      Electronically signed by Olamide eKy MD on 3/31/2021 at 2:54 PM

## 2021-03-31 NOTE — PROGRESS NOTES
Patient was seen in hospital for Acute on chronic respiratory failure with hypoxia and hypercapnia ([address] COPD.   I am prescribing BiPAP because the diagnosis and testing requires the patient to have BiPAP in the home. Conditions will improve or be benefited by the use of BiPAP. CPAP will not effectively treat this patient's hypercapnia. I have determined through testing and evaluation that ANDRES is not the primary cause of this patients hypercapnia.

## 2021-03-31 NOTE — PROGRESS NOTES
Physical Therapy    Physical Therapy Treatment Note  Name: Lindsey Mason MRN: [id number] :   1950   Date:  3/31/2021   Admission Date: 3/23/2021 Room:  30023002-A     Restrictions/Precautions:  Restrictions/Precautions  Restrictions/Precautions: General Precautions, Fall Risk         Communication with other providers:  RN approves tx session. Subjective:  Patient states:  Agreeable to tx session; \"I need to go to the bathroom\"    Pain:   Location, Type, Intensity (0/10 to 10/10):  No C/O pain    Objective:    Observation:  Pt in bed upon arrival.    Treatment, including education/measures:  Transfers  Supine to sit :SBA  Scooting :CGA  Sit to stand :CGA from EOB; min/CGA from toilet. Pt able to stand at sink to complete hand hygiene for ~1 min without UE support   Stand to sit :CGA; VC's for eccentric control    Gait:  Pt amb with RW for 12 ft + 12 ft to bathroom with CG assist  Pt needed VC's for pathway, walker safety     Safety  Patient left safely in the chair, with call light/phone in reach with alarm applied. Gait belt used for transfers and gait.     Assessment / Impression:       Patient's tolerance of treatment:  good   Adverse Reaction: none  Significant change in status and impact:  none  Barriers to improvement: endurance, strength     Plan for Next Session:    Will cont to work towards pt's goals per her tolerance    Time in:  1042  Time out:  1105  Timed treatment minutes:  23  Total treatment time:  23    Previously filed items:  Social/Functional History  Lives With: Family(granddaughter and daughter)  Type of Home: House  Home Layout: One level  Home Access: Stairs to enter with rails(plans on havig a ramp installed as soon as possible)  Entrance Stairs - Number of Steps: 1  Bathroom Shower/Tub: Tub/Shower unit(sponge bathes currently)  Bathroom Toilet: Bedside commode  Home Equipment: Oxygen, Cane, Rolling walker, Wheelchair-manual(4L of O2 at baseline)  ADL Assistance:

## 2021-03-31 NOTE — PROGRESS NOTES
Physician Progress Note      Alexandro Isbell  [facility] #:                  [id number]  :                       1950  ADMIT DATE:       3/23/2021 10:39 AM  [address] [place] DATE:  RESPONDING  PROVIDER #:        Nelda Gordon MD          QUERY TEXT:    Dear Hospitalist    Patient admitted with Sepsis, Per AND/ASPEN criteria pt meets criteria for   Moderate Malnutrition. If possible, please document in progress notes and   discharge summary if you are evaluating and /or treating any of the following    The medical record reflects the following:  Risk Factors: Wounds:  (sacrum wound noted)  Clinical Indicators: Malnutrition Assessment: Malnutrition Status: Moderate   malnutrition  Context:Acute Illness Findings of the 6 clinical characteristics   of malnutrition: Energy Intake:Mild decrease in energy intake (Comment)   Weight Loss: 7 - Greater than 5% over 1 month Body Fat Loss:1 - Mild body fat   loss Orbital, Buccal region Muscle Mass Loss:1 - Mild muscle mass loss Temples   (temporalis), Hand (interosseous) Fluid Accumulation:1 - Mild(2+ pitting   edema noted) Extremities pt extubated 3/25, pt currently on 5 L NC, visited pt   at bedside, pt reports UBW   118#, reports recent wt loss, pt reports that her teeth have been found, noted   pt on dysphagia minced and moist diet consuming 1-25% of 1 meal documented   today  Treatment: Nutrition Cx, DIET GENERAL; Dysphagia Minced and Moist ,Supplement   Intake, Food and Nutrient Intake Please document all meals Will monitor   nutrition status, weight trends, poc    Thank you Lexy Rivera RN, CDS ([phone])  Options provided:  -- Protein calorie malnutrition moderate  -- Protein calorie malnutrition mild  -- Other - I will add my own diagnosis  -- Disagree - Not applicable / Not valid  -- Disagree - Clinically unable to determine / Unknown  -- Refer to Clinical Documentation Reviewer    PROVIDER RESPONSE TEXT:    This patient has moderate protein calorie malnutrition.     Query created by: Talat Frost on 3/31/2021 12:54 PM      Electronically signed by:  Goldy Bueno MD 3/31/2021 3:01 PM

## 2021-03-31 NOTE — CONSULTS
Chart reviewed  Full note to follow                      Name:  Lino Paz /Age/Sex: 1950  (65 y.o. female)   MRN & CSN:  [id number] & [id number] Admission Date/Time: 3/23/2021 10:39 AM   [facility]  [facility] PCP: Holly Rutherford Day: 9          Referring physician:  Iliana Boyd MD         Reason for consultation:  A fIB        Thanks for referral.    Information source: patient    CC;  sob      HPI:   Thank you for involving me in taking  care of Lino Paz who  is a 65 y. o.year  Old female  Presents with  Chronic hypoxemic and hypercapnic respiratory failure, stage IIIb Small Cell Lung Cancer of right lung, dysphagia with exudative esophagitis, chronic anemia, depression/anxiety, HTN, HLD , ca lungthat presented with acute on chronic hypoxemic respiratory failure with hypercapnia. Was intubated, extubated was on floor went into a fib with RVR. [facility] Records Past medical history:    has a past medical history of Arthritis, COPD (chronic obstructive pulmonary disease) ([address] Full dentures, H/O cardiovascular stress test, H/O cardiovascular stress test, H/O Doppler ultrasound, H/O Doppler ultrasound, History of 24 hour EKG monitoring, History of nuclear stress test, Hx of chest x-ray, Hx of Doppler echocardiogram, Hx of echocardiogram, Hx of echocardiogram, Hx of pelvic x-ray, Hx of x-ray of hip, Hyperlipidemia, Hypertension, Leg pain, Lung nodules, On home oxygen therapy, Small cell lung cancer ([address] and Wears glasses. Past surgical history:   has a past surgical history that includes cyst removal ('s); Tubal ligation ('s); laparoscopic appendectomy (N/A, 2019); Mediastinoscopy (N/A, 12/10/2020); back surgery; Colonoscopy; Hysterectomy; eye surgery (?); joint replacement (Right, 2014? ?); Foot surgery (Right, 's); bronchoscopy (N/A, 2021); and Upper gastrointestinal endoscopy (N/A, 3/3/2021).   Social History:   reports that she quit smoking about 4 months ago. Her smoking use included cigarettes. She started smoking about 47 years ago. She has a 70.50 pack-year smoking history. She has never used smokeless tobacco. She reports current alcohol use. She reports that she does not use drugs. Family history:  family history includes COPD in her sister; Cancer in her father and sister; Coronary Art Dis in her mother; Dementia in her mother; Diabetes in her sister; Heart Attack in her sister; Heart Attack (age of onset: 61) in her father and mother; High Cholesterol in her mother; Hypertension in her mother, sister, sister, and sister; Irritable Bowel Syndrome in her sister; Pacemaker in her sister; Stroke in her father, mother, and sister.     Allergies   Allergen Reactions    Formaldehyde     Gabapentin Other (See Comments)    Norflex Tablets [Orphenadrine]     Cranberry Rash       magic (miracle) mouthwash, 4x Daily PRN  digoxin (LANOXIN) injection 250 mcg, Q4H  enoxaparin (LOVENOX) injection 60 mg, BID  pantoprazole (PROTONIX) tablet 40 mg, QAM AC  levoFLOXacin (LEVAQUIN) tablet 750 mg, Daily  lactobacillus (CULTURELLE) capsule 1 capsule, Daily with breakfast  methylPREDNISolone sodium (SOLU-MEDROL) injection 40 mg, Q8H  potassium chloride (KLOR-CON M) extended release tablet 40 mEq, PRN    Or  potassium bicarb-citric acid (EFFER-K) effervescent tablet 40 mEq, PRN    Or  potassium chloride 10 mEq/100 mL IVPB (Peripheral Line), PRN  theophylline (BECKY-24) extended release capsule 100 mg, BID  albuterol sulfate  (90 Base) MCG/ACT inhaler 2 puff, 4x daily  ipratropium (ATROVENT HFA) 17 MCG/ACT inhaler 2 puff, 4x daily  ipratropium-albuterol (DUONEB) nebulizer solution 1 ampule, Q4H PRN  docusate sodium (COLACE) capsule 100 mg, Daily  busPIRone (BUSPAR) tablet 10 mg, BID  sertraline (ZOLOFT) tablet 50 mg, Daily  dexmedetomidine (PRECEDEX) 400 mcg in sodium chloride 0.9 % 100 mL infusion, Continuous  nicotine (NICODERM CQ) 14 MG/24HR 1 patch, Daily  LORazepam (ATIVAN) tablet 1 mg, Q8H PRN  simvastatin (ZOCOR) tablet 20 mg, Nightly  HYDROcodone-acetaminophen (NORCO)  MG per tablet 1 tablet, Q4H PRN  guaiFENesin (ROBITUSSIN) 100 MG/5ML oral solution 200 mg, TID PRN  sodium chloride flush 0.9 % injection 10 mL, 2 times per day  sodium chloride flush 0.9 % injection 10 mL, PRN  sodium chloride flush 0.9 % injection 10 mL, 2 times per day  sodium chloride flush 0.9 % injection 10 mL, PRN  promethazine (PHENERGAN) tablet 12.5 mg, Q6H PRN    Or  ondansetron (ZOFRAN) injection 4 mg, Q6H PRN  polyethylene glycol (GLYCOLAX) packet 17 g, Daily PRN  acetaminophen (TYLENOL) tablet 650 mg, Q6H PRN    Or  acetaminophen (TYLENOL) suppository 650 mg, Q6H PRN  aspirin EC tablet 81 mg, Daily  calcium elemental (OSCAL) tablet 500 mg, Daily with breakfast  dilTIAZem (CARDIZEM CD) extended release capsule 120 mg, Daily  sucralfate (CARAFATE) tablet 1 g, 4x Daily AC & HS      Current Facility-Administered Medications   Medication Dose Route Frequency Provider Last Rate Last Admin    magic (miracle) mouthwash  5 mL Swish & Spit 4x Daily PRN Jason Duarte MD        digoxin (LANOXIN) injection 250 mcg  250 mcg Intravenous Q4H Amanda Palomares MD   250 mcg at 03/31/21 1156    enoxaparin (LOVENOX) injection 60 mg  1 mg/kg Subcutaneous BID Amanda Palomares MD        pantoprazole (PROTONIX) tablet 40 mg  40 mg Oral QAM AC Toi Castro MD   40 mg at 03/31/21 0516    levoFLOXacin (LEVAQUIN) tablet 750 mg  750 mg Oral Daily HELIO Mancilla CNP   750 mg at 03/31/21 0848    lactobacillus (CULTURELLE) capsule 1 capsule  1 capsule Oral Daily with breakfast HELIO Mancilla - CNP   1 capsule at 03/31/21 0849    methylPREDNISolone sodium (SOLU-MEDROL) injection 40 mg  40 mg Intravenous Q8H Jeanette Epperson MD   40 mg at 03/31/21 0517    potassium chloride (KLOR-CON M) extended release tablet 40 mEq  40 mEq Oral PRN Jeanette Epperson MD Or    potassium bicarb-citric acid (EFFER-K) effervescent tablet 40 mEq  40 mEq Oral PRN Fercho Jameson MD        Or    potassium chloride 10 mEq/100 mL IVPB (Peripheral Line)  10 mEq Intravenous PRN Fercho Jameson MD        theophylline (BECKY-24) extended release capsule 100 mg  100 mg Oral BID Janes Hernandez MD   100 mg at 03/31/21 0849    albuterol sulfate  (90 Base) MCG/ACT inhaler 2 puff  2 puff Inhalation 4x daily Kailash Joyce MD   2 puff at 03/31/21 1150    ipratropium (ATROVENT HFA) 17 MCG/ACT inhaler 2 puff  2 puff Inhalation 4x daily Kailash Joyce MD   2 puff at 03/31/21 1149    ipratropium-albuterol (DUONEB) nebulizer solution 1 ampule  1 ampule Inhalation Q4H PRN Kailash Joyce MD   1 ampule at 03/30/21 0321    docusate sodium (COLACE) capsule 100 mg  100 mg Oral Daily Kailash Joyce MD   100 mg at 03/31/21 0848    busPIRone (BUSPAR) tablet 10 mg  10 mg Oral BID Kailash Joyce MD   10 mg at 03/31/21 0849    sertraline (ZOLOFT) tablet 50 mg  50 mg Oral Daily Kailash Joyce MD   Stopped at 03/27/21 0759    dexmedetomidine (PRECEDEX) 400 mcg in sodium chloride 0.9 % 100 mL infusion  0.2-1.4 mcg/kg/hr Intravenous Continuous Fercho Jameson MD   Stopped at 03/26/21 1003    nicotine (NICODERM CQ) 14 MG/24HR 1 patch  1 patch Transdermal Daily Jessenia Lan APRN - CNP   1 patch at 03/31/21 0849    LORazepam (ATIVAN) tablet 1 mg  1 mg Oral Q8H PRN Jessenia Lan APRN - CNP   1 mg at 03/31/21 0858    simvastatin (ZOCOR) tablet 20 mg  20 mg Oral Nightly Jessenia Lan APRN - CNP   20 mg at 03/30/21 2328    HYDROcodone-acetaminophen (NORCO)  MG per tablet 1 tablet  1 tablet Oral Q4H PRN Pantops Julita, APRN - CNP   1 tablet at 03/31/21 1121    guaiFENesin (ROBITUSSIN) 100 MG/5ML oral solution 200 mg  200 mg Oral TID PRN HELIO Melchor CNP   200 mg at 03/31/21 0011    sodium chloride flush 0.9 % injection 10 mL  10 mL Intravenous 2 times per day Tai Yepez MD   10 mL at 03/31/21 0853    sodium chloride flush 0.9 % injection 10 mL  10 mL Intravenous PRN Tai Yepez MD   10 mL at 03/28/21 1719    sodium chloride flush 0.9 % injection 10 mL  10 mL Intravenous 2 times per day Tai Yepez MD   10 mL at 03/31/21 0854    sodium chloride flush 0.9 % injection 10 mL  10 mL Intravenous PRN Tai Yepez MD        promethazine (PHENERGAN) tablet 12.5 mg  12.5 mg Oral Q6H PRN Tai Yepez MD        Or    ondansetron [facility]) injection 4 mg  4 mg Intravenous Q6H PRN Tai Yepez MD   4 mg at 03/31/21 0758    polyethylene glycol (GLYCOLAX) packet 17 g  17 g Oral Daily PRN Tai Yepez MD        acetaminophen (TYLENOL) tablet 650 mg  650 mg Oral Q6H PRN Tai Yepez MD        Or    acetaminophen (TYLENOL) suppository 650 mg  650 mg Rectal Q6H PRN Tai Yepez MD   650 mg at 03/23/21 2220    aspirin EC tablet 81 mg  81 mg Oral Daily Tai Yepez MD   81 mg at 03/31/21 0848    calcium elemental (OSCAL) tablet 500 mg  1 tablet Oral Daily with breakfast Tai Yepez MD   500 mg at 03/31/21 0849    dilTIAZem (CARDIZEM CD) extended release capsule 120 mg  120 mg Oral Daily Tai Yepez MD   120 mg at 03/31/21 0849    sucralfate (CARAFATE) tablet 1 g  1 g Oral 4x Daily AC & HS Tai Yepez MD   1 g at 03/31/21 1121     Review of Systems:  All 14 systems reviewed, all negative except for  sob    Physical Examination:    /65   Pulse 111   Temp 98.3 °F (36.8 °C) (Oral)   Resp 18   Ht 5' (1.524 m)   Wt 131 lb 6.3 oz (59.6 kg)   SpO2 94%   BMI 25.66 kg/m²      Wt Readings from Last 3 Encounters:   03/30/21 131 lb 6.3 oz (59.6 kg)   03/23/21 127 lb (57.6 kg)   03/16/21 127 lb (57.6 kg)     Body mass index is 25.66 kg/m².       General Appearance:  fair  Head: normocephalic     Eyes: normal, noninjected conjunctiva    ENT: normal mucosa, noninjected throat, normal     NECK: No JVP  No thyromegaly Cardiovascular: No thrills palpated   Auscultation: Normal S1 and S2,  no murmur   carotid bruit no   Abdominal Aorta no bruit    Respiratory:    Breath sounds Diminshed bilaterally and/or crackles = 2    Extremities:  Trace Edema clubbing ,   no cyanosis    SKIN: Warm and well perfused, no pallor or cyanosis    Vascular exam:  Pedal Pulses: palp  bilaterally        Abdomen:  No masses or tenderness. No organomegaly noted. Neurological:  Oriented to time, place, and person   No focal neurological deficit noted. Psychiatric:normal mood, no anxiety    Lab Review   Recent Labs     03/31/21  0630   WBC 4.1   HGB 9.1*   HCT 30.3*         Recent Labs     03/31/21  0630      K 4.4   CL 97*   CO2 41*   PHOS 1.8*   BUN 22   CREATININE 0.5*     No results for input(s): AST, ALT, ALB, BILIDIR, BILITOT, ALKPHOS in the last 72 hours. No results for input(s): TROPONINI in the last 72 hours.   No results found for: BNP  Lab Results   Component Value Date    INR 0.80 01/05/2021    PROTIME 9.6 (L) 01/05/2021           Assessment/Recommendations:     - A fib with RVR  Dig, lovenox  - echo  Will fu         Roselyn Petersen MD, 3/31/2021 1:11 PM

## 2021-04-01 NOTE — PROGRESS NOTES
Physical Therapy    Physical Therapy Treatment Note  Name: Miles Luo MRN: [id number] :   1950   Date:  2021   Admission Date: 3/23/2021 Room:  3002/3002-A     Restrictions/Precautions:  Restrictions/Precautions  Restrictions/Precautions: General Precautions, Fall Risk         Communication with other providers:  RN approves tx session. Subjective:  Patient states:  Agreeable to tx session; \"I'm pretty tired today but I could always go to the bathroom. \"    Pain:   Location, Type, Intensity (0/10 to 10/10):  No C/O pain    Objective:    Observation:  Pt in bed upon arrival.    Treatment, including education/measures:  Transfers  Supine to sit :SBA  Scooting :SBA  Sit to stand :CGA from EOB; CGA from toilet. Pt able to stand at sink to complete hand hygiene for ~1 min without UE support   Stand to sit :CGA; VC's for eccentric control    Gait:  Pt amb with RW for 12 ft + 12 ft to bathroom with HH/CG assist; pt does experience on small LOB posterior R, able to self correct. Pt needed VC's for pathway, safety     Pt requires increased time during rest breaks throughout tx session due to SOB. Safety  Patient left safely in the chair, with call light/phone in reach with alarm applied. Gait belt used for transfers and gait.     Assessment / Impression:       Patient's tolerance of treatment:  good   Adverse Reaction: none  Significant change in status and impact:  none  Barriers to improvement: endurance, strength, SOB     Plan for Next Session:    Will cont to work towards pt's goals per her tolerance    Time in:  1450  Time out:  1514  Timed treatment minutes:  24  Total treatment time:  24    Previously filed items:  Social/Functional History  Lives With: Family(granddaughter and daughter)  Type of Home: House  Home Layout: One level  Home Access: Stairs to enter with rails(plans on havig a ramp installed as soon as possible)  Entrance Stairs - Number of Steps: 1  Bathroom Shower/Tub: Tub/Shower unit(sponge bathes currently)  Bathroom Toilet: Bedside commode  Home Equipment: Oxygen, Cane, Rolling walker, Wheelchair-manual(4L of O2 at baseline)  ADL Assistance: Independent(toilets exlusively on BSC, dresses self c SBA, and sponge bathes mostly in seated)  Homemaking Assistance: Needs assistance  Homemaking Responsibilities: No  Ambulation Assistance: Independent(walks short distances in the house, uses a w/c outside of home)  Transfer Assistance: [address] term goals  Time Frame for Long term goals :  In one week:  Long term goal 1: Pt will complete all bed mobility with SBAx1  Long term goal 2: Pt will complete sit <> stand transfers with SBAx1  Long term goal 3: Pt will ambulate 150 feet with SBAx1 with LRAD  Long term goal 4: Pt will independently complete 3 sets of 10 reps of BLE AROM exercises in available and allowed ROM    Electronically signed by:    Debbie Gan PTA  4/1/2021, 3:14 PM

## 2021-04-01 NOTE — PROGRESS NOTES
No acute complaint this morning. She is having breakfast.  She will continue with physical therapy. Hopefully she will be able to discharge soon. She is agreeable to resume chemotherapy as an outpatient when she gets stronger.   Thanks

## 2021-04-01 NOTE — PLAN OF CARE
Problem: Falls - Risk of:  Goal: Will remain free from falls  Description: Will remain free from falls  Outcome: Ongoing  Goal: Absence of physical injury  Description: Absence of physical injury  Outcome: Ongoing     Problem: Skin Integrity:  Goal: Will show no infection signs and symptoms  Description: Will show no infection signs and symptoms  Outcome: Ongoing  Goal: Absence of new skin breakdown  Description: Absence of new skin breakdown  Outcome: Ongoing     Problem: Pain:  Description: Pain management should include both nonpharmacologic and pharmacologic interventions.   Goal: Pain level will decrease  Description: Pain level will decrease  Outcome: Ongoing  Goal: Control of acute pain  Description: Control of acute pain  Outcome: Ongoing  Goal: Control of chronic pain  Description: Control of chronic pain  Outcome: Ongoing

## 2021-04-01 NOTE — PROGRESS NOTES
Hospitalist Progress Note      Name:  Jannette Barr /Age/Sex: 1950  (65 y.o. female)   MRN & CSN:  [id number] & [id number] Admission Date/Time: 3/23/2021 10:39 AM   Location:  [address] [place] [address]-A PCP: Johnson Schmid, [address] Day: 10     Reason for continued hospitalization: A. fib with RVR    ASSESSMENT/PLAN:  Jannette Barr is a 65 y.o.  female who was admitted to the hospital for respiratory failure. She was intubated on  and extubated 2021. Home BiPAP done and patient qualified for home BiPAP. Patient went into A. fib with RVR on the morning of 3/31. Otherwise, the patient was stable for discharge. Patient remained in the hospital due to A. fib with RVR    #.  A. fib with RVRnew onset. Heart rate in the 170s. Completed digoxin to 50 mg x 4 doses. Eliquis 5 mg twice daily  Wean off diltiazem drip  Increase diltiazem 240 mg daily    #. Acute on chronic hypoxic and hypercarbic respiratory failurechronically on 4 L of oxygen. Intubated on 3/23. Extubated on 3/25. Arterial PCO2 65. Infectious work-up including blood cultures and MRSA nasal swab so far negative. Treated with azithromycin x7 days. Procalcitonin low at 0.124. Meropenem DC'd on 3/30  Continue oxygen supplementation  Levaquin 750 mg x 30 days, end date 2021  Probiotics, lactobacillus until     #. COPD exacerbationchronically on 4 L of oxygen at home. Completed azithromycin x7 days. Continue Solu-Medrol  DuoNebs as needed    #. Stage IIIb small cell lung cancerevaluated by hematology oncology. Patient to follow-up with oncology outpatient basis. #.  Tobacco abuse disorderactive, smokes 1 pack to 1 pack and half per day  Nicotine patch    #. HypertensionBP controlled  Continue Cardizem    #. Exudative esophagitis  PPI Carafate    #. Dysphagia  Dysphagia diet    #.   Moderate protein calorie malnutrition    MEDICAL DECISION MAKING:  Labs reviewed Imaging reviewed  Level of risk moderate  Diet DIET GENERAL; Dysphagia Minced and Moist  Dietary Nutrition Supplements: Standard High Calorie Oral Supplement   DVT Prophylaxis [x] Eliquis[]  Heparin, [] SCDs, [] Ambulation   GI Prophylaxis [] PPI,  [] H2 Blocker,  [] Carafate,  [] Diet/Tube Feeds   Code Status Full Code   Disposition  Home/SNF   MDM [] Low, [x] Moderate,[]  High     Chief complaint/Interval History/ROS     Chief Complaint: Respiratory failure    INTERVAL HISTORY:    4/1: Heart rates are much controlled. Plan is to discharge tomorrow if she remains clinically stable. 3/31: Patient went into A. fib with RVR on the morning of 3/31. Otherwise she has been stable for discharge. 3/30: Overall appears stable. Answers questions appropriate. ROS:  No chest pain. No abdominal pain. Objective: Intake/Output Summary (Last 24 hours) at 4/1/2021 1141  Last data filed at 4/1/2021 0848  Gross per 24 hour   Intake 10 ml   Output    Net 10 ml      Vitals:   Vitals:    04/01/21 0844   BP: (!) 146/56   Pulse: 94   Resp: 14   Temp: 98.9 °F (37.2 °C)   SpO2: 95%     Physical Exam:   GEN: Awake female, nontoxic appearing. Answers questions appropriate. EYES: Sclera anicteric. No discharge. HENT: Membranes moist.  No nasal discharge. NECK: Supple,  RESP: Clear to auscultation bilaterally. CV: RRR. No pitting occipital edema. GI: Abdomen soft. Nontender. Nondistended. : No Nicole in place. MSK: No bony fractures.   SKIN: warm, dry, no rashes,  NEURO: Cranial nerves appear grossly intact, normal speech  PSYCH: Awake, alert, oriented     Medications:   Medications:    dilTIAZem  240 mg Oral Daily    apixaban  5 mg Oral BID    pantoprazole  40 mg Oral QAM AC    levoFLOXacin  750 mg Oral Daily    lactobacillus  1 capsule Oral Daily with breakfast    methylPREDNISolone  40 mg Intravenous Q8H    theophylline  100 mg Oral BID    albuterol sulfate HFA  2 puff Inhalation 4x daily    ipratropium  2 puff Inhalation 4x daily    docusate sodium  100 mg Oral Daily    busPIRone  10 mg Oral BID    sertraline  50 mg Oral Daily    nicotine  1 patch Transdermal Daily    simvastatin  20 mg Oral Nightly    sodium chloride flush  10 mL Intravenous 2 times per day    sodium chloride flush  10 mL Intravenous 2 times per day    aspirin  81 mg Oral Daily    calcium elemental  1 tablet Oral Daily with breakfast    sucralfate  1 g Oral 4x Daily AC & HS      Infusions:     PRN Meds: magic (miracle) mouthwash, 5 mL, 4x Daily PRN  potassium chloride, 40 mEq, PRN    Or  potassium alternative oral replacement, 40 mEq, PRN    Or  potassium chloride, 10 mEq, PRN  ipratropium-albuterol, 1 ampule, Q4H PRN  LORazepam, 1 mg, Q8H PRN  HYDROcodone-acetaminophen, 1 tablet, Q4H PRN  guaiFENesin, 200 mg, TID PRN  sodium chloride flush, 10 mL, PRN  sodium chloride flush, 10 mL, PRN  promethazine, 12.5 mg, Q6H PRN    Or  ondansetron, 4 mg, Q6H PRN  polyethylene glycol, 17 g, Daily PRN  acetaminophen, 650 mg, Q6H PRN    Or  acetaminophen, 650 mg, Q6H PRN      Electronically signed by Milla Villalobos MD on 4/1/2021 at 11:41 AM

## 2021-04-01 NOTE — PROGRESS NOTES
ANISH [facility] PHYSICAL REHABILITATION [place]  Luther Lebron  Phone: [phone]    Fax [phone]                  Luz Restrepo MD, Elisa Ganser, MD, [address] [facility], MD, MD Hannah Mills MD Ethelene Amas, MD Melina Drivers, MD Adelia Acevedo, HELIO Vaughan, HELIO Stearns, [address], APRN    Cardiology Progress Note     Today's Plan: change Cardizem to   PO    Admit Date:  3/23/2021    Consult reason/ Seen today for: atrial fibrillation    Subjective and  Overnight Events:  Patient is feeling better today. She is hoping to go home soon    Assessment / Plan / Recommendation:     1. Afib: in sinus rhythm. Will increase PO cardizem and hopefully discontinue infusion. She did received 4 doses of digoxin. Chadsvasc score is 3. Will need AC at discharge. Potassium 5.0 today and mag was 1.3 on 3/25 will check today. Recommend keeping potassium 4-4.5 and magnesium 2-2.2 in patients with atrial fibrillation. 2. COPD exacerbation\: per pulmonary  3. Stage IIIb small cell lung cancer: Per hematology  4. Tobacco abuse: Encouraged to stop  5. HTN: elevated this morning, will see how she responds to higher does of cardizem. 6. DVT prophylaxis if not contraindicated while in the hospital.         History of Presenting Illness:    Chief complain on admission : 65 y. o.year old who is admitted for  Chief Complaint   Patient presents with    Shortness of Breath     low O2 saturation in SDS pre-op.           Past medical history:    has a past medical history of Arthritis, COPD (chronic obstructive pulmonary disease) ([facility] [address] Full dentures, H/O cardiovascular stress test, H/O cardiovascular stress test, H/O Doppler ultrasound, H/O Doppler ultrasound, History of 24 hour EKG monitoring, History of nuclear stress test, Hx of chest x-ray, Hx of Doppler echocardiogram, Hx of echocardiogram, Hx of echocardiogram, Hx of pelvic x-ray, Hx of x-ray of hip, Hyperlipidemia, Hypertension, Leg pain, Lung nodules, On home oxygen therapy, Small cell lung cancer ([facility] [address] and Wears glasses. Past surgical history:   has a past surgical history that includes cyst removal (1970's); Tubal ligation (1970's); laparoscopic appendectomy (N/A, 5/18/2019); Mediastinoscopy (N/A, 12/10/2020); back surgery; Colonoscopy; Hysterectomy; eye surgery (2010?); joint replacement (Right, 2014? ?); Foot surgery (Right, 1970's); bronchoscopy (N/A, 1/5/2021); and Upper gastrointestinal endoscopy (N/A, 3/3/2021). Social History:   reports that she quit smoking about 4 months ago. Her smoking use included cigarettes. She started smoking about 47 years ago. She has a 70.50 pack-year smoking history. She has never used smokeless tobacco. She reports current alcohol use. She reports that she does not use drugs. Family history:  family history includes COPD in her sister; Cancer in her father and sister; Coronary Art Dis in her mother; Dementia in her mother; Diabetes in her sister; Heart Attack in her sister; Heart Attack (age of onset: 61) in her father and mother; High Cholesterol in her mother; Hypertension in her mother, sister, sister, and sister; Irritable Bowel Syndrome in her sister; Pacemaker in her sister; Stroke in her father, mother, and sister. Allergies   Allergen Reactions    Formaldehyde     Gabapentin Other (See Comments)    Norflex Tablets [Orphenadrine]     Cranberry Rash       Review of Systems   Constitutional: Positive for activity change. All 14 systems were reviewed and are negative  Except for the positive findings  which as documented     BP (!) 167/68   Pulse 99   Temp 98.1 °F (36.7 °C) (Axillary)   Resp 13   Ht 5' (1.524 m)   Wt 125 lb 14.1 oz (57.1 kg)   SpO2 93%   BMI 24.58 kg/m²   No intake or output data in the 24 hours ending 04/01/21 0842    Physical Exam  Vitals signs reviewed. Constitutional:       General: She is not in acute distress. Appearance: Normal appearance. She is not ill-appearing. HENT:      Head: Atraumatic. Neck:      Musculoskeletal: Neck supple. No muscular tenderness. Vascular: No carotid bruit. Cardiovascular:      Rate and Rhythm: Normal rate and regular rhythm. Pulses: Normal pulses. Heart sounds: Normal heart sounds. No murmur. Pulmonary:      Effort: Pulmonary effort is normal. No respiratory distress. Breath sounds: Decreased breath sounds present. Comments: On 4 L NC  Musculoskeletal:         General: No swelling or deformity. Right lower leg: Edema present. Left lower leg: Edema present. Neurological:      Mental Status: She is alert.          Telemetry Reviewed:   Sinus rhythm rate 92    Medications:    dilTIAZem  240 mg Oral Daily    enoxaparin  1 mg/kg Subcutaneous BID    pantoprazole  40 mg Oral QAM AC    levoFLOXacin  750 mg Oral Daily    lactobacillus  1 capsule Oral Daily with breakfast    methylPREDNISolone  40 mg Intravenous Q8H    theophylline  100 mg Oral BID    albuterol sulfate HFA  2 puff Inhalation 4x daily    ipratropium  2 puff Inhalation 4x daily    docusate sodium  100 mg Oral Daily    busPIRone  10 mg Oral BID    sertraline  50 mg Oral Daily    nicotine  1 patch Transdermal Daily    simvastatin  20 mg Oral Nightly    sodium chloride flush  10 mL Intravenous 2 times per day    sodium chloride flush  10 mL Intravenous 2 times per day    aspirin  81 mg Oral Daily    calcium elemental  1 tablet Oral Daily with breakfast    sucralfate  1 g Oral 4x Daily AC & HS      dilTIAZem (CARDIZEM) 100 mg in dextrose 5% 100 mL (ADD-Bendena) 5 mg/hr (03/31/21 1803)     magic (miracle) mouthwash, potassium chloride **OR** potassium alternative oral replacement **OR** potassium chloride, ipratropium-albuterol, LORazepam, HYDROcodone-acetaminophen, guaiFENesin, sodium chloride flush, sodium chloride flush, promethazine **OR** ondansetron, polyethylene glycol, acetaminophen **OR** acetaminophen    Lab Data:  CBC:   Recent Labs     03/30/21  0430 03/31/21  0630 04/01/21  0500   WBC 2.9* 4.1 6.7   HGB 9.1* 9.1* 10.4*   HCT 29.9* 30.3* 33.9*   MCV 94.3 95.6 95.0    393 493*     BMP:   Recent Labs     03/30/21  0430 03/31/21  0630 04/01/21  0500    140 131*   K 4.5 4.4 5.2*   CL 95* 97* 88*   CO2 43* 41* 41*   PHOS 1.8* 1.8* 2.7   BUN 19 22 19   CREATININE 0.5* 0.5* 0.6     PT/INR: No results for input(s): PROTIME, INR in the last 72 hours. BNP:  No results for input(s): PROBNP in the last 72 hours. TROPONIN: No results for input(s): TROPONINT in the last 72 hours. [id number] Score for Atrial Fibrillation Stroke Risk   Risk   Factors  Component Value   C CHF No 0   H HTN Yes 1   A2 Age >= 76 No,  (71 y.o.) 0   D DM No 0   S2 Prior Stroke/TIA No 0   V Vascular Disease No 0   A Age 74-69 Yes,  (71 y.o.) 1   Sc Sex female 1    [id number]  Score  3   Score last updated 4/1/21 25:38 AM EDT    Click here for a link to the UpToDate guideline \"Atrial Fibrillation: Anticoagulation therapy to prevent embolization    Disclaimer: Risk Score calculation is dependent on accuracy of patient problem list and past encounter diagnosis. ECHO :   Echocardiogram 12/9/2020 Summary   Technically difficult study patient sitting up during exam.   Left ventricular systolic function is normal.   Ejection fraction is visually estimated at 50-55%. Mild left ventricular hypertrophy. Grade II diastolic dysfunction. Calcified aortic valve with restricted motion of the non coronary cusp; mild   aortic stenosis. No evidence of any pericardial effusion. All labs, medications and tests reviewed by myself , continue all other medications of all above medical condition listed as is except for changes mentioned above. Thank you very much for consult , please call with questions.     Electronically signed by HELIO Adam CNP on 4/1/2021 at 8:42 AM      I have seen ,spoken to  and examined this patient personally, independently of the nurse practitioner. I have reviewed the hospital care given to date and reviewed all pertinent labs and imaging. The plan was developed mutually at the time of the visit with the patient,  NP  and myself. I have spoken with patient, nursing staff and provided written and verbal instructions . The above note has been reviewed and I agree with the assessment, diagnosis, and treatment plan with changes made by me as follows     CARDIOLOGY ATTENDING ADDENDUM    HPI:  I have reviewed the above HPI  And agree with above   Rupert is a 65 y. o.year old who and presents with had concerns including Shortness of Breath (low O2 saturation in SDS pre-op.  ). Chief Complaint   Patient presents with    Shortness of Breath     low O2 saturation in SDS pre-op. Interval history:  Mild SOB    Physical Exam:  General:  Awake, alert, NAD  Head:normal  Eye:normal  Neck:  No JVD   Chest:  Clear to auscultation, respiration easy  Cardiovascular:  RRR S1S2  Abdomen:   nontender  Extremities:  tr edema  Pulses; palpable  Neuro: grossly normal      MEDICAL DECISION MAKING;    I agree with the above plan, which was planned by myself and discussed with NP.   A fib HR control and anticoag        Jacob Aiken MD [facility]

## 2021-04-01 NOTE — PROGRESS NOTES
father, mother, and sister. Allergies   Allergen Reactions    Formaldehyde     Gabapentin Other (See Comments)    Norflex Tablets [Orphenadrine]     Cranberry Rash     Social History:    Reviewed; no changes    Objective:   PHYSICAL EXAM:        VITALS:  BP (!) 146/56   Pulse 94   Temp 98.9 °F (37.2 °C) (Oral)   Resp 14   Ht 5' (1.524 m)   Wt 125 lb 14.1 oz (57.1 kg)   SpO2 95%   BMI 24.58 kg/m²     24HR INTAKE/OUTPUT:      Intake/Output Summary (Last 24 hours) at 4/1/2021 1008  Last data filed at 4/1/2021 0848  Gross per 24 hour   Intake 10 ml   Output    Net 10 ml       CONSTITUTIONAL:  Awake. LUNGS:  decreased breath sounds. CARDIOVASCULAR:  normal S1 and S2 and negative JVD  ABD:Abdomen soft, non-tender. BS normal. No masses,  No organomegaly  NEURO:Awake. DATA:    CBC:  Recent Labs     03/30/21 0430 03/31/21  0630 04/01/21  0500   WBC 2.9* 4.1 6.7   RBC 3.17* 3.17* 3.57*   HGB 9.1* 9.1* 10.4*   HCT 29.9* 30.3* 33.9*    393 493*   MCV 94.3 95.6 95.0   MCH 28.7 28.7 29.1   MCHC 30.4* 30.0* 30.7*   RDW 15.0* 15.0* 15.1*   NRBC 1 3  --    SEGSPCT 58.0 77.0* 80.0*   BANDSPCT 2* 6 5      BMP:  Recent Labs     03/30/21  0430 03/31/21  0630 04/01/21  0500    140 131*   K 4.5 4.4 5.2*   CL 95* 97* 88*   CO2 43* 41* 41*   BUN 19 22 19   CREATININE 0.5* 0.5* 0.6   CALCIUM 8.4 8.0* 9.6   GLUCOSE 93 105* 159*      ABG:  Recent Labs     03/31/21  0845   PH 7.45   PO2ART 67*   [id number] 65.0*   O2SAT 94.3*     Lab Results   Component Value Date    PROBNP 479.3 (H) 03/25/2021    PROBNP 448.8 (H) 03/23/2021    PROBNP 1,244 (H) 03/03/2021    THEOPH 4.9 (L) 03/06/2021     No results found for: [facility]    Radiology Review:  Pertinent images / reports were reviewed as a part of this visit.     Assessment:     Patient Active Problem List   Diagnosis    Hyperlipidemia    COPD (chronic obstructive pulmonary disease) ([facility] [address]    Leg pain    Hypertension    Shortness of breath    Tobacco abuse    Abdominal aortic aneurysm (AAA) without rupture (HCC)    Degeneration of lumbar or lumbosacral intervertebral disc    Osteopenia    Anxiety    Acute on chronic respiratory failure with hypoxia (HCC)    Lung mass    COPD exacerbation (HCC)    Acute exacerbation of chronic obstructive pulmonary disease (COPD) (HCC)    Small cell lung cancer (HCC)    Acute on chronic respiratory failure with hypoxia and hypercapnia (HCC)    Acute hypoxemic respiratory failure (HCC)    Pneumonia of both lungs due to infectious organism       Plan:   1. Overall the patient has improved. 2. Inc. Activity.   Carolyn Ely MD  4/1/2021  10:08 AM

## 2021-04-02 NOTE — CARE COORDINATION
LSW received discharge order and [place] will [place] [facility]. [facility] the [facility]. Pt was given a coupon for her Nilesh.

## 2021-04-02 NOTE — PROGRESS NOTES
Pulmonary and Critical Care  Progress Note    Subjective: The patient has improved. Bipap arranged. Shortness of breath none. Chest pain none. Addressing respiratory complaints Patient is negative for  hemoptysis and cyanosis. CONSTITUTIONAL:  negative for fevers and chills. Past Medical History:     has a past medical history of Arthritis, COPD (chronic obstructive pulmonary disease) ([facility] [address] Full dentures, H/O cardiovascular stress test, H/O cardiovascular stress test, H/O Doppler ultrasound, H/O Doppler ultrasound, History of 24 hour EKG monitoring, History of nuclear stress test, Hx of chest x-ray, Hx of Doppler echocardiogram, Hx of echocardiogram, Hx of echocardiogram, Hx of pelvic x-ray, Hx of x-ray of hip, Hyperlipidemia, Hypertension, Leg pain, Lung nodules, On home oxygen therapy, [facility] and Wears glasses. has a past surgical history that includes cyst removal (1970's); Tubal ligation (1970's); laparoscopic appendectomy (N/A, 5/18/2019); Mediastinoscopy (N/A, 12/10/2020); back surgery; Colonoscopy; Hysterectomy; eye surgery (2010?); joint replacement (Right, 2014? ?); Foot surgery (Right, 1970's); bronchoscopy (N/A, 1/5/2021); and Upper gastrointestinal endoscopy (N/A, 3/3/2021). reports that she quit smoking about 4 months ago. Her smoking use included cigarettes. She started smoking about 47 years ago. She has a 70.50 pack-year smoking history. She has never used smokeless tobacco. She reports current alcohol use. She reports that she does not use drugs. Family history:  family history includes COPD in her sister; Cancer in her father and sister; Coronary Art Dis in her mother; Dementia in her mother; Diabetes in her sister; Heart Attack in her sister;  Heart Attack (age of onset: 61) in her father and mother; High Cholesterol in her mother; Hypertension in her mother, sister, sister, and sister; Irritable Bowel Syndrome in her sister; Pacemaker in her sister; Stroke in  Abdominal aortic aneurysm (AAA) without rupture (HCC)    Degeneration of lumbar or lumbosacral intervertebral disc    Osteopenia    Anxiety    Acute on chronic respiratory failure with hypoxia (HCC)    Lung mass    COPD exacerbation (HCC)    Acute exacerbation of chronic obstructive pulmonary disease (COPD) (HCC)    Small cell lung cancer (HCC)    Acute on chronic respiratory failure with hypoxia and hypercapnia (HCC)    Acute hypoxemic respiratory failure (HCC)    Pneumonia of both lungs due to infectious organism       Plan:   1. Overall the patient has improved. 2. Inc. Activity. 3. D/C soon.   Tato Eckert MD  4/2/2021  12:04 PM

## 2021-04-02 NOTE — PROGRESS NOTES
Hospitalist Progress Note      Name:  Aylin Osman /Age/Sex: 1950  (65 y.o. female)   MRN & CSN:  [id number] & [id number] Admission Date/Time: 3/23/2021 10:39 AM   Location:  [address] [place] [address] PCP: Jennifer Reed, [employer] Day: 11     Reason for continued hospitalization: A. fib with RVR    ASSESSMENT/PLAN:  Aylin Osman is a 65 y.o.  female who was admitted to the hospital for respiratory failure. She was intubated on  and extubated 2021. Home BiPAP done and patient qualified for home BiPAP. Patient went into A. fib with RVR on the morning of 3/31. She was treated with diltiazem drip and subsequently transitioned to diltiazem 240 mg daily. She is started on Eliquis 5 mg twice daily. #.  A. fib with RVRnew onset. Heart rate in the 170s. Completed digoxin to 50 mg x 4 doses. Eliquis 5 mg twice daily  Continue diltiazem 240 mg daily    #. Acute on chronic hypoxic and hypercarbic respiratory failurechronically on 4 L of oxygen. Intubated on 3/23. Extubated on 3/25. Arterial PCO2 65. Infectious work-up including blood cultures and MRSA nasal swab so far negative. Treated with azithromycin x7 days. Procalcitonin low at 0.124. Meropenem DC'd on 3/30  Continue oxygen supplementation  Levaquin 750 mg x 30 days, end date 2021  Probiotics, lactobacillus until     #. COPD exacerbationchronically on 4 L of oxygen at home. Completed azithromycin x7 days. Continue Solu-Medrol  DuoNebs as needed    #. Stage IIIb small cell lung cancerevaluated by hematology oncology. Patient to follow-up with oncology outpatient basis. #.  Tobacco abuse disorderactive, smokes 1 pack to 1 pack and half per day  Nicotine patch    #. HypertensionBP controlled  Continue Cardizem    #. Exudative esophagitis  PPI Carafate    #. Dysphagia  Dysphagia diet    #.   Moderate protein calorie malnutrition    MEDICAL DECISION MAKING:  Labs reviewed Imaging reviewed  Level of risk moderate  Diet DIET GENERAL; Dysphagia Minced and Moist  Dietary Nutrition Supplements: Standard High Calorie Oral Supplement   DVT Prophylaxis [x] Eliquis[]  Heparin, [] SCDs, [] Ambulation   GI Prophylaxis [] PPI,  [] H2 Blocker,  [] Carafate,  [] Diet/Tube Feeds   Code Status Full Code   Disposition  Home/SNF   [facility] [] Low, [x] Moderate,[]  High     Chief complaint/Interval History/ROS     Chief Complaint: Respiratory failure    INTERVAL HISTORY:    4/2: Overall heart rate is controlled. However heart rate at times high in the 100s. 4/1: Heart rates are much controlled. Plan is to discharge tomorrow if she remains clinically stable. 3/31: Patient went into A. fib with RVR on the morning of 3/31. Otherwise she has been stable for discharge. 3/30: Overall appears stable. Answers questions appropriate. ROS:  No chest pain. No abdominal pain. Objective:     No intake or output data in the 24 hours ending 04/02/21 1058   Vitals:   Vitals:    04/02/21 0838   BP:    Pulse:    Resp:    Temp:    SpO2: 96%     Physical Exam:   GEN: Awake female, nontoxic appearing. Answers questions appropriate. Sitting upright at bedside  EYES: Sclera anicteric. No discharge. HENT: Membranes moist.  No nasal discharge. NECK: Supple,  RESP: Clear to auscultation bilaterally. CV: RRR. No pitting occipital edema. GI: Abdomen soft. Nontender. Nondistended. : No Nicole in place. MSK: No bony fractures.   SKIN: warm, dry, no rashes,  NEURO: Cranial nerves appear grossly intact, normal speech  PSYCH: Awake, alert, oriented     Medications:   Medications:    dilTIAZem  240 mg Oral Daily    apixaban  5 mg Oral BID    pantoprazole  40 mg Oral QAM AC    levoFLOXacin  750 mg Oral Daily    lactobacillus  1 capsule Oral Daily with breakfast    methylPREDNISolone  40 mg Intravenous Q8H    theophylline  100 mg Oral BID    albuterol sulfate HFA  2 puff Inhalation 4x daily   

## 2021-04-02 NOTE — DISCHARGE SUMMARY
Discharge Summary    Name:  Woody Ariza /Age/Sex: 1950  (65 y.o. female)   MRN & CSN:  [id number] & [id number] Admission Date/Time: 3/23/2021 10:39 AM   Attending:  Jose Lancaster MD Discharging Physician: Jose Lancaster MD     Hospital Course:   Woody Ariza is a 65 y.o.  female who was admitted to the hospital for respiratory failure. She was intubated on  and extubated 2021. Home BiPAP done and patient qualified for home BiPAP. Patient went into A. fib with RVR on the morning of 3/31/21. She was treated with diltiazem drip, Digoxin 250 mcg x4 doses  and subsequently transitioned to diltiazem 240 mg daily. She is started on Eliquis 5 mg twice daily. The cost of Eliquis for 1 month supply is $47 according to outpatient pharmacy. Patient is not sure if she can afford Eliquis. She also does not want to be on Coumadin. Coupon for 1 month supply was given and the patient agreed  to follow-up with cardiology to see if there are any resources available to help pay for Eliquis.     #.  A. fib with RVRnew onset. Heart rate in the 170s. Completed digoxin to 250 mcg x 4 doses.     #.  Acute on chronic hypoxic and hypercarbic respiratory failurechronically on 4 L of oxygen. Intubated on 3/23. Extubated on 3/25. Arterial PCO2 65. Infectious work-up including blood cultures and MRSA nasal swab so far negative. Treated with azithromycin x7 days. Procalcitonin low at 0.124. Meropenem DC'd on 3/30. Levaquin 750 mg x 30 days, end date 2021. lactobacillus until 21     #. COPD exacerbationchronically on 4 L of oxygen at home. Completed azithromycin x7 days.     #.  Stage IIIb small cell lung cancerevaluated by hematology oncology. Patient to follow-up with oncology outpatient basis.     #. Tobacco abuse disorderactive, smokes 1 pack to 1 pack and half per day    #. HypertensionBP controlled     #.   Exudative esophagitison PPI and Carafate    #.  Dysphagiadysphagia diet    #. Moderate protein calorie malnutrition    The patient expressed appropriate understanding of and agreement with the discharge recommendations, medications, and plan. Consults this admission:  IP CONSULT TO HOSPITALIST  IP CONSULT TO PULMONOLOGY  IP CONSULT TO HEM/ONC  IP CONSULT TO DIETITIAN  IP CONSULT TO INFECTIOUS DISEASES  PHARMACY TO DOSE VANCOMYCIN  IP CONSULT TO CARDIOLOGY  IP CONSULT TO [address]    Discharge Instruction:   Follow-up with cardiology, follow-up with oncology    Diet:  cardiac diet   Activity: activity as tolerated  Disposition: Discharged to:   [x]Home, [][facility], []SNF, []Acute Rehab, []Hospice   Condition on discharge: Stable    Discharge Medications:      Fritzremedioselie Cassius   Home Medication Instructions VV    Printed on:21 1206   Medication Information                      albuterol sulfate HFA (PROAIR HFA) 108 (90 Base) MCG/ACT inhaler  Inhale 2 puffs into the lungs every 6 hours as needed for Wheezing             apixaban (ELIQUIS) 5 MG TABS tablet  Take 1 tablet by mouth 2 times daily             aspirin 81 MG EC tablet  Take 81 mg by mouth daily             busPIRone (BUSPAR) 10 MG tablet  Take 10 mg by mouth 2 times daily             calcium carbonate 600 MG TABS tablet  Take 1 tablet by mouth daily             Cholecalciferol (VITAMIN D3) 25 MCG (1000 UT) TABS  Take by mouth daily              dexamethasone (DECADRON) 4 MG tablet  Take 1 tablet x 4 days after each chemotherapy             dilTIAZem (CARDIZEM CD) 240 MG extended release capsule  Take 1 capsule by mouth daily             guaiFENesin (MUCINEX CHEST CONGESTION CHILD) 100 MG/5ML liquid  Take 10 mLs by mouth 3 times daily as needed for Cough             HYDROcodone-acetaminophen (NORCO)  MG per tablet  Take 1 tablet by mouth every 4 hours as needed for Pain for up to 30 days.  Indications: 180             ipratropium-albuterol (DUONEB) 0.5-2.5 (3) MG/3ML SOLN nebulizer solution  Inhale 3 mLs into the lungs 4 times daily             lactobacillus (CULTURELLE) capsule  Take 1 capsule by mouth daily (with breakfast) for 27 days             levoFLOXacin (LEVAQUIN) 750 MG tablet  Take 1 tablet by mouth daily for 26 days             Magic Mouthwash (MIRACLE MOUTHWASH)  Swish and swallow 5 mLs 4 times daily as needed for Irritation 1:1:1 diphenhydramine, nystatin, lidocaine             magnesium oxide (MAG-OX) 400 MG tablet  Take 0.5 tablets by mouth daily             Nutritional Supplement LIQD  2 cans by mouth daily. Boost strawberry if available. nystatin (MYCOSTATIN) 333045 UNIT/ML suspension  Take 5 mLs by mouth 4 times daily             OLANZapine (ZYPREXA) 2.5 MG tablet  Take the night prior to each chemotherapy, then for three additional nights with each chemotherapy             ondansetron (ZOFRAN) 8 MG tablet  Take 1 tablet by mouth every 8 hours as needed for Nausea or Vomiting             pantoprazole (PROTONIX) 40 MG tablet  Take 1 tablet by mouth every morning (before breakfast)             sertraline (ZOLOFT) 50 MG tablet  Take 1 tablet by mouth daily             simvastatin (ZOCOR) 20 MG tablet  Take 1 tablet by mouth nightly             sucralfate (CARAFATE) 1 GM tablet  Take 1 tablet by mouth 4 times daily             theophylline (UNIPHYL) 400 MG extended release tablet  Take 0.5 tablets by mouth daily                 Objective Findings at Discharge:   BP (!) 152/66   Pulse 107   Temp 98.7 °F (37.1 °C) (Oral)   Resp 16   Ht 5' (1.524 m)   Wt 125 lb 14.1 oz (57.1 kg)   SpO2 96%   BMI 24.58 kg/m²            PHYSICAL EXAM   GEN: Awake female, nontoxic appearing. Answers questions appropriate. EYES: Sclera anicteric. No discharge. HENT: Membranes moist.  No nasal discharge. NECK: Supple,  RESP: Clear to auscultation bilaterally. CV: RRR. No pitting occipital edema. GI: Abdomen soft. Nontender. Nondistended. : No Nicole in place. MSK: No bony fractures.   SKIN: warm, dry, no rashes,  NEURO: Cranial nerves appear grossly intact, normal speech  PSYCH: Awake, alert, oriented     BMP/CBC  Recent Labs     03/31/21  0630 04/01/21  0500 04/02/21  0620    131* 137   K 4.4 5.2* 4.5   CL 97* 88* 92*   CO2 41* 41* 40*   BUN 22 19 22   CREATININE 0.5* 0.6 0.5*   WBC 4.1 6.7 10.2   HCT 30.3* 33.9* 30.4*    493* 420       IMAGING:  All imaging reviewed before discharge    Discharge Time of 36 minutes    Electronically signed by Margaret Villatoro MD on 4/2/2021 at 12:06 PM

## 2021-04-02 NOTE — PROGRESS NOTES
Comprehensive Nutrition Assessment    Type and Reason for Visit:  Reassess    Nutrition Recommendations/Plan:   Continue current diet and supplements, between meals    Nutrition Assessment:  Improved intake dos on dysphagia diet s/p MBSS. Pt feeding self and consuming generally greater than half of meals and drinking some oral supplement, as she does at home. Will continue to monitor as moderate nutrition risk. Malnutrition Assessment:  Malnutrition Status: Moderate malnutrition    Context:  Acute Illness     Findings of the 6 clinical characteristics of malnutrition:  Energy Intake:  Mild decrease in energy intake   Weight Loss:  Greater than 5% over 1 month     Body Fat Loss:  Mild body fat loss Orbital, Buccal region   Muscle Mass Loss:  Mild muscle mass loss Temples (temporalis), Hand (interosseous)  Fluid Accumulation:  Mild(2+ pitting edema noted) Extremities   Strength:  Not Performed    Estimated Daily Nutrient Needs:  Energy (kcal):  9633-0999(61-96 kcal/kg); Weight Used for Energy Requirements:  Current     Protein (g):  62-77(1.2-1.5 g/kg); Weight Used for Protein Requirements:  Current        Fluid (ml/day):  1500; Method Used for Fluid Requirements:  1 ml/kcal      Wounds:  (sacrum wound noted)       Current Nutrition Therapies:    DIET GENERAL; Dysphagia Minced and Moist  Dietary Nutrition Supplements: Standard High Calorie Oral Supplement    Anthropometric Measures:  · Height: 5' (152.4 cm)  · Current Body Weight: 125 lb 14.1 oz (57.1 kg)   · Admission Body Weight: 123 lb 10.9 oz (56.1 kg)(first measured weight)    · Usual Body Weight: 127 lb 10.3 oz (57.9 kg)((2/28/21) 2/10/21-53.9 kg per chart review)     · Ideal Body Weight: 100 lbs; % Ideal Body Weight 131.4 %   · BMI: 24.6  · BMI Categories: Normal Weight (BMI 18.5-24. 9)       Nutrition Diagnosis:   · Moderate malnutrition, In context of acute illness or injury related to inadequate protein-energy intake as evidenced by weight loss greater than or equal to 5% in 1 month, mild muscle loss, mild loss of subcutaneous fat, localized or generalized fluid accumulation    Nutrition Interventions:   Food and/or Nutrient Delivery:  Continue Current Diet, Continue Oral Nutrition Supplement  Nutrition Education/Counseling:  No recommendation at this time   Coordination of Nutrition Care:  Continue to monitor while inpatient    Goals:  pt will consume greater than 50% of meals and supplements       Nutrition Monitoring and Evaluation:   Behavioral-Environmental Outcomes:  None Identified   Food/Nutrient Intake Outcomes:  Supplement Intake, Food and Nutrient Intake  Physical Signs/Symptoms Outcomes:  Biochemical Data, Weight, Hemodynamic Status, Fluid Status or Edema     Discharge Planning:     Too soon to determine     Electronically signed by Juan Jose Gutiérrez RD, LD on 4/2/21 at 10:10 AM EDT    Contact: [phone]

## 2021-04-03 NOTE — CARE COORDINATION
[place]   4/7/2021  9:00 AM Fernand Councilman, MD [address] [facility] [place] [facility]   4/8/2021 10:30 AM SCHEDULE, [facility] MED ONC TREATMENT [facility] MED ONC [place]   4/9/2021 10:30 AM SCHEDULE, [facility] MED ONC TREATMENT [facility]C [place]   4/14/2021 10:15 AM Angeline Regan MD [address] [facility] [place] ID [facility]   6/16/2021  1:30 PM Frosonja Cedillo, DO [address] [place] [facility] [facility]   10/12/2021  4:00 PM [facility], LPN [address] [facility] [place] [facility]   09/81/9159 11:15 AM Moriah Gant MD Critical access hospital Heart [facility]     Challenges to be reviewed by the provider   Additional needs identified to be addressed with provider: No       Method of communication with provider :N/A    Was this a readmission? Yes  Patient stated reason for admission: \"I was passed out\"  Patients top risk factors for readmission: ineffective coping, lack of knowledge about disease, medical condition and medication management     Spoke w/ Patient for discharge call. Reports chronic, ongoing sob on exertion and cough. Denies ac resp distress, wheezing, fever, chills. Noted to be speaking in complete unlabored sentences. Denies need for MD notification. Advised tripod position, fan to circulate air, cough & deep breathing exercises, Med Nebs. Reviewed [employer] Mgt, polite but not receptive. Discussed A Fib, risks of stroke, FAST. Denies palps, chest pain, dizziness. Confirmed copy of AVS received, reviewed. Med education provided on new rx, verbalizes understanding. Family to  all new rx today. Has [employer] discount card. 1111F completed. Advised obtaining 90 day supply of routine, prn meds and contacting pharmacy/MD for refills. Patient questions why Ativan was d/c'd, advised to discuss w/ PCP. [facility] RN scheduled resumption of care visit for today. Denies need for additional in home support, dme, community assistance. Has support of [employer]. Patient to contact PCP on Monday to schedule 7 day hosp f/u appt. Confirmed transportation for 4/7/21 Oncology appt.  Reports chemo currently on hold w/ plan to further dicuss

## 2021-04-05 NOTE — TELEPHONE ENCOUNTER
Wants to start Home Care---    Uses Oxygen 4-6 liters (sometime turns control up to 6)    Order for ---    Skilled Nursing    Physical Therapy    Occupational Therapy    Speech Therapy        Respiratory Therapy    (She is on their Tele-health program)

## 2021-04-06 NOTE — TELEPHONE ENCOUNTER
Patient needs some type of virtual visit she is already on lorazepam we cannot do this over the phone

## 2021-04-06 NOTE — CARE COORDINATION
Rickey 45 Transitions Follow Up Call    2021    Patient: Courtney Montemayor  Patient : 1950   MRN: [id number]  Reason for Admission:  COPD exacerbation/ A-Fib. Discharge Date: 21 RARS: Readmission Risk Score: 46         Spoke with:  Patient's grand daughter ( briefly)    CTN spoke with patient's grand daughter by phone. Identified patient by name and . Oriented to the role of Care Transition. Patient's grand daughter reports that patient is feeling \"fine\" today. Reports that patient is currently resting. Patient's grand daughter reports that patient went for treatment this morning and received [facility] RN visit later in the day. Reports that patient 's day has been \"very busy\". Declined further CTN assessment. Denies any questions, equipment or resource needs. Confirmed appt. With Oncologist 21. Agreeable to continued Care Transition. CTN contact information provided should questions arise. Plan for follow up as needed based on risk factors and severity of symptoms.         Care Transitions Subsequent and Final Call    Subsequent and Final Calls  Are you currently active with any services?: Home Health  Care Transitions Interventions   Home Care Waiver: Completed        DME Assistance: Completed     Senior Services: Declined     Palliative Care: Declined     Other Interventions:           Future Appointments   Date Time Provider Jourdan Funez   2021  8:30 AM SCHEDULE, [facility]   2021  9:00 AM Allison Barnett MD [address] [facility]   2021 10:30 AM SCHEDULE, [address] [facility] MED ONC TREATMENT [facility] MED ONC [place]   2021 10:30 AM SCHEDULE, [facility] MED ONC TREATMENT [facility] MED ONC [place]   2021 10:15 AM Parker Urbina MD [address] [place] [facility]   4/15/2021 12:30 PM Fercho Dennison MD [address] [facility] [place]   2021  1:30 PM Dominic Cedillo DO [address] [place] [facility]   10/12/2021  4:00 PM Elayne Sanford LPN [address] [place] [facility]    11:15 AM Olivia Oreilly MD Counts include 234 beds at the [facility] Heart ALBERTO Mcelroy, RN

## 2021-04-06 NOTE — TELEPHONE ENCOUNTER
Abena Mukherjee from [address] [place] called and stated that patients daughter reported that patient is waking up in the evenings with very high anxiety(freaking out) and afraid to be alone.  Patient was on the vent while in the hospital.  Please call something in for patients anxiety   [address]

## 2021-04-07 NOTE — PROGRESS NOTES
Patient arrived to treatment suite for pre-medications, pre- and post-hydration, and chemotherapy infusion. PIV started in right arm and good blood return noted. Patient stated needs something for nausea prior to treatment, but no other questions or concerns for the doctor at this time. Left treatment suite for doctor appointment. Treatment approved and given. Patient tolerated well. Left treatment suite with assistance in wheelchair. Discharge instructions provided. Patient's status assessed and documented appropriately. All labs and required results were also reviewed today. Treatment parameters have been reviewed. Today's treatment has been approved by the provider. Treatment orders and medication sequencing (when applicable) was verified by 2 registered nurses. The treatment plan was confirmed with the patient prior to administration, and the patient understands the need to report any treatment-related symptoms. Prior to administration, when applicable, the following 8 elements of medication administration were reviewed with 2nd Registered Nurse prior to dosing: drug name, drug dose, infusion volume when prepared in a syringe, rate of administration, expiration dates and/or times, appearance and integrity of drug(s), and rate of pump for infusion. The 5 rights of medication administration have been verified.

## 2021-04-07 NOTE — PROGRESS NOTES
1400:  [place] off unit and report given to tx suite nurses while she was off floor. 1445: Pt assisted x 2 to the wheelchair to the bathroom. She was short of breath, had to sit in her wheelchair for sometime before being able to pull herself out of the wheelchair to the toilet seat. Pt short of breath on her portable oxygen, 2 liters. No oxygen coming out unless if was on a continuous flow setting on her oxygen tank meter. Pt then assisted back to her tx chair, however, decided she wanted to remain in her wheelchair for the duration of her hydration tx. Pt given a pillow to place under her arm. I asked if she had a friend or family member who I could contact to come sit with her today for the remainder of her tx, She declined this offer. Pt resting in a wheelchair.  Fluids hooked up to PIV

## 2021-04-07 NOTE — PROGRESS NOTES
MA Rooming Questions  Patient: Catherine Jay  MRN: [id number]    Date: 4/7/2021        1. Do you have any new issues?   no         2. Do you need any refills on medications?    no    3. Have you had any imaging done since your last visit? yes - [facility]    4. Have you been hospitalized or seen in the emergency room since your last visit here?   yes - [facility]    5. Did the patient have a depression screening completed today?  No    No data recorded     PHQ-9 Given to (if applicable):               PHQ-9 Score (if applicable):                     [] Positive     []  Negative              Does question #9 need addressed (if applicable)                     [] Yes    []  No               Sharona Lopes MA

## 2021-04-08 NOTE — TELEPHONE ENCOUNTER
Called and spoke with pt and her granddaughter harry. Pt stated she currently has anxiety medication and that it is helping some with sleep. Pt informed to make an appointment if anxiety gets worse and she needs a new prescription. Pt and pts granddaughter harry agreed and voiced understanding and had no other concerns at the time of the call.

## 2021-04-09 NOTE — PROGRESS NOTES
Patient arrived to treatment suite for day 3 of Etoposide. PIV already accessed in right arm and is flushing well with normal saline and good blood return noted. Patient stated more left-sided swelling than yesterday, but no other changes. Treatment approved and given. Patient tolerated well. Left treatment suite with assistance in wheelchair. Patient's status assessed and documented appropriately. All labs and required results were also reviewed today. Treatment parameters have been reviewed. Today's treatment has been approved by the provider. Treatment orders and medication sequencing (when applicable) was verified by 2 registered nurses. The treatment plan was confirmed with the patient prior to administration, and the patient understands the need to report any treatment-related symptoms. Prior to administration, when applicable, the following 8 elements of medication administration were reviewed with 2nd Registered Nurse prior to dosing: drug name, drug dose, infusion volume when prepared in a syringe, rate of administration, expiration dates and/or times, appearance and integrity of drug(s), and rate of pump for infusion. The 5 rights of medication administration have been verified.

## 2021-04-10 PROBLEM — R79.89 ELEVATED BRAIN NATRIURETIC PEPTIDE (BNP) LEVEL: Status: ACTIVE | Noted: 2021-01-01

## 2021-04-10 PROBLEM — R79.89 ELEVATED TROPONIN LEVEL: Status: ACTIVE | Noted: 2021-01-01

## 2021-04-10 PROBLEM — J98.4 PNEUMONITIS: Status: ACTIVE | Noted: 2021-01-01

## 2021-04-10 PROBLEM — R77.8 ELEVATED TROPONIN LEVEL: Status: ACTIVE | Noted: 2021-01-01

## 2021-04-10 PROBLEM — J18.9 PNEUMONITIS: Status: ACTIVE | Noted: 2021-01-01

## 2021-04-10 PROBLEM — A41.9 SEPSIS (HCC): Status: ACTIVE | Noted: 2021-01-01

## 2021-04-10 PROBLEM — E87.5 ACUTE HYPERKALEMIA: Status: ACTIVE | Noted: 2021-01-01

## 2021-04-10 NOTE — CONSULTS
[address] [facility]  consulted by Dr. Ramírez Dejesus for monitoring and adjustment. Indication for treatment: Sepsis of unknown etiology  Goal trough: 15 mcg/mL    Pertinent Laboratory Values:   Temp Readings from Last 3 Encounters:   04/10/21 100.6 °F (38.1 °C) (Rectal)   04/09/21 97.8 °F (36.6 °C) (Temporal)   04/08/21 97.7 °F (36.5 °C) (Temporal)     Recent Labs     04/10/21  0831 04/10/21  1349   WBC 15.9*  --    LACTATE  --  2.1*     Recent Labs     04/10/21  0916   BUN 25*   CREATININE 0.5*     Estimated Creatinine Clearance: 73 mL/min (A) (based on SCr of 0.5 mg/dL (L)). No intake or output data in the 24 hours ending 04/10/21 1555    Pertinent Cultures:  Date    Source    Results  3/24               Nasal MRSA screen  Negative  4/10               Blood    Ordered  4/10                            Strep pneumo/Legionella       Ordered    Vancomycin level:   TROUGH:  No results for input(s): VANCOTROUGH in the last 72 hours. RANDOM:  No results for input(s): VANCORANDOM in the last 72 hours. Assessment:  WBC and temperature: WBC elevated @15.9, pt with a slight fever of 100.6  SCr, BUN, and urine output: SCR low, no UOP data  Day(s) of therapy:  1  Vancomycin concentration: scheduled for 4/12 @04:30    Plan:  Start vancomycin 750mg ivpb q12h  Check the vanco level before the 4th dose  Pharmacy will continue to monitor patient and adjust therapy as indicated    VANCOMYCIN CONCENTRATION SCHEDULED FOR 4/12 @04:30    Thank you for the consult. Burnell Hashimoto Tia August   4/10/2021 3:55 PM

## 2021-04-10 NOTE — H&P
History and Physical  HELIO Ron-BC   Internal Medicine Hospitalist      Name:  Yue Butts /Age/Sex: 1950  (65 y.o. female)   MRN & CSN:  [id number] & [id number] Admission Date/Time: 4/10/2021  8:15 AM   Location:  [place] [address] PCP: Jc Nolan MD       Hospital Day: 1      Supervising Physician: Dr. Anthony Chakraborty      Chief Complaint: Respiratory Distress     Assessment and Plan:   Yue Butts is a 65 y.o. female who presents with Sepsis ([facility] [address]     Sepsis, likely 2/2 pneumonitis - Tachypnea, tachycardia, leukocytosis (15.9), lactate 2.1   Acute on chronic respiratory failure with hypoxia and hypercapnia, likely 2/2 pneumonitis, COPD, and lung CA   COPD exacerbation - exp/insp wheezing   Pneumonitis - as per CTA, could be d/t post chemo treatment    Borderline BP, likely r/t sedation       -hypoxic at 70s on room air at home, placed on 15 L NRM no improvement, now intubated on vent, and sedated       -cont mechanical ventilator support       -was recently admitted (3/23/21) for similar problem and DCd 21       -chronically on 4L NC and CPAP at home but CPAP is not working       -pt received full course of antibiotics for pneumonia prior to previous admission       -and treated again with empiric antibiotics (Zithromax, meropenem, and vancomycin) for pneumonia last admission       -pt is imunosupressed with chemo treatment yesterday and intubated on mechanical ventilator        -start empiric antibiotic Zithromax, Meropenem, Vac (pahrm to dose), may de-escalate if PROCALCITONIN is normal        -consult Pulmonology, Dr. Ayden Coronado to assist vent management       -Steroids       -Duoneb/HHN treatment       -Pulse oximetry continuously monitoring       -closely BP monitoring + MAP monitoring          -series lactate       -daily lab works        -pending cultures, urine Legionella, Strep antigen, Procalcitonin     Elevated troponin level, could be r/t intubation procedure - initial trop 0.032 baseline is negative       -trend trop       -cont tele monitoring       -consider cardio work up if no improvement     Elevated brain BNP level - does not appear to be fluid overloaded, no edema, no swelling       -BNP level >2,000 baseline >400       -no h/o HF, Last Echo EF 50-55%, GR II DD       -daily weights/monitor I/O        -cont to monitor for clinical symptoms of hypervolemia       -monitor BNP     Acute hyperkalemia (5.6)       -order kayexalate, once       -cont tele monitoring       -monitor daily BMP     Hx of Stage IIIb SCLC        -had chemo treatment yesterday       -CTA pulmo shows RLL pulmonary nodule measuring up to 24 mm in diameter previously measuring up to 16 mm in diameter suggesting progression 0f primary malignancy       -follows with Oncology, Dr. Benton Childs -consulted       Afib - ECG in ED shows Normal sinus rhythm, rate 91        -on Eliquis, Cardizem       -cont tele monitoring     Dysphagia with exudative esophagitis - on Carafate and PPI, Dysphagia diet when extubate       -dietician consulted     Tobacco abuse - still actively smoking per family friend at bedside, 1 pack to 1 pack and half per day       -on Nicotine patch, tobacco cessation education when extubated        Chronic Illnesses: will continue current home medications unless contraindicated by above plan and assessment.       -chronic anemia -Hbg stable 9.0 today       -hyperlipidemia -on Zocor       -hypertension        -Depression/Anxiety -on Buspar and sedated     Unable to discuss current diagnosis and plan of management with patient due to sedated on mechanical ventilator but explained and discussed current diagnosis and plan of management to family friend that present at bedside at the time of admission in lay language who agree to the above plan and disposition of admission for further care. All concerns and questions addressed.       Patient assessment and plan in conjunction with brought to the ED. Patient was immediately intubated, placed on mechanical ventilator, and sedated. Chart review reveals that patient was recently admitted for similar problem last 3/23/21 and discharged 4/2/21. She also diagnosed with Afib with RVR from previous admission. Patient received full course of antibiotics for pneumonia prior to previous admission, then treated again with empiric antibiotics (Zithromax, meropenem, and vancomycin) for sepsis 2/2 pneumonia last admission. Patient appears comfortably resting with minimal response to physical stimuli, No bilateral lower extremity swelling or edema noted on examination. No labored breathing noted while on mechanical ventilator. Full CODE STATUS confirmed in the Dr. After talking to patient's daughter who is the POA. Restless, not really bad ED Course: Discussed case with ED physician prior to admission. ROS: Ten point ROS reviewed and negative, unless as noted above per HPI. Objective:   No intake or output data in the 24 hours ending 04/10/21 1356     Vitals: Temp 97.3  Vitals:    04/10/21 1232 04/10/21 1247 04/10/21 1301 04/10/21 1317   BP: (!) 105/56 122/62 111/60 105/61   Pulse: 96 98 99 100   Resp: 16 18 18 18   Temp:       SpO2: 100% 100% 100% 100%   Weight:       Height:         Physical Exam: 04/10/21    GEN   sedated female, laying in bed in no apparent distress. EYES Pupils are equally round. No scleral erythema, discharge, or conjunctivitis. HENT Mucous membranes are moist. +ETT +OGT  NECK No apparent thyromegaly or masses. RESP LS diminished air exchange bilaterally, exp/insp wheezing all throughout no rales or no rhonchi. Symmetric chest movement while intubated on mechanical ventilator. CARDIO/VASC   S1/S2 auscultated. Regular rate without appreciable murmurs, rubs, or gallops. Peripheral pulses equal bilaterally and palpable. No pitting edema in extremities.   GI   Abdomen is soft without significant tenderness, masses, or guarding. Bowel sounds are normoactive. Rectal exam deferred.    Nicole catheter is present. MSK No gross joint deformities. No spontaneous movement noted  SKIN Normal coloration, warm, dry. NEURO   Does not follow directions. No response to verbal or painful stimuli  PSYCH   Sedated on fentanyl and Versed. Past Medical History:      Past Medical History:   Diagnosis Date    Arthritis     COPD (chronic obstructive pulmonary disease) ([address]     follow with Dr Vaishnavi Alaniz Full dentures     H/O cardiovascular stress test 11/18/2009    thallium, normal no ischemia EF70%     H/O cardiovascular stress test 10/11/2013    thallium,EF70%, normal, no ischemia    H/O Doppler ultrasound 10/13/2010    carotid, right normal, left normal    H/O Doppler ultrasound 10/07/2014    Carotid mild bilateral internal carotid artery disease less than 50% in severity. Normal vertebral artery flows with good velocities. Incidental finding of possible thyroid nodule in the left lobe.  History of 24 hour EKG monitoring 03/11/2011    NSR no ectopy    History of nuclear stress test 10/21/2016    lexiscan-normal,no ischemia,EF70%,enlarged right ventricle    Hx of chest x-ray 01/02/2015    WNL    Hx of Doppler echocardiogram 12/01/2020    EF 50-55% mild LV hypertrophy. Grade 2 diastolic dysfunction. Mild AS.     Hx of echocardiogram 10/11/2013    10/13 Abn lt ventricular diastolic function, mile MR, EF 57%,10/10 EF55%, mild mitral annular calcification    Hx of echocardiogram 05/02/2019    EF 41-00%, Grade I diastolic dysfunction, Mild aortic stenosis, Calcific thickening of posterior leaflet of mitral valve, Mild Pulm HTN    Hx of pelvic x-ray 01/02/2015    Severe RT his osterarothrosis    Hx of x-ray of hip 01/02/2015    Severe Rt hip osteosrthrosis    Hyperlipidemia     Hypertension     follows with dr Ruben Reyna Leg pain     Lung nodules     scheduled for bronch 1/5/2020    On home oxygen therapy     \"on 4 liters connections     Talks on phone: None     Gets together: None     Attends Taoism service: None     Active member of club or organization: None     Attends meetings of clubs or organizations: None     Relationship status: None    Intimate partner violence     Fear of current or ex partner: None     Emotionally abused: None     Physically abused: None     Forced sexual activity: None   Other Topics Concern    None   Social History Narrative    None     TOBACCO:   reports that she quit smoking about 4 months ago. Her smoking use included cigarettes. She started smoking about 47 years ago. She has a 70.50 pack-year smoking history. She has never used smokeless tobacco.  ETOH:   reports current alcohol use. Drugs:  reports no history of drug use. Allergies: Allergies   Allergen Reactions    Formaldehyde     Gabapentin Other (See Comments)    Norflex Tablets [Orphenadrine]     Cranberry Rash     Medications:   Medications:    Infusions:    midazolam 2 mg/hr (04/10/21 0945)    fentaNYL 12.5 mcg/hr (04/10/21 0945)     PRN Meds: sodium chloride flush, 10 mL, PRN      Prior to Admission Meds:  Prior to Admission medications    Medication Sig Start Date End Date Taking? Authorizing Provider   diphenhydrAMINE (BENADRYL) 25 MG tablet Take 50 mg by mouth every 6 hours as needed for Itching    Historical Provider, MD   HYDROcodone-acetaminophen (NORCO)  MG per tablet Take 1 tablet by mouth every 4 hours as needed for Pain for up to 30 days. Indications: 180 4/5/21 5/5/21  Jewel Connelly MD   LORazepam (ATIVAN) 1 MG tablet Take 1 tablet by mouth every 8 hours as needed for Anxiety for up to 30 days.  4/5/21 5/5/21  Jewel Connelly MD   apixaban (ELIQUIS) 5 MG TABS tablet Take 1 tablet by mouth 2 times daily 4/2/21   Deidre Fernando MD   dilTIAZem (CARDIZEM CD) 240 MG extended release capsule Take 1 capsule by mouth daily 4/3/21   Deidre Fernando MD   lactobacillus (CULTURELLE) capsule Take 1 capsule by progression of primary malignancy. There is new ground-glass opacity peripheral to the dominant lesion. This may represent post treatment changes. There is a 9 mm right lower lobe pulmonary nodule stable since prior examination best seen on image 74 of series 4. Stable right apical spiculated pulmonary nodule measuring approximately 8 mm in size. No new pulmonary nodules are noted. Mild-to-moderate centrilobular emphysema again demonstrated. No pleural effusion or pneumothorax. Upper Abdomen:  Limited images of the upper abdomen demonstrate no acute abnormality. No focal adrenal nodules. Soft Tissues/Bones:  Age related degenerative changes of the visualized osseous structures without focal destructive lesion. Enlarging dominant right lower lobe pulmonary nodule measuring up to 24 mm in diameter previously measuring up to 16 mm in diameter suggesting progression of primary malignancy. New ground-glass opacity peripheral to the lesion in the right lower lobe that may represent post treatment changes. This can be seen with radiation pneumonitis. Recommend clinical correlation. Stable spiculated right apical and right lower lobe smaller pulmonary nodules suggesting stable metastatic disease. No pulmonary embolism. Cta Pulmonary W Contrast    Result Date: 3/23/2021  EXAMINATION: CTA OF THE CHEST 3/23/2021 12:32 pm TECHNIQUE: CTA of the chest was performed after the administration of intravenous contrast.  Multiplanar reformatted images are provided for review. MIP images are provided for review. Dose modulation, iterative reconstruction, and/or weight based adjustment of the mA/kV was utilized to reduce the radiation dose to as low as reasonably achievable.  COMPARISON: 02/28/2021 HISTORY: ORDERING SYSTEM PROVIDED HISTORY: hypoxia, shortness of breath TECHNOLOGIST PROVIDED HISTORY: Reason for exam:->hypoxia, shortness of breath Decision Support Exception->Emergency Medical Condition (MA) Reason for images 1.5 minutes 8.4 mGy COMPARISON: None HISTORY: ORDERING SYSTEM PROVIDED HISTORY: Dysphagia TECHNOLOGIST PROVIDED HISTORY: Reason for exam:->Dysphagia Reason for Exam: Dysphagia FINDINGS: Thin barium, puree, and solid were trialed. Oral phase of swallowing is within normal limits. Pharyngeal phase of swallowing demonstrates reduced laryngeal elevation, tongue base retraction, pharyngeal swallow initiation, and pharyngeal residue within the vallecula and pyriform sinuses. Trace shallow penetration of thin liquids under the epiglottis and midway to the vocal cords which clears spontaneously. No aspiration. 1.  Trace shallow penetration of thin liquids which clears spontaneously. 2.  No aspiration. Please see separate speech pathology report for full discussion of findings and recommendations.      EKG this visit:   EKG: Normal sinus rhythm, rate 91   Nonspecific ST abnormality   Abnormal ECG   When compared with ECG of 10-APR-2021 09:29,   Sinus rhythm has replaced Atrial flutter    Current Treatment Team:  Treatment Team: Attending Provider: [address] [place] [address]; Registered Nurse: RENETTA Valencia APRN-BC [facility]  4/10/2021 1:56 PM      Electronically signed by HELIO Lenz CNP on 4/10/2021 at 1:56 PM

## 2021-04-10 NOTE — ED TRIAGE NOTES
Pt to ED for c/o increase SOB. Pt alert to verbal stimuli. Pt has hx of lung cancer. Pt 100% on 10 Lpm via NC.  Pt is reportedly on CPAP at home and reports that it has not been working for few days

## 2021-04-10 NOTE — ED PROVIDER NOTES
Emergency Department Encounter    Patient: Jayson Draper  MRN: [id number]  : 1950  Date of Evaluation: 4/10/2021  ED Provider:  [facility]    Triage Chief Complaint:   Respiratory Distress      Ute:  Jayson Draper is a 65 y.o. female that presents to the emergency department for evaluation of difficulty in breathing. Patient presents via EMS and report provided by paramedics who state that they were dispatched to the patient secondary difficulty in breathing. When EMS arrived to the scene, patient was sitting upright complaining of difficulty in breathing for the past 2 days. Her CPAP machine was not working. Oxygen saturation from 2 L on a concentrator was 85%. Breath sounds seem slightly diminished. Patient was placed on 15 L nonrebreather and moved to the EMS cot. When seen, patient did have cigarettes beside her chair and an x-ray filled with smoke box. Patient did have conversational dyspnea as well. EMS was able to wean the O2 saturation on 12 L. No history of DVT or PE. Patient was recently admitted and intubated for hypoxia. Does have a history of stage IIIb small cell lung cancer, is undergoing chemotherapy. No syncope, dizziness, headedness. No abdominal pain, nausea, vomiting. Additional history is difficult to elicit secondary patient's mental status. ROS - see HPI, below listed is current ROS at time of my eval:  A complete review of systems was unable to performed secondary patient's mental status.       Past Medical History:   Diagnosis Date    Arthritis     COPD (chronic obstructive pulmonary disease) ([facility]     follow with Dr Denise Carvalho Full dentures     H/O cardiovascular stress test 2009    thallium, normal no ischemia EF70%     H/O cardiovascular stress test 10/11/2013    thallium,EF70%, normal, no ischemia    H/O Doppler ultrasound 10/13/2010    carotid, right normal, left normal    H/O Doppler ultrasound 10/07/2014    Carotid mild bilateral internal carotid artery disease less than 50% in severity. Normal vertebral artery flows with good velocities. Incidental finding of possible thyroid nodule in the left lobe.  History of 24 hour EKG monitoring 03/11/2011    NSR no ectopy    History of nuclear stress test 10/21/2016    lexiscan-normal,no ischemia,EF70%,enlarged right ventricle    Hx of chest x-ray 01/02/2015    WNL    Hx of Doppler echocardiogram 12/01/2020    EF 50-55% mild LV hypertrophy. Grade 2 diastolic dysfunction. Mild AS.     Hx of echocardiogram 10/11/2013    10/13 Abn lt ventricular diastolic function, mile MR, EF 57%,10/10 EF55%, mild mitral annular calcification    Hx of echocardiogram 05/02/2019    EF 77-62%, Grade I diastolic dysfunction, Mild aortic stenosis, Calcific thickening of posterior leaflet of mitral valve, Mild Pulm HTN    Hx of pelvic x-ray 01/02/2015    Severe RT his osterarothrosis    Hx of x-ray of hip 01/02/2015    Severe Rt hip osteosrthrosis    Hyperlipidemia     Hypertension     follows with dr Ruben Reyna Leg pain     Lung nodules     scheduled for bronch 1/5/2020    On home oxygen therapy     \"on 4 liters 24 hours per day\"    Small cell lung cancer ([address] 1/11/2021    Wears glasses     to read       Past Surgical History:   Procedure Laterality Date    BACK SURGERY      \"about 12 yrs ago - surgery my mid to low back \" done in [address] N/A 1/5/2021    BRONCHOSCOPY ENDOBRONCHIAL ULTRASOUND performed by Vijay Bonds MD at [facility]      \"last one done in my age 61's    CYST REMOVAL  1970's    left arm    EYE SURGERY  2010?    svetlana cataract ext     FOOT SURGERY Right 1970's    \"have screw in 2nd toe\"    HYSTERECTOMY      1999\"took everything \"    JOINT REPLACEMENT Right 2014??    hip    LAPAROSCOPIC APPENDECTOMY N/A 5/18/2019    APPENDECTOMY LAPAROSCOPIC performed by Carlos Matamoros MD at [address] [place] [address] 12/10/2020    MEDIASTINOSCOPY performed by Valerie Mock MD at [address]    UPPER GASTROINTESTINAL ENDOSCOPY N/A 3/3/2021    EGD BIOPSY AND BRUSHING performed by Arpit Bullard MD at [facility] History   Problem Relation Age of Onset    Stroke Mother     Dementia Mother     Heart Attack Mother 61    Coronary Art Dis Mother     Hypertension Mother     High Cholesterol Mother     Cancer Father     Stroke Father     Heart Attack Father 61    Diabetes Sister     Heart Attack Sister     Hypertension Sister     Cancer Sister    Diana Brunner COPD Sister    Diana Brunner Stroke Sister     Hypertension Sister     Hypertension Sister     Irritable Bowel Syndrome Sister     Pacemaker Sister        Social History     Socioeconomic History    Marital status:      Spouse name: Not on file    Number of children: Not on file    Years of education: Not on file    Highest education level: Not on file   Occupational History    Not on file   Social Needs    Financial resource strain: Not on file    Food insecurity     Worry: Not on file     Inability: Not on file    Transportation needs     Medical: Not on file     Non-medical: Not on file   Tobacco Use    Smoking status: Former Smoker     Packs/day: 1.50     Years: 47.00     Pack years: 70.50     Types: Cigarettes     Start date: 1973     Quit date: 2020     Years since quittin.3    Smokeless tobacco: Never Used   Substance and Sexual Activity    Alcohol use:  Yes     Alcohol/week: 0.0 standard drinks     Comment: rare\"twice per year\"    Drug use: No    Sexual activity: Not on file     Comment:    Lifestyle    Physical activity     Days per week: Not on file     Minutes per session: Not on file    Stress: Not on file   Relationships    Social connections     Talks on phone: Not on file     Gets together: Not on file     Attends Buddhist service: Not on file     Active member of club or organization: Not on file     Attends meetings of clubs or organizations: Not on file     Relationship status: Not on file    Intimate partner violence     Fear of current or ex partner: Not on file     Emotionally abused: Not on file     Physically abused: Not on file     Forced sexual activity: Not on file   Other Topics Concern    Not on file   Social History Narrative    Not on file       Current Facility-Administered Medications   Medication Dose Route Frequency Provider Last Rate Last Admin    midazolam (VERSED) 100 mg in dextrose 5 % 100 mL infusion  1-10 mg/hr Intravenous Continuous Nik S Davidson, DO 2 mL/hr at 04/10/21 0945 2 mg/hr at 04/10/21 0945    fentaNYL (SUBLIMAZE) 1,000 mcg in sodium chloride 0.9% 100 mL infusion  12.5-200 mcg/hr Intravenous Continuous Nik Corky Peralta, DO 1.3 mL/hr at 04/10/21 0945 12.5 mcg/hr at 04/10/21 0945    sodium chloride flush 0.9 % injection 10 mL  10 mL Intravenous PRN Nik Corky Kim, DO   10 mL at 04/10/21 1226     Current Outpatient Medications   Medication Sig Dispense Refill    diphenhydrAMINE (BENADRYL) 25 MG tablet Take 50 mg by mouth every 6 hours as needed for Itching      HYDROcodone-acetaminophen (NORCO)  MG per tablet Take 1 tablet by mouth every 4 hours as needed for Pain for up to 30 days. Indications: 180 180 tablet 0    LORazepam (ATIVAN) 1 MG tablet Take 1 tablet by mouth every 8 hours as needed for Anxiety for up to 30 days.  45 tablet 0    apixaban (ELIQUIS) 5 MG TABS tablet Take 1 tablet by mouth 2 times daily 60 tablet 2    dilTIAZem (CARDIZEM CD) 240 MG extended release capsule Take 1 capsule by mouth daily 30 capsule 3    lactobacillus (CULTURELLE) capsule Take 1 capsule by mouth daily (with breakfast) for 27 days 27 capsule 0    levoFLOXacin (LEVAQUIN) 750 MG tablet Take 1 tablet by mouth daily for 26 days 26 tablet 0    magnesium oxide (MAG-OX) 400 MG tablet Take 0.5 tablets by mouth daily 30 tablet 1    busPIRone (BUSPAR) 10 MG tablet Take 10 mg by mouth 2 times daily      ipratropium-albuterol (DUONEB) 0.5-2.5 (3) MG/3ML SOLN nebulizer solution Inhale 3 mLs into the lungs 4 times daily 360 mL 5    simvastatin (ZOCOR) 20 MG tablet Take 1 tablet by mouth nightly 90 tablet 1    nystatin (MYCOSTATIN) 215601 UNIT/ML suspension Take 5 mLs by mouth 4 times daily 1 Bottle 2    pantoprazole (PROTONIX) 40 MG tablet Take 1 tablet by mouth every morning (before breakfast) 30 tablet 3    sucralfate (CARAFATE) 1 GM tablet Take 1 tablet by mouth 4 times daily 120 tablet 3    guaiFENesin (MUCINEX CHEST CONGESTION CHILD) 100 MG/5ML liquid Take 10 mLs by mouth 3 times daily as needed for Cough 1 Bottle 3    Nutritional Supplement LIQD 2 cans by mouth daily. Boost strawberry if available.  60 Can 3    theophylline (UNIPHYL) 400 MG extended release tablet Take 0.5 tablets by mouth daily 15 tablet 3    Magic Mouthwash (MIRACLE MOUTHWASH) Swish and swallow 5 mLs 4 times daily as needed for Irritation 1:1:1 diphenhydramine, nystatin, lidocaine 240 mL 1    ondansetron (ZOFRAN) 8 MG tablet Take 1 tablet by mouth every 8 hours as needed for Nausea or Vomiting 30 tablet 2    OLANZapine (ZYPREXA) 2.5 MG tablet Take the night prior to each chemotherapy, then for three additional nights with each chemotherapy 16 tablet 0    dexamethasone (DECADRON) 4 MG tablet Take 1 tablet x 4 days after each chemotherapy 16 tablet 0    albuterol sulfate HFA (PROAIR HFA) 108 (90 Base) MCG/ACT inhaler Inhale 2 puffs into the lungs every 6 hours as needed for Wheezing 1 Inhaler 6    Cholecalciferol (VITAMIN D3) 25 MCG (1000 UT) TABS Take by mouth daily       calcium carbonate 600 MG TABS tablet Take 1 tablet by mouth daily      aspirin 81 MG EC tablet Take 81 mg by mouth daily         Allergies   Allergen Reactions    Formaldehyde     Gabapentin Other (See Comments)    Norflex Tablets [Orphenadrine]     Cranberry Rash         Nursing Notes Reviewed    Physical Exam:  Triage VS:    ED Triage Vitals [04/10/21 0820] Enc Vitals Group      BP (!) 143/65      Pulse 94      Resp 16      Temp 97.3 °F (36.3 °C)      Temp src       SpO2 100 %      Weight 116 lb (52.6 kg)      Height 5' (1.524 m)     My pulse ox interpretation is hypoxic requiring 12 L via nonrebreather    General appearance: Patient is somnolent, arousable. No drooling, stridor, hoarseness, tripoding. Patient does appear to be in respiratory distress with 1-2  word conversational dyspnea. Skin:  Warm. Dry. Intact. No petechiae or purpura. No herpes zoster lesions. Areas of ecchymosis noted to the lower abdomen likely from injections while patient was hospitalized. No skin crepitus. No subcutaneous emphysema. Eye: Pupils are equal, round, reactive. Extraocular movements are intact. No eyelid pallor. No nystagmus or gaze deviation. Head, ears, nose, mouth and throat: Head is normocephalic and atraumatic. There are no external masses or lesions. No nasal drainage. Pharynx is clear and non-erythematous. Airway is patent. No tonsillar enlargement. No tongue or lip edema. No uvular deviation. Neck: Supple. No nuchal rigidity. Trachea is midline. No masses or thyromegaly or lymphadenopathy. No JVD. No carotid thrills or bruits. No subcutaneous emphysema. No skin crepitus. Heart: Regular rate and sinus rhythm. Audible S1 and S2. No audible murmurs, rubs, gallops. Perfusion: Symmetric peripheral pulses. Brisk capillary refill. Respiratory: Breath sounds diminished bilaterally. There are no rales, rhonchi, wheezes. In bilateral lung fields. There are chest and abdominal retractions with accessory muscle use. No flail segments or no signs of  chest trauma. Abdominal: Soft. Nondistended. No focal tenderness. No midline pulsatile abdominal masses, thrills, bruits. Extremity: No clubbing or cyanosis. No pitting edema in bilateral lower extremities.   Patient has full muscle strength and good muscle tone in bilateral upper and lower - 1.0 MG/DL    Nitrite Urine, Quantitative NEGATIVE NEGATIVE    Leukocyte Esterase, Urine NEGATIVE NEGATIVE    RBC, UA 17 (H) 0 - 6 /HPF    WBC, UA 3 0 - 5 /HPF    Bacteria, UA NEGATIVE NEGATIVE /HPF    Squam Epithel, UA <1 /HPF    Trichomonas, UA NONE SEEN NONE SEEN /HPF    non squam epi cells <1 /HPF   APTT   Result Value Ref Range    aPTT 28.0 25.1 - 37.1 SECONDS   Protime-INR   Result Value Ref Range    Protime 12.7 11.7 - 14.5 SECONDS    INR 1.05 INDEX   SPECIMEN REJECTION   Result Value Ref Range    Rejected Test CMPR, PBNP     Reason for Rejection UNABLE TO PERFORM TESTING:    Comprehensive Metabolic Panel w/ Reflex to MG   Result Value Ref Range    Sodium 140 135 - 145 MMOL/L    Potassium 5.6 (HH) 3.5 - 5.1 MMOL/L    Chloride 98 (L) 99 - 110 mMol/L    CO2 40 (H) 21 - 32 MMOL/L    BUN 25 (H) 6 - 23 MG/DL    CREATININE 0.5 (L) 0.6 - 1.1 MG/DL    Glucose 128 (H) 70 - 99 MG/DL    Calcium 9.3 8.3 - 10.6 MG/DL    Albumin 3.4 3.4 - 5.0 GM/DL    Total Protein 5.9 (L) 6.4 - 8.2 GM/DL    Total Bilirubin 0.2 0.0 - 1.0 MG/DL    ALT 16 10 - 40 U/L    AST 23 15 - 37 IU/L    Alkaline Phosphatase 66 40 - 129 IU/L    GFR Non-African American >60 >60 mL/min/1.73m2    GFR African American >60 >60 mL/min/1.73m2    Anion Gap 2 (L) 4 - 16   Brain Natriuretic Peptide   Result Value Ref Range    Pro-BNP 2,265 (H) <300 PG/ML   Blood gas, arterial   Result Value Ref Range    pH, Bld 7.41 7.34 - 7.45    pCO2, Arterial 79.0 (H) 32 - 45 MMHG    pO2, Arterial 64 (L) 75 - 100 MMHG    Base Exc, Mixed 20.7 (H) 0 - 2.3    HCO3, Arterial 50.1 (H) 18 - 23 MMOL/L    CO2 Content 52.5 (H) 19 - 24 MMOL/L    O2 Sat 93.3 (L) 96 - 97 %    Carbon Monoxide, Blood 2.8 0 - 5 %    Methemoglobin, Arterial 0.9 <1.5 %    Comment AC18 450 P10 .40    EKG 12 Lead   Result Value Ref Range    Ventricular Rate 89 BPM    Atrial Rate 178 BPM    QRS Duration 80 ms    Q-T Interval 326 ms    QTc Calculation (Bazett) 396 ms    P Axis 84 degrees    R Axis 84 degrees    T with 2-1 AV conduction. No signs of acute ST segment elevation myocardial infarction. EKG compared to previous EKG performed on 3/31/2021 at which time patient was in atrial fibrillation with rapid ventricular rate. Repeat EKG performed on 4/10/2021 at 1002 demonstrates ventricular rate of 91 bpm sinus rhythm. Nonspecific ST wave changes. No signs of acute ischemia, infarct, high degree block. MDM:  Patient was seen and evaluated in the emergency department by myself. A thorough history and physical exam were performed, prior medical records are reviewed. Most recent echocardiogram is from December 2020 at which time left ventricular ejection fraction was 50 to 55%. Grade 2 diastolic dysfunction. Patient also being treated for stage IIIb small cell lung cancer. Receives day 3 of etoposide on 4/9/2021. Upon arrival to the emergency department today, patient's vital signs were noted. She is hypoxic requiring supplemental oxygen via nonrebreather. Blood pressure is stable. Heart rate is 93. Patient is somnolent, however, arousable. Patient was connected to continuous cardiopulmonary monitoring. Rhythm strip was interpreted by myself demonstrating sinus rhythm. EKG is performed, interpreted by myself, as detailed above. Differential diagnoses and treatment plan were discussed. I am concerned about CO2 narcosis due to patient's mentation and lack of CPAP support over the past 2 days. IV access is established. Solu-Medrol is provided. ABG is performed demonstrating significant respiratory acidosis with a pH of 7.21, PCO2 130, PO2 146, bicarb 56. Case discussed with family over the phone. Secondary to suspected CO2 narcosis with respiratory acidosis, I recommend endotracheal intubation. Risks, benefits, alternatives are discussed with family. All questions were answered to satisfaction.  Daughter, Rohan Patino, who is also power of , expresses understanding of this treatment plan and is agreeable with intubation. Intubation    Date/Time: 4/10/2021 9:18 AM  Performed by: [facility]   [facility]     Consent:     Consent obtained:  Verbal    Consent given by:  Healthcare agent    Risks discussed:  Aspiration and brain injury    Alternatives discussed:  No treatment and delayed treatment  Pre-procedure details:     Patient status:  Altered mental status    Mallampati score:  IV    Pretreatment meds: etomidate. Paralytics:  Rocuronium  Procedure details:     Preoxygenation:  Bag valve mask    CPR in progress: no      Intubation method:  Oral    Oral intubation technique:  Direct    Laryngoscope blade: Mac 3    Tube size (mm):  7.5    Tube type:  Cuffed    Number of attempts:  1    Ventilation between attempts: no      Cricoid pressure: no      Tube visualized through cords: yes    Placement assessment:     ETT to teeth:  22    Tube secured with: Adhesive tape and ETT brink    Breath sounds:  Equal    Placement verification: chest rise, condensation, CXR verification and ETCO2 detector      CXR findings:  ETT in proper place  Post-procedure details:     Patient tolerance of procedure: Tolerated well, no immediate complications      Patient tolerated the procedure well.  2 L adjusted per ideal body weight. Fentanyl and Versed drips were initiated for post procedure sedation. Chest x-ray is performed, tube is slightly retracted. Labs demonstrate leukocytosis white blood cell count 15.9. Patient does have macrocytic anemia hemoglobin 9.0. No thrombocytopenia. APTT 28.0. INR 1.05. Electrolytes demonstrate hyperkalemia potassium 5.6. Patient is hypochloremic with a chloride of 98. Bicarb is 40. BUN is 25. Creatinine 0.5. Glucose 120. AST and ALT are unremarkable. proBNP is 2265. Troponin is 0.032. Urinalysis negative for urinary tract infection.   CT PE study radiology report reads Enlarging dominant right lower lobe pulmonary nodule measuring up to 24 mm in diameter previously measuring up to 16 mm in diameter suggesting progression of primary malignancy. New ground-glass opacity peripheral to the lesion in the right lower lobe that may represent post treatment changes. This can be seen with radiation pneumonitis. Recommend clinical correlation. Stable spiculated right apical and right lower lobe smaller pulmonary nodules suggesting stable metastatic disease. No pulmonary embolism. Repeat evaluations, patient remains hemodynamically stable. Repeat ABG menstruates improvement in respiratory acidosis. pH is 7.41, PCO2 79, PO2 64, bicarb 50.1. Results of laboratory and radiographic data along with differential diagnosis and treatment plan are discussed with family. Patient presents with difficulty breathing. Work-up is concerning for ventilator dependent respiratory failure due to CO2 narcosis and respiratory acidosis. Patient does have small cell lung cancer. Has acute on chronic diastolic congestive heart failure exacerbation and NSTEMI. CT is concerning for enlarging dominant right lower lobe pulmonary nodule measuring up to 24 mm. Clinical Impression:  1. Ventilator dependent ([address]    2. Acute on chronic respiratory failure with hypoxia and hypercapnia (HCC)    3. Acute respiratory acidosis    4. Small cell lung cancer, right with metastasis. 5. CO2 narcosis    6. Acute on chronic diastolic congestive heart failure ([address]    7. NSTEMI (non-ST elevated myocardial infarction) (HCC)    8. 24 mm right lower lobe pulmonary nodule        Procedures  1. Endotracheal intubation. 2.  ABG interpretation and ventilator management. Critical Care Note:  Total critical care time provided today was 38 minutes. This excludes seperately billable procedures and family discussion time.  Critical care time provided for obtaining history, conducting a physical exam, performing and monitoring interventions, ordering, collecting and interpreting tests, and establishing medical decision-making. There was a potential for life/limb threatening pathology requiring close evaluation and intervention with concern for patient decompensation. ED Provider Disposition:  DISPOSITION  Admit ICU      Comment: Please note this report has been produced using speech recognition software and may contain errors related to that system including errors in grammar, punctuation, and spelling, as well as words and phrases that may be inappropriate. Efforts were made to edit the dictations.        1310 [facility],   04/10/21 2 [facility],   04/10/21 [facility]7

## 2021-04-10 NOTE — PROGRESS NOTES
04/10/21 0928   Vent Information   Vent Type   (zoll)   Vent Mode AC/PC   Pressure Ordered 25   Rate Set 18 bmp   FiO2  70 %   SpO2 100 %   SpO2/FiO2 ratio 142.86   PEEP/CPAP 10   I Time/ I Time % 1.25 s   Humidification Source HME   Nitric Oxide/Epoprostenol In Use? No   Vent Patient Data   High Peep/I Pressure 35   Peak Inspiratory Pressure 35 cmH2O   Rate Measured 18 br/min   Vt Exhaled 421 mL   Minute Volume 12.2 Liters   I:E Ratio 1:3.2   Breath Sounds   Right Upper Lobe Expiratory Wheezes   Right Middle Lobe Expiratory Wheezes   Right Lower Lobe Expiratory Wheezes   Left Upper Lobe Expiratory Wheezes   Left Lower Lobe Expiratory Wheezes   Additional Respiratory  Assessments   Resp 18   Subglottic Suction Done? Yes   Alarm Settings   High Pressure Alarm 40 cmH2O   Low Minute Volume Alarm 2.5 L/min   High Respiratory Rate 40 br/min   Low Exhaled Vt  250 mL   Non-Surgical Airway Endo Tracheal Tube   Placement Date/Time: 04/10/21 0853   Timeout: Patient; Consent Confirmed; Appropriate Equipment;Site/Side;Procedure  Placed By: In ED  Inserted by: Dr Jo Ann Collins  Insertion attempts: 1  Airway Device: Endo Tracheal Tube  Size: 7.5  Placement Verified By[de-identified] Manju...   $ Intubation emergent  $ Yes   Secured at 22 cm   Measured From Lips   Secured Location Right   Secured By Commercial tube brink   Site Condition Dry   patient intubated per Dr. Jo Ann Collins with no complications. Correct color change, BBS, condensation in the ETT and portable chest xray all confirmed correct ETT location. Patient placed Immediately on the vent( see flow sheet). Will continue to wean as tolerated        ET tube 2.7 cm above the luca.       Enteric tube with tip in the stomach but proximal port possibly above the GE   junction.       Bilateral pulmonary nodules as seen on prior.

## 2021-04-10 NOTE — ED NOTES
Pt resting in bed with no signs of distress noted.  This RN will continue to monitor patient      Leonarda Joy RN  04/10/21 [id number]

## 2021-04-10 NOTE — PROGRESS NOTES
04/10/21 1049   Vent Information   Vent Mode AC/VC   Vt Ordered 450 mL   Rate Set 18 bmp   Peak Flow 50 L/min   FiO2  40 %   SpO2 100 %  (Simultaneous filing. User may not have seen previous data.)   SpO2/FiO2 ratio 250  (Simultaneous filing. User may not have seen previous data.)   Sensitivity 2   PEEP/CPAP 10   I Time/ I Time % 1.25 s   Humidification Source HME   Nitric Oxide/Epoprostenol In Use? No   Vent Patient Data   Peak Inspiratory Pressure 45 cmH2O   Rate Measured 18 br/min   Vt Exhaled 487 mL   Minute Volume 11.9 Liters   I:E Ratio 1:3.2   Cough/Sputum   Sputum How Obtained Endotracheal   $Obtained Sample $Induced Sputum   Cough Productive   Sputum Amount Large   Sputum Color Creamy;Cloudy   Tenacity Thick   Spontaneous Breathing Trial (SBT) RT Doc   Pulse 91   Additional Respiratory  Assessments   Resp 18  (Simultaneous filing. User may not have seen previous data.)   Subglottic Suction Done? Yes   Alarm Settings   High Pressure Alarm 50 cmH2O   Low Minute Volume Alarm 2.5 L/min   High Respiratory Rate 40 br/min   Low Exhaled Vt  250 mL   switched patient to volume control at 450 and RR 18. Patient tolerating better. Also, decreased FIO2 to 40%.   Will continue to wean as tolerated

## 2021-04-10 NOTE — PROGRESS NOTES
04/10/21 1227   Patient Transport   Time spent transporting 16-30   Transport ventillation type Transport vent   Transport from ER   Transport destination CT scan   Transport destination CT scan   Transport destination ER   Emergency equipment included Yes   Non-Surgical Airway Endo Tracheal Tube   Placement Date/Time: 04/10/21 0544   Timeout: Patient; Consent Confirmed; Appropriate Equipment;Site/Side;Procedure  Placed By: In ED  Inserted by: Dr Colin Nicholas  Insertion attempts: 1  Airway Device: Endo Tracheal Tube  Size: 7.5  Placement Verified By[de-identified] Manju...    Secured at 22 cm   Measured From Lips   Secured Location Right   Secured By Commercial tube brink   patient transported to CT with no complications

## 2021-04-10 NOTE — CONSULTS
Consult completed. #5Fr Triple Lumen PowerPICC HF initiated to RUE Basilic Vein using sterile, UltraSound-guided technique without difficulty/complications. Positioning verified via TPS & 3CG with appropriate P-wave changes noted. Sterile dressing with SkinPrep, StatLock Securing Device, BioPatch, SwabCaps, and Limb Precautions band applied. Pt tolerated well without s/s of anxiety/distress/pain or requiring additional sedation. Portia Westbrook, Primary RN notified.

## 2021-04-10 NOTE — CARE COORDINATION
Pt identified as potential readmission. Last admission 3/23/2021 - 4/2/2021 for respiratory failure. She was intubated on March 23 and extubated March 25, 2021. Pt here today for evaluation of difficulty in breathing. Patient presents via EMS and report provided by paramedics who state that they were dispatched to the patient secondary difficulty in breathing. When EMS arrived to the scene, patient was sitting upright complaining of difficulty in breathing for the past 2 days. Concerned about CO2 narcosis due to patient's mentation and lack of CPAP support over the past 2 days. IV access is established. Solu-Medrol is provided. ABG is performed demonstrating significant respiratory acidosis with a pH of 7.21, PCO2 130, PO2 146, bicarb 56. Case discussed with family over the phone. Secondary to suspected CO2 narcosis with respiratory acidosis, Recommend endotracheal intubation. Risks, benefits, alternatives are discussed with family. All questions were answered to satisfaction. Daughter, Macy Boothe, who is also power of , expresses understanding of this treatment plan and is agreeable with intubation. Pt is requiring readmission and there were no alternatives to admission to explore at this time. Pt can cannot be safely treated at a lower level of service.

## 2021-04-11 NOTE — PROGRESS NOTES
Pt extubated per Dr. Silvia Brantley. RR 16  RSBI 54 NIF -38. Pt tolerated well. Pt placed on 6LNC saturating in the mid 90's. I will continue to monitor.

## 2021-04-11 NOTE — PROGRESS NOTES
12/01/2020    Lung mass 10/08/2020    Acute on chronic respiratory failure with hypoxia (HCC) 09/18/2020    Osteopenia 06/09/2019    Anxiety 06/09/2019    Tobacco abuse 05/18/2019    Abdominal aortic aneurysm (AAA) without rupture ([facility] [address] 05/18/2019    Shortness of breath 10/18/2016    Hypertension     Degeneration of lumbar or lumbosacral intervertebral disc 07/08/2014    Hyperlipidemia     COPD (chronic obstructive pulmonary disease) (HCC)     Leg pain    AECOPD  HTN  SOB  Smoker  Small cell lung ca  Dysphagia  Acute on chronic Hypoxic Hypercapneic resp failure  VDRF       1. Abx  2. Inhalers  3. Keep sats > 92%  4. ICS  5. OOB  6. D/c sedation  7. SAT and SBT trial and extubation  8. PT/OT  9. Hold tube feeds  10. Swallow eval post  11. C/w present management  No follow-ups on file.     Electronically signed by Peter Christine MD on 4/11/2021 at 11:29 AM

## 2021-04-11 NOTE — PROGRESS NOTES
04/11/21 0430   Vent Information   Vent Type 980   Vent Mode AC/VC   Vt Ordered 450 mL   Rate Set 18 bmp   Peak Flow 45 L/min   Pressure Support 0 cmH20   FiO2  30 %   SpO2 97 %   SpO2/FiO2 ratio 323.33   Sensitivity 3   PEEP/CPAP 5   I Time/ I Time % 0 s   Humidification Source HME   Nitric Oxide/Epoprostenol In Use? No   Vent Patient Data   High Peep/I Pressure 0   Peak Inspiratory Pressure 33 cmH2O   Mean Airway Pressure 14 cmH20   Rate Measured 18 br/min   Vt Exhaled 465 mL   Minute Volume 8.38 Liters   I:E Ratio 1:2.00   Spontaneous Breathing Trial (SBT) RT Doc   Pulse 102   Additional Respiratory  Assessments   Resp 18   Position Semi-Cotton's   Oral Care Completed? No   Alarm Settings   High Pressure Alarm 50 cmH2O   Delay Alarm 20 sec(s)   Low Minute Volume Alarm 2.05 L/min   Apnea (secs) 20 secs   High Respiratory Rate 40 br/min   Low Exhaled Vt  200 mL   Patient Observation   Observations 7.5 cc ETT   Non-Surgical Airway Endo Tracheal Tube   Placement Date/Time: 04/10/21 5018   Timeout: Patient; Consent Confirmed; Appropriate Equipment;Site/Side;Procedure  Placed By: In ED  Inserted by: Dr Micky Alas  Insertion attempts: 1  Airway Device: Endo Tracheal Tube  Size: 7.5  Placement Verified By[de-identified] Manju...    Secured at 22 cm   Measured From Lips   Secured Location Right   Secured By Commercial tube brink   Site Condition Dry

## 2021-04-11 NOTE — PROGRESS NOTES
Progress Note  Date:2021       Room:  Patient Name:Wendy Jauregui     YOB: 1950     Age:70 y.o. Intubated at bedside but opens eyes  Subjective    Subjective:  Symptoms:  Stable. Diet:  NPO. Review of Systems  Objective         Vitals Last 24 Hours:  TEMPERATURE:  Temp  Av.4 °F (37.4 °C)  Min: 97.3 °F (36.3 °C)  Max: 100.6 °F (38.1 °C)  RESPIRATIONS RANGE: Resp  Av.9  Min: 9  Max: 24  PULSE OXIMETRY RANGE: SpO2  Av %  Min: 92 %  Max: 100 %  PULSE RANGE: Pulse  Av.8  Min: 88  Max: 163  BLOOD PRESSURE RANGE: Systolic (79BNQ), QLH:588 , Min:75 , SGJ:244   ; Diastolic (57HZW), YPB:43, Min:42, Max:79    I/O (24Hr): Intake/Output Summary (Last 24 hours) at 2021 0718  Last data filed at 2021 0502  Gross per 24 hour   Intake 2421 ml   Output 1000 ml   Net 1421 ml     Objective:  General Appearance:  Comfortable. Vital signs: (most recent): Blood pressure 129/63, pulse 103, temperature 99.3 °F (37.4 °C), temperature source Rectal, resp. rate 18, height 5' (1.524 m), weight 133 lb 2.5 oz (60.4 kg), SpO2 97 %, not currently breastfeeding. (Tachy). HEENT: Normal HEENT exam.    Lungs:  Normal effort. There are decreased breath sounds. No rales or rhonchi. Heart: Normal rate. Abdomen: Abdomen is soft. Bowel sounds are normal.     Extremities: Decreased range of motion. There is dependent edema. Neurological: (Opens eyes ). Pupils:  Pupils are equal.   Skin:  Warm.       Labs/Imaging/Diagnostics    Labs:  CBC:  Recent Labs     04/10/21  0831 21  0530   WBC 15.9* 4.9   RBC 3.11* 2.42*   HGB 9.0* 7.0*   HCT 31.6* 23.3*   .6* 96.3   RDW 15.8* 15.4*     --      CHEMISTRIES:  Recent Labs     04/10/21  0916 04/10/21  1850 21  0530     --  143   K 5.6* 4.1 4.1   CL 98*  --  100   CO2 40*  --  37*   BUN 25*  --  29*   CREATININE 0.5*  --  0.7   GLUCOSE 128*  --  107*     PT/INR:  Recent Labs     04/10/21 0831   PROTIME 12.7   INR 1.05     APTT:  Recent Labs     04/10/21  0831   APTT 28.0     LIVER PROFILE:  Recent Labs     04/10/21  0916   AST 23   ALT 16   BILITOT 0.2   ALKPHOS 66       Imaging Last 24 Hours:  Xr Chest Portable    Result Date: 4/11/2021  EXAMINATION: ONE XRAY VIEW OF THE CHEST 4/11/2021 3:05 am COMPARISON: 04/10/2021 HISTORY: ORDERING SYSTEM PROVIDED HISTORY: ventilator TECHNOLOGIST PROVIDED HISTORY: Reason for exam:->ventilator Reason for Exam: ventilator Acuity: Unknown Type of Exam: Subsequent/Follow-up Additional signs and symptoms: ventilator Relevant Medical/Surgical History: ventilator FINDINGS: There is a new right-sided PICC line in place, the tip projects over the mid SVC. Tubes and lines are otherwise unchanged. Bilateral pulmonary nodules are again demonstrated. No significant interval change. Xr Chest Portable    Result Date: 4/10/2021  EXAMINATION: ONE XRAY VIEW OF THE CHEST 4/10/2021 8:49 am COMPARISON: 03/25/2021 HISTORY: ORDERING SYSTEM PROVIDED HISTORY: post intub TECHNOLOGIST PROVIDED HISTORY: Reason for exam:->post intub Reason for Exam: intubation and ng placement Acuity: Acute Type of Exam: Initial Relevant Medical/Surgical History: known lung ca FINDINGS: The tip of the ET tube is 2.7 cm above the luca. Enteric tube extends below the diaphragm with tip in the stomach. Proximal port may be above the GE junction. Cardiomediastinal silhouette and bony thorax are unchanged. Atherosclerotic calcifications of the aorta. Again demonstrated bilateral pulmonary nodules measuring up to 2.3 cm in size as seen on prior examination in the right lower lobe. No effusion or pneumothorax. ET tube 2.7 cm above the luca. Enteric tube with tip in the stomach but proximal port possibly above the GE junction. Bilateral pulmonary nodules as seen on prior.      Cta Pulmonary W Contrast    Result Date: 4/10/2021  EXAMINATION: CTA OF THE CHEST, 4/10/2021 12:24 pm TECHNIQUE: CTA of visualized osseous structures without focal destructive lesion. Enlarging dominant right lower lobe pulmonary nodule measuring up to 24 mm in diameter previously measuring up to 16 mm in diameter suggesting progression of primary malignancy. New ground-glass opacity peripheral to the lesion in the right lower lobe that may represent post treatment changes. This can be seen with radiation pneumonitis. Recommend clinical correlation. Stable spiculated right apical and right lower lobe smaller pulmonary nodules suggesting stable metastatic disease. No pulmonary embolism.      Assessment//Plan           Hospital Problems           Last Modified POA    * (Principal) Sepsis ([facility] [address] 4/10/2021 Yes    COPD exacerbation ([facility] [address] 4/10/2021 Yes    Small cell lung cancer ([facility] [address] 4/10/2021 Yes    Acute on chronic respiratory failure with hypoxia and hypercapnia (HCC) 4/10/2021 Yes    Elevated troponin level 4/10/2021 Yes    Elevated brain natriuretic peptide (BNP) level 4/10/2021 Yes    Acute hyperkalemia 4/10/2021 Yes    Pneumonitis 4/10/2021 Yes        Assessment & Plan  Sepsis with acute on chronic resp failure with pneumonitis  -intubated and baseline 4L  -meropenem, zithro and vanc  -CT PE no PE  -solumedrol   -legionella, strep pending and covid neg  HYperkalemia(resolved)  -kayexalate  SCLC  -chemo last week  -CTA with progressino of primary malignancy  Afib  -eliquis, CCB  -echo pending  Dsyphagia with esophagitis  -carafate and famotidine  Tobacco use  -still actively smoking and once extubated will emphasize to stop  Anemia  -transfuse PRBC and plt count pending  Seizure  -keppra and CT head      Critical care time 30min from 8-830am  Electronically signed by Martinez Paredes MD on 4/11/21 at 7:18 AM EDT

## 2021-04-11 NOTE — CONSULTS
[address] [facility]  consulted by Dr. Paige Clements for monitoring and adjustment. Indication for treatment: Sepsis of unknown etiology  Goal trough: 15 mcg/mL    Pertinent Laboratory Values:   Temp Readings from Last 3 Encounters:   04/11/21 99.3 °F (37.4 °C) (Rectal)   04/09/21 97.8 °F (36.6 °C) (Temporal)   04/08/21 97.7 °F (36.5 °C) (Temporal)     Recent Labs     04/10/21  0831 04/10/21  1349 04/10/21  1646 04/11/21  0530   WBC 15.9*  --   --  4.9   LACTATE  --  2.1* 1.2  --      Recent Labs     04/10/21  0916 04/11/21  0530   BUN 25* 29*   CREATININE 0.5* 0.7     Estimated Creatinine Clearance: 59 mL/min (based on SCr of 0.7 mg/dL). Intake/Output Summary (Last 24 hours) at 4/11/2021 1727  Last data filed at 4/11/2021 1040  Gross per 24 hour   Intake 2761 ml   Output 1000 ml   Net 1761 ml       Pertinent Cultures:  Date    Source    Results  3/24               Nasal MRSA screen  Negative  4/10               Blood    Ordered  4/10                            Strep pneumo/Legionella       Ordered    Vancomycin level:   TROUGH:  No results for input(s): VANCOTROUGH in the last 72 hours. RANDOM:  No results for input(s): VANCORANDOM in the last 72 hours. Assessment:  · WBC and temperature: WBC down to normal today, pt continues with a slight fever of 99.3  · SCr, BUN, and urine output: SCR now trending up slightly above baseline, UOP appears ok  · Day(s) of therapy:  3  · Vancomycin concentration: scheduled for 4/12 @11:00    Plan:  · Started on vancomycin 750mg ivpb q12h. SCR now slowly trending up above baseline. Will empirically decrease frequency and adjust trough time. · Decrease to vancomycin 750mg ivpb q18h  · Check the vanco level tomorrow am  · Pharmacy will continue to monitor patient and adjust therapy as indicated    [address] 4/12 @11:00    Thank you for the consult. Yulissa Merrill Flick   4/11/2021 5:27 PM

## 2021-04-11 NOTE — PROGRESS NOTES
Skin assessment completed upon admission with this RN and RADHA RN. Noted to have stage 2 coccyx (mepilex placed at this time.) scattered bruising and abrasions.

## 2021-04-11 NOTE — PROGRESS NOTES
[employer] message sent to Dr Elmer Bowden with ABG results. ABG results also called to [place].

## 2021-04-11 NOTE — PLAN OF CARE
Problem: Pain:  Goal: Pain level will decrease  Description: Pain level will decrease  4/11/2021 1057 by John Cardenas RN  Outcome: Ongoing  4/10/2021 2220 by Leona Barnhart RN  Outcome: Ongoing  Goal: Control of acute pain  Description: Control of acute pain  4/11/2021 1057 by John Cardenas RN  Outcome: Ongoing  4/10/2021 2220 by Leona Barnhart RN  Outcome: Ongoing  Goal: Control of chronic pain  Description: Control of chronic pain  4/11/2021 1057 by John Cardensa RN  Outcome: Ongoing  4/10/2021 2220 by Leona Barnhart RN  Outcome: Ongoing     Problem: Non-Violent Restraints  Goal: Removal from restraints as soon as assessed to be safe  4/11/2021 1057 by John Cardenas RN  Outcome: Ongoing  4/10/2021 2220 by Leona Barnhart RN  Outcome: Ongoing  Goal: No harm/injury to patient while restraints in use  4/11/2021 1057 by John Cardenas RN  Outcome: Ongoing  4/10/2021 2220 by Leona Barnhart RN  Outcome: Ongoing  Goal: Patient's dignity will be maintained  4/11/2021 1057 by John Cardenas RN  Outcome: Ongoing  4/10/2021 2220 by Leona Barnhart RN  Outcome: Ongoing

## 2021-04-11 NOTE — CONSULTS
Chart reviewed  Full note to follow                      Name:  Jannette Barr /Age/Sex: 1950  (65 y.o. female)   MRN & CSN:  [id number] & [id number] Admission Date/Time: 4/10/2021  8:15 AM   Location:  -A PCP: Johnson Schmid, [address] Me[address] Singh Day: 2          Referring physician:  Vlad Higgins MD         Reason for consultation:   afib        Thanks for referral.    Information source: chart/nurse    CC;  SOB      HPI:   Thank you for involving me in taking  care of Jannette Barr who  is a 65 y. o.year  Old female  Presents with  past medical history of stage IIIb Small Cell Lung Cancer of right lung, chronic hypoxemic respiratory failure, COPD, dysphagia with exudative esophagitis, Afib on Eliquis, chronic anemia, hyperlipidemia, hypertension, anxiety, depression, and tobacco abuse presented to the ED with worsening shortness of breath, difficulty breathing, and respiratory distress with hypoxia. Patient is presently intubated, on mechanical ventilator, and sedated with fentanyl and Versed. Pt on vent very alert. In SR now. Past medical history:    has a past medical history of Arthritis, COPD (chronic obstructive pulmonary disease) ([address] Full dentures, H/O cardiovascular stress test, H/O cardiovascular stress test, H/O Doppler ultrasound, H/O Doppler ultrasound, History of 24 hour EKG monitoring, History of nuclear stress test, Hx of chest x-ray, Hx of Doppler echocardiogram, Hx of echocardiogram, Hx of echocardiogram, Hx of pelvic x-ray, Hx of x-ray of hip, Hyperlipidemia, Hypertension, Leg pain, Lung nodules, On home oxygen therapy, Small cell lung cancer ([address] and Wears glasses. Past surgical history:   has a past surgical history that includes cyst removal ('s); Tubal ligation ('s); laparoscopic appendectomy (N/A, 2019); Mediastinoscopy (N/A, 12/10/2020); back surgery; Colonoscopy; Hysterectomy; eye surgery (?); joint replacement (Right, 2014? ? ); Foot surgery (Right, 1970's); bronchoscopy (N/A, 1/5/2021); and Upper gastrointestinal endoscopy (N/A, 3/3/2021). Social History:   reports that she quit smoking about 4 months ago. Her smoking use included cigarettes. She started smoking about 47 years ago. She has a 70.50 pack-year smoking history. She has never used smokeless tobacco. She reports current alcohol use. She reports that she does not use drugs. Family history:  family history includes COPD in her sister; Cancer in her father and sister; Coronary Art Dis in her mother; Dementia in her mother; Diabetes in her sister; Heart Attack in her sister; Heart Attack (age of onset: 61) in her father and mother; High Cholesterol in her mother; Hypertension in her mother, sister, sister, and sister; Irritable Bowel Syndrome in her sister; Pacemaker in her sister; Stroke in her father, mother, and sister.     Allergies   Allergen Reactions    Formaldehyde     Gabapentin Other (See Comments)    Norflex Tablets [Orphenadrine]     Cranberry Rash       sodium chloride flush 0.9 % injection 10 mL, PRN  sodium chloride flush 0.9 % injection 5-40 mL, 2 times per day  sodium chloride flush 0.9 % injection 5-40 mL, PRN  0.9 % sodium chloride infusion, PRN  promethazine (PHENERGAN) tablet 12.5 mg, Q6H PRN    Or  ondansetron (ZOFRAN) injection 4 mg, Q6H PRN  polyethylene glycol (GLYCOLAX) packet 17 g, Daily PRN  acetaminophen (TYLENOL) tablet 650 mg, Q6H PRN    Or  acetaminophen (TYLENOL) suppository 650 mg, Q6H PRN  nicotine (NICODERM CQ) 21 MG/24HR 1 patch, Daily  methylPREDNISolone sodium (SOLU-MEDROL) injection 40 mg, Q6H    Followed by  Pamelia Balling ON 4/13/2021] predniSONE (DELTASONE) tablet 40 mg, Daily  azithromycin (ZITHROMAX) 500 mg in dextrose 5 % 250 mL IVPB, Q24H  apixaban (ELIQUIS) tablet 5 mg, BID  aspirin EC tablet 81 mg, Daily  busPIRone (BUSPAR) tablet 10 mg, BID  calcium-cholecalciferol 500-200 MG-UNIT per tablet 1 tablet, Daily  vitamin D CAPS 1,000 Units, Daily  dilTIAZem (CARDIZEM CD) extended release capsule 240 mg, Daily  diphenhydrAMINE (BENADRYL) tablet 50 mg, Q6H PRN  guaiFENesin (ROBITUSSIN) 100 MG/5ML oral solution 200 mg, TID PRN  lactobacillus (CULTURELLE) capsule 1 capsule, Daily with breakfast  magnesium oxide (MAG-OX) tablet 200 mg, Daily  nystatin (MYCOSTATIN) 756631 UNIT/ML suspension 500,000 Units, 4x Daily  sucralfate (CARAFATE) tablet 1 g, 4x Daily  theophylline (UNIPHYL) extended release tablet 200 mg, Daily  meropenem (MERREM) 1,000 mg in sodium chloride 0.9 % 100 mL IVPB (mini-bag), Q8H  fentaNYL (SUBLIMAZE) 1,000 mcg in sodium chloride 0.9% 100 mL infusion, Continuous  propofol injection, Continuous  chlorhexidine (PERIDEX) 0.12 % solution 15 mL, BID  famotidine (PEPCID) injection 20 mg, BID  glucose (GLUTOSE) 40 % oral gel 15 g, PRN  dextrose 50 % IV solution, PRN  glucagon (rDNA) injection 1 mg, PRN  dextrose 5 % solution, PRN  vancomycin (VANCOCIN) 750 mg in dextrose 5 % 250 mL IVPB, Q12H  atorvastatin (LIPITOR) tablet 10 mg, Nightly  magic (miracle) mouthwash, 4x Daily PRN  0.9 % sodium chloride infusion, Continuous  ipratropium-albuterol (DUONEB) nebulizer solution 1 ampule, Q4H WA  sodium chloride flush 0.9 % injection 5-40 mL, 2 times per day  sodium chloride flush 0.9 % injection 5-40 mL, PRN  0.9 % sodium chloride infusion, PRN  levETIRAcetam (KEPPRA) 500 mg in sodium chloride 0.9 % 100 mL IVPB, Q12H  LORazepam (ATIVAN) injection 1 mg, Q2H PRN      Current Facility-Administered Medications   Medication Dose Route Frequency Provider Last Rate Last Admin    sodium chloride flush 0.9 % injection 10 mL  10 mL Intravenous PRN HELIO Dowling - CNP   10 mL at 04/10/21 1226    sodium chloride flush 0.9 % injection 5-40 mL  5-40 mL Intravenous 2 times per day Veleria Halls, APRN - CNP   10 mL at 04/10/21 2124    sodium chloride flush 0.9 % injection 5-40 mL  5-40 mL Intravenous PRN HELIO Iglesias - CNP        0.9 % sodium chloride infusion  25 mL Intravenous PRN Jeraline Natali, APRN - CNP        promethazine (PHENERGAN) tablet 12.5 mg  12.5 mg Oral Q6H PRN Jeraline Natali, APRN - CNP        Or    ondansetron (ZOFRAN) injection 4 mg  4 mg Intravenous Q6H PRN Elinor Dallas, APRN - CNP        polyethylene glycol (GLYCOLAX) packet 17 g  17 g Oral Daily PRN Jeraline Natali, APRN - CNP        acetaminophen (TYLENOL) tablet 650 mg  650 mg Oral Q6H PRN Jeraline Natali, APRN - CNP   650 mg at 04/10/21 1605    Or    acetaminophen (TYLENOL) suppository 650 mg  650 mg Rectal Q6H PRN Jeraline Natali, APRN - CNP        nicotine (NICODERM CQ) 21 MG/24HR 1 patch  1 patch Transdermal Daily Jeraline Natali, APRN - CNP   1 patch at 04/10/21 1604    methylPREDNISolone sodium (SOLU-MEDROL) injection 40 mg  40 mg Intravenous Q6H Jeraline Natali, APRN - CNP   40 mg at 04/11/21 0342    Followed by   Mary Janeissa Going ON 4/13/2021] predniSONE (DELTASONE) tablet 40 mg  40 mg Oral Daily Elinor Dallas, APRN - CNP        azithromycin (ZITHROMAX) 500 mg in dextrose 5 % 250 mL IVPB  500 mg Intravenous Q24H Jeraline Natali, APRN - CNP   Stopped at 04/10/21 1901    apixaban (ELIQUIS) tablet 5 mg  5 mg Oral BID Jeraline Natali, APRN - CNP   5 mg at 04/10/21 2133    aspirin EC tablet 81 mg  81 mg Oral Daily Elinor Dallas, APRN - CNP        busPIRone (BUSPAR) tablet 10 mg  10 mg Oral BID Jeraline Natali, APRN - CNP   10 mg at 04/10/21 1605    calcium-cholecalciferol 500-200 MG-UNIT per tablet 1 tablet  1 tablet Oral Daily Elinor Dallas, APRN - CNP   1 tablet at 04/10/21 1621    vitamin D CAPS 1,000 Units  1,000 Units Oral Daily Elinor Dallas, APRN - CNP        dilTIAZem (CARDIZEM CD) extended release capsule 240 mg  240 mg Oral Daily Elinor Dallas, APRN - CNP        diphenhydrAMINE (BENADRYL) tablet 50 mg  50 mg Oral Q6H PRN Elinor Dallas APRN - CNP        guaiFENesin (ROBITUSSIN) 100 MG/5ML oral solution 200 mg  200 mg Oral TID PRN HELIO Terry - CNP  lactobacillus (CULTURELLE) capsule 1 capsule  1 capsule Oral Daily with breakfast Hector Bolivar, APRN - CNP        magnesium oxide (MAG-OX) tablet 200 mg  200 mg Oral Daily Hector Bolivar, APRN - CNP   200 mg at 04/10/21 1605    nystatin (MYCOSTATIN) 225208 UNIT/ML suspension 500,000 Units  5 mL Oral 4x Daily Hector Bolivar, APRN - CNP   500,000 Units at 04/10/21 2132    sucralfate (CARAFATE) tablet 1 g  1 g Oral 4x Daily Hector Bolivar, APRN - CNP   1 g at 04/10/21 2132    theophylline (UNIPHYL) extended release tablet 200 mg  200 mg Oral Daily Hector Bolivar, APRN - CNP   200 mg at 04/10/21 1608    meropenem (MERREM) 1,000 mg in sodium chloride 0.9 % 100 mL IVPB (mini-bag)  1,000 mg Intravenous Q8H Hector Bolivar, APRN - CNP   Stopped at 04/11/21 0027    fentaNYL (SUBLIMAZE) 1,000 mcg in sodium chloride 0.9% 100 mL infusion  25 mcg/hr Intravenous Continuous Tiera Coe MD 20 mL/hr at 04/11/21 0339 200 mcg/hr at 04/11/21 0339    propofol injection  10 mcg/kg/min Intravenous Continuous Tiera Coe MD 6.3 mL/hr at 04/11/21 0341 20 mcg/kg/min at 04/11/21 0341    chlorhexidine (PERIDEX) 0.12 % solution 15 mL  15 mL Mouth/Throat BID Tiera Coe MD   15 mL at 04/10/21 2133    famotidine (PEPCID) injection 20 mg  20 mg Intravenous BID Tiera Coe MD   20 mg at 04/10/21 2132    glucose (GLUTOSE) 40 % oral gel 15 g  15 g Oral PRN Elinor Dallas, APRN - CNP        dextrose 50 % IV solution  12.5 g Intravenous PRN Elinor Dallas, APRN - CNP        glucagon (rDNA) injection 1 mg  1 mg Intramuscular PRN Elinor Dallas, APRN - CNP        dextrose 5 % solution  100 mL/hr Intravenous PRN Elinor Dallas, APRN - CNP        vancomycin (VANCOCIN) 750 mg in dextrose 5 % 250 mL IVPB  750 mg Intravenous Q12H Steve Joyce MD   Stopped at 04/11/21 0503    atorvastatin (LIPITOR) tablet 10 mg  10 mg Oral Nightly Steve Joyce MD   10 mg at 04/10/21 0269    magic (miracle) mouthwash  5 mL Swish & Spit 4x Daily PRN Latanya Catching, APRN - CNP        0.9 % sodium chloride infusion   Intravenous Continuous Anthony Chakraborty  mL/hr at 04/11/21 0239 New Bag at 04/11/21 0239    ipratropium-albuterol (DUONEB) nebulizer solution 1 ampule  1 ampule Inhalation Q4H WA Fercho Hook MD   1 ampule at 04/10/21 2153    sodium chloride flush 0.9 % injection 5-40 mL  5-40 mL Intravenous 2 times per day Anthony Chakraborty MD   10 mL at 04/10/21 2123    sodium chloride flush 0.9 % injection 5-40 mL  5-40 mL Intravenous PRN Anthony Chakraborty MD        0.9 % sodium chloride infusion  25 mL Intravenous PRN Anthony Chakraborty MD        levETIRAcetam (KEPPRA) 500 mg in sodium chloride 0.9 % 100 mL IVPB  500 mg Intravenous Q12H Anthony Chakraborty MD   Stopped at 04/11/21 0617    LORazepam (ATIVAN) injection 1 mg  1 mg Intravenous Q2H PRN Anthony Chakraborty MD   1 mg at 04/10/21 1857     Review of Systems:  All 14 systems reviewed, all negative except for  SOB    Physical Examination:    /62   Pulse 100   Temp 99.3 °F (37.4 °C) (Rectal)   Resp 18   Ht 5' (1.524 m)   Wt 133 lb 2.5 oz (60.4 kg)   SpO2 96%   BMI 26.01 kg/m²      Wt Readings from Last 3 Encounters:   04/11/21 133 lb 2.5 oz (60.4 kg)   04/09/21 116 lb (52.6 kg)   04/07/21 116 lb (52.6 kg)     Body mass index is 26.01 kg/m². General Appearance:  fair  Head: normocephalic     Eyes: normal, noninjected conjunctiva    ENT: normal mucosa, noninjected throat, normal     NECK: No JVP  No thyromegaly        Cardiovascular: No thrills palpated   Auscultation: Normal S1 and S2,  no murmur   carotid bruit no   Abdominal Aorta no bruit    Respiratory:    Breath sounds Diminshed bilaterally     Extremities:  Trace Edema clubbing ,   no cyanosis    SKIN: Warm and well perfused, no pallor or cyanosis    Vascular exam:  Pedal Pulses: palp  bilaterally        Abdomen:  No masses or tenderness. No organomegaly noted.     Neurological:  On vent  Lab Review   Recent Labs     04/10/21 0831 04/11/21  0530   WBC 15.9* 4.9   HGB 9.0* 7.0*   HCT 31.6* 23.3*     --       Recent Labs     04/11/21  0530      K 4.1      CO2 37*   BUN 29*   CREATININE 0.7     Recent Labs     04/10/21  0916   AST 23   ALT 16   BILITOT 0.2   ALKPHOS 66     No results for input(s): TROPONINI in the last 72 hours.   No results found for: BNP  Lab Results   Component Value Date    INR 1.05 04/10/2021    PROTIME 12.7 04/10/2021           Assessment/Recommendations:     - Resp failure sec to pulm issues on Vent  - A fib in SR, on Corinne Renee MD, 4/11/2021 6:39 AM

## 2021-04-11 NOTE — FLOWSHEET NOTE
Patient sats 76s. Placed omn NRB and sats up to [de-identified]. Patient work of breathing increased and tachy. Called Dr Dmitri Bansal and orders to reintubate based on presentation and recent ABG. Called Respiratory and CRNA.     Feli JACKSON called daughter and updated and agree on reintubation

## 2021-04-11 NOTE — CONSULTS
monitoring 03/11/2011    NSR no ectopy    History of nuclear stress test 10/21/2016    lexiscan-normal,no ischemia,EF70%,enlarged right ventricle    Hx of chest x-ray 01/02/2015    WNL    Hx of Doppler echocardiogram 12/01/2020    EF 50-55% mild LV hypertrophy. Grade 2 diastolic dysfunction. Mild AS.     Hx of echocardiogram 10/11/2013    10/13 Abn lt ventricular diastolic function, mile MR, EF 57%,10/10 EF55%, mild mitral annular calcification    Hx of echocardiogram 05/02/2019    EF 38-88%, Grade I diastolic dysfunction, Mild aortic stenosis, Calcific thickening of posterior leaflet of mitral valve, Mild Pulm HTN    Hx of pelvic x-ray 01/02/2015    Severe RT his osterarothrosis    Hx of x-ray of hip 01/02/2015    Severe Rt hip osteosrthrosis    Hyperlipidemia     Hypertension     follows with dr Ana Cleary Leg pain     Lung nodules     scheduled for bronch 1/5/2020    On home oxygen therapy     \"on 4 liters 24 hours per day\"    Small cell lung cancer ([facility] 1/11/2021    Wears glasses     to read     Past Surgical History:        Procedure Laterality Date    BACK SURGERY      \"about 12 yrs ago - surgery my mid to low back \" done in [address] N/A 1/5/2021    BRONCHOSCOPY ENDOBRONCHIAL ULTRASOUND performed by Daniel Fonseca MD at [facility]      \"last one done in my age 61's    CYST REMOVAL  1970's    left arm    EYE SURGERY  2010?    svetlana cataract ext     FOOT SURGERY Right 1970's    \"have screw in 2nd toe\"    HYSTERECTOMY      1999\"took everything \"    JOINT REPLACEMENT Right 2014??    hip    LAPAROSCOPIC APPENDECTOMY N/A 5/18/2019    APPENDECTOMY LAPAROSCOPIC performed by Manny Phillips MD at [address] [place] [address] 12/10/2020    MEDIASTINOSCOPY performed by Daniel Fonseca MD at [address]  1970's   [address] N/A 3/3/2021    EGD BIOPSY AND BRUSHING performed by Shyann Alston MD at [facility] ENDOSCOPY       Current Medications:    Current Facility-Administered Medications: 0.9 % sodium chloride infusion, , Intravenous, PRN  sodium chloride flush 0.9 % injection 10 mL, 10 mL, Intravenous, PRN  sodium chloride flush 0.9 % injection 5-40 mL, 5-40 mL, Intravenous, 2 times per day  sodium chloride flush 0.9 % injection 5-40 mL, 5-40 mL, Intravenous, PRN  0.9 % sodium chloride infusion, 25 mL, Intravenous, PRN  promethazine (PHENERGAN) tablet 12.5 mg, 12.5 mg, Oral, Q6H PRN **OR** ondansetron (ZOFRAN) injection 4 mg, 4 mg, Intravenous, Q6H PRN  polyethylene glycol (GLYCOLAX) packet 17 g, 17 g, Oral, Daily PRN  acetaminophen (TYLENOL) tablet 650 mg, 650 mg, Oral, Q6H PRN **OR** acetaminophen (TYLENOL) suppository 650 mg, 650 mg, Rectal, Q6H PRN  nicotine (NICODERM CQ) 21 MG/24HR 1 patch, 1 patch, Transdermal, Daily  methylPREDNISolone sodium (SOLU-MEDROL) injection 40 mg, 40 mg, Intravenous, Q6H **FOLLOWED BY** [START ON 4/13/2021] predniSONE (DELTASONE) tablet 40 mg, 40 mg, Oral, Daily  azithromycin (ZITHROMAX) 500 mg in dextrose 5 % 250 mL IVPB, 500 mg, Intravenous, Q24H  apixaban (ELIQUIS) tablet 5 mg, 5 mg, Oral, BID  aspirin EC tablet 81 mg, 81 mg, Oral, Daily  busPIRone (BUSPAR) tablet 10 mg, 10 mg, Oral, BID  calcium-cholecalciferol 500-200 MG-UNIT per tablet 1 tablet, 1 tablet, Oral, Daily  vitamin D CAPS 1,000 Units, 1,000 Units, Oral, Daily  dilTIAZem (CARDIZEM CD) extended release capsule 240 mg, 240 mg, Oral, Daily  diphenhydrAMINE (BENADRYL) tablet 50 mg, 50 mg, Oral, Q6H PRN  guaiFENesin (ROBITUSSIN) 100 MG/5ML oral solution 200 mg, 200 mg, Oral, TID PRN  lactobacillus (CULTURELLE) capsule 1 capsule, 1 capsule, Oral, Daily with breakfast  magnesium oxide (MAG-OX) tablet 200 mg, 200 mg, Oral, Daily  nystatin (MYCOSTATIN) 538104 UNIT/ML suspension 500,000 Units, 5 mL, Oral, 4x Daily  sucralfate (CARAFATE) tablet 1 g, 1 g, Oral, 4x Daily  theophylline (UNIPHYL) extended release tablet 200 mg, 200 mg, Oral, Daily  meropenem (MERREM) 1,000 mg in sodium chloride 0.9 % 100 mL IVPB (mini-bag), 1,000 mg, Intravenous, Q8H  fentaNYL (SUBLIMAZE) 1,000 mcg in sodium chloride 0.9% 100 mL infusion, 25 mcg/hr, Intravenous, Continuous  propofol injection, 10 mcg/kg/min, Intravenous, Continuous  chlorhexidine (PERIDEX) 0.12 % solution 15 mL, 15 mL, Mouth/Throat, BID  famotidine (PEPCID) injection 20 mg, 20 mg, Intravenous, BID  glucose (GLUTOSE) 40 % oral gel 15 g, 15 g, Oral, PRN  dextrose 50 % IV solution, 12.5 g, Intravenous, PRN  glucagon (rDNA) injection 1 mg, 1 mg, Intramuscular, PRN  dextrose 5 % solution, 100 mL/hr, Intravenous, PRN  vancomycin (VANCOCIN) 750 mg in dextrose 5 % 250 mL IVPB, 750 mg, Intravenous, Q12H  atorvastatin (LIPITOR) tablet 10 mg, 10 mg, Oral, Nightly  magic (miracle) mouthwash, 5 mL, Swish & Spit, 4x Daily PRN  0.9 % sodium chloride infusion, , Intravenous, Continuous  ipratropium-albuterol (DUONEB) nebulizer solution 1 ampule, 1 ampule, Inhalation, Q4H WA  sodium chloride flush 0.9 % injection 5-40 mL, 5-40 mL, Intravenous, 2 times per day  sodium chloride flush 0.9 % injection 5-40 mL, 5-40 mL, Intravenous, PRN  0.9 % sodium chloride infusion, 25 mL, Intravenous, PRN  levETIRAcetam (KEPPRA) 500 mg in sodium chloride 0.9 % 100 mL IVPB, 500 mg, Intravenous, Q12H  LORazepam (ATIVAN) injection 1 mg, 1 mg, Intravenous, Q2H PRN    Allergies:  Formaldehyde, Gabapentin, Norflex tablets [orphenadrine], and Cranberry    Social History:    TOBACCO:   reports that she quit smoking about 4 months ago. Her smoking use included cigarettes. She started smoking about 47 years ago. She has a 70.50 pack-year smoking history. She has never used smokeless tobacco.  ETOH:   reports current alcohol use. DRUGS:   reports no history of drug use.   Family History:       Problem Relation Age of Onset   [place] Stroke Mother     Dementia Mother     Heart Attack Mother 61    Coronary Art Dis Mother     Hypertension Mother     High Cholesterol Mother    Lorie Alexander Father     Stroke Father     Heart Attack Father 61    Diabetes Sister     Heart Attack Sister     Hypertension Sister     Cancer Sister    [facility] COPD Sister     Stroke Sister     Hypertension Sister     Hypertension Sister     Irritable Bowel Syndrome Sister     Pacemaker Sister      REVIEW OF SYSTEMS:    Patient is sedated and on the vent. PHYSICAL EXAM:      Vitals:    BP (!) 116/57   Pulse 96   Temp 99.3 °F (37.4 °C) (Rectal)   Resp 16   Ht 5' (1.524 m)   Wt 133 lb 2.5 oz (60.4 kg)   SpO2 97%   BMI 26.01 kg/m²     CONSTITUTIONAL: Sedated and on the vent  EYES: Anicteric sclera. Pale. NECK: ET tube noted. No JVD. No cervical lymphadenopathy. LUNGS: Fair air entry anteriorly. No wheezing  CARDIOVASCULAR:  normal S1 and S2 and no murmur noted  ABDOMEN:  decrease bowel sounds, soft, non-distended and non-tender  MUSCULOSKELETAL:  there is no redness, warmth, or swelling of the joints  NEUROLOGIC:  Cranial Nerves: Sedated  SKIN: warm skin, no edema or cyanosis.   DATA:    Labs:  General Labs:    CBC with Differential:    Lab Results   Component Value Date    WBC 4.9 04/11/2021    RBC 2.42 04/11/2021    HGB 7.0 04/11/2021    HCT 23.3 04/11/2021     04/11/2021    MCV 96.3 04/11/2021    MCH 28.9 04/11/2021    MCHC 30.0 04/11/2021    RDW 15.4 04/11/2021    NRBC 1 04/02/2021    SEGSPCT 92.2 04/11/2021    BANDSPCT 3 04/05/2021    LYMPHOPCT 4.1 04/11/2021    MONOPCT 2.7 04/11/2021    MYELOPCT 2 04/05/2021    BASOPCT 0.0 04/11/2021    MONOSABS 0.1 04/11/2021    LYMPHSABS 0.2 04/11/2021    EOSABS 0.0 04/11/2021    BASOSABS 0.0 04/11/2021    DIFFTYPE AUTOMATED DIFFERENTIAL 04/11/2021     CMP:    Lab Results   Component Value Date     04/11/2021    K 4.1 04/11/2021     04/11/2021    CO2 37 04/11/2021    BUN 29 04/11/2021    CREATININE 0.7 04/11/2021    GFRAA >60 04/11/2021    AGRATIO 1.6 08/28/2020    LABGLOM >60 04/11/2021    GLUCOSE 107 04/11/2021    PROT 5.9 04/10/2021    PROT 6.5 03/06/2015    LABALBU 3.4 04/10/2021    CALCIUM 8.3 04/11/2021    BILITOT 0.2 04/10/2021    ALKPHOS 66 04/10/2021    AST 23 04/10/2021    ALT 16 04/10/2021       IMPRESSION/RECOMMENDATIONS:      1. She has small cell carcinoma of the lung. She received fourth cycle of cisplatin etoposide. Last chemo was last week. I doubt she have pneumonitis induced by the treatment since she never received any radiation therapy or immunotherapy. She could have pneumonia and COPD exacerbation. To continue with antibiotic. I plan to have follow-up PET CT scan as an outpatient. 2.  She wakes up during the physical exam.  Hopefully she can be extubated soon. 3.  She has multifactorial anemia. Chemotherapy also attributed to her anemia. I recommend to transfuse 1 unit of packed red blood cell. I will continue to follow her closely. Thank you for allowing me to participate in the care of this nice female patient.   Sincerely

## 2021-04-11 NOTE — ANESTHESIA PROCEDURE NOTES
Airway  Date/Time: 4/11/2021 5:27 PM  Urgency: urgent    Airway not difficult    General Information and Staff    Patient location during procedure: ICU  Resident/CRNA: Eduardo Jim APRN - CRNA  Performed: resident/CRNA     Consent for Airway (if performed for an anesthetic, see related documentation for consents)  Patient identity confirmed: per hospital policy  Consent: The procedure was performed in an emergent situation. Verbal consent not obtained. Written consent not obtained. Risks and benefits: risks, benefits and alternatives were not discussed      Code status verified:yes  Indications and Patient Condition  Indications for airway management: pulmonary toilet, respiratory distress, respiratory failure and CNS depression  Spontaneous ventilation: present  Sedation level: deep  Preoxygenated: yes  Mask difficulty assessment: vent by bag mask    Final Airway Details  Final airway type: endotracheal airway      Successful airway: ETT  Cuffed: yes   Successful intubation technique: direct laryngoscopy  Facilitating devices/methods: intubating stylet  Endotracheal tube insertion site: oral  Blade: Marti  Blade size: #3  ETT size (mm): 7.5  Cormack-Lehane Classification: grade I - full view of glottis  Placement verified by: capnometry   Measured from: gums  ETT to gums (cm): 23  Number of attempts at approach: 1  Ventilation between attempts: bag mask    Additional Comments  Pt found minimally responsive, sats in low 90s, nonrebreather. Oral suctioned, preoxygen with ambubag, 10 mg etomidate followed by 100 mg suxx. Size 7 ett easily inserted. Positive etco2 obtained. Sats 99 post intubation. Report to RN at bedside.

## 2021-04-11 NOTE — PROGRESS NOTES
Abg results sent to Dr Ayden Caballero. He wants FIO2 at 50. Keeps sats above 88. Repeat abg in two hours.  Orders placed

## 2021-04-11 NOTE — PROGRESS NOTES
Notified Dr. Vega Post pt bp still low, running 70s-40s map 50s and HR increasing now 120-160s. Awaiting PICC line placment for meds and possibly start IV pressors. Will obtain repeat labs at that time to recheck potassium and digoxin administration if possible. No new orders at this time, try to adjust sedation. Pt currently still arouse easily and follow commands but anxious.

## 2021-04-12 NOTE — PROGRESS NOTES
CO2 40*  --  37* 32   BUN 25*  --  29* 26*   CREATININE 0.5*  --  0.7 0.6   GLUCOSE 128*  --  107* 93     PT/INR:  Recent Labs     04/10/21  0831   PROTIME 12.7   INR 1.05     APTT:  Recent Labs     04/10/21  0831   APTT 28.0     LIVER PROFILE:  Recent Labs     04/10/21  0916   AST 23   ALT 16   BILITOT 0.2   ALKPHOS 66       Imaging Last 24 Hours:  Xr Chest Portable    Result Date: 4/11/2021  EXAMINATION: ONE XRAY VIEW OF THE CHEST 4/11/2021 3:05 am COMPARISON: 04/10/2021 HISTORY: ORDERING SYSTEM PROVIDED HISTORY: ventilator TECHNOLOGIST PROVIDED HISTORY: Reason for exam:->ventilator Reason for Exam: ventilator Acuity: Unknown Type of Exam: Subsequent/Follow-up Additional signs and symptoms: ventilator Relevant Medical/Surgical History: ventilator FINDINGS: There is a new right-sided PICC line in place, the tip projects over the mid SVC. Tubes and lines are otherwise unchanged. Bilateral pulmonary nodules are again demonstrated. No significant interval change. Xr Chest Portable    Result Date: 4/10/2021  EXAMINATION: ONE XRAY VIEW OF THE CHEST 4/10/2021 8:49 am COMPARISON: 03/25/2021 HISTORY: ORDERING SYSTEM PROVIDED HISTORY: post intub TECHNOLOGIST PROVIDED HISTORY: Reason for exam:->post intub Reason for Exam: intubation and ng placement Acuity: Acute Type of Exam: Initial Relevant Medical/Surgical History: known lung ca FINDINGS: The tip of the ET tube is 2.7 cm above the luca. Enteric tube extends below the diaphragm with tip in the stomach. Proximal port may be above the GE junction. Cardiomediastinal silhouette and bony thorax are unchanged. Atherosclerotic calcifications of the aorta. Again demonstrated bilateral pulmonary nodules measuring up to 2.3 cm in size as seen on prior examination in the right lower lobe. No effusion or pneumothorax. ET tube 2.7 cm above the luca. Enteric tube with tip in the stomach but proximal port possibly above the GE junction.  Bilateral pulmonary nodules as seen on prior. Cta Pulmonary W Contrast    Result Date: 4/10/2021  EXAMINATION: CTA OF THE CHEST, 4/10/2021 12:24 pm TECHNIQUE: CTA of the chest was performed after the administration of intravenous contrast.  Multiplanar reformatted images are provided for review. MIP images are provided for review. Dose modulation, iterative reconstruction, and/or weight based adjustment of the mA/kV was utilized to reduce the radiation dose to as low as reasonably achievable. COMPARISON: 03/23/2021 HISTORY: ORDERING SYSTEM PROVIDED HISTORY:  JODIE, hx SCLC TECHNOLOGIST PROVIDED HISTORY: Reason for exam:  JODIE, hx SCLC Decision Support Exception:  Emergency Medical Condition (MA) Reason for Exam:  JODIE, hx SCLC Acuity: Acute Type of Exam: Initial FINDINGS: Pulmonary Arteries: Pulmonary arteries are adequately opacified for evaluation. No evidence of intraluminal filling defect to suggest pulmonary embolism. Main pulmonary artery is normal in caliber. Mediastinum: Mild coronary artery atherosclerotic calcifications. Thoracic aorta is unremarkable. Heart and pericardium are unremarkable. Enteric tube noted in the esophagus. ET tube in the trachea with tip near the luca. This should be reposition. No evidence of mediastinal, hilar or axillary lymphadenopathy. Lungs/pleura: There is a spiculated right lower lobe pulmonary nodule measuring 2.4 cm in diameter previously measuring 1.6 cm in diameter suggesting progression of primary malignancy. There is new ground-glass opacity peripheral to the dominant lesion. This may represent post treatment changes. There is a 9 mm right lower lobe pulmonary nodule stable since prior examination best seen on image 74 of series 4. Stable right apical spiculated pulmonary nodule measuring approximately 8 mm in size. No new pulmonary nodules are noted. Mild-to-moderate centrilobular emphysema again demonstrated. No pleural effusion or pneumothorax.  Upper Abdomen:  Limited images of the upper abdomen demonstrate no acute abnormality. No focal adrenal nodules. Soft Tissues/Bones:  Age related degenerative changes of the visualized osseous structures without focal destructive lesion. Enlarging dominant right lower lobe pulmonary nodule measuring up to 24 mm in diameter previously measuring up to 16 mm in diameter suggesting progression of primary malignancy. New ground-glass opacity peripheral to the lesion in the right lower lobe that may represent post treatment changes. This can be seen with radiation pneumonitis. Recommend clinical correlation. Stable spiculated right apical and right lower lobe smaller pulmonary nodules suggesting stable metastatic disease. No pulmonary embolism.      Assessment//Plan           Hospital Problems           Last Modified POA    * (Principal) Sepsis ([facility] [address] 4/10/2021 Yes    COPD exacerbation ([address] 4/10/2021 Yes    Small cell lung cancer ([facility] [address] 4/10/2021 Yes    Acute on chronic respiratory failure with hypoxia and hypercapnia (HCC) 4/10/2021 Yes    Elevated troponin level 4/10/2021 Yes    Elevated brain natriuretic peptide (BNP) level 4/10/2021 Yes    Acute hyperkalemia 4/10/2021 Yes    Pneumonitis 4/10/2021 Yes    Ventilator dependent ([address] 4/11/2021 Yes        Assessment & Plan  Sepsis with acute on chronic resp failure with pneumonitis  - baseline 4L  -intubated on admission then was extubated 4/11 and then reintubated again last night  -RLL pna  -meropenem, zithro and vanc  -CT PE no PE  -solumedrol   -legionella, strep pending and covid neg  HYperkalemia(resolved)  -kayexalate  SCLC  -chemo last week  -CTA with progressino of primary malignancy  Afib  -eliquis, CCB  -echo pending  Dsyphagia with esophagitis  -carafate and famotidine  Tobacco use  -still actively smoking and once extubated will emphasize to stop  Anemia  -s/p 1PRBC yesterday and stable  Seizure  -keppra and CT head neg  Thrombocytopenia  -mild      Critical care time 30min from 8-830am Electronically signed by Reinier Ortiz MD on 4/11/21 at 7:18 AM EDT

## 2021-04-12 NOTE — PLAN OF CARE
Nutrition Problem #1: Inadequate protein-energy intake  Intervention: Food and/or Nutrient Delivery: Start Tube Feeding  Nutritional Goals: Pt will receive at least 75% of estimated needs via EN

## 2021-04-12 NOTE — PROGRESS NOTES
Sedation stopped per family request to discuss code status and plan of care. Propofol stopped 1145. Patient awakened after 30 minutes, alert and oriented and able to write. Patient states says she wants to keep the tube, and that she is agreeable to trach and peg if needed. This RN explained all the details, and she communicated she understood. Patient stated that daughter Krista Caballero can make all decisions for her when she is unable to do so for herself. Dr. Barbara Shrestha updated.

## 2021-04-12 NOTE — PROGRESS NOTES
Pulmonary and Critical Care  Progress Note    Subjective: The patient is sedated on vent. Shortness of breath none  Chest pain none  Addressing respiratory complaints Patient is negative for  hemoptysis and cyanosis  CONSTITUTIONAL:  negative for fevers and chills      Past Medical History:     has a past medical history of Arthritis, COPD (chronic obstructive pulmonary disease) ([facility] [address] Full dentures, H/O cardiovascular stress test, H/O cardiovascular stress test, H/O Doppler ultrasound, H/O Doppler ultrasound, History of 24 hour EKG monitoring, History of nuclear stress test, Hx of chest x-ray, Hx of Doppler echocardiogram, Hx of echocardiogram, Hx of echocardiogram, Hx of pelvic x-ray, Hx of x-ray of hip, Hyperlipidemia, Hypertension, Leg pain, Lung nodules, On home oxygen therapy, Small cell lung cancer ([facility] [address] and Wears glasses. has a past surgical history that includes cyst removal (1970's); Tubal ligation (1970's); laparoscopic appendectomy (N/A, 5/18/2019); Mediastinoscopy (N/A, 12/10/2020); back surgery; Colonoscopy; Hysterectomy; eye surgery (2010?); joint replacement (Right, 2014? ?); Foot surgery (Right, 1970's); bronchoscopy (N/A, 1/5/2021); and Upper gastrointestinal endoscopy (N/A, 3/3/2021). reports that she quit smoking about 4 months ago. Her smoking use included cigarettes. She started smoking about 47 years ago. She has a 70.50 pack-year smoking history. She has never used smokeless tobacco. She reports current alcohol use. She reports that she does not use drugs. Family history:  family history includes COPD in her sister; Cancer in her father and sister; Coronary Art Dis in her mother; Dementia in her mother; Diabetes in her sister; Heart Attack in her sister;  Heart Attack (age of onset: 61) in her father and mother; High Cholesterol in her mother; Hypertension in her mother, sister, sister, and sister; Irritable Bowel Syndrome in her sister; Pacemaker in her sister; Stroke in her father, mother, and sister. Allergies   Allergen Reactions    Formaldehyde     Gabapentin Other (See Comments)    Norflex Tablets [Orphenadrine]     Cranberry Rash     Social History:    Reviewed; no changes    Objective:   PHYSICAL EXAM:        VITALS:  BP (!) 152/70   Pulse 94   Temp 98.2 °F (36.8 °C) (Rectal)   Resp 14   Ht 5' (1.524 m)   Wt 139 lb 15.9 oz (63.5 kg)   SpO2 99%   BMI 27.34 kg/m²     24HR INTAKE/OUTPUT:      Intake/Output Summary (Last 24 hours) at 4/12/2021 1115  Last data filed at 4/12/2021 0934  Gross per 24 hour   Intake 6399 ml   Output 1975 ml   Net 4424 ml       CONSTITUTIONAL:  Somnolent. LUNGS:  decreased breath sounds. CARDIOVASCULAR:  normal S1 and S2 and negative JVD  ABD:Abdomen soft, non-tender. BS normal. No masses,  No organomegaly  NEURO:Awake. DATA:    CBC:  Recent Labs     04/10/21  0831 04/11/21  0530 04/11/21  2135 04/12/21  0521   WBC 15.9* 4.9  --  11.5*   RBC 3.11* 2.42*  --  3.25*   HGB 9.0* 7.0* 9.4* 9.5*   HCT 31.6* 23.3* 29.7* 29.5*    254  --  133*   .6* 96.3  --  90.8   MCH 28.9 28.9  --  29.2   MCHC 28.5* 30.0*  --  32.2   RDW 15.8* 15.4*  --  15.3*   SEGSPCT 90.0* 92.2*  --  97.3*      BMP:  Recent Labs     04/10/21  0916 04/10/21  1850 04/11/21  0530 04/12/21  0521     --  143 136   K 5.6* 4.1 4.1 3.3*   CL 98*  --  100 98*   CO2 40*  --  37* 32   BUN 25*  --  29* 26*   CREATININE 0.5*  --  0.7 0.6   CALCIUM 9.3  --  8.3 8.0*   GLUCOSE 128*  --  107* 93      ABG:  Recent Labs     04/11/21  1930 04/12/21  0800 04/12/21  0915   PH 7.17* 7.56* 7.51*   PO2ART 427* 97 99   ISP1QAE 98.0* 35.0 40.0   O2SAT 97.5* 96.9 96.7     Lab Results   Component Value Date    PROBNP 3,057 (H) 04/11/2021    PROBNP 2,265 (H) 04/10/2021    PROBNP 479.3 (H) 03/25/2021    THEOPH 4.9 (L) 03/06/2021     No results found for: [facility]    Radiology Review:  Pertinent images / reports were reviewed as a part of this visit.     Assessment:     Patient Active Problem List   Diagnosis    Hyperlipidemia    COPD (chronic obstructive pulmonary disease) ([facility] [address]    Leg pain    Hypertension    Shortness of breath    Tobacco abuse    Abdominal aortic aneurysm (AAA) without rupture (HCC)    Degeneration of lumbar or lumbosacral intervertebral disc    Osteopenia    Anxiety    Acute on chronic respiratory failure with hypoxia (HCC)    Lung mass    COPD exacerbation (HCC)    Acute exacerbation of chronic obstructive pulmonary disease (COPD) ([address]    Small cell lung cancer (HCC)    Acute on chronic respiratory failure with hypoxia and hypercapnia (HCC)    Acute hypoxemic respiratory failure (HCC)    Pneumonia of both lungs due to infectious organism    Elevated troponin level    Elevated brain natriuretic peptide (BNP) level    Acute hyperkalemia    Sepsis (HCC)    Pneumonitis    Ventilator dependent ([facility] [address]       Plan:   1. Overall the patient is better  2. Supp. kcl. 3. Discussed with RN. 4. Discussed with the family. They will decide about code status.   Ricco Chavez MD  4/12/2021  11:15 AM

## 2021-04-12 NOTE — CONSULTS
[address]  consulted by Dr. Polina Dash for monitoring and adjustment. Indication for treatment: Sepsis of unknown etiology  Goal trough: 15 mcg/mL    Pertinent Laboratory Values:   Temp Readings from Last 3 Encounters:   04/12/21 98.6 °F (37 °C) (Rectal)   04/09/21 97.8 °F (36.6 °C) (Temporal)   04/08/21 97.7 °F (36.5 °C) (Temporal)     Recent Labs     04/10/21  0831 04/10/21  1349 04/10/21  1646 04/11/21  0530 04/12/21  0521   WBC 15.9*  --   --  4.9 11.5*   LACTATE  --  2.1* 1.2  --   --      Recent Labs     04/10/21  0916 04/11/21  0530 04/12/21  0521   BUN 25* 29* 26*   CREATININE 0.5* 0.7 0.6     Estimated Creatinine Clearance: 71 mL/min (based on SCr of 0.6 mg/dL). Intake/Output Summary (Last 24 hours) at 4/12/2021 1418  Last data filed at 4/12/2021 0934  Gross per 24 hour   Intake 6399 ml   Output 1975 ml   Net 4424 ml       Pertinent Cultures:  Date    Source    Results  3/24               Nasal MRSA screen  Negative  4/10               Blood    Ordered  4/10                            Strep pneumo/Legionella       Ordered    Vancomycin level:   TROUGH:    Recent Labs     04/12/21  1159   VANCOTROUGH 13.5     RANDOM:  No results for input(s): VANCORANDOM in the last 72 hours. Assessment:  · WBC and temperature: WBC elevated (on Methylprednisolone), patient is afebrile today   · SCr, BUN, and urine output: SCR remains wnL, UOP appears ok  · Day(s) of therapy:   4  · Vancomycin concentration:   · 4/12:  13., OK to re-dose    Plan:  · Renal labs are stable, wnl  · Trough level @ 13.5, OK to re-dose  · Will continue on Vancomycin 750mg ivpb q18h  · Repeat trough level on 4/14 @ 1730  · Pharmacy will continue to monitor patient and adjust therapy as indicated    Hunter 3 4/14 @ 1730    Thank you for the consult.   Lauro Braxton [place]   4/12/2021 2:18 PM

## 2021-04-12 NOTE — PROGRESS NOTES
CARDIOLOGY  NOTE        Name:  Corinne Joe /Age/Sex: 1950  (65 y.o. female)   MRN & CSN:  [id number] & [id number] Admission Date/Time: 4/10/2021  8:15 AM   Location:  -A PCP: Jim Walton MD       Hospital Day: 3        PLAN FROM CARDIOLOGY FOR TODAY:   ? extubation      - cardiology consult is for:  afib    -  Interval history:  In SR    · ASSESSMENT/ PLAN:  1. A fib in SR CPM  2. ? Extubation  3. Supportive care          Subjective: Todays complain: Patient  Remain intubated    HPI:  Rupert is a 65 y. o.year old who and presents with had concerns including Respiratory Distress. Chief Complaint   Patient presents with    Respiratory Distress           Objective: Temperature:  Current - Temp: 98.4 °F (36.9 °C);  Max - Temp  Av.5 °F (36.9 °C)  Min: 97 °F (36.1 °C)  Max: 99.3 °F (37.4 °C)    Respiratory Rate : Resp  Av.7  Min: 14  Max: 27    Pulse Range: Pulse  Av.1  Min: 88  Max: 124    Blood Presuure Range:  Systolic (38JXV), OUS:805 , Min:68 , JWT:281   ; Diastolic (84QZP), WQA:82, Min:48, Max:114      Pulse ox Range: SpO2  Av.7 %  Min: 89 %  Max: 100 %    24hr I & O:      Intake/Output Summary (Last 24 hours) at 2021 0615  Last data filed at 2021 0345  Gross per 24 hour   Intake 340 ml   Output 1050 ml   Net -710 ml         BP (!) 157/80   Pulse 88   Temp 98.4 °F (36.9 °C) (Rectal)   Resp 18   Ht 5' (1.524 m)   Wt 133 lb 2.5 oz (60.4 kg)   SpO2 96%   BMI 26.01 kg/m²           Review of Systems:  Remain intubated      TELEMETRY: Sinus    has a past medical history of Arthritis, COPD (chronic obstructive pulmonary disease) (HCC), Full dentures, H/O cardiovascular stress test, H/O cardiovascular stress test, H/O Doppler ultrasound, H/O Doppler ultrasound, History of 24 hour EKG monitoring, History of nuclear stress test, Hx of chest x-ray, Hx of Doppler echocardiogram, Hx of echocardiogram, Hx of echocardiogram, Hx of pelvic x-ray, Hx of x-ray of hip, Hyperlipidemia, Hypertension, Leg pain, Lung nodules, On home oxygen therapy, Small cell lung cancer ([address] and Wears glasses. has a past surgical history that includes cyst removal (1970's); Tubal ligation (1970's); laparoscopic appendectomy (N/A, 5/18/2019); Mediastinoscopy (N/A, 12/10/2020); back surgery; Colonoscopy; Hysterectomy; eye surgery (2010?); joint replacement (Right, 2014? ?); Foot surgery (Right, 1970's); bronchoscopy (N/A, 1/5/2021); and Upper gastrointestinal endoscopy (N/A, 3/3/2021).   Physical Exam:  General:   Remain intubated  Head:normal  Eye:normal  Neck:  No JVD   Chest:  Clear to auscultation, respiration easy  Cardiovascular:  RRR S1S2  Abdomen:   nontender  Extremities:  tr edema  Pulses; palpable  Neuro: grossly normal    Medications:    chlorhexidine  15 mL Mouth/Throat BID    famotidine (PEPCID) injection  20 mg Intravenous BID    vancomycin  750 mg Intravenous Q18H    ipratropium-albuterol  1 ampule Inhalation Q4H WA    sodium chloride flush  5-40 mL Intravenous 2 times per day    nicotine  1 patch Transdermal Daily    methylPREDNISolone  40 mg Intravenous Q6H    Followed by   Tia Simpson ON 4/13/2021] predniSONE  40 mg Oral Daily    azithromycin  500 mg Intravenous Q24H    apixaban  5 mg Oral BID    aspirin  81 mg Oral Daily    busPIRone  10 mg Oral BID    calcium-cholecalciferol  1 tablet Oral Daily    vitamin D  1,000 Units Oral Daily    dilTIAZem  240 mg Oral Daily    lactobacillus  1 capsule Oral Daily with breakfast    magnesium oxide  200 mg Oral Daily    nystatin  5 mL Oral 4x Daily    sucralfate  1 g Oral 4x Daily    theophylline  200 mg Oral Daily    meropenem  1,000 mg Intravenous Q8H    atorvastatin  10 mg Oral Nightly    sodium chloride flush  5-40 mL Intravenous 2 times per day    levetiracetam  500 mg Intravenous Q12H      sodium chloride      fentaNYL 75 mcg/hr (04/12/21 7588)    propofol 30 mcg/kg/min (04/12/21 0507)    norepinephrine 2 mcg/min (04/11/21 2107)    sodium chloride      dextrose      sodium chloride 75 mL/hr at 04/11/21 2007    sodium chloride       LORazepam, sodium chloride, metoprolol, sodium chloride flush, sodium chloride flush, sodium chloride, promethazine **OR** ondansetron, polyethylene glycol, acetaminophen **OR** acetaminophen, diphenhydrAMINE, guaiFENesin, glucose, dextrose, glucagon (rDNA), dextrose, magic (miracle) mouthwash, sodium chloride flush, sodium chloride, LORazepam    Lab Data:  CBC:   Recent Labs     04/10/21  0831 04/11/21  0530 04/11/21  2135 04/12/21  0521   WBC 15.9* 4.9  --  11.5*   HGB 9.0* 7.0* 9.4* 9.5*   HCT 31.6* 23.3* 29.7* 29.5*   .6* 96.3  --  90.8    254  --  133*     BMP:   Recent Labs     04/10/21  0916 04/10/21  1850 04/11/21  0530     --  143   K 5.6* 4.1 4.1   CL 98*  --  100   CO2 40*  --  37*   BUN 25*  --  29*   CREATININE 0.5*  --  0.7     LIVER PROFILE:   Recent Labs     04/10/21  0916   AST 23   ALT 16   BILITOT 0.2   ALKPHOS 66     PT/INR:   Recent Labs     04/10/21  0831   PROTIME 12.7   INR 1.05     APTT:   Recent Labs     04/10/21  0831   APTT 28.0     BNP:  No results for input(s): BNP in the last 72 hours.   TROPONIN: @TROPONINI:3@  Labs, consult, tests reviewed    Assessment/ PLAN:    As above                  Adalberto Jaimes MD 4/12/2021 6:15 AM

## 2021-04-12 NOTE — PROGRESS NOTES
I met her daughter today. She was extubated yesterday but reintubated again. Reportedly she wants to be extubated and do not want to be reintubated. They will have meeting with pulmonologist.  She is awake, alert and in no distress. Temperature 98.4, pulse 104, respiratory 18, blood pressure 152 over 74, saturation 98% on the vent. No distress. ET tube noted. Supple, no JVD, no cervical lymphadenopathy. Pupils are reactive. Anicteric sclera. Coarse lung sounds anteriorly. S1-S2 no murmur. Soft, nontender nondistended. No cyanosis or edema. WBC was 11.5, hemoglobin 9.5, platelet 303. Assessment and plan:  1. She has small cell carcinoma of the lung. Recent CT chest was reviewed. I discussed the finding with her and daughter. 2.  Acute respiratory failure likely related to COPD. Pulmonologist will discuss with her and daughter today. To continue supportive care. Thanks.

## 2021-04-12 NOTE — PROGRESS NOTES
Spoke with David Leal [phone], see was asking about rehab for patient, looking at [facility] or [facility], will pass on to case mgt in report in am but asked her to call as well.

## 2021-04-12 NOTE — CARE COORDINATION
[address] Transitions Interview     2021    Patient: Kamala Mortensen Patient : 1950   MRN: [id number]  Reason for Admission: SOB  RARS: Readmission Risk Score: 67           Inpatient Assessment  Care Transitions Summary    Care Transitions Inpatient Review  Medication Review  Do you have all of your prescriptions and are they filled?: No   Housing Review  Social Support  Durable Medical Equipment  Functional Review  Hearing and Vision  Care Transitions Interventions         Follow Up:  Readmission noted/ SOB. Care Transition to follow at discharge if criteria is met.   Future Appointments   Date Time Provider Jourdan Funez   2021 11:45 AM [facility] C-ARM 1 SRMZ RAD [facility] Radiolo   2021 10:00 AM VETO, MED ONC NURSE [facility] MED ONC [place]   2021 10:15 AM Lisandro Balderrama MD [facility][1] CC [facility]   2021  1:30 PM Luis Cedillo,  [facility][1] FPS [facility]   10/12/2021  4:00 PM Derrick Fraser LPN [facility][1] FPS [facility]    11:15 AM Zulay Rahman  [address] [facility]       Health Maintenance  Health Maintenance Due   Topic Date Due    Breast cancer screen  2020       Juan Sommer RN

## 2021-04-12 NOTE — CARE COORDINATION
Patient is intubated/sedated. Will follow for post extubation needs. Akash Corbin RN    Chart reviewed. Patient is from home; recently discharged from [facility] (4/2/21) with [facility]. This is 3rd admission in 2021; 1 ED visit. Has family support. Will follow for post extubation needs. Daughter voiced that if patient needs SNF- [facility] and [facility] are choices.  Akash Corbin RN

## 2021-04-12 NOTE — PROGRESS NOTES
Mona Francis BSN RN clinical  for [facility] SURGICAL [facility] RN students 07-19:00 this date.

## 2021-04-12 NOTE — PROGRESS NOTES
Initial introduction to patient. Pt is sedated and on a ventilator. Joselito Decker at bedside while family is having a family conference. Brief life review with ashlyn. Offered prayer and anointing of the patient, accepted. Will continue to visit and is available for the family if needed.

## 2021-04-12 NOTE — CONSULTS
Height: 5' (152.4 cm)  · Current Body Weight: 139 lb 15.9 oz (63.5 kg)   · Admission Body Weight: 116 lb (52.6 kg)(n/a weight source)    · Usual Body Weight: 125 lb 14.1 oz (57.1 kg)(4/1/21)     · Ideal Body Weight: 100 lbs; % Ideal Body Weight 140 %   · BMI: 27.3  · Adjusted Body Weight:  ; No Adjustment   · Adjusted BMI:      · BMI Categories: Overweight (BMI 25.0-29. 9)       Nutrition Diagnosis:   · Inadequate protein-energy intake related to impaired respiratory function as evidenced by intubation    Nutrition Interventions:   Food and/or Nutrient Delivery:  Start Tube Feeding  Nutrition Education/Counseling:  No recommendation at this time   Coordination of Nutrition Care:  Continue to monitor while inpatient, Coordination of Community Care    Goals:  Pt will receive at least 75% of estimated needs via EN       Nutrition Monitoring and Evaluation:   Behavioral-Environmental Outcomes:  None Identified   Food/Nutrient Intake Outcomes:  Enteral Nutrition Intake/Tolerance  Physical Signs/Symptoms Outcomes:  Biochemical Data, Fluid Status or Edema, Hemodynamic Status, Weight     Discharge Planning:     Too soon to determine     Electronically signed by Maico Bender RD, VAN on 4/12/21 at 1:14 PM EDT    Contact: [phone]

## 2021-04-13 NOTE — PROGRESS NOTES
Neurology Service progress note  [facility]  Patient Name: Gerldine Homans  : 1950        Subjective:   Patient seen and examined today. She still has a right upper extremity swelling and weakness, the sustained clonus has resolved, she remains intubated and sedated however even on light sedation she is able to follow commands and move all 4 extremities she tries to raise up out of bed. We are awaiting EEG results, MRI does not show any metastasis of the brain or acute infarct. Past Medical History:   Diagnosis Date    Arthritis     COPD (chronic obstructive pulmonary disease) ([facility] [address]     follow with Dr Otilio Mendoza Full dentures     H/O cardiovascular stress test 2009    thallium, normal no ischemia EF70%     H/O cardiovascular stress test 10/11/2013    thallium,EF70%, normal, no ischemia    H/O Doppler ultrasound 10/13/2010    carotid, right normal, left normal    H/O Doppler ultrasound 10/07/2014    Carotid mild bilateral internal carotid artery disease less than 50% in severity. Normal vertebral artery flows with good velocities. Incidental finding of possible thyroid nodule in the left lobe.  History of 24 hour EKG monitoring 2011    NSR no ectopy    History of nuclear stress test 10/21/2016    lexiscan-normal,no ischemia,EF70%,enlarged right ventricle    Hx of chest x-ray 2015    WNL    Hx of Doppler echocardiogram 2020    EF 50-55% mild LV hypertrophy. Grade 2 diastolic dysfunction. Mild AS.     Hx of echocardiogram 10/11/2013    10/13 Abn lt ventricular diastolic function, mile MR, EF 57%,10/10 EF55%, mild mitral annular calcification    Hx of echocardiogram 2019    EF 73-30%, Grade I diastolic dysfunction, Mild aortic stenosis, Calcific thickening of posterior leaflet of mitral valve, Mild Pulm HTN    Hx of pelvic x-ray 2015    Severe RT his osterarothrosis    Hx of x-ray of hip 2015    Severe Rt hip osteosrthrosis    Hyperlipidemia     Hypertension     follows with dr [place] Leg pain     Lung nodules     scheduled for bronch 1/5/2020    On home oxygen therapy     \"on 4 liters 24 hours per day\"    Small cell lung cancer ([address] 1/11/2021    Wears glasses     to read    :   Past Surgical History:   Procedure Laterality Date    BACK SURGERY      \"about 12 yrs ago - surgery my mid to low back \" done in [address] N/A 1/5/2021    BRONCHOSCOPY ENDOBRONCHIAL ULTRASOUND performed by Ledy Horne MD at [facility]      \"last one done in my age 61's    CYST REMOVAL  1970's    left arm    EYE SURGERY  2010?    svetlana cataract ext     FOOT SURGERY Right 1970's    \"have screw in 2nd toe\"    HYSTERECTOMY      1999\"took everything \"    JOINT REPLACEMENT Right 2014??    hip    LAPAROSCOPIC APPENDECTOMY N/A 5/18/2019    APPENDECTOMY LAPAROSCOPIC performed by Nikia Toledo MD at [facility] 12/10/2020    MEDIASTINOSCOPY performed by Ledy Horne MD at [facility]  1970's   [address] [facility] [address] N/A 3/3/2021    EGD BIOPSY AND BRUSHING performed by Tori Grover MD at [facility] ENDOSCOPY     Medications:  Scheduled Meds:   methylPREDNISolone  40 mg Intravenous Q6H    chlorhexidine  15 mL Mouth/Throat BID    famotidine (PEPCID) injection  20 mg Intravenous BID    vancomycin  750 mg Intravenous Q18H    ipratropium-albuterol  1 ampule Inhalation Q4H WA    sodium chloride flush  5-40 mL Intravenous 2 times per day    nicotine  1 patch Transdermal Daily    azithromycin  500 mg Intravenous Q24H    apixaban  5 mg Oral BID    aspirin  81 mg Oral Daily    busPIRone  10 mg Oral BID    calcium-cholecalciferol  1 tablet Oral Daily    vitamin D  1,000 Units Oral Daily    dilTIAZem  240 mg Oral Daily    lactobacillus  1 capsule Oral Daily with breakfast    magnesium oxide  200 mg Oral Daily    nystatin  5 mL Oral 4x Daily    sucralfate  1 g Oral 4x Daily    theophylline  200 mg Oral Daily    meropenem  1,000 mg Intravenous Q8H    atorvastatin  10 mg Oral Nightly    sodium chloride flush  5-40 mL Intravenous 2 times per day    levetiracetam  500 mg Intravenous Q12H     Continuous Infusions:   sodium chloride      fentaNYL 100 mcg/hr (21 0456)    propofol 55 mcg/kg/min (21 0923)    sodium chloride      dextrose      sodium chloride       PRN Meds:. LORazepam, potassium chloride, magnesium sulfate, sodium chloride, metoprolol, sodium chloride flush, sodium chloride flush, sodium chloride, promethazine **OR** ondansetron, polyethylene glycol, acetaminophen **OR** acetaminophen, diphenhydrAMINE, guaiFENesin, glucose, dextrose, glucagon (rDNA), dextrose, magic (miracle) mouthwash, sodium chloride flush, sodium chloride, LORazepam    Allergies   Allergen Reactions    Formaldehyde     Gabapentin Other (See Comments)    Norflex Tablets [Orphenadrine]     Cranberry Rash     Social History     Socioeconomic History    Marital status:      Spouse name: Not on file    Number of children: Not on file    Years of education: Not on file    Highest education level: Not on file   Occupational History    Not on file   Social Needs    Financial resource strain: Not on file    Food insecurity     Worry: Not on file     Inability: Not on file    Transportation needs     Medical: Not on file     Non-medical: Not on file   Tobacco Use    Smoking status: Former Smoker     Packs/day: 1.50     Years: 47.00     Pack years: 70.50     Types: Cigarettes     Start date: 1973     Quit date: 2020     Years since quittin.4    Smokeless tobacco: Never Used   Substance and Sexual Activity    Alcohol use:  Yes     Alcohol/week: 0.0 standard drinks     Comment: rare\"twice per year\"    Drug use: No    Sexual activity: Not on file     Comment:    Lifestyle    Physical activity     Days per week: Not on file     Minutes per session: Not on file    Stress: Not on file   Relationships    Social connections     Talks on phone: Not on file     Gets together: Not on file     Attends Oriental orthodox service: Not on file     Active member of club or organization: Not on file     Attends meetings of clubs or organizations: Not on file     Relationship status: Not on file    Intimate partner violence     Fear of current or ex partner: Not on file     Emotionally abused: Not on file     Physically abused: Not on file     Forced sexual activity: Not on file   Other Topics Concern    Not on file   Social History Narrative    Not on file      Family History   Problem Relation Age of Onset    Stroke Mother     Dementia Mother     Heart Attack Mother 61    Coronary Art Dis Mother     Hypertension Mother     High Cholesterol Mother     Cancer Father     Stroke Father     Heart Attack Father 61    Diabetes Sister     Heart Attack Sister     Hypertension Sister    Nell [place] Sister     COPD Sister     Stroke Sister     Hypertension Sister     Hypertension Sister     Irritable Bowel Syndrome Sister     Pacemaker Sister        Review of Symptoms:    Due to current state of encephalopathy cannot answer ROS     Physical Exam:           Gen: intubated, sedation turned off   HEENT: NC/AT,neck supple, PERRL,   Heart: irregular   Lungs: vent   Ext: RUE 3+ pitting edema   Psych: poor eye contact  Skin: no rashes or lesions    NEUROLOGIC EXAM:    Mental Status: Intubated, off sedation she opens her eyes and attempts to follow commands     Cranial Nerve Exam:   CN II-XII: appears grossly intact with equal and reactive pupils, no gaze deviation face appears symmetrical     Motor Exam:       Withdrawal to pain x4 with decreased response and  squeeze on the RUE    Deep Tendon Reflexes: 2/4 biceps, triceps, brachioradialis, patellar, and achilles bilateral upper extremities bilateral lower extremities    Sensation: Pain UE's/LE's b/l    Coordination/Cerebellum:       Tremors--none Gait and stance:      Gait: deferred      LABS:     Recent Labs     04/11/21  0530 04/12/21  0521 04/13/21  0510 04/13/21  1400   WBC 4.9 11.5* 5.3  --     136 139  --    K 4.1 3.3* 3.2* 3.3*    98* 101  --    CO2 37* 32 30  --    BUN 29* 26* 20  --    CREATININE 0.7 0.6 0.5*  --    GLUCOSE 107* 93 118*  --          IMAGING:      MRI Brain w/wo:  No intracranial metastatic disease. Stable mild chronic microvascular disease within the periventricular white   matter. Mild atrophy. EEG Pending     ASSESSMENT/PLAN:     3 65year old female with GTC seizures and right arm and leg weakness with RLE weakness suspect prolonged postictal paralysis not in status epilepticus-rule out partial seizure   2. TME secondary to ARDS with pneumonitis. Plan of care as follows:  1. Neuro Exam:  1. Mild right upper extremity drift  2. Neurodiagnostics:   1. CT head non acute  2. MRI brain w/wo as above  3. EEG pending   3. Medications:  1. Eliquis 5mg BID  2. Keppra 500mg BID  4. PT/OT/ST:  1. Per their recommendations  5. Follow up:  1. Pending course of admission and neurodiagnostics          Thank you for allowing us to participate in the care of your patient. If there are any questions regarding evaluation please feel free to contact us.      HELIO Jasmine - CNP, 4/13/2021

## 2021-04-13 NOTE — PROGRESS NOTES
Pulmonary and Critical Care  Progress Note    Subjective: The patient is better, sedated on vent. Having neuro W/U.MRI brain today. Shortness of breath none  Chest pain none  Addressing respiratory complaints Patient is negative for  hemoptysis and cyanosis  CONSTITUTIONAL:  negative for fevers and chills      Past Medical History:     has a past medical history of Arthritis, COPD (chronic obstructive pulmonary disease) ([facility] [address] Full dentures, H/O cardiovascular stress test, H/O cardiovascular stress test, H/O Doppler ultrasound, H/O Doppler ultrasound, History of 24 hour EKG monitoring, History of nuclear stress test, Hx of chest x-ray, Hx of Doppler echocardiogram, Hx of echocardiogram, Hx of echocardiogram, Hx of pelvic x-ray, Hx of x-ray of hip, Hyperlipidemia, Hypertension, Leg pain, Lung nodules, On home oxygen therapy, Small cell lung cancer ([facility] [address] and Wears glasses. has a past surgical history that includes cyst removal (1970's); Tubal ligation (1970's); laparoscopic appendectomy (N/A, 5/18/2019); Mediastinoscopy (N/A, 12/10/2020); back surgery; Colonoscopy; Hysterectomy; eye surgery (2010?); joint replacement (Right, 2014? ?); Foot surgery (Right, 1970's); bronchoscopy (N/A, 1/5/2021); and Upper gastrointestinal endoscopy (N/A, 3/3/2021). reports that she quit smoking about 4 months ago. Her smoking use included cigarettes. She started smoking about 47 years ago. She has a 70.50 pack-year smoking history. She has never used smokeless tobacco. She reports current alcohol use. She reports that she does not use drugs. Family history:  family history includes COPD in her sister; Cancer in her father and sister; Coronary Art Dis in her mother; Dementia in her mother; Diabetes in her sister; Heart Attack in her sister;  Heart Attack (age of onset: 61) in her father and mother; High Cholesterol in her mother; Hypertension in her mother, sister, sister, and sister; Irritable Bowel Syndrome in her sister; Problem List   Diagnosis    Hyperlipidemia    COPD (chronic obstructive pulmonary disease) (HCC)    Leg pain    Hypertension    Shortness of breath    Tobacco abuse    Abdominal aortic aneurysm (AAA) without rupture (HCC)    Degeneration of lumbar or lumbosacral intervertebral disc    Osteopenia    Anxiety    Acute on chronic respiratory failure with hypoxia (HCC)    Lung mass    COPD exacerbation (HCC)    Acute exacerbation of chronic obstructive pulmonary disease (COPD) ([address]    Small cell lung cancer (HCC)    Acute on chronic respiratory failure with hypoxia and hypercapnia (HCC)    Acute hypoxemic respiratory failure (HCC)    Pneumonia of both lungs due to infectious organism    Elevated troponin level    Elevated brain natriuretic peptide (BNP) level    Acute hyperkalemia    Sepsis (HCC)    Pneumonitis    Ventilator dependent ([address]       Plan:   1. Overall the patient is better  2. Supp. kcl and Mg. 3. Discussed with RN. 4. Discussed with the family. 5. Neuro W/UKevin Fernando MD  4/13/2021  10:59 AM

## 2021-04-13 NOTE — PROGRESS NOTES
Pt transported to MRI with this nurse, Sergio Delgado, [address], and transport personnel from [id number]. Pt back in room resting comfortably.

## 2021-04-13 NOTE — PROGRESS NOTES
04/13/21 0413   Vent Information   Vent Type 980   Vent Mode AC/VC   Vt Ordered 500 mL   Rate Set 14 bmp   Peak Flow 50 L/min   Pressure Support 0 cmH20   FiO2  35 %   SpO2 99 %   SpO2/FiO2 ratio 282.86   Sensitivity 3   PEEP/CPAP 5   I Time/ I Time % 0 s   Humidification Source HME   Vent Patient Data   High Peep/I Pressure 0   Peak Inspiratory Pressure 33 cmH2O   Mean Airway Pressure 12 cmH20   Rate Measured 14 br/min   Vt Exhaled 511 mL   Minute Volume 7.15 Liters   I:E Ratio 1:2.90   Cough/Sputum   Sputum How Obtained Endotracheal;Suctioned   $Obtained Sample $Induced Sputum   Cough Productive   Sputum Amount Small   Sputum Color Creamy   Tenacity Thick   Spontaneous Breathing Trial (SBT) RT Doc   Pulse 75   Additional Respiratory  Assessments   Resp 14   Alarm Settings   High Pressure Alarm 45 cmH2O   Delay Alarm 20 sec(s)   Low Minute Volume Alarm 2.5 L/min   Apnea (secs) 20 secs   High Respiratory Rate 40 br/min   Low Exhaled Vt  250 mL   ETT (adult)   Placement Date/Time: 04/11/21 (c) 1202   Preoxygenation: Yes  Mask Ventilation: Ventilated by mask (1)  Technique: Direct laryngoscopy  Tube Size: 7.5 mm  Laryngoscope: Mac  Blade Size: 3  Location: Oral  Grade View: Full view of the glottis  Insertio. ..    Measured From Lips   ET Placement Right   Secured By Commercial tube brink   Site Condition Dry   Cuff Pressure   (Minimal leak)

## 2021-04-13 NOTE — PROGRESS NOTES
Hospitalist Progress Note      Name:  Aylin Osman /Age/Sex: 1950  (65 y.o. female)   MRN & CSN:  [id number] & [id number] Admission Date/Time: 4/10/2021  8:15 AM   Location:  -A PCP: Jennifer Reed MD         Hospital Day: 4    Assessment and Plan:       Sepsis with acute on chronic resp failure with pneumonitis  - baseline 4L  -intubated on admission then was extubated  and then reintubated again due to respiratory distress  -RLL pna  -meropenem, zithro and vanc  -CT PE no PE  -solumedrol   -legionella, strep pending and covid neg  HYperkalemia(resolved)  -kayexalate  SCLC  -chemo last week  -CTA with progressino of primary malignancy  Afib  -eliquis, CCB  -echo pending  Dsyphagia with esophagitis  -carafate and famotidine  Tobacco use  -still actively smoking and once extubated will emphasize to stop  Anemia  -s/p 1PRBC yesterday and stable  Seizure  -keppra and CT head neg  Thrombocytopenia  -mild     Possible G TC seizure on the right arm leg weakness with RLE sustained clonus:  Neurologist on board plan for MRI of the head today  Not in status epilepticus  EEG pending         Diet DIET TUBE FEED CONTINUOUS/CYCLIC NPO; Semi-elemental (Vital AF 1.2); Continuous; 15; 20; 24   DVT Prophylaxis [] Lovenox, []  Heparin, [] SCDs, [] Ambulation   GI Prophylaxis [] PPI,  [] H2 Blocker,  [] Carafate,  [] Diet/Tube Feeds   Code Status Full Code   Disposition Patient requires continued admission due to    MDM [] Low, [] Moderate,[]  High  Patient's risk as above due to      History of Present Illness:   Patient was seen and examined at the bedside. Patient still intubated on mechanical ventilator  Plan for MRI of head today  Check ionized calcium  Stop IV fluid as the BNP jumped to 8000 and she is already on tube feeding    Objective:        Intake/Output Summary (Last 24 hours) at 2021 0855  Last data filed at 2021 0600  Gross per 24 hour   Intake 3578 ml   Output 1650 ml   Net 1928 ml      Vitals:   Vitals:    04/13/21 0802   BP:    Pulse:    Resp: 19   Temp:    SpO2: 99%     Physical Exam:   GEN Awake.   Intubated sedated on mechanical ventilator  HEENT: PEERLA, , supple neck,   Chest: air entry equal bilaterally, no wheezing or crepitation  Heart: S1 and S2 heard, no murmur, no gallop or rub, regular rate  Abdomen: soft, ND , Nt, +BS  Extremities: no cyanosis, tenderness or erythema, peripheral pulses audible  Neurology: Patient able to move 4 limbs partially sedated    Medications:   Medications:    methylPREDNISolone  40 mg Intravenous Q6H    chlorhexidine  15 mL Mouth/Throat BID    famotidine (PEPCID) injection  20 mg Intravenous BID    vancomycin  750 mg Intravenous Q18H    ipratropium-albuterol  1 ampule Inhalation Q4H WA    sodium chloride flush  5-40 mL Intravenous 2 times per day    nicotine  1 patch Transdermal Daily    azithromycin  500 mg Intravenous Q24H    apixaban  5 mg Oral BID    aspirin  81 mg Oral Daily    busPIRone  10 mg Oral BID    calcium-cholecalciferol  1 tablet Oral Daily    vitamin D  1,000 Units Oral Daily    dilTIAZem  240 mg Oral Daily    lactobacillus  1 capsule Oral Daily with breakfast    magnesium oxide  200 mg Oral Daily    nystatin  5 mL Oral 4x Daily    sucralfate  1 g Oral 4x Daily    theophylline  200 mg Oral Daily    meropenem  1,000 mg Intravenous Q8H    atorvastatin  10 mg Oral Nightly    sodium chloride flush  5-40 mL Intravenous 2 times per day    levetiracetam  500 mg Intravenous Q12H      Infusions:    sodium chloride      fentaNYL 100 mcg/hr (04/13/21 0456)    propofol 55 mcg/kg/min (04/13/21 0744)    sodium chloride      dextrose      sodium chloride       PRN Meds: LORazepam, 0.5 mg, TID PRN  potassium chloride, 20 mEq, PRN  magnesium sulfate, 1,000 mg, PRN  sodium chloride, , PRN  metoprolol, 2.5 mg, Q6H PRN  sodium chloride flush, 10 mL, PRN  sodium chloride flush, 5-40 mL, PRN  sodium chloride, 25 mL, PRN promethazine, 12.5 mg, Q6H PRN    Or  ondansetron, 4 mg, Q6H PRN  polyethylene glycol, 17 g, Daily PRN  acetaminophen, 650 mg, Q6H PRN    Or  acetaminophen, 650 mg, Q6H PRN  diphenhydrAMINE, 50 mg, Q6H PRN  guaiFENesin, 200 mg, TID PRN  glucose, 15 g, PRN  dextrose, 12.5 g, PRN  glucagon (rDNA), 1 mg, PRN  dextrose, 100 mL/hr, PRN  magic (miracle) mouthwash, 5 mL, 4x Daily PRN  sodium chloride flush, 5-40 mL, PRN  sodium chloride, 25 mL, PRN  LORazepam, 1 mg, Q2H PRN          Electronically signed by Ehsan Clifford MD on 4/13/2021 at 8:55 AM

## 2021-04-13 NOTE — CONSULTS
Daily    dilTIAZem  240 mg Oral Daily    lactobacillus  1 capsule Oral Daily with breakfast    magnesium oxide  200 mg Oral Daily    nystatin  5 mL Oral 4x Daily    sucralfate  1 g Oral 4x Daily    theophylline  200 mg Oral Daily    meropenem  1,000 mg Intravenous Q8H    atorvastatin  10 mg Oral Nightly    sodium chloride flush  5-40 mL Intravenous 2 times per day    levetiracetam  500 mg Intravenous Q12H     Continuous Infusions:   sodium chloride      fentaNYL 100 mcg/hr (21)    propofol 75 mcg/kg/min (21)    sodium chloride      dextrose      sodium chloride 75 mL/hr at 21    sodium chloride       PRN Meds:. LORazepam, potassium chloride, magnesium sulfate, sodium chloride, metoprolol, sodium chloride flush, sodium chloride flush, sodium chloride, promethazine **OR** ondansetron, polyethylene glycol, acetaminophen **OR** acetaminophen, diphenhydrAMINE, guaiFENesin, glucose, dextrose, glucagon (rDNA), dextrose, magic (miracle) mouthwash, sodium chloride flush, sodium chloride, LORazepam    Allergies   Allergen Reactions    Formaldehyde     Gabapentin Other (See Comments)    Norflex Tablets [Orphenadrine]     Cranberry Rash     Social History     Socioeconomic History    Marital status:       Spouse name: Not on file    Number of children: Not on file    Years of education: Not on file    Highest education level: Not on file   Occupational History    Not on file   Social Needs    Financial resource strain: Not on file    Food insecurity     Worry: Not on file     Inability: Not on file    Transportation needs     Medical: Not on file     Non-medical: Not on file   Tobacco Use    Smoking status: Former Smoker     Packs/day: 1.50     Years: 47.00     Pack years: 70.50     Types: Cigarettes     Start date: 1973     Quit date: 2020     Years since quittin.4    Smokeless tobacco: Never Used   Substance and Sexual Activity    Alcohol use: Yes     Alcohol/week: 0.0 standard drinks     Comment: rare\"twice per year\"    Drug use: No    Sexual activity: Not on file     Comment:    Lifestyle    Physical activity     Days per week: Not on file     Minutes per session: Not on file    Stress: Not on file   Relationships    Social connections     Talks on phone: Not on file     Gets together: Not on file     Attends Adventism service: Not on file     Active member of club or organization: Not on file     Attends meetings of clubs or organizations: Not on file     Relationship status: Not on file    Intimate partner violence     Fear of current or ex partner: Not on file     Emotionally abused: Not on file     Physically abused: Not on file     Forced sexual activity: Not on file   Other Topics Concern    Not on file   Social History Narrative    Not on file      Family History   Problem Relation Age of Onset    Stroke Mother     Dementia Mother     Heart Attack Mother 61    Coronary Art Dis Mother     Hypertension Mother     High Cholesterol Mother    Janett Moreno Father     Stroke Father     Heart Attack Father 61    Diabetes Sister     Heart Attack Sister     Hypertension Sister    Janett Moreno Sister     COPD Sister     Stroke Sister     Hypertension Sister     Hypertension Sister     Irritable Bowel Syndrome Sister     Pacemaker Sister        Review of Symptoms:    Due to current state of encephalopathy cannot answer ROS     Physical Exam:           Gen: intubated, sedation turned off   HEENT: NC/AT,neck supple, PERRL,   Heart: irregular   Lungs: vent   Ext: RUE 3+ pitting edema   Psych: poor eye contact  Skin: no rashes or lesions    NEUROLOGIC EXAM:    Mental Status: Intubated, off sedation she opens her eyes and attempts to follow commands     Cranial Nerve Exam:   CN II-XII: appears grossly intact with equal and reactive pupils, no gaze deviation face appears symmetrical     Motor Exam:       Withdrawal to pain x4 with decreased response and  squeeze on the RUE    Deep Tendon Reflexes: 2/4 biceps, triceps, brachioradialis, patellar, and achilles RUE with sustained clonus. 1/4 LUE and LLE biceps, triceps, brachioradialis, patellar, and achilles    Sensation: Pain UE's/LE's b/l    Coordination/Cerebellum:       Tremors--none    Gait and stance:      Gait: deferred      LABS:     Recent Labs     04/10/21  0831 04/10/21  0831 04/10/21  0916 04/10/21  1850 04/11/21  0530 04/12/21  0521   WBC 15.9*  --   --   --  4.9 11.5*   NA  --   --  140  --  143 136   K  --    < > 5.6* 4.1 4.1 3.3*   CL  --   --  98*  --  100 98*   CO2  --   --  40*  --  37* 32   BUN  --   --  25*  --  29* 26*   CREATININE  --   --  0.5*  --  0.7 0.6   GLUCOSE  --   --  128*  --  107* 93   INR 1.05  --   --   --   --   --     < > = values in this interval not displayed. IMAGING:      MRI Brain w/wo pending  EEG Pending     ASSESSMENT/PLAN:     3 65year old female with GTC seizures and right arm and leg weakness with RLE sustained clonus-will rule out METs to left side of brain. Not in status epilepticus. 2. TME secondary to ARDS with pneumonitis. Plan of care as follows:  1. Neuro Exam:  1. RUE edema and weakness  2. RLE drift  3. RLE sustained clonus  2. Neurodiagnostics:   1. CT head non acute  2. MRI brain w/wo pending  3. EEG pending   3. Medications:  1. Eliquis 5mg BID  2. Keppra 500mg BID  4. PT/OT/ST:  1. Per their recommendations  5. Follow up:  1. Pending course of admission and neurodiagnostics          Thank you for allowing us to participate in the care of your patient. If there are any questions regarding evaluation please feel free to contact us.      HELIO Hannon - CNP, 4/12/2021

## 2021-04-14 NOTE — PROGRESS NOTES
Patient did well with SBT/SAT. Pt following commands, RSBI 46, NIF -27. Verbal order from Dr Claudean Heater to extubate patient. Patient suctioned, cuff deflated, and extubated at 4617 without complication. Patient placed on BiPAP 15/5 40%. Will continue to monitor and wean as tolerated.

## 2021-04-14 NOTE — PROGRESS NOTES
04/13/21 2255   Vent Information   Vent Mode AC/VC   Vt Ordered 500 mL   Rate Set 14 bmp   Peak Flow 50 L/min   Pressure Support 0 cmH20   FiO2  35 %   SpO2 99 %   SpO2/FiO2 ratio 282.86   Sensitivity 3   PEEP/CPAP 5   I Time/ I Time % 0 s   Humidification Source HME   Vent Patient Data   High Peep/I Pressure 0   Peak Inspiratory Pressure 32 cmH2O   Mean Airway Pressure 11 cmH20   Rate Measured 14 br/min   Vt Exhaled 504 mL   Minute Volume 7.07 Liters   I:E Ratio 1:2.90   Plateau Pressure 20 [id number]   Static Compliance 32 mL/cmH2O   Total PEEP 8.1 cmH20   Auto PEEP 3.1 cmH20   Cough/Sputum   Sputum How Obtained Endotracheal;Suctioned   Sputum Amount Moderate   Sputum Color Clear   Spontaneous Breathing Trial (SBT) RT Doc   Pulse 87   Additional Respiratory  Assessments   Resp 14   Alarm Settings   High Pressure Alarm 45 cmH2O   Delay Alarm 20 sec(s)   Low Minute Volume Alarm 2.5 L/min   Apnea (secs) 20 secs   High Respiratory Rate 40 br/min   Low Exhaled Vt  250 mL

## 2021-04-14 NOTE — PROGRESS NOTES
Pt refusing bipap at this time. She said \"I need a break and I will wear when I am done with dinner. \" Pt educated on reason for bipap and pt continued to refuse at this time. Will continue to monitor. Call light within reach and bed alarm on.

## 2021-04-14 NOTE — PROGRESS NOTES
Evaluation:   Behavioral-Environmental Outcomes:  None Identified   Food/Nutrient Intake Outcomes:  Enteral Nutrition Intake/Tolerance  Physical Signs/Symptoms Outcomes:  Biochemical Data, Weight, GI Status, Hemodynamic Status, Fluid Status or Edema     Discharge Planning:     Too soon to determine     Electronically signed by Loan March MS, RD, LD on 4/14/21 at 12:33 PM EDT    Contact: [phone]

## 2021-04-14 NOTE — PROGRESS NOTES
Hospitalist Progress Note      Name:  Gerldine Homans /Age/Sex: 1950  (65 y.o. female)   MRN & CSN:  [id number] & [id number] Admission Date/Time: 4/10/2021  8:15 AM   Location:  -A PCP: Laurie Dickey MD         Hospital Day: 5    Assessment and Plan:     #Sepsis with acute on chronic resp failure with pneumonitis  - baseline 4L  -intubated on admission then was extubated  and then reintubated again due to respiratory distress  -RLL pna  -meropenem, zithro and vanc  -CT PE no PE  -solumedrol   -legionella, strep pending and covid neg  -RASS 0    #Seizure  -keppra and CT head neg  -Monitor EEG today  -Possible G TC seizure on the right arm leg weakness with RLE sustained clonus:  -Brain no acute pathology  -Neurology on board    #Thrombocytopenia-resolved    #Hyperkalemia(resolved)  -s/p kayexalate    #hypomagnesemia  -monitor and replace as usual    #SCLC  -chemo last week  -CTA with progressino of primary malignancy  -hemonc on board    #Afib  -eliquis, CCB  -echo EF 55%. RVSP 42 mmHg    #Dsyphagia with esophagitis  -carafate and famotidine    #Tobacco use  -still actively smoking and once extubated will emphasize to stop      #Anemia  -s/p 1PRBC   -HH stable         Diet DIET TUBE FEED CONTINUOUS/CYCLIC NPO; Semi-elemental (Vital AF 1.2); Continuous; 15; 20; 24   DVT Prophylaxis [] Lovenox, []  Heparin, [] SCDs, [] Ambulation   GI Prophylaxis [] PPI,  [] H2 Blocker,  [] Carafate,  [] Diet/Tube Feeds   Code Status Full Code   Disposition Patient requires continued admission due to    MDM [] Low, [] Moderate,[]  High  Patient's risk as above due to      History of Present Illness:   Patient was seen and examined at the bedside. Patient still intubated on mechanical ventilator  already on tube feeding  RASS 0    Objective:        Intake/Output Summary (Last 24 hours) at 2021 1200  Last data filed at 2021 1124  Gross per 24 hour   Intake 2645 ml   Output 3450 ml   Net -805 ml Vitals:   Vitals:    04/14/21 1115   BP:    Pulse:    Resp: 21   Temp:    SpO2: 97%     Physical Exam:   GEN RASS 0,  Intubated sedated on mechanical ventilator  HEENT: PEERLA, , supple neck,   Chest: air entry equal bilaterally, no wheezing or crepitation  Heart: S1 and S2 heard, no murmur, no gallop or rub, regular rate  Abdomen: soft, ND , Nt, +BS  Extremities: no cyanosis, tenderness or erythema, peripheral pulses audible  Neurology: Patient able to move 4 limbs partially sedated    Medications:   Medications:    methylPREDNISolone  40 mg Intravenous Q6H    chlorhexidine  15 mL Mouth/Throat BID    famotidine (PEPCID) injection  20 mg Intravenous BID    vancomycin  750 mg Intravenous Q18H    ipratropium-albuterol  1 ampule Inhalation Q4H WA    sodium chloride flush  5-40 mL Intravenous 2 times per day    nicotine  1 patch Transdermal Daily    azithromycin  500 mg Intravenous Q24H    apixaban  5 mg Oral BID    aspirin  81 mg Oral Daily    busPIRone  10 mg Oral BID    calcium-cholecalciferol  1 tablet Oral Daily    vitamin D  1,000 Units Oral Daily    dilTIAZem  240 mg Oral Daily    lactobacillus  1 capsule Oral Daily with breakfast    magnesium oxide  200 mg Oral Daily    nystatin  5 mL Oral 4x Daily    sucralfate  1 g Oral 4x Daily    theophylline  200 mg Oral Daily    meropenem  1,000 mg Intravenous Q8H    atorvastatin  10 mg Oral Nightly    sodium chloride flush  5-40 mL Intravenous 2 times per day    levetiracetam  500 mg Intravenous Q12H      Infusions:    sodium chloride      fentaNYL Stopped (04/14/21 0950)    propofol Stopped (04/14/21 0950)    sodium chloride      dextrose      sodium chloride       PRN Meds: LORazepam, 0.5 mg, TID PRN  potassium chloride, 20 mEq, PRN  magnesium sulfate, 1,000 mg, PRN  sodium chloride, , PRN  metoprolol, 2.5 mg, Q6H PRN  sodium chloride flush, 10 mL, PRN  sodium chloride flush, 5-40 mL, PRN  sodium chloride, 25 mL, PRN  promethazine, 12.5 mg, Q6H PRN    Or  ondansetron, 4 mg, Q6H PRN  polyethylene glycol, 17 g, Daily PRN  acetaminophen, 650 mg, Q6H PRN    Or  acetaminophen, 650 mg, Q6H PRN  diphenhydrAMINE, 50 mg, Q6H PRN  guaiFENesin, 200 mg, TID PRN  glucose, 15 g, PRN  dextrose, 12.5 g, PRN  glucagon (rDNA), 1 mg, PRN  dextrose, 100 mL/hr, PRN  magic (miracle) mouthwash, 5 mL, 4x Daily PRN  sodium chloride flush, 5-40 mL, PRN  sodium chloride, 25 mL, PRN  LORazepam, 1 mg, Q2H PRN          Electronically signed by Jigna Page MD on 4/14/2021 at 12:00 PM

## 2021-04-14 NOTE — CONSULTS
[facility]  consulted by Dr. Liborio Goldberg for monitoring and adjustment. Indication for treatment: Sepsis of unknown etiology  Goal trough: 15 mcg/mL    Pertinent Laboratory Values:   Temp Readings from Last 3 Encounters:   04/14/21 98.1 °F (36.7 °C) (Rectal)   04/09/21 97.8 °F (36.6 °C) (Temporal)   04/08/21 97.7 °F (36.5 °C) (Temporal)     Recent Labs     04/12/21  0521 04/13/21  0510 04/14/21  0525   WBC 11.5* 5.3 10.3     Recent Labs     04/12/21  0521 04/13/21  0510 04/14/21  0525   BUN 26* 20 18   CREATININE 0.6 0.5* 0.4*     Estimated Creatinine Clearance: 106 mL/min (A) (based on SCr of 0.4 mg/dL (L)). Intake/Output Summary (Last 24 hours) at 4/14/2021 1440  Last data filed at 4/14/2021 1124  Gross per 24 hour   Intake 2645 ml   Output 3450 ml   Net -805 ml       Pertinent Cultures:  Date    Source    Results  3/24               Nasal MRSA screen Negative  4/10               Blood              Pending  4/10                            Strep pneumo/Legionella        Negative    Vancomycin level:   TROUGH:    Recent Labs     04/12/21  1159   VANCOTROUGH 13.5     RANDOM:  No results for input(s): VANCORANDOM in the last 72 hours. Assessment:  · WBC and temperature: WBC wnl (on Methylprednisolone), patient is afebrile  · SCr, BUN, and urine output: SCR remains wnL, UOP appears ok, net -805ml  · Day(s) of therapy:   6  · Vancomycin concentration:   · 4/12:  13.5. · 4/15= pending    Plan:  · Renal labs are stable, wnl  · Continue Vancomycin 750mg ivpb q18h  · Pharmacy will continue to monitor patient and adjust therapy as indicated    Hunter 3 4/15 @ 1130am    Thank you for the consult.   Neto Butcher MS, [facility]   4/14/2021 2:40 PM

## 2021-04-14 NOTE — PROGRESS NOTES
04/13/21 2008   Vent Information   Vent Mode AC/VC   Vt Ordered 500 mL   Rate Set 14 bmp   Peak Flow 50 L/min   Pressure Support 0 cmH20   FiO2  35 %   SpO2 98 %   SpO2/FiO2 ratio 280   Sensitivity 3   PEEP/CPAP 5   I Time/ I Time % 0 s   Humidification Source HME   Vent Patient Data   High Peep/I Pressure 0   Peak Inspiratory Pressure 36 cmH2O   Mean Airway Pressure 12 cmH20   Rate Measured 14 br/min   Vt Exhaled 525 mL   Minute Volume 7.06 Liters   I:E Ratio 1:2.90   Plateau Pressure 21 [id number]   Static Compliance 29 mL/cmH2O   Total PEEP 9.1 cmH20   Auto PEEP 4 cmH20   Spontaneous Breathing Trial (SBT) RT Doc   Pulse 83   Breath Sounds   Right Upper Lobe Expiratory Wheezes   Right Middle Lobe Expiratory Wheezes   Right Lower Lobe Diminished   Left Upper Lobe Diminished   Left Lower Lobe Diminished   Additional Respiratory  Assessments   Resp 14   Alarm Settings   High Pressure Alarm 45 cmH2O   Delay Alarm 20 sec(s)   Low Minute Volume Alarm 2.5 L/min   Apnea (secs) 20 secs   High Respiratory Rate 40 br/min   Low Exhaled Vt  250 mL

## 2021-04-14 NOTE — PROGRESS NOTES
Neurology Service progress note  [facility]  Patient Name: Gonzalo Bullock  : 1950        Subjective:   Patient seen and examined today. She is extubated on bipap. No acute changes or repeat seizures   She talks to me and follows commands, states she just feels generally weak. States she never had any brain METs, states that she has never had a seizure. We talked about her MRI and she was getting ready to get her EEG. Denies Headache, diplopia, tunnel vision, no unilateral weakness  No fever     Past Medical History:   Diagnosis Date    Arthritis     COPD (chronic obstructive pulmonary disease) ([facility] [address]     follow with Dr Edie Neff Full dentures     H/O cardiovascular stress test 2009    thallium, normal no ischemia EF70%     H/O cardiovascular stress test 10/11/2013    thallium,EF70%, normal, no ischemia    H/O Doppler ultrasound 10/13/2010    carotid, right normal, left normal    H/O Doppler ultrasound 10/07/2014    Carotid mild bilateral internal carotid artery disease less than 50% in severity. Normal vertebral artery flows with good velocities. Incidental finding of possible thyroid nodule in the left lobe.  History of 24 hour EKG monitoring 2011    NSR no ectopy    History of nuclear stress test 10/21/2016    lexiscan-normal,no ischemia,EF70%,enlarged right ventricle    Hx of chest x-ray 2015    WNL    Hx of Doppler echocardiogram 2020    EF 50-55% mild LV hypertrophy. Grade 2 diastolic dysfunction. Mild AS.     Hx of echocardiogram 10/11/2013    10/13 Abn lt ventricular diastolic function, mile MR, EF 57%,10/10 EF55%, mild mitral annular calcification    Hx of echocardiogram 2019    EF 11-67%, Grade I diastolic dysfunction, Mild aortic stenosis, Calcific thickening of posterior leaflet of mitral valve, Mild Pulm HTN    Hx of pelvic x-ray 2015    Severe RT his osterarothrosis    Hx of x-ray of hip 2015    Severe Rt hip osteosrthrosis    on file     Attends [employer] service: Not on file     Active member of club or organization: Not on file     Attends meetings of clubs or organizations: Not on file     Relationship status: Not on file    Intimate partner violence     Fear of current or ex partner: Not on file     Emotionally abused: Not on file     Physically abused: Not on file     Forced sexual activity: Not on file   Other Topics Concern    Not on file   Social History Narrative    Not on file      Family History   Problem Relation Age of Onset    Stroke Mother     Dementia Mother     Heart Attack Mother 61    Coronary Art Dis Mother     Hypertension Mother     High Cholesterol Mother     Cancer Father     Stroke Father     Heart Attack Father 61    Diabetes Sister     Heart Attack Sister     Hypertension Sister    Florence Tasha Sister     COPD Sister     Stroke Sister     Hypertension Sister     Hypertension Sister     Irritable Bowel Syndrome Sister     Pacemaker Sister        Review of Symptoms:    Due to current state of encephalopathy cannot answer ROS     Physical Exam:           Gen: Extubated, on BiPAP, alert and oriented following commands talking  HEENT: NC/AT,neck supple, PERRL,   Heart: irregular   Lungs: Work of breathing noted  Ext: RUE 3+ pitting edema   Psych: poor eye contact  Skin: no rashes or lesions    NEUROLOGIC EXAM:    Mental Status: Extubated, on BiPAP, alert and oriented following commands talking    Cranial Nerve Exam:   CN II-XII: appears grossly intact with equal and reactive pupils, no gaze deviation face appears symmetrical     Motor Exam:       Antigravity x4    Deep Tendon Reflexes: 2/4 biceps, triceps, brachioradialis, patellar, and achilles bilateral upper extremities bilateral lower extremities    Sensation: Pain/light touch UE's/LE's b/l    Coordination/Cerebellum:       Tremors--none    Gait and stance:      Gait: deferred      LABS:     Recent Labs     04/12/21  0521 04/13/21  0510 04/13/21 1400 04/14/21  0525   WBC 11.5* 5.3  --  10.3    139  --  135   K 3.3* 3.2* 3.3* 3.8   CL 98* 101  --  103   CO2 32 30  --  26   BUN 26* 20  --  18   CREATININE 0.6 0.5*  --  0.4*   GLUCOSE 93 118*  --  118*         IMAGING:      MRI Brain w/wo:  No intracranial metastatic disease. Stable mild chronic microvascular disease within the periventricular white   matter. Mild atrophy. EEG Pending     ASSESSMENT/PLAN:     3 65year old female with GTC seizures and right arm and leg weakness with RLE weakness suspect prolonged postictal paralysis not in status epilepticus-rule out partial seizure, I will hold antiepileptic drugs for now. 2. TME secondary to ARDS with pneumonitis. Plan of care as follows:  1. Neuro Exam:  1. Nonfocal today  2. Neurodiagnostics:   1. CT head non acute  2. MRI brain w/wo as above  3. EEG pending   3. Medications:  1. Eliquis 5mg BID  2. D/C Keppra 500mg BID  4. PT/OT/ST:  1. Per their recommendations  5. Follow up:  1. Pending course of admission and neurodiagnostics          Thank you for allowing us to participate in the care of your patient. If there are any questions regarding evaluation please feel free to contact us.      HELIO Cheng - BRIANNE, 4/14/2021

## 2021-04-14 NOTE — PROGRESS NOTES
mother, and sister. Allergies   Allergen Reactions    Formaldehyde     Gabapentin Other (See Comments)    Norflex Tablets [Orphenadrine]     Cranberry Rash     Social History:    Reviewed; no changes    Objective:   PHYSICAL EXAM:        VITALS:  BP (!) 133/56   Pulse 89   Temp 98.1 °F (36.7 °C) (Rectal)   Resp 14   Ht 5' (1.524 m)   Wt 140 lb 6.9 oz (63.7 kg)   SpO2 100%   BMI 27.43 kg/m²     24HR INTAKE/OUTPUT:      Intake/Output Summary (Last 24 hours) at 4/14/2021 1104  Last data filed at 4/14/2021 0748  Gross per 24 hour   Intake 2645 ml   Output 2900 ml   Net -255 ml       CONSTITUTIONAL:  Somnolent. LUNGS:  decreased breath sounds. CARDIOVASCULAR:  normal S1 and S2 and negative JVD  ABD:Abdomen soft, non-tender. BS normal. No masses,  No organomegaly  NEURO:Awake. DATA:    CBC:  Recent Labs     04/12/21  0521 04/13/21  0510 04/14/21  0525   WBC 11.5* 5.3 10.3   RBC 3.25* 3.29* 3.13*   HGB 9.5* 9.4* 9.3*   HCT 29.5* 29.6* 28.4*   * 140 154   MCV 90.8 90.0 90.7   MCH 29.2 28.6 29.7   MCHC 32.2 31.8* 32.7   RDW 15.3* 14.7 15.1*   SEGSPCT 97.3* 80.0* 87.0*   BANDSPCT  --  14* 9      BMP:  Recent Labs     04/12/21  0521 04/13/21  0510 04/13/21  1400 04/14/21  0525    139  --  135   K 3.3* 3.2* 3.3* 3.8   CL 98* 101  --  103   CO2 32 30  --  26   BUN 26* 20  --  18   CREATININE 0.6 0.5*  --  0.4*   CALCIUM 8.0* 7.7*  --  7.8*   GLUCOSE 93 118*  --  118*      ABG:  Recent Labs     04/12/21  0915 04/13/21  0800 04/14/21  0800   PH 7.51* 7.46* 7.47*   PO2ART 99 85 75   [id number] 40.0 43.0 42.0   O2SAT 96.7 96.4 95.4*     Lab Results   Component Value Date    PROBNP 8,008 (H) 04/13/2021    PROBNP 3,057 (H) 04/11/2021    PROBNP 2,265 (H) 04/10/2021    THEOPH 4.9 (L) 03/06/2021     No results found for: [facility]    Radiology Review:  Pertinent images / reports were reviewed as a part of this visit.     Assessment:     Patient Active Problem List   Diagnosis    Hyperlipidemia    COPD (chronic obstructive pulmonary disease) (HCC)    Leg pain    Hypertension    Shortness of breath    Tobacco abuse    Abdominal aortic aneurysm (AAA) without rupture (HCC)    Degeneration of lumbar or lumbosacral intervertebral disc    Osteopenia    Anxiety    Acute on chronic respiratory failure with hypoxia (HCC)    Lung mass    COPD exacerbation (HCC)    Acute exacerbation of chronic obstructive pulmonary disease (COPD) (HCC)    Small cell lung cancer (HCC)    Acute on chronic respiratory failure with hypoxia and hypercapnia (HCC)    Acute hypoxemic respiratory failure (HCC)    Pneumonia of both lungs due to infectious organism    Elevated troponin level    Elevated brain natriuretic peptide (BNP) level    Acute hyperkalemia    Sepsis (HCC)    Pneumonitis    Ventilator dependent (HCC)       Plan:   1. Overall the patient is better  2. Wean per protocol. 3. Discussed with RN. 4. Bipap post extubation.   Jacob Field MD  4/14/2021  11:04 AM

## 2021-04-14 NOTE — PROGRESS NOTES
Spontaneous breathing trial initiated with RN at bedside. CPAP/Spontaneous vent mode with PS 15.  Patient had two good breaths and went apneic. Attempt made again and patient went apneic a second time. Placed back on previous AC vent settings and will attempt weaning again later.

## 2021-04-14 NOTE — PLAN OF CARE
Nutrition Problem #1: Inadequate protein-energy intake  Intervention: Food and/or Nutrient Delivery: Start Tube Feeding  Nutritional Goals: if pt unable to extubate today, please restart EN as soon as possible

## 2021-04-14 NOTE — PROGRESS NOTES
She is sedated and on the vent. She responded to the verbal stimuli. She was seen by neurologist for seizures. MRI of the brain was negative. She is scheduled for EEG. Pulmonologist plans to wean her off. Temperature 98.1, pulse 83, respiratory 14, blood pressure 150/53, saturation 100% on ventilator. She is arousable. She moves her extremities. ET tube noted. Supple no JVD. S1-S2 regular. No wheezing anteriorly. Soft, bowel sounds present, nontender nondistended. No cyanosis. No edema  Assessment and plan:  1. She has small cell cancer of the lung. She received fourth cycle of chemo. I consider PET CT scan as an outpatient. 2.  Acute respiratory failure. She is on the vent. Pulmonologist is on board. 3.  She has seizure. Neurologist is on board. She is scheduled for EEG today. We will follow up.   Thanks

## 2021-04-15 NOTE — PROGRESS NOTES
father, mother, and sister. Allergies   Allergen Reactions    Formaldehyde     Gabapentin Other (See Comments)    Norflex Tablets [Orphenadrine]     Cranberry Rash     Social History:    Reviewed; no changes    Objective:   PHYSICAL EXAM:        VITALS:  BP (!) 170/79   Pulse 81   Temp 98.8 °F (37.1 °C) (Rectal)   Resp 18   Ht 5' (1.524 m)   Wt 136 lb 11 oz (62 kg)   SpO2 96%   BMI 26.69 kg/m²     24HR INTAKE/OUTPUT:      Intake/Output Summary (Last 24 hours) at 4/15/2021 1036  Last data filed at 4/15/2021 0625  Gross per 24 hour   Intake 450 ml   Output 5350 ml   Net -4900 ml       CONSTITUTIONAL:  Alert. LUNGS:  decreased breath sounds. CARDIOVASCULAR:  normal S1 and S2 and negative JVD. ABD:Abdomen soft, non-tender. BS normal. No masses,  No organomegaly. Maksim Rhyme. DATA:    CBC:  Recent Labs     04/13/21  0510 04/14/21  0525 04/15/21  0400   WBC 5.3 10.3 11.5*   RBC 3.29* 3.13* 3.15*   HGB 9.4* 9.3* 9.4*   HCT 29.6* 28.4* 28.8*    154 85*   MCV 90.0 90.7 91.4   MCH 28.6 29.7 29.8   MCHC 31.8* 32.7 32.6   RDW 14.7 15.1* 14.9   SEGSPCT 80.0* 87.0* 94.0*   BANDSPCT 14* 9  --       BMP:  Recent Labs     04/13/21  0510 04/13/21  1400 04/14/21  0525 04/15/21  0400     --  135 148*   K 3.2* 3.3* 3.8 3.8     --  103 109   CO2 30  --  26 34*   BUN 20  --  18 17   CREATININE 0.5*  --  0.4* 0.5*   CALCIUM 7.7*  --  7.8* 8.4   GLUCOSE 118*  --  118* 82      ABG:  Recent Labs     04/13/21  0800 04/14/21  0800   PH 7.46* 7.47*   PO2ART 85 75   [id number] 43.0 42.0   O2SAT 96.4 95.4*     Lab Results   Component Value Date    PROBNP 8,008 (H) 04/13/2021    PROBNP 3,057 (H) 04/11/2021    PROBNP 2,265 (H) 04/10/2021    THEOPH 4.9 (L) 03/06/2021     No results found for: CULTRESP    Radiology Review:  Pertinent images / reports were reviewed as a part of this visit.     Assessment:     Patient Active Problem List   Diagnosis    Hyperlipidemia    COPD (chronic obstructive pulmonary disease) ([address]    Leg pain    Hypertension    Shortness of breath    Tobacco abuse    Abdominal aortic aneurysm (AAA) without rupture (HCC)    Degeneration of lumbar or lumbosacral intervertebral disc    Osteopenia    Anxiety    Acute on chronic respiratory failure with hypoxia (HCC)    Lung mass    COPD exacerbation (HCC)    Acute exacerbation of chronic obstructive pulmonary disease (COPD) (HCC)    Small cell lung cancer (HCC)    Acute on chronic respiratory failure with hypoxia and hypercapnia (HCC)    Acute hypoxemic respiratory failure (HCC)    Pneumonia of both lungs due to infectious organism    Elevated troponin level    Elevated brain natriuretic peptide (BNP) level    Acute hyperkalemia    Sepsis (HCC)    Pneumonitis    Ventilator dependent (HCC)    Hypernatremia       Plan:   1. Overall the patient is better  2. Inc. Activity. 3. Discussed with RN. 4. Check parisa level.   Simpson Kanner MD  4/15/2021  10:36 AM

## 2021-04-15 NOTE — PROGRESS NOTES
Neurology Service progress note  [facility]  Patient Name: Vandana Gutierrez  : 1950        Subjective:   Patient seen and examined today. She remains on Bipap  We once again reviewed MRI and reviewed EEG results today. She has not had any repeat seizures. She does not want to be on a AED at this time and I support this. She denies HA or unilateral arm or leg weakness. She denies CP. No fever today. Family at the bedside. Past Medical History:   Diagnosis Date    Arthritis     COPD (chronic obstructive pulmonary disease) ([facility] [address]     follow with Dr Juan M Begum Full dentures     H/O cardiovascular stress test 2009    thallium, normal no ischemia EF70%     H/O cardiovascular stress test 10/11/2013    thallium,EF70%, normal, no ischemia    H/O Doppler ultrasound 10/13/2010    carotid, right normal, left normal    H/O Doppler ultrasound 10/07/2014    Carotid mild bilateral internal carotid artery disease less than 50% in severity. Normal vertebral artery flows with good velocities. Incidental finding of possible thyroid nodule in the left lobe.  History of 24 hour EKG monitoring 2011    NSR no ectopy    History of nuclear stress test 10/21/2016    lexiscan-normal,no ischemia,EF70%,enlarged right ventricle    Hx of chest x-ray 2015    WNL    Hx of Doppler echocardiogram 2020    EF 50-55% mild LV hypertrophy. Grade 2 diastolic dysfunction. Mild AS.     Hx of echocardiogram 10/11/2013    10/13 Abn lt ventricular diastolic function, mile MR, EF 57%,10/10 EF55%, mild mitral annular calcification    Hx of echocardiogram 2019    EF 12-38%, Grade I diastolic dysfunction, Mild aortic stenosis, Calcific thickening of posterior leaflet of mitral valve, Mild Pulm HTN    Hx of pelvic x-ray 2015    Severe RT his osterarothrosis    Hx of x-ray of hip 2015    Severe Rt hip osteosrthrosis    Hyperlipidemia     Hypertension     follows with dr Vanita Blood Leg pain  Lung nodules     scheduled for bronch 1/5/2020    On home oxygen therapy     \"on 4 liters 24 hours per day\"    Small cell lung cancer ([address] 1/11/2021    Wears glasses     to read    :   Past Surgical History:   Procedure Laterality Date    BACK SURGERY      \"about 12 yrs ago - surgery my mid to low back \" done in [address] N/A 1/5/2021    BRONCHOSCOPY ENDOBRONCHIAL ULTRASOUND [facility]      \"last one done in my age 61's    CYST REMOVAL  1970's    left arm    EYE SURGERY  2010?    svetlana cataract ext     FOOT SURGERY Right 1970's    \"have screw in 2nd toe\"    HYSTERECTOMY      1999\"took everything \"    JOINT REPLACEMENT Right 2014??    hip    LAPAROSCOPIC APPENDECTOMY N/A 5/18/2019    APPENDECTOMY LAPAROSCOPIC performed by Brea Benavides MD at [address] [place] [address] 12/10/2020    MEDIASTINOSCOPY performed by Ara Jenkins MD at [address] N/A 3/3/2021    EGD BIOPSY AND BRUSHING performed by Olesya Cristina MD at [facility] ENDOSCOPY     Medications:  Scheduled Meds:   metoprolol tartrate  50 mg Oral BID    pantoprazole  40 mg Oral QAM AC    vancomycin  750 mg Intravenous Q12H    methylPREDNISolone  40 mg Intravenous Q6H    ipratropium-albuterol  1 ampule Inhalation Q4H WA    sodium chloride flush  5-40 mL Intravenous 2 times per day    nicotine  1 patch Transdermal Daily    apixaban  5 mg Oral BID    aspirin  81 mg Oral Daily    busPIRone  10 mg Oral BID    calcium-cholecalciferol  1 tablet Oral Daily    vitamin D  1,000 Units Oral Daily    dilTIAZem  240 mg Oral Daily    lactobacillus  1 capsule Oral Daily with breakfast    magnesium oxide  200 mg Oral Daily    nystatin  5 mL Oral 4x Daily    sucralfate  1 g Oral 4x Daily    theophylline  200 mg Oral Daily    meropenem  1,000 mg Intravenous Q8H    atorvastatin  10 mg Oral Nightly    sodium chloride flush  5-40 mL Intravenous 2 times per day     Continuous Infusions:   sodium chloride      sodium chloride      dextrose      sodium chloride       PRN Meds:. HYDROcodone-acetaminophen, labetalol, LORazepam, potassium chloride, magnesium sulfate, sodium chloride, metoprolol, sodium chloride flush, sodium chloride flush, sodium chloride, promethazine **OR** ondansetron, polyethylene glycol, acetaminophen **OR** acetaminophen, diphenhydrAMINE, guaiFENesin, glucose, dextrose, glucagon (rDNA), dextrose, magic (miracle) mouthwash, sodium chloride flush, sodium chloride, LORazepam    Allergies   Allergen Reactions    Formaldehyde     Gabapentin Other (See Comments)    Norflex Tablets [Orphenadrine]     Cranberry Rash     Social History     Socioeconomic History    Marital status:      Spouse name: Not on file    Number of children: Not on file    Years of education: Not on file    Highest education level: Not on file   Occupational History    Not on file   Social Needs    Financial resource strain: Not on file    Food insecurity     Worry: Not on file     Inability: Not on file    Transportation needs     Medical: Not on file     Non-medical: Not on file   Tobacco Use    Smoking status: Former Smoker     Packs/day: 1.50     Years: 47.00     Pack years: 70.50     Types: Cigarettes     Start date: 1973     Quit date: 2020     Years since quittin.4    Smokeless tobacco: Never Used   Substance and Sexual Activity    Alcohol use:  Yes     Alcohol/week: 0.0 standard drinks     Comment: rare\"twice per year\"    Drug use: No    Sexual activity: Not on file     Comment:    Lifestyle    Physical activity     Days per week: Not on file     Minutes per session: Not on file    Stress: Not on file   Relationships    Social connections     Talks on phone: Not on file     Gets together: Not on file     Attends Baptism service: Not on file     Active member of club or organization: Not on file Attends meetings of clubs or organizations: Not on file     Relationship status: Not on file    Intimate partner violence     Fear of current or ex partner: Not on file     Emotionally abused: Not on file     Physically abused: Not on file     Forced sexual activity: Not on file   Other Topics Concern    Not on file   Social History Narrative    Not on file      Family History   Problem Relation Age of Onset    Stroke Mother     Dementia Mother     Heart Attack Mother 61    Coronary Art Dis Mother     Hypertension Mother     High Cholesterol Mother     Cancer Father     Stroke Father     Heart Attack Father 61    Diabetes Sister     Heart Attack Sister     Hypertension Sister    Mirthan Najjar Sister     COPD Sister     Stroke Sister     Hypertension Sister     Hypertension Sister     Irritable Bowel Syndrome Sister     Pacemaker Sister        Review of Symptoms:    Due to current state of encephalopathy cannot answer ROS     Physical Exam:           Gen: Extubated, on BiPAP, alert and oriented following commands talking  HEENT: NC/AT,neck supple, PERRL,   Heart: irregular   Lungs: Work of breathing noted  Ext: RUE 3+ pitting edema   Psych: poor eye contact  Skin: no rashes or lesions    NEUROLOGIC EXAM:    Mental Status: Extubated, on BiPAP, alert and oriented following commands talking    Cranial Nerve Exam:   CN II-XII: appears grossly intact with equal and reactive pupils, no gaze deviation face appears symmetrical     Motor Exam:       Antigravity x4    Deep Tendon Reflexes: 2/4 biceps, triceps, brachioradialis, patellar, and achilles bilateral upper extremities bilateral lower extremities    Sensation: Pain/light touch UE's/LE's b/l    Coordination/Cerebellum:       Tremors--none    Gait and stance:      Gait: deferred      LABS:     Recent Labs     04/13/21  0510 04/13/21  1400 04/14/21  0525 04/15/21  0400   WBC 5.3  --  10.3 11.5*     --  135 148*   K 3.2* 3.3* 3.8 3.8     --  103 109   CO2 30  --  26 34*   BUN 20  --  18 17   CREATININE 0.5*  --  0.4* 0.5*   GLUCOSE 118*  --  118* 82         IMAGING:      MRI Brain w/wo:  No intracranial metastatic disease. Stable mild chronic microvascular disease within the periventricular white   matter. Mild atrophy. EEG:  IMPRESSION:     Abnormal EEG due to mild diffuse slowing of the background. This is nonspecific finding and can be seen in variety of encephalopathies. ASSESSMENT/PLAN:     3 65year old female with GTC seizures and right arm and leg weakness with RLE weakness suspect prolonged postictal paralysis not in status epilepticus-no partial seizures found. 2. TME secondary to ARDS with pneumonitis, improving superimposed on SCLC. Plan of care as follows:  1. Neuro Exam:  1. Nonfocal today  2. Neurodiagnostics:   1. CT head non acute  2. MRI brain w/wo as above  3. EEG as above  3. Medications:  1. Eliquis 5mg BID  2. D/C Keppra 500mg BID  4. PT/OT/ST:  1. Per their recommendations  5. Follow up:  1. We will sign off. Welcome to follow up outpatient          Thank you for allowing us to participate in the care of your patient. If there are any questions regarding evaluation please feel free to contact us.      HELIO Lovelace - BRIANNE, 4/15/2021

## 2021-04-15 NOTE — CONSULTS
[facility]  consulted by Dr. Barbara Steen for monitoring and adjustment. Indication for treatment: Sepsis of unknown etiology  Goal trough: 15-20 mcg/mL    Pertinent Laboratory Values:   Temp Readings from Last 3 Encounters:   04/15/21 98.8 °F (37.1 °C) (Rectal)   04/09/21 97.8 °F (36.6 °C) (Temporal)   04/08/21 97.7 °F (36.5 °C) (Temporal)     Recent Labs     04/13/21  0510 04/14/21  0525 04/15/21  0400   WBC 5.3 10.3 11.5*     Recent Labs     04/13/21  0510 04/14/21  0525 04/15/21  0400   BUN 20 18 17   CREATININE 0.5* 0.4* 0.5*     Estimated Creatinine Clearance: 84 mL/min (A) (based on SCr of 0.5 mg/dL (L)). Intake/Output Summary (Last 24 hours) at 4/15/2021 1151  Last data filed at 4/15/2021 0625  Gross per 24 hour   Intake 450 ml   Output 4350 ml   Net -3900 ml       Pertinent Cultures:  Date    Source    Results  3/24               Nasal MRSA screen Negative  4/10               Blood              Pending  4/10                            Strep pneumo/Legionella        Negative    Vancomycin level:   TROUGH:    Recent Labs     04/12/21  1159 04/15/21  1020   VANCOTROUGH 13.5 12.6     RANDOM:  No results for input(s): VANCORANDOM in the last 72 hours. Assessment:  · WBC and temperature: WBC up slightly at 11.5 (on Methylprednisolone), patient is afebrile  · SCr, BUN, and urine output: SCR remains wnL, UOP appears ok, net -3900ml  · Day(s) of therapy: 6  · Vancomycin concentration:   · 4/12:  13.5. · 4/15= 12.6    Plan:  · Renal labs are stable, wnl  · Vanco TR this am= 12.6 = slightly below range of [15-20]  · Change Vancomycin 750mg ivpb q12hr  [was q18hr] = same dose closer frequency. · Pharmacy will continue to monitor patient and adjust therapy as indicated    [address] 4/18 @ 11am    Thank you for the consult.   Talat Quinteros [address], [facility]   4/15/2021 11:51 AM

## 2021-04-15 NOTE — CARE COORDINATION
Patient now extubated; very weak. PS message to Dr Lin Campoverde to order PT/OT as patient will need SNF for short term rehab.  Dave Nelson RN

## 2021-04-15 NOTE — PROGRESS NOTES
CARDIOLOGY  NOTE        Name:  Courtney Montemayor /Age/Sex: 1950  (65 y.o. female)   MRN & CSN:  [id number] & [id number] Admission Date/Time: 4/10/2021  8:15 AM   Location:  -A PCP: Maisha Leija, [address] Day: 6        PLAN FROM CARDIOLOGY FOR TODAY:   Add oral BB      - cardiology consult is for:  afib    -  Interval history:  In SR    · ASSESSMENT/ PLAN:  1. A fib in SR CPM, add oral BB  2.  normal EF on echo  3. Supportive care          Subjective: Todays complain: Patient  extubated    HPI:  Rupert is a 65 y. o.year old who and presents with had concerns including Respiratory Distress. Chief Complaint   Patient presents with    Respiratory Distress           Objective: Temperature:  Current - Temp: 99.1 °F (37.3 °C);  Max - Temp  Av.8 °F (37.1 °C)  Min: 98.1 °F (36.7 °C)  Max: 99.1 °F (37.3 °C)    Respiratory Rate : Resp  Av.8  Min: 11  Max: 27    Pulse Range: Pulse  Av.3  Min: 77  Max: 126    Blood Presuure Range:  Systolic (94VWN), FFY:171 , Min:103 , BCB:957   ; Diastolic (43GLF), TTE:19, Min:50, Max:112      Pulse ox Range: SpO2  Av.8 %  Min: 94 %  Max: 100 %    24hr I & O:      Intake/Output Summary (Last 24 hours) at 4/15/2021 0618  Last data filed at 2021 2200  Gross per 24 hour   Intake 450 ml   Output 5600 ml   Net -5150 ml         BP (!) 150/81   Pulse 98   Temp 99.1 °F (37.3 °C) (Rectal)   Resp 18   Ht 5' (1.524 m)   Wt 140 lb 6.9 oz (63.7 kg)   SpO2 95%   BMI 27.43 kg/m²           Review of Systems:   No cp, sob      TELEMETRY: Sinus    has a past medical history of Arthritis, COPD (chronic obstructive pulmonary disease) (HCC), Full dentures, H/O cardiovascular stress test, H/O cardiovascular stress test, H/O Doppler ultrasound, H/O Doppler ultrasound, History of 24 hour EKG monitoring, History of nuclear stress test, Hx of chest x-ray, Hx of Doppler echocardiogram, Hx of echocardiogram, Hx of echocardiogram, Hx of pelvic x-ray, Hx of x-ray of hip, Hyperlipidemia, Hypertension, Leg pain, Lung nodules, On home oxygen therapy, Small cell lung cancer ([address] and Wears glasses. has a past surgical history that includes cyst removal (1970's); Tubal ligation (1970's); laparoscopic appendectomy (N/A, 5/18/2019); Mediastinoscopy (N/A, 12/10/2020); back surgery; Colonoscopy; Hysterectomy; eye surgery (2010?); joint replacement (Right, 2014? ?); Foot surgery (Right, 1970's); bronchoscopy (N/A, 1/5/2021); and Upper gastrointestinal endoscopy (N/A, 3/3/2021).   Physical Exam:  General:   sleeping  Head:normal  Eye:normal  Neck:  No JVD   Chest:  Clear to auscultation, respiration easy  Cardiovascular:  RRR S1S2  Abdomen:   nontender  Extremities:  tr edema  Pulses; palpable  Neuro: grossly normal    Medications:    metoprolol tartrate  50 mg Oral BID    methylPREDNISolone  40 mg Intravenous Q6H    famotidine (PEPCID) injection  20 mg Intravenous BID    vancomycin  750 mg Intravenous Q18H    ipratropium-albuterol  1 ampule Inhalation Q4H WA    sodium chloride flush  5-40 mL Intravenous 2 times per day    nicotine  1 patch Transdermal Daily    apixaban  5 mg Oral BID    aspirin  81 mg Oral Daily    busPIRone  10 mg Oral BID    calcium-cholecalciferol  1 tablet Oral Daily    vitamin D  1,000 Units Oral Daily    dilTIAZem  240 mg Oral Daily    lactobacillus  1 capsule Oral Daily with breakfast    magnesium oxide  200 mg Oral Daily    nystatin  5 mL Oral 4x Daily    sucralfate  1 g Oral 4x Daily    theophylline  200 mg Oral Daily    meropenem  1,000 mg Intravenous Q8H    atorvastatin  10 mg Oral Nightly    sodium chloride flush  5-40 mL Intravenous 2 times per day      sodium chloride      sodium chloride      dextrose      sodium chloride       HYDROcodone-acetaminophen, labetalol, LORazepam, potassium chloride, magnesium sulfate, sodium chloride, metoprolol, sodium chloride flush, sodium chloride flush, sodium chloride, promethazine **OR** ondansetron, polyethylene glycol, acetaminophen **OR** acetaminophen, diphenhydrAMINE, guaiFENesin, glucose, dextrose, glucagon (rDNA), dextrose, magic (miracle) mouthwash, sodium chloride flush, sodium chloride, LORazepam    Lab Data:  CBC:   Recent Labs     04/13/21  0510 04/14/21  0525 04/15/21  0400   WBC 5.3 10.3 11.5*   HGB 9.4* 9.3* 9.4*   HCT 29.6* 28.4* 28.8*   MCV 90.0 90.7 91.4    154 85*     BMP:   Recent Labs     04/13/21  0510 04/13/21  1400 04/14/21  0525 04/15/21  0400     --  135 148*   K 3.2* 3.3* 3.8 3.8     --  103 109   CO2 30  --  26 34*   PHOS 2.9  --   --   --    BUN 20  --  18 17   CREATININE 0.5*  --  0.4* 0.5*     LIVER PROFILE:   Recent Labs     04/13/21  0510 04/14/21  0525   AST 26 19   ALT 14 11   BILITOT 0.2 0.2   ALKPHOS 51 48     PT/INR:   No results for input(s): PROTIME, INR in the last 72 hours. APTT:   No results for input(s): APTT in the last 72 hours. BNP:  No results for input(s): BNP in the last 72 hours.   TROPONIN: @TROPONINI:3@  Labs, consult, tests reviewed    Assessment/ PLAN:    As above                  Roselyn Petersen MD 4/15/2021 6:18 AM

## 2021-04-15 NOTE — CONSULTS
Nephrology Service Consultation      [address] 1973 [place] [address]  Phone: [phone]  Office Hours: 8:30AM  4:30PM  Monday - Friday            Patient:  Aracelis Couch  MRN: [id number]  Consulting physician:  Chyna Aragon MD  Reason for Consult: high sodium      PCP: Jarod Orellana MD    HISTORY OF PRESENT ILLNESS:   The patient is a 65 y.o. 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 She was intubated and treated for sepsis from pneumonitis. She remains on abx. She was also suspected to have a seizure for which she was seen by the neurologist  Renal consult for sodium 148 from 135 and being in negative fluid balance. She is on bipap this morning  Has not received any diuretics in the past 24hrs    REVIEW OF SYSTEMS:  14 point ROS is Negative. See positive ROS per HPI    Past Medical History:        Diagnosis Date    Arthritis     COPD (chronic obstructive pulmonary disease) ([facility]     follow with Dr Julia Thomas Full dentures     H/O cardiovascular stress test 11/18/2009    thallium, normal no ischemia EF70%     H/O cardiovascular stress test 10/11/2013    thallium,EF70%, normal, no ischemia    H/O Doppler ultrasound 10/13/2010    carotid, right normal, left normal    H/O Doppler ultrasound 10/07/2014    Carotid mild bilateral internal carotid artery disease less than 50% in severity. Normal vertebral artery flows with good velocities. Incidental finding of possible thyroid nodule in the left lobe.  History of 24 hour EKG monitoring 03/11/2011    NSR no ectopy    History of nuclear stress test 10/21/2016    lexiscan-normal,no ischemia,EF70%,enlarged right ventricle    Hx of chest x-ray 01/02/2015    WNL    Hx of Doppler echocardiogram 12/01/2020    EF 50-55% mild LV hypertrophy. Grade 2 diastolic dysfunction. Mild AS.     Hx of echocardiogram 10/11/2013    10/13 Abn lt ventricular diastolic function, mile MR, EF 57%,10/10 EF55%, mild mitral annular calcification    Hx of echocardiogram 05/02/2019    EF 16-70%, Grade I diastolic dysfunction, Mild aortic stenosis, Calcific thickening of posterior leaflet of mitral valve, Mild Pulm HTN    Hx of pelvic x-ray 01/02/2015    Severe RT his osterarothrosis    Hx of x-ray of hip 01/02/2015    Severe Rt hip osteosrthrosis    Hyperlipidemia     Hypertension     follows with dr Coty Adler Leg pain     Lung nodules     scheduled for bronch 1/5/2020    On home oxygen therapy     \"on 4 liters 24 hours per day\"    Small cell lung cancer ([address] 1/11/2021    Wears glasses     to read       Past Surgical History:        Procedure Laterality Date    BACK SURGERY      \"about 12 yrs ago - surgery my mid to low back \" done in [address] N/A 1/5/2021    BRONCHOSCOPY ENDOBRONCHIAL ULTRASOUND performed by Montse Miller MD at [facility]      \"last one done in my age 61's    CYST REMOVAL  1970's    left arm    EYE SURGERY  2010?    svetlana cataract ext     FOOT SURGERY Right 1970's    \"have screw in 2nd toe\"    HYSTERECTOMY      1999\"took everything \"    JOINT REPLACEMENT Right 2014??    hip    LAPAROSCOPIC APPENDECTOMY N/A 5/18/2019    APPENDECTOMY LAPAROSCOPIC performed by Santa Lopez MD at [address] [place] [address] 12/10/2020    MEDIASTINOSCOPY performed by Montse Miller MD at [address] N/A 3/3/2021    EGD BIOPSY AND BRUSHING performed by Gaurang Duff MD at [facility] ENDOSCOPY       Medications:   Prior to Admission medications    Medication Sig Start Date End Date Taking? Authorizing Provider   diphenhydrAMINE (BENADRYL) 25 MG tablet Take 50 mg by mouth every 6 hours as needed for Itching    Historical Provider, MD   HYDROcodone-acetaminophen (NORCO)  MG per tablet Take 1 tablet by mouth every 4 hours as needed for Pain for up to 30 days.  Indications: 180 4/5/21 5/5/21  Radha Oscar MD   LORazepam (ATIVAN) 1 MG tablet Take 1 tablet by mouth every 8 hours as needed for Anxiety for up to 30 days. 4/5/21 5/5/21  Jewel Connelly MD   apixaban (ELIQUIS) 5 MG TABS tablet Take 1 tablet by mouth 2 times daily 4/2/21   Deidre Fernando MD   dilTIAZem (CARDIZEM CD) 240 MG extended release capsule Take 1 capsule by mouth daily 4/3/21   Deidre Fernando MD   lactobacillus (CULTURELLE) capsule Take 1 capsule by mouth daily (with breakfast) for 27 days 4/3/21 4/30/21  Deidre Fernando MD   levoFLOXacin (LEVAQUIN) 750 MG tablet Take 1 tablet by mouth daily for 26 days 4/3/21 4/29/21  Deidre Fernando MD   magnesium oxide (MAG-OX) 400 MG tablet Take 0.5 tablets by mouth daily 4/3/21   Deidre Fernando MD   busPIRone (BUSPAR) 10 MG tablet Take 10 mg by mouth 2 times daily    Historical MD Juanito   ipratropium-albuterol (DUONEB) 0.5-2.5 (3) MG/3ML SOLN nebulizer solution Inhale 3 mLs into the lungs 4 times daily 3/16/21   Dominic Cedillo DO   simvastatin (ZOCOR) 20 MG tablet Take 1 tablet by mouth nightly 3/16/21 9/12/21  Dominic Cedillo DO   pantoprazole (PROTONIX) 40 MG tablet Take 1 tablet by mouth every morning (before breakfast) 3/14/21   Iman Mccord MD   sucralfate (CARAFATE) 1 GM tablet Take 1 tablet by mouth 4 times daily 3/13/21 4/12/21  Iman Mccord MD   Nutritional Supplement LIQD 2 cans by mouth daily. Boost strawberry if available.  2/19/21   Al Pascual APRN - CNP   theophylline (UNIPHYL) 400 MG extended release tablet Take 0.5 tablets by mouth daily 2/2/21   Fercho Carrillo MD   Magic Mouthwash (MIRACLE MOUTHWASH) Swish and swallow 5 mLs 4 times daily as needed for Irritation 1:1:1 diphenhydramine, nystatin, lidocaine 1/27/21   Al Pascual APRN - CNP   ondansetron (ZOFRAN) 8 MG tablet Take 1 tablet by mouth every 8 hours as needed for Nausea or Vomiting 1/19/21   Joni Corado MD   OLANZapine (ZYPREXA) 2.5 MG tablet Take the night prior to each chemotherapy, then for three additional nights with each chemotherapy 1/19/21   Marc Matos MD   dexamethasone (DECADRON) 4 MG tablet Take 1 tablet x 4 days after each chemotherapy 1/19/21   Marc Matos MD   albuterol sulfate HFA (PROAIR HFA) 108 (90 Base) MCG/ACT inhaler Inhale 2 puffs into the lungs every 6 hours as needed for Wheezing 6/9/20   Lucas Pavon MD   Cholecalciferol (VITAMIN D3) 25 MCG (1000 UT) TABS Take by mouth daily     Historical Provider, MD   calcium carbonate 600 MG TABS tablet Take 1 tablet by mouth daily    Historical Provider, MD   aspirin 81 MG EC tablet Take 81 mg by mouth daily    Historical Provider, MD        Allergies:  Formaldehyde, Gabapentin, Norflex tablets [orphenadrine], and Cranberry    Social History:   TOBACCO:   reports that she quit smoking about 4 months ago. Her smoking use included cigarettes. She started smoking about 47 years ago. She has a 70.50 pack-year smoking history. She has never used smokeless tobacco.  ETOH:   reports current alcohol use.   OCCUPATION:      Family History:       Problem Relation Age of Onset    Stroke Mother     Dementia Mother     Heart Attack Mother 61    Coronary Art Dis Mother     Hypertension Mother     High Cholesterol Mother    [facility] Cancer Father     Stroke Father     Heart Attack Father 61    Diabetes Sister     Heart Attack Sister     Hypertension Sister     Cancer Sister    [facility] COPD Sister    [facility] Stroke Sister     Hypertension Sister     Hypertension Sister     Irritable Bowel Syndrome Sister     Pacemaker Sister            Physical Exam:    Vitals: BP (!) 169/78   Pulse 90   Temp 99.1 °F (37.3 °C) (Rectal)   Resp 14   Ht 5' (1.524 m)   Wt 136 lb 11 oz (62 kg)   SpO2 97%   BMI 26.69 kg/m²   General appearance: in no acute distress, appears stated age  Skin: Skin color, texture, turgor normal. No rashes or lesions  HEENT: normocephalic, atraumatic  Neck: supple, trachea midline  Lungs: clear to auscultation bilaterally, breathing comfortably on bipap  Heart[de-identified] regular rate and rhythm, S1, S2 normal,  Abdomen: soft, non-tender; bowel sounds normal; no masses,   Extremities: extremities normal, atraumatic, no cyanosis or edema  Neurologic: Mental status: alert, oriented, interactive, following commands  Psychiatric: mood and affect appropriate    CBC:   Recent Labs     04/13/21  0510 04/14/21  0525 04/15/21  0400   WBC 5.3 10.3 11.5*   HGB 9.4* 9.3* 9.4*    154 85*     BMP:    Recent Labs     04/13/21  0510 04/13/21  1400 04/14/21  0525 04/15/21  0400     --  135 148*   K 3.2* 3.3* 3.8 3.8     --  103 109   CO2 30  --  26 34*   BUN 20  --  18 17   CREATININE 0.5*  --  0.4* 0.5*   GLUCOSE 118*  --  118* 82     Hepatic:   Recent Labs     04/13/21  0510 04/14/21  0525   AST 26 19   ALT 14 11   BILITOT 0.2 0.2   ALKPHOS 51 48       -----------------------------------------------------------------      Assessment and Recommendations     Patient Active Problem List    Diagnosis Date Noted    [facility]il[facility] dependent ([facility]     Elevated troponin level 04/10/2021    Elevated brain natriuretic peptide (BNP) level 04/10/2021    Acute hyperkalemia 04/10/2021    Sepsis ([facility] 04/10/2021    Pneumonitis 04/10/2021    Pneumonia of both lungs due to infectious organism     Acute hypoxemic respiratory failure ([facility] 03/23/2021    Acute on chronic respiratory failure with hypoxia and hypercapnia (HCC) 02/28/2021    Small cell lung cancer ([facility] 01/11/2021    Acute exacerbation of chronic obstructive pulmonary disease (COPD) ([facility] 12/02/2020    COPD exacerbation ([facility] 12/01/2020    Lung mass 10/08/2020    Acute on chronic respiratory failure with hypoxia ([facility] 09/18/2020    Osteopenia 06/09/2019    Anxiety 06/09/2019    Tobacco abuse 05/18/2019    Abdominal aortic aneurysm (AAA) without rupture ([facility] 05/18/2019    Shortness of breath 10/18/2016    Hypertension     Degeneration of lumbar or lumbosacral intervertebral disc 07/08/2014    Hyperlipidemia     COPD (chronic obstructive pulmonary disease) (HCC)     Leg pain      Hypernatremia  Negative fluid balance from polyuria?  Is UOP accurate?  sclc without brain mets  afib  Sepsis from pna    Plan;  Obtain Sosm, Uosm, UA, urine lytes  Will follow  Please monitor uop accurately today  Thank you      Electronically signed by Omari Chavez DO on 4/15/2021 at 7:47 AM    [address], [address],  Zohaib Larry  [facility], [place] [address]  PHONE: [phone]  FAX: [phone]

## 2021-04-15 NOTE — PROGRESS NOTES
Rita Frederick MD, [address]                Internal Medicine Hospitalist             Daily Progress  Note   Subjective:     Chief Complaint   Patient presents with    Respiratory Distress     Ms. Jauregui Complains of nil, in ICU off bipap now, awake and alert coughing up sputum. Objective:    BP (!) 169/78   Pulse 90   Temp 99.1 °F (37.3 °C) (Rectal)   Resp 15   Ht 5' (1.524 m)   Wt 136 lb 11 oz (62 kg)   SpO2 98%   BMI 26.69 kg/m²      Intake/Output Summary (Last 24 hours) at 4/15/2021 0819  Last data filed at 4/15/2021 7027  Gross per 24 hour   Intake 450 ml   Output 5350 ml   Net -4900 ml      Physical Exam:  Heart:  Regular rate and rhythm, normal S1 and S2 in all 4 auscultatory areas. No rubs  Murmurs or gallops heard. Distant heart sounds. Lungs: Mostly bronchial breathing to auscultation, decreased breath sounds at bases. No wheezes appreciated no crackles heard. Moderate air exchange. Abdomen: Soft, non distended. Bowel sounds not appreciated. No obvious liver or spleen enlargement. Non tender, no rebound noted. Extremities: Non tender, no swelling noted, can move all 4. CNS: Grossly intact.     Labs:  CBC with Differential:    Lab Results   Component Value Date    WBC 11.5 04/15/2021    RBC 3.15 04/15/2021    HGB 9.4 04/15/2021    HCT 28.8 04/15/2021    PLT 85 04/15/2021    MCV 91.4 04/15/2021    MCH 29.8 04/15/2021    MCHC 32.6 04/15/2021    RDW 14.9 04/15/2021    NRBC 1 04/02/2021    SEGSPCT 94.0 04/15/2021    BANDSPCT 9 04/14/2021    LYMPHOPCT 5.0 04/15/2021    MONOPCT 1.0 04/15/2021    MYELOPCT 2 04/05/2021    BASOPCT 0.1 04/12/2021    MONOSABS 0.1 04/15/2021    LYMPHSABS 0.6 04/15/2021    EOSABS 0.1 04/14/2021    BASOSABS 0.0 04/12/2021    DIFFTYPE MANUAL DIFFERENTIAL 04/15/2021     CMP:    Lab Results   Component Value Date     04/15/2021    K 3.8 04/15/2021     04/15/2021    CO2 34 04/15/2021    BUN 17 04/15/2021    CREATININE 0.5 04/15/2021    GFRAA >60 04/15/2021 AGRATIO 1.6 08/28/2020    LABGLOM >60 04/15/2021    GLUCOSE 82 04/15/2021    PROT 4.4 04/14/2021    PROT 6.5 03/06/2015    LABALBU 2.9 04/14/2021    CALCIUM 8.4 04/15/2021    BILITOT 0.2 04/14/2021    ALKPHOS 48 04/14/2021    AST 19 04/14/2021    ALT 11 04/14/2021     No results for input(s): TROPONINT in the last 72 hours.   Lab Results   Component Value Date    TSHHS 2.290 03/23/2021         sodium chloride      sodium chloride      dextrose      sodium chloride        metoprolol tartrate  50 mg Oral BID    methylPREDNISolone  40 mg Intravenous Q6H    famotidine (PEPCID) injection  20 mg Intravenous BID    vancomycin  750 mg Intravenous Q18H    ipratropium-albuterol  1 ampule Inhalation Q4H WA    sodium chloride flush  5-40 mL Intravenous 2 times per day    nicotine  1 patch Transdermal Daily    apixaban  5 mg Oral BID    aspirin  81 mg Oral Daily    busPIRone  10 mg Oral BID    calcium-cholecalciferol  1 tablet Oral Daily    vitamin D  1,000 Units Oral Daily    dilTIAZem  240 mg Oral Daily    lactobacillus  1 capsule Oral Daily with breakfast    magnesium oxide  200 mg Oral Daily    nystatin  5 mL Oral 4x Daily    sucralfate  1 g Oral 4x Daily    theophylline  200 mg Oral Daily    meropenem  1,000 mg Intravenous Q8H    atorvastatin  10 mg Oral Nightly    sodium chloride flush  5-40 mL Intravenous 2 times per day         Assessment:       Patient Active Problem List    Diagnosis Date Noted    Hypernatremia     Ventilator dependent (HCC)     Elevated troponin level 04/10/2021    Elevated brain natriuretic peptide (BNP) level 04/10/2021    Acute hyperkalemia 04/10/2021    Sepsis ([facility] 04/10/2021    Pneumonitis 04/10/2021    Pneumonia of both lungs due to infectious organism     Acute hypoxemic respiratory failure ([facility] 03/23/2021    Acute on chronic respiratory failure with hypoxia and hypercapnia (HCC) 02/28/2021    Small cell lung cancer ([facility] 01/11/2021    Acute exacerbation of chronic obstructive pulmonary disease (COPD) ([facility] 12/02/2020    COPD exacerbation ([facility] 12/01/2020    Lung mass 10/08/2020    Acute on chronic respiratory failure with hypoxia ([facility] 09/18/2020    Osteopenia 06/09/2019    Anxiety 06/09/2019    Tobacco abuse 05/18/2019    Abdominal aortic aneurysm (AAA) without rupture ([facility] 05/18/2019    Shortness of breath 10/18/2016    Hypertension     Degeneration of lumbar or lumbosacral intervertebral disc 07/08/2014    Hyperlipidemia     COPD (chronic obstructive pulmonary disease) (HCC)     Leg pain        Plan:     Problems being addressed this admission:   Sepsis / Acute on Chronic resp failure / PNA  4/15/21- doing better on vanco and merrem solumedrol 40 mg q6h as well. Pulmonary also seeing her. Her CXR shows no real change from earlier will need to continue as before. She tells me that her bipap machine stopped working at home. She was intubated earlier and now extubated. On 3/19/21 covid 19 negative. GTC Seizure  4/15/21-had eeg yesterday and off seizure meds rule out partial seizure. Neurology not convinced that she had seizure. Hx of SCLC / smoker  4/15/21- she just had chemo before being admitted to the hospital, says that she has quit smoking. Does not have brain mets. She has received 4th cycle of chemo and oncology will consider PET CT scan as an out pt. A Fib on doac  4/15/21- she is in sinus now will continue to monitor. Cardiology added BB. Normal EF on Echo. Esophagitis  4/15/21- on Famotidine and carafate that she was on before as well. Will continue for now and chenage her iv pepcid to PPI orally. Anemia r/o GIB  4/15/21- hb is 9.4 today continue to monitor. Check stools. Hypernatremia / HTN  4/15/21- b/p is 169/78 today. Nephrology on the case and wants osmolality studies for her sodium.        Consultants:  Nephrology  Neurology  Pulmonary  Oncology  Cardiology    General Orders:  Repeat basic labs again in am.  I have explained to the patient and discussed with him/her the treatment plan. Also RN in room during all this time.    Jean Paul Garay MD, [address]

## 2021-04-16 NOTE — PROGRESS NOTES
Comprehensive Nutrition Assessment    Type and Reason for Visit:  Reassess    Nutrition Recommendations/Plan:   · Continue current diet  · Continue current oral nutrition supplement bid, pt prefers strawberry    Nutrition Assessment:  Pt extubated and po diet started. Consuming greater than half to all of Cardiac Diet with standard oral supplements ordered, as pt uses at home. No recent wt loss noted. Will continue to follow as moderate nutrition risk. Malnutrition Assessment:  Malnutrition Status: At risk for malnutrition    Context:  Acute Illness       Estimated Daily Nutrient Needs:  Energy (kcal):  4407-4366 (25-30 kcal/kg IBW); Weight Used for Energy Requirements:  Ideal     Protein (g):  54-63 (1.2-1.4 g/kg IBW); Weight Used for Protein Requirements:  Ideal        Fluid (ml/day):  5035-8937; Method Used for Fluid Requirements:  1 ml/kcal      Wounds:  Pressure Injury, Stage II       Current Nutrition Therapies:    DIET CARDIAC; Dietary Nutrition Supplements: Standard High Calorie Oral Supplement    Anthropometric Measures:  · Height: 5' (152.4 cm)  · Current Body Weight: 136 lb 11 oz (62 kg)   · Admission Body Weight: 133 lb 2.5 oz (60.4 kg)(unknown weight source)    · Usual Body Weight: 125 lb 14.1 oz (57.1 kg)(4/1/21)     · Ideal Body Weight: 100 lbs; % Ideal Body Weight 140.4 %   · BMI: 26.7  · BMI Categories: Overweight (BMI 25.0-29. 9)       Nutrition Diagnosis:   · Predicted inadequate energy intake related to increase demand for energy/nutrients as evidenced by wounds    Nutrition Interventions:   Food and/or Nutrient Delivery:  Continue Current Diet, Continue Oral Nutrition Supplement  Nutrition Education/Counseling:  No recommendation at this time   Coordination of Nutrition Care:  Continue to monitor while inpatient, Speech Therapy, Swallow Evaluation    Goals:  Pt will consume greater than 75% of her meals and supplements       Nutrition Monitoring and Evaluation:   Behavioral-Environmental Outcomes:  None Identified   Food/Nutrient Intake Outcomes:  Food and Nutrient Intake, Supplement Intake  Physical Signs/Symptoms Outcomes:  Biochemical Data, Weight, GI Status, Hemodynamic Status, Fluid Status or Edema     Discharge Planning:    Continue Oral Nutrition Supplement     Electronically signed by Willa Fonseca RD, LD on 4/16/21 at 3:06 PM EDT    Contact: [phone]

## 2021-04-16 NOTE — PROGRESS NOTES
Pulmonary and Critical Care  Progress Note    Subjective: The patient is better. Shortness of breath none. Chest pain none. Addressing respiratory complaints Patient is negative for  hemoptysis and cyanosis. CONSTITUTIONAL:  negative for fevers and chills. Past Medical History:     has a past medical history of Arthritis, COPD (chronic obstructive pulmonary disease) ([facility] Full dentures, H/O cardiovascular stress test, H/O cardiovascular stress test, H/O Doppler ultrasound, H/O Doppler ultrasound, History of 24 hour EKG monitoring, History of nuclear stress test, Hx of chest x-ray, Hx of Doppler echocardiogram, Hx of echocardiogram, Hx of echocardiogram, Hx of pelvic x-ray, Hx of x-ray of hip, Hyperlipidemia, Hypertension, Leg pain, Lung nodules, On home oxygen therapy, [facility] cell lung cancer ([facility] and Wears glasses. has a past surgical history that includes cyst removal (1970's); Tubal ligation (1970's); laparoscopic appendectomy (N/A, 5/18/2019); Mediastinoscopy (N/A, 12/10/2020); back surgery; Colonoscopy; Hysterectomy; eye surgery (2010?); joint replacement (Right, 2014? ?); Foot surgery (Right, 1970's); bronchoscopy (N/A, 1/5/2021); and Upper gastrointestinal endoscopy (N/A, 3/3/2021). reports that she quit smoking about 4 months ago. Her smoking use included cigarettes. She started smoking about 47 years ago. She has a 70.50 pack-year smoking history. She has never used smokeless tobacco. She reports current alcohol use. She reports that she does not use drugs. Family history:  family history includes COPD in her sister; Cancer in her father and sister; Coronary Art Dis in her mother; Dementia in her mother; Diabetes in her sister; Heart Attack in her sister;  Heart Attack (age of onset: 61) in her father and mother; High Cholesterol in her mother; Hypertension in her mother, sister, sister, and sister; Irritable Bowel Syndrome in her sister; Pacemaker in her sister; Stroke in her father, mother, and sister. Allergies   Allergen Reactions    Formaldehyde     Gabapentin Other (See Comments)    Norflex Tablets [Orphenadrine]     Cranberry Rash     Social History:    Reviewed; no changes    Objective:   PHYSICAL EXAM:        VITALS:  BP (!) 163/94   Pulse 84   Temp 98.6 °F (37 °C) (Oral)   Resp 13   Ht 5' (1.524 m)   Wt 136 lb 11 oz (62 kg)   SpO2 98%   BMI 26.69 kg/m²     24HR INTAKE/OUTPUT:      Intake/Output Summary (Last 24 hours) at 4/16/2021 1144  Last data filed at 4/16/2021 0815  Gross per 24 hour   Intake 460 ml   Output 2550 ml   Net -2090 ml       CONSTITUTIONAL:  Alert. LUNGS:  decreased breath sounds. CARDIOVASCULAR:  normal S1 and S2 and negative JVD. ABD:Abdomen soft, non-tender. BS normal. No masses,  No organomegaly. Venora Cora. DATA:    CBC:  Recent Labs     04/14/21  0525 04/15/21  0400 04/16/21  0545   WBC 10.3 11.5* 9.4   RBC 3.13* 3.15* 3.30*   HGB 9.3* 9.4* 9.8*   HCT 28.4* 28.8* 30.0*    85* 140   MCV 90.7 91.4 90.9   MCH 29.7 29.8 29.7   MCHC 32.7 32.6 32.7   RDW 15.1* 14.9 14.5   SEGSPCT 87.0* 94.0* 98.0*   BANDSPCT 9  --   --       BMP:  Recent Labs     04/14/21  0525 04/15/21  0400 04/16/21  0545    148* 143   K 3.8 3.8 3.7    109 103   CO2 26 34* 37*   BUN 18 17 18   CREATININE 0.4* 0.5* 0.4*   CALCIUM 7.8* 8.4 8.2*   GLUCOSE 118* 82 129*      ABG:  Recent Labs     04/14/21  0800   PH 7.47*   PO2ART 75   SDH1QSY 42.0   O2SAT 95.4*     Lab Results   Component Value Date    PROBNP 8,008 (H) 04/13/2021    PROBNP 3,057 (H) 04/11/2021    PROBNP 2,265 (H) 04/10/2021    THEOPH 2.3 (L) 04/16/2021     No results found for: [facility]    Radiology Review:  Pertinent images / reports were reviewed as a part of this visit.     Assessment:     Patient Active Problem List   Diagnosis    Hyperlipidemia    COPD (chronic obstructive pulmonary disease) ([address]    Leg pain    Hypertension    Shortness of breath    Tobacco abuse    Abdominal aortic aneurysm (AAA) without rupture (HCC)    Degeneration of lumbar or lumbosacral intervertebral disc    Osteopenia    Anxiety    Acute on chronic respiratory failure with hypoxia (HCC)    Lung mass    COPD exacerbation (HCC)    Acute exacerbation of chronic obstructive pulmonary disease (COPD) (HCC)    Small cell lung cancer (HCC)    Acute on chronic respiratory failure with hypoxia and hypercapnia (HCC)    Acute hypoxemic respiratory failure (HCC)    Pneumonia of both lungs due to infectious organism    Elevated troponin level    Elevated brain natriuretic peptide (BNP) level    Acute hyperkalemia    Sepsis (HCC)    Pneumonitis    Ventilator dependent (HCC)    Hypernatremia       Plan:   1. Overall the patient is better  2. Inc. Activity. 3. Taper steroids. [address] [facility]   Belkis Gayle MD  4/16/2021  11:44 AM

## 2021-04-16 NOTE — PROGRESS NOTES
04/14/2021    PROT 6.5 03/06/2015    LABALBU 2.9 04/14/2021    CALCIUM 8.2 04/16/2021    BILITOT 0.2 04/14/2021    ALKPHOS 48 04/14/2021    AST 19 04/14/2021    ALT 11 04/14/2021     No results for input(s): TROPONINT in the last 72 hours.   Lab Results   Component Value Date    TSH 2.290 03/23/2021         sodium chloride      sodium chloride      dextrose      sodium chloride        metoprolol tartrate  50 mg Oral BID    pantoprazole  40 mg Oral QAM AC    vancomycin  750 mg Intravenous Q12H    methylPREDNISolone  40 mg Intravenous Q6H    ipratropium-albuterol  1 ampule Inhalation Q4H WA    sodium chloride flush  5-40 mL Intravenous 2 times per day    nicotine  1 patch Transdermal Daily    apixaban  5 mg Oral BID    aspirin  81 mg Oral Daily    busPIRone  10 mg Oral BID    calcium-cholecalciferol  1 tablet Oral Daily    vitamin D  1,000 Units Oral Daily    dilTIAZem  240 mg Oral Daily    lactobacillus  1 capsule Oral Daily with breakfast    magnesium oxide  200 mg Oral Daily    nystatin  5 mL Oral 4x Daily    sucralfate  1 g Oral 4x Daily    theophylline  200 mg Oral Daily    meropenem  1,000 mg Intravenous Q8H    atorvastatin  10 mg Oral Nightly    sodium chloride flush  5-40 mL Intravenous 2 times per day         Assessment:       Patient Active Problem List    Diagnosis Date Noted    Hypernatremia     Ventilator dependent (HCC)     Elevated troponin level 04/10/2021    Elevated brain natriuretic peptide (BNP) level 04/10/2021    Acute hyperkalemia 04/10/2021    Sepsis ([facility] 04/10/2021    Pneumonitis 04/10/2021    Pneumonia of both lungs due to infectious organism     Acute hypoxemic respiratory failure ([facility] 03/23/2021    Acute on chronic respiratory failure with hypoxia and hypercapnia (HCC) 02/28/2021    Small cell lung cancer ([facility] 01/11/2021    Acute exacerbation of chronic obstructive pulmonary disease (COPD) ([facility] 12/02/2020    COPD exacerbation ([facility] 12/01/2020    Lung mass 10/08/2020    Acute on chronic respiratory failure with hypoxia (HCC) 09/18/2020    Osteopenia 06/09/2019    Anxiety 06/09/2019    Tobacco abuse 05/18/2019    Abdominal aortic aneurysm (AAA) without rupture ([facility] 05/18/2019    Shortness of breath 10/18/2016    Hypertension     Degeneration of lumbar or lumbosacral intervertebral disc 07/08/2014    Hyperlipidemia     COPD (chronic obstructive pulmonary disease) ([facility]     Leg pain        Plan:     Problems being addressed this admission:   Sepsis / Acute on Chronic resp failure / PNA  4/15/21- doing better on vanco and merrem solumedrol 40 mg q6h as well. Pulmonary also seeing her. Her CXR shows no real change from earlier will need to continue as before. She tells me that her bipap machine stopped working at home. She was intubated earlier and now extubated. On 3/19/21 covid 19 negative. 4/16/21- May need to cut down on her str=eroids will have pulmonary decide of that. Continue as before.      GTC Seizure  4/15/21-had eeg yesterday and off seizure meds rule out partial seizure. Neurology not convinced that she had seizure. 4/16/21- EEG done yesterday shows ' Abnormal EEG due to mild diffuse slowing of the background. This is nonspecific finding and can be seen in variety of encephalopathies.' Neurology nurse does not support use of anti epileptic meds for her and she also well does not want to be on them, as per chart.      Hx of SCLC / smoker  4/15/21- she just had chemo before being admitted to the hospital, says that she has quit smoking. Does not have brain mets. She has received 4th cycle of chemo and oncology will consider PET CT scan as an out pt.      A Fib on doac  4/15/21- she is in sinus now will continue to monitor. Cardiology added BB. Normal EF on Echo.   4/16/21- continue as before.      Esophagitis  4/15/21- on Famotidine and carafate that she was on before as well.  Will continue for now and change her iv pepcid to PPI orally.     Anemia r/o GIB  4/15/21- hb is 9.4 today continue to monitor. Check stools. 4/16/21- hb is 9.8 stable      Hypernatremia / HTN  4/15/21- b/p is 169/78 today. Nephrology on the case and wants osmolality studies for her sodium. 4/16/21- sodium is 143 today b/p is 163/94. On cardizem 240 mg qd. Add ACE-I. Disposition  4/16/21- to go to SNF and will need to precert and covid test before transfer.     Consultants:  Nephrology  Neurology  Pulmonary  Oncology  Cardiology    General Orders:  Repeat basic labs again in am.  I have explained to the patient and discussed with him/her the treatment plan.   Lin Lambert MD, Aviva Das

## 2021-04-16 NOTE — CONSULTS
[facility]  consulted by Dr. Colton Morrison for monitoring and adjustment. Indication for treatment: Sepsis of unknown etiology  Goal trough: 15-20 mcg/mL    Pertinent Laboratory Values:   Temp Readings from Last 3 Encounters:   04/16/21 98.6 °F (37 °C) (Oral)   04/09/21 97.8 °F (36.6 °C) (Temporal)   04/08/21 97.7 °F (36.5 °C) (Temporal)     Recent Labs     04/14/21  0525 04/15/21  0400 04/16/21  0545   WBC 10.3 11.5* 9.4     Recent Labs     04/14/21  0525 04/15/21  0400 04/16/21  0545   BUN 18 17 18   CREATININE 0.4* 0.5* 0.4*     Estimated Creatinine Clearance: 105 mL/min (A) (based on SCr of 0.4 mg/dL (L)). Intake/Output Summary (Last 24 hours) at 4/16/2021 1143  Last data filed at 4/16/2021 0815  Gross per 24 hour   Intake 460 ml   Output 2550 ml   Net -2090 ml       Pertinent Cultures:  Date    Source    Results  3/24               Nasal MRSA screen Negative  4/10               Blood              Pending  4/10                            Strep pneumo/Legionella        Negative    Vancomycin level:   TROUGH:    Recent Labs     04/15/21  1020   VANCOTROUGH 12.6     RANDOM:  No results for input(s): VANCORANDOM in the last 72 hours. Assessment:  · WBC and temperature: WBC WNL today (on Methylprednisolone), patient remains afebrile  · SCr, BUN, and urine output: SCR WNL, UOP ok  · Day(s) of therapy: 7  · Vancomycin concentration:   · 4/12:  13.5. · 4/15= 12.6    Plan:  · Renal labs are stable, wnl  · Vancomycin trough level is slightly below goal range of 15-20  · Continue Vancomycin 750mg ivpb every 12 hours. · Pharmacy will continue to monitor patient and adjust therapy as indicated    Hunter 3 4/18 @ 11am    Thank you for the consult.     Bree Suarez [place]  4/16/2021  11:51 AM

## 2021-04-16 NOTE — PROGRESS NOTES
CARDIOLOGY  NOTE        Name:  Catherine Jay /Age/Sex: 1950  (65 y.o. female)   MRN & CSN:  [id number] & [id number] Admission Date/Time: 4/10/2021  8:15 AM   Location:  - PCP: Mary Jane Sheppard MD       Hospital Day: 7        PLAN FROM CARDIOLOGY FOR TODAY:   CPM    - cardiology consult is for:  afib    -  Interval history:  In SR, out of ICU    · ASSESSMENT/ PLAN:  1. A fib  In SR, HR controlled  2.  normal EF on echo  3. Supportive care  4. Will fu as needed          Subjective: Todays complain: Patient  No SOB    HPI:  Rupert is a 65 y. o.year old who and presents with had concerns including Respiratory Distress. Chief Complaint   Patient presents with    Respiratory Distress           Objective: Temperature:  Current - Temp: 98.6 °F (37 °C);  Max - Temp  Av.7 °F (37.1 °C)  Min: 98.4 °F (36.9 °C)  Max: 99 °F (37.2 °C)    Respiratory Rate : Resp  Av.8  Min: 13  Max: 22    Pulse Range: Pulse  Av  Min: 74  Max: 105    Blood Presuure Range:  Systolic (53JON), XUB:199 , Min:132 , QYU:731   ; Diastolic (97IJH), VJI:37, Min:56, Max:94      Pulse ox Range: SpO2  Av.8 %  Min: 94 %  Max: 100 %    24hr I & O:      Intake/Output Summary (Last 24 hours) at 2021 1547  Last data filed at 2021 0815  Gross per 24 hour   Intake 460 ml   Output 2550 ml   Net -2090 ml         BP (!) 163/94   Pulse 84   Temp 98.6 °F (37 °C) (Oral)   Resp 13   Ht 5' (1.524 m)   Wt 136 lb 11 oz (62 kg)   SpO2 98%   BMI 26.69 kg/m²           Review of Systems:   No cp, sob      TELEMETRY: Sinus    has a past medical history of Arthritis, COPD (chronic obstructive pulmonary disease) (HCC), Full dentures, H/O cardiovascular stress test, H/O cardiovascular stress test, H/O Doppler ultrasound, H/O Doppler ultrasound, History of 24 hour EKG monitoring, History of nuclear stress test, Hx of chest x-ray, Hx of Doppler echocardiogram, Hx of echocardiogram, Hx of echocardiogram, Hx of pelvic x-ray, Hx of x-ray of hip, Hyperlipidemia, Hypertension, Leg pain, Lung nodules, On home oxygen therapy, Small cell lung cancer ([facility] and Wears glasses. has a past surgical history that includes cyst removal (1970's); Tubal ligation (1970's); laparoscopic appendectomy (N/A, 5/18/2019); Mediastinoscopy (N/A, 12/10/2020); back surgery; Colonoscopy; Hysterectomy; eye surgery (2010?); joint replacement (Right, 2014? ?); Foot surgery (Right, 1970's); bronchoscopy (N/A, 1/5/2021); and Upper gastrointestinal endoscopy (N/A, 3/3/2021).   Physical Exam:  General:   sleeping  Head:normal  Eye:normal  Neck:  No JVD   Chest:  Clear to auscultation, respiration easy  Cardiovascular:  RRR S1S2  Abdomen:   nontender  Extremities:  tr edema  Pulses; palpable  Neuro: grossly normal    Medications:    lisinopril  5 mg Oral Daily    theophylline  200 mg Oral BID    methylPREDNISolone  40 mg Intravenous Q8H    metoprolol tartrate  50 mg Oral BID    pantoprazole  40 mg Oral QAM AC    vancomycin  750 mg Intravenous Q12H    ipratropium-albuterol  1 ampule Inhalation Q4H WA    sodium chloride flush  5-40 mL Intravenous 2 times per day    nicotine  1 patch Transdermal Daily    apixaban  5 mg Oral BID    aspirin  81 mg Oral Daily    busPIRone  10 mg Oral BID    calcium-cholecalciferol  1 tablet Oral Daily    vitamin D  1,000 Units Oral Daily    dilTIAZem  240 mg Oral Daily    lactobacillus  1 capsule Oral Daily with breakfast    magnesium oxide  200 mg Oral Daily    nystatin  5 mL Oral 4x Daily    sucralfate  1 g Oral 4x Daily    meropenem  1,000 mg Intravenous Q8H    atorvastatin  10 mg Oral Nightly    sodium chloride flush  5-40 mL Intravenous 2 times per day      sodium chloride      sodium chloride      dextrose      sodium chloride       labetalol, HYDROcodone-acetaminophen, LORazepam, potassium chloride, magnesium sulfate, sodium chloride, metoprolol,

## 2021-04-16 NOTE — PLAN OF CARE
Problem: Pain:  Goal: Pain level will decrease  Description: Pain level will decrease  4/16/2021 0834 by Lore Knapp RN  Outcome: Ongoing  4/16/2021 0830 by Lore Knapp RN  Outcome: Ongoing  Goal: Control of acute pain  Description: Control of acute pain  4/16/2021 0834 by Lore Knapp RN  Outcome: Ongoing  4/16/2021 0830 by Lore Knapp RN  Outcome: Ongoing  Goal: Control of chronic pain  Description: Control of chronic pain  4/16/2021 0834 by Lore Knapp RN  Outcome: Ongoing  4/16/2021 0830 by Lore Knapp RN  Outcome: Ongoing     Problem: Non-Violent Restraints  Goal: Removal from restraints as soon as assessed to be safe  4/16/2021 0834 by Lore Knapp RN  Outcome: Ongoing  4/16/2021 0830 by Lore Knapp RN  Outcome: Ongoing  Goal: No harm/injury to patient while restraints in use  4/16/2021 0834 by Lore Knapp RN  Outcome: Ongoing  4/16/2021 0830 by Lore Knapp RN  Outcome: Ongoing  Goal: Patient's dignity will be maintained  4/16/2021 0834 by Lore Knapp RN  Outcome: Ongoing  4/16/2021 0830 by Lore Knapp RN  Outcome: Ongoing     Problem: Falls - Risk of:  Goal: Will remain free from falls  Description: Will remain free from falls  4/16/2021 0834 by Lore Knapp RN  Outcome: Ongoing  4/16/2021 0830 by Lore Knapp RN  Outcome: Ongoing  Goal: Absence of physical injury  Description: Absence of physical injury  4/16/2021 0834 by Lore Knapp RN  Outcome: Ongoing  4/16/2021 0830 by Lore Knapp RN  Outcome: Ongoing     Problem: Skin Integrity:  Goal: Will show no infection signs and symptoms  Description: Will show no infection signs and symptoms  4/16/2021 0834 by Lore Knapp RN  Outcome: Ongoing  4/16/2021 0830 by Lore Knapp RN  Outcome: Ongoing  Goal: Absence of new skin breakdown  Description: Absence of new skin breakdown  4/16/2021 0834 by Lore Knapp RN  Outcome: Ongoing  4/16/2021 0830 by Lore Knapp RN  Outcome: Ongoing     Problem: Nutrition  Goal: Optimal nutrition therapy  4/16/2021 0834 by Stu Gibson RN  Outcome: Ongoing  4/16/2021 0830 by Stu Gibson RN  Outcome: Ongoing

## 2021-04-16 NOTE — PROGRESS NOTES
Physical Therapy  [facility] ACUTE CARE PHYSICAL THERAPY EVALUATION  Kamala Mortensen, 1950, 3111/3111-A, 4/16/2021    History  Cheesh-Na:  The primary encounter diagnosis was Ventilator dependent ([address] Diagnoses of Acute on chronic respiratory failure with hypoxia and hypercapnia (HCC), Acute respiratory acidosis, Small cell lung cancer, right with metastasis. , CO2 narcosis, Acute on chronic diastolic congestive heart failure ([address] NSTEMI (non-ST elevated myocardial infarction) ([address] and 24 mm right lower lobe pulmonary nodule were also pertinent to this visit. Patient  has a past medical history of Arthritis, COPD (chronic obstructive pulmonary disease) ([address] Full dentures, H/O cardiovascular stress test, H/O cardiovascular stress test, H/O Doppler ultrasound, H/O Doppler ultrasound, History of 24 hour EKG monitoring, History of nuclear stress test, Hx of chest x-ray, Hx of Doppler echocardiogram, Hx of echocardiogram, Hx of echocardiogram, Hx of pelvic x-ray, Hx of x-ray of hip, Hyperlipidemia, Hypertension, Leg pain, Lung nodules, On home oxygen therapy, Small cell lung cancer ([address] and Wears glasses. Patient  has a past surgical history that includes cyst removal (1970's); Tubal ligation (1970's); laparoscopic appendectomy (N/A, 5/18/2019); Mediastinoscopy (N/A, 12/10/2020); back surgery; Colonoscopy; Hysterectomy; eye surgery (2010?); joint replacement (Right, 2014? ?); Foot surgery (Right, 1970's); bronchoscopy (N/A, 1/5/2021); and Upper gastrointestinal endoscopy (N/A, 3/3/2021).     Subjective:  Patient states:  \"I'm so weak\"    Pain:  Denies   Communication with other providers: CONCETTA,  co-eval with Dl MENDOZA   Restrictions: general precautions, falls, chair alarm     Home Setup/Prior level of function  Social/Functional History  Lives With: Family(granddaughter and daughter, someone always home with pt)  Type of Home: House  Home Layout: One level  Home Access: Stairs to enter with rails(plans on havig a ramp installed)  Entrance Stairs - Number of Steps: 1  Bathroom Shower/Tub: Tub/Shower unit(sponge bathes currently)  Bathroom Toilet: Bedside commode  Home Equipment: Oxygen, Cane, Rolling walker, Wheelchair-manual, sleeps in recliner(4L of O2 at baseline)  ADL Assistance: Independent(toilets exlusively on BSC, dresses self c SBA, and sponge bathes mostly in seated)  Homemaking Assistance: Needs assistance  Homemaking Responsibilities: No  Ambulation Assistance: Independent(walks short distances in the house with RW, uses a w/c outside of home)  Transfer Assistance: Independent     Examination of body systems (includes body structures/functions, activity/participation limitations):  · Observation:  Supine in bed upon arrival. Cooperative with therapy   · Vision:  [facility] [place]  · Hearing:  [employer] [place]  · Cardiopulmonary:  6L O2, stable vitals throughout session though felt very SOB with minimal activity. Musculoskeletal  · ROM R/L:  WFL BLEs   · Strength R/L:  BLEs grossly 4/5    · Mobility/treatment:  · Rolling L/R:  NT    · Supine to sit:  SBA with HOB fully elevated and a significant amount of time. · Transfers:   · Sit to stand: CGA from EOB  · Stand to sit: Trae for steadying to chair with lack of control and dec awareness with sequencing UEs back to chair for support   · Step pivot: CGA for safety with RW   · Sitting balance:  SBA at EOB static and light dynamic without UE support   · Standing balance:  CGA at RW   · Gait: 6ft CGA with RW. Slower pace, short steps, slightly fwd posture, no major LOB noted but with very quick fatigue. · Educated pt on POC, role of PT, DME use, discharge. Cues provided to inc safety and indep with mobility     [facility] 6 Clicks Inpatient Mobility:  AM-PAC Inpatient Mobility Raw Score : 17    Safety: patient left in chair with alarm, call light within reach,  gait belt used. Assessment:  Pt is a 65year old female admitted with difficulty breathing.  Diagnosed with acute on chronic respiratory failure with pneumonitis. Required intubation from 4/10-4/15. Pt wanting to return home upon discharge. Strongly recommend pt have 24/7 assist available at home with [facility] PT. If 24/7 not available, short subacute rehab stay would be appropriate. She performed below her baseline today and would benefit from further therapy services to address her current deficits, dec potential fall risk, and restore function. Complexity: Moderate  Prognosis: Good, no significant barriers to participation at this time.    Plan Times per week: 3/week, 1 week  Discharge Recommendations: 24 hour supervision or assist, Home with Home health PT, [address] [place] [address] Ave: no needs at this time     Goals:  Short term goals  Time Frame for Short term goals: 1 week  Short term goal 1: Pt will perform transfers SBA  Short term goal 2: Pt will ambulate 30ft with RW SBA  Short term goal 3: Pt will perform standing light dynamic activity with single UE support x 2 minutes SBA       Treatment plan:  Bed mobility, transfers, balance, gait, TA, TX, stairs, WC     Recommendations for NURSING mobility: stand step pivot with RW to BSC/recliner     Time:   Time in: 1000  Time out: 1026  Timed treatment minutes: 10  Total time: 26    Electronically signed by:    Jaime Paniagua [id number]  4/16/2021, 1:11 PM

## 2021-04-16 NOTE — CARE COORDINATION
Pt transferred from ICU. Daughter wants pt to go to a SNF for rehab d/t pt is very weak per CM note. PT/OT ordered. Requested PT/OT to see pt this am via wb d/t pt will require a pre-cert for placement. They requested [facility] and [facility] per CM note. Referral made to [facility] via confidential VM. Not medically ready for d/c. Pt is on 6L O2 and is still in A-fib per nurse.   TE

## 2021-04-16 NOTE — PLAN OF CARE
Nutrition Problem #1: Predicted inadequate energy intake  Intervention: Food and/or Nutrient Delivery: Continue Current Diet, Continue Oral Nutrition Supplement  Nutritional Goals: Pt will consume greater than 75% of her meals and supplements

## 2021-04-16 NOTE — PROGRESS NOTES
Nephrology Progress Note        [address], [address] [place] [address]  Phone: [phone]  Office Hours: 8:30AM  4:30PM  Monday - Friday 4/16/2021 8:47 AM  Subjective:   Admit Date: 4/10/2021  PCP: Maurizio Wolfe MD  Interval History: on nc  Doing ok  UOp yesterday 2550ml  After talking with nurse, there were 3000ml urine mistakenly added to the uop from April 14th to april 15th    Diet: DIET CARDIAC;      Data:   Scheduled Meds:   metoprolol tartrate  50 mg Oral BID    pantoprazole  40 mg Oral QAM AC    vancomycin  750 mg Intravenous Q12H    methylPREDNISolone  40 mg Intravenous Q6H    ipratropium-albuterol  1 ampule Inhalation Q4H WA    sodium chloride flush  5-40 mL Intravenous 2 times per day    nicotine  1 patch Transdermal Daily    apixaban  5 mg Oral BID    aspirin  81 mg Oral Daily    busPIRone  10 mg Oral BID    calcium-cholecalciferol  1 tablet Oral Daily    vitamin D  1,000 Units Oral Daily    dilTIAZem  240 mg Oral Daily    lactobacillus  1 capsule Oral Daily with breakfast    magnesium oxide  200 mg Oral Daily    nystatin  5 mL Oral 4x Daily    sucralfate  1 g Oral 4x Daily    theophylline  200 mg Oral Daily    meropenem  1,000 mg Intravenous Q8H    atorvastatin  10 mg Oral Nightly    sodium chloride flush  5-40 mL Intravenous 2 times per day     Continuous Infusions:   sodium chloride      sodium chloride      dextrose      sodium chloride       PRN Meds:labetalol, HYDROcodone-acetaminophen, LORazepam, potassium chloride, magnesium sulfate, sodium chloride, metoprolol, sodium chloride flush, sodium chloride flush, sodium chloride, promethazine **OR** ondansetron, polyethylene glycol, acetaminophen **OR** acetaminophen, diphenhydrAMINE, guaiFENesin, glucose, dextrose, glucagon (rDNA), dextrose, magic (miracle) mouthwash, sodium chloride flush, sodium chloride, LORazepam  I/O last 3 completed shifts:   In: 450 [IV [id number]:904]  LHF: 7285 [Urine:2550]  I/O this shift:  In: 10 [I.V.:10]  Out: -     Intake/Output Summary (Last 24 hours) at 4/16/2021 0847  Last data filed at 4/16/2021 0815  Gross per 24 hour   Intake 460 ml   Output 2550 ml   Net -2090 ml       CBC:   Recent Labs     04/14/21  0525 04/15/21  0400 04/16/21  0545   WBC 10.3 11.5* 9.4   HGB 9.3* 9.4* 9.8*    85* 140       BMP:    Recent Labs     04/14/21  0525 04/15/21  0400 04/16/21  0545    148* 143   K 3.8 3.8 3.7    109 103   CO2 26 34* 37*   BUN 18 17 18   CREATININE 0.4* 0.5* 0.4*   GLUCOSE 118* 82 129*     Hepatic:   Recent Labs     04/14/21  0525   AST 19   ALT 11   BILITOT 0.2   ALKPHOS 48     Troponin: No results for input(s): TROPONINI in the last 72 hours. BNP: No results for input(s): BNP in the last 72 hours. Lipids: No results for input(s): CHOL, HDL in the last 72 hours. Invalid input(s): LDLCALCU  ABGs:   Lab Results   Component Value Date    PO2ART 75 04/14/2021    FVB9XCH 42.0 04/14/2021     INR: No results for input(s): INR in the last 72 hours.     Objective:   Vitals: BP (!) 163/94   Pulse 84   Temp 98.6 °F (37 °C) (Oral)   Resp 13   Ht 5' (1.524 m)   Wt 136 lb 11 oz (62 kg)   SpO2 98%   BMI 26.69 kg/m²   General appearance: alert and cooperative with exam, in no acute distress  HEENT: normocephalic, atraumatic,   Neck: supple, trachea midline  Lungs: , breathing comfortably on nc  Heart[de-identified] regular rate and rhythm,   Abdomen: soft, non-tender; non distended,   Extremities: extremities atraumatic, no cyanosis or edema  Neurologic: alert, oriented, follows commands, interactive    Assessment and Plan:       Patient Active Problem List     Diagnosis Date Noted    Ventilator dependent ([facility]      Elevated troponin level 04/10/2021    Elevated brain natriuretic peptide (BNP) level 04/10/2021    Acute hyperkalemia 04/10/2021    Sepsis ([address] 04/10/2021    Pneumonitis 04/10/2021    Pneumonia of both lungs due to infectious organism      Acute hypoxemic respiratory failure ([address] 03/23/2021    Acute on chronic respiratory failure with hypoxia and hypercapnia (HCC) 02/28/2021    Small cell lung cancer ([address] 01/11/2021    Acute exacerbation of chronic obstructive pulmonary disease (COPD) ([address] 12/02/2020    COPD exacerbation ([address] 12/01/2020    Lung mass 10/08/2020    Acute on chronic respiratory failure with hypoxia ([address] 09/18/2020    Osteopenia 06/09/2019    Anxiety 06/09/2019    Tobacco abuse 05/18/2019    Abdominal aortic aneurysm (AAA) without rupture ([address] 05/18/2019    Shortness of breath 10/18/2016    Hypertension      Degeneration of lumbar or lumbosacral intervertebral disc 07/08/2014    Hyperlipidemia      COPD (chronic obstructive pulmonary disease) (HCC)      Leg pain        -Hypernatremia  -Negative fluid balance from polyuria? Is UOP accurate?  UOP from 4/14 to 4/15 had 3000ml mistakenly added to it thus it was overestimated  -sclc without brain mets  -afib  -Sepsis from pna     Plan;  uop 2.5L yesterday, better than the overrecorded 7L  Sodium level wnl  Monitor  uosm still pending                  Electronically signed by Manuel Ramirez DO on 4/16/2021 at 8:47 AM    MD James Pena DO [address],  [address] [facility], [place] [address]  PHONE: [phone]  FAX: [phone]

## 2021-04-16 NOTE — PROGRESS NOTES
Occupational Therapy  [address] [place] [address] OCCUPATIONAL THERAPY EVALUATION    History  [place]:  The primary encounter diagnosis was Ventilator dependent ([facility] Diagnoses of Acute on chronic respiratory failure with hypoxia and hypercapnia (HCC), Acute respiratory acidosis, Small cell lung cancer, right with metastasis. , CO2 narcosis, Acute on chronic diastolic congestive heart failure ([facility] NSTEMI (non-ST elevated myocardial infarction) ([facility] and 24 mm right lower lobe pulmonary nodule were also pertinent to this visit. Restrictions:                           Communication with other providers: PT.    Subjective:  Patient states:  \"I feel really weak this time\"  Pain:  No c/o  Patient goal:  Home, not amenable to rehab placement. Occupational profile (relevant social history and personal factors):        Social/Functional History  Lives With: Family(granddaughter and daughter, someone always home with pt)  Type of Home: House  Home Layout: One level  Home Access: Stairs to enter with rails(plans on havig a ramp installed)  Entrance Stairs - Number of Steps: 1  Bathroom Shower/Tub: Tub/Shower unit(sponge bathes currently)  Bathroom Toilet: Bedside commode  Home Equipment: Oxygen, Cane, Rolling walker, Wheelchair-manual, sleeps in recliner(4L of O2 at baseline)  ADL Assistance: Independent(toilets exlusively on BSC, dresses self c SBA, and sponge bathes mostly in seated)  Homemaking Assistance: Needs assistance  Homemaking Responsibilities: No  Ambulation Assistance: Independent(walks short distances in the house with RW, uses a w/c outside of home)  Transfer Assistance: Independent        Examination of body systems (includes body structures/functions, activity/participation limitations):  · Orientation: WFL   · Cognition:  WFL   · Observation:  Received pt in bed. Alert and cooperative  · Cardiopulm: 6L of O2 , no desaturation during activity despite SOB/heavy breathing.    · Vision:  [facility]   · Hearing:  WFL   · ROM:  WFL BUE · Strength: WFL BUE  · Sensation: not tested    ADLs  Feeding: KADEEM    Grooming: KADEEM in seated    Dressing: UB CGA in seated LB ModA    Bathing: UB CGA in seated LB ModA    Toileting: CGA    *Some ADL determined per observation of actual ADL performance vs functional mobility, balance, activity tolerance, and cognition. AM-PAC 6 click short form for inpatient daily activity:  Raw Score: 18  24/24 = unimpaired  23/24 = 1-20% impaired   20/24-22/24 = 21-40% impaired  15/24-19/24 = 41-59% impaired   10/24-14/24 = 60%-79% impaired  7/24-9/24 = 80%-99% impaired  6/24 = 100% impaired    Functional Mobility  Bed mobility:   Supine to sit: SBA    Sitting balance: SBA    Transfers:   Sit to stand: CGA from EOB  Stand to sit: CGA to chair    Standing balance: CGA c RW    Ambulation:  CGA c RW bed to chair 8'. Unable to tolerate further distance. Activity tolerance  Fair. becomes SOB quickly and fatigues quickly. Assessment:  Assessment  Performance deficits / Impairments: Decreased high-level IADLs, Decreased endurance, Decreased ADL status, Decreased strength, Decreased balance, Decreased posture  Treatment Diagnosis: acute on chronic resp failure, small cell R lung cancer c mets  Prognosis: Good  REQUIRES OT FOLLOW UP: Yes  Discharge Recommendations: Patient would benefit from continued therapy after discharge    Pt is a 65year old F admitted from home c resp failure on 4/10 requiring x5 days of intubation. Has hx of small cell R lung CA c mets. Pt lives with family and typically able to perform short walks to bathroom and perform toileting and dressing KADEEM. Now, pt below this previous baseline and would benefit from continued OT during LOS and at d/c to maximize function. If family able to assist with bathing/dressing and supervision all walking and standing during toileting, then recommend home c aide and therapy. SNF if family unable to supervise/assist her.        Goals:  By d/c or goals met:     Pt will

## 2021-04-16 NOTE — DISCHARGE INSTR - COC
Continuity of Care Form    Patient Name: Barry Trent   :  1950  MRN:  [id number]    Admit date:  4/10/2021  Discharge date:  21    Code Status Order: Full Code   Advance Directives:   Advance Care Flowsheet Documentation     Date/Time Healthcare Directive Type of Healthcare Directive Copy in [facility] [address] Agent's Name Healthcare Agent's Phone Number    21 1856  No, patient does not have an advance directive for healthcare treatment -- -- -- -- --          Admitting Physician:  Troy Wilder MD  PCP: Mike Parisi MD    Discharging Nurse: [facility] Unit/Room#: 6286/7789-A  Discharging Unit Phone Number: ***    Emergency Contact:   Extended Emergency Contact Information  Primary Emergency Contact: Kem [address] Phone: [phone]  Work Phone: [employer] Phone: [phone]  Relation: [address] needed? No  Secondary Emergency Contact: Betty Ramos, [address] [place] [address] Phone: [phone]  Work Phone: [phone]  Mobile Phone: [phone]  Relation: Grandchild   needed?  No    Past Surgical History:  Past Surgical History:   Procedure Laterality Date    BACK SURGERY      \"about 12 yrs ago - surgery my mid to low back \" done in [address] N/A 2021    BRONCHOSCOPY ENDOBRONCHIAL ULTRASOUND performed by Sabra Lacey MD at [facility]      \"last one done in my age 61's    CYST REMOVAL  's    left arm    EYE SURGERY  ?    svetlana cataract ext     FOOT SURGERY Right s    \"have screw in 2nd toe\"    HYSTERECTOMY      \"took everything \"    JOINT REPLACEMENT Right ??    hip    LAPAROSCOPIC APPENDECTOMY N/A 2019    APPENDECTOMY LAPAROSCOPIC performed by Neha Kumar MD at [facility] 12/10/2020    MEDIASTINOSCOPY performed by Sabra Lacey MD at [facility] N/A 3/3/2021    EGD BIOPSY AND BRUSHING performed by Olesya Cristina MD at [facility] ENDOSCOPY       Immunization History:   Immunization History   Administered Date(s) Administered    Influenza A (S4N2-40) Vaccine PF IM 12/10/2009    Influenza, High Dose (Fluzone 65 yrs and older) 09/19/2014, 10/23/2015, 09/18/2017, 09/21/2018    Influenza, Triv, inactivated, subunit, adjuvanted, IM (Fluad 65 yrs and older) 09/30/2019, 10/08/2020    Pneumococcal Conjugate 13-valent ([id number]) 03/24/2015    Pneumococcal Polysaccharide ([id number]) 09/17/2007, 03/06/2020    Tdap (Boostrix, Adacel) 06/22/2009       Active Problems:  Patient Active Problem List   Diagnosis Code    Hyperlipidemia E78.5    COPD (chronic obstructive pulmonary disease) ([facility] J44.9    Leg pain M79.606    Hypertension I10    Shortness of breath R06.02    Tobacco abuse Z72.0    Abdominal aortic aneurysm (AAA) without rupture ([facility]) I71.4    Degeneration of lumbar or lumbosacral intervertebral disc M51.37    Osteopenia M85.80    Anxiety F41.9    Acute on chronic respiratory failure with hypoxia (HCC) J96.21    Lung mass R91.8    COPD exacerbation ([facility]) J44.1    Acute exacerbation of chronic obstructive pulmonary disease (COPD) ([facility]) J44.1    Small cell lung cancer ([facility] C34.90    Acute on chronic respiratory failure with hypoxia and hypercapnia (HCC) J96.21, J96.22    Acute hypoxemic respiratory failure ([facility]) J96.01    Pneumonia of both lungs due to infectious organism J18.9    Elevated troponin level R77.8    Elevated brain natriuretic peptide (BNP) level R79.89    Acute hyperkalemia E87.5    Sepsis (HCC) A41.9    Pneumonitis J18.9    Ventilator dependent ([facility]) Z99.11    Hypernatremia E87.0       Isolation/Infection:   Isolation          No Isolation        Patient Infection Status     Infection Onset Added Last Indicated Last Indicated By Review Planned Expiration Resolved Resolved By    None active    Resolved    COVID-19 Rule Out 03/23/21 03/23/21 03/23/21 Respiratory Panel, Molecular, with COVID-19 (Restricted: peds pts or suitable admitted adults) (Ordered)   21     COVID-19 Rule Out 21 COVID-19, Rapid (Ordered)   21 Rule-Out Test Resulted    COVID-19 Rule Out 21 COVID-19 (Ordered)   21 Rule-Out Test Resulted    COVID-19 Rule Out 21 Respiratory Panel, Molecular, with COVID-19 (Restricted: peds pts or suitable admitted adults) (Ordered)   21 Rule-Out Test Resulted    COVID-19 Rule Out 21 COVID-19, Rapid (Ordered)   21 Rule-Out Test Resulted    COVID-19 Rule Out 21 COVID-19 (Ordered)   21 Rule-Out Test Resulted    COVID-19 Rule Out 20 COVID-19 (Ordered)   20 Rule-Out Test Resulted    COVID-19 Rule Out 20 COVID-19 (Ordered)   20 Rule-Out Test Resulted    COVID-19 Rule Out 20 Covid-19 Ambulatory (Ordered)   20 Rule-Out Test Resulted    COVID-19 Rule Out 20 Covid-19 Ambulatory (Ordered)   20 Rule-Out Test Resulted    COVID-19 Rule Out 20 COVID-19 (Ordered)   20 Rule-Out Test Resulted          Nurse Assessment:  Last Vital Signs: BP (!) 128/57   Pulse 74   Temp 99.2 °F (37.3 °C) (Oral)   Resp 17   Ht 5' (1.524 m)   Wt 130 lb 11.2 oz (59.3 kg)   SpO2 98%   BMI 25.53 kg/m²     Last documented pain score (0-10 scale): Pain Level: 8  Last Weight:   Wt Readings from Last 1 Encounters:   21 130 lb 11.2 oz (59.3 kg)     Mental Status:  oriented    IV Access:  - None    Nursing Mobility/ADLs:  Walking   Assisted  Transfer  Independent  Bathing  Assisted  Dressing  Assisted  Toileting  Assisted  Feeding  Assisted  Med Admin  Assisted  Med Delivery   whole    Wound Care Documentation and Therapy:  Wound 21 Sacrum Medial wound red and weeping (Active) Dressing Status Dry; Intact; Clean 03/24/21 1222   Number of days: 40       Wound 04/13/21 Coccyx Stage 2 Pressure Ulcer (Active)   Wound Etiology Pressure Stage  2 04/21/21 0923   Dressing Status New dressing applied 04/20/21 0212   Wound Cleansed Soap and water 04/21/21 0923   Dressing/Treatment Protective barrier 04/21/21 0923   Wound Assessment Pink/red 04/21/21 0923   Drainage Amount None 04/21/21 0923   Odor None 04/21/21 0923   Beryl-wound Assessment Intact 04/21/21 0923   Number of days: 8        Elimination:  Continence:   · Bowel: Yes  · Bladder: Yes  Urinary Catheter: None   Colostomy/Ileostomy/Ileal Conduit: No       Date of Last BM: ***    Intake/Output Summary (Last 24 hours) at 4/21/2021 1327  Last data filed at 4/21/2021 6413  Gross per 24 hour   Intake 460 ml   Output 2800 ml   Net -2340 ml     I/O last 3 completed shifts: In: 920 [P.O.:720; IV Piggyback:200]  Out: 8619 [Urine:4850]    Safety Concerns: At Risk for Falls    Impairments/Disabilities:      Vision      Patient's personal belongings (please select all that are sent with patient):  Dentures {DESC; UPPER/LOWER/BOTH:41259}    RN SIGNATURE:  Electronically signed by Rod Moody RN on 4/21/21 at 1:09 PM EDT    CASE MANAGEMENT/SOCIAL WORK SECTION    Inpatient Status Date: ***    Readmission Risk Assessment Score:  Readmission Risk              Risk of Unplanned Readmission:        71           Discharging to Facility/ Agency   · Name: [facility]  · Address: [address] [place] [address]  · Phone: [phone]  · Fax: [phone]      PHYSICIAN SECTION    Nutrition Therapy:  Current Nutrition Therapy:   - Oral Diet:  Dietary Nutrition Supplements: Standard High Calorie Oral Supplement  DIET GENERAL; No Added Salt (3-4 GM)     Routes of Feeding: Oral  Liquids:  Thin Liquids  Daily Fluid Restriction: no  Last Modified Barium Swallow with Video (Video Swallowing Test): not done    Treatments at the Time of Hospital Discharge:   Respiratory Treatments: duonebs Oxygen Therapy:  is on oxygen at 4 L/min per nasal cannula. Ventilator:    - BiPAP   IPAP: 15 cmH20, CPAP/EPAP: 5 cmH2O only when sleeping    Rehab Therapies: Physical Therapy and Occupational Therapy  Weight Bearing Status/Restrictions: No weight bearing restirctions  Other Medical Equipment (for information only, NOT a DME order):  walker  Other Treatments: CBC w/Diff in 1 days and 5 days    Prognosis: Fair    Condition at Discharge: Stable    Rehab Potential (if transferring to Rehab): Fair    Recommended Labs or Other Treatments After Discharge: CBC w/Diff in 1 days and 5 days    Physician Certification: I certify the above information and transfer of Courtney Montemayor  is necessary for the continuing treatment of the diagnosis listed and that she requires Jony Malinuel for less 30 days.      Update Admission H&P: No change in H&P    PHYSICIAN SIGNATURE:  Electronically signed by Kurt Harris MD on 4/21/21 at 12:58 PM EDT

## 2021-04-17 NOTE — PROGRESS NOTES
Walter Doyle MD, [facility]                Internal Medicine Hospitalist             Daily Progress  Note   Subjective:     Chief Complaint   Patient presents with    Respiratory Distress     Ms. Noemí oFwler of wanting to have regular diet. Objective:    BP (!) 180/81   Pulse 93   Temp 98 °F (36.7 °C) (Oral)   Resp 20   Ht 5' (1.524 m)   Wt 131 lb 14.4 oz (59.8 kg)   SpO2 93%   BMI 25.76 kg/m²      Intake/Output Summary (Last 24 hours) at 4/17/2021 1251  Last data filed at 4/17/2021 0954  Gross per 24 hour   Intake 100 ml   Output 3200 ml   Net -3100 ml      Physical Exam:  Heart:  Regular rate and rhythm, normal S1 and S2 in all 4 auscultatory areas. No rubs  Systolic Murmur no gallops heard. Lungs: Mostly clear to auscultation, decreased breath sounds at bases. No wheezes appreciated no crackles heard. Abdomen: Soft, non distended. Bowel sounds appreciated. No obvious liver or spleen enlargement. Non tender, no rebound noted. Extremities: Non tender, no swelling noted, strength 5/5 both legs. CNS: Grossly intact.     Labs:  CBC with Differential:    Lab Results   Component Value Date    WBC 6.7 04/17/2021    RBC 3.22 04/17/2021    HGB 9.5 04/17/2021    HCT 29.4 04/17/2021     04/17/2021    MCV 91.3 04/17/2021    MCH 29.5 04/17/2021    MCHC 32.3 04/17/2021    RDW 14.2 04/17/2021    NRBC 1 04/02/2021    SEGSPCT 93.5 04/17/2021    BANDSPCT 9 04/14/2021    LYMPHOPCT 4.5 04/17/2021    MONOPCT 1.5 04/17/2021    MYELOPCT 2 04/05/2021    BASOPCT 0.1 04/17/2021    MONOSABS 0.1 04/17/2021    LYMPHSABS 0.3 04/17/2021    EOSABS 0.0 04/17/2021    BASOSABS 0.0 04/17/2021    DIFFTYPE AUTOMATED DIFFERENTIAL 04/17/2021     CMP:    Lab Results   Component Value Date     04/17/2021    K 3.5 04/17/2021     04/17/2021    CO2 40 04/17/2021    BUN 17 04/17/2021    CREATININE 0.4 04/17/2021    GFRAA >60 04/17/2021    AGRATIO 1.6 08/28/2020    LABGLOM >60 04/17/2021    GLUCOSE 110 04/17/2021    PROT 4.4 04/14/2021    PROT 6.5 03/06/2015    LABALBU 2.9 04/14/2021    CALCIUM 8.3 04/17/2021    BILITOT 0.2 04/14/2021    ALKPHOS 48 04/14/2021    AST 19 04/14/2021    ALT 11 04/14/2021     No results for input(s): TROPONINT in the last 72 hours.   Lab Results   Component Value Date    TSHHS 2.290 03/23/2021         sodium chloride      sodium chloride      dextrose      sodium chloride        magnesium sulfate  2,000 mg Intravenous Q2H    [START ON 4/18/2021] magnesium oxide  400 mg Oral Daily    lisinopril  5 mg Oral Daily    theophylline  200 mg Oral BID    methylPREDNISolone  40 mg Intravenous Q8H    metoprolol tartrate  50 mg Oral BID    pantoprazole  40 mg Oral QAM AC    vancomycin  750 mg Intravenous Q12H    ipratropium-albuterol  1 ampule Inhalation Q4H WA    sodium chloride flush  5-40 mL Intravenous 2 times per day    nicotine  1 patch Transdermal Daily    apixaban  5 mg Oral BID    aspirin  81 mg Oral Daily    busPIRone  10 mg Oral BID    calcium-cholecalciferol  1 tablet Oral Daily    vitamin D  1,000 Units Oral Daily    dilTIAZem  240 mg Oral Daily    lactobacillus  1 capsule Oral Daily with breakfast    nystatin  5 mL Oral 4x Daily    sucralfate  1 g Oral 4x Daily    meropenem  1,000 mg Intravenous Q8H    atorvastatin  10 mg Oral Nightly    sodium chloride flush  5-40 mL Intravenous 2 times per day         Assessment:       Patient Active Problem List    Diagnosis Date Noted    Hypernatremia     Ventilator dependent (HCC)     Elevated troponin level 04/10/2021    Elevated brain natriuretic peptide (BNP) level 04/10/2021    Acute hyperkalemia 04/10/2021    Sepsis ([facility] 04/10/2021    Pneumonitis 04/10/2021    Pneumonia of both lungs due to infectious organism     Acute hypoxemic respiratory failure ([facility] 03/23/2021    Acute on chronic respiratory failure with hypoxia and hypercapnia (HCC) 02/28/2021    [facility] 01/11/2021    Acute exacerbation of chronic obstructive pulmonary disease (COPD) ([facility] 12/02/2020    COPD exacerbation ([facility] 12/01/2020    Lung mass 10/08/2020    Acute on chronic respiratory failure with hypoxia ([facility] 09/18/2020    Osteopenia 06/09/2019    Anxiety 06/09/2019    Tobacco abuse 05/18/2019    Abdominal aortic aneurysm (AAA) without rupture ([facility] 05/18/2019    Shortness of breath 10/18/2016    Hypertension     Degeneration of lumbar or lumbosacral intervertebral disc 07/08/2014    Hyperlipidemia     COPD (chronic obstructive pulmonary disease) (HCC)     Leg pain        Plan:     Problems being addressed this admission:   Sepsis / Acute on Chronic resp failure / PNA  4/15/21- doing better on vanco and merrem solumedrol 40 mg q6h as well. Pulmonary also seeing her. Her CXR shows no real change from earlier will need to continue as before. She tells me that her bipap machine stopped working at home. She was intubated earlier and now extubated. On 3/19/21 covid 19 negative.    4/16/21- May need to cut down on her steroids will have pulmonary decide of that. Continue as before. 4/17/21- slowly improving no new suggestions. Replace mag.      GTC Seizure  4/15/21-had eeg yesterday and off seizure meds rule out partial seizure. Neurology not convinced that she had seizure. 4/16/21- EEG done yesterday shows ' Abnormal EEG due to mild diffuse slowing of the background. This is nonspecific finding and can be seen in variety of encephalopathies.' Neurology nurse does not support use of anti epileptic meds for her and she also well does not want to be on them, as per chart.      Hx of SCLC / smoker  4/15/21- she just had chemo before being admitted to the hospital, says that she has quit smoking. Does not have brain mets.  She has received 4th cycle of chemo and oncology will consider PET CT scan as an out pt.      A Fib on doac  4/15/21- she is in sinus now will continue to monitor. Cardiology added BB.  Normal EF on Echo.   4/16/21-

## 2021-04-17 NOTE — CONSULTS
[facility]  consulted by Dr. Rene Higginbotham for monitoring and adjustment. Indication for treatment: Sepsis of unknown etiology  Goal trough: 15-20 mcg/mL    Pertinent Laboratory Values:   Temp Readings from Last 3 Encounters:   04/17/21 98 °F (36.7 °C) (Oral)   04/09/21 97.8 °F (36.6 °C) (Temporal)   04/08/21 97.7 °F (36.5 °C) (Temporal)     Recent Labs     04/15/21  0400 04/16/21  0545 04/17/21  0445   WBC 11.5* 9.4 6.7     Recent Labs     04/15/21  0400 04/16/21  0545 04/17/21  0445   BUN 17 18 17   CREATININE 0.5* 0.4* 0.4*     Estimated Creatinine Clearance: 103 mL/min (A) (based on SCr of 0.4 mg/dL (L)). Intake/Output Summary (Last 24 hours) at 4/17/2021 1449  Last data filed at 4/17/2021 0954  Gross per 24 hour   Intake 100 ml   Output 3200 ml   Net -3100 ml       Pertinent Cultures:  Date    Source    Results  3/24               Nasal MRSA screen Negative  4/10               Blood              Negative  4/10                            Strep pneumo/Legionella        Negative    Vancomycin level:   TROUGH:    Recent Labs     04/15/21  1020   VANCOTROUGH 12.6     RANDOM:  No results for input(s): VANCORANDOM in the last 72 hours. Assessment:  · WBC and temperature: WBC WNL (on Methylprednisolone), patient remains afebrile  · SCr, BUN, and urine output: SCR WNL, UOP is good  · Day(s) of therapy: 8  · Vancomycin concentration:   · 4/12:  13.5. · 4/15: 12.6  · 4/18:  Due @ 1100    Plan:  · Renal labs are stable, wnl  · Vancomycin trough level is slightly below goal range of 15-20  · Continue Vancomycin 750mg ivpb every 12 hours. · Pharmacy will continue to monitor patient and adjust therapy as indicated    Hunter 3 4/18 @ 11am    Thank you for the consult.     Milagro cOampo, [address]  4/17/2021  2:49 PM

## 2021-04-17 NOTE — PROGRESS NOTES
Spoke with Dm Dhillon, daughter of patient and patient update provided.   Dm Dhillon states she will call tomorrow after 1300 and would like to speak with physician

## 2021-04-17 NOTE — PROGRESS NOTES
Electrolytes wnl  Good UOP  I will sign off today      Patient Active Problem List     Diagnosis Date Noted    Ventilator dependent ([facility]      Elevated troponin level 04/10/2021    Elevated brain natriuretic peptide (BNP) level 04/10/2021    Acute hyperkalemia 04/10/2021    Sepsis ([address] 04/10/2021    Pneumonitis 04/10/2021    Pneumonia of both lungs due to infectious organism      Acute hypoxemic respiratory failure ([address] 03/23/2021    Acute on chronic respiratory failure with hypoxia and hypercapnia (HCC) 02/28/2021    Small cell lung cancer ([address] 01/11/2021    Acute exacerbation of chronic obstructive pulmonary disease (COPD) ([address] 12/02/2020    COPD exacerbation ([address] 12/01/2020    Lung mass 10/08/2020    Acute on chronic respiratory failure with hypoxia ([facility] 09/18/2020    Osteopenia 06/09/2019    Anxiety 06/09/2019    Tobacco abuse 05/18/2019    Abdominal aortic aneurysm (AAA) without rupture ([address] 05/18/2019    Shortness of breath 10/18/2016    Hypertension      Degeneration of lumbar or lumbosacral intervertebral disc 07/08/2014    Hyperlipidemia      COPD (chronic obstructive pulmonary disease) (HCC)      Leg pain        -Hypernatremia; resolved on its own/uosm 167 and sosm 301  -Negative fluid balance from polyuria? Is UOP accurate?  UOP from 4/14 to 4/15 had 3000ml mistakenly added to it thus it was overestimated  -sclc without brain mets  -afib  -Sepsis from pna

## 2021-04-17 NOTE — PLAN OF CARE
Problem: Pain:  Goal: Pain level will decrease  Description: Pain level will decrease  Outcome: Ongoing  Goal: Control of acute pain  Description: Control of acute pain  Outcome: Ongoing  Goal: Control of chronic pain  Description: Control of chronic pain  Outcome: Ongoing     Problem: Non-Violent Restraints  Goal: Removal from restraints as soon as assessed to be safe  Outcome: Ongoing  Goal: No harm/injury to patient while restraints in use  Outcome: Ongoing  Goal: Patient's dignity will be maintained  Outcome: Ongoing     Problem: Falls - Risk of:  Goal: Will remain free from falls  Description: Will remain free from falls  Outcome: Ongoing  Goal: Absence of physical injury  Description: Absence of physical injury  Outcome: Ongoing     Problem: Skin Integrity:  Goal: Will show no infection signs and symptoms  Description: Will show no infection signs and symptoms  Outcome: Ongoing  Goal: Absence of new skin breakdown  Description: Absence of new skin breakdown  Outcome: Ongoing     Problem: Nutrition  Goal: Optimal nutrition therapy  Outcome: Ongoing

## 2021-04-17 NOTE — PROGRESS NOTES
Pulmonary and Critical Care  Progress Note    Subjective: The patient has improved. Shortness of breath none. Chest pain none  Addressing respiratory complaints Patient is negative for  hemoptysis and cyanosis  CONSTITUTIONAL:  negative for fevers and chills      Past Medical History:     has a past medical history of Arthritis, COPD (chronic obstructive pulmonary disease) ([facility] [address] Full dentures, H/O cardiovascular stress test, H/O cardiovascular stress test, H/O Doppler ultrasound, H/O Doppler ultrasound, History of 24 hour EKG monitoring, History of nuclear stress test, Hx of chest x-ray, Hx of Doppler echocardiogram, Hx of echocardiogram, Hx of echocardiogram, Hx of pelvic x-ray, Hx of x-ray of hip, Hyperlipidemia, Hypertension, Leg pain, Lung nodules, On home oxygen therapy, Small cell lung cancer ([facility] [address] and Wears glasses. has a past surgical history that includes cyst removal (1970's); Tubal ligation (1970's); laparoscopic appendectomy (N/A, 5/18/2019); Mediastinoscopy (N/A, 12/10/2020); back surgery; Colonoscopy; Hysterectomy; eye surgery (2010?); joint replacement (Right, 2014? ?); Foot surgery (Right, 1970's); bronchoscopy (N/A, 1/5/2021); and Upper gastrointestinal endoscopy (N/A, 3/3/2021). reports that she quit smoking about 4 months ago. Her smoking use included cigarettes. She started smoking about 47 years ago. She has a 70.50 pack-year smoking history. She has never used smokeless tobacco. She reports current alcohol use. She reports that she does not use drugs. Family history:  family history includes COPD in her sister; Cancer in her father and sister; Coronary Art Dis in her mother; Dementia in her mother; Diabetes in her sister; Heart Attack in her sister;  Heart Attack (age of onset: 61) in her father and mother; High Cholesterol in her mother; Hypertension in her mother, sister, sister, and sister; Irritable Bowel Syndrome in her sister; Pacemaker in her sister; Stroke in her father, rupture (HCC)    Degeneration of lumbar or lumbosacral intervertebral disc    Osteopenia    Anxiety    Acute on chronic respiratory failure with hypoxia (HCC)    Lung mass    COPD exacerbation (HCC)    Acute exacerbation of chronic obstructive pulmonary disease (COPD) (HCC)    Small cell lung cancer (HCC)    Acute on chronic respiratory failure with hypoxia and hypercapnia (HCC)    Acute hypoxemic respiratory failure (HCC)    Pneumonia of both lungs due to infectious organism    Elevated troponin level    Elevated brain natriuretic peptide (BNP) level    Acute hyperkalemia    Sepsis (HCC)    Pneumonitis    Ventilator dependent (HCC)    Hypernatremia       Plan:   1. Overall the patient is better  2. Inc. Activity. 3. Supp.  Douglas Julien MD  4/17/2021  11:54 AM

## 2021-04-17 NOTE — CARE COORDINATION
CONCETTA LVM for [facility] requesting return call re referral.  Electronically signed by DOMINGO Arizmendi on 4/17/2021 at 8:41 AM     Addendum:  CM received VM from [facility] stating they cannot accept referral d/t respiratory needs. CM called [facility] (2nd choice) with referral.  Titi Olivia reported pt's insurance will warrant precert that cannot be obtained until Monday, as well as bipap will need to be ordered. Annamarie to view PHI via Epic.   Electronically signed by DOMINGO Arizmendi on 4/17/2021 at 11:17 AM

## 2021-04-17 NOTE — PROGRESS NOTES
Physical Therapy  Attempted to see pt for PT treatment. Pt declined stating she just got back into bed from being up in the chair for a few hours already today.  Will re-attempt at a later time

## 2021-04-18 NOTE — PROGRESS NOTES
6.5 03/06/2015    LABALBU 2.9 04/14/2021    CALCIUM 8.1 04/18/2021    BILITOT 0.2 04/14/2021    ALKPHOS 48 04/14/2021    AST 19 04/14/2021    ALT 11 04/14/2021     No results for input(s): TROPONINT in the last 72 hours.   Lab Results   Component Value Date    TSH 2.290 03/23/2021         sodium chloride      sodium chloride      dextrose      sodium chloride        magnesium oxide  400 mg Oral Daily    lisinopril  5 mg Oral Daily    theophylline  200 mg Oral BID    methylPREDNISolone  40 mg Intravenous Q8H    metoprolol tartrate  50 mg Oral BID    pantoprazole  40 mg Oral QAM AC    vancomycin  750 mg Intravenous Q12H    ipratropium-albuterol  1 ampule Inhalation Q4H WA    sodium chloride flush  5-40 mL Intravenous 2 times per day    nicotine  1 patch Transdermal Daily    apixaban  5 mg Oral BID    aspirin  81 mg Oral Daily    busPIRone  10 mg Oral BID    calcium-cholecalciferol  1 tablet Oral Daily    vitamin D  1,000 Units Oral Daily    dilTIAZem  240 mg Oral Daily    lactobacillus  1 capsule Oral Daily with breakfast    nystatin  5 mL Oral 4x Daily    sucralfate  1 g Oral 4x Daily    meropenem  1,000 mg Intravenous Q8H    atorvastatin  10 mg Oral Nightly    sodium chloride flush  5-40 mL Intravenous 2 times per day         Assessment:       Patient Active Problem List    Diagnosis Date Noted    Hypernatremia     Ventilator dependent (HCC)     Elevated troponin level 04/10/2021    Elevated brain natriuretic peptide (BNP) level 04/10/2021    Acute hyperkalemia 04/10/2021    [facility] 04/10/2021    Pneumonitis 04/10/2021    Pneumonia of both lungs due to infectious organism     Acute hypoxemic respiratory failure ([facility] [address] 03/23/2021    Acute on chronic respiratory failure with hypoxia and hypercapnia (HCC) 02/28/2021    [facility] lung cancer ([facility] 01/11/2021    Acute exacerbation of chronic obstructive pulmonary disease (COPD) ([facility] 12/02/2020    COPD exacerbation (HCC)

## 2021-04-18 NOTE — CONSULTS
[address] [facility]  consulted by Dr. Nitesh Sharma for monitoring and adjustment. Indication for treatment: Sepsis of unknown etiology  Goal trough: 15-20 mcg/mL    Pertinent Laboratory Values:   Temp Readings from Last 3 Encounters:   04/18/21 99 °F (37.2 °C) (Oral)   04/09/21 97.8 °F (36.6 °C) (Temporal)   04/08/21 97.7 °F (36.5 °C) (Temporal)     Recent Labs     04/16/21  0545 04/17/21  0445 04/18/21  0500   WBC 9.4 6.7 3.3*     Recent Labs     04/16/21  0545 04/17/21  0445 04/18/21  0500   BUN 18 17 16   CREATININE 0.4* 0.4* 0.5*     Estimated Creatinine Clearance: 82 mL/min (A) (based on SCr of 0.5 mg/dL (L)). Intake/Output Summary (Last 24 hours) at 4/18/2021 1503  Last data filed at 4/18/2021 1218  Gross per 24 hour   Intake 240 ml   Output 2700 ml   Net -2460 ml       Pertinent Cultures:  Date    Source    Results  3/24               Nasal MRSA screen Negative  4/10               Blood              Negative  4/10                            Strep pneumo/Legionella        Negative    Vancomycin level:   TROUGH:    No results for input(s): VANCOTROUGH in the last 72 hours. RANDOM:  No results for input(s): VANCORANDOM in the last 72 hours. Assessment:  · WBC and temperature: WBC WNL (on Methylprednisolone), patient remains afebrile  · SCr, BUN, and urine output: SCR WNL, UOP is good  · Day(s) of therapy: 9  · Vancomycin concentration:   · 4/12:  13.5. · 4/15: 12.6  · 4/18:  Missed trough this AM.    Plan:  · Renal labs are stable, wnl  · Trough level missed this AM.  Re-ordered for tonight. · Continue Vancomycin 750mg ivpb every 12 hours. · Pharmacy will continue to monitor patient and adjust therapy as indicated    Hunter 3 4/18 @ 2100. Thank you for the consult.     Darleen Sotelo, [address]  4/18/2021  3:03 PM

## 2021-04-18 NOTE — PROGRESS NOTES
Cardiology Progress Note     Today's Plan: Will sign off    Admit Date:  4/10/2021    Consult reason/ Seen today for: afib    Subjective and  Overnight Events: Patient up in chair, reports cough is unchanged and SOB improved. Assessment / Plan / Recommendation:     1. Paroxysmal afib: CHADs-Vas score is 5 and is on Eliquis for  Anticoagulation. Patient is currently in sinus rhythm, continue Cardizem 240 mg daily for rate control. Recommend to keep potassium between 44.5 and magnesium between 22.2. Currently hypomagnesia 1.4, recommend replacement. 2. VT: 12 beat yesterday magnesium level was 1.4. Magnesium was replaced but still low today at 1.6, recommend to continue replacement protocol. Will closely monitor. 3. SOB: Acute respiratory failurepneumonia. 4. HTN:elevated, continue  lisinopril and Cardizem can titrate accordingly. She just received medication. 5. DVT prophylaxis if not contraindicated. 6. Will sign off    . History of Presenting Illness:    Chief complain on admission : 65 y. o.year old who is admitted for  Chief Complaint   Patient presents with    Respiratory Distress        Past medical history:    has a past medical history of Arthritis, COPD (chronic obstructive pulmonary disease) ([address] Full dentures, H/O cardiovascular stress test, H/O cardiovascular stress test, H/O Doppler ultrasound, H/O Doppler ultrasound, History of 24 hour EKG monitoring, History of nuclear stress test, Hx of chest x-ray, Hx of Doppler echocardiogram, Hx of echocardiogram, Hx of echocardiogram, Hx of pelvic x-ray, Hx of x-ray of hip, Hyperlipidemia, Hypertension, Leg pain, Lung nodules, On home oxygen therapy, Small cell lung cancer ([address] and Wears glasses. Past surgical history:   has a past surgical history that includes cyst removal (1970's);  Tubal ligation (1970's); laparoscopic appendectomy (N/A, 5/18/2019); Mediastinoscopy (N/A, 12/10/2020); back surgery; Colonoscopy; Hysterectomy; eye surgery (2010?); joint replacement (Right, 2014? ?); Foot surgery (Right, 1970's); bronchoscopy (N/A, 1/5/2021); and Upper gastrointestinal endoscopy (N/A, 3/3/2021). Social History:   reports that she quit smoking about 5 months ago. Her smoking use included cigarettes. She started smoking about 47 years ago. She has a 70.50 pack-year smoking history. She has never used smokeless tobacco. She reports current alcohol use. She reports that she does not use drugs. Family history:  family history includes COPD in her sister; Cancer in her father and sister; Coronary Art Dis in her mother; Dementia in her mother; Diabetes in her sister; Heart Attack in her sister; Heart Attack (age of onset: 61) in her father and mother; High Cholesterol in her mother; Hypertension in her mother, sister, sister, and sister; Irritable Bowel Syndrome in her sister; Pacemaker in her sister; Stroke in her father, mother, and sister. Allergies   Allergen Reactions    Formaldehyde     Gabapentin Other (See Comments)    Norflex Tablets [Orphenadrine]     Cranberry Rash       Review of Systems:  Review of Systems   All other systems reviewed and are negative. BP (!) 127/54   Pulse 96   Temp 99 °F (37.2 °C)   Resp 18   Ht 5' (1.524 m)   Wt 131 lb 4.8 oz (59.6 kg)   SpO2 95%   BMI 25.64 kg/m²       Intake/Output Summary (Last 24 hours) at 4/18/2021 0946  Last data filed at 4/18/2021 0500  Gross per 24 hour   Intake    Output 4600 ml   Net -4600 ml       Physical Exam:  Constitutional:  Well developed, Well nourished, No acute distress  HENT:  Normocephalic, Atraumatic, Bilateral external ears normal,  Nose normal.   Neck- trachea is midline  Eyes:  PERRL, Conjunctiva normal  Respiratory: scattered rhonchi, No respiratory distress, No wheezing, No chest tenderness.    Cardiovascular:  Normal heart rate, irregular, no murmurs appreciated, No rubs appreciated, No gallops appreciated, JVP not elevated  Abdomen/GI:  Soft, No tenderness  Musculoskeletal:  Intact distal pulses, no edema, No tenderness, No cyanosis, No clubbing. Integument:  Warm, Dry  Lymphatic:  No lymphadenopathy noted.    Neurologic:  Alert & oriented  Psychiatric:  Affect and Mood :pleasant     Medications:    magnesium oxide  400 mg Oral Daily    lisinopril  5 mg Oral Daily    theophylline  200 mg Oral BID    methylPREDNISolone  40 mg Intravenous Q8H    metoprolol tartrate  50 mg Oral BID    pantoprazole  40 mg Oral QAM AC    vancomycin  750 mg Intravenous Q12H    ipratropium-albuterol  1 ampule Inhalation Q4H WA    sodium chloride flush  5-40 mL Intravenous 2 times per day    nicotine  1 patch Transdermal Daily    apixaban  5 mg Oral BID    aspirin  81 mg Oral Daily    busPIRone  10 mg Oral BID    calcium-cholecalciferol  1 tablet Oral Daily    vitamin D  1,000 Units Oral Daily    dilTIAZem  240 mg Oral Daily    lactobacillus  1 capsule Oral Daily with breakfast    nystatin  5 mL Oral 4x Daily    sucralfate  1 g Oral 4x Daily    meropenem  1,000 mg Intravenous Q8H    atorvastatin  10 mg Oral Nightly    sodium chloride flush  5-40 mL Intravenous 2 times per day      sodium chloride      sodium chloride      dextrose      sodium chloride       labetalol, HYDROcodone-acetaminophen, LORazepam, potassium chloride, magnesium sulfate, sodium chloride, metoprolol, sodium chloride flush, sodium chloride flush, sodium chloride, promethazine **OR** ondansetron, polyethylene glycol, acetaminophen **OR** acetaminophen, diphenhydrAMINE, guaiFENesin, glucose, dextrose, glucagon (rDNA), dextrose, magic (miracle) mouthwash, sodium chloride flush, sodium chloride, LORazepam    Lab Data:  CBC:   Recent Labs     04/16/21  0545 04/17/21  0445 04/18/21  0500   WBC 9.4 6.7 3.3*   HGB 9.8* 9.5* 9.7*   HCT 30.0* 29.4* 30.8*   MCV 90.9 91.3 91.1    111* 97*     BMP:   Recent Labs

## 2021-04-18 NOTE — PROGRESS NOTES
[facility] changed per protocol to MDIs. She feels her breathing is good now and wants to try MDIs. She will tell us if she feels she needs to go back to [facility].

## 2021-04-18 NOTE — PROGRESS NOTES
04/18/21 0112   NIV Type   NIV Started/Stopped On   Equipment Type v60   Mode Bilevel   Mask Type Full face mask   Mask Size Medium   Settings/Measurements   IPAP 15 cmH20   CPAP/EPAP 5 cmH2O   Resp 19   FiO2  35 %   I Time/ I Time % 1 s   Vt Exhaled 430 mL   Mask Leak (lpm) 0 lpm   Comfort Level Fair   SpO2 91   Alarm Settings   Alarms On Y   Press Low Alarm 5 cmH2O   High Pressure Alarm 25 cmH2O   Delay Alarm 20 sec(s)   Resp Rate Low Alarm 12   High Respiratory Rate 40 br/min

## 2021-04-19 NOTE — PROGRESS NOTES
Hospitalist Progress Note      Name:  Madison Hernandez /Age/Sex: 1950  (65 y.o. female)   MRN & CSN:  [id number] & [id number] Admission Date/Time: 4/10/2021  8:15 AM   Location:  [facility]/[address] PCP: Darryl Vázquez, [address] Day: 10    Assessment and Plan:   Madison Hernandez is a 65 y.o.  female  who presents with Sepsis ([facility] [address]    Sepsis with acute on chronic respiratory failure  baseline 4L  intubated on admission then was extubated  and then reintubated again due to respiratory distress. Extubated on April 15. Chest x-ray with right lower lobe pneumonia  Meropenem, zithro and vanc. Currently on meropenem. On IV steroid every 8 hours.     Generalized tonic-clonic seizure  CT head neg. MRI with no metastatic disease  EEG with mild diffuse slowing. Keppra discontinued  Neurology following.     Thrombocytopenia-resolved    Hypertension: On Cardizem, lisinopril.     Hyperkalemia(resolved)  s/p kayexalate     Hypomagnesemia  monitor and replace as usual     SCLC  Status post chemotherapy. Enlarging right lower lobe pulmonary nodule. For PET scan as outpatient and consider to biopsy site.     Paroxysmal atrial fibrillation  Anticoagulation with eliquis, CCB  Echo EF 55%. RVSP 42 mmHg    Nonsustained ventricular tachycardia: Keep magnesium more than 2 and potassium more than 4.     Dsyphagia with esophagitis  carafate and famotidine     Tobacco use     Anemia  s/p 1PRBC   HH stable    Diet Dietary Nutrition Supplements: Standard High Calorie Oral Supplement  DIET GENERAL; No Added Salt (3-4 GM)   DVT Prophylaxis [] Lovenox, []  Heparin, [x] SCDs, [] Warfarin  [] NOAC     GI Prophylaxis [x] PPI,  [] H2 Blocker,  [] Carafate,  [] Diet/Tube Feeds   Code Status Full Code   MDM [] Low, [x] Moderate,[]  High     History of Present Illness:     Chief Complaint: Sepsis ([facility] [address]    Seen and examined today. Complaining of cough and sore throat. Has on and off shortness of breath. Denied fever.   Has tablet Oral Daily    vitamin D  1,000 Units Oral Daily    dilTIAZem  240 mg Oral Daily    lactobacillus  1 capsule Oral Daily with breakfast    nystatin  5 mL Oral 4x Daily    sucralfate  1 g Oral 4x Daily    meropenem  1,000 mg Intravenous Q8H    atorvastatin  10 mg Oral Nightly    sodium chloride flush  5-40 mL Intravenous 2 times per day      Infusions:    sodium chloride      sodium chloride      dextrose      sodium chloride       PRN Meds: ipratropium-albuterol, 1 ampule, Q4H PRN  labetalol, 10 mg, Q4H PRN  HYDROcodone-acetaminophen, 1 tablet, Q4H PRN  LORazepam, 0.5 mg, TID PRN  potassium chloride, 20 mEq, PRN  magnesium sulfate, 1,000 mg, PRN  sodium chloride, , PRN  metoprolol, 2.5 mg, Q6H PRN  sodium chloride flush, 10 mL, PRN  sodium chloride flush, 5-40 mL, PRN  sodium chloride, 25 mL, PRN  promethazine, 12.5 mg, Q6H PRN    Or  ondansetron, 4 mg, Q6H PRN  polyethylene glycol, 17 g, Daily PRN  acetaminophen, 650 mg, Q6H PRN    Or  acetaminophen, 650 mg, Q6H PRN  diphenhydrAMINE, 50 mg, Q6H PRN  guaiFENesin, 200 mg, TID PRN  glucose, 15 g, PRN  dextrose, 12.5 g, PRN  glucagon (rDNA), 1 mg, PRN  dextrose, 100 mL/hr, PRN  magic (miracle) mouthwash, 5 mL, 4x Daily PRN  sodium chloride flush, 5-40 mL, PRN  sodium chloride, 25 mL, PRN  LORazepam, 1 mg, Q2H PRN      Recent Labs     04/17/21  0445 04/18/21  0500 04/19/21  0502   WBC 6.7 3.3* 1.6*   HGB 9.5* 9.7* 8.7*   HCT 29.4* 30.8* 27.4*   * 97* 97*      Recent Labs     04/17/21  0445 04/18/21  0500 04/19/21  0430    139 142   K 3.5 3.5 3.4*    96* 97*   CO2 40* 42* 44*   BUN 17 16 19   CREATININE 0.4* 0.5* 0.5*     Imaging reviewed    Electronically signed by Sarah Jimenez MD on 4/19/2021 at 3:05 PM

## 2021-04-19 NOTE — CONSULTS
[facility]  consulted by Dr. Favio Louise for monitoring and adjustment. Indication for treatment: Sepsis of unknown etiology  Goal trough: 15-20 mcg/mL    Pertinent Laboratory Values:   Temp Readings from Last 3 Encounters:   04/19/21 98.8 °F (37.1 °C) (Oral)   04/09/21 97.8 °F (36.6 °C) (Temporal)   04/08/21 97.7 °F (36.5 °C) (Temporal)     Recent Labs     04/17/21  0445 04/18/21  0500 04/19/21  0502   WBC 6.7 3.3* 1.6*     Recent Labs     04/17/21  0445 04/18/21  0500 04/19/21  0430   BUN 17 16 19   CREATININE 0.4* 0.5* 0.5*     Estimated Creatinine Clearance: 81 mL/min (A) (based on SCr of 0.5 mg/dL (L)). Intake/Output Summary (Last 24 hours) at 4/19/2021 1619  Last data filed at 4/19/2021 1341  Gross per 24 hour   Intake 355.83 ml   Output 1400 ml   Net -1044.17 ml       Pertinent Cultures:  Date    Source    Results  3/24               Nasal MRSA screen Negative  4/10               Blood              Negative  4/10                            Strep pneumo/Legionella        Negative    Vancomycin level:   TROUGH:    Recent Labs     04/18/21  2216   VANCOTROUGH 19.5     RANDOM:  No results for input(s): VANCORANDOM in the last 72 hours. Assessment:  · WBC and temperature: Neutropenic and trending down to 1.6, patient remains afebrile  · SCr, BUN, and urine output: SCR remains low, UOP ok  · Day(s) of therapy: 10  · Vancomycin concentration:   · 4/12:  13.5. · 4/15: 12.6  · 4/18:  19.5, therapeutic    Plan:  · Renal trends remain normal  · Vanco trough on the high side of therpaeutic. Will decrease frequency to target a trough closer to 15. · Decrease to Vancomycin 750mg ivpb every 18 hours. · Recheck the vancomycin level in 3 days to monitor for accumulation. · Pharmacy will continue to monitor patient and adjust therapy as indicated    [address] 4/22 @ 11:30    Thank you for the consult. Caryl Pinon  4/19/2021  4:19 PM

## 2021-04-19 NOTE — PROGRESS NOTES
Pulmonary and Critical Care  Progress Note    Subjective: The patient is better. Shortness of breath none. Chest pain none. Addressing respiratory complaints Patient is negative for  hemoptysis and cyanosis. CONSTITUTIONAL:  negative for fevers and chills. Past Medical History:     has a past medical history of Arthritis, COPD (chronic obstructive pulmonary disease) ([facility] [address] Full dentures, H/O cardiovascular stress test, H/O cardiovascular stress test, H/O Doppler ultrasound, H/O Doppler ultrasound, History of 24 hour EKG monitoring, History of nuclear stress test, Hx of chest x-ray, Hx of Doppler echocardiogram, Hx of echocardiogram, Hx of echocardiogram, Hx of pelvic x-ray, Hx of x-ray of hip, Hyperlipidemia, Hypertension, Leg pain, Lung nodules, On home oxygen therapy, [facility] and Wears glasses. has a past surgical history that includes cyst removal (1970's); Tubal ligation (1970's); laparoscopic appendectomy (N/A, 5/18/2019); Mediastinoscopy (N/A, 12/10/2020); back surgery; Colonoscopy; Hysterectomy; eye surgery (2010?); joint replacement (Right, 2014? ?); Foot surgery (Right, 1970's); bronchoscopy (N/A, 1/5/2021); and Upper gastrointestinal endoscopy (N/A, 3/3/2021). reports that she quit smoking about 5 months ago. Her smoking use included cigarettes. She started smoking about 47 years ago. She has a 70.50 pack-year smoking history. She has never used smokeless tobacco. She reports current alcohol use. She reports that she does not use drugs. Family history:  family history includes COPD in her sister; Cancer in her father and sister; Coronary Art Dis in her mother; Dementia in her mother; Diabetes in her sister; Heart Attack in her sister;  Heart Attack (age of onset: 61) in her father and mother; High Cholesterol in her mother; Hypertension in her mother, sister, sister, and sister; Irritable Bowel Syndrome in her sister; Pacemaker in her sister; Stroke in her father, mother, and sister. Allergies   Allergen Reactions    Formaldehyde     Gabapentin Other (See Comments)    Norflex Tablets [Orphenadrine]     Cranberry Rash     Social History:    Reviewed; no changes    Objective:   PHYSICAL EXAM:        VITALS:  /65   Pulse 102   Temp 99 °F (37.2 °C) (Oral)   Resp 20   Ht 5' (1.524 m)   Wt 127 lb 12.8 oz (58 kg)   SpO2 98%   BMI 24.96 kg/m²     24HR INTAKE/OUTPUT:      Intake/Output Summary (Last 24 hours) at 4/19/2021 1116  Last data filed at 4/19/2021 0435  Gross per 24 hour   Intake 240 ml   Output 1400 ml   Net -1160 ml       CONSTITUTIONAL:  Alert. LUNGS:  decreased breath sounds. CARDIOVASCULAR:  normal S1 and S2 and negative JVD. ABD:Abdomen soft, non-tender. BS normal. No masses,  No organomegaly. Venora Cora. DATA:    CBC:  Recent Labs     04/17/21 0445 04/18/21  0500 04/19/21  0502   WBC 6.7 3.3* 1.6*   RBC 3.22* 3.38* 3.01*   HGB 9.5* 9.7* 8.7*   HCT 29.4* 30.8* 27.4*   * 97* 97*   MCV 91.3 91.1 91.0   MCH 29.5 28.7 28.9   MCHC 32.3 31.5* 31.8*   RDW 14.2 13.9 13.7   SEGSPCT 93.5* 96.0* 80.0*   BANDSPCT  --   --  3*      BMP:  Recent Labs     04/17/21 0445 04/18/21  0500 04/19/21  0430    139 142   K 3.5 3.5 3.4*    96* 97*   CO2 40* 42* 44*   BUN 17 16 19   CREATININE 0.4* 0.5* 0.5*   CALCIUM 8.3 8.1* 8.0*   GLUCOSE 110* 128* 137*      ABG:  No results for input(s): PH, PO2ART, [id number], HCO3, BEART, O2SAT in the last 72 hours. Lab Results   Component Value Date    PROBNP 8,008 (H) 04/13/2021    PROBNP 3,057 (H) 04/11/2021    PROBNP 2,265 (H) 04/10/2021    THEOPH 6.6 (L) 04/17/2021     No results found for: [facility]    Radiology Review:  Pertinent images / reports were reviewed as a part of this visit.     Assessment:     Patient Active Problem List   Diagnosis    Hyperlipidemia    COPD (chronic obstructive pulmonary disease) ([facility] [facility]    Leg pain    Hypertension    Shortness of breath    Tobacco abuse    Abdominal aortic

## 2021-04-19 NOTE — PROGRESS NOTES
in prep for EOB/OOB activity. Pt will perform all functional transfers with SBA and appropriate use of LRD to bed, toilet, chair in prep for increased functional independence. Pt will perform UB ADLs with SBA to increase functional independence. Pt will perform LB ADLs with Noel to increase functional independence. Pt will perform all aspects of toileting with SBA to increase functional independence. Pt will participate in therex/therax c emphasis on strength, activity tolerance,  safety, KADEEM tasks.     Safety: Left in chair with all needs in reach. chair alarm applied. Gait belt used for transfer and mobility. Time in:  1318  Time out:  1348  Timed treatment minutes:  30  Total treatment time:  30    Electronically signed by:     LAKESHIA Solitario/L, [place]   [id number]   2:39 PM, 4/19/2021      Previously filed values:

## 2021-04-19 NOTE — CONSULTS
Pulmonary Consult Note      Reason for Consult: AECOPD  Requesting Physician: HELIO Champagne  Subjective:   CHIEF COMPLAINT :SOB    Patient Active Problem List    Diagnosis Date Noted    Hypernatremia     Ventilator dependent ([address]     Elevated troponin level 04/10/2021    Elevated brain natriuretic peptide (BNP) level 04/10/2021    Acute hyperkalemia 04/10/2021    Sepsis ([address] 04/10/2021    Pneumonitis 04/10/2021    Pneumonia of both lungs due to infectious organism     Acute hypoxemic respiratory failure ([address] 03/23/2021    Acute on chronic respiratory failure with hypoxia and hypercapnia (HCC) 02/28/2021    Small cell lung cancer ([address] 01/11/2021    Acute exacerbation of chronic obstructive pulmonary disease (COPD) ([address] 12/02/2020    COPD exacerbation ([address] 12/01/2020    Lung mass 10/08/2020    Acute on chronic respiratory failure with hypoxia ([address] 09/18/2020    Osteopenia 06/09/2019    Anxiety 06/09/2019    Tobacco abuse 05/18/2019    Abdominal aortic aneurysm (AAA) without rupture ([address] 05/18/2019    Shortness of breath 10/18/2016    Hypertension     Degeneration of lumbar or lumbosacral intervertebral disc 07/08/2014    Hyperlipidemia     COPD (chronic obstructive pulmonary disease) (HCC)     Leg pain         HPI:                The patient is a 65 y.o. female with significant past medical history of COPD, chronic respiratory failure,  on home oxygen, small cell lung cancer, diagnosed early this year, on  chemotherapy  presents with complaints of SOB. She has little cough, little phlegm, no hemoptyis, no fever, no loss of weight,fair appetite, SOBOE. She is on oxygen at home in the night. She is using her inhalers regularly. She has no recent travel, no headaches, no n/v, no abd pain, no diarrhea, no dysuria. She has been intubated in the ER for the resp failure after she failed the NRB and BIPAP. At this time she is on the vent and sedated.  She is on Abx, Inhalers and solumedrol    Past Medical History:      Diagnosis Date    Arthritis     COPD (chronic obstructive [facility] disease) [facility]     follow with Dr Crystal Kessler Full dentures     H/O cardiovascular stress test 11/18/2009    thallium, normal no ischemia EF70%     H/O cardiovascular stress test 10/11/2013    thallium,EF70%, normal, no ischemia    H/O Doppler ultrasound 10/13/2010    carotid, right normal, left normal    H/O Doppler ultrasound 10/07/2014    Carotid mild bilateral internal carotid artery disease less than 50% in severity. Normal vertebral artery flows with good velocities. Incidental finding of possible thyroid nodule in the left lobe.  History of 24 hour EKG monitoring 03/11/2011    NSR no ectopy    History of nuclear stress test 10/21/2016    lexiscan-normal,no ischemia,EF70%,enlarged right ventricle    Hx of chest x-ray 01/02/2015    WNL    Hx of Doppler echocardiogram 12/01/2020    EF 50-55% mild LV hypertrophy. Grade 2 diastolic dysfunction. Mild AS.     Hx of echocardiogram 10/11/2013    10/13 Abn lt ventricular diastolic function, mile MR, EF 57%,10/10 EF55%, mild mitral annular calcification    Hx of echocardiogram 05/02/2019    EF 09-47%, Grade I diastolic dysfunction, Mild aortic stenosis, Calcific thickening of posterior leaflet of mitral valve, Mild Pulm HTN    Hx of pelvic x-ray 01/02/2015    Severe RT his osterarothrosis    Hx of x-ray of hip 01/02/2015    Severe Rt hip osteosrthrosis    Hyperlipidemia     Hypertension     follows with dr Cass Walton Leg pain     Lung nodules     scheduled for bronch 1/5/2020    On home oxygen therapy     \"on 4 liters 24 hours per day\"    Small cell lung cancer ([facility] 1/11/2021    Wears glasses     to read      Past Surgical History:        Procedure Laterality Date    BACK SURGERY      \"about 12 yrs ago - surgery my mid to low back \" done in [address] N/A 1/5/2021    BRONCHOSCOPY ENDOBRONCHIAL ULTRASOUND performed by Samara Haywood MD at [facility]      \"last one done in my age 61's    CYST REMOVAL  1970's    left arm    EYE SURGERY  2010?    svetlana cataract ext     FOOT SURGERY Right 1970's    \"have screw in 2nd toe\"    HYSTERECTOMY      1999\"took everything \"    JOINT REPLACEMENT Right 2014??    hip    LAPAROSCOPIC APPENDECTOMY N/A 5/18/2019    APPENDECTOMY LAPAROSCOPIC performed by Erika Cabrera MD at [facility] 12/10/2020    MEDIASTINOSCOPY performed by Samara Haywood MD at [facility]  1970's   [place] N/A 3/3/2021    EGD BIOPSY AND BRUSHING performed by Columba Dickson MD at [facility] ENDOSCOPY     Current Medications:     albuterol sulfate HFA  2 puff Inhalation Q4H WA    ipratropium  2 puff Inhalation Q4H WA    magnesium oxide  400 mg Oral Daily    lisinopril  5 mg Oral Daily    theophylline  200 mg Oral BID    methylPREDNISolone  40 mg Intravenous Q8H    metoprolol tartrate  50 mg Oral BID    pantoprazole  40 mg Oral QAM AC    vancomycin  750 mg Intravenous Q12H    sodium chloride flush  5-40 mL Intravenous 2 times per day    nicotine  1 patch Transdermal Daily    apixaban  5 mg Oral BID    aspirin  81 mg Oral Daily    busPIRone  10 mg Oral BID    calcium-cholecalciferol  1 tablet Oral Daily    vitamin D  1,000 Units Oral Daily    dilTIAZem  240 mg Oral Daily    lactobacillus  1 capsule Oral Daily with breakfast    nystatin  5 mL Oral 4x Daily    sucralfate  1 g Oral 4x Daily    meropenem  1,000 mg Intravenous Q8H    atorvastatin  10 mg Oral Nightly    sodium chloride flush  5-40 mL Intravenous 2 times per day     Allergies:    Social History:    TOBACCO:   reports that she quit smoking about 5 months ago. Her smoking use included cigarettes. She started smoking about 47 years ago. She has a 70.50 pack-year smoking history. She has never used smokeless tobacco.  ETOH:   reports current alcohol use.   Patient currently lives independently    Family History:       Problem Relation Age of Onset    Stroke Mother     Dementia Mother     Heart Attack Mother 61    Coronary Art Dis Mother     Hypertension Mother     High Cholesterol Mother     Cancer Father     Stroke Father     Heart Attack Father 61    Diabetes Sister     Heart Attack Sister     Hypertension Sister     Cancer Sister    Maco Galla COPD Sister     Stroke Sister     Hypertension Sister     Hypertension Sister     Irritable Bowel Syndrome Sister     Pacemaker Sister        REVIEW OF SYSTEMS:    CONSTITUTIONAL:  negative for fevers, chills, diaphoresis, activity change, appetite change, fatigue, night sweats and unexpected weight change. EYES:  negative for blurred vision, eye discharge, visual disturbance and icterus  HEENT:  negative for hearing loss, tinnitus, ear drainage, sinus pressure, nasal congestion, epistaxis and snoring  RESPIRATORY:  See HPI  CARDIOVASCULAR:  negative for chest pain, palpitations, exertional chest pressure/discomfort, edema, syncope  GASTROINTESTINAL:  negative for nausea, vomiting, diarrhea, constipation, blood in stool and abdominal pain  GENITOURINARY:  negative for frequency, dysuria, urinary incontinence, decreased urine volume, and hematuria  HEMATOLOGIC/LYMPHATIC:  negative for easy bruising, bleeding and lymphadenopathy  ALLERGIC/IMMUNOLOGIC:  negative for recurrent infections, angioedema, anaphylaxis and drug reactions  ENDOCRINE:  negative for weight changes and diabetic symptoms including polyuria, polydipsia and polyphagia  MUSCULOSKELETAL:  negative for  pain, joint swelling, decreased range of motion and muscle weakness  NEUROLOGICAL:  negative for headaches, slurred speech, unilateral weakness  PSYCHIATRIC/BEHAVIORAL: negative for hallucinations, behavioral problems, confusion and agitation.      Objective:   PHYSICAL EXAM:      VITALS:  BP (!) 147/74   Pulse 101   Temp 98.8 °F (37.1 °C) (Oral)   Resp 14   Ht 5' (1.524 m) Wt 127 lb 12.8 oz (58 kg)   SpO2 94%   BMI 24.96 kg/m²   24HR INTAKE/OUTPUT:      Intake/Output Summary (Last 24 hours) at 2021 1439  Last data filed at 2021 1341  Gross per 24 hour   Intake 355.83 ml   Output 1400 ml   Net -1044.17 ml     CURRENT PULSE OXIMETRY:  SpO2: 94 %  24HR PULSE OXIMETRY RANGE:  SpO2  Av.9 %  Min: 94 %  Max: 100 %    CONSTITUTIONAL: On the vent and sedated  NECK:  Supple, symmetrical, trachea midline, no adenopathy, thyroid symmetric, not enlarged and no tenderness, skin normal  LUNGS:  Occasional basal crackles  CARDIOVASCULAR:  normal S1 and S2, no edema and no JVD  ABDOMEN:  normal bowel sounds, non-distended and no masses palpated, and no tenderness to palpation. No hepatospleenomegaly  LYMPHADENOPATHY:  no axillary or supraclavicular adenopathy. No cervical adnenopathy  PSYCHIATRIC: Oriented to person place and time. No obvious depression or anxiety. MUSCULOSKELETAL: No obvious misalignment or effusion of the joints. No clubbing, cyanosis of the digits. SKIN:  normal skin color, texture, turgor and no redness, warmth, or swelling.  No palpable nodules    DATA:    Old records have been reviewed  CBC with Differential:    Lab Results   Component Value Date    WBC 1.6 2021    RBC 3.01 2021    HGB 8.7 2021    HCT 27.4 2021    PLT 97 2021    MCV 91.0 2021    MCH 28.9 2021    MCHC 31.8 2021    RDW 13.7 2021    NRBC 1 2021    SEGSPCT 80.0 2021    BANDSPCT 3 2021    LYMPHOPCT 12.0 2021    MONOPCT 5.0 2021    MYELOPCT 2 2021    BASOPCT 0.1 2021    MONOSABS 0.1 2021    LYMPHSABS 0.2 2021    EOSABS 0.0 2021    BASOSABS 0.0 2021    DIFFTYPE MANUAL DIFFERENTIAL 2021     BMP:    Lab Results   Component Value Date     2021    K 3.4 2021    CL 97 2021    CO2 44 2021    BUN 19 2021    CREATININE 0.5 2021    CALCIUM aortic aneurysm (AAA) without rupture ([facility] 05/18/2019    Shortness of breath 10/18/2016    Hypertension     Degeneration of lumbar or lumbosacral intervertebral disc 07/08/2014    Hyperlipidemia     COPD (chronic obstructive pulmonary disease) (HCC)     Leg pain    AECOPD  HTN  SOB  Smoker  Small cell lung ca  Dysphagia  Acute on chronic Hypoxic Hypercapneic resp failure  VDRF    PLAN  1. Abx  2. F/u C&S  3. ICS  4. OOB  5. Keep sats > 92%  6. Tube feeds  7. Inhalers  8. Solumedrol  9. DVT and GI Prophylaxis  10. SAT and SBT trial in am  11. CXR in am  15.  C/w present management        Electronically signed by Carley Carias MD on 4/19/2021 at 2:39 PM

## 2021-04-19 NOTE — PROGRESS NOTES
1: Pt will perform transfers SBA  Short term goal 2: Pt will ambulate 30ft with RW SBA  Short term goal 3: Pt will perform standing light dynamic activity with single UE support x 2 minutes SBA   Electronically signed by:     Agustín Pan PTA  4/19/2021, 1:50 PM

## 2021-04-19 NOTE — CARE COORDINATION
Spoke with Trung. She states that she will start the pre-cert when the updated PT/OT notes are in. Requested updated PT/OT notes via WB. She will notify CM when she receives the pre-cert. D/c instructions are on front of packet located with the soft chart.   NILAY

## 2021-04-20 NOTE — PROGRESS NOTES
Pulmonary and Critical Care  Progress Note    Subjective: The patient has improved. On 4L N/C. Shortness of breath none. Chest pain none  Addressing respiratory complaints Patient is negative for  hemoptysis and cyanosis  CONSTITUTIONAL:  negative for fevers and chills      Past Medical History:     has a past medical history of Arthritis, COPD (chronic obstructive pulmonary disease) ([facility] Full dentures, H/O cardiovascular stress test, H/O cardiovascular stress test, H/O Doppler ultrasound, H/O Doppler ultrasound, History of 24 hour EKG monitoring, History of nuclear stress test, Hx of chest x-ray, Hx of Doppler echocardiogram, Hx of echocardiogram, Hx of echocardiogram, Hx of pelvic x-ray, Hx of x-ray of hip, Hyperlipidemia, Hypertension, Leg pain, Lung nodules, On home oxygen therapy, [facility] cell lung cancer ([facility] and Wears glasses. has a past surgical history that includes cyst removal (1970's); Tubal ligation (1970's); laparoscopic appendectomy (N/A, 5/18/2019); Mediastinoscopy (N/A, 12/10/2020); back surgery; Colonoscopy; Hysterectomy; eye surgery (2010?); joint replacement (Right, 2014? ?); Foot surgery (Right, 1970's); bronchoscopy (N/A, 1/5/2021); and Upper gastrointestinal endoscopy (N/A, 3/3/2021). reports that she quit smoking about 5 months ago. Her smoking use included cigarettes. She started smoking about 47 years ago. She has a 70.50 pack-year smoking history. She has never used smokeless tobacco. She reports current alcohol use. She reports that she does not use drugs. Family history:  family history includes COPD in her sister; Cancer in her father and sister; Coronary Art Dis in her mother; Dementia in her mother; Diabetes in her sister; Heart Attack in her sister;  Heart Attack (age of onset: 61) in her father and mother; High Cholesterol in her mother; Hypertension in her mother, sister, sister, and sister; Irritable Bowel Syndrome in her sister; Pacemaker in her sister; Stroke in her father, mother, and sister. Allergies   Allergen Reactions    Formaldehyde     Gabapentin Other (See Comments)    Norflex Tablets [Orphenadrine]     Cranberry Rash     Social History:    Reviewed; no changes    Objective:   PHYSICAL EXAM:        VITALS:  BP (!) 133/51   Pulse 90   Temp 98 °F (36.7 °C) (Axillary)   Resp 16   Ht 5' (1.524 m)   Wt 126 lb 6.4 oz (57.3 kg)   SpO2 94%   BMI 24.69 kg/m²     24HR INTAKE/OUTPUT:      Intake/Output Summary (Last 24 hours) at 4/20/2021 1054  Last data filed at 4/19/2021 1740  Gross per 24 hour   Intake 1175.83 ml   Output 1600 ml   Net -424.17 ml       CONSTITUTIONAL:  Alert. LUNGS:  decreased breath sounds. CARDIOVASCULAR:  normal S1 and S2 and negative JVD. ABD:Abdomen soft, non-tender. BS normal. No masses,  No organomegaly. Conner Canes. DATA:    CBC:  Recent Labs     04/18/21  0500 04/19/21  0502 04/20/21  0608   WBC 3.3* 1.6* 1.1*   RBC 3.38* 3.01* 3.23*   HGB 9.7* 8.7* 9.3*   HCT 30.8* 27.4* 29.3*   PLT 97* 97* 85*   MCV 91.1 91.0 90.7   MCH 28.7 28.9 28.8   MCHC 31.5* 31.8* 31.7*   RDW 13.9 13.7 13.3   SEGSPCT 96.0* 80.0* 57.0   BANDSPCT  --  3* 3*      BMP:  Recent Labs     04/18/21  0500 04/19/21  0430 04/20/21  0608    142 142   K 3.5 3.4* 4.2   CL 96* 97* 97*   CO2 42* 44* 42*   BUN 16 19 25*   CREATININE 0.5* 0.5* 0.5*   CALCIUM 8.1* 8.0* 8.5   GLUCOSE 128* 137* 126*      ABG:  No results for input(s): PH, PO2ART, KDC2OFV, HCO3, BEART, O2SAT in the last 72 hours. Lab Results   Component Value Date    PROBNP 8,008 (H) 04/13/2021    PROBNP 3,057 (H) 04/11/2021    PROBNP 2,265 (H) 04/10/2021    THEOPH 6.6 (L) 04/17/2021     No results found for: [facility]    Radiology Review:  Pertinent images / reports were reviewed as a part of this visit.     Assessment:     Patient Active Problem List   Diagnosis    Hyperlipidemia    COPD (chronic obstructive pulmonary disease) ([facility]    Leg pain    Hypertension    Shortness of breath    Tobacco abuse  Abdominal aortic aneurysm (AAA) without rupture (HCC)    Degeneration of lumbar or lumbosacral intervertebral disc    Osteopenia    Anxiety    Acute on chronic respiratory failure with hypoxia (HCC)    Lung mass    COPD exacerbation (HCC)    Acute exacerbation of chronic obstructive pulmonary disease (COPD) (HCC)    Small cell lung cancer (HCC)    Acute on chronic respiratory failure with hypoxia and hypercapnia (HCC)    Acute hypoxemic respiratory failure (HCC)    Pneumonia of both lungs due to infectious organism    Elevated troponin level    Elevated brain natriuretic peptide (BNP) level    Acute hyperkalemia    Sepsis (HCC)    Pneumonitis    Ventilator dependent (HCC)    Hypernatremia       Plan:   1. Overall the patient is better  2. Inc. Activity.   Jacob Field MD  4/20/2021  10:54 AM

## 2021-04-20 NOTE — PROGRESS NOTES
HEMATOLOGY & ONCOLOGY progress note  [facility]      Patient was seen and examined today. Not in any acute distress and no overnight events. She is tolerating 02 per NC. Respirations even and unlabored. Denies chest pain, shortness of breath, no hemoptysis. Denied any fever or chills. ANC on today's CBC was 0.73. She is on vancomycin and meropenem. I will continue to monitor CBC. If ANC drops to below 0.5 I will consider growth factor. Awaiting for rehab placement. PET scan has been ordered as an outpatient. PHYSICAL EXAM    Vitals: /69   Pulse 73   Temp 98 °F (36.7 °C) (Axillary)   Resp 16   Ht 5' (1.524 m)   Wt 126 lb 6.4 oz (57.3 kg)   SpO2 96%   BMI 24.69 kg/m²   General appearance: alert, appears stated age and cooperative. Skin: Pale skin color, texture, turgor normal. No rashes or lesions  HEENT: Head: Normocephalic, no lesions, without obvious abnormality. Neck: no adenopathy  Lungs: respirations even, unlabored, no wheezing. Diminished lung sounds. Heart: tachycardic S1, S2 normal, no murmur, click, rub or gallop  Abdomen: soft, non-tender; bowel sounds normal; no masses,  no organomegaly  Extremities: extremities normal, atraumatic, no cyanosis or edema  Neurologic: Mental status: Alert, oriented, thought content appropriate    LABORATORY RESULTS  CBC:   Recent Labs     04/18/21  0500 04/19/21  0502 04/20/21  0608   WBC 3.3* 1.6* 1.1*   HGB 9.7* 8.7* 9.3*   PLT 97* 97* 85*     BMP:    Recent Labs     04/18/21  0500 04/19/21  0430 04/20/21  0608    142 142   K 3.5 3.4* 4.2   CL 96* 97* 97*   CO2 42* 44* 42*   BUN 16 19 25*   CREATININE 0.5* 0.5* 0.5*   GLUCOSE 128* 137* 126*       ASSESSMENT/RECOMMENDATION    SCLC- s/p 4 cycles of cisplatin/etoposide. Last Tx 3/31/21. CT 4/10 with enlarging RLL pulmonary nodule. New opacity peripheral to lesion in RLL. ? post treatment change progression. Discussed with Dr. Nghia Dove.  We will obtain PET CT scan as an outpatient and consider to biopsy site. We will discuss with radiation oncology to see if she is a candidate for radiation therapy. Neutropenia- secondary to chemotherapy. Continue to monitor. She is on antibiotics. [address] on April 20, 2021 was 0.73. Seizure-MRI negative for metastatic disease. EEG with nonspecific findings. She declines anti-seizure medication. Anemia- Hgb 9.3 today. Will continue to trend. COPD-improved. Pulmonary consulted. If she is discharged, I recommend she follow-up at the cancer center. Please continue present management. We will continue to follow the patient. Thank you.

## 2021-04-20 NOTE — PROGRESS NOTES
Occupational Therapy  . Occupational Therapy Treatment Note      Name: Yue Butts MRN: [id number] :   1950   Date:  2021   Admission Date: 4/10/2021 Room:  [address] [place] [address]-A     Primary Problem:  Ventilator dependent    Restrictions/Precautions:    General precautions; Fall Risk    Communication with other providers:  Per chart review, patient is appropriate for therapeutic intervention. Subjective:  Patient states:  Pt agreeable to OT Tx session. \"I will try to stand again. \" Pt then stated, \"I think I'm too tired. I guess we can do exercises. \"   Pain: 4/10, Back and buttocks (location, type, intensity)    Objective:    Observation:  Alert and oriented x4,   Objective Measures:  Telemetry, HR 85, O2 sat 91% on 3L O2 NC, catheter, RUE PICC line    Treatment, including education:  Therapeutic Exercise:  Cues were given for technique, safety, recruitment, and rationale. Cues were verbal and/or tactile. Pt Sup + vc's for technique to perform BUE AROM exercises to include: shoulder flex/extension, internal/external rotation, chest presses, bicep curls, rowing, and supination/pronation x10 reps + cues for self-pacing c use of rest breaks and PLB techniques PRN to manage activity tolerance. Pt expressed desire for additional exercises and performed BLE AROM therapeutic exercises for 10 reps each marches, kicks, ankle pumps and buttocks squeezes. Pt provided written instructions for the above exercises and for use of PLB techniques and rest breaks to manage activity tolerance. Pt verbalized understanding for all education. All therapeutic intervention performed c emphasis on therapeutic exercises, to inc strength, endurance and act tolerance for inc Indep c ADL tasks, func transfers / mobility. Safety  Patient safely in bedside chair + alarm placed at end of session, with call light/phone in reach, and nursing aware. Gait belt was used for func transfers / mobility.     Assessment / Impression:    Patient's tolerance of treatment: Well  Adverse Reaction: None  Significant change in status and impact:  None  Barriers to improvement: Endurance, strength      Plan for Next Session:    Continue per OT POC per patient's tolerance. Time in:  1610  Time out:  1633  Timed treatment minutes:  23  Total treatment time:  23      Electronically signed by:    LATRICIA Harrison  4/20/2021, 4:12 PM    Goals:  By d/c or goals met:  Pt will perform all bed mobility with SBA   in prep for EOB/OOB activity. Pt will perform all functional transfers with SBA and appropriate use of LRD to bed, toilet, chair in prep for increased functional independence. Pt will perform UB ADLs with SBA to increase functional independence. Pt will perform LB ADLs with Noel to increase functional independence. Pt will perform all aspects of toileting with SBA to increase functional independence.    Pt will participate in therex/therax c emphasis on strength, activity tolerance,  safety, KADEEM tasks.

## 2021-04-20 NOTE — PROGRESS NOTES
Hospitalist Progress Note      Name:  Olman Moran /Age/Sex: 1950  (65 y.o. female)   MRN & CSN:  [id number] & [id number] Admission Date/Time: 4/10/2021  8:15 AM   Location:  2609/7106-G PCP: Lobo Stoddard, [address] Day: 11    Assessment and Plan:   Olman Moran is a 65 y.o.  female  who presents with Sepsis ([facility] [address]    Sepsis with acute on chronic respiratory failure  baseline 4L  intubated on admission then was extubated  and then reintubated again due to respiratory distress. Extubated on April 15. Chest x-ray with right lower lobe pneumonia  Meropenem, zithro and vanc. Currently on meropenem. On IV steroid every 8 hours. On 4 L nasal cannula and on BiPAP. Respiratory culture: Stenotrophomonas maltophilia. But few polys. Check procalcitonin and CRP. We will attempt to de-escalate antibiotics.     Generalized tonic-clonic seizure  CT head neg. MRI with no metastatic disease  EEG with mild diffuse slowing. Keppra discontinued  Neurology following.       Hypertension: On Cardizem, lisinopril.     Hyperkalemia(resolved)  s/p kayexalate     Hypomagnesemia  monitor and replace as usual     SCLC  Status post chemotherapy. Enlarging right lower lobe pulmonary nodule. For PET scan as outpatient and consider to biopsy site.     Paroxysmal atrial fibrillation  Anticoagulation with eliquis, CCB  Echo EF 55%. RVSP 42 mmHg    Nonsustained ventricular tachycardia: Keep magnesium more than 2 and potassium more than 4.     Dsyphagia with esophagitis  carafate and famotidine     Former Tobacco user    Pancytopenia  Received 1 unit PRBC. If ANC drops below 0.5 then heme-onc will consider growth factor. Disposition: Case management working on nursing home placement. Possible DC tomorrow if okay with heme-onc and pulmonology.     Diet Dietary Nutrition Supplements: Standard High Calorie Oral Supplement  DIET GENERAL; No Added Salt (3-4 GM)   DVT Prophylaxis [] Lovenox, [] Heparin, [x] SCDs, [] Warfarin  [] NOAC     GI Prophylaxis [x] PPI,  [] H2 Blocker,  [] Carafate,  [] Diet/Tube Feeds   Code Status Full Code   MDM [] Low, [x] Moderate,[]  High     History of Present Illness:     Chief Complaint: Sepsis ([address]    She feels her breathing is stable, she also got up with 2 people helping    Ten point ROS reviewed negative, unless as noted above    Objective: Intake/Output Summary (Last 24 hours) at 4/20/2021 1022  Last data filed at 4/19/2021 1740  Gross per 24 hour   Intake 1175.83 ml   Output 1600 ml   Net -424.17 ml      Vitals:   Vitals:    04/20/21 0900   BP: (!) 133/51   Pulse: 90   Resp: 16   Temp:    SpO2: 94%     Physical Exam:   GEN Awake female, sitting back in bed  EYES No discharge  HENT NCAT  RESP Seems clear but has mild tightness of lungs  CARDIO/VASC S1/S2 auscultated. Regular rate without appreciable murmurs, rubs, or gallops. No peripheral edema. GI Soft nt nd  MSK No gross joint deformities. SKIN Normal coloration, warm, dry. NEURO Cranial nerves appear grossly intact, normal speech  PSYCH Awake, alert, oriented. Affect appropriate.     Medications:   Medications:    vancomycin  750 mg Intravenous Q18H    albuterol sulfate HFA  2 puff Inhalation Q4H WA    ipratropium  2 puff Inhalation Q4H WA    magnesium oxide  400 mg Oral Daily    lisinopril  5 mg Oral Daily    theophylline  200 mg Oral BID    methylPREDNISolone  40 mg Intravenous Q8H    metoprolol tartrate  50 mg Oral BID    pantoprazole  40 mg Oral QAM AC    sodium chloride flush  5-40 mL Intravenous 2 times per day    nicotine  1 patch Transdermal Daily    apixaban  5 mg Oral BID    aspirin  81 mg Oral Daily    busPIRone  10 mg Oral BID    calcium-cholecalciferol  1 tablet Oral Daily    vitamin D  1,000 Units Oral Daily    dilTIAZem  240 mg Oral Daily    lactobacillus  1 capsule Oral Daily with breakfast    nystatin  5 mL Oral 4x Daily    sucralfate  1 g Oral 4x Daily    meropenem 1,000 mg Intravenous Q8H    atorvastatin  10 mg Oral Nightly    sodium chloride flush  5-40 mL Intravenous 2 times per day      Infusions:    sodium chloride      sodium chloride      dextrose      sodium chloride       PRN Meds: ipratropium-albuterol, 1 ampule, Q4H PRN  labetalol, 10 mg, Q4H PRN  HYDROcodone-acetaminophen, 1 tablet, Q4H PRN  LORazepam, 0.5 mg, TID PRN  potassium chloride, 20 mEq, PRN  magnesium sulfate, 1,000 mg, PRN  sodium chloride, , PRN  metoprolol, 2.5 mg, Q6H PRN  sodium chloride flush, 10 mL, PRN  sodium chloride flush, 5-40 mL, PRN  sodium chloride, 25 mL, PRN  promethazine, 12.5 mg, Q6H PRN    Or  ondansetron, 4 mg, Q6H PRN  polyethylene glycol, 17 g, Daily PRN  acetaminophen, 650 mg, Q6H PRN    Or  acetaminophen, 650 mg, Q6H PRN  diphenhydrAMINE, 50 mg, Q6H PRN  guaiFENesin, 200 mg, TID PRN  glucose, 15 g, PRN  dextrose, 12.5 g, PRN  glucagon (rDNA), 1 mg, PRN  dextrose, 100 mL/hr, PRN  magic (miracle) mouthwash, 5 mL, 4x Daily PRN  sodium chloride flush, 5-40 mL, PRN  sodium chloride, 25 mL, PRN  LORazepam, 1 mg, Q2H PRN      Recent Labs     04/18/21  0500 04/19/21  0502 04/20/21  0608   WBC 3.3* 1.6* 1.1*   HGB 9.7* 8.7* 9.3*   HCT 30.8* 27.4* 29.3*   PLT 97* 97* 85*      Recent Labs     04/18/21  0500 04/19/21  0430 04/20/21  0608    142 142   K 3.5 3.4* 4.2   CL 96* 97* 97*   CO2 42* 44* 42*   BUN 16 19 25*   CREATININE 0.5* 0.5* 0.5*     Imaging reviewed    Electronically signed by Moises Padgett MD on 4/20/2021 at 10:22 AM

## 2021-04-20 NOTE — CONSULTS
[facility]  consulted by Dr. Annika Madrid for monitoring and adjustment. Indication for treatment: Sepsis of unknown etiology  Goal trough: 15-20 mcg/mL    Pertinent Laboratory Values:   Temp Readings from Last 3 Encounters:   04/20/21 97.6 °F (36.4 °C) (Axillary)   04/09/21 97.8 °F (36.6 °C) (Temporal)   04/08/21 97.7 °F (36.5 °C) (Temporal)     Recent Labs     04/18/21  0500 04/19/21  0502 04/20/21  0608   WBC 3.3* 1.6* 1.1*     Recent Labs     04/18/21  0500 04/19/21  0430 04/20/21  0608   BUN 16 19 25*   CREATININE 0.5* 0.5* 0.5*     Estimated Creatinine Clearance: 80 mL/min (A) (based on SCr of 0.5 mg/dL (L)). Intake/Output Summary (Last 24 hours) at 4/20/2021 1531  Last data filed at 4/20/2021 1230  Gross per 24 hour   Intake 820 ml   Output 3650 ml   Net -2830 ml       Pertinent Cultures:  Date    Source    Results  3/24               Nasal MRSA screen Negative  4/10               Blood              Negative  4/10                            Strep pneumo/Legionella        Negative    Vancomycin level:   TROUGH:    Recent Labs     04/18/21  2216   VANCOTROUGH 19.5     RANDOM:  No results for input(s): VANCORANDOM in the last 72 hours. Assessment:  · WBC and temperature: Neutropenic, continues trending down to 1.1, patient remains afebrile  · SCr, BUN, and urine output: SCR remains low, UOP good  · Day(s) of therapy: 11  · Vancomycin concentration:   · 4/12:  13.5. · 4/15: 12.6  · 4/18:  19.5, therapeutic    Plan:  · Renal trends remain normal  · Previous vanco trough on the high side of therpaeutic. Will decrease frequency to target a trough closer to 15. · Continue decreased frequency of Vancomycin 750mg ivpb every 18 hours. · Recheck the vancomycin level in 2 days to monitor for accumulation. · Pharmacy will continue to monitor patient and adjust therapy as indicated    Hunter 3 4/22 @ 11:30    Thank you for the consult.     Lebron Nunez Jimi Zuluaga  4/20/2021  3:31 PM

## 2021-04-20 NOTE — CARE COORDINATION
[facility] She stated that precert has been approved and will be good through 4/23.

## 2021-04-20 NOTE — PROGRESS NOTES
Physical Therapy    Physical Therapy Treatment Note  Name: Courtney Montemayor MRN: [id number] :   1950   Date:  2021   Admission Date: 4/10/2021 Room:  [address] [place] [address]   Restrictions/Precautions:        general precautions, falls   Subjective:  Patient states:  \"I need help getting up\"   Pain:   Location, Type, Intensity (0/10 to 10/10): \"I always hurt\". Reported pain to her buttock, did not rate  Objective:    Observation:    Sitting EOB upon arrival. Cooperative with therapy. Stable vitals on 3L   Treatment, including education/measures:  Sitting balance: SBA/Ziggy at EOB static and light dynamic mostly within AVIS  Sit to stand: Trae from EOB. Slow to transition with dec power  Stand to sit: Trae for controlling sit to recliner  Step pivot: Trae for steadying and support without AD   Educated pt on change in position for pressure/pain relief.    Assessment / Impression:    Patient's tolerance of treatment: good    Adverse Reaction: no  Significant change in status and impact:  no  Barriers to improvement:  Activity tolerance, strength, balance, medical hx   Plan for Next Session:    Activity tolerance, strength, balance, gait as tolerated, [id number]  Time in: 1410   Time out:  1420  Timed treatment minutes:  10  Total treatment time: 10    Previously filed items:     Short term goals  Time Frame for Short term goals: 1 week  Short term goal 1: Pt will perform transfers SBA  Short term goal 2: Pt will ambulate 30ft with RW SBA  Short term goal 3: Pt will perform standing light dynamic activity with single UE support x 2 minutes SBA       Electronically signed by:    Baron Pham [id number]  2021, 2:22 PM

## 2021-04-21 NOTE — CARE COORDINATION
Transport arranged with [facility] [phone]. ETA is 1530. Notified pt's nurse. She notified pt and  pt's family at bedside. Notified [employer]. AVS faxed and placed in packet. RAPID COVID RESULT PENDING.    TE

## 2021-04-21 NOTE — CARE COORDINATION
Pre-cert received yesterday afternoon for [facility] and is good through 4/23. Notified Dr Chi Sterling via PS this am that pt can go to [facility] whenever she is medically ready.  TE

## 2021-04-21 NOTE — DISCHARGE SUMMARY
diagnosed with Afib with RVR from previous admission. Patient received full course of antibiotics for pneumonia prior to previous admission, then treated again with empiric antibiotics (Zithromax, meropenem, and vancomycin) for sepsis 2/2 pneumonia last admission. Patient appears comfortably resting with minimal response to physical stimuli, No bilateral lower extremity swelling or edema noted on examination. No labored breathing noted while on mechanical ventilator. Full CODE STATUS confirmed in the Dr. After talking to patient's daughter who is the POA.      Restless, not really bad ED Course: Discussed case with ED physician prior to admission.  [facility]  Patient came in due to respiratory distress. She was intubated. There was concern for pneumonia on chest x-ray. Patient was placed on IV antibiotics. Patient was also placed on steroids. She was extubated on 4/11 but then reintubated again due to respiratory distress. She was then extubated again on April 15. Breathing improved. Patient was weaned down to 4 L nasal cannula. This was her baseline. She also states that she wears BiPAP when she is sleeping. Respiratory culture grew  Stenotrophomonas maltophilia but there were few polys on the culture. This was likely not clinically significant. She completed a 10-day course of IV antibiotics. She was placed on Levaquin for prophylaxis due to neutropenia, until April 29, 2021. There was concern the patient may have had a generalized tonic-clonic seizure. CT of the head was negative. MRI showed no metastatic disease. EEG showed mild diffuse slowing. Patient was initially placed on Keppra but then it was stopped. Neurology recommended outpatient follow-up. Patient had hypertension and was on Cardizem. Lisinopril and metoprolol were added. Patient had hyperkalemia which resolved with Kayexalate. Patient had hypomagnesemia which was replaced.     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 She had an enlarging right lower lobe pulmonary nodule. She will need PET scan as an outpatient. Patient had paroxysmal A. fib and was on Eliquis and metoprolol and diltiazem. Echo showed an EF of 55%. Patient had nonsustained ventricular tachycardia. She was recommended to keep magnesium above 2 and potassium more than 4. Patient developed pancytopenia during her stay. She was transfused 1 unit PRBC. She had neutropenia, with her ANC dropping to 0.4. She was given Granix. Hematology recommended recheck as an outpatient. Neutropenia may have been due to patient receiving chemotherapy prior to admission. Patient was stable. She was discharged to the skilled nursing facility. Physical Exam:  BP (!) 128/57   Pulse 74   Temp 99.2 °F (37.3 °C) (Oral)   Resp 17   Ht 5' (1.524 m)   Wt 130 lb 11.2 oz (59.3 kg)   SpO2 98%   BMI 25.53 kg/m²   GEN    Awake female, sitting on edge of bed  EYES   No discharge  HENT  NCAT  RESP  Seems more clear, and improved air exchagne  CARDIO/VASC           S1/S2 auscultated. Regular rate without appreciable murmurs, rubs, or gallops. No peripheral edema. GI        Soft nt nd  MSK    No gross joint deformities. No tenderness to palpation of back   SKIN    Normal coloration, warm, dry. NEURO           Cranial nerves appear grossly intact, normal speech  PSYCH            Awake, alert, oriented. Affect appropriate. Patient Instructions: Follow-up With  Details  Why  Contact [facility] [address]      [address] [facility] [address] [phone]   Yunior Salter MD  Schedule an appointment as soon as possible for a visit in 1 week  for follow up of your hospital stay  [address] [facility] [address] Rhys Eckert MD  Schedule an appointment as soon as possible for a visit in 2 weeks  to check on your breathing  [address]  [facility] [address]   [facility] [place] [place]  Schedule an appointment as soon as possible for a visit in 2 weeks  to check on your seizures  [address] [facility]   Ahmet Mott MD  Schedule an appointment as soon as possible for a visit in 2 weeks  to check on your heart  [address]   Maci Angel MD  Schedule an appointment as soon as possible for a visit in 1 week  to check on your cancer and blood levels  [place] [address] [id number] [phone]         Medications: see computerized discharge medication list  Activity: activity as tolerated  Diet: regular diet   Respiratory: 4LNC and bipap when sleeping  Disposition: SNF  Discharged Condition: Stable       The patient was seen and examined on day of discharge and this discharge summary is in conjunction with any daily progress note from day of discharge. Time spent on discharge is 36 minutes in the examination, evaluation, counseling and review of medications and discharge plan.     Discharge Medications:     Medication List      START taking these medications    lisinopril 5 MG tablet  Commonly known as: PRINIVIL;ZESTRIL  Take 1 tablet by mouth daily  Start taking on: April 22, 2021     metoprolol tartrate 50 MG tablet  Commonly known as: LOPRESSOR  Take 1 tablet by mouth 2 times daily     nicotine 21 MG/24HR  Commonly known as: NICODERM CQ  Place 1 patch onto the skin daily  Start taking on: April 22, 2021     predniSONE 10 MG tablet  Commonly known as: DELTASONE  Take 40mg (4 tablets) for 2 days, Then 30mg (3 tablets) for 2 days, Then 20mg (2 tablets) for 2 days, Then 10mg (1 tablet) for 2 days     Probiotic Acidophilus Tabs  Take 1 tablet by mouth daily for 9 days        CONTINUE taking these medications    albuterol sulfate  (90 Base) MCG/ACT inhaler  Commonly known as: ProAir HFA  Inhale 2 puffs into the lungs every 6 hours as needed for Wheezing     apixaban 5 MG Tabs tablet  Commonly known as: ELIQUIS  Take 1 tablet by mouth 2 times daily     aspirin 81 MG EC tablet     busPIRone 10 MG tablet  Commonly known as: BUSPAR     calcium carbonate 600 MG Tabs tablet     dexamethasone 4 MG tablet  Commonly known as: DECADRON  Take 1 tablet x 4 days after each chemotherapy     dilTIAZem 240 MG extended release capsule  Commonly known as: CARDIZEM CD  Take 1 capsule by mouth daily     diphenhydrAMINE 25 MG tablet  Commonly known as: BENADRYL     guaiFENesin 100 MG/5ML liquid  Commonly known as: Mucinex Chest Congestion Child  Take 10 mLs by mouth 3 times daily as needed for Cough     HYDROcodone-acetaminophen  MG per tablet  Commonly known as: NORCO  Take 1 tablet by mouth every 4 hours as needed for Pain for up to 30 days. Indications: 180     ipratropium-albuterol 0.5-2.5 (3) MG/3ML Soln nebulizer solution  Commonly known as: DUONEB  Inhale 3 mLs into the lungs 4 times daily     lactobacillus capsule  Take 1 capsule by mouth daily (with breakfast) for 27 days     levoFLOXacin 750 MG tablet  Commonly known as: LEVAQUIN  Take 1 tablet by mouth daily for 8 days     LORazepam 1 MG tablet  Commonly known as: ATIVAN  Take 1 tablet by mouth every 8 hours as needed for Anxiety for up to 30 days. Magic Mouthwash  Commonly known as: Miracle Mouthwash  Swish and swallow 5 mLs 4 times daily as needed for Irritation 1:1:1 diphenhydramine, nystatin, lidocaine     magnesium oxide 400 MG tablet  Commonly known as: MAG-OX  Take 0.5 tablets by mouth daily     Nutritional Supplement Liqd  2 cans by mouth daily. Boost strawberry if available.      OLANZapine 2.5 MG tablet  Commonly known as: ZyPREXA  Take the night prior to each chemotherapy, then for three additional nights with each chemotherapy     ondansetron 8 MG tablet  Commonly known as: Zofran  Take 1 tablet by mouth every 8 hours as needed for Nausea or Vomiting     pantoprazole 40 MG tablet  Commonly known as: PROTONIX  Take 1 tablet Date: 2021  The ET tube was now in satisfactory position, 3.8 cm above the luca. Right PICC line with the tip in the right atrium. The NG tube was at the GE junction. It should be advanced 4 or 5 more cm at least. The interstitial edema noted on 1759 hours has resolved. No change in patchy right lower lobe pneumonia. COPD. Xr Chest Portable  Result Date: 2021  1. Endotracheal tube terminating in the right mainstem bronchus. Recommend repositioning. 2. Unchanged bilateral opacities. 3. Trace bilateral pleural effusions. Xr Chest Portable  Result Date: 2021  No significant interval change. Xr Chest Portable  Result Date: 4/10/2021  ET tube 2.7 cm above the luca. Enteric tube with tip in the stomach but proximal port possibly above the GE junction. Bilateral pulmonary nodules as seen on prior. Cta Pulmonary W Contrast  Result Date: 2021  Enlarging dominant right lower lobe pulmonary nodule measuring up to 24 mm in diameter previously measuring up to 16 mm in diameter suggesting progression of primary malignancy. New ground-glass opacity peripheral to the lesion in the right lower lobe that may represent post treatment changes. This can be seen with radiation pneumonitis. Recommend clinical correlation. Stable spiculated right apical and right lower lobe smaller pulmonary nodules suggesting stable metastatic disease. No pulmonary embolism. Mri Brain W Wo Contrast  Result Date: 2021  No intracranial metastatic disease. Stable mild chronic microvascular disease within the periventricular white matter. Mild atrophy. Echo 2d W Doppler W Color W Contrast  Result Date: 2021  Conclusions   Summary  Technically difficult study; patient intubated. Left ventricular systolic function is normal.  Ejection fraction is visually estimated at 55%. Mild left ventricular hypertrophy. Sclerotic aortic valve with mild aortic stenosis; mean PmmHg.   Mild mitral

## 2021-04-21 NOTE — PROGRESS NOTES
progression. Discussed with Dr. Zora Lopez. We will obtain PET CT scan as an outpatient and consider to biopsy site. We will discuss with radiation oncology to see if she is a candidate for radiation therapy. Neutropenia- secondary to chemotherapy. Continue to monitor. She is on antibiotics. We will start Granix on April 21, 2021. I will monitor his daily CBC. Seizure-MRI negative for metastatic disease. EEG with nonspecific findings. She declines anti-seizure medication. Anemia- Hgb 8.1. Will continue to trend. COPD-improved. Pulmonary consulted. If she is discharged, I recommend to follow-up at the cancer center. Please continue present management. We will continue to follow the patient. Thank you.

## 2021-04-21 NOTE — CONSULTS
[facility]  consulted by Dr. Timo Peterson for monitoring and adjustment. Indication for treatment: Sepsis of unknown etiology  Goal trough: 15-20 mcg/mL    Pertinent Laboratory Values:   Temp Readings from Last 3 Encounters:   04/21/21 99.1 °F (37.3 °C) (Oral)   04/09/21 97.8 °F (36.6 °C) (Temporal)   04/08/21 97.7 °F (36.5 °C) (Temporal)     Recent Labs     04/19/21  0502 04/20/21  0608 04/21/21  0537   WBC 1.6* 1.1* 0.8*     Recent Labs     04/19/21  0430 04/20/21  0608 04/21/21  0537   BUN 19 25* 27*   CREATININE 0.5* 0.5* 0.5*     Estimated Creatinine Clearance: 82 mL/min (A) (based on SCr of 0.5 mg/dL (L)). Intake/Output Summary (Last 24 hours) at 4/21/2021 1212  Last data filed at 4/21/2021 3286  Gross per 24 hour   Intake 460 ml   Output 3250 ml   Net -2790 ml       Pertinent Cultures:  Date    Source    Results  3/24               Nasal MRSA screen Negative  4/10               Blood              Negative  4/10                            Strep pneumo/Legionella        Negative    Vancomycin level:   TROUGH:    Recent Labs     04/18/21  2216   VANCOTROUGH 19.5     RANDOM:  No results for input(s): VANCORANDOM in the last 72 hours. Assessment:  · WBC and temperature: Neutropenic, continues trending down to 1.1, patient remains afebrile  · SCr, BUN, and urine output: SCR remains low, UOP good  · Day(s) of therapy: 11  · Vancomycin concentration:   · 4/12:  13.5. · 4/15: 12.6  · 4/18:  19.5, therapeutic    Plan:  · Renal trends remain normal  · Previous vanco trough on the high side of therpaeutic. Will decrease frequency to target a trough closer to 15. · Continue decreased frequency of Vancomycin 750mg ivpb every 18 hours. · Repeat trough level tomorrow AM; monitor for accumulation. · Pharmacy will continue to monitor patient and adjust therapy as indicated    Hunter 3 4/22 @ 11:30    Thank you for the consult.     Freedom Bowers [place]

## 2021-04-22 NOTE — TELEPHONE ENCOUNTER
Pt is going to discuss scan once she is able to come back into the office. Pt has been in the hospital and is at this time currently still not home.

## 2021-04-26 PROBLEM — R06.02 SOB (SHORTNESS OF BREATH): Status: ACTIVE | Noted: 2021-01-01

## 2021-04-26 NOTE — ED NOTES
Pt uses bipap and night and prn when she naps, family states setting is 5-15. Family notes [facility] has been having trouble setting up her bipap at night.      Lolis Schulte RN  04/26/21 5419

## 2021-04-26 NOTE — ED NOTES
Type and screen on analyzer at this time. Helen Nova in lab to call this RN when resulted and unit(s) prepared.      Roselyn Ram RN  04/26/21 5072

## 2021-04-26 NOTE — ED TRIAGE NOTES
Pt resident of [facility] sent to ED for respiratory distress. Pt states she was SOB and they were trying to put her on CPAP but reports she was not tolerating it well. Pt transported to ED for SOB. EMS reports O2 sat 95% on 4 Lpm O2 via NC.  Pt has hx of lung cancer and is a full code

## 2021-04-26 NOTE — ED NOTES
Lab called with critical result hg 5.5. Dr. Michelle Gamble notified. Type and screen sent down already.       Beba Langley, RN  04/26/21 [address] [facility], RN  04/26/21 6003

## 2021-04-26 NOTE — ED PROVIDER NOTES
Emergency Department Encounter    Patient: Yohannes Castle  MRN: [id number]  : 1950  Date of Evaluation: 2021  ED Provider:  David Enciso    Triage Chief Complaint:   Shortness of Breath    [place]:  Yohannes Castle is a 65 y.o. female that presents with shortness of breath, recently admitted and intubated for pneumonia and respiratory failure, was discharged back to her facility has been short of breath for 3 to 4 days, denies any new cough, denies chest pain, no leg pain or leg swelling. She is been taking her home medication with no improvement or treatment. ROS - see HPI, below listed is current ROS at time of my eval:  General:  No fevers, no chills, no weakness  Eyes:  No recent vison changes, no discharge  ENT:  No sore throat, no nasal congestion, no hearing changes  Cardiovascular:  No chest pain, no palpitations  Respiratory:  + shortness of breath, + cough, no wheezing  Gastrointestinal:  No pain, no nausea, no vomiting, no diarrhea  Musculoskeletal:  No muscle pain, no joint pain  Skin:  No rash, no pruritis, no easy bruising  Neurologic:  No speech problems, no headache, no extremity numbness, no extremity tingling, no extremity weakness  Psychiatric:  No anxiety  Genitourinary:  No dysuria, no hematuria  Endocrine:  No unexpected weight gain, no unexpected weight loss  Extremities:  no edema, no pain    Past Medical History:   Diagnosis Date    Arthritis     COPD (chronic obstructive pulmonary disease) ([facility] [address]     follow with Dr Herrera Like Full dentures     H/O cardiovascular stress test 2009    thallium, normal no ischemia EF70%     H/O cardiovascular stress test 10/11/2013    thallium,EF70%, normal, no ischemia    H/O Doppler ultrasound 10/13/2010    carotid, right normal, left normal    H/O Doppler ultrasound 10/07/2014    Carotid mild bilateral internal carotid artery disease less than 50% in severity. Normal vertebral artery flows with good velocities.  Incidental finding of possible thyroid nodule in the left lobe.  History of 24 hour EKG monitoring 03/11/2011    NSR no ectopy    History of nuclear stress test 10/21/2016    lexiscan-normal,no ischemia,EF70%,enlarged right ventricle    Hx of chest x-ray 01/02/2015    WNL    Hx of Doppler echocardiogram 12/01/2020    EF 50-55% mild LV hypertrophy. Grade 2 diastolic dysfunction. Mild AS.     Hx of echocardiogram 10/11/2013    10/13 Abn lt ventricular diastolic function, mile MR, EF 57%,10/10 EF55%, mild mitral annular calcification    Hx of echocardiogram 05/02/2019    EF 81-88%, Grade I diastolic dysfunction, Mild aortic stenosis, Calcific thickening of posterior leaflet of mitral valve, Mild Pulm HTN    Hx of pelvic x-ray 01/02/2015    Severe RT his osterarothrosis    Hx of x-ray of hip 01/02/2015    Severe Rt hip osteosrthrosis    Hyperlipidemia     Hypertension     follows with dr Rachael Sun Leg pain     Lung nodules     scheduled for bronch 1/5/2020    On home oxygen therapy     \"on 4 liters 24 hours per day\"    Small cell lung cancer ([facility] [address] 1/11/2021    Wears glasses     to read     Past Surgical History:   Procedure Laterality Date    BACK SURGERY      \"about 12 yrs ago - surgery my mid to low back \" done in [address] N/A 1/5/2021    BRONCHOSCOPY ENDOBRONCHIAL ULTRASOUND performed by Cheri Morris MD at [address]      \"last one done in my age 61's    CYST REMOVAL  1970's    left arm    EYE SURGERY  2010?    svetlana cataract ext     FOOT SURGERY Right 1970's    \"have screw in 2nd toe\"    HYSTERECTOMY      1999\"took everything \"    JOINT REPLACEMENT Right 2014??    hip    LAPAROSCOPIC APPENDECTOMY N/A 5/18/2019    APPENDECTOMY LAPAROSCOPIC performed by Nimo Portillo MD at [facility] 12/10/2020    MEDIASTINOSCOPY performed by Cheri Morris MD at [facility]  1970's    UPPER GASTROINTESTINAL ENDOSCOPY N/A 3/3/2021    EGD BIOPSY AND BRUSHING performed by Little Mckeon MD at [facility] [address] History   Problem Relation Age of Onset    Stroke Mother     Dementia Mother     Heart Attack Mother 61    Coronary Art Dis Mother     Hypertension Mother     High Cholesterol Mother    [de-identified] Cancer Father     Stroke Father     Heart Attack Father 61    Diabetes Sister     Heart Attack Sister     Hypertension Sister     Cancer Sister    [de-identified] COPD Sister    [de-identified] Stroke Sister     Hypertension Sister     Hypertension Sister     Irritable Bowel Syndrome Sister     Pacemaker Sister      Social History     Socioeconomic History    Marital status:      Spouse name: Not on file    Number of children: Not on file    Years of education: Not on file    Highest education level: Not on file   Occupational History    Not on file   Social Needs    Financial resource strain: Not on file    Food insecurity     Worry: Not on file     Inability: Not on file    Transportation needs     Medical: Not on file     Non-medical: Not on file   Tobacco Use    Smoking status: Former Smoker     Packs/day: 1.50     Years: 47.00     Pack years: 70.50     Types: Cigarettes     Start date: 1973     Quit date: 2020     Years since quittin.4    Smokeless tobacco: Never Used   Substance and Sexual Activity    Alcohol use:  Yes     Alcohol/week: 0.0 standard drinks     Comment: rare\"twice per year\"    Drug use: No    Sexual activity: Not on file     Comment:    Lifestyle    Physical activity     Days per week: Not on file     Minutes per session: Not on file    Stress: Not on file   Relationships    Social connections     Talks on phone: Not on file     Gets together: Not on file     Attends Taoism service: Not on file     Active member of club or organization: Not on file     Attends meetings of clubs or organizations: Not on file     Relationship status: Not on file    Intimate partner violence     Fear of current or ex partner: Not on file     Emotionally abused: Not on file     Physically abused: Not on file     Forced sexual activity: Not on file   Other Topics Concern    Not on file   Social History Narrative    Not on file     Current Facility-Administered Medications   Medication Dose Route Frequency Provider Last Rate Last Admin    0.9 % sodium chloride infusion   Intravenous PRN Geoffrey Narayan MD         Current Outpatient Medications   Medication Sig Dispense Refill    lisinopril (PRINIVIL;ZESTRIL) 5 MG tablet Take 1 tablet by mouth daily 30 tablet 3    metoprolol tartrate (LOPRESSOR) 50 MG tablet Take 1 tablet by mouth 2 times daily 60 tablet 3    levoFLOXacin (LEVAQUIN) 750 MG tablet Take 1 tablet by mouth daily for 8 days  0    LORazepam (ATIVAN) 1 MG tablet Take 1 tablet by mouth every 8 hours as needed for Anxiety for up to 30 days. 45 tablet 0    nicotine (NICODERM CQ) 21 MG/24HR Place 1 patch onto the skin daily 30 patch 3    predniSONE (DELTASONE) 10 MG tablet Take 40mg (4 tablets) for 2 days, Then 30mg (3 tablets) for 2 days, Then 20mg (2 tablets) for 2 days, Then 10mg (1 tablet) for 2 days 20 tablet 0    Probiotic Acidophilus (FLORANEX) TABS Take 1 tablet by mouth daily for 9 days  0    guaiFENesin (MUCINEX CHEST CONGESTION CHILD) 100 MG/5ML liquid Take 10 mLs by mouth 3 times daily as needed for Cough 1 Bottle 3    diphenhydrAMINE (BENADRYL) 25 MG tablet Take 50 mg by mouth every 6 hours as needed for Itching      HYDROcodone-acetaminophen (NORCO)  MG per tablet Take 1 tablet by mouth every 4 hours as needed for Pain for up to 30 days.  Indications: 180 180 tablet 0    apixaban (ELIQUIS) 5 MG TABS tablet Take 1 tablet by mouth 2 times daily 60 tablet 2    dilTIAZem (CARDIZEM CD) 240 MG extended release capsule Take 1 capsule by mouth daily 30 capsule 3    lactobacillus (CULTURELLE) capsule Take 1 capsule by mouth daily (with breakfast) for 27 days 27 capsule 0    magnesium oxide (MAG-OX) 400 MG tablet Take 0.5 tablets by mouth daily 30 tablet 1    busPIRone (BUSPAR) 10 MG tablet Take 10 mg by mouth 2 times daily      ipratropium-albuterol (DUONEB) 0.5-2.5 (3) MG/3ML SOLN nebulizer solution Inhale 3 mLs into the lungs 4 times daily 360 mL 5    simvastatin (ZOCOR) 20 MG tablet Take 1 tablet by mouth nightly 90 tablet 1    pantoprazole (PROTONIX) 40 MG tablet Take 1 tablet by mouth every morning (before breakfast) 30 tablet 3    sucralfate (CARAFATE) 1 GM tablet Take 1 tablet by mouth 4 times daily 120 tablet 3    Nutritional Supplement LIQD 2 cans by mouth daily. Boost strawberry if available.  60 Can 3    theophylline (UNIPHYL) 400 MG extended release tablet Take 0.5 tablets by mouth daily 15 tablet 3    Magic Mouthwash (MIRACLE MOUTHWASH) Swish and swallow 5 mLs 4 times daily as needed for Irritation 1:1:1 diphenhydramine, nystatin, lidocaine 240 mL 1    ondansetron (ZOFRAN) 8 MG tablet Take 1 tablet by mouth every 8 hours as needed for Nausea or Vomiting 30 tablet 2    OLANZapine (ZYPREXA) 2.5 MG tablet Take the night prior to each chemotherapy, then for three additional nights with each chemotherapy 16 tablet 0    dexamethasone (DECADRON) 4 MG tablet Take 1 tablet x 4 days after each chemotherapy 16 tablet 0    albuterol sulfate HFA (PROAIR HFA) 108 (90 Base) MCG/ACT inhaler Inhale 2 puffs into the lungs every 6 hours as needed for Wheezing 1 Inhaler 6    Cholecalciferol (VITAMIN D3) 25 MCG (1000 UT) TABS Take by mouth daily       calcium carbonate 600 MG TABS tablet Take 1 tablet by mouth daily      aspirin 81 MG EC tablet Take 81 mg by mouth daily       Allergies   Allergen Reactions    Formaldehyde     Gabapentin Other (See Comments)    Norflex Tablets [Orphenadrine]     Cranberry Rash       Nursing Notes Reviewed    Physical Exam:  Triage VS:    ED Triage Vitals [04/26/21 5537]   Enc Vitals Group      BP (!) 117/56      Pulse 102      Resp 18      Temp 98.5 °F (36.9 °C)      Temp Source Oral      SpO2 98 %      Weight 130 lb (59 kg)      Height 5' (1.524 m)      Head Circumference       Peak Flow       Pain Score       Pain Loc       Pain Edu? Excl. in [address]? My pulse ox interpretation is - normal    General appearance:  No acute distress. Skin:  Warm. Dry. Eye:  Extraocular movements intact. Ears, nose, mouth and throat:  Oral mucosa moist   Neck:  Trachea midline. Extremity:  No swelling. Normal ROM     Heart:  Regular rate and rhythm, normal S1 & S2, no extra heart sounds. Perfusion:  intact  Respiratory:  Lungs clear to auscultation bilaterally. Respirations nonlabored. Abdominal:  Normal bowel sounds. Soft. Nontender. Non distended. Neurological:  Alert and oriented times 3.       I have reviewed and interpreted all of the currently available lab results from this visit (if applicable):  Results for orders placed or performed during the hospital encounter of 04/26/21   CBC Auto Differential   Result Value Ref Range    WBC 12.6 (H) 4.0 - 10.5 K/CU MM    RBC 1.87 (L) 4.2 - 5.4 M/CU MM    Hemoglobin 5.5 (LL) 12.5 - 16.0 GM/DL    Hematocrit 17.8 (LL) 37 - 47 %    MCV 95.2 78 - 100 FL    MCH 29.4 27 - 31 PG    MCHC 30.9 (L) 32.0 - 36.0 %    RDW 15.8 (H) 11.7 - 14.9 %    Platelets 750 856 - 330 K/CU MM    MPV 10.8 7.5 - 11.1 FL    Bands Relative 1 (L) 5 - 11 %    Segs Relative 79.0 (H) 36 - 66 %    Lymphocytes % 13.0 (L) 24 - 44 %    Monocytes % 7.0 (H) 0 - 4 %    nRBC 3     Bands Absolute 0.13 K/CU MM    Segs Absolute 10.0 K/CU MM    Lymphocytes Absolute 1.6 K/CU MM    Monocytes Absolute 0.9 K/CU MM    Differential Type MANUAL DIFFERENTIAL    Brain Natriuretic Peptide   Result Value Ref Range    Pro-BNP 2,218 (H) <300 PG/ML   Comprehensive Metabolic Panel   Result Value Ref Range    Sodium 139 135 - 145 MMOL/L    Potassium 3.7 3.5 - 5.1 MMOL/L    Chloride 94 (L) 99 - 110 mMol/L    CO2 41 (H) 21 - 32 MMOL/L    BUN 44 (H) 6 - 23 MG/DL    CREATININE 0.7 0.6 - 1.1 MG/DL    Glucose 112 (H) 70 - 99 MG/DL    Calcium 8.5 8.3 - 10.6 MG/DL    Albumin 3.0 (L) 3.4 - 5.0 GM/DL    Total Protein 4.7 (L) 6.4 - 8.2 GM/DL    Total Bilirubin 0.1 0.0 - 1.0 MG/DL    ALT 20 10 - 40 U/L    AST 15 15 - 37 IU/L    Alkaline Phosphatase 86 40 - 129 IU/L    GFR Non-African American >60 >60 mL/min/1.73m2    GFR African American >60 >60 mL/min/1.73m2    Anion Gap 4 4 - 16   Troponin   Result Value Ref Range    Troponin T 0.055 (H) <0.01 NG/ML   Magnesium   Result Value Ref Range    Magnesium 1.1 (L) 1.8 - 2.4 mg/dl   Phosphorus   Result Value Ref Range    Phosphorus 3.0 2.5 - 4.9 MG/DL   EKG 12 Lead   Result Value Ref Range    Ventricular Rate 99 BPM    Atrial Rate 99 BPM    P-R Interval 140 ms    QRS Duration 72 ms    Q-T Interval 334 ms    QTc Calculation (Bazett) 428 ms    P Axis 64 degrees    R Axis 42 degrees    T Axis 5 degrees    Diagnosis       Normal sinus rhythm  Normal ECG  When compared with ECG of 10-APR-2021 10:02,  Nonspecific T wave abnormality now evident in Inferior leads        Radiographs (if obtained):  Radiologist's Report Reviewed:  No results found.     EKG (if obtained): (All EKG's are interpreted by myself in the absence of a cardiologist)  EKG shows a sinus rhythm rate of 99 normal MT and QRS intervals no ST elevation no old EKG available for comparison    MDM:  Patient presents with fatigue, shortness of breath she is in no respiratory distress here vital signs are not unstable work-up initiated, she was placed on the monitor on arrival.    Patient had a work-up initiated as above, labs show critical hemoglobin of 5.5, patient is not actively bleeding this is likely related to her cancer and chemotherapy, she consented to transfusion it was ordered and pending, she has some small electrolyte abnormalities, persistent findings on chest x-ray but no overt pneumonia, given presentation, history and current findings we will admit for further work-up and treatment, hospitalist notified    Total critical care time provided today was 31 minutes. This excludes seperately billable procedures and family discussion time. Critical care time provided for obtaining history, conducting a physical exam, performing and monitoring interventions, ordering, collecting and interpreting tests, and establishing medical decision-making. There was a potential for life/limb threatening pathology requiring close evaluation and intervention with concern for patient decompensation. Clinical Impression:  1. Acute anemia      Disposition referral (if applicable):  No follow-up provider specified. Disposition medications (if applicable):  New Prescriptions    No medications on file     ED Provider Disposition Time  DISPOSITION Decision To Admit 04/26/2021 06:07:17 PM      Comment: Please note this report has been produced using speech recognition software and may contain errors related to that system including errors in grammar, punctuation, and spelling, as well as words and phrases that may be inappropriate. Efforts were made to edit the dictations.        Rema Braga MD  04/26/21 [facility] Duane Lozano MD  04/26/21 2209

## 2021-04-26 NOTE — ED NOTES
Ok for pt to eat per Dr. Shirley Fair. Dr. Shirley Fair aware Mg 1.1, no new orders at this time.      Rand Wei RN  04/26/21 [id number] [facility] SVFVRT, RN  04/26/21 2063

## 2021-04-26 NOTE — ED NOTES
Pt states she is actively getting chemo, she is supposed to have a PET scan soon. She believed last treatment was around April 10th. Pt has nicotine patch to LUE and notes she has pressure ulcer to buttocks. Pt educated on importance of turning in bed but is refusing at this time. Pt allowed for [facility] to be lowered some to decrease pressure on wound. Pt denies any needs at this time.     Merry Bhatti, RN  04/26/21 [id number] YUN Peñaloza RN  04/26/21 9229

## 2021-04-26 NOTE — ED NOTES
[id number] paged hospitalist.     Anders Romano  04/26/21 1808  1810 Favian Allen mid level with [facility] returned call      Anders Romano  04/26/21 1810

## 2021-04-26 NOTE — H&P
History and Physical      Name:  Rasheed Reyes /Age/Sex: 1950  (65 y.o. female)   MRN & CSN:  [id number] & [id number] Admission Date/Time: 2021  4:24 PM   Location:  03/03TR-03 PCP: Kale eKy,  Meaghan Singh Day: 1    Admitting Physician: DR Colton Chavez    Discussed assessment and treatment plan with the admitting and supervising physician who agrees with the plan below. Assessment and Plan:   Rasheed Reyes is a 65 y.o.  female  who presents with shortness of breath      SOB 2/2 chronic hypercarbic respiratory failure: Most likely secondary to anemia/COPD/hx small lung cell carcinoma: Chronically on 4 L supplemental oxygen. ProBNP 2218 ( 8008 on 2021). + Stenotrophomonas maltophilia in sputum culture 2021. · Admit intermediate/progressive care unit  · Solu-Medrol 40 mg IV daily  · Continue BiPAP at present as needed  ·  IP respiratory evaluation  · Continue bronchodilator-albuterol  · Oxygen supplement as needed to keep SPO2 >92%  · Continue Levaquin 3 more doses to complete the course ( Hx sepsis 2/2pneumonia from last discharge)    Acute on  chronic anemia likely multifactorial: Hb 5.5 ( 8.1 on 2021)  · Transfuse I units of PRBC  · Hold Eliquis for tonight  · Restart eliquis  tomorrow if Hb stable  · Hemoglobin and hematocrit every 8 hours x 2, notify MD if Hb<7    Essential hypertension: Currently on low side, will hold BP medications for now  · Hold metoprolol for today    Hypomagnesemia: Mag 1.1  · Magnesium replacement 2 g IV x1   · Recheck mag midnight    Elevated  troponin, chronic 2/2likely demand ischemia  · Serial troponins every 6 hours x 2    Hx of stage iii small lung cell cell cancer: s/p 4 cycles of cisplatin/etoposide. Last Tx 3/31/21; F/U oncology Dr Alaina Bello    Hx of A. fib A. Fib diagnosed 2021: CHADs-VAsC Score 5, EF 55%  EcHo2021. EKG NSR, rate currently controlled, on Eliquis.     · Continue diltiazem 240 mg daily, hold Eliquis for now  · Continue cardiac monitoring  ·   BMI:25.39 Kg/m2       Chronic Conditions:  Hx SVT  AAA without rupture  Hx generalized tonic-clonic seizures: Not on any medications, EEG showed only mild diffuse slowing, follows up with neurology  Hx pancytopenia  Anxiety  Esophagitis with dysphagia  Ex-smoker    Diet  General diet   DVT Prophylaxis [] Lovenox, []  Heparin, [x] SCDs, [] Ambulation   GI Prophylaxis [x] PPI,  [] H2 Blocker,  [] Carafate,  [] Diet/Tube Feeds   Code Status  full   Disposition Patient requires continued admission due to loss of breath   MDM [] Low, [x] Moderate,[]  High  Patient's risk as above due to      [x] One or more chronic illnesses with exacerbation progression      [x] Two or more stable chronic illnesses      [] Undiagnosed new problem with uncertain prognosis      [] Elective major surgery      []Prescription drug management       History of Present Illness:     Chief Complaint:  shortness of breath  Christopher Fuentes is a 65 y.o.  female  who presents with  shortness  of breath. History of chronic COPD, non-small cell lung cancer and chronic respiratory failure. Currently uses BiPAP at night . Patient was admitted twice within the last 30 days for sepsis and pneumonia. Her hospitalization was complicated requiring intubation twice. Currently on Levaquin for pneumonia. Patient presented today with worsening of shortness of breath. She is on chronic 4 L/nasal cannula. Upon examination patient states the symptoms started around afternoon, she felt like unable to catch breath. Symptoms  not alleviated with increasing supplemental oxygen. She is able to complete full sentence without difficulty. Admits cough, fatigue, nosebleeds. denies fever, cough, chest pain, chest pressure, abdominal pain, melena, hematochezia, hematochezia, lower urinary tract symptoms, bilateral lower extremity weakness.     Ten point ROS reviewed negative, unless as noted above    Objective:   No intake or output data in the 24 hours ending 04/26/21 1828   Vitals:   Vitals:    04/26/21 1819   BP: (!) 96/43   Pulse: 98   Resp: 17   Temp:    SpO2: 98%     Physical Exam:   GEN Pleasant, non toxic appearence ,Well Developed, Well nourished, Awake female, cooperative, NAD, sitting upright in bed in no apparent distress. Appears given age. EYES Pupils are equally round. No scleral erythema, discharge, or conjunctivitis. HENT Mucous membranes are moist. Oral pharynx without exudates, no evidence of thrush. NECK Supple, no apparent thyromegaly or masses. RESP + rhonchi bilaterally. On O24L/NC  Symmetric chest movement while on room air. CARDIO/VASC:  RRR, S1/S2 auscultated. No murmurs, rubs, or gallops. No JVD or carotid bruits. Peripheral pulses equal bilaterally and palpable. GI Abdomen soft without significant tenderness, masses, or guarding. Bowel sounds are normoactive. Rectal exam deferred.  No costovertebral angle tenderness,supra pubic tenderness. HEME/LYMP : No palpable cervical lymphadenopathy and no hepatosplenomegaly. No petechiae or ecchymoses. MSK  No peripheral Edema. NO calf tenderness, No gross joint deformities. SKIN Intact. Dry. No rash/petechiae/ecchymosis. Warm extremities. NEURO :A&O to self, location, month and year, speech clear, language fluent, repetition and naming intact, follows commands appropriately. Cranial nerves appear grossly intact, no lateralizing weakness. PSYCH : Normal mood. Affect appropriate.     Past Medical History:      Past Medical History:   Diagnosis Date    Arthritis     COPD (chronic obstructive pulmonary disease) ([facility] [address]     follow with Dr Kenji Guerra Full dentures     H/O cardiovascular stress test 11/18/2009    thallium, normal no ischemia EF70%     H/O cardiovascular stress test 10/11/2013    thallium,EF70%, normal, no ischemia    H/O Doppler ultrasound 10/13/2010    carotid, right normal, left normal    H/O Doppler ultrasound 10/07/2014    Carotid mild bilateral internal carotid artery disease less than 50% in severity. Normal vertebral artery flows with good velocities. Incidental finding of possible thyroid nodule in the left lobe.  History of 24 hour EKG monitoring 03/11/2011    NSR no ectopy    History of nuclear stress test 10/21/2016    lexiscan-normal,no ischemia,EF70%,enlarged right ventricle    Hx of chest x-ray 01/02/2015    WNL    Hx of Doppler echocardiogram 12/01/2020    EF 50-55% mild LV hypertrophy. Grade 2 diastolic dysfunction. Mild AS.  Hx of echocardiogram 10/11/2013    10/13 Abn lt ventricular diastolic function, mile MR, EF 57%,10/10 EF55%, mild mitral annular calcification    Hx of echocardiogram 05/02/2019    EF 56-97%, Grade I diastolic dysfunction, Mild aortic stenosis, Calcific thickening of posterior leaflet of mitral valve, Mild Pulm HTN    Hx of pelvic x-ray 01/02/2015    Severe RT his osterarothrosis    Hx of x-ray of hip 01/02/2015    Severe Rt hip osteosrthrosis    Hyperlipidemia     Hypertension     follows with dr Evin Hardy Leg pain     Lung nodules     scheduled for bronch 1/5/2020    On home oxygen therapy     \"on 4 liters 24 hours per day\"    Small cell lung cancer ([facility] [address] 1/11/2021    Wears glasses     to read     PSHX:  has a past surgical history that includes cyst removal (1970's); Tubal ligation (1970's); laparoscopic appendectomy (N/A, 5/18/2019); Mediastinoscopy (N/A, 12/10/2020); back surgery; Colonoscopy; Hysterectomy; eye surgery (2010?); joint replacement (Right, 2014? ?); Foot surgery (Right, 1970's); bronchoscopy (N/A, 1/5/2021); and Upper gastrointestinal endoscopy (N/A, 3/3/2021). Allergies:    Allergies   Allergen Reactions    Formaldehyde     Gabapentin Other (See Comments)    Norflex Tablets [Orphenadrine]     Cranberry Rash       FAM HX: family history includes COPD in her sister; Cancer in her father and sister; Coronary Art Dis in her mother; Dementia in her mother; Diabetes in her sister; Heart Attack in her sister; Heart Attack (age of onset: 61) in her father and mother; High Cholesterol in her mother; Hypertension in her mother, sister, sister, and sister; Irritable Bowel Syndrome in her sister; Pacemaker in her sister; Stroke in her father, mother, and sister. Soc HX:   Social History     Socioeconomic History    Marital status:      Spouse name: None    Number of children: None    Years of education: None    Highest education level: None   Occupational History    None   Social Needs    Financial resource strain: None    Food insecurity     Worry: None     Inability: None    Transportation needs     Medical: None     Non-medical: None   Tobacco Use    Smoking status: Former Smoker     Packs/day: 1.50     Years: 47.00     Pack years: 70.50     Types: Cigarettes     Start date: 1973     Quit date: 2020     Years since quittin.4    Smokeless tobacco: Never Used   Substance and Sexual Activity    Alcohol use: Yes     Alcohol/week: 0.0 standard drinks     Comment: rare\"twice per year\"    Drug use: No    Sexual activity: None     Comment:    Lifestyle    Physical activity     Days per week: None     Minutes per session: None    Stress: None   Relationships    Social connections     Talks on phone: None     Gets together: None     Attends Shinto service: None     Active member of club or organization: None     Attends meetings of clubs or organizations: None     Relationship status: None    Intimate partner violence     Fear of current or ex partner: None     Emotionally abused: None     Physically abused: None     Forced sexual activity: None   Other Topics Concern    None   Social History Narrative    None       Medications:   Medications:    No current facility-administered medications on file prior to encounter.       Current Outpatient Medications on File Prior to Encounter   Medication Sig Dispense (ELIQUIS) 5 MG TABS tablet Take 1 tablet by mouth 2 times daily 60 tablet 2    lactobacillus (CULTURELLE) capsule Take 1 capsule by mouth daily (with breakfast) for 27 days 27 capsule 0    magnesium oxide (MAG-OX) 400 MG tablet Take 0.5 tablets by mouth daily 30 tablet 1    busPIRone (BUSPAR) 10 MG tablet Take 10 mg by mouth 2 times daily      ipratropium-albuterol (DUONEB) 0.5-2.5 (3) MG/3ML SOLN nebulizer solution Inhale 3 mLs into the lungs 4 times daily 360 mL 5    sucralfate (CARAFATE) 1 GM tablet Take 1 tablet by mouth 4 times daily 120 tablet 3    theophylline (UNIPHYL) 400 MG extended release tablet Take 0.5 tablets by mouth daily 15 tablet 3    ondansetron (ZOFRAN) 8 MG tablet Take 1 tablet by mouth every 8 hours as needed for Nausea or Vomiting 30 tablet 2    albuterol sulfate HFA (PROAIR HFA) 108 (90 Base) MCG/ACT inhaler Inhale 2 puffs into the lungs every 6 hours as needed for Wheezing 1 Inhaler 6    Cholecalciferol (VITAMIN D3) 25 MCG (1000 UT) TABS Take by mouth daily       calcium carbonate 600 MG TABS tablet Take 1 tablet by mouth daily      aspirin 81 MG EC tablet Take 81 mg by mouth daily      Nutritional Supplement LIQD 2 cans by mouth daily. Boost strawberry if available. 60 Can 3    Magic Mouthwash (MIRACLE MOUTHWASH) Swish and swallow 5 mLs 4 times daily as needed for Irritation 1:1:1 diphenhydramine, nystatin, lidocaine 240 mL 1    OLANZapine (ZYPREXA) 2.5 MG tablet Take the night prior to each chemotherapy, then for three additional nights with each chemotherapy 16 tablet 0    dexamethasone (DECADRON) 4 MG tablet Take 1 tablet x 4 days after each chemotherapy 16 tablet 0       Infusions:    sodium chloride       PRN Meds: sodium chloride, , PRN    IMAGING:  Chest XRay 4/26/2021  Persistent right basilar opacity appears to correlate with the primary   malignancy     EKG NSR Hr 99/mt    CARDIAC WORK UP  ECHO 4/12/2021  Summary   Technically difficult study; patient intubated. Left ventricular systolic function is normal.   Ejection fraction is visually estimated at 55%. Mild left ventricular hypertrophy. Sclerotic aortic valve with mild aortic stenosis; mean PmmHg. Mild mitral regurgitation. Moderate tricuspid regurgitation; RVSP: 42 mmHg. No evidence of any pericardial effusion. Dilated IVC and hepatic veins.      Electronically signed by HELIO Vieira on 2021 at 6:28 PM

## 2021-04-26 NOTE — ED NOTES
Pt from [facility] and is completing rehab at this time, she states she was just recently here in the hospital. Prior to that pt is from home with her granddaughter. Pt baseline is bedridden. She states she is not able to stand and pivot.      Edel Jules RN  04/26/21 [address] [place] [address] GEUVUS, RN  04/26/21 1029

## 2021-04-27 NOTE — PLAN OF CARE
Nutrition Problem #1: Moderate malnutrition, In context of chronic illness  Intervention: Food and/or Nutrient Delivery: Continue Current Diet, Start Oral Nutrition Supplement  Nutritional Goals: Pt will consume greater than 75% of her meals and supplements

## 2021-04-27 NOTE — CONSULTS
Hematology/Oncology  Consult Note      Reason for Consult:  Lung cancer  Requesting Physician:  Hospitalist    CHIEF COMPLAINT:    Chief Complaint   Patient presents with    Shortness of Breath     History Obtained From:  patient, electronic medical record    HISTORY OF PRESENT ILLNESS:      The patient is a 65 y.o. female with significant past medical history significant for small cell carinoma of the lung who presented with 3-4 day history of worsening shortness of breath despite taking her typical treatments. She remains on PO levaquin from last hospitalization from 4/10 to 4/21. She has intubated at that hospitalization. She is noted to have Hgb 5.5 on admission. She is on Eliquis for atrial fibrillation. Denies black, bloody or tarry stools. She was noted to be neutropenic at last hospitalization and received granix x 2. CXR 4/26/21  Impression   Persistent right basilar opacity appears to correlate with the primary   malignancy     Last chemotherapy 3/31/21 with cisplatin/gemcitabine. She has not received any radiation therapy or immunotherapy. Due to history of lung cancer we were called to evaluate. Past Medical History:        Diagnosis Date    Arthritis     COPD (chronic obstructive pulmonary disease) ([facility] [address]     follow with Dr Dianna Gleason Full dentures     H/O cardiovascular stress test 11/18/2009    thallium, normal no ischemia EF70%     H/O cardiovascular stress test 10/11/2013    thallium,EF70%, normal, no ischemia    H/O Doppler ultrasound 10/13/2010    carotid, right normal, left normal    H/O Doppler ultrasound 10/07/2014    Carotid mild bilateral internal carotid artery disease less than 50% in severity. Normal vertebral artery flows with good velocities. Incidental finding of possible thyroid nodule in the left lobe.      History of 24 hour EKG monitoring 03/11/2011    NSR no ectopy    History of nuclear stress test 10/21/2016    lexiscan-normal,no ischemia,EF70%,enlarged right ventricle    Hx of chest x-ray 01/02/2015    WNL    Hx of Doppler echocardiogram 12/01/2020    EF 50-55% mild LV hypertrophy. Grade 2 diastolic dysfunction. Mild AS.     Hx of echocardiogram 10/11/2013    10/13 Abn lt ventricular diastolic function, mile MR, EF 57%,10/10 EF55%, mild mitral annular calcification    Hx of echocardiogram 05/02/2019    EF 33-85%, Grade I diastolic dysfunction, Mild aortic stenosis, Calcific thickening of posterior leaflet of mitral valve, Mild Pulm HTN    Hx of pelvic x-ray 01/02/2015    Severe RT his osterarothrosis    Hx of x-ray of hip 01/02/2015    Severe Rt hip osteosrthrosis    Hyperlipidemia     Hypertension     follows with dr Robert Dickerson Leg pain     Lung nodules     scheduled for bronch 1/5/2020    On home oxygen therapy     \"on 4 liters 24 hours per day\"    Small cell lung cancer ([address] 1/11/2021    Wears glasses     to read     Past Surgical History:        Procedure Laterality Date    BACK SURGERY      \"about 12 yrs ago - surgery my mid to low back \" done in [address] N/A 1/5/2021    BRONCHOSCOPY ENDOBRONCHIAL ULTRASOUND performed by Francia Mari MD at [facility]      \"last one done in my age 61's    CYST REMOVAL  1970's    left arm    EYE SURGERY  2010?    svetlana cataract ext     FOOT SURGERY Right 1970's    \"have screw in 2nd toe\"    HYSTERECTOMY      1999\"took everything \"    JOINT REPLACEMENT Right 2014??    hip    LAPAROSCOPIC APPENDECTOMY N/A 5/18/2019    APPENDECTOMY LAPAROSCOPIC performed by Syeda Wiseman MD at [address] [place] [address] 12/10/2020    MEDIASTINOSCOPY performed by Francia Mari MD at [facility]  1970's   [address] N/A 3/3/2021    EGD BIOPSY AND BRUSHING performed by Juan Carlos Humphreys MD at [facility] ENDOSCOPY       Current Medications:    Current Facility-Administered Medications: pantoprazole (PROTONIX) tablet 40 mg, 40 mg, Oral, QAM AC  0.9 % sodium chloride infusion, , Intravenous, PRN  sodium chloride flush 0.9 % injection 5-40 mL, 5-40 mL, Intravenous, 2 times per day  sodium chloride flush 0.9 % injection 5-40 mL, 5-40 mL, Intravenous, PRN  0.9 % sodium chloride infusion, 25 mL, Intravenous, PRN  promethazine (PHENERGAN) tablet 12.5 mg, 12.5 mg, Oral, Q6H PRN **OR** ondansetron (ZOFRAN) injection 4 mg, 4 mg, Intravenous, Q6H PRN  polyethylene glycol (GLYCOLAX) packet 17 g, 17 g, Oral, Daily PRN  acetaminophen (TYLENOL) tablet 650 mg, 650 mg, Oral, Q6H PRN **OR** acetaminophen (TYLENOL) suppository 650 mg, 650 mg, Rectal, Q6H PRN  albuterol sulfate  (90 Base) MCG/ACT inhaler 2 puff, 2 puff, Inhalation, Q6H PRN  atorvastatin (LIPITOR) tablet 10 mg, 10 mg, Oral, Daily  busPIRone (BUSPAR) tablet 10 mg, 10 mg, Oral, BID  calcium elemental (OSCAL) tablet 500 mg, 1 tablet, Oral, Daily  dilTIAZem (CARDIZEM CD) extended release capsule 240 mg, 240 mg, Oral, Daily  diphenhydrAMINE (BENADRYL) tablet 50 mg, 50 mg, Oral, Q6H PRN  guaiFENesin (ROBITUSSIN) 100 MG/5ML oral solution 200 mg, 200 mg, Oral, TID PRN  HYDROcodone-acetaminophen (NORCO)  MG per tablet 1 tablet, 1 tablet, Oral, Q4H PRN  lactobacillus (CULTURELLE) capsule 1 capsule, 1 capsule, Oral, Daily with breakfast  levoFLOXacin (LEVAQUIN) tablet 750 mg, 750 mg, Oral, Daily  magnesium oxide (MAG-OX) tablet 200 mg, 200 mg, Oral, Daily  sucralfate (CARAFATE) tablet 1 g, 1 g, Oral, 4x Daily  theophylline (UNIPHYL) extended release tablet 200 mg, 200 mg, Oral, Daily  methylPREDNISolone sodium (SOLU-MEDROL) injection 40 mg, 40 mg, Intravenous, Daily  lisinopril (PRINIVIL;ZESTRIL) tablet 5 mg, 5 mg, Oral, Daily  LORazepam (ATIVAN) tablet 1 mg, 1 mg, Oral, Q8H PRN  Magic Mouthwash (Miracle Mouthwash) 5 mL, 5 mL, Swish & Swallow, 4x Daily PRN  metoprolol tartrate (LOPRESSOR) tablet 50 mg, 50 mg, Oral, BID  ipratropium-albuterol (DUONEB) nebulizer solution 3 mL, 1 vial, Inhalation, 4x Daily  potassium chloride 10 mEq/100 mL IVPB (Peripheral Line), 10 mEq, Intravenous, PRN    Allergies:  Formaldehyde, Gabapentin, Norflex tablets [orphenadrine], and Cranberry    Social History:    TOBACCO:   reports that she quit smoking about 5 months ago. Her smoking use included cigarettes. She started smoking about 47 years ago. She has a 70.50 pack-year smoking history. She has never used smokeless tobacco.  ETOH:   reports current alcohol use. DRUGS:   reports no history of drug use. Family History:       Problem Relation Age of Onset    Stroke Mother     Dementia Mother     Heart Attack Mother 61    Coronary Art Dis Mother     Hypertension Mother     High Cholesterol Mother     Cancer Father     Stroke Father     Heart Attack Father 61    Diabetes Sister     Heart Attack Sister     Hypertension Sister     Cancer Sister    Mirta Stephens COPD Sister     Stroke Sister     Hypertension Sister     Hypertension Sister     Irritable Bowel Syndrome Sister     Pacemaker Sister      REVIEW OF SYSTEMS:    Denies fever, chills, night sweats  +shortness of breath, cough productive of tan sputum  No chest pain  Denies abdominal discomfort, nausea or vomiting  No lower extremity edema or calf pain    PHYSICAL EXAM:      Vitals:    BP (!) 103/59   Pulse 81   Temp 98 °F (36.7 °C) (Axillary)   Resp 19   Ht 5' (1.524 m)   Wt 121 lb 4.1 oz (55 kg)   SpO2 100%   BMI 23.68 kg/m²     CONSTITUTIONAL:  awake, alert, cooperative, no apparent distress, and appears chronically ill. frail  EYES:  Lids and lashes normal, extra ocular muscles intact, sclera clear, conjunctiva normal  LUNGS:  No increased work of breathing, wheezes throughout.   Diminished breath sounds bilaterally  CARDIOVASCULAR: regular rate and rhythm, normal S1 and S2  ABDOMEN:normal bowel sounds, soft, non-distended, non-tender, no masses palpate  MUSCULOSKELETAL:  there is no redness, warmth, or swelling of the joints  NEUROLOGIC:  Awake, alert, oriented to name, place and time. Cranial nerves II-XII are grossly intact. SKIN:  no redness, warmth, or swelling      IMPRESSION/RECOMMENDATIONS:     Small cell lung cancer- S/P four cycles cisplatin/etoposide with last treatment 3/31/21. CT 4/10 with enlarging RLL pulmonary nodule. New opacity peripheral to lesion. Possible post treatment change vs progression. We had planned to obtain a PET CT scan as an outpatient. Due to declining performance status we will consider a hospice consult which she has previously declined. We will discuss this in the morning. Anemia- multifactorial. Will obtain iron studies, FOB. Transfuse to keep Hgb above 7. COPD- continue solumedrol, nebs, supplemental 02    Constipation- continue miralax, colace PRN    Hx of Seizure- 4/13/21 MRI negative for metastatic disease. EEG non-specific. She declined tx with anti seizure medications. I saw and examined patient myself. She is looking better this morning. She is agreeable to have 1 unit of blood this morning to help her shortness of breath. Stool occult has been ordered. Agreed with above. Thank you for allowing us to participate it the care of this patient. We will continue to follow.     Electronically signed by HELIO Mathews CNP on 4/27/2021 at 2:05 PM

## 2021-04-27 NOTE — ED NOTES
Pt incontinent of urine in depend at this time. Craig care performed and new depend placed on pt. Pt craig area very excoriated, buttocks a deep red/purple color and appears to be healing wound. Very small area of skin is open.      Julian Burns RN  04/26/21 2006 normal...

## 2021-04-27 NOTE — PROGRESS NOTES
04/27/21 0129   NIV Type   NIV Started/Stopped On   Equipment Type v60   Mode Bilevel   Mask Type Full face mask   Mask Size Small   Settings/Measurements   IPAP 10 cmH20   CPAP/EPAP 5 cmH2O   Rate Ordered 16   Resp 16   FiO2  35 %   I Time/ I Time % 1 s   Vt Exhaled 447 mL   Mask Leak (lpm) 0 lpm   SpO2 94   Alarm Settings   Alarms On Y   Press Low Alarm 5 cmH2O   High Pressure Alarm 25 cmH2O   Apnea (secs) 20 secs   Resp Rate Low Alarm 12   High Respiratory Rate 40 br/min

## 2021-04-27 NOTE — CARE COORDINATION
DOMINGO received a call from Bhumi with [employer] who stated she spoke with [facility] and pt has be out of the SNF for 7 days before pt can go to a different facility or the family can call a number and appeal this decision. Bhumi stated she will call pt dght and inform her of this and give her the number to call.

## 2021-04-27 NOTE — PROGRESS NOTES
Hospitalist Progress Note      Name:  Padmini Perdomo /Age/Sex: 1950  (65 y.o. female)   MRN & CSN:  [id number] & [id number] Admission Date/Time: 2021  4:24 PM   Location:  [address] [place] [address] PCP: Loyda Jain MD         Hospital Day: 2    ASSESSMENT & PLAN:  Padmini Perdomo is a 65 y.o.  female Who presented to the hospital with worsening SOB. Patient was similarly admitted on 2021 and discharged on 2021. During the hospitalization on 2021, the patient was intubated and subsequently extubated. She was again readmitted on April 10, 2021 through 2021. She was intubated twice on April 10, 2021 admission. #.  Acute on chronic hypoxic and hypercapnic respiratory failure---chronically on 4 L home oxygen. Has a trilogy BiPAP at home. Oxygen requirement around baseline.  -Continue oxygen supplementation  -Continue Levaquin until     #. Paroxysmal A. fib---rate controlled. Eliquis has been on hold due to anemia requiring transfusion.  -Resume Eliquis tomorrow if hemoglobin remains stable  -Continue diltiazem    #. Chronic anemia--requiring blood transfusion. Likely as a result of underlying malignancy. S/p 1 unit PRBC on this admission. Hemoglobin stable around 7.  -Daily CBC    #. COPD exacerbation---chronically on 4 L of oxygen at home. On IV Solu-Medrol 40 daily, albuterol as needed    #.  Stage IIIb small cell lung cancer---s/p 4 cycles cystoplasty and/etoposide with last treatment on 3/31/2021. CT chest on 4/10 with enlarging right lower lobe pulmonary nodule. Patient had been declining with frequent hospitalization and required intubation and last 2 hospitalizations. Hospice consultation and consideration.     #.  Hypertension---BP controlled     #.  Exudative esophagitis---on PPI and Carafate    #. Generalized tonic-clonic seizure---diagnosed on last hospitalization on 4/10/2021. Not on antiepileptic drugs.      #.  Moderate protein calorie malnutrition    #. Tobacco abuse disorder    Diet DIET GENERAL; Dysphagia Pureed  Dietary Nutrition Supplements: Standard High Calorie Oral Supplement   DVT Prophylaxis [x] Eliquis, []  Heparin, [] SCDs, [] Ambulation   GI Prophylaxis [] PPI,  [] H2 Blocker,  [] Carafate,  [] Diet/Tube Feeds   Code Status Full Code   Disposition  [facility] [] Low, [] Moderate,[x]  High     Chief complaint/Interval History/ROS     Chief Complaint: Shortness of breath    INTERVAL HISTORY:    4/27: Oxygen requirement around baseline this morning. Hemoglobin remains stable around 7.0.      ROS:  No chest pain. No abdominal pain. Nausea. No vomiting. Objective: Intake/Output Summary (Last 24 hours) at 4/27/2021 1634  Last data filed at 4/27/2021 1058  Gross per 24 hour   Intake 200 ml   Output 225 ml   Net -25 ml      Vitals:   Vitals:    04/27/21 1500   BP:    Pulse: 79   Resp: 17   Temp:    SpO2: 95%     Physical Exam:   GEN: Awake female, nontoxic appearing. Answers questions appropriate. EYES: No eye discharge. Ocular muscles intact. T. HENT: Mucous membranes dry. Normocephalic atraumatic. NECK: Supple  RESP: Symmetric breath sounds. Accessory muscle use. No wheezing. CV: RRR. No pitting lower extremity edema. GI: Abdomen soft. Nontender. Nondistended. : No Nicole in place  MSK: No bony fractures. No gross deformities. SKIN: warm, dry, no rashes,   NEURO: Cranial nerves appear grossly intact, normal speech, no lateralizing weakness.   PSYCH: Awake, alert, oriented     Medications:   Medications:    pantoprazole  40 mg Oral QAM AC    docusate sodium  200 mg Oral Daily    sodium chloride flush  5-40 mL Intravenous 2 times per day    atorvastatin  10 mg Oral Daily    busPIRone  10 mg Oral BID    calcium elemental  1 tablet Oral Daily    dilTIAZem  240 mg Oral Daily    lactobacillus  1 capsule Oral Daily with breakfast    levoFLOXacin  750 mg Oral Daily    magnesium oxide  200 mg Oral Daily

## 2021-04-27 NOTE — PROGRESS NOTES
04/26/21 4593   NIV Type   $NIV $Daily Charge   NIV Started/Stopped On   Equipment Type v60   Mode Bilevel   Mask Type Full face mask   Mask Size Small   Settings/Measurements   IPAP 10 cmH20   CPAP/EPAP 5 cmH2O   Rate Ordered 16   Resp 24   FiO2  35 %   I Time/ I Time % 1 s   Vt Exhaled 418 mL   Mask Leak (lpm) 0 lpm   Comfort Level Fair   SpO2 94   Alarm Settings   Alarms On Y   Press Low Alarm 5 cmH2O   High Pressure Alarm 25 cmH2O   Apnea (secs) 20 secs   Resp Rate Low Alarm 12   High Respiratory Rate 40 br/min

## 2021-04-27 NOTE — DISCHARGE INSTR - COC
Continuity of Care Form    Patient Name: Poli Vail   :  1950  MRN:  [id number]    Admit date:  2021  Discharge date:  ***    Code Status Order: Full Code   Advance Directives:   Advance Care Flowsheet Documentation     Date/Time Healthcare Directive Type of Healthcare Directive Copy in [address] Agent's Name Healthcare Agent's Phone Number    21 [phone]  No, patient does not have an advance directive for healthcare treatment -- -- -- -- --          Admitting Physician:  Gilford Rivet, MD  PCP: Elle Cain MD    Discharging Nurse: [facility] Unit/Room#: 0943/3490-Q  Discharging Unit Phone Number: ***    Emergency Contact:   Extended Emergency Contact Information  Primary Emergency Contact: [address] [place] [address] Kem Carpenter 36 Phone: [phone]  Work Phone: [phone]  Mobile Phone: [phone]  Relation: [address] needed? No  Secondary Emergency Contact: Yanick Isaacs, [address] [place] [address] Phone: [phone]  Work Phone: [phone]  Mobile Phone: [phone]  Relation: Grandchild   needed?  No    Past Surgical History:  Past Surgical History:   Procedure Laterality Date    BACK SURGERY      \"about 12 yrs ago - surgery my mid to low back \" done in [address] N/A 2021    BRONCHOSCOPY ENDOBRONCHIAL ULTRASOUND performed by B[facility]      \"last one done in my age 61's    CYST REMOVAL  's    left arm    EYE SURGERY  ?    svetlana cataract ext     FOOT SURGERY Right s    \"have screw in 2nd toe\"    HYSTERECTOMY      \"took everything \"    JOINT REPLACEMENT Right ??    hip    LAPAROSCOPIC APPENDECTOMY N/A 2019    APPENDECTOMY LAPAROSCOPIC performed by Main Harp MD at [address] [place] [address] 12/10/2020    MEDIASTINOSCOPY performed by Bora Vital MD at [address] [place] [address] N/A 3/3/2021    EGD BIOPSY AND BRUSHING performed by Rajeev Hughes MD at [facility] ENDOSCOPY       Immunization History:   Immunization History   Administered Date(s) Administered    Influenza A ([id number]) Vaccine PF IM 12/10/2009    Influenza, High Dose (Fluzone 65 yrs and older) 09/19/2014, 10/23/2015, 09/18/2017, 09/21/2018    Influenza, Triv, inactivated, subunit, adjuvanted, IM (Fluad 65 yrs and older) 09/30/2019, 10/08/2020    Pneumococcal Conjugate 13-valent ([id number]) 03/24/2015    Pneumococcal Polysaccharide ([id number]) 09/17/2007, 03/06/2020    Tdap (Boostrix, Adacel) 06/22/2009       Active Problems:  Patient Active Problem List   Diagnosis Code    Hyperlipidemia E78.5    COPD (chronic obstructive pulmonary disease) ([facility] J44.9    Leg pain M79.606    Hypertension I10    Shortness of breath R06.02    Tobacco abuse Z72.0    Abdominal aortic aneurysm (AAA) without rupture ([facility]) I71.4    Degeneration of lumbar or lumbosacral intervertebral disc M51.37    Osteopenia M85.80    Anxiety F41.9    Acute on chronic respiratory failure with hypoxia ([facility]) J96.21    Lung mass R91.8    COPD exacerbation ([facility]) J44.1    Acute exacerbation of chronic obstructive pulmonary disease (COPD) ([facility]) J44.1    Small cell lung cancer ([facility] C34.90    Acute on chronic respiratory failure with hypoxia and hypercapnia (HCC) J96.21, J96.22    Acute hypoxemic respiratory failure ([facility]) J96.01    Pneumonia of both lungs due to infectious organism J18.9    Elevated troponin level R77.8    Elevated brain natriuretic peptide (BNP) level R79.89    Acute hyperkalemia E87.5    Sepsis (HCC) A41.9    Pneumonitis J18.9    Ventilator dependent ([facility]) Z99.11    Hypernatremia E87.0    SOB (shortness of breath) R06.02       Isolation/Infection:   Isolation          No Isolation        Patient Infection Status     Infection Onset Added Last Indicated Last Indicated By Review Planned Expiration Resolved Resolved By    None active    Resolved COVID-19 Rule Out 21 Respiratory Panel, Molecular, with COVID-19 (Restricted: peds pts or suitable admitted adults) (Ordered)   21     COVID-19 Rule Out 21 COVID-19, Rapid (Ordered)   21 Rule-Out Test Resulted    COVID-19 Rule Out 21 COVID-19 (Ordered)   21 Rule-Out Test Resulted    COVID-19 Rule Out 21 Respiratory Panel, Molecular, with COVID-19 (Restricted: peds pts or suitable admitted adults) (Ordered)   21 Rule-Out Test Resulted    COVID-19 Rule Out 21 COVID-19, Rapid (Ordered)   21 Rule-Out Test Resulted    COVID-19 Rule Out 21 COVID-19 (Ordered)   21 Rule-Out Test Resulted    COVID-19 Rule Out 20 COVID-19 (Ordered)   20 Rule-Out Test Resulted    COVID-19 Rule Out 20 COVID-19 (Ordered)   20 Rule-Out Test Resulted    COVID-19 Rule Out 20 Covid-19 Ambulatory (Ordered)   20 Rule-Out Test Resulted    COVID-19 Rule Out 20 Covid-19 Ambulatory (Ordered)   20 Rule-Out Test Resulted    COVID-19 Rule Out 20 COVID-19 (Ordered)   20 Rule-Out Test Resulted          Nurse Assessment:  Last Vital Signs: /65   Pulse 101   Temp 98 °F (36.7 °C) (Axillary)   Resp 18   Ht 5' (1.524 m)   Wt 121 lb 4.1 oz (55 kg)   SpO2 95%   BMI 23.68 kg/m²     Last documented pain score (0-10 scale): Pain Level: 0  Last Weight:   Wt Readings from Last 1 Encounters:   21 121 lb 4.1 oz (55 kg)     Mental Status:  oriented and alert    IV Access:  - None    Nursing Mobility/ADLs:  Walking   Dependent  Transfer  Dependent  Bathing  Dependent  Dressing  Dependent  Toileting  Dependent  Feeding  Independent  Med Admin  Assisted  Med Delivery   whole    Wound Care Documentation and Therapy:  Wound 21 Sacrum Medial wound red and weeping (Active)   Dressing Status Dry; Intact; Clean 21 1222   Number of days: 46       Wound 21 Coccyx Stage 2 Pressure Ulcer (Active)   Wound Etiology Pressure Stage  2 21 0815   Dressing Status New dressing applied 21 0212   Wound Cleansed Soap and water 21 0923   Dressing/Treatment Protective barrier 21 0923   Wound Assessment Pink/red 21 0815   Drainage Amount None 21 0815   Odor None 21 0923   Beryl-wound Assessment Intact 21 0923   Number of days: 14        Elimination:  Continence:   · Bowel: Yes  · Bladder: Yes  Urinary Catheter: None   Colostomy/Ileostomy/Ileal Conduit: No       Date of Last BM: 21      Intake/Output Summary (Last 24 hours) at 2021 1101  Last data filed at 2021 1058  Gross per 24 hour   Intake 200 ml   Output 225 ml   Net -25 ml     No intake/output data recorded.     Safety Concerns:     None    Impairments/Disabilities:      None      Patient's personal belongings (please select all that are sent with patient):  None    RN SIGNATURE:  Electronically signed by Rafaela White RN on 5/3/21 at 12:03 PM EDT      PHYSICIAN SECTION    Nutrition Therapy:  Current Nutrition Therapy:   [address] Diet List:877208228}    Routes of Feeding: {CHP DME Other Feedings:184965468}  Liquids: {Slp liquid thickness:28029}  Daily Fluid Restriction: {CHP DME Yes amt example:011311982}  Last Modified Barium Swallow with Video (Video Swallowing Test): {Done Not Done AEMN:653352074}    Treatments at the Time of Hospital Discharge:   Respiratory Treatments: ***  Oxygen Therapy:  {Therapy; copd oxygen:26686}  Ventilator:    { CC Vent BNUJ:222830457}    Rehab Therapies: {THERAPEUTIC INTERVENTION:2847008857}  Weight Bearing Status/Restrictions: [address]  Weight Bearin}  Other Medical Equipment (for information only, NOT a DME order):  {EQUIPMENT:673102488}  Other Treatments: ***    Prognosis: {Prognosis:3057763961}    Condition at Discharge: Carol Self Patient Condition:503796116}    Rehab Potential (if transferring to Rehab): {Prognosis:2416752266}    Recommended Labs or Other Treatments After Discharge: ***    Physician Certification: I certify the above information and transfer of Yohannes Castle  is necessary for the continuing treatment of the diagnosis listed and that she requires {Admit to Appropriate Level of Care:37173} for {GREATER/LESS:560776287} 30 days.      Update Admission H&P: {CHP DME Changes in GRDIY:643812345}    PHYSICIAN SIGNATURE:  {Esignature:546376749}

## 2021-04-27 NOTE — ED NOTES
Report called to [address] RN at this time. Bed remains dirty at this time.      Devorah Fernandez RN  04/26/21 2012

## 2021-04-27 NOTE — CONSULTS
[facility] HOMA Sun, 1950, 3119/3119-A, 4/27/2021    History  [place]:  The encounter diagnosis was Acute anemia. Patient  has a past medical history of Arthritis, COPD (chronic obstructive pulmonary disease) ([facility] Full dentures, H/O cardiovascular stress test, H/O cardiovascular stress test, H/O Doppler ultrasound, H/O Doppler ultrasound, History of 24 hour EKG monitoring, History of nuclear stress test, Hx of chest x-ray, Hx of Doppler echocardiogram, Hx of echocardiogram, Hx of echocardiogram, Hx of pelvic x-ray, Hx of x-ray of hip, Hyperlipidemia, Hypertension, Leg pain, Lung nodules, On home oxygen therapy, Small cell lung cancer ([facility] and Wears glasses. Patient  has a past surgical history that includes cyst removal (1970's); Tubal ligation (1970's); laparoscopic appendectomy (N/A, 5/18/2019); Mediastinoscopy (N/A, 12/10/2020); back surgery; Colonoscopy; Hysterectomy; eye surgery (2010?); joint replacement (Right, 2014? ?); Foot surgery (Right, 1970's); bronchoscopy (N/A, 1/5/2021); and Upper gastrointestinal endoscopy (N/A, 3/3/2021). Subjective:  Patient states:  \"I want to use the bedpan. \"    Pain: Pt reports 7/10 back pain . Communication with other providers:  Handoff to RN, OT  Restrictions: general precautions, fall risk    Home Setup/Prior level of function       Examination of body systems (includes body structures/functions, activity/participation limitations):  · Observation:  Pt supine in bed upon arrival and agreeable to therapy  · Vision:  Wears glasses for reading  · Hearing:  [facility]  · Cardiopulmonary:  4L O2, increased to 7L O2 during activity  · Cognition: appears WFL, see OT/SLP note for further evaluation. Musculoskeletal  · ROM R/L:  WFL. · Strength R/L:  4/5, minimal impairment in function and endurance.     · Neuro:  [facility]      Mobility:  · Rolling L/R:  CGA performed bilaterally during pericare  · Supine to sit: Min A with assist at hips and increased time to complete due to SOB  · Transfers: Pt completed STS transfer min A with safe sequencing. Pt refused further transfers at this time  · Sitting balance:  good. · Standing balance:  Fair+, pt stood at RW ~1 minute CGA. · Gait: NT, pt refused    AMPAC 6 Clicks Inpatient Mobility:  AM-PAC Inpatient Mobility Raw Score : 16    Safety: patient left supine in bed (pt did not wish to transfer to chair), call light within reach, RN notified, gait belt used. Assessment:  Pt is a 65 y.o. female admitted to the hospital for SOB. Pt was recently discharged from the hospital and admitted to SNF where she was working toward transferring and ambulating. Pt currently requires min A for bed mobility, min A for transfers, and did not ambulate this date. Pt is significantly limited by oxygen demand during activity. Pt would benefit from continued acute care PT as well as SNF placement after discharge to continue to address impairments. Complexity: moderate    Prognosis: Good, no significant barriers to participation at this time.      Plan Times per week: 3+/week     Equipment: TBD     Goals:  Short term goals  Time Frame for Short term goals: 1 week  Short term goal 1: Pt to complete all bed mobility mod I  Short term goal 2: Pt to complete all STS transfers to/from bed, commode, and chair CGA  Short term goal 3: Pt to complete stand pivot with LRAD CGA  Short term goal 4: Pt to ambulate 15' with LRAD min A       Treatment plan:  Bed mobility, transfers, balance, gait, TA, TX    Recommendations for NURSING mobility: stand pivot min A with RW and gait belt    Time:   Time in: 0950  Time out: 1014  Timed treatment minutes: 15  Total time: 24    Electronically signed by:    Tasia Floyd, PT  4/27/2021, 12:52 PM

## 2021-04-27 NOTE — CONSULTS
[address], 1950, 3195/4597-C, 4/27/2021    Discharge Recommendation: Jony Watson      History:  [place]:  The encounter diagnosis was Acute anemia. Diagnoses of Acute on chronic respiratory failure with hypoxia and hypercapnia (HCC), Acute respiratory acidosis, Small cell lung cancer, right with metastasis. , CO2 narcosis, Acute on chronic diastolic congestive heart failure ([facility] NSTEMI (non-ST elevated myocardial infarction) ([facility] and 24 mm right lower lobe pulmonary nodule were also pertinent to this visit. Subjective:  Patient states: Agreeable to therapy. Pain: Pt reports 7/10 back pain. Communication with other providers: PT, RN  Restrictions: General Precautions, Fall Risk    Home Setup/Prior level of function:    Lives With: Family(granddaughter and daughter, someone always home with pt)  Type of Home: House  Home Layout: One level  Home Access: Stairs to enter with rails(plans on havig a ramp installed)  Entrance Stairs - Number of Steps: 1  Bathroom Shower/Tub: Tub/Shower unit(sponge bathes currently)  Bathroom Toilet: Bedside commode  Home Equipment: Oxygen, Cane, Rolling walker, Wheelchair-manual, sleeps in recliner(4L of O2 at baseline). Pt sleeps in a recliner. ADL Assistance: Needs assistance (pt utilizes bedpan as of recently related to fall during SPT to [facility] at rehab, requires assistance with bathing and dressing. Homemaking Assistance: Needs assistance  Homemaking Responsibilities: No  Ambulation Assistance: Pt reports she has not been ambulating since March. Transfer Assistance: Requires assistance     **Taken from evaluation on 4/16/2021. Confirmed with pt this date. **    Examination:  · Observation: Supine in bed upon arrival  · Vision: [facility] [place]  · Hearing: [facility] [place]  · Vitals: 4L O2 at baseline, increased to 7L O2 during activity and O2 stabilized at 86% from 82%.     Body Systems and recently. AM-PAC 6 click short form for inpatient daily activity:   How much help from another person does the patient currently need. .. Unable  Dep A Lot  Max A A Lot   Mod A A Little  Min A A Little   CGA  SBA None   Mod I  Indep  Sup   1. Putting on and taking off regular lower body clothing? [] 1    [x] 2   [] 2   [] 3   [] 3   [] 4      2. Bathing (including washing, rinsing, drying)? [] 1   [] 2   [x] 2 [] 3 [] 3 [] 4   3. Toileting, which includes using toilet, bedpan, or urinal? [] 1    [] 2   [x] 2   [] 3   [] 3   [] 4     4. Putting on and taking off regular upper body clothing? [] 1   [] 2   [] 2   [x] 3   [] 3    [] 4      5. Taking care of personal grooming such as brushing teeth? [] 1   [] 2    [] 2 [x] 3    [] 3   [] 4      6. Eating meals? [] 1   [] 2   [] 2   [] 3   [] 3   [x] 4      Raw Score:  16     [24=0% impaired(CH), 23=1-19%(CI), 20-22=20-39%(CJ), 15-19=40-59%(CK), 10-14=60-79%(CL), 7-9=80-99%(CM), 6=100%(CN)]    Treatment:  Therapeutic Activity Training:   Therapeutic activity training was instructed today. Cues were given for safety, sequence, UE/LE placement, awareness, and balance. Activities performed today included bed mobility training, sup-sit, sit-stand. Safety Measures: Gait belt used, Left in bed, Alarm in place    Assessment:  Assessment  Performance deficits / Impairments: Decreased functional mobility , Decreased endurance, Decreased balance, Decreased high-level IADLs, Decreased ADL status, Decreased strength  Treatment Diagnosis: SOB  Prognosis: Fair  Decision Making: High Complexity  REQUIRES OT FOLLOW UP: Yes  Discharge Recommendations: [address]    Pt is a 65year old F admitted for SOB. Pt reports that prior to admission she was receiving assist with ADLs, not responsible for IADLs, and was Jeromy for functional mobility. This date, pt demo decreased strength, endurance, balance, and activity tolerance impacting ADL status.  Pt is currently functioning below baseline and would benefit from skilled OT services at SNF. Plan:  Plan  Times per week: 2+  Times per day: Daily      Goals:  1. Pt will complete all aspects of bed mobility for EOB/OOB ADLs CGA. 2. Pt will complete UB/LB bathing with Noel and AE as needed. 3. Pt will complete all aspects of LB dressing with Noel and AE as needed. 4. Pt will complete all functional transfers to and from bed, chair, toilet, shower chair with CGA and AD. 5. Pt will ambulate HH distance to bathroom for toileting with Noel and AD. 6. Pt will complete all aspects of toileting task with Noel. 7. Pt will complete oral hygiene/grooming routine in standing at sink with CGA demo good dynamic standing balance for approx 8 minutes. 8. Pt will complete ther ex/ther act with focus on UB strengthening. Time:   Time in: 950  Time out: 1014  Timed treatment minutes: 12  Total time: 24      Electronically signed by:       LAKESHIA Haley/L, [place], [id number]

## 2021-04-27 NOTE — PROGRESS NOTES
Comprehensive Nutrition Assessment    Type and Reason for Visit:  Initial, Positive Nutrition Screen, Wound    Nutrition Recommendations/Plan:   · Continue current diet  · Begin standard high eboni oral nutrition supplement bid, pt prefers strawberry    Nutrition Assessment:  Pt known to RDs from recent admission here with  shortness  of breath. History of chronic COPD, non-small cell lung cancer, chronic respiratory failure and moderate malnutrition. Stage II pressure injury noted. Recent wt loss noted. Currently feeding self and tolerating fluids. Will order oral supplement and continue following as high nutrition risk. Malnutrition Assessment:  Malnutrition Status: Moderate malnutrition    Context:  Chronic Illness     Findings of the 6 clinical characteristics of malnutrition:  Energy Intake:  Mild decrease in energy intake   Weight Loss:   Mild weight loss   Body Fat Loss:  Mild body fat loss Orbital, Buccal region   Muscle Mass Loss:  Mild muscle mass loss Temples (temporalis), Clavicles (pectoralis & deltoids)  Fluid Accumulation:  No significant fluid accumulation Extremities   Strength:  Not Performed    Estimated Daily Nutrient Needs:  Energy (kcal):  4081-8904 (25-30 kcal/kg IBW); Weight Used for Energy Requirements:  Ideal     Protein (g):  54-63 (1.2-1.4 g/kg IBW);  Weight Used for Protein Requirements:  Ideal        Fluid (ml/day):  5404-3365; Method Used for Fluid Requirements:  1 ml/kcal      Wounds:  Pressure Injury, Stage II       Current Nutrition Therapies:    DIET GENERAL; Dysphagia Pureed    Anthropometric Measures:  · Height: 5' (152.4 cm)  · Current Body Weight: 121 lb 4.1 oz (55 kg)   · Admission Body Weight: 121 lb 4.1 oz (55 kg)    · Usual Body Weight: 126 lb 12.8 oz (57.5 kg)(1/22/21)     · Ideal Body Weight: 100 lbs; % Ideal Body Weight 121.3 %   · BMI: 23.7  · BMI Categories: Normal Weight (BMI 22.0 to 24.9) age over 72       Nutrition Diagnosis:   · Moderate malnutrition, In context of chronic illness related to impaired respiratory function, inadequate protein-energy intake as evidenced by poor intake prior to admission, weight loss, mild muscle loss, mild loss of subcutaneous fat    Nutrition Interventions:   Food and/or Nutrient Delivery:  Continue Current Diet, Start Oral Nutrition Supplement  Nutrition Education/Counseling:  No recommendation at this time   Coordination of Nutrition Care:  Continue to monitor while inpatient    Goals:  Pt will consume greater than 75% of her meals and supplements       Nutrition Monitoring and Evaluation:   Behavioral-Environmental Outcomes:  None Identified   Food/Nutrient Intake Outcomes:     Physical Signs/Symptoms Outcomes:  Biochemical Data, Chewing or Swallowing, Meal Time Behavior, Nutrition Focused Physical Findings, Skin, Weight     Discharge Planning:    Continue Oral Nutrition Supplement     Electronically signed by Ivan Vazquez RD, LD on 4/27/21 at 12:22 PM EDT    Contact: [phone]

## 2021-04-27 NOTE — PROGRESS NOTES
04/27/21 0400   NIV Type   NIV Started/Stopped On   Equipment Type v60   Mode Bilevel   Mask Type Full face mask   Mask Size Small   Settings/Measurements   IPAP 10 cmH20   CPAP/EPAP 5 cmH2O   Rate Ordered 16   Resp 18   FiO2  35 %   I Time/ I Time % 1 s   Vt Exhaled 259 mL   Mask Leak (lpm) 0 lpm   Comfort Level Fair   Alarm Settings   Alarms On Y   Press Low Alarm 5 cmH2O   High Pressure Alarm 25 cmH2O   Delay Alarm 20 sec(s)   Resp Rate Low Alarm 12   High Respiratory Rate 40 br/min

## 2021-04-27 NOTE — PROGRESS NOTES
Patient arrived to unit from er per stretcher. Alert and orient times 4. Skin assessment completed upon arrival and groin excoriated and coccyx with stage 2. [place] to room, call light, staff, meals, lights, tv, and phone and no questions at this time.

## 2021-04-27 NOTE — CARE COORDINATION
LSW informed that pt is from [address] home and family is wanting pt to go to [facility] at discharge. LSW read that pt insurance is [employer] ([place]). LSW has been informed that if a pt goes to a NH and has been there less then 30 days they can not change SNF and will need to return to [address]. LSW called pt [facility] and informed her of this info. Dght stated she feels [facility] was neglectful and does not want pt to return to [address]. LSW will call Laban Peppers with [facility] and see if she can talk with [employer]  and explain how the family feels and wanting her to be moved to a new facility. LSW spoke with Keira Wiseman at [address] [place] [address] and she stated she has a contact at [place] [address] and will talk with them and get back with this LSW. Pt will need a precert to to go to a  and or return to [facility]. Need PT/OT andria for precert. LSW faxed pt information to [address] [place] [address]. Waiting on return call from Laban Peppers.

## 2021-04-28 NOTE — PLAN OF CARE
Problem: Skin Integrity:  Goal: Will show no infection signs and symptoms  Description: Will show no infection signs and symptoms  4/28/2021 0820 by Rahat Willson RN  Outcome: Ongoing  4/27/2021 1958 by Tamra Camarillo RN  Outcome: Ongoing  Goal: Absence of new skin breakdown  Description: Absence of new skin breakdown  4/28/2021 0820 by Rahat Willson RN  Outcome: Ongoing  4/27/2021 1958 by Tamra Camarillo RN  Outcome: Ongoing  Goal: Skin integrity will improve  Description: Skin integrity will improve  Outcome: Ongoing  Goal: Signs of wound healing will improve  Description: Signs of wound healing will improve  Outcome: Ongoing     Problem: Falls - Risk of:  Goal: Will remain free from falls  Description: Will remain free from falls  4/28/2021 0820 by Rahat Willson RN  Outcome: Ongoing  4/27/2021 1958 by Tamra Camarillo RN  Outcome: Ongoing  Goal: Absence of physical injury  Description: Absence of physical injury  4/28/2021 0820 by Rahat Willson RN  Outcome: Ongoing  4/27/2021 1958 by Tamra Camarillo RN  Outcome: Ongoing     Problem: Nutrition  Goal: Optimal nutrition therapy  4/28/2021 0820 by Rahat Willson RN  Outcome: Ongoing  4/27/2021 1958 by Tamra Camarillo RN  Outcome: Ongoing     Problem: Pain:  Goal: Pain level will decrease  Description: Pain level will decrease  Outcome: Ongoing  Goal: Control of acute pain  Description: Control of acute pain  Outcome: Ongoing  Goal: Control of chronic pain  Description: Control of chronic pain  Outcome: Ongoing     Problem: Activity:  Goal: Fatigue will decrease  Description: Fatigue will decrease  Outcome: Ongoing  Goal: Ability to tolerate increased activity will improve  Description: Ability to tolerate increased activity will improve  Outcome: Ongoing  Goal: Ability to maintain optimal joint mobility will improve  Description: Ability to maintain optimal joint mobility will improve  Outcome: Ongoing     Problem:  Bowel/Gastric:  Goal: Ability to achieve a regular elimination pattern will improve  Description: Ability to achieve a regular elimination pattern will improve  Outcome: Ongoing     Problem: Cardiac:  Goal: Ability to maintain an adequate cardiac output will improve  Description: Ability to maintain an adequate cardiac output will improve  Outcome: Ongoing  Goal: Ability to maintain adequate ventilation will improve  Description: Ability to maintain adequate ventilation will improve  Outcome: Ongoing  Goal: Ability to achieve and maintain adequate cardiopulmonary perfusion will improve  Description: Ability to achieve and maintain adequate cardiopulmonary perfusion will improve  Outcome: Ongoing     Problem: Coping:  Goal: Ability to adjust to condition or change in health will improve  Description: Ability to adjust to condition or change in health will improve  Outcome: Ongoing  Goal: Communication of feelings regarding changes in body function or appearance will improve  Description: Communication of feelings regarding changes in body function or appearance will improve  Outcome: Ongoing     Problem: Health Behavior:  Goal: Identification of resources available to assist in meeting health care needs will improve  Description: Identification of resources available to assist in meeting health care needs will improve  Outcome: Ongoing     Problem: Nutritional:  Goal: Maintenance of adequate nutrition will improve  Description: Maintenance of adequate nutrition will improve  Outcome: Ongoing     Problem: Physical Regulation:  Goal: Signs and symptoms of infection will decrease  Description: Signs and symptoms of infection will decrease  Outcome: Ongoing  Goal: Will show no signs and symptoms of excessive bleeding  Description: Will show no signs and symptoms of excessive bleeding  Outcome: Ongoing  Goal: Complications related to the disease process, condition or treatment will be avoided or minimized  Description: Complications related to the disease process, condition or treatment will be avoided or minimized  Outcome: Ongoing     Problem: Safety:  Goal: Ability to remain free from injury will improve  Description: Ability to remain free from injury will improve  Outcome: Ongoing     Problem: Sensory:  Goal: Pain level will decrease  Description: Pain level will decrease  Outcome: Ongoing  Goal: General experience of comfort will improve  Description: General experience of comfort will improve  Outcome: Ongoing     Problem: Tissue Perfusion:  Goal: Ability to maintain adequate tissue perfusion will improve  Description: Ability to maintain adequate tissue perfusion will improve  Outcome: Ongoing  Goal: Ability to maintain a stable neurologic state will improve  Description: Ability to maintain a stable neurologic state will improve  Outcome: Ongoing

## 2021-04-28 NOTE — PROGRESS NOTES
Hospitalist Progress Note      Name:  Vidhya Sorto /Age/Sex: 1950  (65 y.o. female)   MRN & CSN:  [id number] & [id number] Admission Date/Time: 2021  4:24 PM   Location:  [address] [place] [address] PCP: Cherylene Collet, MD         Hospital Day: 3    ASSESSMENT & PLAN:  Vidhya Sorto is a 65 y.o.  female Who presented to the hospital with worsening SOB. Patient was similarly admitted on 2021 and discharged on 2021. During the hospitalization on 2021, the patient was intubated and subsequently extubated. She was again readmitted on April 10, 2021 through 2021. She was intubated twice on April 10, 2021 admission. #.  Acute on chronic hypoxic and hypercapnic respiratory failure---chronically on 4 L home oxygen. Has BiPAP at home. Oxygen requirement around baseline.  -Continue oxygen supplementation  -Continue Levaquin until     #. Paroxysmal A. fib---rate controlled. Eliquis has been on hold due to anemia requiring transfusion.  -Resume Eliquis tomorrow if hemoglobin remains stable  -Continue diltiazem    #. Chronic anemia--requiring blood transfusion. Likely as a result of underlying malignancy. S/p 1 unit PRBC on this admission. Hemoglobin stable around 7.  -Daily CBC    #. COPD exacerbation---chronically on 4 L of oxygen at home. On IV Solu-Medrol 40 daily, albuterol as needed    #.  Stage IIIb small cell lung cancer---s/p 4 cycles cystoplasty and/etoposide with last treatment on 3/31/2021. CT chest on 4/10 with enlarging right lower lobe pulmonary nodule. Patient had been declining with frequent hospitalization and required intubation and last 2 hospitalizations. Hospice consultation and consideration.     #.  Hypertension---relatively hypotensive.  -Discontinue lisinopril as of      #.  Exudative esophagitis---on PPI and Carafate    #. Generalized tonic-clonic seizure---diagnosed on last hospitalization on 4/10/2021.   Not on antiepileptic drugs. #.  Moderate protein calorie malnutrition  -Protein supplements  -Regular diet    #. Tobacco abuse disorder    Diet DIET GENERAL; Dysphagia Pureed  Dietary Nutrition Supplements: Standard High Calorie Oral Supplement   DVT Prophylaxis [x] Eliquis, []  Heparin, [] SCDs, [] Ambulation   GI Prophylaxis [] PPI,  [] H2 Blocker,  [] Carafate,  [] Diet/Tube Feeds   Code Status Full Code   Disposition  SNF   MDM [] Low, [x] Moderate,[]  High     Chief complaint/Interval History/ROS     Chief Complaint: Shortness of breath    INTERVAL HISTORY:    4/28: Patient is agreeable to skilled facility discharge. Hemoglobin remained stable around 7. Oxygen requirement around baseline. PT/OT ordered today. 4/27: Oxygen requirement around baseline this morning. Hemoglobin remains stable around 7.0.      ROS:  No chest pain. No abdominal pain. Nausea. No vomiting. Objective: Intake/Output Summary (Last 24 hours) at 4/28/2021 1324  Last data filed at 4/28/2021 9728  Gross per 24 hour   Intake 1056.58 ml   Output --   Net 1056.58 ml      Vitals:   Vitals:    04/28/21 1224   BP:    Pulse: 72   Resp: 15   Temp: 98.1 °F (36.7 °C)   SpO2:      Physical Exam:   GEN: Awake female, pleasant. Answers questions appropriately. EYES: No eye discharge. Ocular muscles intact. T. HENT: Mucous membranes dry. Normocephalic atraumatic. NECK: Supple  RESP: Symmetric breath sounds. No accessory muscle use. No wheezing. CV: RRR. No pitting lower extremity edema. GI: Abdomen soft. Nontender. Nondistended. : No Nicole in place  MSK: No bony fractures. No gross deformities. SKIN: warm, dry, no rashes,   NEURO: Cranial nerves appear grossly intact, normal speech, no lateralizing weakness.   PSYCH: Awake, alert, oriented     Medications:   Medications:    pantoprazole  40 mg Oral QAM AC    docusate sodium  200 mg Oral Daily    sodium chloride flush  5-40 mL Intravenous 2 times per day    atorvastatin  10 mg Oral Daily    busPIRone  10 mg Oral BID    calcium elemental  1 tablet Oral Daily    dilTIAZem  240 mg Oral Daily    lactobacillus  1 capsule Oral Daily with breakfast    levoFLOXacin  750 mg Oral Daily    magnesium oxide  200 mg Oral Daily    sucralfate  1 g Oral 4x Daily    theophylline  200 mg Oral Daily    methylPREDNISolone  40 mg Intravenous Daily    lisinopril  5 mg Oral Daily    metoprolol tartrate  50 mg Oral BID    ipratropium-albuterol  1 vial Inhalation 4x Daily      Infusions:    sodium chloride      sodium chloride      sodium chloride       PRN Meds: sodium chloride, , PRN  magnesium sulfate, 1,000 mg, PRN  sodium chloride, , PRN  sodium chloride flush, 5-40 mL, PRN  sodium chloride, 25 mL, PRN  promethazine, 12.5 mg, Q6H PRN    Or  ondansetron, 4 mg, Q6H PRN  polyethylene glycol, 17 g, Daily PRN  acetaminophen, 650 mg, Q6H PRN    Or  acetaminophen, 650 mg, Q6H PRN  albuterol sulfate HFA, 2 puff, Q6H PRN  diphenhydrAMINE, 50 mg, Q6H PRN  guaiFENesin, 200 mg, TID PRN  HYDROcodone-acetaminophen, 1 tablet, Q4H PRN  LORazepam, 1 mg, Q8H PRN  Magic Mouthwash, 5 mL, 4x Daily PRN  potassium chloride, 10 mEq, PRN      Electronically signed by Yo Irwin MD on 4/28/2021 at 1:24 PM

## 2021-04-28 NOTE — PROGRESS NOTES
04/28/21 1130   Treatment   Treatment Type HHN   $Treatment Type $Inhaled Therapy/Meds   Medications Albuterol/Ipratropium   Pre-Tx Pulse 70   Pre-Tx Resps 17   Breath Sounds Pre-Tx CORAZON Diminished   Breath Sounds Pre-Tx LLL Diminished   Breath Sounds Pre-Tx RUL Diminished   Breath Sounds Pre-Tx RML Diminished   Breath Sounds Pre-Tx RLL Diminished   Breath Sounds Post-Tx CORAZON Diminished;Clear   Breath Sounds Post-Tx LLL Diminished   Breath Sounds Post-Tx RUL Diminished;Clear   Breath Sounds Post-Tx RML Diminished;Clear   Breath Sounds Post-Tx RLL Diminished   Post-Tx Pulse 64   Post-Tx Resps 14   Delivery Source Air   Position Semi-Cotton's   Tx Tolerance Well   Duration 10   Is patient on O2? Y   Oxygen Therapy/Pulse Ox   O2 Therapy Oxygen humidified   O2 Device Nasal cannula   O2 Flow Rate (L/min) 4 L/min   Resp 17   SpO2 96 %   Cough/Sputum   Sputum How Obtained Spontaneous cough   Cough Non-productive; Congested   Patient Observation   Observations patient is in bed, watching tv

## 2021-04-28 NOTE — CARE COORDINATION
LSW was asked to talk with pt dght Kimo Gan who is in the pt's room at this time. LSW went to room and spoke with pt and dght. Dght asked how many days pt may be in the hospital. LSW explained that this LSW is not sure of the expected discharge date. Pt's dght explained that the insurance will allow pt to go to a different NH if pt is out for 7 days. LSW informed dght that tis LSW is not sure fo the discharge however encouraged to call Wanda  to start the process to appeal the decision that pt must return to [facility] per insurance policy. (family wanting pt to go to a different SNF. See CM notes from yesterday)   Dght stated she will start that process today.

## 2021-04-29 NOTE — CARE COORDINATION
CM collaborated with Candace Fleischer. CONCETTA informed that information faxed to [address] [place] [address].  Family is waiting for insurance appeal. [address]

## 2021-04-29 NOTE — PROGRESS NOTES
Pt requesting to eat some cake, current diet order is pureed. Per patient and family this was placed by ed doctor due to previous admission patient was placed on a ventilator. Pt taking oral medications without problem. Dr. Maira Ortiz notified of patient request, VO for swallow evaluation.

## 2021-04-29 NOTE — CARE COORDINATION
ANIYAW received a call from Phoenix Macias with [facility] and she has received the the okay from the insurance that pt can go to a different SNF and she can start the [employer] with [facility]. ANIYAW went to [facility] web-site to change the [facility] from [facility]. It looks like that a PASS has not been completed. ANIYAW completed the PASS and placed a copy in the the NH packet.

## 2021-04-29 NOTE — PROGRESS NOTES
Hospitalist Progress Note      Name:  Chance Quinteros /Age/Sex: 1950  (65 y.o. female)   MRN & CSN:  [id number] & [id number] Admission Date/Time: 2021  4:24 PM   Location:  2916/5086[facility] PCP: Shayne Baker, [employer] [address] Day: 4    ASSESSMENT & PLAN:  Chance Quinteros is a 65 y.o.  female Who presented to the hospital with worsening SOB. Patient was similarly admitted on 2021 and discharged on 2021. During the hospitalization on 2021, the patient was intubated and subsequently extubated. She was again readmitted on April 10, 2021 through 2021. She was intubated twice on April 10, 2021 admission. #.  Acute on chronic hypoxic and hypercapnic respiratory failure---chronically on 4 L home oxygen. Has BiPAP at home. Oxygen requirement around baseline.  -Continue oxygen supplementation  -Continue Levaquin until     #. Paroxysmal A. fib---rate controlled. Eliquis has been on hold due to anemia requiring transfusion.  -Eliquis 2.5 twice daily as of   -Continue diltiazem    #. Chronic anemia--requiring blood transfusion. Likely as a result of underlying malignancy. S/p 2 unit PRBC on this admission. Hemoglobin 9.3.  -Daily CBC    #. COPD exacerbation---chronically on 4 L of oxygen at home. On IV Solu-Medrol 40 daily, albuterol as needed    #.  Stage IIIb small cell lung cancer---s/p 4 cycles cystoplasty and/etoposide with last treatment on 3/31/2021. CT chest on 4/10 with enlarging right lower lobe pulmonary nodule. Patient had been declining with frequent hospitalization and required intubation and last 2 hospitalizations. Hospice consultation and consideration.     #.  Hypertension---relatively hypotensive.  -Discontinue lisinopril as of      #.  Exudative esophagitis---on PPI and Carafate    #. Generalized tonic-clonic seizure---diagnosed on last hospitalization on 4/10/2021. Not on antiepileptic drugs.      #. Mariann [place] protein calorie malnutrition  -Protein supplements  -Regular diet    #. Tobacco abuse disorder    Diet DIET GENERAL; Dysphagia Pureed  Dietary Nutrition Supplements: Standard High Calorie Oral Supplement   DVT Prophylaxis [x] Eliquis, []  Heparin, [] SCDs, [] Ambulation   GI Prophylaxis [] PPI,  [] H2 Blocker,  [] Carafate,  [] Diet/Tube Feeds   Code Status Full Code   Disposition  SNF   MDM [] Low, [x] Moderate,[]  High     Chief complaint/Interval History/ROS     Chief Complaint: Shortness of breath    INTERVAL HISTORY:    4/29: Had 1 unit of blood transfusion on 4/28. Overall stable. Awaiting placement. 4/28: Patient is agreeable to skilled facility discharge. Hemoglobin remained stable around 7. Oxygen requirement around baseline. PT/OT ordered today. 4/27: Oxygen requirement around baseline this morning. Hemoglobin remains stable around 7.0.      ROS:  No chest pain. No abdominal pain. Nausea. No vomiting. Objective: Intake/Output Summary (Last 24 hours) at 4/29/2021 1411  Last data filed at 4/29/2021 0447  Gross per 24 hour   Intake 133.25 ml   Output 650 ml   Net -516.75 ml      Vitals:   Vitals:    04/29/21 1300   BP: (!) 109/95   Pulse: 75   Resp: 17   Temp:    SpO2:      Physical Exam:   GEN: Awake female, pleasant. Answers questions appropriately. EYES: No eye discharge. Ocular muscles intact. T. HENT: Mucous membranes dry. Normocephalic atraumatic. NECK: Supple  RESP: Symmetric breath sounds. No accessory muscle use. No wheezing. CV: RRR. No pitting lower extremity edema. GI: Abdomen soft. Nontender. Nondistended. : No Nicole in place  MSK: No bony fractures. No gross deformities. SKIN: warm, dry, no rashes,   NEURO: Cranial nerves appear grossly intact, normal speech, no lateralizing weakness.   PSYCH: Awake, alert, oriented     Medications:   Medications:    apixaban  2.5 mg Oral BID    pantoprazole  40 mg Oral QAM AC    docusate sodium  200 mg Oral Daily  sodium chloride flush  5-40 mL Intravenous 2 times per day    atorvastatin  10 mg Oral Daily    busPIRone  10 mg Oral BID    calcium elemental  1 tablet Oral Daily    dilTIAZem  240 mg Oral Daily    lactobacillus  1 capsule Oral Daily with breakfast    magnesium oxide  200 mg Oral Daily    sucralfate  1 g Oral 4x Daily    theophylline  200 mg Oral Daily    methylPREDNISolone  40 mg Intravenous Daily    metoprolol tartrate  50 mg Oral BID    ipratropium-albuterol  1 vial Inhalation 4x Daily      Infusions:    sodium chloride      sodium chloride      sodium chloride       PRN Meds: sodium chloride, , PRN  magnesium sulfate, 1,000 mg, PRN  sodium chloride, , PRN  sodium chloride flush, 5-40 mL, PRN  sodium chloride, 25 mL, PRN  promethazine, 12.5 mg, Q6H PRN    Or  ondansetron, 4 mg, Q6H PRN  polyethylene glycol, 17 g, Daily PRN  acetaminophen, 650 mg, Q6H PRN    Or  acetaminophen, 650 mg, Q6H PRN  albuterol sulfate HFA, 2 puff, Q6H PRN  diphenhydrAMINE, 50 mg, Q6H PRN  guaiFENesin, 200 mg, TID PRN  HYDROcodone-acetaminophen, 1 tablet, Q4H PRN  LORazepam, 1 mg, Q8H PRN  Magic Mouthwash, 5 mL, 4x Daily PRN  potassium chloride, 10 mEq, PRN      Electronically signed by Deborah Morales MD on 4/29/2021 at 2:11 PM

## 2021-04-29 NOTE — PROGRESS NOTES
Speech Language Pathology  Facility/Department: [address] [place] [address]   CLINICAL BEDSIDE SWALLOW EVALUATION    NAME: Mikel Mota  : 1950  MRN: [id number]    ADMISSION DATE: 2021  ADMITTING DIAGNOSIS: has Hyperlipidemia; COPD (chronic obstructive pulmonary disease) ([address] Leg pain; Hypertension; Shortness of breath; Tobacco abuse; Abdominal aortic aneurysm (AAA) without rupture ([address] Degeneration of lumbar or lumbosacral intervertebral disc; Osteopenia; Anxiety; Acute on chronic respiratory failure with hypoxia ([address] Lung mass; COPD exacerbation ([address] Acute exacerbation of chronic obstructive pulmonary disease (COPD) ([address] Small cell lung cancer ([address] Acute on chronic respiratory failure with hypoxia and hypercapnia ([address] Acute hypoxemic respiratory failure ([address] Pneumonia of both lungs due to infectious organism; Elevated troponin level; Elevated brain natriuretic peptide (BNP) level; Acute hyperkalemia; Sepsis ([address] Pneumonitis; Ventilator dependent ([address] Hypernatremia; and SOB (shortness of breath) on their problem list.    Impressions: Mikel Mota was seen for a bedside swallowing evaluation after being admitted to [facility] with SOB. Pt is alert and cooperative throughout assessment. She is familiar to ST from previous hospital admissions and had a recent MBSS completed 3/29/21 which indicated no aspiration with all PO trials given. Relevant medical hx includes esophagitis, small cell lung cancer and hypertension. Pt has been on a minced and moist diet level/thin liquids during past hospital admissions d/t esophageal dysphagia. She is requesting to be upgraded to regular diet at this time. Pt was positioned upright in bed and presented with a clear vocal quality and weak congested cough. Oral mechanism examination was [facility] with no focal orofacial weakness. PO trials of puree, regular solids and thin liquids by cup/straw sips were given.   Prolonged mastication with adequate oral clearance was observed with trials of regular solids. Suspect mild pharyngeal dysphagia characterized by delayed swallow initiation and adequate laryngeal elevation. Clear vocal quality and 0 overt s/s of aspiration were observed with all PO trials given. Recommend general diet/thin liquids with strict aspiration and reflux precautions. ST will continue to follow Rupert Reyna for diet tolerance monitoring x1-2. ONSET DATE: this admission   Date of Eval: 4/29/2021  Evaluating Therapist: Chyna Roper    Current Diet level:  Current Diet : Dysphagia Pureed (Dysphagia I)  Current Liquid Diet : Thin      Primary Complaint  Patient Complaint: weakness    Pain:  Pain Assessment  Pain Assessment: 0-10  Pain Level: 7  Valadez-Baker Pain Rating: No hurt  Patient's Stated Pain Goal: 2  Pain Type: Chronic pain  Pain Location: Back  Pain Orientation: Lower  Pain Radiating Towards: None  Pain Descriptors: Throbbing  Pain Frequency: Intermittent  Pain Onset: On-going  Clinical Progression: Gradually worsening  Functional Pain Assessment: Activities are not prevented  Non-Pharmaceutical Pain Intervention(s): Repositioned  Response to Pain Intervention: Asleep with RR greater than 10    Reason for Referral  Rupert Reyna was referred for a bedside swallow evaluation to assess the efficiency of her swallow function, identify signs and symptoms of aspiration and make recommendations regarding safe dietary consistencies, effective compensatory strategies, and safe eating environment. Impression  Dysphagia Diagnosis: Mild oral stage dysphagia;Mild pharyngeal stage dysphagia  Dysphagia Outcome Severity Scale: Level 5: Mild dysphagia- Distant supervision. May need one diet consistency restricted     Treatment Plan  Requires SLP Intervention: Yes  Duration/Frequency of Treatment: 1-2xs weekly  D/C Recommendations:  To be determined       Recommended Diet and Intervention  Diet Solids Recommendation: Dysphagia Minced and Moist (Dysphagia II); Regular  Liquid Consistency Recommendation: Thin  Recommended Form of Meds: Meds in puree     Therapeutic Interventions: Diet tolerance monitoring; Therapeutic PO trials with SLP    Compensatory Swallowing Strategies  Compensatory Swallowing Strategies: Eat/Feed slowly;Upright as possible for all oral intake    Treatment/Goals  Short-term Goals  Timeframe for Short-term Goals: LOS or until goals are met  Goal 1: Pt will tolerate regular diet/thin liquids without clinical evidence of aspiration 100%  Goal 2: Pt/caregivers will demonstrate comprehension of recommendations/POC    General  Chart Reviewed: Yes  Behavior/Cognition: Alert; Cooperative;Pleasant mood  Respiratory Status: O2 via nasual cannula  O2 Device: Nasal cannula  Communication Observation: Functional  Follows Directions: Complex  Dentition: Some missing teeth  Patient Positioning: Upright in bed  Baseline Vocal Quality: Normal  Volitional Cough: Weak  Prior Dysphagia History: esophageal dysphagia  Consistencies Administered: Reg solid; Dysphagia Pureed (Dysphagia I); Thin - cup; Thin - straw; Thin - teaspoon           Vision/Hearing  Vision  Vision: Within Functional Limits  Hearing  Hearing: Within functional limits    Oral Motor Deficits  Oral/Motor  Oral Motor: Within functional limits    Oral Phase Dysfunction  Oral Phase  Oral Phase: Exceptions  Oral Phase Dysfunction  Impaired Mastication: Reg Solid  Lingual/Palatal Residue: Reg solid     Indicators of Pharyngeal Phase Dysfunction   Pharyngeal Phase  Pharyngeal Phase: Exceptions  Indicators of Pharyngeal Phase Dysfunction  Delayed Swallow:  All    Prognosis  Prognosis  Prognosis for safe diet advancement: good  Barriers/Prognosis Comment: esophageal dysphagia  Individuals consulted  Consulted and agree with results and recommendations: Patient    Education  Patient Education: recommendations/POC  Patient Education Response: Verbalizes understanding  Safety Devices in place: Yes  Type of devices:  All fall risk precautions in place       Therapy Time  SLP Individual Minutes  Time In: 1530  Time Out: 1600  Minutes: Talat Aguirre [address], [address] [facility] [place], operated [facility], 4/29/2021

## 2021-04-30 NOTE — PROGRESS NOTES
Hospitalist Progress Note      Name:  Kirk Sauceda /Age/Sex: 1950  (65 y.o. female)   MRN & CSN:  [id number] & [id number] Admission Date/Time: 2021  4:24 PM   Location:  [address] [place] [address] PCP: Vaishnavi Verdin MD         Hospital Day: 5    Assessment and Plan:   Kirk Sauceda is a 65 y.o.  female Who presented to the hospital with worsening SOB.    1) Acute on chronic hypoxic and hypercapnic respiratory failure  -Chronically on 4 L home oxygen and BIPAP QHS. -Has had recurrent admissions with intubations due to respiratory failure  -Currently back to her baseline  -Continue steroid  -Hospice consult has been placed per oncology  -     2) Paroxysmal A. fib---rate controlled. -Eliquis 2.5 twice daily as of   -Continue diltiazem     3) Anemia of chronic disease  -No obvious bleeding  -Transfuse as needed  -Monitor hemoglobin    4) Stage IIIb small cell lung cancer  -s/p 4 cycles of chemo with last treatment on 3/31/2021.    -CT chest on 4/10 with enlarging right lower lobe pulmonary nodule.   -Hospice consult placed    Other chronic medical conditions, medication resumed unless contraindicated  Essential hypertension  Generalized tonic-clonic seizures  Tobacco use disorder  Exudative esophagitis  PCM, moderate     Plan: Hospice to evaluate and discuss with family at 5 AM tomorrow.  Depending patient and family decision; she can be discharged to SNF versus home with hospice  Patient has been approved to be discharged to SNF; await discussion with hospice tomorrow    Diet Dietary Nutrition Supplements: Standard High Calorie Oral Supplement  DIET GENERAL;  Dietary Nutrition Supplements: Wound Healing Oral Supplement   DVT Prophylaxis [] Lovenox, []  Heparin, [] SCDs, [] Ambulation   GI Prophylaxis [] PPI,  [] H2 Blocker,  [] Carafate,  [] Diet/Tube Feeds   Code Status Full Code   Disposition TBD   MDM      History of Present Illness:     Patient was seen and examined  Denied any chest pain or shortness of breath  No fever or chills  No acute events overnight    Ten point ROS reviewed negative, unless as noted above    Objective: Intake/Output Summary (Last 24 hours) at 4/30/2021 1049  Last data filed at 4/30/2021 0816  Gross per 24 hour   Intake 10 ml   Output --   Net 10 ml      Vitals:   Vitals:    04/30/21 0808   BP:    Pulse: 87   Resp:    Temp:    SpO2:      Physical Exam:   GEN Awake female, sitting upright in bed in no apparent distress. Appears given age. EYES Pupils are equally round. No scleral erythema, discharge, or conjunctivitis. HENT Mucous membranes are moist. Oral pharynx without exudates, no evidence of thrush. NECK Supple, no apparent thyromegaly or masses. RESP Clear to auscultation, no wheezes, rales or rhonchi. Symmetric chest movement while on 4L of O2.  CARDIO/VASC S1/S2 auscultated. Regular rate without appreciable murmurs, rubs, or gallops. No JVD or carotid bruits. Peripheral pulses equal bilaterally and palpable. No peripheral edema. GI Abdomen is soft without significant tenderness, masses, or guarding. Bowel sounds are normoactive. Rectal exam deferred.  No costovertebral angle tenderness. Nicole catheter is not present. HEME/LYMPH No palpable cervical lymphadenopathy and no hepatosplenomegaly. No petechiae or ecchymoses. MSK No gross joint deformities. SKIN Normal coloration, warm, dry. NEURO Cranial nerves appear grossly intact, normal speech, no lateralizing weakness. PSYCH Awake, alert, oriented x 4. Affect appropriate.     Medications:   Medications:    guaiFENesin  600 mg Oral BID    apixaban  2.5 mg Oral BID    pantoprazole  40 mg Oral QAM AC    docusate sodium  200 mg Oral Daily    sodium chloride flush  5-40 mL Intravenous 2 times per day    atorvastatin  10 mg Oral Daily    busPIRone  10 mg Oral BID    calcium elemental  1 tablet Oral Daily    dilTIAZem  240 mg Oral Daily    lactobacillus  1 capsule Oral Daily with breakfast    magnesium oxide  200 mg Oral Daily    sucralfate  1 g Oral 4x Daily    theophylline  200 mg Oral Daily    methylPREDNISolone  40 mg Intravenous Daily    metoprolol tartrate  50 mg Oral BID    ipratropium-albuterol  1 vial Inhalation 4x Daily      Infusions:    sodium chloride      sodium chloride      sodium chloride       PRN Meds: sodium chloride, 2 spray, Q2H PRN  sodium chloride, , PRN  magnesium sulfate, 1,000 mg, PRN  sodium chloride, , PRN  sodium chloride flush, 5-40 mL, PRN  sodium chloride, 25 mL, PRN  promethazine, 12.5 mg, Q6H PRN    Or  ondansetron, 4 mg, Q6H PRN  polyethylene glycol, 17 g, Daily PRN  acetaminophen, 650 mg, Q6H PRN    Or  acetaminophen, 650 mg, Q6H PRN  albuterol sulfate HFA, 2 puff, Q6H PRN  diphenhydrAMINE, 50 mg, Q6H PRN  HYDROcodone-acetaminophen, 1 tablet, Q4H PRN  LORazepam, 1 mg, Q8H PRN  Magic Mouthwash, 5 mL, 4x Daily PRN  potassium chloride, 10 mEq, PRN          Electronically signed by Pryia Campos MD on 4/30/2021 at 10:49 AM

## 2021-04-30 NOTE — PROGRESS NOTES
ONCOLOGY HEMATOLOGY CARE ([facility])  PROGRESS NOTE      Patient was seen and examined today. Not in any acute distress and no overnight events. We discussed about home hospice since she may consider to go home with hospice. I will get hospice evaluation today. PHYSICAL EXAM    Vitals: /71   Pulse 96   Temp 98.3 °F (36.8 °C) (Oral)   Resp 17   Ht 5' (1.524 m)   Wt 122 lb 9.2 oz (55.6 kg)   SpO2 100%   BMI 23.94 kg/m²   CONSTITUTIONAL: awake, alert, cooperative, no apparent distress   EYES: pupils equal, round and reactive to light, sclera clear and conjunctiva normal  ENT: Normocephalic, without obvious abnormality, atraumatic. Nasal canula noted. NECK: supple, symmetrical, no jugular venous distension and no carotid bruits   HEMATOLOGIC/LYMPHATIC: no cervical, supraclavicular or axillary lymphadenopathy   LUNGS: no increased work of breathing and clear to auscultation   CARDIOVASCULAR: regular rate and rhythm, normal S1 and S2, no murmur noted  ABDOMEN: normal bowel sounds x 4, soft, non-distended, non-tender, no masses palpated, no hepatosplenomgaly   MUSCULOSKELETAL: full range of motion noted, tone is normal  NEUROLOGIC: awake, alert, oriented to name, place and time. CN II - XII grossly intact. SKIN: Normal skin color, texture, turgor and no jaundice. appears intact   EXTREMITIES: no LE edema or cyanosis. LABORATORY RESULTS  CBC:   Recent Labs     04/27/21 2018 04/28/21  0339 04/29/21  0857   WBC  --  12.3* 12.4*   HGB 7.1* 7.2* 9.3*   PLT  --  190 207       ASSESSMENT/RECOMMENDATION    1. Small cell lung cancer. She had chemo, cisplatin and etoposide. 2. COPD. She has debility. We discussed about hospice evaluation since she may consider home hospice. She is tired of being admitted to the hospital.    3. Anemia. S/p transfusion. Hg was 9.3 yesterday. Will monitor CBC. 4. Afib on eliquis      We will continue to follow the patient. Thank you.

## 2021-04-30 NOTE — PLAN OF CARE
Nutrition Problem #1: Moderate malnutrition, In context of chronic illness  Intervention: Food and/or Nutrient Delivery: Continue Current Diet, Continue Oral Nutrition Supplement, Start Oral Nutrition Supplement  Nutritional Goals: pt will consume greater than 50-75% of meals and supplements

## 2021-04-30 NOTE — CARE COORDINATION
LSW spoke with Mami Morejon with [facility] and they have received approval from the insurance for pt to go to [address] [place] [address]. Hospice has a meeting scheduled with family in the morning at 9:00 AM.  Pt is ready to be discharged. LSW will have weekend CM follow up with pt and family after their meeting with Hospice in the morning on if pt is going home with Hospice or to [facility]. NH packet in soft chart and PASS was completed. If pt plans to go to [address] [place] [address]. CM will need for pt to have a rapid COVID test.  RN and doctor will need to complete their COCs. If pt is discharged after hours please complete the following. ... Call report to  [phone] Fax completed AVS with both [facility] on the AVS and any written Rx [phone]. Set up transportation (pt's choice) [facility] [phone] or [employer] [phone] and call family.

## 2021-05-01 NOTE — PROGRESS NOTES
ONCOLOGY HEMATOLOGY CARE ([facility])  PROGRESS NOTE      Patient was seen and examined today. Not in any acute distress and no overnight events. We discussed about home hospice since she may consider to go home with hospice. She looks comfortable. Awaiting for the hospice evaluation. PHYSICAL EXAM    Vitals: BP (!) 163/71   Pulse 96   Temp 99 °F (37.2 °C) (Oral)   Resp 13   Ht 5' (1.524 m)   Wt 121 lb 4.1 oz (55 kg)   SpO2 96%   BMI 23.68 kg/m²   CONSTITUTIONAL: awake, alert, cooperative, no apparent distress   EYES: pupils equal, round and reactive to light, sclera clear and conjunctiva normal  ENT: Normocephalic, without obvious abnormality, atraumatic. Nasal canula noted. NECK: supple, symmetrical, no jugular venous distension and no carotid bruits   HEMATOLOGIC/LYMPHATIC: no cervical, supraclavicular or axillary lymphadenopathy   LUNGS: Diminished breath sound bilaterally. No wheezing. CARDIOVASCULAR: regular rate and rhythm, normal S1 and S2, no murmur noted  ABDOMEN: normal bowel sounds x 4, soft, non-distended, non-tender, no masses palpated, no hepatosplenomgaly   MUSCULOSKELETAL: full range of motion noted, tone is normal  NEUROLOGIC: awake, alert, oriented to name, place and time. No focal neuro deficit. SKIN: Normal skin color, texture, turgor and no jaundice. appears intact   EXTREMITIES: no LE edema or cyanosis. LABORATORY RESULTS  CBC:   Recent Labs     04/29/21  0857   WBC 12.4*   HGB 9.3*          ASSESSMENT/RECOMMENDATION    1. Small cell lung cancer. She had chemo, cisplatin and etoposide. Last CAT scan showed enlarging dominant right lower lobe pulmonary nodule suggesting progression of primary malignancy. 2. COPD. She has debility. We discussed about hospice evaluation since she may consider home hospice. She is tired of being admitted to the hospital.    3. Anemia. S/p transfusion. Hg was 9.3 yesterday. Will monitor CBC.     4. Afib on eliquis    To continue with present supportive care. We will continue to follow the patient. Thank you.

## 2021-05-01 NOTE — PROGRESS NOTES
05/01/21 0355   NIV Type   NIV Started/Stopped On   Equipment Type v60   Mode Bilevel   Mask Type Full face mask   Mask Size Small   Bonnet size Small   Settings/Measurements   IPAP 10 cmH20   CPAP/EPAP 5 cmH2O   Rate Ordered 16   Resp 17   FiO2  35 %   I Time/ I Time % 1 s   Vt Exhaled 253 mL   Minute Volume 5.6 Liters   Mask Leak (lpm) 0 lpm   Comfort Level Good   Using Accessory Muscles No   SpO2 96   Patient Observation   Observations Pt tolerating well.   Pt stated she was SOB   Alarm Settings   Alarms On Y   Press Low Alarm 5 cmH2O   High Pressure Alarm 50 cmH2O   Delay Alarm 20 sec(s)   Apnea (secs) 20 secs   Resp Rate Low Alarm 8   High Respiratory Rate 40 br/min

## 2021-05-01 NOTE — PROGRESS NOTES
Physical Therapy    Physical Therapy Treatment Note  Name: Manuel Abraham MRN: [id number] :   1950   Date:  2021   Admission Date: 2021 Room:  [address] [place] [address]   Restrictions/Precautions:        general precautions, fall risk  Communication with other providers:  Per chart review pt appropriate for tx. Subjective:  Patient states: Only agreeable to scoot up in bed. Pain:   Location, Type, Intensity (0/10 to 10/10): Has pain; does not rate  Objective:    Observation:  Pt in low in bed upon arrival.    Treatment, including education/measures:  Transfers:  Rolling: maxA  Scooting :maxA  Pt refuses further tx despite education on positional changes. Safety  Patient left safely in the bed, with call light/phone in reach with alarm applied.       Assessment / Impression:       Patient's tolerance of treatment:  fair   Adverse Reaction: none  Significant change in status and impact:  none  Barriers to improvement:  Pain, strength, endurance    Plan for Next Session:    Will cont to work towards pt's goals per her tolerance    Time in:  1120  Time out:  1130  Timed treatment minutes:  10  Total treatment time:  10    Previously filed items:     Short term goals  Time Frame for Short term goals: 1 week  Short term goal 1: Pt to complete all bed mobility mod I  Short term goal 2: Pt to complete all STS transfers to/from bed, commode, and chair CGA  Short term goal 3: Pt to complete stand pivot with LRAD CGA  Short term goal 4: Pt to ambulate 15' with LRAD min A       Electronically signed by:    Jael Rodas PTA  2021, 11:55 AM

## 2021-05-01 NOTE — PLAN OF CARE
Problem: Skin Integrity:  Goal: Will show no infection signs and symptoms  Description: Will show no infection signs and symptoms  Outcome: Ongoing  Goal: Absence of new skin breakdown  Description: Absence of new skin breakdown  Outcome: Ongoing  Goal: Skin integrity will improve  Description: Skin integrity will improve  Outcome: Ongoing  Goal: Signs of wound healing will improve  Description: Signs of wound healing will improve  Outcome: Ongoing  Goal: Risk for impaired skin integrity will decrease  Description: Risk for impaired skin integrity will decrease  Outcome: Ongoing     Problem: Falls - Risk of:  Goal: Will remain free from falls  Description: Will remain free from falls  Outcome: Ongoing  Goal: Absence of physical injury  Description: Absence of physical injury  Outcome: Ongoing     Problem: Nutrition  Goal: Optimal nutrition therapy  Outcome: Ongoing     Problem: Pain:  Goal: Pain level will decrease  Description: Pain level will decrease  Outcome: Ongoing  Goal: Control of acute pain  Description: Control of acute pain  Outcome: Ongoing  Goal: Control of chronic pain  Description: Control of chronic pain  Outcome: Ongoing     Problem: Activity:  Goal: Fatigue will decrease  Description: Fatigue will decrease  Outcome: Ongoing  Goal: Ability to tolerate increased activity will improve  Description: Ability to tolerate increased activity will improve  Outcome: Ongoing  Goal: Ability to maintain optimal joint mobility will improve  Description: Ability to maintain optimal joint mobility will improve  Outcome: Ongoing     Problem:  Bowel/Gastric:  Goal: Ability to achieve a regular elimination pattern will improve  Description: Ability to achieve a regular elimination pattern will improve  Outcome: Ongoing     Problem: Cardiac:  Goal: Ability to maintain an adequate cardiac output will improve  Description: Ability to maintain an adequate cardiac output will improve  Outcome: Ongoing  Goal: Ability to maintain adequate ventilation will improve  Description: Ability to maintain adequate ventilation will improve  Outcome: Ongoing  Goal: Ability to achieve and maintain adequate cardiopulmonary perfusion will improve  Description: Ability to achieve and maintain adequate cardiopulmonary perfusion will improve  Outcome: Ongoing  Goal: Hemodynamic stability will improve  Description: Hemodynamic stability will improve  Outcome: Ongoing     Problem: Coping:  Goal: Ability to adjust to condition or change in health will improve  Description: Ability to adjust to condition or change in health will improve  Outcome: Ongoing  Goal: Communication of feelings regarding changes in body function or appearance will improve  Description: Communication of feelings regarding changes in body function or appearance will improve  Outcome: Ongoing  Goal: Level of anxiety will decrease  Description: Level of anxiety will decrease  Outcome: Ongoing  Goal: Ability to cope will improve  Description: Ability to cope will improve  Outcome: Ongoing  Goal: Ability to establish a method of communication will improve  Description: Ability to establish a method of communication will improve  Outcome: Ongoing     Problem: Health Behavior:  Goal: Identification of resources available to assist in meeting health care needs will improve  Description: Identification of resources available to assist in meeting health care needs will improve  Outcome: Ongoing     Problem: Nutritional:  Goal: Maintenance of adequate nutrition will improve  Description: Nutrition Problem #1: Moderate malnutrition, In context of chronic illness  Intervention: Food and/or Nutrient Delivery: Continue Current Diet, Continue Oral Nutrition Supplement, Start Oral Nutrition Supplement  Nutritional Goals: pt will consume greater than 50-75% of meals and supplements      Outcome: Ongoing  Goal: Consumption of the prescribed amount of daily calories will improve  Description: Consumption of the prescribed amount of daily calories will improve  Outcome: Ongoing     Problem: Physical Regulation:  Goal: Signs and symptoms of infection will decrease  Description: Signs and symptoms of infection will decrease  Outcome: Ongoing  Goal: Will show no signs and symptoms of excessive bleeding  Description: Will show no signs and symptoms of excessive bleeding  Outcome: Ongoing  Goal: Complications related to the disease process, condition or treatment will be avoided or minimized  Description: Complications related to the disease process, condition or treatment will be avoided or minimized  Outcome: Ongoing     Problem: Safety:  Goal: Ability to remain free from injury will improve  Description: Ability to remain free from injury will improve  Outcome: Ongoing     Problem: Sensory:  Goal: Pain level will decrease  Description: Pain level will decrease  Outcome: Ongoing  Goal: General experience of comfort will improve  Description: General experience of comfort will improve  Outcome: Ongoing     Problem: Tissue Perfusion:  Goal: Ability to maintain adequate tissue perfusion will improve  Description: Ability to maintain adequate tissue perfusion will improve  Outcome: Ongoing  Goal: Ability to maintain a stable neurologic state will improve  Description: Ability to maintain a stable neurologic state will improve  Outcome: Ongoing     Problem: Respiratory:  Goal: Ability to maintain adequate ventilation will improve  Description: Ability to maintain adequate ventilation will improve  Outcome: Ongoing  Goal: Complications related to the disease process, condition or treatment will be avoided or minimized  Description: Complications related to the disease process, condition or treatment will be avoided or minimized  Outcome: Ongoing  Goal: Ability to maintain a clear airway will improve  Description: Ability to maintain a clear airway will improve  Outcome: Ongoing

## 2021-05-01 NOTE — PROGRESS NOTES
Qi Cooper MD, [facility]                Internal Medicine Hospitalist             Daily Progress  Note   Subjective:     Chief Complaint   Patient presents with    Shortness of Breath     Ms. Jauregui Complains of nil, little drowsy just had ativan, family by her side. Resting quietly. Objective:    BP (!) 163/71   Pulse 96   Temp 99 °F (37.2 °C) (Oral)   Resp 13   Ht 5' (1.524 m)   Wt 121 lb 4.1 oz (55 kg)   SpO2 96%   BMI 23.68 kg/m²      Intake/Output Summary (Last 24 hours) at 5/1/2021 0935  Last data filed at 5/1/2021 0530  Gross per 24 hour   Intake 480 ml   Output 1000 ml   Net -520 ml      Physical Exam:  Heart:  IR, normal S1 and S2 in all 4 auscultatory areas. No rubs  Systolic Murmur 4/6 no gallops heard. Lungs: Mostly clear to auscultation, decreased breath sounds at bases. No wheezes appreciated no crackles heard. Moderate effort. Abdomen: Soft, non distended. Bowel sounds not appreciated. No obvious liver or spleen enlargement. Non tender, no rebound noted. Extremities: Non tender, no swelling noted, strength 5/5 both legs. CNS: Grossly intact.     Labs:  CBC with Differential:    Lab Results   Component Value Date    WBC 12.4 04/29/2021    RBC 3.24 04/29/2021    HGB 9.3 04/29/2021    HCT 30.5 04/29/2021     04/29/2021    MCV 94.1 04/29/2021    MCH 28.7 04/29/2021    MCHC 30.5 04/29/2021    RDW 14.4 04/29/2021    NRBC 3 04/26/2021    SEGSPCT 83.0 04/29/2021    BANDSPCT 3 04/29/2021    LYMPHOPCT 10.0 04/29/2021    PROMYELOPCT 1 04/29/2021    MONOPCT 2.0 04/29/2021    MYELOPCT 1 04/29/2021    BASOPCT 0.1 04/17/2021    MONOSABS 0.2 04/29/2021    LYMPHSABS 1.2 04/29/2021    EOSABS 0.1 04/28/2021    BASOSABS 0.0 04/17/2021    DIFFTYPE MANUAL DIFFERENTIAL 04/29/2021     CMP:    Lab Results   Component Value Date     04/27/2021    K 3.6 04/27/2021    CL 97 04/27/2021    CO2 42 04/27/2021    BUN 32 04/27/2021    CREATININE 0.6 04/27/2021    GFRAA >60 04/27/2021    AGRATIO 1.6 08/28/2020    LABGLOM >60 04/27/2021    GLUCOSE 73 04/27/2021    PROT 4.7 04/26/2021    PROT 6.5 03/06/2015    LABALBU 3.0 04/26/2021    CALCIUM 8.4 04/27/2021    BILITOT 0.1 04/26/2021    ALKPHOS 86 04/26/2021    AST 15 04/26/2021    ALT 20 04/26/2021     No results for input(s): TROPONINT in the last 72 hours.   Lab Results   Component Value Date    TSHHS 2.290 03/23/2021         sodium chloride      sodium chloride      sodium chloride        guaiFENesin  600 mg Oral BID    apixaban  2.5 mg Oral BID    pantoprazole  40 mg Oral QAM AC    docusate sodium  200 mg Oral Daily    sodium chloride flush  5-40 mL Intravenous 2 times per day    atorvastatin  10 mg Oral Daily    busPIRone  10 mg Oral BID    calcium elemental  1 tablet Oral Daily    dilTIAZem  240 mg Oral Daily    lactobacillus  1 capsule Oral Daily with breakfast    magnesium oxide  200 mg Oral Daily    sucralfate  1 g Oral 4x Daily    theophylline  200 mg Oral Daily    methylPREDNISolone  40 mg Intravenous Daily    metoprolol tartrate  50 mg Oral BID    ipratropium-albuterol  1 vial Inhalation 4x Daily         Assessment:       Patient Active Problem List    Diagnosis Date Noted    SOB (shortness of breath) 04/26/2021    Hypernatremia     Ventilator dependent (HCC)     Elevated troponin level 04/10/2021    Elevated brain natriuretic peptide (BNP) level 04/10/2021    Acute hyperkalemia 04/10/2021    Sepsis ([facility] 04/10/2021    Pneumonitis 04/10/2021    Pneumonia of both lungs due to infectious organism     Acute hypoxemic respiratory failure ([facility] [facility] 03/23/2021    Acute on chronic respiratory failure with hypoxia and hypercapnia (HCC) 02/28/2021    Small cell lung cancer ([facility] [address] [facility] 01/11/2021    Acute exacerbation of chronic obstructive pulmonary disease (COPD) ([facility] [address] [facility] 12/02/2020    COPD exacerbation ([facility] 12/01/2020    Lung mass 10/08/2020    Acute on chronic respiratory failure with hypoxia ([facility] [address] [facility] 09/18/2020    Osteopenia 06/09/2019  Anxiety 06/09/2019    Tobacco abuse 05/18/2019    Abdominal aortic aneurysm (AAA) without rupture ([facility] [address] 05/18/2019    Shortness of breath 10/18/2016    Hypertension     Degeneration of lumbar or lumbosacral intervertebral disc 07/08/2014    Hyperlipidemia     COPD (chronic obstructive pulmonary disease) (HCC)     Leg pain        Plan:     Problems being addressed this admission:   Acute on chronic hypoxic respiratory failure / COPD  PAF on doac  Anemia  Stage 3B SCLC  HTN    Seems to be tolerating the treatment plans OK, Is gong with Hospice on Monday when she is discharged back to home. On doac will continue. Anemia stable continue to monitor. Oncology wrote for her [facility] ' She had chemo, cisplatin and etoposide. Last CAT scan showed enlarging dominant right lower lobe pulmonary nodule suggesting progression of primary malignancy.'. B/p is 163/71 today. Consultants:  Oncology    General Orders:  Repeat basic labs again in am.  I have explained to the patient and discussed with him/her the treatment plan. Also family by her side and she will go home with hospice.    Kalyn Gayle MD, [address]

## 2021-05-02 NOTE — CARE COORDINATION
Chart reviewed. CM called [facility] as she was the point of contact in the excellent cm documentation. [facility] states that they are meeting with hospice tomorrow morning at 9 am to sign paperwork and the plan is home with hospice tomorrow. The hospital bed and bedside table are supposed to be delivered today and [facility] is at home waiting for it. The patient has the following DME: BSC, c-pap, oxygen, ramps, walker, shower bench, wheelchair, and breathing machine. Emotional support was provided and discharge is home tomorrow with [address] [place] [address]. CM called [facility] and updated them that the patient would not be coming.

## 2021-05-02 NOTE — PROGRESS NOTES
Hospitalist Progress Note      Name:  Leonidas Hale /Age/Sex: 1950  (65 y.o. female)   MRN & CSN:  [id number] & [id number] Admission Date/Time: 2021  4:24 PM   Location:  [address] [place] [address] PCP: Vitor Schilling, [address] Day: 7    Assessment and Plan:   Leonidas Hale is a 65 y.o.  female  who presents with <principal problem not specified>    > Acute on chronic hypoxic and hypercapnic respiratory failure  > COPD exacerbation  > Stage IIIb small cell lung cancer  -s/p 4 cycles of chemo with last treatment on 3/31/2021.    -CT chest on 4/10 with enlarging right lower lobe pulmonary nodule.   -Chronically on 4 L home oxygen and BIPAP QHS.    -Has had recurrent admissions with intubations due to respiratory failure  - steroids, Bronchodilators, Bipap  -Hospice consult has been placed per oncology  - Plan home with hospice on Monday     > Paroxysmal A. fib---rate controlled. -Eliquis 2.5 twice daily as of   -Continue diltiazem     > Anemia of chronic disease  -No obvious bleeding  -Transfuse as needed  -Monitor hemoglobin      >Other chronic medical conditions, medication resumed unless contraindicated  Essential hypertension  Generalized tonic-clonic seizures  Tobacco use disorder  Exudative esophagitis  PCM, moderate     Diet Dietary Nutrition Supplements: Standard High Calorie Oral Supplement  DIET GENERAL;  Dietary Nutrition Supplements: Wound Healing Oral Supplement   DVT Prophylaxis [] pt on eliquis   GI Prophylaxis [] PPI   Code Status Full Code   Disposition  Home with Hospice, anticipate tomorrow     History of Present Illness:     Pt S&E. Admit dyspnea, weakness, no chest pain, no abd pain, no n/V, no F/C.     10-14 point ROS reviewed negative, unless as noted above    Objective:        Intake/Output Summary (Last 24 hours) at 2021 1225  Last data filed at 2021 2154  Gross per 24 hour   Intake --   Output 1200 ml   Net -1200 ml      Vitals:   Vitals:    21 0912   BP: (!) 120/53   Pulse: 94   Resp: 18   Temp: 98.6 °F (37 °C)   SpO2: 95%     Physical Exam:      GEN Awake female, cooperative  RESP Decreased air sounds. Symmetric chest movement . CARDIO/VASC S1/S2 auscultated. Regular rate. GI Abdomen is soft without significant tenderness, Bowel sounds are normoactive. MSK No gross joint deformities. Spontaneous movement of all extremities  SKIN Normal coloration, warm, dry. NEURO normal speech,  PSYCH Awake, alert, oriented x 4. Affect appropriate.     Medications:   Medications:    guaiFENesin  600 mg Oral BID    apixaban  2.5 mg Oral BID    pantoprazole  40 mg Oral QAM AC    docusate sodium  200 mg Oral Daily    sodium chloride flush  5-40 mL Intravenous 2 times per day    atorvastatin  10 mg Oral Daily    busPIRone  10 mg Oral BID    calcium elemental  1 tablet Oral Daily    dilTIAZem  240 mg Oral Daily    lactobacillus  1 capsule Oral Daily with breakfast    magnesium oxide  200 mg Oral Daily    sucralfate  1 g Oral 4x Daily    theophylline  200 mg Oral Daily    methylPREDNISolone  40 mg Intravenous Daily    metoprolol tartrate  50 mg Oral BID    ipratropium-albuterol  1 vial Inhalation 4x Daily      Infusions:    sodium chloride      sodium chloride      sodium chloride       PRN Meds: LORazepam, 0.5 mg, Q8H PRN  sodium chloride, 2 spray, Q2H PRN  sodium chloride, , PRN  magnesium sulfate, 1,000 mg, PRN  sodium chloride, , PRN  sodium chloride flush, 5-40 mL, PRN  sodium chloride, 25 mL, PRN  promethazine, 12.5 mg, Q6H PRN    Or  ondansetron, 4 mg, Q6H PRN  polyethylene glycol, 17 g, Daily PRN  acetaminophen, 650 mg, Q6H PRN    Or  acetaminophen, 650 mg, Q6H PRN  albuterol sulfate HFA, 2 puff, Q6H PRN  diphenhydrAMINE, 50 mg, Q6H PRN  HYDROcodone-acetaminophen, 1 tablet, Q4H PRN  LORazepam, 1 mg, Q8H PRN  Magic Mouthwash, 5 mL, 4x Daily PRN  potassium chloride, 10 mEq, PRN          Electronically signed by Pam Mccullough MD on 5/2/2021 at 12:25 PM

## 2021-05-03 PROBLEM — Z51.5 HOSPICE CARE PATIENT: Status: ACTIVE | Noted: 2021-01-01

## 2021-05-03 PROBLEM — R77.8 ELEVATED TROPONIN LEVEL: Status: RESOLVED | Noted: 2021-01-01 | Resolved: 2021-01-01

## 2021-05-03 PROBLEM — J44.1 COPD EXACERBATION (HCC): Status: RESOLVED | Noted: 2020-01-01 | Resolved: 2021-01-01

## 2021-05-03 PROBLEM — R79.89 ELEVATED TROPONIN LEVEL: Status: RESOLVED | Noted: 2021-01-01 | Resolved: 2021-01-01

## 2021-05-03 PROBLEM — R79.89 ELEVATED BRAIN NATRIURETIC PEPTIDE (BNP) LEVEL: Status: RESOLVED | Noted: 2021-01-01 | Resolved: 2021-01-01

## 2021-05-03 NOTE — CARE COORDINATION
LSW was inform that the pt discharge has been cancelled. Pt has been placed on comfort meds and is a DNR-CC. Family is wanting pt to go GIP. Hospice will evaluate pt for possible GIP.

## 2021-05-03 NOTE — PROGRESS NOTES
Hospitalist Progress Note      Name:  Catalina Good /Age/Sex: 1950  (65 y.o. female)   MRN & CSN:  [id number] & [id number] Admission Date/Time: 2021  4:24 PM   Location:  [address] [place] [address] PCP: Marilin Valdez, [address] Day: 8    Assessment and Plan:   Catalina Good is a 65 y.o.  female  who presents with Acute on chronic respiratory failure with hypoxia ([address]    > Acute on chronic hypoxic and hypercapnic respiratory failure  > COPD exacerbation  > Stage IIIb small cell lung cancer  -s/p 4 cycles of chemo with last treatment on 3/31/2021.    -CT chest on 4/10 with enlarging right lower lobe pulmonary nodule.   -Chronically on 4 L home oxygen and BIPAP QHS.    -Has had recurrent admissions with intubations due to respiratory failure  - steroids, Bronchodilators, Bipap  -Hospice consult has been placed per oncology, initial plan was home with hospice  - /3 resp failure worse, pt refusing Bipap, check CXR, oxygen requirement up to 10 L/min, talked to family and pt regarding prognosis, agreed with changing code to DNR CC and requested inpt hospice, placed comfort care meds, notified hospice. Planned tomorrow      > Paroxysmal A. fib---rate controlled. -Eliquis 2.5 twice daily as of   -Continue diltiazem     > Anemia of chronic disease  -No obvious bleeding  -Transfuse as needed  -Monitor hemoglobin      >Other chronic medical conditions, medication resumed unless contraindicated  Essential hypertension  Generalized tonic-clonic seizures  Tobacco use disorder  Exudative esophagitis  PCM, moderate     Diet Dietary Nutrition Supplements: Standard High Calorie Oral Supplement  DIET GENERAL;  Dietary Nutrition Supplements: Wound Healing Oral Supplement   DVT Prophylaxis [] pt on eliquis   GI Prophylaxis [] PPI   Code Status DNR-CC   Disposition  inpt Hospice, anticipate tomorrow     History of Present Illness:     Pt S&E.      Admit dyspnea, weakness, no chest pain, no abd pain, no n/V, no F/C.     10-14 point ROS reviewed negative, unless as noted above    Objective:     No intake or output data in the 24 hours ending 05/03/21 1336   Vitals:   Vitals:    05/03/21 1141   BP: 128/71   Pulse: 114   Resp: 21   Temp: 98.7 °F (37.1 °C)   SpO2: 94%     Physical Exam:      GEN Awake female, cooperative  RESP Decreased air sounds. Symmetric chest movement . CARDIO/VASC S1/S2 auscultated. Regular rate. GI Abdomen is soft without significant tenderness, Bowel sounds are normoactive. MSK No gross joint deformities. Spontaneous movement of all extremities  SKIN Normal coloration, warm, dry. NEURO normal speech,  PSYCH Awake, alert, oriented x 4. Affect appropriate.     Medications:   Medications:    methylPREDNISolone  40 mg Intravenous Q8H    guaiFENesin  600 mg Oral BID    apixaban  2.5 mg Oral BID    pantoprazole  40 mg Oral QAM AC    docusate sodium  200 mg Oral Daily    sodium chloride flush  5-40 mL Intravenous 2 times per day    atorvastatin  10 mg Oral Daily    busPIRone  10 mg Oral BID    calcium elemental  1 tablet Oral Daily    dilTIAZem  240 mg Oral Daily    lactobacillus  1 capsule Oral Daily with breakfast    magnesium oxide  200 mg Oral Daily    sucralfate  1 g Oral 4x Daily    metoprolol tartrate  50 mg Oral BID    ipratropium-albuterol  1 vial Inhalation 4x Daily      Infusions:    sodium chloride      sodium chloride      sodium chloride       PRN Meds: LORazepam, 1 mg, Q4H PRN  LORazepam, 0.5 mg, Q8H PRN  sodium chloride, 2 spray, Q2H PRN  sodium chloride, , PRN  magnesium sulfate, 1,000 mg, PRN  sodium chloride, , PRN  sodium chloride flush, 5-40 mL, PRN  sodium chloride, 25 mL, PRN  promethazine, 12.5 mg, Q6H PRN    Or  ondansetron, 4 mg, Q6H PRN  polyethylene glycol, 17 g, Daily PRN  acetaminophen, 650 mg, Q6H PRN    Or  acetaminophen, 650 mg, Q6H PRN  albuterol sulfate HFA, 2 puff, Q6H PRN  diphenhydrAMINE, 50 mg, Q6H PRN  HYDROcodone-acetaminophen, 1 tablet, Q4H PRN  Magic Mouthwash, 5 mL, 4x Daily PRN  potassium chloride, 10 mEq, PRN      Electronically signed by Harjinder Ahuja MD on 5/3/2021 at 1:36 PM

## 2021-05-03 NOTE — PROGRESS NOTES
Pt became very anxious and tachy up into the 140s. Notified Dr. Joaquina Graves and orders were placed. Also asked respiratory for a prn breathing treatment for pt.  Rafaela White RN

## 2021-05-03 NOTE — CONSULTS
[address] [place] [address] Consultation Note    Date: 5/3/2021  Name: Melissa Pepper  MRN: [id number]  YOB: 1950   Patient's PCP: Verenice Armenta MD   Oncology: Dr Jewel Johnson  Referring Physician: Dr Jovana Kim. [facility]  Acute care admit date: 4/26/2021 and 4/10 to 4/21/2021 (6 admissions since 12/1/2020)    Informant: Chart reviewed, discussed with the hospice nurse liaison. I met with the patient, her friend and \"support person\" Coni Vargas at the bedside. The patient's daughter and Talia Light has left after speaking with the hospice nurse liaison. The patient was recently medicated with Morphine for acute dyspnea, and provides minimal information. 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 Additional diagnoses include hypertension, abdominal aortic aneurysm of 2.6 cm, hyperlipidemia. The patient had an admission early April 2021 and was on mechanical ventilation. She was discharged, and readmitted 5 days later on 4/26/21, with shortness of breath, acute superimposed on chronic respiratory failure, and was anemic at 5.5 gm/dl without active bleeding. The patient was on anticoagulation for paroxysmal atrial fibrillation, and she was transfused PRBC. The patient has had aggressive supportive care, nebulized bronchodilators, steroids, BiPap (which the patient has been removing). Hospice was recommended, and the hospice nurse liaison had met with the patient and daughter over the weekend, and the plan was for home hospice on 5/3/21, but the patient has clinically worsened with acute dyspnea, air hunger, hypercarbic and hypoxic respiratory failure.  Chest X-ray  Shows right basilar opacity consistent with her known lung cancer. Discussion was had by Dr Alejandra Werner. Javi with the patient and her POA daughter, Bran, regarding aggressive care with mechanical ventilation, and that was declined after the discussion. The hospice nurse liaison followed up and discussed possible General Inpatient Hospice, for management of respiratory distress, and they were agreeable. I was contacted, and returned to [facility] to evaluate the patient. She has been given Morphine 4 mg IV which has helped with her acute dyspnea and air hunger, however, the patient does not provide much information to me. Hospice philosophy was discussed by me and also the hospice nurse liaison earlier regarding care and comfort at the end of life. Questions were answered, and emotional support was provided. They are aware that [facility] is for the acute management of symptoms, and if the patient stabilizes, alternative arrangements for home Hospice or extended care facility will be necessary. The patient is DNR-comfort care status. Past Medical History:   Diagnosis Date    Arthritis     COPD (chronic obstructive pulmonary disease) ([facility] [address]     follow with Dr Peter Alejandro Full dentures     H/O cardiovascular stress test 11/18/2009    thallium, normal no ischemia EF70%     H/O cardiovascular stress test 10/11/2013    thallium,EF70%, normal, no ischemia    H/O Doppler ultrasound 10/13/2010    carotid, right normal, left normal    H/O Doppler ultrasound 10/07/2014    Carotid mild bilateral internal carotid artery disease less than 50% in severity. Normal vertebral artery flows with good velocities. Incidental finding of possible thyroid nodule in the left lobe.      History of 24 hour EKG monitoring 03/11/2011    NSR no ectopy    History of nuclear stress test 10/21/2016    lexiscan-normal,no ischemia,EF70%,enlarged right ventricle    Hx of chest x-ray 01/02/2015    WNL    Hx of Doppler echocardiogram 12/01/2020    EF 50-55% mild LV hypertrophy. Grade 2 diastolic dysfunction. Mild AS.  Hx of echocardiogram 10/11/2013    10/13 Abn lt ventricular diastolic function, mile MR, EF 57%,10/10 EF55%, mild mitral annular calcification    Hx of echocardiogram 05/02/2019    EF 84-90%, Grade I diastolic dysfunction, Mild aortic stenosis, Calcific thickening of posterior leaflet of mitral valve, Mild Pulm HTN    Hx of pelvic x-ray 01/02/2015    Severe RT his osterarothrosis    Hx of x-ray of hip 01/02/2015    Severe Rt hip osteosrthrosis    Hyperlipidemia     Hypertension     follows with dr Jovana Lugo Leg pain     Lung nodules     scheduled for bronch 1/5/2020    On home oxygen therapy     \"on 4 liters 24 hours per day\"    Small cell lung cancer ([address] 1/11/2021    Wears glasses     to read       Past Surgical History:   Procedure Laterality Date    BACK SURGERY      \"about 12 yrs ago - surgery my mid to low back \" done in [address] N/A 1/5/2021    BRONCHOSCOPY ENDOBRONCHIAL ULTRASOUND performed by Agustin Gonsales MD at [address]      \"last one done in my age 61's    CYST REMOVAL  1970's    left arm    EYE SURGERY  2010?    svetlana cataract ext     FOOT SURGERY Right 1970's    \"have screw in 2nd toe\"    HYSTERECTOMY      1999\"took everything \"    JOINT REPLACEMENT Right 2014??    hip    LAPAROSCOPIC APPENDECTOMY N/A 5/18/2019    APPENDECTOMY LAPAROSCOPIC performed by Roman James MD at [facility] 12/10/2020    MEDIASTINOSCOPY performed by Agustin Gonsales MD at [facility]  1970's   [address] N/A 3/3/2021    EGD BIOPSY AND BRUSHING performed by Yesenia Penn MD at [facility] History     Socioeconomic History    Marital status:       Spouse name: Not on file    Number of children: Not on file    Years of education: Not on file    Highest education level: Not on file   Occupational History    Not on file   Social Needs    Financial resource strain: Not on file    Food insecurity     Worry: Not on file     Inability: Not on file    Transportation needs     Medical: Not on file     Non-medical: Not on file   Tobacco Use    Smoking status: Former Smoker     Packs/day: 1.50     Years: 47.00     Pack years: 70.50     Types: Cigarettes     Start date: 1973     Quit date: 2020     Years since quittin.4    Smokeless tobacco: Never Used   Substance and Sexual Activity    Alcohol use:  Yes     Alcohol/week: 0.0 standard drinks     Comment: rare\"twice per year\"    Drug use: No    Sexual activity: Not on file     Comment:    Lifestyle    Physical activity     Days per week: Not on file     Minutes per session: Not on file    Stress: Not on file   Relationships    Social connections     Talks on phone: Not on file     Gets together: Not on file     Attends Sikh service: Not on file     Active member of club or organization: Not on file     Attends meetings of clubs or organizations: Not on file     Relationship status: Not on file    Intimate partner violence     Fear of current or ex partner: Not on file     Emotionally abused: Not on file     Physically abused: Not on file     Forced sexual activity: Not on file   Other Topics Concern    Not on file   Social History Narrative    Not on file       Family History   Problem Relation Age of Onset    Stroke Mother     Dementia Mother     Heart Attack Mother 61    Coronary Art Dis Mother     Hypertension Mother     High Cholesterol Mother    Deepika Ndiaye Father     Stroke Father     Heart Attack Father 61    Diabetes Sister     Heart Attack Sister     Hypertension Sister    Deepika Zelda Sister     COPD Sister     Stroke Sister     Hypertension Sister     Hypertension Sister     Irritable Bowel Syndrome Sister     Pacemaker Sister        Allergies   Allergen Reactions    Formaldehyde     Gabapentin Other (See Comments) right basilar opacity again correlating with patient's known malignancy. Otherwise, no acute cardiopulmonary process     PET scan on 2020 showed  1. Dominant 1.6 cm nodules in the medial right upper lobe and central right   lower lobe are hypermetabolic and suspicious for malignancy, possibly   synchronous lung cancers. 2. A 3 x 2 cm precarinal merrick mass is intensely hypermetabolic and   consistent with metastatic disease. 3. A smaller 1 cm nodule in the lateral right lower lobe has not   significantly changed since  and demonstrates only minimal uptake,   consistent with benign etiology. 4. Patchy ground-glass opacity throughout the left lung demonstrates mild   uptake and is favored to be infectious or inflammatory. 5. No distant metastatic disease. Final Cytologic Diagnosis 21:   A. Mediastinal mass, fine needle aspirate with cell block:   -     SMALL CELL CARCINOMA. B. Bronchoalveolar lavage with cell block:   -     Few atypical cells seen. Transthoracic Echocardiogram 21   Technically difficult study; patient intubated. Left ventricular systolic function is normal.   Ejection fraction is visually estimated at 55%. Mild left ventricular hypertrophy. Sclerotic aortic valve with mild aortic stenosis; mean PmmHg. Mild mitral regurgitation. Moderate tricuspid regurgitation; RVSP: 42 mmHg. No evidence of any pericardial effusion. Dilated IVC and hepatic veins. CT Chest 4/10/21  Enlarging dominant right lower lobe pulmonary nodule measuring up to 24 mm in diameter previously measuring up to 16 mm in diameter suggesting progression of primary malignancy. New ground-glass opacity peripheral to the lesion in the right lower lobe that may represent post treatment changes. This can be seen with radiation pneumonitis. Recommend clinical correlation.    Stable spiculated right apical and right lower lobe smaller pulmonary nodules suggesting stable metastatic disease. No pulmonary embolism.      ABG:  Recent Labs     05/03/21  1345   PH 7.37   PO2ART 63*   [id number] 83.0*   O2SAT 91.4*       Lab Results   Component Value Date    ALKPHOS 86 04/26/2021    ALT 20 04/26/2021    AST 15 04/26/2021    PROT 4.7 04/26/2021    PROT 6.5 03/06/2015    BILITOT 0.1 04/26/2021    BILIDIR 0.1 03/06/2015    IBILI 0.3 03/06/2015    LABALBU 3.0 04/26/2021     CBC with Differential:    Lab Results   Component Value Date    WBC 12.4 04/29/2021    RBC 3.24 04/29/2021    HGB 9.3 04/29/2021    HCT 30.5 04/29/2021     04/29/2021    MCV 94.1 04/29/2021    MCH 28.7 04/29/2021    MCHC 30.5 04/29/2021    RDW 14.4 04/29/2021    NRBC 3 04/26/2021    SEGSPCT 83.0 04/29/2021    BANDSPCT 3 04/29/2021    LYMPHOPCT 10.0 04/29/2021    PROMYELOPCT 1 04/29/2021    MONOPCT 2.0 04/29/2021    MYELOPCT 1 04/29/2021    BASOPCT 0.1 04/17/2021    MONOSABS 0.2 04/29/2021    LYMPHSABS 1.2 04/29/2021    EOSABS 0.1 04/28/2021    BASOSABS 0.0 04/17/2021    DIFFTYPE MANUAL DIFFERENTIAL 04/29/2021     BMP:    Lab Results   Component Value Date     04/27/2021    K 3.6 04/27/2021    CL 97 04/27/2021    CO2 42 04/27/2021    BUN 32 04/27/2021    LABALBU 3.0 04/26/2021    CREATININE 0.6 04/27/2021    CALCIUM 8.4 04/27/2021    GFRAA >60 04/27/2021    LABGLOM >60 04/27/2021    GLUCOSE 73 04/27/2021       Physical Exam:   BP (!) 116/59   Pulse 95   Temp 98.1 °F (36.7 °C) (Oral)   Resp 23   Ht 5' (1.524 m)   Wt 121 lb 4.1 oz (55 kg)   SpO2 (!) 89%   BMI 23.68 kg/m²   General: drowsy but arousable, will briefly awaken, and say a few words in a very soft voice, and fall back to sleep, chronically ill appearing, was in significant respiratory distress earlier with oximetry in the low 80's  Skin: pale  HEENT: Mucous membranes are dry, sclerae are clear, mild conjunctival pallor, + alopecia  Neck: no thyromegaly, carotid upstrokes are diminished without bruit    Heart: distant tones, tachycardic RRR, S1S2, no murmurs  Lungs:  Distant equal breath sounds bilaterally, diminished at the bases, with R>L basilar rales, scattered rhonchi, end expiratory wheeze,   Abdomen: soft, bowel sounds quietly present, no apparent tenderness, nondistended  /rectal: deferred  Extremities:  Feet are cool, no mottling, no pedal edema  Neurologic: lethargic, hypophonic, generally weak,       Assessment/Plan:  1. Small cell lung cancer of the right lung diagnosed January 2021, with acute superimposed on chronic hypercarbic and hypoxic respiratory failure. The patient has had declining course with multiple admissions, recent mechanical ventilation April 2021. The patient is admitted to [address] [place] [address] appropriate for the acute management of pain, shortness of breath, congestion, respiratory failure. Comfort medications are addressed. PPS 20% and prognosis is quite guarded. 2. Advanced COPD  3. Severe anemia of 5.5 gm/dl on admission, transfused PRBC. 4. Paroxysmal atrial fibrillation. Given her declining status and hospice plan of care, will stop anticoagulation, continue rate control meds as able. 5. Possible seizure April 2021 with no metastases reported on MRI brain and EEG with mild diffuse slowing. Seen by Neurology and Levetiracetam stopped. 6. History of hypertension, esophagitis,  hyperlipidemia, AAA  7.  DNR-comfort care    Patient Active Problem List   Diagnosis Code    Hyperlipidemia E78.5    COPD (chronic obstructive pulmonary disease) ([facility] J44.9    Leg pain M79.606    Hypertension I10    Shortness of breath R06.02    Tobacco abuse Z72.0    Abdominal aortic aneurysm (AAA) without rupture ([facility]) I71.4    Degeneration of lumbar or lumbosacral intervertebral disc M51.37    Osteopenia M85.80    Anxiety F41.9    Acute on chronic respiratory failure with hypoxia ([facility]) J96.21    Lung mass R91.8    COPD exacerbation ([facility]) J44.1    Acute exacerbation of chronic obstructive pulmonary disease (COPD) ([facility] [address] J44.1    Small cell lung cancer ([facility] [address] C34.90    Acute on chronic respiratory failure with hypoxia and hypercapnia (HCC) J96.21, J96.22    Acute hypoxemic respiratory failure (HCC) J96.01    Pneumonia of both lungs due to infectious organism J18.9    Elevated troponin level R77.8    Elevated brain natriuretic peptide (BNP) level R79.89    Acute hyperkalemia E87.5    Sepsis (HCC) A41.9    Pneumonitis J18.9    Ventilator dependent (HCC) Z99.11    Hypernatremia E87.0    SOB (shortness of breath) C00.70       Certification of Terminal Illness: I certify that this patient is eligible for General Inpatient Hospice services for a terminal diagnosis of small cell lung cancer with a life expectancy predicted to be less than 6 months, if the illness follows its expected course.     Stephanie Knox MD

## 2021-05-03 NOTE — PROGRESS NOTES
Physical Therapy  Pt is going home with hospice, offered PT, pt clarisa no and family stated no. Will discuss with [facility] PT and D/C services at this time. Radha Kiser.  Laurita Arrieta PTA

## 2021-05-03 NOTE — CARE COORDINATION
Pt is on discharge to go home with family and [facility]. Hospice will be setting up equipment and transport home. LSW spoke with pt charito Gan and she is aware of the discharge today.

## 2021-05-04 PROBLEM — J98.4 PNEUMONITIS: Status: RESOLVED | Noted: 2021-01-01 | Resolved: 2021-01-01

## 2021-05-04 PROBLEM — A41.9 SEPSIS (HCC): Status: RESOLVED | Noted: 2021-01-01 | Resolved: 2021-01-01

## 2021-05-04 PROBLEM — J18.9 PNEUMONITIS: Status: RESOLVED | Noted: 2021-01-01 | Resolved: 2021-01-01

## 2021-05-04 NOTE — PROGRESS NOTES
[address] [place] [address]  General Inpatient Hospice Progress Note    Date: 5/4/2021  Name: Miles Luo  MRN: [id number]  YOB: 1950   Patient's PCP: Amina Alas MD   Oncology: Dr Anuel Ayersman  Hospitalist: Dr Shira Cunningham. [facility]  Acute care admit date: 4/26 to 5/3/2021 and 4/10 to 4/21/2021 (6 admissions since 12/1/2020)  Admit Date: 5/4/2021 to General Inpatient Hospice    Subjective: The patient is drowsy, briefly awakens to voice, no conversation. Breathing is mildly labored, no cough noted. There is no furrowed brow. No family are here. Objective:   Pain and acute dyspnea is managed with Morphine IV prn with 4 doses in the past 24 hours, Glycopyrrolate IV is available for secretions, and Buspar and Lorazepam are available for anxiety. Data reviewed 5/4/2021:  MRI brain 4/13/21:  No intracranial metastatic disease.  Stable mild chronic microvascular disease within the periventricular white matter.  Mild atrophy.      Chest X-ray  5/3/21:  Unchanged right basilar opacity again correlating with patient's known malignancy. Otherwise, no acute cardiopulmonary process      PET scan on October 22, 2020 showed  1. Dominant 1.6 cm nodules in the medial right upper lobe and central right   lower lobe are hypermetabolic and suspicious for malignancy, possibly   synchronous lung cancers. 2. A 3 x 2 cm precarinal merrick mass is intensely hypermetabolic and   consistent with metastatic disease. 3. A smaller 1 cm nodule in the lateral right lower lobe has not   significantly changed since 2009 and demonstrates only minimal uptake,   consistent with benign etiology. 4. Patchy ground-glass opacity throughout the left lung demonstrates mild   uptake and is favored to be infectious or inflammatory. 5. No distant metastatic disease.      Final Cytologic Diagnosis 1/5/21:   A. Mediastinal mass, fine needle aspirate with cell block:   -     SMALL CELL CARCINOMA.      B.  Bronchoalveolar lavage with cell block:   -     Few atypical cells seen.      Transthoracic Echocardiogram 21   Technically difficult study; patient intubated.   Left ventricular systolic function is normal.   Ejection fraction is visually estimated at 55%.   Mild left ventricular hypertrophy.   Sclerotic aortic valve with mild aortic stenosis; mean PmmHg.   Mild mitral regurgitation.   Moderate tricuspid regurgitation; RVSP: 42 mmHg.   No evidence of any pericardial effusion.   Dilated IVC and hepatic veins.     CT Chest 4/10/21  Enlarging dominant right lower lobe pulmonary nodule measuring up to 24 mm in diameter previously measuring up to 16 mm in diameter suggesting progression of primary malignancy.   New ground-glass opacity peripheral to the lesion in the right lower lobe that may represent post treatment changes.  This can be seen with radiation pneumonitis. Recommend clinical correlation.   Stable spiculated right apical and right lower lobe smaller pulmonary nodules suggesting stable metastatic disease.   No pulmonary embolism.      ABG:      Recent Labs     21  1345   PH 7.37   PO2ART 63*   KTA2TSC 83.0*   O2SAT 91.4*               Lab Results   Component Value Date     ALKPHOS 86 2021     ALT 20 2021     AST 15 2021     PROT 4.7 2021     PROT 6.5 2015     BILITOT 0.1 2021     BILIDIR 0.1 2015     IBILI 0.3 2015     LABALBU 3.0 2021      CBC with Differential:          Lab Results   Component Value Date     WBC 12.4 2021     RBC 3.24 2021     HGB 9.3 2021     HCT 30.5 2021      2021     MCV 94.1 2021     MCH 28.7 2021     MCHC 30.5 2021     RDW 14.4 2021     NRBC 3 2021     SEGSPCT 83.0 2021     BANDSPCT 3 2021     LYMPHOPCT 10.0 2021     PROMYELOPCT 1 2021     MONOPCT 2.0 2021     MYELOPCT 1 2021     BASOPCT 0.1 2021     MONOSABS 0.2 2021     LYMPHSABS 1.2 04/29/2021     EOSABS 0.1 04/28/2021     BASOSABS 0.0 04/17/2021     DIFFTYPE MANUAL DIFFERENTIAL 04/29/2021      BMP:          Lab Results   Component Value Date      04/27/2021     K 3.6 04/27/2021     CL 97 04/27/2021     CO2 42 04/27/2021     BUN 32 04/27/2021     LABALBU 3.0 04/26/2021     CREATININE 0.6 04/27/2021     CALCIUM 8.4 04/27/2021     GFRAA >60 04/27/2021     LABGLOM >60 04/27/2021     GLUCOSE 73 04/27/2021         Physical Exam:   There were no vitals taken for this visit. General: remains drowsy, will briefly awaken but no speech this morning, chronically ill appearing  Skin: pale  HEENT: Mucous membranes are dry, sclerae are clear, mild conjunctival pallor, + alopecia  Heart: distant tones, tachycardic RRR, S1S2, no murmurs  Lungs:  Distant equal breath sounds bilaterally, diminished at the bases, with R>L basilar rales, scattered rhonchi, end expiratory wheeze,   Abdomen: soft, bowel sounds quietly present, no apparent tenderness, nondistended  /rectal: deferred  Extremities:  Feet are warm this morning, no mottling, no pedal edema  Neurologic: lethargic, generally weak,         Assessment/Plan:  1. Small cell lung cancer of the right lung diagnosed January 2021, with acute superimposed on chronic hypercarbic and hypoxic respiratory failure. The patient has had declining course with multiple admissions, recent mechanical ventilation April 2021. The patient is continued on [address] [place] [address] for the acute management of pain, shortness of breath, congestion, respiratory failure. Comfort medications are in place. PPS 20% and prognosis is quite guarded. 2. Advanced COPD  3. Severe anemia of 5.5 gm/dl on admission, transfused PRBC. 4. Paroxysmal atrial fibrillation. Given her declining status and hospice plan of care, will stop anticoagulation, continue rate control meds as able.   5. Possible seizure April 2021 with no metastases reported on MRI brain and EEG with mild diffuse slowing. Seen by Neurology and Levetiracetam stopped. 6. History of hypertension, esophagitis,  hyperlipidemia, AAA  7. DNR-comfort care        Patient Active Problem List   Diagnosis Code    Hyperlipidemia E78.5    [facility] J44.9    Leg pain M79.606    Hypertension I10    Shortness of breath R06.02    Tobacco abuse Z72.0    Abdominal aortic aneurysm (AAA) without rupture (HCC) I71.4    Osteopenia M85.80    Anxiety F41.9    Acute on chronic respiratory failure with hypoxia (HCC) J96.21    Lung mass R91.8    Acute exacerbation of chronic obstructive pulmonary disease (COPD) (HCC) J44.1    [facility] cell lung cancer ([facility] C34.90    Acute on chronic respiratory failure with hypoxia and hypercapnia (HCC) J96.21, J96.22    Acute hypoxemic respiratory failure (HCC) J96.01    Acute hyperkalemia E87.5    Hypernatremia E87.0    SOB (shortness of breath) R06.02    Hospice care patient Z51.5       CEDRIC Olivia MD  5/4/2021

## 2021-05-04 NOTE — H&P
50-55% mild LV hypertrophy. Grade 2 diastolic dysfunction. Mild AS.  Hx of echocardiogram 10/11/2013    10/13 Abn lt ventricular diastolic function, mile MR, EF 57%,10/10 EF55%, mild mitral annular calcification    Hx of echocardiogram 05/02/2019    EF 83-47%, Grade I diastolic dysfunction, Mild aortic stenosis, Calcific thickening of posterior leaflet of mitral valve, Mild Pulm HTN    Hx of pelvic x-ray 01/02/2015    Severe RT his osterarothrosis    Hx of x-ray of hip 01/02/2015    Severe Rt hip osteosrthrosis    Hyperlipidemia     Hypertension     follows with dr Evette Arnold Leg pain     Lung nodules     scheduled for bronch 1/5/2020    On home oxygen therapy     \"on 4 liters 24 hours per day\"    Small cell lung cancer ([facility] [address] 1/11/2021    Wears glasses     to read       Past Surgical History:   Procedure Laterality Date    BACK SURGERY      \"about 12 yrs ago - surgery my mid to low back \" done in [address] N/A 1/5/2021    BRONCHOSCOPY ENDOBRONCHIAL ULTRASOUND [facility]      \"last one done in my age 61's    CYST REMOVAL  1970's    left arm    EYE SURGERY  2010?    svetlana cataract ext     FOOT SURGERY Right 1970's    \"have screw in 2nd toe\"    HYSTERECTOMY      1999\"took everything \"    JOINT REPLACEMENT Right 2014??    hip    LAPAROSCOPIC APPENDECTOMY N/A 5/18/2019    APPENDECTOMY LAPAROSCOPIC performed by Nata Strickland MD at [facility] 12/10/2020    MEDIASTINOSCOPY performed by Clarice Salgado MD at [address] [place] [address]  1970's   [address] [place] [address] N/A 3/3/2021    EGD BIOPSY AND BRUSHING performed by Yvan Wang MD at [address] [facility] History     Socioeconomic History    Marital status:       Spouse name: Not on file    Number of children: Not on file    Years of education: Not on file    Highest education level: Not on file   Occupational History    Not on file Social Needs    Financial resource strain: Not on file    Food insecurity     Worry: Not on file     Inability: Not on file    Transportation needs     Medical: Not on file     Non-medical: Not on file   Tobacco Use    Smoking status: Former Smoker     Packs/day: 1.50     Years: 47.00     Pack years: 70.50     Types: Cigarettes     Start date: 1973     Quit date: 2020     Years since quittin.4    Smokeless tobacco: Never Used   Substance and Sexual Activity    Alcohol use:  Yes     Alcohol/week: 0.0 standard drinks     Comment: rare\"twice per year\"    Drug use: No    Sexual activity: Not on file     Comment:    Lifestyle    Physical activity     Days per week: Not on file     Minutes per session: Not on file    Stress: Not on file   Relationships    Social connections     Talks on phone: Not on file     Gets together: Not on file     Attends Voodoo service: Not on file     Active member of club or organization: Not on file     Attends meetings of clubs or organizations: Not on file     Relationship status: Not on file    Intimate partner violence     Fear of current or ex partner: Not on file     Emotionally abused: Not on file     Physically abused: Not on file     Forced sexual activity: Not on file   Other Topics Concern    Not on file   Social History Narrative    Not on file       Family History   Problem Relation Age of Onset    Stroke Mother     Dementia Mother     Heart Attack Mother 61    Coronary Art Dis Mother     Hypertension Mother     High Cholesterol Mother    Lonza Bora Father     Stroke Father     Heart Attack Father 61    Diabetes Sister     Heart Attack Sister     Hypertension Sister    Lonza Bora Sister     COPD Sister     Stroke Sister     Hypertension Sister     Hypertension Sister     Irritable Bowel Syndrome Sister     Pacemaker Sister        Allergies   Allergen Reactions    Formaldehyde     Gabapentin Other (See Comments)    Norflex Tablets [Orphenadrine]     Cranberry Rash       Medications reviewed  Prior to Admission medications    Medication Sig Start Date End Date Taking? Authorizing Provider   apixaban (ELIQUIS) 5 MG TABS tablet Take 0.5 tablets by mouth 2 times daily 5/3/21   Bridget Scanlon MD   predniSONE (DELTASONE) 10 MG tablet Take 30mg daily x 3 d, then 20mg daily x 3d, then 10mg daily x 3. 5/3/21   Bridget Scanlon MD   docusate sodium (COLACE, DULCOLAX) 100 MG CAPS Take 100 mg by mouth daily 5/3/21   Bridget Scanlon MD   atorvastatin (LIPITOR) 10 MG tablet Take 10 mg by mouth daily    Historical Provider, MD   dilTIAZem (CARTIA XT) 240 MG extended release capsule Take 240 mg by mouth daily    Historical Provider, MD   omeprazole (PRILOSEC) 20 MG delayed release capsule Take 20 mg by mouth daily    Historical Provider, MD   nystatin (MYCOSTATIN) 269836 UNIT/GM cream Apply topically 2 times daily Apply to buttocks/groin topically every day and night shift for redness    Historical Provider, MD   metoprolol tartrate (LOPRESSOR) 50 MG tablet Take 1 tablet by mouth 2 times daily 4/21/21   Rose Ramirez MD   LORazepam (ATIVAN) 1 MG tablet Take 1 tablet by mouth every 8 hours as needed for Anxiety for up to 30 days. Patient taking differently: Take 0.5 mg by mouth every 6 hours as needed for Anxiety. 4/21/21 5/21/21  Rose Ramirez MD   nicotine (NICODERM CQ) 21 MG/24HR Place 1 patch onto the skin daily 4/22/21   Rose Ramirez MD   guaiFENesin [facility]ST[facility]) 100 MG/5ML liquid Take 10 mLs by mouth 3 times daily as needed for Cough  Patient taking differently: Take 200 mg by mouth every 8 hours as needed for Cough  4/21/21 5/21/21  Rose Ramirez MD   diphenhydrAMINE (BENADRYL) 25 MG tablet Take 50 mg by mouth every 6 hours as needed for Itching    Historical Provider, MD   HYDROcodone-acetaminophen (NORCO)  MG per tablet Take 1 tablet by mouth every 4 hours as needed for Pain for up to 30 days.  Indications: 180 4/5/21 5/5/21  Meghan Burgos MD   magnesium oxide (MAG-OX) 400 MG tablet Take 0.5 tablets by mouth daily 4/3/21   Penne Carrel, MD   busPIRone (BUSPAR) 10 MG tablet Take 10 mg by mouth 2 times daily    Historical Provider, MD   ipratropium-albuterol (DUONEB) 0.5-2.5 (3) MG/3ML SOLN nebulizer solution Inhale 3 mLs into the lungs 4 times daily 3/16/21   Dominic Cedillo DO   sucralfate (CARAFATE) 1 GM tablet Take 1 tablet by mouth 4 times daily 3/13/21 4/26/21  Debby Yuen MD   Nutritional Supplement LIQD 2 cans by mouth daily. Boost strawberry if available. 2/19/21   HELIO Christianson CNP   theophylline (UNIPHYL) 400 MG extended release tablet Take 0.5 tablets by mouth daily 2/2/21   Fercho Ohara MD   Magic Mouthwash (MIRACLE MOUTHWASH) Swish and swallow 5 mLs 4 times daily as needed for Irritation 1:1:1 diphenhydramine, nystatin, lidocaine 1/27/21   HELIO Christianson CNP   ondansetron (ZOFRAN) 8 MG tablet Take 1 tablet by mouth every 8 hours as needed for Nausea or Vomiting 1/19/21   Jose Alfredo Palomares MD   albuterol sulfate HFA (PROAIR HFA) 108 (90 Base) MCG/ACT inhaler Inhale 2 puffs into the lungs every 6 hours as needed for Wheezing 6/9/20   Meghan Burgos MD   Cholecalciferol (VITAMIN D3) 25 MCG (1000 UT) TABS Take by mouth daily     Historical Provider, MD   calcium carbonate 600 MG TABS tablet Take 1 tablet by mouth daily    Historical Provider, MD       ROS: As noted in [address], all other systems are unobtainable due to the patient's clinical condition    Weight:    Wt Readings from Last 3 Encounters:   05/01/21 121 lb 4.1 oz (55 kg)   04/21/21 130 lb 11.2 oz (59.3 kg)   04/09/21 116 lb (52.6 kg)       Data reviewed 5/3/2021:  MRI brain 4/13/21:  No intracranial metastatic disease. Stable mild chronic microvascular disease within the periventricular white matter. Mild atrophy. Chest X-ray  5/3/21:  Unchanged right basilar opacity again correlating with patient's known malignancy. Otherwise, no acute cardiopulmonary process     PET scan on 2020 showed  1. Dominant 1.6 cm nodules in the medial right upper lobe and central right   lower lobe are hypermetabolic and suspicious for malignancy, possibly   synchronous lung cancers. 2. A 3 x 2 cm precarinal merrick mass is intensely hypermetabolic and   consistent with metastatic disease. 3. A smaller 1 cm nodule in the lateral right lower lobe has not   significantly changed since  and demonstrates only minimal uptake,   consistent with benign etiology. 4. Patchy ground-glass opacity throughout the left lung demonstrates mild   uptake and is favored to be infectious or inflammatory. 5. No distant metastatic disease. Final Cytologic Diagnosis 21:   A. Mediastinal mass, fine needle aspirate with cell block:   -     SMALL CELL CARCINOMA. B. Bronchoalveolar lavage with cell block:   -     Few atypical cells seen. Transthoracic Echocardiogram 21   Technically difficult study; patient intubated. Left ventricular systolic function is normal.   Ejection fraction is visually estimated at 55%. Mild left ventricular hypertrophy. Sclerotic aortic valve with mild aortic stenosis; mean PmmHg. Mild mitral regurgitation. Moderate tricuspid regurgitation; RVSP: 42 mmHg. No evidence of any pericardial effusion. Dilated IVC and hepatic veins. CT Chest 4/10/21  Enlarging dominant right lower lobe pulmonary nodule measuring up to 24 mm in diameter previously measuring up to 16 mm in diameter suggesting progression of primary malignancy. New ground-glass opacity peripheral to the lesion in the right lower lobe that may represent post treatment changes. This can be seen with radiation pneumonitis. Recommend clinical correlation. Stable spiculated right apical and right lower lobe smaller pulmonary nodules suggesting stable metastatic disease. No pulmonary embolism.      ABG:  Recent Labs 05/03/21  1345   PH 7.37   PO2ART 63*   GZB1ISP 83.0*   O2SAT 91.4*       Lab Results   Component Value Date    ALKPHOS 86 04/26/2021    ALT 20 04/26/2021    AST 15 04/26/2021    PROT 4.7 04/26/2021    PROT 6.5 03/06/2015    BILITOT 0.1 04/26/2021    BILIDIR 0.1 03/06/2015    IBILI 0.3 03/06/2015    LABALBU 3.0 04/26/2021     CBC with Differential:    Lab Results   Component Value Date    WBC 12.4 04/29/2021    RBC 3.24 04/29/2021    HGB 9.3 04/29/2021    HCT 30.5 04/29/2021     04/29/2021    MCV 94.1 04/29/2021    MCH 28.7 04/29/2021    MCHC 30.5 04/29/2021    RDW 14.4 04/29/2021    NRBC 3 04/26/2021    SEGSPCT 83.0 04/29/2021    BANDSPCT 3 04/29/2021    LYMPHOPCT 10.0 04/29/2021    PROMYELOPCT 1 04/29/2021    MONOPCT 2.0 04/29/2021    MYELOPCT 1 04/29/2021    BASOPCT 0.1 04/17/2021    MONOSABS 0.2 04/29/2021    LYMPHSABS 1.2 04/29/2021    EOSABS 0.1 04/28/2021    BASOSABS 0.0 04/17/2021    DIFFTYPE MANUAL DIFFERENTIAL 04/29/2021     BMP:    Lab Results   Component Value Date     04/27/2021    K 3.6 04/27/2021    CL 97 04/27/2021    CO2 42 04/27/2021    BUN 32 04/27/2021    LABALBU 3.0 04/26/2021    CREATININE 0.6 04/27/2021    CALCIUM 8.4 04/27/2021    GFRAA >60 04/27/2021    LABGLOM >60 04/27/2021    GLUCOSE 73 04/27/2021       Physical Exam:   BP (!) 116/59   Pulse 95   Temp 98.1 °F (36.7 °C) (Oral)   Resp 23   Ht 5' (1.524 m)   Wt 121 lb 4.1 oz (55 kg)   SpO2 (!) 89%   BMI 23.68 kg/m²   General: drowsy but arousable, will briefly awaken, and say a few words in a very soft voice, and fall back to sleep, chronically ill appearing, was in significant respiratory distress earlier with oximetry in the low 80's  Skin: pale  HEENT: Mucous membranes are dry, sclerae are clear, mild conjunctival pallor, + alopecia  Neck: no thyromegaly, carotid upstrokes are diminished without bruit    Heart: distant tones, tachycardic RRR, S1S2, no murmurs  Lungs:  Distant equal breath sounds bilaterally, diminished at the bases, with R>L basilar rales, scattered rhonchi, end expiratory wheeze,   Abdomen: soft, bowel sounds quietly present, no apparent tenderness, nondistended  /rectal: deferred  Extremities:  Feet are cool, no mottling, no pedal edema  Neurologic: lethargic, hypophonic, generally weak,       Assessment/Plan:  1. Small cell lung cancer of the right lung diagnosed January 2021, with acute superimposed on chronic hypercarbic and hypoxic respiratory failure. The patient has had declining course with multiple admissions, recent mechanical ventilation April 2021. The patient is admitted to [address] [place] [address] appropriate for the acute management of pain, shortness of breath, congestion, respiratory failure. Comfort medications are addressed. PPS 20% and prognosis is quite guarded. 2. Advanced COPD  3. Severe anemia of 5.5 gm/dl on admission, transfused PRBC. 4. Paroxysmal atrial fibrillation. Given her declining status and hospice plan of care, will stop anticoagulation, continue rate control meds as able. 5. Possible seizure April 2021 with no metastases reported on MRI brain and EEG with mild diffuse slowing. Seen by Neurology and Levetiracetam stopped. 6. History of hypertension, esophagitis,  hyperlipidemia, AAA  7.  DNR-comfort care    Patient Active Problem List   Diagnosis Code    Hyperlipidemia E78.5    COPD (chronic obstructive pulmonary disease) ([facility] [address] J44.9    Leg pain M79.606    Hypertension I10    Shortness of breath R06.02    Tobacco abuse Z72.0    Abdominal aortic aneurysm (AAA) without rupture ([facility]) I71.4    Osteopenia M85.80    Anxiety F41.9    Acute on chronic respiratory failure with hypoxia (HCC) J96.21    Lung mass R91.8    Acute exacerbation of chronic obstructive pulmonary disease (COPD) (HCC) J44.1    Small cell lung cancer (HCC) C34.90    Acute on chronic respiratory failure with hypoxia and hypercapnia (HCC) J96.21, J96.22    Acute hypoxemic respiratory failure
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
## 2021-05-05 NOTE — PROGRESS NOTES
Patient becoming increasingly SOB tonight. Refused to wear high flow set to 15L but was agreeable to 8L. States that 15L makes her feel as though she cannot breathe. Patient has been medicated and I will continue to watch closely to ensure her comfort.

## 2021-05-05 NOTE — PROGRESS NOTES
with cell block:   -     SMALL CELL CARCINOMA.      B.  Bronchoalveolar lavage with cell block:   -     Few atypical cells seen.      Transthoracic Echocardiogram 21   Technically difficult study; patient intubated.   Left ventricular systolic function is normal.   Ejection fraction is visually estimated at 55%.   Mild left ventricular hypertrophy.   Sclerotic aortic valve with mild aortic stenosis; mean PmmHg.   Mild mitral regurgitation.   Moderate tricuspid regurgitation; RVSP: 42 mmHg.   No evidence of any pericardial effusion.   Dilated IVC and hepatic veins.     CT Chest 4/10/21  Enlarging dominant right lower lobe pulmonary nodule measuring up to 24 mm in diameter previously measuring up to 16 mm in diameter suggesting progression of primary malignancy.   New ground-glass opacity peripheral to the lesion in the right lower lobe that may represent post treatment changes.  This can be seen with radiation pneumonitis. Recommend clinical correlation.   Stable spiculated right apical and right lower lobe smaller pulmonary nodules suggesting stable metastatic disease.   No pulmonary embolism.      ABG:      Recent Labs     21  1345   PH 7.37   PO2ART 63*   [id number] 83.0*   O2SAT 91.4*               Lab Results   Component Value Date     ALKPHOS 86 2021     ALT 20 2021     AST 15 2021     PROT 4.7 2021     PROT 6.5 2015     BILITOT 0.1 2021     BILIDIR 0.1 2015     IBILI 0.3 2015     LABALBU 3.0 2021      CBC with Differential:          Lab Results   Component Value Date     WBC 12.4 2021     RBC 3.24 2021     HGB 9.3 2021     HCT 30.5 2021      2021     MCV 94.1 2021     MCH 28.7 2021     MCHC 30.5 2021     RDW 14.4 2021     NRBC 3 2021     SEGSPCT 83.0 2021     BANDSPCT 3 2021     LYMPHOPCT 10.0 2021     PROMYELOPCT 1 2021     MONOPCT 2.0 2021     MYELOPCT 1 04/29/2021     BASOPCT 0.1 04/17/2021     MONOSABS 0.2 04/29/2021     LYMPHSABS 1.2 04/29/2021     EOSABS 0.1 04/28/2021     BASOSABS 0.0 04/17/2021     DIFFTYPE MANUAL DIFFERENTIAL 04/29/2021      BMP:          Lab Results   Component Value Date      04/27/2021     K 3.6 04/27/2021     CL 97 04/27/2021     CO2 42 04/27/2021     BUN 32 04/27/2021     LABALBU 3.0 04/26/2021     CREATININE 0.6 04/27/2021     CALCIUM 8.4 04/27/2021     GFRAA >60 04/27/2021     LABGLOM >60 04/27/2021     GLUCOSE 73 04/27/2021         Physical Exam:   /61   Pulse 110   Temp 98.2 °F (36.8 °C) (Oral)   Resp 18   Ht 5' (1.524 m)   Wt 121 lb 4.1 oz (55 kg)   SpO2 97%   BMI 23.68 kg/m²   General: drowsy but arousable, respirations are labored, chronically ill appearing  Skin: pale  HEENT: Mucous membranes are dry, sclerae are clear, mild conjunctival pallor, + alopecia  Heart: distant tones, tachycardic RRR, S1S2, no murmurs  Lungs:  Distant equal breath sounds bilaterally, diminished at the bases, with R>L basilar rales, scattered rhonchi, end expiratory wheeze,   Abdomen: soft, bowel sounds quietly present, no apparent tenderness, nondistended  /rectal: deferred  Extremities:  Feet are warm, no mottling, no pedal edema  Neurologic: lethargic, generally weak,         Assessment/Plan:  1. Small cell lung cancer of the right lung diagnosed January 2021, with acute superimposed on chronic hypercarbic and hypoxic respiratory failure. The patient has had declining course with multiple admissions, recent mechanical ventilation April 2021. The patient is continued on [address] [place] [address] for the acute management of pain, shortness of breath, congestion, respiratory failure. Will add scheduled Morphine and Lorazepam and continue comfort medications. PPS 20% and prognosis is quite guarded and continues to decline. 2. Advanced COPD  3. Severe anemia of 5.5 gm/dl on admission, transfused PRBC.    4. Paroxysmal atrial fibrillation. Given her declining status and hospice plan of care, will stop anticoagulation, continue rate control meds as able. 5. Possible seizure April 2021 with no metastases reported on MRI brain and EEG with mild diffuse slowing. Seen by Neurology and Levetiracetam stopped. 6. History of hypertension, esophagitis,  hyperlipidemia, AAA  7. DNR-comfort care        Patient Active Problem List   Diagnosis Code    Hyperlipidemia E78.5    COPD (chronic obstructive pulmonary disease) ([facility] [address] J44.9    Leg pain M79.606    Hypertension I10    Shortness of breath R06.02    Tobacco abuse Z72.0    Abdominal aortic aneurysm (AAA) without rupture (HCC) I71.4    Osteopenia M85.80    Anxiety F41.9    Acute on chronic respiratory failure with hypoxia (HCC) J96.21    Lung mass R91.8    Acute exacerbation of chronic obstructive pulmonary disease (COPD) (HCC) J44.1    Small cell lung cancer ([facility] [address] C34.90    Acute on chronic respiratory failure with hypoxia and hypercapnia (HCC) J96.21, J96.22    Acute hypoxemic respiratory failure (HCC) J96.01    Acute hyperkalemia E87.5    Hypernatremia E87.0    SOB (shortness of breath) R06.02    Hospice care patient Z51.5       CEDRIC Kruse MD  5/5/2021

## 2021-05-06 NOTE — CONSULTS
IV Consult complete. No suitable vessels foe PIV/  Arrow Endurance 20g 6cm Midline inserted in RUE Brachial vessel x1 attempt using ultrasound guidance. Brisk blood return, flushes easy.

## 2021-05-07 NOTE — PROGRESS NOTES
[address] [place] [address]  General Inpatient Hospice Progress Note    Date: 5/7/2021  Name: Gonzalo Bullock  MRN: [id number]  YOB: 1950   Patient's PCP: Chao Kolb MD   Oncology: Dr Lisa Figueroa  Hospitalist: Dr Dusty Painter. [facility]  Acute care admit date: 4/26 to 5/3/2021 and 4/10 to 4/21/2021 (6 admissions since 12/1/2020)  Admit Date: 5/4/2021 to General Inpatient Hospice    Subjective: The patient remains with episodes of acute dyspnea, and benefits from Morphine and Lorazepam. She has some congestion with thick sputum that is difficult for her to expectorate. The patient's friend and support person, Yulissa Ma is at the bedside, and I called the patient's daughter, [facility], on 5/6/21. I talked with the patient regarding trying to get home, and she wants to be here. Her friend reports concerns for young great grandchildren in the home with the patient this sick. I collaborated with the patient's nurse and the hospice nurse. Objective:   Pain and acute dyspnea is managed with Morphine IV scheduled and prn with 4 prn doses in the past 24 hours, Glycopyrrolate IV is available for secretions, and Buspar and Lorazepam are available for anxiety. Data reviewed 5/7/2021:  MRI brain 4/13/21:  No intracranial metastatic disease.  Stable mild chronic microvascular disease within the periventricular white matter.  Mild atrophy.      Chest X-ray  5/3/21:  Unchanged right basilar opacity again correlating with patient's known malignancy. Otherwise, no acute cardiopulmonary process      PET scan on October 22, 2020 showed  1. Dominant 1.6 cm nodules in the medial right upper lobe and central right   lower lobe are hypermetabolic and suspicious for malignancy, possibly   synchronous lung cancers. 2. A 3 x 2 cm precarinal merrick mass is intensely hypermetabolic and   consistent with metastatic disease.    3. A smaller 1 cm nodule in the lateral right lower lobe has not   significantly changed since 2009 and demonstrates only minimal uptake,   consistent with benign etiology. 4. Patchy ground-glass opacity throughout the left lung demonstrates mild   uptake and is favored to be infectious or inflammatory. 5. No distant metastatic disease.      Final Cytologic Diagnosis 21:   A. Mediastinal mass, fine needle aspirate with cell block:   -     SMALL CELL CARCINOMA.      B.  Bronchoalveolar lavage with cell block:   -     Few atypical cells seen.      Transthoracic Echocardiogram 21   Technically difficult study; patient intubated.   Left ventricular systolic function is normal.   Ejection fraction is visually estimated at 55%.   Mild left ventricular hypertrophy.   Sclerotic aortic valve with mild aortic stenosis; mean PmmHg.   Mild mitral regurgitation.   Moderate tricuspid regurgitation; RVSP: 42 mmHg.   No evidence of any pericardial effusion.   Dilated IVC and hepatic veins.     CT Chest 4/10/21  Enlarging dominant right lower lobe pulmonary nodule measuring up to 24 mm in diameter previously measuring up to 16 mm in diameter suggesting progression of primary malignancy.   New ground-glass opacity peripheral to the lesion in the right lower lobe that may represent post treatment changes.  This can be seen with radiation pneumonitis. Recommend clinical correlation.   Stable spiculated right apical and right lower lobe smaller pulmonary nodules suggesting stable metastatic disease.   No pulmonary embolism.      ABG:      Recent Labs     21  1345   PH 7.37   PO2ART 63*   [id number] 83.0*   O2SAT 91.4*               Lab Results   Component Value Date     ALKPHOS 86 2021     ALT 20 2021     AST 15 2021     PROT 4.7 2021     PROT 6.5 2015     BILITOT 0.1 2021     BILIDIR 0.1 2015     IBILI 0.3 2015     LABALBU 3.0 2021      CBC with Differential:          Lab Results   Component Value Date     WBC 12.4 2021     RBC 3.24 2021     HGB 9.3 04/29/2021     HCT 30.5 04/29/2021      04/29/2021     MCV 94.1 04/29/2021     MCH 28.7 04/29/2021     MCHC 30.5 04/29/2021     RDW 14.4 04/29/2021     NRBC 3 04/26/2021     SEGSPCT 83.0 04/29/2021     BANDSPCT 3 04/29/2021     LYMPHOPCT 10.0 04/29/2021     PROMYELOPCT 1 04/29/2021     MONOPCT 2.0 04/29/2021     MYELOPCT 1 04/29/2021     BASOPCT 0.1 04/17/2021     MONOSABS 0.2 04/29/2021     LYMPHSABS 1.2 04/29/2021     EOSABS 0.1 04/28/2021     BASOSABS 0.0 04/17/2021     DIFFTYPE MANUAL DIFFERENTIAL 04/29/2021      BMP:          Lab Results   Component Value Date      04/27/2021     K 3.6 04/27/2021     CL 97 04/27/2021     CO2 42 04/27/2021     BUN 32 04/27/2021     LABALBU 3.0 04/26/2021     CREATININE 0.6 04/27/2021     CALCIUM 8.4 04/27/2021     GFRAA >60 04/27/2021     LABGLOM >60 04/27/2021     GLUCOSE 73 04/27/2021         Physical Exam:   BP (!) 143/61   Pulse 121   Temp 98.7 °F (37.1 °C) (Oral)   Resp 20   Ht 5' (1.524 m)   Wt 121 lb 4.1 oz (55 kg)   SpO2 96%   BMI 23.68 kg/m²   General: drowsy but arousable, respirations are mildly labored, no facial grimacing, says a few soft spoken words, chronically ill appearing  Skin: pale, decreased subcutaneous fat  HEENT: Mucous membranes are dry, sclerae are clear, mild conjunctival pallor, + alopecia  Heart: distant tones, tachycardic IRRR, S1S2, no murmurs  Lungs:  Distant coarse breath sounds bilaterally, diminished at the bases, with R>L basilar rales, scattered rhonchi, end expiratory wheeze,   Abdomen: soft, bowel sounds quietly present, no apparent tenderness, nondistended  /rectal: deferred  Extremities:  Feet are warm, no mottling, no pedal edema  Neurologic: lethargic, generally weak,         Assessment/Plan:  1. Small cell lung cancer of the right lung diagnosed January 2021, with acute superimposed on chronic hypercarbic and hypoxic respiratory failure.  The patient has had declining course with multiple admissions, recent mechanical ventilation April 2021. The patient is continued on [facility] for the acute management of pain, shortness of breath, congestion, respiratory failure. Continue scheduled Morphine and Lorazepam and continue comfort medications. PPS 10% and prognosis is quite guarded and continues to decline daily. 2. Advanced COPD  3. Severe anemia of 5.5 gm/dl on admission, transfused PRBC. 4. Paroxysmal atrial fibrillation. Given her declining status and hospice plan of care, will stop anticoagulation, continue rate control meds as able. 5. Possible seizure April 2021 with no metastases reported on MRI brain and EEG with mild diffuse slowing. Seen by Neurology and Levetiracetam stopped. 6. History of hypertension, esophagitis,  hyperlipidemia, AAA  7. DNR-comfort care        Patient Active Problem List   Diagnosis Code    Hyperlipidemia E78.5    COPD (chronic obstructive pulmonary disease) ([email or web address] J44.9    Leg pain M79.606    Hypertension I10    Shortness of breath R06.02    Tobacco abuse Z72.0    Abdominal aortic aneurysm (AAA) without rupture (HCC) I71.4    Osteopenia M85.80    Anxiety F41.9    Acute on chronic respiratory failure with hypoxia (HCC) J96.21    Lung mass R91.8    Acute exacerbation of chronic obstructive pulmonary disease (COPD) (HCC) J44.1    Small cell lung cancer ([facility] C34.90    Acute on chronic respiratory failure with hypoxia and hypercapnia (HCC) J96.21, J96.22    Acute hypoxemic respiratory failure (HCC) J96.01    Acute hyperkalemia E87.5    Hypernatremia E87.0    SOB (shortness of breath) R06.02    Hospice care patient Z51.5       CEDRIC Valencia MD  5/7/2021

## 2021-05-08 NOTE — PLAN OF CARE
Problem: Skin Integrity:  Goal: Will show no infection signs and symptoms  Description: Will show no infection signs and symptoms  5/8/2021 1116 by Ovidio Menchaca RN  Outcome: Ongoing  5/8/2021 0304 by Summer Sorensen RN  Outcome: Ongoing  Goal: Absence of new skin breakdown  Description: Absence of new skin breakdown  5/8/2021 1116 by Ovidio Menchaca RN  Outcome: Ongoing  5/8/2021 0304 by Summer Sorensen RN  Outcome: Ongoing     Problem: Falls - Risk of:  Goal: Will remain free from falls  Description: Will remain free from falls  5/8/2021 1116 by Ovidio Menchaca RN  Outcome: Ongoing  5/8/2021 0304 by Summer Soernsen RN  Outcome: Ongoing  Goal: Absence of physical injury  Description: Absence of physical injury  5/8/2021 1116 by Ovidio Menchaca RN  Outcome: Ongoing  5/8/2021 0304 by Summer Sorensen RN  Outcome: Ongoing     Problem: Pain:  Goal: Pain level will decrease  Description: Pain level will decrease  5/8/2021 1116 by Ovidio Menchaca RN  Outcome: Ongoing  5/8/2021 0304 by Summer Sorensen RN  Outcome: Ongoing  Goal: Control of acute pain  Description: Control of acute pain  5/8/2021 1116 by Ovidio Menchaca RN  Outcome: Ongoing  5/8/2021 0304 by Summer Sorensen RN  Outcome: Ongoing  Goal: Control of chronic pain  Description: Control of chronic pain  5/8/2021 1116 by Ovidio Menchaca RN  Outcome: Ongoing  5/8/2021 0304 by Summer Sorensen RN  Outcome: Ongoing

## 2021-05-08 NOTE — PLAN OF CARE
Problem: Skin Integrity:  Goal: Will show no infection signs and symptoms  Description: Will show no infection signs and symptoms  5/8/2021 1117 by Chai Becerra RN  Outcome: Ongoing  5/8/2021 1116 by Chai Becerra RN  Outcome: Ongoing  5/8/2021 0304 by Mónica Linder RN  Outcome: Ongoing  Goal: Absence of new skin breakdown  Description: Absence of new skin breakdown  5/8/2021 1117 by Chai Becerra RN  Outcome: Ongoing  5/8/2021 1116 by Chai Becerra RN  Outcome: Ongoing  5/8/2021 0304 by Mónica Linder RN  Outcome: Ongoing     Problem: Falls - Risk of:  Goal: Will remain free from falls  Description: Will remain free from falls  5/8/2021 1117 by Chai Becerra RN  Outcome: Ongoing  5/8/2021 1116 by Chai Becerra RN  Outcome: Ongoing  5/8/2021 0304 by Mónica Linder RN  Outcome: Ongoing  Goal: Absence of physical injury  Description: Absence of physical injury  5/8/2021 1117 by Chai Becerra RN  Outcome: Ongoing  5/8/2021 1116 by Chai Becerra RN  Outcome: Ongoing  5/8/2021 0304 by Mónica Linder RN  Outcome: Ongoing     Problem: Pain:  Goal: Pain level will decrease  Description: Pain level will decrease  5/8/2021 1117 by Chai Becerra RN  Outcome: Ongoing  5/8/2021 1116 by Chai Becerra RN  Outcome: Ongoing  5/8/2021 0304 by Mónica Linder RN  Outcome: Ongoing  Goal: Control of acute pain  Description: Control of acute pain  5/8/2021 1117 by Chai Becerra RN  Outcome: Ongoing  5/8/2021 1116 by Chai Becerra RN  Outcome: Ongoing  5/8/2021 0304 by Mónica Linder RN  Outcome: Ongoing  Goal: Control of chronic pain  Description: Control of chronic pain  5/8/2021 1117 by Chai Becerra RN  Outcome: Ongoing  5/8/2021 1116 by Chai Becerra RN  Outcome: Ongoing  5/8/2021 0304 by Mónica Linder RN  Outcome: Ongoing

## 2021-05-08 NOTE — PROGRESS NOTES
[address] [place] [address]  General Inpatient Hospice Progress Note    Date: 5/8/2021  Name: Courtney Montemayor  MRN: [id number]  YOB: 1950   Patient's PCP: Maisha Leija MD   Oncology: Dr Cooper [place]  Hospitalist: Dr Grace Smith. [facility]  Acute care admit date: 4/26 to 5/3/2021 and 4/10 to 4/21/2021 (6 admissions since 12/1/2020)  Admit Date: 5/4/2021 to General Inpatient Hospice    Subjective: The patient is currently peaceful but still has episodes of acute dyspnea, and benefits from Morphine and Lorazepam. There is some thick sputum that is difficult for her to expectorate. The patient's friend and support person, Simi Reyes is at the bedside. She reports that multiple family and grandchildren visited yesterday. I collaborated with the patient's nurse and the hospice nurse. Objective:   Pain and acute dyspnea is managed with Morphine IV scheduled and prn with 1 prn doses in the past 24 hours, Glycopyrrolate IV is available for secretions, and Buspar and Lorazepam are available for anxiety. Data reviewed 5/8/2021:  MRI brain 4/13/21:  No intracranial metastatic disease.  Stable mild chronic microvascular disease within the periventricular white matter.  Mild atrophy.      Chest X-ray  5/3/21:  Unchanged right basilar opacity again correlating with patient's known malignancy. Otherwise, no acute cardiopulmonary process      PET scan on October 22, 2020 showed  1. Dominant 1.6 cm nodules in the medial right upper lobe and central right   lower lobe are hypermetabolic and suspicious for malignancy, possibly   synchronous lung cancers. 2. A 3 x 2 cm precarinal merrick mass is intensely hypermetabolic and   consistent with metastatic disease. 3. A smaller 1 cm nodule in the lateral right lower lobe has not   significantly changed since 2009 and demonstrates only minimal uptake,   consistent with benign etiology.    4. Patchy ground-glass opacity throughout the left lung demonstrates mild   uptake and is favored to be infectious or inflammatory. 5. No distant metastatic disease.      Final Cytologic Diagnosis 21:   A. Mediastinal mass, fine needle aspirate with cell block:   -     SMALL CELL CARCINOMA.      B.  Bronchoalveolar lavage with cell block:   -     Few atypical cells seen.      Transthoracic Echocardiogram 21   Technically difficult study; patient intubated.   Left ventricular systolic function is normal.   Ejection fraction is visually estimated at 55%.   Mild left ventricular hypertrophy.   Sclerotic aortic valve with mild aortic stenosis; mean PmmHg.   Mild mitral regurgitation.   Moderate tricuspid regurgitation; RVSP: 42 mmHg.   No evidence of any pericardial effusion.   Dilated IVC and hepatic veins.     CT Chest 4/10/21  Enlarging dominant right lower lobe pulmonary nodule measuring up to 24 mm in diameter previously measuring up to 16 mm in diameter suggesting progression of primary malignancy.   New ground-glass opacity peripheral to the lesion in the right lower lobe that may represent post treatment changes.  This can be seen with radiation pneumonitis. Recommend clinical correlation.   Stable spiculated right apical and right lower lobe smaller pulmonary nodules suggesting stable metastatic disease.   No pulmonary embolism.      ABG:      Recent Labs     21  1345   PH 7.37   PO2ART 63*   [id number] 83.0*   O2SAT 91.4*               Lab Results   Component Value Date     ALKPHOS 86 2021     ALT 20 2021     AST 15 2021     PROT 4.7 2021     PROT 6.5 2015     BILITOT 0.1 2021     BILIDIR 0.1 2015     IBILI 0.3 2015     LABALBU 3.0 2021      CBC with Differential:          Lab Results   Component Value Date     WBC 12.4 2021     RBC 3.24 2021     HGB 9.3 2021     HCT 30.5 2021      2021     MCV 94.1 2021     MCH 28.7 2021     MCHC 30.5 2021     RDW 14.4 2021   NRBC 3 04/26/2021     SEGSPCT 83.0 04/29/2021     BANDSPCT 3 04/29/2021     LYMPHOPCT 10.0 04/29/2021     PROMYELOPCT 1 04/29/2021     MONOPCT 2.0 04/29/2021     MYELOPCT 1 04/29/2021     BASOPCT 0.1 04/17/2021     MONOSABS 0.2 04/29/2021     LYMPHSABS 1.2 04/29/2021     EOSABS 0.1 04/28/2021     BASOSABS 0.0 04/17/2021     DIFFTYPE MANUAL DIFFERENTIAL 04/29/2021      BMP:          Lab Results   Component Value Date      04/27/2021     K 3.6 04/27/2021     CL 97 04/27/2021     CO2 42 04/27/2021     BUN 32 04/27/2021     LABALBU 3.0 04/26/2021     CREATININE 0.6 04/27/2021     CALCIUM 8.4 04/27/2021     GFRAA >60 04/27/2021     LABGLOM >60 04/27/2021     GLUCOSE 73 04/27/2021         Physical Exam:   BP (!) 144/75   Pulse 108   Temp 98.3 °F (36.8 °C) (Oral)   Resp 20   Ht 5' (1.524 m)   Wt 121 lb 4.1 oz (55 kg)   SpO2 93%   BMI 23.68 kg/m²   General: drowsy but arousable, respirations are less labored this morning, no facial grimacing, nods appropriately but no speech, chronically ill appearing  Skin: pale, decreased subcutaneous fat  HEENT: Mucous membranes are dry, sclerae are clear, mild conjunctival pallor, + alopecia  Heart: distant tones, tachycardic IRRR, S1S2, no murmurs  Lungs:  Distant coarse breath sounds bilaterally, diminished at the bases, with R>L basilar rales, scattered rhonchi, end expiratory wheeze,   Abdomen: soft, bowel sounds quietly present, no apparent tenderness, nondistended  /rectal: deferred  Extremities:  Feet are warm, no mottling, no pedal edema  Neurologic: lethargic, generally weak,         Assessment/Plan:  1. Small cell lung cancer of the right lung diagnosed January 2021, with acute superimposed on chronic hypercarbic and hypoxic respiratory failure. The patient has ongoing decline and is continued on [facility] for the acute management of pain, shortness of breath, congestion, respiratory failure.  Continue scheduled Morphine and Lorazepam and continue comfort medications. PPS 10% and prognosis is quite guarded and continues to decline daily. 2. Advanced COPD  3. Severe anemia of 5.5 gm/dl on admission, transfused PRBC. 4. Paroxysmal atrial fibrillation. Given her declining status and hospice plan of care, stopped anticoagulation, continue rate control meds as able. 5. Possible seizure April 2021 with no metastases reported on MRI brain and EEG with mild diffuse slowing. Seen by Neurology and Levetiracetam stopped. 6. History of hypertension, esophagitis,  hyperlipidemia, AAA  7. DNR-comfort care        Patient Active Problem List   Diagnosis Code    Hyperlipidemia E78.5    COPD (chronic obstructive pulmonary disease) ([facility] J44.9    Leg pain M79.606    Hypertension I10    Shortness of breath R06.02    Tobacco abuse Z72.0    Abdominal aortic aneurysm (AAA) without rupture (HCC) I71.4    Osteopenia M85.80    Anxiety F41.9    Acute on chronic respiratory failure with hypoxia (HCC) J96.21    Lung mass R91.8    Acute exacerbation of chronic obstructive pulmonary disease (COPD) (HCC) J44.1    Small cell lung cancer ([facility] C34.90    Acute on chronic respiratory failure with hypoxia and hypercapnia (HCC) J96.21, J96.22    Acute hypoxemic respiratory failure (HCC) J96.01    Acute hyperkalemia E87.5    Hypernatremia E87.0    SOB (shortness of breath) R06.02    Hospice care patient Z51.5       CEDRIC Cerda MD  5/8/2021

## 2021-05-09 NOTE — PROGRESS NOTES
[facility]  General Inpatient Hospice Progress Note    Date: 5/9/2021  Name: Madison Hernandez  MRN: [id number]  YOB: 1950   Patient's PCP: Darryl Vázquez MD   Oncology: Dr Montse Jones  Hospitalist: Dr Krish Brown. [facility]  Acute care admit date: 4/26 to 5/3/2021 and 4/10 to 4/21/2021 (6 admissions since 12/1/2020)  Admit Date: 5/4/2021 to General Inpatient Hospice    Subjective: The patient is currently comfortable but had some respiratory distress earlier and received Morphine. She has episodes of acute dyspnea, and benefits from Morphine and Lorazepam. There is some upper airway noise. She ate a bit better yesterday. The patient's friend and support person, Jony Jon is at the bedside. She has not been able to take oral meds and will stop most of them. There were a lot of visitors yesterday. I collaborated with the patient's nurse and the hospice nurse. Objective:   Pain and acute dyspnea is managed with Morphine IV scheduled and prn with 2 prn doses in the past 24 hours, Glycopyrrolate IV is available for secretions, and Buspar and Lorazepam are available for anxiety. Data reviewed 5/9/2021:  MRI brain 4/13/21:  No intracranial metastatic disease.  Stable mild chronic microvascular disease within the periventricular white matter.  Mild atrophy.      Chest X-ray  5/3/21:  Unchanged right basilar opacity again correlating with patient's known malignancy. Otherwise, no acute cardiopulmonary process      PET scan on October 22, 2020 showed  1. Dominant 1.6 cm nodules in the medial right upper lobe and central right   lower lobe are hypermetabolic and suspicious for malignancy, possibly   synchronous lung cancers. 2. A 3 x 2 cm precarinal merrick mass is intensely hypermetabolic and   consistent with metastatic disease.    3. A smaller 1 cm nodule in the lateral right lower lobe has not   significantly changed since 2009 and demonstrates only minimal uptake,   consistent with benign etiology. 4. Patchy ground-glass opacity throughout the left lung demonstrates mild   uptake and is favored to be infectious or inflammatory. 5. No distant metastatic disease.      Final Cytologic Diagnosis 21:   A. Mediastinal mass, fine needle aspirate with cell block:   -     SMALL CELL CARCINOMA.      B.  Bronchoalveolar lavage with cell block:   -     Few atypical cells seen.      Transthoracic Echocardiogram 21   Technically difficult study; patient intubated.   Left ventricular systolic function is normal.   Ejection fraction is visually estimated at 55%.   Mild left ventricular hypertrophy.   Sclerotic aortic valve with mild aortic stenosis; mean PmmHg.   Mild mitral regurgitation.   Moderate tricuspid regurgitation; RVSP: 42 mmHg.   No evidence of any pericardial effusion.   Dilated IVC and hepatic veins.     CT Chest 4/10/21  Enlarging dominant right lower lobe pulmonary nodule measuring up to 24 mm in diameter previously measuring up to 16 mm in diameter suggesting progression of primary malignancy.   New ground-glass opacity peripheral to the lesion in the right lower lobe that may represent post treatment changes.  This can be seen with radiation pneumonitis. Recommend clinical correlation.   Stable spiculated right apical and right lower lobe smaller pulmonary nodules suggesting stable metastatic disease.   No pulmonary embolism.      ABG:      Recent Labs     21  1345   PH 7.37   PO2ART 63*   UCV2AET 83.0*   O2SAT 91.4*               Lab Results   Component Value Date     ALKPHOS 86 2021     ALT 20 2021     AST 15 2021     PROT 4.7 2021     PROT 6.5 2015     BILITOT 0.1 2021     BILIDIR 0.1 2015     IBILI 0.3 2015     LABALBU 3.0 2021      CBC with Differential:          Lab Results   Component Value Date     WBC 12.4 2021     RBC 3.24 2021     HGB 9.3 2021     HCT 30.5 2021      2021     MCV 94.1 04/29/2021     MCH 28.7 04/29/2021     MCHC 30.5 04/29/2021     RDW 14.4 04/29/2021     NRBC 3 04/26/2021     SEGSPCT 83.0 04/29/2021     BANDSPCT 3 04/29/2021     LYMPHOPCT 10.0 04/29/2021     PROMYELOPCT 1 04/29/2021     MONOPCT 2.0 04/29/2021     MYELOPCT 1 04/29/2021     BASOPCT 0.1 04/17/2021     MONOSABS 0.2 04/29/2021     LYMPHSABS 1.2 04/29/2021     EOSABS 0.1 04/28/2021     BASOSABS 0.0 04/17/2021     DIFFTYPE MANUAL DIFFERENTIAL 04/29/2021      BMP:          Lab Results   Component Value Date      04/27/2021     K 3.6 04/27/2021     CL 97 04/27/2021     CO2 42 04/27/2021     BUN 32 04/27/2021     LABALBU 3.0 04/26/2021     CREATININE 0.6 04/27/2021     CALCIUM 8.4 04/27/2021     GFRAA >60 04/27/2021     LABGLOM >60 04/27/2021     GLUCOSE 73 04/27/2021         Physical Exam:   BP (!) 145/80   Pulse 118   Temp 98 °F (36.7 °C) (Axillary)   Resp 14   Ht 5' (1.524 m)   Wt 121 lb 4.1 oz (55 kg)   SpO2 (!) 89%   BMI 23.68 kg/m²   General: drowsy but arousable, respirations are mildly labored, no facial grimacing, nods appropriately but minimal speech, chronically ill appearing  Skin: pale, decreased subcutaneous fat  HEENT: Mucous membranes are dry, sclerae are clear, mild conjunctival pallor, + alopecia  Heart: distant tones, tachycardic IRRR, S1S2, no murmurs  Lungs:  Distant coarse breath sounds bilaterally, diminished at the bases, with R>L basilar rales, scattered rhonchi, end expiratory wheeze,   Abdomen: soft, bowel sounds quietly present, no apparent tenderness, nondistended  /rectal: deferred  Extremities:  Feet are warm, no mottling, no pedal edema  Neurologic: lethargic, generally weak,         Assessment/Plan:  1. Small cell lung cancer of the right lung diagnosed January 2021, with acute superimposed on chronic hypercarbic and hypoxic respiratory failure.  The patient has ongoing decline and is continued on General Inpatient Hospice for the acute management of pain, shortness of breath, congestion, respiratory failure. Continue scheduled Morphine and Lorazepam and continue comfort medications, stop less important oral medications. PPS 10% and prognosis is quite guarded and continues to decline daily. 2. Advanced COPD  3. Severe anemia of 5.5 gm/dl on admission, transfused PRBC. 4. Paroxysmal atrial fibrillation. Given her declining status and hospice plan of care, stopped anticoagulation, continue rate control meds as able. 5. Possible seizure April 2021 with no metastases reported on MRI brain and EEG with mild diffuse slowing. Seen by Neurology and Levetiracetam stopped. 6. History of hypertension, esophagitis,  hyperlipidemia, AAA  7. DNR-comfort care        Patient Active Problem List   Diagnosis Code    Hyperlipidemia E78.5    COPD (chronic obstructive pulmonary disease) ([facility] J44.9    Leg pain M79.606    Hypertension I10    Shortness of breath R06.02    Tobacco abuse Z72.0    Abdominal aortic aneurysm (AAA) without rupture (HCC) I71.4    Osteopenia M85.80    Anxiety F41.9    Acute on chronic respiratory failure with hypoxia (HCC) J96.21    Lung mass R91.8    Acute exacerbation of chronic obstructive pulmonary disease (COPD) (HCC) J44.1    Small cell lung cancer ([facility] C34.90    Acute on chronic respiratory failure with hypoxia and hypercapnia (HCC) J96.21, J96.22    Acute hypoxemic respiratory failure (HCC) J96.01    Acute hyperkalemia E87.5    Hypernatremia E87.0    SOB (shortness of breath) R06.02    Hospice care patient Z51.5       CEDRIC Porras MD  5/9/2021

## 2021-05-10 NOTE — PROGRESS NOTES
[address] [place] [address]  General Inpatient Hospice Progress Note    Date: 5/10/2021  Name: Sanjiv Lilly  MRN: [id number]  YOB: 1950   Patient's PCP: Christopher Rodriguez MD   Oncology: Dr Corona Flowers  Hospitalist: Dr Salome Kidd. [facility]  Acute care admit date:  to 5/3/2021 and 4/10 to 2021 (6 admissions since 2020)  Admit Date: 2021 to General Inpatient Hospice    Subjective: The patient has declined, is confused, calling out for  family members. She has upper airway noise. Breathing is more shallow, and oximetry is 85% on oxygen. She did not eat much yesterday. The patient's friend and support person, Eduard Aminair is at the bedside. There were a lot of visitors on the weekend. I collaborated with the patient's nurse and the hospice nurse. Objective:   Pain and acute dyspnea is managed with Morphine IV scheduled and prn with 1 prn doses in the past 24 hours, Glycopyrrolate IV is available for secretions, and Buspar and Lorazepam are available for anxiety. Data reviewed 5/10/2021:  MRI brain 21:  No intracranial metastatic disease.  Stable mild chronic microvascular disease within the periventricular white matter.  Mild atrophy.      Chest X-ray  5/3/21:  Unchanged right basilar opacity again correlating with patient's known malignancy. Otherwise, no acute cardiopulmonary process      PET scan on 2020 showed  1. Dominant 1.6 cm nodules in the medial right upper lobe and central right   lower lobe are hypermetabolic and suspicious for malignancy, possibly   synchronous lung cancers. 2. A 3 x 2 cm precarinal merrick mass is intensely hypermetabolic and   consistent with metastatic disease. 3. A smaller 1 cm nodule in the lateral right lower lobe has not   significantly changed since  and demonstrates only minimal uptake,   consistent with benign etiology.    4. Patchy ground-glass opacity throughout the left lung demonstrates mild   uptake and is 3 04/26/2021     SEGSPCT 83.0 04/29/2021     BANDSPCT 3 04/29/2021     LYMPHOPCT 10.0 04/29/2021     PROMYELOPCT 1 04/29/2021     MONOPCT 2.0 04/29/2021     MYELOPCT 1 04/29/2021     BASOPCT 0.1 04/17/2021     MONOSABS 0.2 04/29/2021     LYMPHSABS 1.2 04/29/2021     EOSABS 0.1 04/28/2021     BASOSABS 0.0 04/17/2021     DIFFTYPE MANUAL DIFFERENTIAL 04/29/2021      BMP:          Lab Results   Component Value Date      04/27/2021     K 3.6 04/27/2021     CL 97 04/27/2021     CO2 42 04/27/2021     BUN 32 04/27/2021     LABALBU 3.0 04/26/2021     CREATININE 0.6 04/27/2021     CALCIUM 8.4 04/27/2021     GFRAA >60 04/27/2021     LABGLOM >60 04/27/2021     GLUCOSE 73 04/27/2021         Physical Exam:   /74   Pulse 114   Temp 98.2 °F (36.8 °C) (Axillary)   Resp 23   Ht 5' (1.524 m)   Wt 121 lb 4.1 oz (55 kg)   SpO2 (!) 85%   BMI 23.68 kg/m²   General: drowsy and will awaken briefly and called for her Mom, looks worse than yesterday, no facial grimacing, chronically ill appearing  Skin: pale, decreased subcutaneous fat  HEENT: Mucous membranes are dry, sclerae are clear, mild conjunctival pallor, + alopecia  Heart: distant tones, tachycardic IRRR, S1S2, no murmurs  Lungs:  Distant shallow coarse breath sounds bilaterally, diminished at the bases, with basilar rales, scattered rhonchi, end expiratory wheeze,   Abdomen: soft, bowel sounds quietly present, no apparent tenderness, nondistended  /rectal: deferred  Extremities:  Feet are warm, no mottling, no pedal edema  Neurologic: minimally responsive         Assessment/Plan:  1. Small cell lung cancer of the right lung diagnosed January 2021, with acute superimposed on chronic hypercarbic and hypoxic respiratory failure. The patient has ongoing decline and is continued on General Inpatient Hospice for the acute management of pain, shortness of breath, congestion, respiratory failure.  Continue scheduled Morphine and Lorazepam and continue comfort medications, stop less important oral medications. PPS 10% and prognosis is grim and the patient continues to decline daily. 2. Advanced COPD  3. Severe anemia of 5.5 gm/dl on admission, transfused PRBC. 4. Paroxysmal atrial fibrillation. Given her declining status and hospice plan of care, stopped anticoagulation, continue rate control meds as able. 5. Possible seizure April 2021 with no metastases reported on MRI brain and EEG with mild diffuse slowing. Seen by Neurology and Levetiracetam stopped. 6. History of hypertension, esophagitis,  hyperlipidemia, AAA  7. DNR-comfort care        Patient Active Problem List   Diagnosis Code    Hyperlipidemia E78.5    COPD (chronic obstructive pulmonary disease) ([facility] J44.9    Leg pain M79.606    Hypertension I10    Shortness of breath R06.02    Tobacco abuse Z72.0    Abdominal aortic aneurysm (AAA) without rupture (HCC) I71.4    Osteopenia M85.80    Anxiety F41.9    Acute on chronic respiratory failure with hypoxia (HCC) J96.21    Lung mass R91.8    Acute exacerbation of chronic obstructive pulmonary disease (COPD) (HCC) J44.1    Small cell lung cancer ([facility] C34.90    Acute on chronic respiratory failure with hypoxia and hypercapnia (HCC) J96.21, J96.22    Acute hypoxemic respiratory failure (HCC) J96.01    Acute hyperkalemia E87.5    Hypernatremia E87.0    SOB (shortness of breath) R06.02    Hospice care patient Z51.5       CEDRIC Waggoner MD  5/10/2021

## 2021-05-10 NOTE — PLAN OF CARE
Problem: Skin Integrity:  Goal: Will show no infection signs and symptoms  Description: Will show no infection signs and symptoms  5/10/2021 1002 by Danie Saunders RN  Outcome: Ongoing  5/10/2021 1001 by Danie Saunders RN  Outcome: Ongoing  Goal: Absence of new skin breakdown  Description: Absence of new skin breakdown  5/10/2021 1002 by Danie Saunders RN  Outcome: Ongoing  5/10/2021 1001 by Danie Saunders RN  Outcome: Ongoing  Goal: Risk for impaired skin integrity will decrease  Description: Risk for impaired skin integrity will decrease  Outcome: Ongoing     Problem: Falls - Risk of:  Goal: Will remain free from falls  Description: Will remain free from falls  5/10/2021 1002 by Danie Saunders RN  Outcome: Ongoing  5/10/2021 1001 by Danie Saunders RN  Outcome: Ongoing  Goal: Absence of physical injury  Description: Absence of physical injury  5/10/2021 1002 by Danie Saunders RN  Outcome: Ongoing  5/10/2021 1001 by Danie Saunders RN  Outcome: Ongoing     Problem: Pain:  Description: Pain management should include both nonpharmacologic and pharmacologic interventions.   Goal: Pain level will decrease  Description: Pain level will decrease  5/10/2021 1002 by Danie Saunders RN  Outcome: Ongoing  5/10/2021 1001 by Danie Saunders RN  Outcome: Ongoing  Goal: Control of acute pain  Description: Control of acute pain  5/10/2021 1002 by Danie Saunders RN  Outcome: Ongoing  5/10/2021 1001 by Danie Saunders RN  Outcome: Ongoing  Goal: Control of chronic pain  Description: Control of chronic pain  5/10/2021 1002 by Danie Saunders RN  Outcome: Ongoing  5/10/2021 1001 by Danie Saunders RN  Outcome: Ongoing     Problem: Coping:  Goal: Ability to participate in care decisions during the dying process will improve  Description: Ability to participate in care decisions during the dying process will improve  Outcome: Ongoing  Goal: Family's ability to cope with current situation will improve  Description: Family's ability to cope with current situation will improve  Outcome: Ongoing     Problem: Health Behavior:  Goal: Identification of resources available to assist in meeting health care needs will improve  Description: Identification of resources available to assist in meeting health care needs will improve  Outcome: Ongoing     Problem: Physical Regulation:  Goal: Complications related to the disease process, condition or treatment will be avoided or minimized  Description: Complications related to the disease process, condition or treatment will be avoided or minimized  Outcome: Ongoing     Problem: Respiratory:  Goal: Verbalizations of increased ease of respirations will increase  Description: Verbalizations of increased ease of respirations will increase  Outcome: Ongoing     Problem: Role Relationship:  Goal: The number of positive interactions the caregiver has with the patient will increase  Description: The number of positive interactions the caregiver has with the patient will increase  Outcome: Ongoing     Problem: Sensory:  Goal: Expressions of feelings of enhanced comfort will increase  Description: Expressions of feelings of enhanced comfort will increase  Outcome: Ongoing

## 2021-05-11 NOTE — DISCHARGE SUMMARY
Discharge Summary    Name:  Rasheed Reyes /Age/Sex: 1950  (65 y.o. female)   MRN & CSN:  [id number] & [id number] Admission Date/Time: 2021  4:24 PM   Attending:  No att. providers found Discharging Physician: Glendon Lundborg, MD     HPI:     Rasheed Reyes is a 65 y.o.  female  who presents with  shortness  of breath. History of chronic COPD, non-small cell lung cancer and chronic respiratory failure. Currently uses BiPAP at night . Patient was admitted twice within the last 30 days for sepsis and pneumonia. Her hospitalization was complicated requiring intubation twice. Currently on Levaquin for pneumonia. Patient presented today with worsening of shortness of breath. She is on chronic 4 L/nasal cannula. Upon examination patient states the symptoms started around afternoon, she felt like unable to catch breath. Symptoms  not alleviated with increasing supplemental oxygen. She is able to complete full sentence without difficulty. Admits cough, fatigue, nosebleeds. denies fever, cough, chest pain, chest pressure, abdominal pain, melena, hematochezia, hematochezia, lower urinary tract symptoms, bilateral lower extremity weakness. Hospital Course:   Rasheed Reyes is a 65 y.o.  female  who presents with Small cell lung cancer ([address]    > Acute on chronic hypoxic and hypercapnic respiratory failure  > COPD exacerbation  > Stage IIIb small cell lung cancer  -s/p 4 cycles of chemo with last treatment on 3/31/2021.    -CT chest on 4/10 with enlarging right lower lobe pulmonary nodule.   -Chronically on 4 L home oxygen and BIPAP QHS.     -Has had recurrent admissions with intubations due to respiratory failure  - steroids, Bronchodilators, Bipap  -Hospice consult has been placed per oncology, initial plan was home with hospice  - /3 resp failure worse, pt refusing Bipap, check CXR, oxygen requirement up to 10 L/min, talked to family and pt regarding prognosis, agreed with changing code to DNR CC and requested inpt hospice, placed comfort care meds, notified hospice. And pt was discharged to hospice     Discharge summery was not done day of discharge because discharge was expected next day , but end up being done earlier than expected by Hospice service. Was off till I realized as explained earlier discharge summery not done, so completed after the fact.      > Paroxysmal A. fib---rate controlled. -Eliquis 2.5 twice daily as of 4/29  - diltiazem     > Anemia of chronic disease  -No obvious bleeding  -Transfuse as needed  -Monitor hemoglobin      >Other chronic medical conditions, medication resumed unless contraindicated  Essential hypertension  Generalized tonic-clonic seizures  Tobacco use disorder  Exudative esophagitis  PCM, moderate    The patient expressed appropriate understanding of and agreement with the discharge recommendations, medications, and plan.      Consults this admission:  IP CONSULT TO SOCIAL WORK  IP CONSULT TO ONCOLOGY  IP CONSULT TO [address] [place] [address]    Discharge Instruction:   Follow up appointments:     See AVS    Disposition: Discharged to:   []Hospice   Condition on discharge: Stable    Discharge Medications:      Polly Liriano   Home Medication Instructions RSE:[id number]    Printed on:05/11/21 1322   Medication Information                      albuterol sulfate HFA (PROAIR HFA) 108 (90 Base) MCG/ACT inhaler  Inhale 2 puffs into the lungs every 6 hours as needed for Wheezing             apixaban (ELIQUIS) 5 MG TABS tablet  Take 0.5 tablets by mouth 2 times daily             atorvastatin (LIPITOR) 10 MG tablet  Take 10 mg by mouth daily             busPIRone (BUSPAR) 10 MG tablet  Take 10 mg by mouth 2 times daily             calcium carbonate 600 MG TABS tablet  Take 1 tablet by mouth daily             Cholecalciferol (VITAMIN D3) 25 MCG (1000 UT) TABS  Take by mouth daily              dilTIAZem (CARTIA XT) 240 MG extended release capsule  Take 240 mg by mouth daily diphenhydrAMINE (BENADRYL) 25 MG tablet  Take 50 mg by mouth every 6 hours as needed for Itching             docusate sodium (COLACE, DULCOLAX) 100 MG CAPS  Take 100 mg by mouth daily             guaiFENesin (MUCINEX CHEST CONGESTION CHILD) 100 MG/5ML liquid  Take 10 mLs by mouth 3 times daily as needed for Cough             HYDROcodone-acetaminophen (NORCO)  MG per tablet  Take 1 tablet by mouth every 4 hours as needed for Pain for up to 30 days. Indications: 180             ipratropium-albuterol (DUONEB) 0.5-2.5 (3) MG/3ML SOLN nebulizer solution  Inhale 3 mLs into the lungs 4 times daily             LORazepam (ATIVAN) 1 MG tablet  Take 1 tablet by mouth every 8 hours as needed for Anxiety for up to 30 days. Magic Mouthwash (MIRACLE MOUTHWASH)  Swish and swallow 5 mLs 4 times daily as needed for Irritation 1:1:1 diphenhydramine, nystatin, lidocaine             magnesium oxide (MAG-OX) 400 MG tablet  Take 0.5 tablets by mouth daily             metoprolol tartrate (LOPRESSOR) 50 MG tablet  Take 1 tablet by mouth 2 times daily             nicotine (NICODERM CQ) 21 MG/24HR  Place 1 patch onto the skin daily             Nutritional Supplement LIQD  2 cans by mouth daily. Boost strawberry if available.              nystatin (MYCOSTATIN) 778037 UNIT/GM cream  Apply topically 2 times daily Apply to buttocks/groin topically every day and night shift for redness             omeprazole (PRILOSEC) 20 MG delayed release capsule  Take 20 mg by mouth daily             ondansetron (ZOFRAN) 8 MG tablet  Take 1 tablet by mouth every 8 hours as needed for Nausea or Vomiting             predniSONE (DELTASONE) 10 MG tablet  Take 30mg daily x 3 d, then 20mg daily x 3d, then 10mg daily x 3.             sucralfate (CARAFATE) 1 GM tablet  Take 1 tablet by mouth 4 times daily             theophylline (UNIPHYL) 400 MG extended release tablet  Take 0.5 tablets by mouth daily                 Objective Findings at Discharge: air-fluid level to bilateral sphenoid sinuses. Other visualized paranasal sinuses appear to be clear. Opacification of multiple right-sided mastoid air cells. Minimal opacification of few left-sided mastoid air cells. Bilateral middle ears appear clear. SOFT TISSUES/SKULL:  No acute abnormality of the visualized skull or soft tissues. Within the limitations of the exam, no acute intracranial abnormality. Paranasal sinus disease as above. Bilateral mastoid effusions, right worse than left. Xr Chest Portable    Result Date: 5/3/2021  EXAMINATION: ONE XRAY VIEW OF THE CHEST 5/3/2021 2:29 pm COMPARISON: 04/26/2021 HISTORY: ORDERING SYSTEM PROVIDED HISTORY: Dyspnea TECHNOLOGIST PROVIDED HISTORY: Reason for exam:->Dyspnea Reason for Exam: Dyspnea Acuity: Chronic Type of Exam: Ongoing FINDINGS: Heart size is stable. Right basilar opacity. No pulmonary vascular congestion or edema. No pneumothorax. Unchanged right basilar opacity again correlating with patient's known malignancy. Otherwise, no acute cardiopulmonary process     Xr Chest Portable    Result Date: 4/26/2021  EXAMINATION: ONE XRAY VIEW OF THE CHEST 4/26/2021 4:30 pm COMPARISON: 04/14/2021 HISTORY: ORDERING SYSTEM PROVIDED HISTORY: chest pain TECHNOLOGIST PROVIDED HISTORY: Reason for exam:->chest pain Reason for Exam: chest pain Acuity: Acute Type of Exam: Initial Additional signs and symptoms: na Relevant Medical/Surgical History: copd, small cell lung cancer FINDINGS: Cardiomediastinal silhouette is stable. Right basilar opacity persists. No pneumothorax. No pleural effusion.      Persistent right basilar opacity appears to correlate with the primary malignancy     Xr Chest Portable    Result Date: 4/14/2021  EXAMINATION: ONE XRAY VIEW OF THE CHEST 4/14/2021 6:21 am COMPARISON: 04/13/2021 HISTORY: ORDERING SYSTEM PROVIDED HISTORY: on vent TECHNOLOGIST PROVIDED HISTORY: Reason for exam:->on vent Reason for Exam: on vent Acuity: Unknown Type of Exam: Unknown FINDINGS: A right-sided PICC is identified with the tip overlying the cavoatrial junction/proximal right atrium. The enteric catheter courses below the level the film. The endotracheal tube terminates approximately 1 cm above the luca. Atherosclerotic calcifications are identified. Cardiac size appear similar. Mild increased right basilar airspace disease. Trace right pleural effusion. Osseous structures appear stable. Mild increased right basilar infiltrate with a trace right pleural effusion. Otherwise similar appearing chest.     Xr Chest Portable    Result Date: 4/13/2021  EXAMINATION: ONE XRAY VIEW OF THE CHEST 4/13/2021 5:51 am COMPARISON: 04/12/2021 HISTORY: ORDERING SYSTEM PROVIDED HISTORY: on vent TECHNOLOGIST PROVIDED HISTORY: Reason for exam:->on vent Reason for Exam: on vent Acuity: Unknown Type of Exam: Ongoing FINDINGS: Endotracheal tube, NG tube and right upper extremity PICC line are in place. The heart and mediastinal structures are stable. Right basilar atelectasis or infiltrate persists. Lungs are otherwise clear. Bones are osteopenic. Right basilar atelectasis or infiltrate. Xr Chest Portable    Result Date: 4/12/2021  EXAMINATION: ONE XRAY VIEW OF THE CHEST 4/12/2021 6:07 am COMPARISON: 04/11/2021 HISTORY: ORDERING SYSTEM PROVIDED HISTORY: ventilator TECHNOLOGIST PROVIDED HISTORY: Reason for exam:->ventilator Reason for Exam: ventilator Acuity: Acute Type of Exam: Initial Additional signs and symptoms: best image possible due to pt. being combative/artifact due to tech holding pt. for image FINDINGS: Endotracheal tube, NG tube and right upper extremity PICC line are in place. Heart and mediastinal structures are stable. There is an infiltrate in the right lower lobe with underlying emphysematous changes present. Persistent right lower lobe infiltrate with underlying COPD.      Xr Chest Portable    Result Date: 4/11/2021  EXAMINATION: ONE XRAY VIEW OF THE CHEST 4/11/2021 5:37 pm COMPARISON: 04/11/2021, 1759 hours. HISTORY: ORDERING SYSTEM PROVIDED HISTORY: ett placement and ng tube placement TECHNOLOGIST PROVIDED HISTORY: Reason for exam:->ett placement and ng tube placement FINDINGS: The ET tube was now in satisfactory position, 3.8 cm above the luca. A right PICC line was noted with the tip in the right atrium. Hyperinflation was noted with diaphragmatic flattening. No cardiomegaly or interstitial edema was noted. Patchy right lower lobe pneumonia was noted. The interstitial edema noted on 1759 hours has resolved. The NG tube was at the GE junction. The ET tube was now in satisfactory position, 3.8 cm above the luca. Right PICC line with the tip in the right atrium. The NG tube was at the GE junction. It should be advanced 4 or 5 more cm at least. The interstitial edema noted on 1759 hours has resolved. No change in patchy right lower lobe pneumonia. COPD. Xr Chest Portable    Result Date: 4/11/2021  EXAMINATION: ONE XRAY VIEW OF THE CHEST 4/11/2021 5:54 pm COMPARISON: Chest radiograph 04/11/2021. HISTORY: ORDERING SYSTEM PROVIDED HISTORY: vent reintubation TECHNOLOGIST PROVIDED HISTORY: Reason for exam:->vent reintubation Reason for Exam: vent reintubation Acuity: Acute Type of Exam: Initial FINDINGS: The endotracheal tube terminates in the right mainstem bronchus. A right upper extremity PICC terminates at the cavoatrial junction. The cardiomediastinal silhouette is within normal limits. Trace pleural effusions. No pneumothorax. Bilateral pulmonary opacities without significant change. No acute osseous abnormality. 1. Endotracheal tube terminating in the right mainstem bronchus. Recommend repositioning. 2. Unchanged bilateral opacities. 3. Trace bilateral pleural effusions.      Mri Brain W Wo Contrast    Result Date: 4/13/2021  EXAMINATION: MRI OF THE BRAIN WITHOUT AND WITH CONTRAST  4/13/2021 11:30 am TECHNIQUE: Multiplanar multisequence MRI of the head/brain was performed without and with the administration of intravenous contrast. COMPARISON: 01/19/2021 HISTORY: ORDERING SYSTEM PROVIDED HISTORY: Rule out METS, right sided weakness with right sided clonus BLE TECHNOLOGIST PROVIDED HISTORY: Reason for exam:->Rule out METS, right sided weakness with right sided clonus BLE Reason for Exam: Rule out METS, right sided weakness with right sided clonus BLE gfr >60 13 ml prohance-pt intubated FINDINGS: INTRACRANIAL STRUCTURES/VENTRICLES:  There is no acute infarct. No mass effect or midline shift. No evidence of an acute intracranial hemorrhage. The ventricles and sulci are normal in size and configuration. The sellar/suprasellar regions appear unremarkable. The normal signal voids within the major intracranial vessels appear maintained. No abnormal focus of enhancement is seen within the brain. Mild chronic microvascular disease is identified within the periventricular white matter. Mild cerebral atrophy is noted. ORBITS: The visualized portion of the orbits demonstrate no acute abnormality. SINUSES: Moderate-sized bilateral mastoid effusions are identified. BONES/SOFT TISSUES: The bone marrow signal intensity appears normal. The soft tissues demonstrate no acute abnormality. No intracranial metastatic disease. Stable mild chronic microvascular disease within the periventricular white matter. Mild atrophy.      Echo 2d W Doppler W Color W Contrast    Result Date: 4/12/2021  Transthoracic Echocardiography Report (TTE)  Demographics   Patient Name       Rigo LUTHER  Date of Study       04/12/2021   Date of Birth      1950        Gender              Female   Age                65 year(s)        Race                   Patient Number     [id number]        Room Number         2112   Visit Number       [id number]   Corporate ID       [id number]   Accession Number   [id number]        [facility] [place] [facility] [place] [facility]   Ordering Physician Tex Sweeney MD    Interpreting        Hugo                                        Physician           Catrachito JUSTIN  Procedure Type of Study   TTE procedure:ECHOCARDIOGRAM 2D W DOPPLER W COLOR W CONTRAST. Procedure Date Date: 2021 Start: 01:04 PM Study Location: Portable Technical Quality: Fair visualization Indications:Atrial fibrillation. Patient Status: Routine Height: 60 inches Weight: 133 pounds BSA: 1.57 m2 BMI: 25.97 kg/m2 HR: 96 bpm BP: 149/71 mmHg  Conclusions   Summary  Technically difficult study; patient intubated. Left ventricular systolic function is normal.  Ejection fraction is visually estimated at 55%. Mild left ventricular hypertrophy. Sclerotic aortic valve with mild aortic stenosis; mean PmmHg. Mild mitral regurgitation. Moderate tricuspid regurgitation; RVSP: 42 mmHg. No evidence of any pericardial effusion. Dilated IVC and hepatic veins. Signature   ------------------------------------------------------------------  Electronically signed by Alex Lorenzo MD  (Interpreting physician) on 2021 at 04:23 PM  ------------------------------------------------------------------   Findings   Left Ventricle  Left ventricular systolic function is normal.  Ejection fraction is visually estimated at 55%. Mild left ventricular hypertrophy. Left Atrium  Essentially normal left atrium. Right Atrium  Moderately dilated right atrium. Right Ventricle  Moderately dilated right ventricle. Aortic Valve  Sclerotic aortic valve with mild aortic stenosis; mean PmmHg. Mitral Valve  Mild mitral regurgitation. Tricuspid Valve  Moderate tricuspid regurgitation; RVSP: 42 mmHg. Pulmonic Valve  The pulmonic valve was not well visualized.    Pericardial Effusion  No evidence of any pericardial effusion. Pleural Effusion  No evidence of pleural effusion. Miscellaneous  Dilated IVC and hepatic veins.   M-Mode/2D Measurements & Calculations   LV Diastolic Dimension:  LV Systolic Dimension:  LA Dimension: 2.6 cmAO Root  4.63 cm                  3.71 cm                 Dimension: 3.1 cmLA Area:  LV FS:19.9 %             LV Volume Diastolic: 38 04.1 cm2  LV PW Diastolic: 5.90 cm ml  LV PW Systolic: 9.58 cm  LV Volume Systolic: 15  Septum Diastolic: 1.80   ml  cm                       LV EDV/LV EDV Index: 38  Septum Systolic: 1.3 cm  MW/58 V2JC ESV/LV ESV   LA/Aorta: 0.84  CO: 6.5 l/min            Index: 15 ml/10 m2  CI: 4.14 l/m*m2          EF Calculated (A4C):    LA volume/Index: 29 ml                           60.5 %                  /09K2  LV Area Diastolic: 16    EF Calculated (2D):  cm2                      40.8 %  LV Area Systolic: 8.89  cm2                      LV Length: 5.28 cm                            LVOT: 1.9 cm  Doppler Measurements & Calculations   MV Peak E-Wave: 85.9 AV Peak Velocity: 245 cm/s    LVOT Peak Velocity: 111  cm/s                 AV Peak Gradient: 24.01 mmHg  cm/s  MV Peak A-Wave: 106  AV Mean Velocity: 192 cm/s    LVOT Mean Velocity: 70.9  cm/s                 AV Mean Gradient: 16 mmHg     cm/s  MV E/A Ratio: 0.81   AV VTI: 38.1 cm               LVOT Peak Gradient: 6  MV Peak Gradient:    AV Area (Continuity):1.78 cm2 mmHgLVOT Mean Gradient: 2  2.95 mmHg                                          mmHg                       LVOT VTI: 23.9 cm             Estimated RVSP: 42 mmHg  MV P1/2t: 64 msec                                  Estimated RAP:15 mmHg  MVA by PHT:3.44 cm2  Estimated PASP: 41.83 mmHg   MV E' Septal                                       TR Velocity:259 cm/s  Velocity: 5.92 cm/s                                TR Gradient:26.83 mmHg  MV E' Lateral  Velocity: 6.69 cm/s  MV E/E' septal:  14.51  MV E/E' lateral:  12.84        Discharge Time of 26 minutes    Electronically signed by Bhumi Gaston MD on 5/11/2021 at 6:41 AM

## 2021-05-11 NOTE — PROGRESS NOTES
Assumed patient care at this time - report received from [facility].

## 2021-05-11 NOTE — PROGRESS NOTES
[address] [place] [address]  General Inpatient Hospice Progress Note    Date: 5/11/2021  Name: Misty Del Rosario  MRN: [id number]  YOB: 1950   Patient's PCP: Tin Calhoun MD   Oncology: Dr Gomez Sessions  Hospitalist: Dr Ellen Recio. [facility]  Acute care admit date: 4/26 to 5/3/2021 and 4/10 to 4/21/2021 (6 admissions since 12/1/2020)  Admit Date: 5/4/2021 to General Inpatient Hospice    Subjective: The patient is minimally responsive, briefly moving her eyelids with my exam. She is not eating. She has upper airway noise with shallow respirations and is hypoxic. There is no furrowed brow. The patient's friend and support person, Corey Zaldviar is at the bedside, and states that the patient's youngest sister arrived from [place] last evening. I collaborated with the patient's nurse and the hospice nurse. Objective:   Pain and acute dyspnea is managed with Morphine IV scheduled and prn with 3 prn doses in the past 24 hours, Glycopyrrolate IV is available for secretions, and Buspar and Lorazepam are available for anxiety. Data reviewed 5/11/2021:  MRI brain 4/13/21:  No intracranial metastatic disease.  Stable mild chronic microvascular disease within the periventricular white matter.  Mild atrophy.      Chest X-ray  5/3/21:  Unchanged right basilar opacity again correlating with patient's known malignancy. Otherwise, no acute cardiopulmonary process      PET scan on October 22, 2020 showed  1. Dominant 1.6 cm nodules in the medial right upper lobe and central right   lower lobe are hypermetabolic and suspicious for malignancy, possibly   synchronous lung cancers. 2. A 3 x 2 cm precarinal merrick mass is intensely hypermetabolic and   consistent with metastatic disease. 3. A smaller 1 cm nodule in the lateral right lower lobe has not   significantly changed since 2009 and demonstrates only minimal uptake,   consistent with benign etiology.    4. Patchy ground-glass opacity throughout the left lung demonstrates mild   uptake and is favored to be infectious or inflammatory. 5. No distant metastatic disease.      Final Cytologic Diagnosis 21:   A. Mediastinal mass, fine needle aspirate with cell block:   -     SMALL CELL CARCINOMA.      B.  Bronchoalveolar lavage with cell block:   -     Few atypical cells seen.      Transthoracic Echocardiogram 21   Technically difficult study; patient intubated.   Left ventricular systolic function is normal.   Ejection fraction is visually estimated at 55%.   Mild left ventricular hypertrophy.   Sclerotic aortic valve with mild aortic stenosis; mean PmmHg.   Mild mitral regurgitation.   Moderate tricuspid regurgitation; RVSP: 42 mmHg.   No evidence of any pericardial effusion.   Dilated IVC and hepatic veins.     CT Chest 4/10/21  Enlarging dominant right lower lobe pulmonary nodule measuring up to 24 mm in diameter previously measuring up to 16 mm in diameter suggesting progression of primary malignancy.   New ground-glass opacity peripheral to the lesion in the right lower lobe that may represent post treatment changes.  This can be seen with radiation pneumonitis. Recommend clinical correlation.   Stable spiculated right apical and right lower lobe smaller pulmonary nodules suggesting stable metastatic disease.   No pulmonary embolism.      ABG:      Recent Labs     21  1345   PH 7.37   PO2ART 63*   KAV2BKQ 83.0*   O2SAT 91.4*               Lab Results   Component Value Date     ALKPHOS 86 2021     ALT 20 2021     AST 15 2021     PROT 4.7 2021     PROT 6.5 2015     BILITOT 0.1 2021     BILIDIR 0.1 2015     IBILI 0.3 2015     LABALBU 3.0 2021      CBC with Differential:          Lab Results   Component Value Date     WBC 12.4 2021     RBC 3.24 2021     HGB 9.3 2021     HCT 30.5 2021      2021     MCV 94.1 2021     MCH 28.7 2021     MCHC 30.5 2021   RDW 14.4 04/29/2021     NRBC 3 04/26/2021     SEGSPCT 83.0 04/29/2021     BANDSPCT 3 04/29/2021     LYMPHOPCT 10.0 04/29/2021     PROMYELOPCT 1 04/29/2021     MONOPCT 2.0 04/29/2021     MYELOPCT 1 04/29/2021     BASOPCT 0.1 04/17/2021     MONOSABS 0.2 04/29/2021     LYMPHSABS 1.2 04/29/2021     EOSABS 0.1 04/28/2021     BASOSABS 0.0 04/17/2021     DIFFTYPE MANUAL DIFFERENTIAL 04/29/2021      BMP:          Lab Results   Component Value Date      04/27/2021     K 3.6 04/27/2021     CL 97 04/27/2021     CO2 42 04/27/2021     BUN 32 04/27/2021     LABALBU 3.0 04/26/2021     CREATININE 0.6 04/27/2021     CALCIUM 8.4 04/27/2021     GFRAA >60 04/27/2021     LABGLOM >60 04/27/2021     GLUCOSE 73 04/27/2021         Physical Exam:   BP (!) 112/53   Pulse 115   Temp 97.4 °F (36.3 °C) (Oral)   Resp 16   Ht 5' (1.524 m)   Wt 121 lb 4.1 oz (55 kg)   SpO2 (!) 88%   BMI 23.68 kg/m²   General: minimally responsive, no facial grimacing, chronically ill appearing  Skin: pale, decreased subcutaneous fat  HEENT: Mucous membranes are dry, sclerae are clear, mild conjunctival pallor, + alopecia  Heart: distant tones, tachycardic IRRR, S1S2, no murmurs  Lungs:  Distant shallow coarse breath sounds bilaterally, diminished at the bases, with basilar rales, scattered rhonchi, end expiratory wheeze,   Abdomen: soft, bowel sounds quietly present, no apparent tenderness, nondistended  /rectal: deferred  Extremities:  Feet are warm, no mottling, no pedal edema  Neurologic: minimally responsive, arms are flaccid this morning        Assessment/Plan:  1. Small cell lung cancer of the right lung diagnosed January 2021, with acute superimposed on chronic hypercarbic and hypoxic respiratory failure. The patient has ongoing decline and is continued on General Inpatient Hospice for the acute management of pain, shortness of breath, congestion, respiratory failure.  Continue scheduled Morphine and Lorazepam and continue comfort medications, stop less important oral medications. PPS 10% and prognosis is grim and the patient continues to decline daily. 2. Advanced COPD  3. Severe anemia of 5.5 gm/dl on admission, transfused PRBC. 4. Paroxysmal atrial fibrillation. Given her declining status and hospice plan of care, stopped anticoagulation, continue rate control meds as able. 5. Possible seizure April 2021 with no metastases reported on MRI brain and EEG with mild diffuse slowing. Seen by Neurology and Levetiracetam stopped. 6. History of hypertension, esophagitis,  hyperlipidemia, AAA  7. DNR-comfort care        Patient Active Problem List   Diagnosis Code    Hyperlipidemia E78.5    COPD (chronic obstructive pulmonary disease) ([facility] J44.9    Leg pain M79.606    Hypertension I10    Shortness of breath R06.02    Tobacco abuse Z72.0    Abdominal aortic aneurysm (AAA) without rupture (HCC) I71.4    Osteopenia M85.80    Anxiety F41.9    Acute on chronic respiratory failure with hypoxia (HCC) J96.21    Lung mass R91.8    Acute exacerbation of chronic obstructive pulmonary disease (COPD) (HCC) J44.1    Small cell lung cancer ([facility] C34.90    Acute on chronic respiratory failure with hypoxia and hypercapnia (HCC) J96.21, J96.22    Acute hypoxemic respiratory failure (HCC) J96.01    Acute hyperkalemia E87.5    Hypernatremia E87.0    SOB (shortness of breath) R06.02    Hospice care patient Z51.5       CEDRIC Garrett MD  5/11/2021

## 2021-05-12 NOTE — DISCHARGE SUMMARY
[facility]    Death Summary    Date: 5/12/2021  Name: Jennifer Garcia  MRN: [id number]  YOB: 1950     Patient's PCP: Juan Antonio Benz MD   Oncology: Dr Evelyn Joyce  Acute care admit date: 4/26 to 5/3/2021 and 4/10 to 4/21/2021 (6 admissions since 12/1/2020)  Admit Date: 5/4/2021 to [address] [place] [address]  Date of Death: 5/12/2021  Time: 6546  Admitting Physician: Calin Joyce MD to [address] [place] [address]  Discharge Physician: Jason Moerl MD  Consultation: none during General Inpatient Hospice stay    Invasive procedures: none during General Inpatient Hospice stay    Discharge Diagnoses:  1. Small cell lung cancer of the right lung diagnosed January 2021, with acute superimposed on chronic hypercarbic and hypoxic respiratory failure. 2. Advanced COPD  3. Severe anemia of 5.5 gm/dl on admission, transfused PRBC. 4. Paroxysmal atrial fibrillation  5. History of hypertension, esophagitis,  hyperlipidemia, AAA      Patient Active Problem List   Diagnosis Code    Hyperlipidemia E78.5    COPD (chronic obstructive pulmonary disease) ([facility] J44.9    Leg pain M79.606    Hypertension I10    Shortness of breath R06.02    Tobacco abuse Z72.0    Abdominal aortic aneurysm (AAA) without rupture (HCC) I71.4    Osteopenia M85.80    Anxiety F41.9    Acute on chronic respiratory failure with hypoxia (HCC) J96.21    Lung mass R91.8    Acute exacerbation of chronic obstructive pulmonary disease (COPD) (HCC) J44.1    Small cell lung cancer (HCC) C34.90    Acute on chronic respiratory failure with hypoxia and hypercapnia (HCC) J96.21, J96.22    Acute hypoxemic respiratory failure (HCC) J96.01    Acute hyperkalemia E87.5    Hypernatremia E87.0    SOB (shortness of breath) R06.02    Hospice care patient Z51.5       Brief History, reason for admission: Please see the acute care H+P, discharge summary, consultants notes, and my notes.  The patient was admitted to General Inpatient Hospice. This is a 65year old patient with small cell lung cancer of the right lung, Stage III B, diagnosed in January 2021, on chemotherapy, and related co morbidities of advanced COPD, Acute superimposed on chronic respiratory failure, tobacco use, seizure in April 2021 with no CNS metastases by MRI brain 4/13/21, atrial fibrillation. Additional diagnoses include hypertension, abdominal aortic aneurysm of 2.6 cm, hyperlipidemia.        The patient had an admission early April 2021 and was on mechanical ventilation. She was discharged, and readmitted 5 days later on 4/26/21, with shortness of breath, acute superimposed on chronic respiratory failure, and was anemic at 5.5 gm/dl without active bleeding. The patient was on anticoagulation for paroxysmal atrial fibrillation, and she was transfused PRBC. The patient has had aggressive supportive care, nebulized bronchodilators, steroids, BiPap (which the patient has been removing).        Hospice was recommended, and the hospice nurse liaison had met with the patient and daughter over the weekend, and the plan was for home hospice on 5/3/21, but the patient has clinically worsened with acute dyspnea, air hunger, hypercarbic and hypoxic respiratory failure. Chest X-ray  Shows right basilar opacity consistent with her known lung cancer.       Discussion was had by Dr Espinoza Ashley. Javi with the patient and her POA daughter, [facility], regarding aggressive care with mechanical ventilation, and that was declined after the discussion. The hospice nurse liaison followed up and discussed possible General Inpatient Hospice, for management of respiratory distress, and they were agreeable. I was contacted, and returned to [facility] to evaluate the patient.  She has been given Morphine 4 mg IV which has helped with her acute dyspnea and air hunger, however, the patient does not provide much information to me.        Hospice philosophy was discussed by me and also the hospice nurse liaison earlier regarding care and comfort at the end of life. Questions were answered, and emotional support was provided. They are aware that [address] is for the acute management of symptoms, and if the patient stabilizes, alternative arrangements for home Hospice or extended care facility will be necessary. The patient is DNR-comfort care status. Hospital Course: The patient was admitted to [facility] with the above, for complete details, please see the acute care History and Physical, progress notes, consultant notes and discharge summary. The patient had frequent episodes of acute dyspnea, managed with medications. The patient was treated with supportive care, comfort medications, and symptoms were managed. Emotional and spiritual support was provided to the patient and family. The patient  as noted above.     Cause of death: small cell lung cancer    Significant Diagnostic Studies:  See computerized record in Kristina Milner MD  2021

## 2022-11-14 NOTE — PROGRESS NOTES
[facility]  Hematology/Oncology  Consult Note    Patient Name: Tessa Enriquez  MRN: [id number]  Admission Date: 11/13/2022  Hospital Length of Stay: 0 days  Code Status: Full Code   Attending Provider: Linda Bellamy MD  Consulting Provider: Harmony Badillo NP  Primary Care Physician: Karma Mcdermott MD  Principal Problem:Chemotherapy-induced diarrhea    Consults  Subjective:     HPI: Tessa Enriquez is a 62 year old white female with a PMHx significant for colon cancer on chemotherapy, HLD, and thyroid disease presenting for diarrhea for 4 days. She follows Dr. Frank for her colon cancer. She reports she received her most recent infusion of chemotherapy on 11/9/22. Following the infusion, she began to have increased diarrhea. She states she normally has a degree of diarrhea after chemo infusion but the latest one has been more severe. She describes the bowel movements as black water. She reports 4 Bowel movements since sitting in ED. She states anything by mouth makes her have a bowel movement. She states she tried Lomotil, loperamide and peto bismul without any relief of diarrhea. She complains of anal irritation for frequent wiping following bowel movements.  She denies any bloody stools, abdominal pain, N/V, fever/chills, or palpitations. She denies any recent antibiotic use. ED workup significant for tachycardia and hypokalemia. CBC with mild leukocytosis. C diff panel and stool cultures ordered in ED. Plan to start patient on oral K+ replacement and IVFs. Per med rec     11/14/2022:  As time of my interview patient is in bed eating lunch with no obvious signs of distress.  Patient continues to complain of diarrhea and skin breakdown due to diarrhea.  Patient states she is incontinent over stool at this time.    Oncology Treatment Plan:   OP COLORECTAL FOLFIRI + PANITUMUMAB Q2W    Medications:  Continuous Infusions:   sodium chloride 0.9% 125 mL/hr at 11/13/22 2014     Scheduled Meds:    Stroke in her father, mother, and sister. Allergies   Allergen Reactions    Formaldehyde     Gabapentin Other (See Comments)    Norflex Tablets [Orphenadrine]     Cranberry Rash     Social History:    Reviewed; no changes    Objective:   PHYSICAL EXAM:        VITALS:  /61   Pulse 79   Temp 98.7 °F (37.1 °C) (Axillary)   Resp 17   Ht 5' (1.524 m)   Wt 130 lb 11.2 oz (59.3 kg)   SpO2 96%   BMI 25.53 kg/m²     24HR INTAKE/OUTPUT:      Intake/Output Summary (Last 24 hours) at 4/21/2021 1026  Last data filed at 4/21/2021 2436  Gross per 24 hour   Intake 460 ml   Output 3250 ml   Net -2790 ml       CONSTITUTIONAL:  Alert. LUNGS:  decreased breath sounds. CARDIOVASCULAR:  normal S1 and S2 and negative JVD. ABD:Abdomen soft, non-tender. BS normal. No masses,  No organomegaly. Regan Grandchild. DATA:    CBC:  Recent Labs     04/19/21  0502 04/20/21  0608 04/21/21  0537   WBC 1.6* 1.1* 0.8*   RBC 3.01* 3.23* 2.75*   HGB 8.7* 9.3* 8.1*   HCT 27.4* 29.3* 24.9*   PLT 97* 85* 96*   MCV 91.0 90.7 90.5   MCH 28.9 28.8 29.5   MCHC 31.8* 31.7* 32.5   RDW 13.7 13.3 13.7   SEGSPCT 80.0* 57.0 37.0   BANDSPCT 3* 3* 5      BMP:  Recent Labs     04/19/21  0430 04/20/21  0608 04/21/21  0537    142 139   K 3.4* 4.2 4.2   CL 97* 97* 96*   CO2 44* 42* 43*   BUN 19 25* 27*   CREATININE 0.5* 0.5* 0.5*   CALCIUM 8.0* 8.5 8.1*   GLUCOSE 137* 126* 109*      ABG:  No results for input(s): PH, PO2ART, XYL5NOQ, HCO3, BEART, O2SAT in the last 72 hours. Lab Results   Component Value Date    PROBNP 8,008 (H) 04/13/2021    PROBNP 3,057 (H) 04/11/2021    PROBNP 2,265 (H) 04/10/2021    THEOPH 6.6 (L) 04/17/2021     No results found for: [facility]    Radiology Review:  Pertinent images / reports were reviewed as a part of this visit.     Assessment:     Patient Active Problem List   Diagnosis    Hyperlipidemia    COPD (chronic obstructive pulmonary disease) (HCC)    Leg pain    Hypertension    Shortness of breath    Tobacco enoxaparin  40 mg Subcutaneous Daily    ferrous sulfate  1 tablet Oral Daily    gabapentin  300 mg Oral QHS    levothyroxine  125 mcg Oral Before breakfast    loperamide  4 mg Oral Once    potassium chloride  40 mEq Oral Daily     PRN Meds:acetaminophen, dextrose 10%, dextrose 10%, glucagon (human recombinant), glucose, glucose, HYDROcodone-acetaminophen, HYDROcodone-acetaminophen, loperamide, magnesium oxide, magnesium oxide, naloxone, ondansetron, potassium bicarbonate, potassium bicarbonate, potassium bicarbonate, potassium, sodium phosphates, potassium, sodium phosphates, potassium, sodium phosphates, prochlorperazine, sodium chloride 0.9%     Review of patient's allergies indicates:   Allergen Reactions    Ativan [lorazepam] Hives and Itching    Flagyl [metronidazole] Itching and Rash    Pcn [penicillins] Hives, Itching and Rash        Past Medical History:   Diagnosis Date    Acute hypoxemic respiratory failure 07/09/2018    Arthritis     Cancer     colon    COPD (chronic obstructive pulmonary disease)     History of home oxygen therapy     ONLY PRN AND HASN'T USED IT IN A YEAR    Hyperlipidemia     Thyroid disease      Past Surgical History:   Procedure Laterality Date    CHOLECYSTECTOMY      COLECTOMY N/A 11/9/2020    Procedure: COLECTOMY;  Surgeon: Brandon Wagner MD;  Location: [facility];  Service: General;  Laterality: N/A;  LOWER- ANTERIOR    COLONOSCOPY N/A 10/23/2020    Procedure: COLONOSCOPY;  Surgeon: Jolynn Ayala MD;  Location: [facility];  Service: Endoscopy;  Laterality: N/A;    COLONOSCOPY N/A 8/6/2021    Procedure: COLONOSCOPY;  Surgeon: Jolynn Ayala MD;  Location: [facility] ENDO;  Service: Endoscopy;  Laterality: N/A;    COLONOSCOPY N/A 7/7/2022    Procedure: COLONOSCOPY;  Surgeon: Jolynn Ayala MD;  Location: [facility];  Service: Endoscopy;  Laterality: N/A;    ESOPHAGEAL DILATION N/A 6/29/2021    Procedure: DILATION, ESOPHAGUS;  Surgeon: Sourav Baires III, MD;  Location: [facility]  ENDO;  Service: Endoscopy;  Laterality: N/A;    ESOPHAGOGASTRODUODENOSCOPY  2021    Dilation to 57 Fr    ESOPHAGOGASTRODUODENOSCOPY N/A 2021    Procedure: EGD (ESOPHAGOGASTRODUODENOSCOPY);  Surgeon: Sourav Baires III, MD;  Location: [facility] ENDO;  Service: Endoscopy;  Laterality: N/A;    FLEXIBLE SIGMOIDOSCOPY N/A 2022    Procedure: SIGMOIDOSCOPY, FLEXIBLE;  Surgeon: Brandon Wagner MD;  Location: [facility] ENDO;  Service: Endoscopy;  Laterality: N/A;    HYSTERECTOMY      INSERTION OF TUNNELED CENTRAL VENOUS CATHETER (CVC) WITH SUBCUTANEOUS PORT N/A 2021    Procedure: [id number]-CATH;  Surgeon: Brandon Wagner MD;  Location: [facility] OR;  Service: General;  Laterality: N/A;    INSERTION OF TUNNELED CENTRAL VENOUS CATHETER (CVC) WITH SUBCUTANEOUS PORT Right 2022    Procedure: [id number]-CATH;  Surgeon: Brandon Wagner MD;  Location: [facility] OR;  Service: General;  Laterality: Right;  Wants rt side placement    MEDIPORT REMOVAL Left 2021    Procedure: REMOVAL, CATHETER, CENTRAL VENOUS, TUNNELED, WITH PORT;  Surgeon: Brandon Wagner MD;  Location: [facility] OR;  Service: General;  Laterality: Left;    ROBOT-ASSISTED COLECTOMY N/A 2020    Procedure: ROBOTIC COLECTOMY low anterior resection, possible open;  Surgeon: Brandon Wagner MD;  Location: [facility] OR;  Service: General;  Laterality: N/A;  CONVERTED TO OPEN AT 1503     Family History       Problem Relation (Age of Onset)    Breast cancer Maternal Grandmother    COPD Mother, Father    Prostate cancer Brother          Tobacco Use    Smoking status: Former     Packs/day: 2.00     Years: 40.00     Pack years: 80.00     Types: Cigarettes     Quit date: 2017     Years since quittin.3    Smokeless tobacco: Never   Substance and Sexual Activity    Alcohol use: No    Drug use: No    Sexual activity: Not Currently     Partners: Male       Review of Systems  As per mentioned in HPI; all other systems reviewed and negative  Objective:  abuse    Abdominal aortic aneurysm (AAA) without rupture (HCC)    Degeneration of lumbar or lumbosacral intervertebral disc    Osteopenia    Anxiety    Acute on chronic respiratory failure with hypoxia (HCC)    Lung mass    COPD exacerbation (HCC)    Acute exacerbation of chronic obstructive pulmonary disease (COPD) (HCC)    Small cell lung cancer (HCC)    Acute on chronic respiratory failure with hypoxia and hypercapnia (HCC)    Acute hypoxemic respiratory failure (HCC)    Pneumonia of both lungs due to infectious organism    Elevated troponin level    Elevated brain natriuretic peptide (BNP) level    Acute hyperkalemia    Sepsis (HCC)    Pneumonitis    Ventilator dependent (HCC)    Hypernatremia       Plan:   1. Overall the patient is better  2. Inc. Activity. 3. Discussed with the family.   Roland Lechuga MD  4/21/2021  10:26 AM     Vital Signs (Most Recent):  Temp: 98.2 °F (36.8 °C) (11/14/22 0737)  Pulse: 81 (11/14/22 0737)  Resp: 16 (11/14/22 0954)  BP: (!) 118/56 (11/14/22 0737)  SpO2: 97 % (11/14/22 0737)   Vital Signs (24h Range):  Temp:  [96.6 °F (35.9 °C)-98.2 °F (36.8 °C)] 98.2 °F (36.8 °C)  Pulse:  [79-89] 81  Resp:  [16-18] 16  SpO2:  [97 %-98 %] 97 %  BP: (112-156)/(56-74) 118/56     Weight: 83 kg (182 lb 15.7 oz)  Body mass index is 35.74 kg/m².  Body surface area is 1.87 meters squared.      Intake/Output Summary (Last 24 hours) at 11/14/2022 1433  Last data filed at 11/14/2022 0547  Gross per 24 hour   Intake 1313.75 ml   Output --   Net 1313.75 ml       Physical Exam  PHYSICAL EXAM:     Vitals:    11/14/22 0954   BP:    Pulse:    Resp: 16   Temp:        GENERAL: Comfortable looking patient. Patient is in no distress.  Awake, alert and oriented to time, person and place.  No anxiety, or agitation.      HEENT: Normal conjunctivae and eyelids. WNL.  PERRLA 3 to 4 mm. No icterus, no pallor, no congestion, and no discharge noted.     NECK:  Supple. Trachea is central.  No crepitus.  No JVD or masses.    RESPIRATORY:  No intercostal retractions.  No dullness to percussion.  Chest is clear to auscultation.  No rales, rhonchi or wheezes.  No crepitus.  Good air entry bilaterally.    CARDIOVASCULAR:  S1 and S2 are normally heard without murmurs or gallops.  All peripheral pulses are present.    ABDOMEN:  Normal abdomen.  No hepatosplenomegaly.  No free fluid.  Bowel sounds are present.  No hernia noted. No masses.  No rebound or tenderness.  No guarding or rigidity.  Umbilicus is midline.    LYMPHATICS:  No axillary, cervical, supraclavicular, submental, or inguinal lymphadenopathy.    SKIN/MUSCULOSKELETAL:  Erythematous rash noted in genital area.  She states this is painful    NEUROLOGIC:  Higher functions are appropriate.  No cranial nerve deficits.  Normal yandel.  Normal strength.  Motor and sensory functions are normal.  Deep  tendon reflexes are normal.    GENITAL/RECTAL:  Exams are deferred.   Significant Labs:   CBC:   Recent Labs   Lab 11/13/22  1309 11/14/22  0536   WBC 13.01* 5.42   HGB 13.6 10.4*   HCT 40.4 32.3*    155    and CMP:   Recent Labs   Lab 11/13/22  1309 11/14/22  0536    138   K 3.1* 3.8    110   CO2 20* 23   * 96   BUN 10 5*   CREATININE 0.7 0.6   CALCIUM 9.2 8.2*   PROT 7.1 5.3*   ALBUMIN 3.8 2.9*   BILITOT 0.8 0.4   ALKPHOS 160* 100   AST 11 11   ALT 11 9*   ANIONGAP 14 5*       Diagnostic Results:  I have reviewed all pertinent imaging results/findings within the past 24 hours.    Assessment/Plan:     Active Diagnoses:    Diagnosis Date Noted POA    PRINCIPAL PROBLEM:  Chemotherapy-induced diarrhea [K52.1, T45.1X5A] 11/13/2022 Yes    Dehydration [E86.0] 09/26/2022 Yes    Severe obesity (BMI 35.0-39.9) with comorbidity [E66.01] 06/01/2022 Yes    Malignant neoplasm of colon [C18.9] 08/06/2021 Yes    Rectal cancer [C20] 12/11/2020 Yes    Acquired hypothyroidism [E03.9] 10/15/2020 Yes    COPD (chronic obstructive pulmonary disease) [J44.9] 07/09/2018 Yes      Problems Resolved During this Admission:       Chemotherapy induced diarrhea:   -C diff was negative  -stool study was positive for neutrophil  -diarrhea is likely inflammatory in nature induced by CPT 11..  Doubtful that is infectious in nature due to patient remaining afebrile  -continue supportive care  -alternate Imodium and Lomotil  -follow-up in clinic next week  -MD will dose reduced CPT 11    Case discussed with dr reed   Thank you for your consult. I will sign off. Please contact us if you have any additional questions.    Harmony Badillo NP  Hematology/Oncology  [facility]    I have discussed patient with my nurse practitioner reviewed labs discuss plans will dose reduce patient's iron not take and for next visit she will follow-up with clinic

## 2023-07-06 NOTE — PROGRESS NOTES
2023     Lindy Bush (:  1949) is a 74 y.o. female, here for evaluation of the following medical concerns:    HPI  Patient presented to the clinic for follow up on chronic medical conditions. She stated that again she developed mild erythema and edema in left arm that occurred after the COVID booster. Right knee pain- edema, osteoarthritis, has had this for years, believes this is worse, having more problems with ambulation. Followed with orthopedist, wants conservative treatment. Asthma-  patient feels well today,  patient feels much improved, decided not to have imaging studies, understands the need but is not wanting these. She understands her decision. h denied fever or chills, Denied rhinorrhea or nasal congestion. Right leg cellulitis- patient hasn't been back to the wound clinic, last note stated that it had closed, secondary to venous insuff. She is wearing a bandage today, stated it has become worse but unable to view due to extensive bandage. Today, chest pain sob, n, v, or diarrhea. Review of Systems   Constitutional:  Negative for activity change, fatigue, fever and unexpected weight change. Eyes:  Negative for visual disturbance. Respiratory:  Negative for cough and shortness of breath. Cardiovascular:  Negative for chest pain and leg swelling. Gastrointestinal:  Negative for abdominal pain, diarrhea, nausea and vomiting. Musculoskeletal:  Positive for arthralgias and gait problem. Skin:  Positive for rash and wound. Neurological:  Negative for dizziness, numbness and headaches. Psychiatric/Behavioral:  Negative for dysphoric mood. The patient is not nervous/anxious. Prior to Visit Medications    Medication Sig Taking? Authorizing Provider   clotrimazole-betamethasone (LOTRISONE) 1-0.05 % cream Apply topically 2 times daily.  Yes Lubna Mon MD   metOLazone (ZAROXOLYN) 5 MG tablet TAKE ONE TABLET BY MOUTH DAILY AS NEEDED FOR SWELLING Comprehensive Nutrition Assessment    Type and Reason for Visit:  Reassess    Nutrition Recommendations/Plan:   Add wound healing oral nutrition supplement BID  Continue current diet and standard oral nutrition supplement as ordered  Encourage po intake as able  Will monitor po intake, nutrition status, poc    Nutrition Assessment:  pt on general diet with standard high calorie oral nutrition supplement consuming ~% of meals documented in the past 72 hr, visited pt at bedside, reports UBW ~116#,  reports good appetite, reports eating more than 50% of her meals and drinking ~2 oral nutrition supplements daily,  noted hospice consult, denies any nutritional needs at this time, pt remains at high nutrition risk    Malnutrition Assessment:  Malnutrition Status: Moderate malnutrition    Context:  Chronic Illness       Estimated Daily Nutrient Needs:  Energy (kcal):  1115-6064 (25-30 kcal/kg); Weight Used for Energy Requirements: Current     Protein (g):  67-83 (1.2-1.5 g/kg); Weight Used for Protein Requirements:  Current          Wounds:  Pressure Injury, Stage II       Current Nutrition Therapies:    Dietary Nutrition Supplements: Standard High Calorie Oral Supplement  DIET GENERAL; Anthropometric Measures:  · Height: 5' (152.4 cm)  · Current Body Weight: 122 lb 9.2 oz (55.6 kg)   · Admission Body Weight: 121 lb 4.1 oz (55 kg)(first measured weight)    · Usual Body Weight: 126 lb 12.8 oz (57.5 kg)(1/22/21)     · Ideal Body Weight: 100 lbs; % Ideal Body Weight 122.6 %   · BMI: 23.9   · BMI Categories: Normal Weight (BMI 18.5-24. 9)       Nutrition Diagnosis:   · Moderate malnutrition, In context of chronic illness related to impaired respiratory function, inadequate protein-energy intake as evidenced by poor intake prior to admission, weight loss, mild muscle loss, mild loss of subcutaneous fat    Nutrition Interventions:   Food and/or Nutrient Delivery:  Continue Current Diet, Continue Oral Nutrition Supplement, Start Oral Nutrition Supplement  Nutrition Education/Counseling:  No recommendation at this time   Coordination of Nutrition Care:  Continue to monitor while inpatient    Goals:  pt will consume greater than 50-75% of meals and supplements       Nutrition Monitoring and Evaluation:   Behavioral-Environmental Outcomes:  None Identified   Food/Nutrient Intake Outcomes:  Supplement Intake, Food and Nutrient Intake  Physical Signs/Symptoms Outcomes:  Biochemical Data, Weight, Skin, GI Status, Hemodynamic Status     Discharge Planning:     Too soon to determine     Electronically signed by Aminata Daniel MS, RD, LD on 4/30/21 at 10:21 AM EDT    Contact: [phone]

## 2023-08-30 NOTE — PROGRESS NOTES
joint replacement (Right, 2014? ?); Foot surgery (Right, 1970's); bronchoscopy (N/A, 1/5/2021); and Upper gastrointestinal endoscopy (N/A, 3/3/2021). Social History:   reports that she quit smoking about 4 months ago. Her smoking use included cigarettes. She started smoking about 47 years ago. She has a 70.50 pack-year smoking history. She has never used smokeless tobacco. She reports current alcohol use. She reports that she does not use drugs. Family history:  family history includes COPD in her sister; Cancer in her father and sister; Coronary Art Dis in her mother; Dementia in her mother; Diabetes in her sister; Heart Attack in her sister; Heart Attack (age of onset: 61) in her father and mother; High Cholesterol in her mother; Hypertension in her mother, sister, sister, and sister; Irritable Bowel Syndrome in her sister; Pacemaker in her sister; Stroke in her father, mother, and sister. Allergies   Allergen Reactions    Formaldehyde     Gabapentin Other (See Comments)    Norflex Tablets [Orphenadrine]     Cranberry Rash       Review of Systems:  Review of Systems   All other systems reviewed and are negative. BP (!) 180/81   Pulse 93   Temp 98 °F (36.7 °C) (Oral)   Resp 20   Ht 5' (1.524 m)   Wt 131 lb 14.4 oz (59.8 kg)   SpO2 96%   BMI 25.76 kg/m²       Intake/Output Summary (Last 24 hours) at 4/17/2021 0954  Last data filed at 4/17/2021 0550  Gross per 24 hour   Intake 100 ml   Output 2250 ml   Net -2150 ml       Physical Exam:  Constitutional:  Well developed, Well nourished, No acute distress  HENT:  Normocephalic, Atraumatic, Bilateral external ears normal,  Nose normal.   Neck- trachea is midline  Eyes:  PERRL, Conjunctiva normal  Respiratory: scattered rhonchi, No respiratory distress, No wheezing, No chest tenderness.    Cardiovascular:  Normal heart rate, irregular, no murmurs appreciated, No rubs appreciated, No gallops appreciated, JVP not elevated  Abdomen/GI:  Soft, No tenderness  Musculoskeletal:  Intact distal pulses, no edema, No tenderness, No cyanosis, No clubbing. Integument:  Warm, Dry  Lymphatic:  No lymphadenopathy noted.    Neurologic:  Alert & oriented  Psychiatric:  Affect and Mood :pleasant     Medications:    magnesium sulfate  2,000 mg Intravenous Q2H    [START ON 4/18/2021] magnesium oxide  400 mg Oral Daily    lisinopril  5 mg Oral Daily    theophylline  200 mg Oral BID    methylPREDNISolone  40 mg Intravenous Q8H    metoprolol tartrate  50 mg Oral BID    pantoprazole  40 mg Oral QAM AC    vancomycin  750 mg Intravenous Q12H    ipratropium-albuterol  1 ampule Inhalation Q4H WA    sodium chloride flush  5-40 mL Intravenous 2 times per day    nicotine  1 patch Transdermal Daily    apixaban  5 mg Oral BID    aspirin  81 mg Oral Daily    busPIRone  10 mg Oral BID    calcium-cholecalciferol  1 tablet Oral Daily    vitamin D  1,000 Units Oral Daily    dilTIAZem  240 mg Oral Daily    lactobacillus  1 capsule Oral Daily with breakfast    nystatin  5 mL Oral 4x Daily    sucralfate  1 g Oral 4x Daily    meropenem  1,000 mg Intravenous Q8H    atorvastatin  10 mg Oral Nightly    sodium chloride flush  5-40 mL Intravenous 2 times per day      sodium chloride      sodium chloride      dextrose      sodium chloride       labetalol, HYDROcodone-acetaminophen, LORazepam, potassium chloride, magnesium sulfate, sodium chloride, metoprolol, sodium chloride flush, sodium chloride flush, sodium chloride, promethazine **OR** ondansetron, polyethylene glycol, acetaminophen **OR** acetaminophen, diphenhydrAMINE, guaiFENesin, glucose, dextrose, glucagon (rDNA), dextrose, magic (miracle) mouthwash, sodium chloride flush, sodium chloride, LORazepam    Lab Data:  CBC:   Recent Labs     04/15/21  0400 04/16/21  0545 04/17/21  0445   WBC 11.5* 9.4 6.7   HGB 9.4* 9.8* 9.5*   HCT 28.8* 30.0* 29.4*   MCV 91.4 90.9 91.3   PLT 85* 140 111*     BMP:   Recent Labs Extremities:  trace edema  Pulses; palpable  Neuro: grossly normal      MEDICAL DECISION MAKING;    I agree with the above plan, which was planned by myself and discussed with NP.     Cont OAC   12 beats Non sustained ventricular tachycardia - correct electrolytes Mg 1.4 today     Dr. Joanna Pratt MD Spiral Flap Text: Due to geometric and functional constraints, a flap reconstruction was performed to reconstruct the defect.  To that end, adjacent tissue was incised and carried over to close the defect in the following manner: The defect edges were debeveled with a #15 scalpel blade.  Given the location of the defect, shape of the defect and the proximity to free margins a spiral flap was deemed most appropriate.  Using a sterile surgical marker, an appropriate rotation flap was drawn incorporating the defect and placing the expected incisions within the relaxed skin tension lines where possible. The area thus outlined was incised deep to adipose tissue with a #15 scalpel blade.  The skin margins were undermined to an appropriate distance in all directions utilizing iris scissors.

## 2023-12-13 NOTE — PLAN OF CARE
Problem: Falls - Risk of:  Goal: Will remain free from falls  Description: Will remain free from falls  Outcome: Ongoing  Goal: Absence of physical injury  Description: Absence of physical injury  Outcome: Ongoing     Problem: Pain:  Description: Pain management should include both nonpharmacologic and pharmacologic interventions. Goal: Pain level will decrease  Description: Pain level will decrease  Outcome: Ongoing  Goal: Control of acute pain  Description: Control of acute pain  Outcome: Ongoing  Goal: Control of chronic pain  Description: Control of chronic pain  Outcome: Ongoing     Problem: Discharge Planning:  Goal: Discharged to appropriate level of care  Description: Discharged to appropriate level of care  Outcome: Ongoing     Problem: Activity Intolerance:  Goal: Ability to tolerate increased activity will improve  Description: Ability to tolerate increased activity will improve  Outcome: Ongoing     Problem:  Activity Intolerance:  Goal: Ability to tolerate increased activity will improve  Description: Ability to tolerate increased activity will improve  Outcome: Ongoing     Problem: Airway Clearance - Ineffective:  Goal: Ability to maintain a clear airway will improve  Description: Ability to maintain a clear airway will improve  Outcome: Ongoing     Problem: Breathing Pattern - Ineffective:  Goal: Ability to achieve and maintain a regular respiratory rate will improve  Description: Ability to achieve and maintain a regular respiratory rate will improve  Outcome: Ongoing     Problem: Gas Exchange - Impaired:  Goal: Levels of oxygenation will improve  Description: Levels of oxygenation will improve  Outcome: Ongoing
Problem: Falls - Risk of:  Goal: Will remain free from falls  Description: Will remain free from falls  Outcome: Ongoing  Goal: Absence of physical injury  Description: Absence of physical injury  Outcome: Ongoing     Problem: Pain:  Goal: Pain level will decrease  Description: Pain level will decrease  Outcome: Ongoing  Goal: Control of acute pain  Description: Control of acute pain  Outcome: Ongoing  Goal: Control of chronic pain  Description: Control of chronic pain  Outcome: Ongoing     Problem: Discharge Planning:  Goal: Discharged to appropriate level of care  Description: Discharged to appropriate level of care  Outcome: Ongoing     Problem:  Activity Intolerance:  Goal: Ability to tolerate increased activity will improve  Description: Ability to tolerate increased activity will improve  Outcome: Ongoing     Problem: Airway Clearance - Ineffective:  Goal: Ability to maintain a clear airway will improve  Description: Ability to maintain a clear airway will improve  Outcome: Ongoing     Problem: Breathing Pattern - Ineffective:  Goal: Ability to achieve and maintain a regular respiratory rate will improve  Description: Ability to achieve and maintain a regular respiratory rate will improve  Outcome: Ongoing     Problem: Gas Exchange - Impaired:  Goal: Levels of oxygenation will improve  Description: Levels of oxygenation will improve  Outcome: Ongoing
: Yes

## (undated) DEVICE — ZINACTIVE USE 2539609 APPLICATOR MEDICATED 10.5 CC SOLUTION HI LT ORNG CHLORAPREP

## (undated) DEVICE — ELECTRODE ES AD CRDLSS PT RET REM POLYHESIVE

## (undated) DEVICE — STANDARD HYPODERMIC NEEDLE,POLYPROPYLENE HUB: Brand: MONOJECT

## (undated) DEVICE — YANKAUER,FLEXIBLE HANDLE,REGLR CAPACITY: Brand: MEDLINE INDUSTRIES, INC.

## (undated) DEVICE — 35 ML SYRINGE LUER-LOCK TIP: Brand: MONOJECT

## (undated) DEVICE — CUTTER ENDOSCP L340MM LIN ARTC SGL STROKE FIRING ENDOPATH

## (undated) DEVICE — GLOVE ORANGE PI 7   MSG9070

## (undated) DEVICE — DRAPE,UTILITY,XL,4/PK,STERILE: Brand: MEDLINE

## (undated) DEVICE — SYRINGE INFL 60ML DISP ALLIANCE II

## (undated) DEVICE — CORD ES L15FT PT RET REUSE VALLEYLAB REM

## (undated) DEVICE — DRESSING TRNSPAR W2XL2.75IN FLM SHT SEMIPERMEABLE WIND

## (undated) DEVICE — CONTAINER,SPECIMEN,OR STERILE,4OZ: Brand: MEDLINE

## (undated) DEVICE — INTENDED FOR TISSUE SEPARATION, AND OTHER PROCEDURES THAT REQUIRE A SHARP SURGICAL BLADE TO PUNCTURE OR CUT.: Brand: BARD-PARKER ® STAINLESS STEEL BLADES

## (undated) DEVICE — MARKER SURG SKIN UTIL REGULAR/FINE 2 TIP W/ RUL AND 9 LBL

## (undated) DEVICE — ELECTRODE ES L2.75IN S STL INSUL BLDE W/ SL EDGE

## (undated) DEVICE — PROTECTOR EYE PT SELF ADH NS OPT GRD LF

## (undated) DEVICE — DRAPE SHEET ULTRAGARD: Brand: MEDLINE

## (undated) DEVICE — GARMENT,MEDLINE,DVT,INT,CALF,MED, GEN2: Brand: MEDLINE

## (undated) DEVICE — BRUSH CYTO CATH L240CM OD2.3MM BRIST OD3MM NYL 3 RNG HNDL

## (undated) DEVICE — TUBING, SUCTION, 9/32" X 10', STRAIGHT: Brand: MEDLINE

## (undated) DEVICE — TUBING, SUCTION, 3/16" X 10', STRAIGHT: Brand: MEDLINE

## (undated) DEVICE — AGENT HEMSTAT W2XL14IN OXIDIZED REGENERATED CELOS ABSRB FOR

## (undated) DEVICE — SOLUTION IV 1000ML 0.9% SOD CHL FOR IRRIG PLAS CONT

## (undated) DEVICE — SYRINGE MED 20ML STD CLR PLAS LUERLOCK TIP N CTRL DISP

## (undated) DEVICE — APPLICATOR MEDICATED 26 CC SOLUTION HI LT ORNG CHLORAPREP

## (undated) DEVICE — STRIP SKIN CLSR W0.25XL4IN WHT SPUNBOUND FBR NYL HI ADH

## (undated) DEVICE — GAUZE,SPONGE,4"X4",16PLY,XRAY,STRL,LF: Brand: MEDLINE

## (undated) DEVICE — COUNTER NDL 30 COUNT FOAM STRP SGL MAG

## (undated) DEVICE — PACK SURG LAP CHOLE

## (undated) DEVICE — SOLUTION IV IRRIG WATER 1000ML POUR BRL 2F7114

## (undated) DEVICE — TUBING INSUFFLATOR HEAT HI FLO SET PNEUMOCLEAR

## (undated) DEVICE — BAG SPEC REM 224ML W4XL6IN DIA10MM 1 HND GYN DISP ENDOPCH

## (undated) DEVICE — SUTURE MCRYL SZ 4-0 L18IN ABSRB UD L19MM PS-2 3/8 CIR PRIM Y496G

## (undated) DEVICE — SEALER ENDOSCP L37CM NANO COAT BLNT TIP LAP DIV

## (undated) DEVICE — GLOVE SURG SZ 7 L12IN FNGR THK94MIL TRNSLUC YEL LTX HYDRGEL

## (undated) DEVICE — CHLORAPREP 26ML ORANGE

## (undated) DEVICE — SKIN AFFIX SURG ADHESIVE 72/CS 0.55ML: Brand: MEDLINE

## (undated) DEVICE — ANESTHESIA CIRCUIT ADULT-LF: Brand: MEDLINE INDUSTRIES, INC.

## (undated) DEVICE — 3M™ STERI-DRAPE™ INSTRUMENT POUCH 1018: Brand: STERI-DRAPE™

## (undated) DEVICE — HERCULES 3 STAGE BALLOON ESOPHAGEAL: Brand: HERCULES

## (undated) DEVICE — TELFA NON-ADHERENT ABSORBENT DRESSING: Brand: TELFA

## (undated) DEVICE — TOWEL,OR,DSP,ST,BLUE,STD,6/PK,12PK/CS: Brand: MEDLINE

## (undated) DEVICE — AIRLIFE™ NASAL OXYGEN CANNULA CURVED, NONFLARED TIP, WITH 7' FEET (2.1 M) CRUSH RESISTANT TUBING, OVER-THE-EAR STYLE: Brand: AIRLIFE™

## (undated) DEVICE — SUTURE SZ 0 27IN 5/8 CIR UR-6  TAPER PT VIOLET ABSRB VICRYL J603H

## (undated) DEVICE — TROCAR ENDOSCP L100MM DIA5MM BLDELSS STBL SL THRD OPT VW

## (undated) DEVICE — Z DISCONTINUED NO SUB IDED TUBING ETCO2 AD L6.5FT NSL ORAL CVD PRNG NONFLARED TIP OVR

## (undated) DEVICE — ADHESIVE SKIN CLSR 0.7ML TOP DERMBND ADV

## (undated) DEVICE — Z DISCONTINUED (USE MFG CAT MVABO)  TUBING GAS SAMPLING STD 6.5 FT FEMALE CONN SMRT CAPNOLINE

## (undated) DEVICE — PENCIL ES CRD L10FT HND SWCHING ROCK SWCH W/ EDGE COAT BLDE

## (undated) DEVICE — TOTAL TRAY, 16FR 10ML SIL FOLEY, URN: Brand: MEDLINE

## (undated) DEVICE — RELOAD STPL SZ 0 L45MM DIA3.5MM 0DEG STD REG TISS BLU TI

## (undated) DEVICE — TROCAR ENDOSCP L100MM DIA12MM BLDELSS OBT RADLUC STBL SL

## (undated) DEVICE — GOWN,SIRUS,POLYRNF,BRTHSLV,XLN/XL,20/CS: Brand: MEDLINE

## (undated) DEVICE — GLOVE SURG SZ 65 L12IN FNGR THK87MIL WHT LTX FREE

## (undated) DEVICE — TISSUE RETRIEVAL SYSTEM: Brand: INZII RETRIEVAL SYSTEM

## (undated) DEVICE — SUTURE VCRL SZ 4-0 L18IN ABSRB UD L19MM PS-2 3/8 CIR PRIM J496H

## (undated) DEVICE — DECANTER FLD 9IN ST BG FOR ASEP TRNSF OF FLD